# Patient Record
Sex: FEMALE | Race: WHITE | NOT HISPANIC OR LATINO | Employment: FULL TIME | URBAN - METROPOLITAN AREA
[De-identification: names, ages, dates, MRNs, and addresses within clinical notes are randomized per-mention and may not be internally consistent; named-entity substitution may affect disease eponyms.]

---

## 2018-02-21 ENCOUNTER — OFFICE VISIT (OUTPATIENT)
Dept: URGENT CARE | Facility: CLINIC | Age: 48
End: 2018-02-21
Payer: COMMERCIAL

## 2018-02-21 VITALS
HEIGHT: 65 IN | HEART RATE: 98 BPM | OXYGEN SATURATION: 99 % | TEMPERATURE: 99.9 F | BODY MASS INDEX: 36.66 KG/M2 | WEIGHT: 220.02 LBS | SYSTOLIC BLOOD PRESSURE: 128 MMHG | RESPIRATION RATE: 16 BRPM | DIASTOLIC BLOOD PRESSURE: 82 MMHG

## 2018-02-21 DIAGNOSIS — R05.9 COUGH: ICD-10-CM

## 2018-02-21 DIAGNOSIS — J01.90 ACUTE NON-RECURRENT SINUSITIS, UNSPECIFIED LOCATION: Primary | ICD-10-CM

## 2018-02-21 PROCEDURE — 99203 OFFICE O/P NEW LOW 30 MIN: CPT | Performed by: PHYSICIAN ASSISTANT

## 2018-02-21 RX ORDER — LEVONORGESTREL AND ETHINYL ESTRADIOL 0.1-0.02MG
KIT ORAL
COMMUNITY
Start: 2018-02-12 | End: 2020-09-24 | Stop reason: SDUPTHER

## 2018-02-21 RX ORDER — ALBUTEROL SULFATE 90 UG/1
2 AEROSOL, METERED RESPIRATORY (INHALATION) EVERY 4 HOURS PRN
Qty: 1 INHALER | Refills: 0 | Status: SHIPPED | OUTPATIENT
Start: 2018-02-21 | End: 2018-03-23

## 2018-02-21 RX ORDER — CEFPROZIL 250 MG/1
250 TABLET, FILM COATED ORAL 2 TIMES DAILY
Qty: 14 TABLET | Refills: 0 | Status: SHIPPED | OUTPATIENT
Start: 2018-02-21 | End: 2018-02-28

## 2018-02-21 RX ORDER — LEVOTHYROXINE SODIUM 0.05 MG/1
TABLET ORAL
COMMUNITY
Start: 2018-02-12 | End: 2020-05-27 | Stop reason: DRUGHIGH

## 2018-02-21 NOTE — PROGRESS NOTES
Assessment/Plan:      Diagnoses and all orders for this visit:    Acute non-recurrent sinusitis, unspecified location  -     cefprozil (CEFZIL) 250 mg tablet; Take 1 tablet (250 mg total) by mouth 2 (two) times a day for 7 days If Cefzil is not covered by insurance try Ceftin 250 mg BID x 7 days    Cough  -     albuterol (PROVENTIL HFA,VENTOLIN HFA) 90 mcg/act inhaler; Inhale 2 puffs every 4 (four) hours as needed for wheezing or shortness of breath for up to 30 days    Other orders  -     FALMINA 0 1-20 MG-MCG per tablet;   -     levothyroxine 50 mcg tablet;         Patient Instructions   Acute Cough   WHAT YOU NEED TO KNOW:   An acute cough can last up to 3 weeks  Common causes of an acute cough include a cold, allergies, or a lung infection  DISCHARGE INSTRUCTIONS:   Return to the emergency department if:   · You have trouble breathing or feel short of breath  · You cough up blood, or you see blood in your mucus  · You faint or feel weak or dizzy  · You have chest pain when you cough or take a deep breath  · You have new wheezing  Contact your healthcare provider if:   · You have a fever  · Your cough lasts longer than 4 weeks  · Your symptoms do not improve with treatment  · You have questions or concerns about your condition or care  Medicines:   · Medicines  may be needed to stop the cough, decrease swelling in your airways, or help open your airways  Medicine may also be given to help you cough up mucus  Ask your healthcare provider what over-the-counter medicines you can take  If you have an infection caused by bacteria, you may need antibiotics  · Take your medicine as directed  Contact your healthcare provider if you think your medicine is not helping or if you have side effects  Tell him or her if you are allergic to any medicine  Keep a list of the medicines, vitamins, and herbs you take  Include the amounts, and when and why you take them   Bring the list or the pill bottles to follow-up visits  Carry your medicine list with you in case of an emergency  Manage your symptoms:   · Do not smoke and stay away from others who smoke  Nicotine and other chemicals in cigarettes and cigars can cause lung damage and make your cough worse  Ask your healthcare provider for information if you currently smoke and need help to quit  E-cigarettes or smokeless tobacco still contain nicotine  Talk to your healthcare provider before you use these products  · Drink extra liquids as directed  Liquids will help thin and loosen mucus so you can cough it up  Liquids will also help prevent dehydration  Examples of good liquids to drink include water, fruit juice, and broth  Do not drink liquids that contain caffeine  Caffeine can increase your risk for dehydration  Ask your healthcare provider how much liquid to drink each day  · Rest as directed  Do not do activities that make your cough worse, such as exercise  · Use a humidifier or vaporizer  Use a cool mist humidifier or a vaporizer to increase air moisture in your home  This may make it easier for you to breathe and help decrease your cough  · Eat 2 to 5 mL of honey 2 times each day  Honey can help thin mucus and decrease your cough  · Use cough drops or lozenges  These can help decrease throat irritation and your cough  Follow up with your healthcare provider as directed:  Write down your questions so you remember to ask them during your visits  © 2017 [address] [facility] Information is for End User's use only and may not be sold, redistributed or otherwise used for commercial purposes  All illustrations and images included in CareNotes® are the copyrighted property of [employer]  or Devendra Chen  The above information is an  only  It is not intended as medical advice for individual conditions or treatments   Talk to your doctor, nurse or pharmacist before following any medical regimen to see if it is safe and effective for you  Rhinosinusitis   WHAT YOU NEED TO KNOW:   Rhinosinusitis (RS) is inflammation of your nose and sinuses  It commonly begins as a virus, often as a common cold  Viruses usually last 7 to 10 days and do not need treatment  When the virus does not get better on its own, you may have bacterial RS  This means that bacteria have begun to grow inside your sinuses  Acute RS lasts less than 4 weeks  Chronic RS lasts 12 weeks or more  Recurrent RS is when you have 4 or more episodes of RS in one year  DISCHARGE INSTRUCTIONS:   Return to the emergency department if:   · Your eye and eyelid are red, swollen, and painful  · You cannot open your eye  · You have double vision or you cannot see  · Your eyeball bulges out or you cannot move your eye  · You are more sleepy than normal or you notice changes in your ability to think, move, or talk  · You have a stiff neck, a fever, or a bad headache  · You have swelling of your forehead or scalp  Contact your healthcare provider if:   · Your symptoms are worse or do not improve after 3 to 5 days of treatment  · You have questions or concerns about your condition or care  Medicines: You may need any of the following:  · Acetaminophen  decreases pain and fever  It is available without a doctor's order  Ask how much to take and how often to take it  Follow directions  Acetaminophen can cause liver damage if not taken correctly  · NSAIDs , such as ibuprofen, help decrease swelling, pain, and fever  This medicine is available with or without a doctor's order  NSAIDs can cause stomach bleeding or kidney problems in certain people  If you take blood thinner medicine, always ask your healthcare provider if NSAIDs are safe for you  Always read the medicine label and follow directions  · Nasal steroid sprays  decrease inflammation in your nose and sinuses      · Decongestants  reduce swelling and drain mucus in the nose and sinuses  They may help you breathe easier  · Antihistamines  dry mucus in the nose and relieve sneezing  · Antibiotics  treat a bacterial infection and may be needed if your symptoms do not improve or they get worse  · Take your medicine as directed  Contact your healthcare provider if you think your medicine is not helping or if you have side effects  Tell him or her if you are allergic to any medicine  Keep a list of the medicines, vitamins, and herbs you take  Include the amounts, and when and why you take them  Bring the list or the pill bottles to follow-up visits  Carry your medicine list with you in case of an emergency  Self-care:   · Rinse your sinuses  Use a sinus rinse device to rinse your nasal passages with a saline (salt water) solution  This will help thin the mucus in your nose and rinse away pollen and dirt  It will also help reduce swelling so you can breathe normally  Ask your healthcare provider how often to do this  · Breathe in steam   Heat a bowl of water until you see steam  Lean over the bowl and make a tent over your head with a large towel  Breathe deeply for about 20 minutes  Be careful not to get too close to the steam or burn yourself  Do this 3 times a day  You can also breathe deeply when you take a hot shower  · Sleep with your head elevated  Place an extra pillow under your head before you go to sleep to help your sinuses drain  · Drink liquids as directed  Ask your healthcare provider how much liquid to drink each day and which liquids are best for you  Liquids will thin the mucus in your nose and help it drain  Avoid drinks that contain alcohol or caffeine  · Do not smoke, and avoid secondhand smoke  Nicotine and other chemicals in cigarettes and cigars can make your symptoms worse  Ask your healthcare provider for information if you currently smoke and need help to quit   E-cigarettes or smokeless tobacco still contain nicotine  Talk to your healthcare provider before you use these products  Follow up with your healthcare provider as directed: Follow up if your symptoms are worse or not better after 3 to 5 days of treatment  Write down your questions so you remember to ask them during your visits  © 2017 [address] [employer] Information is for End User's use only and may not be sold, redistributed or otherwise used for commercial purposes  All illustrations and images included in CareNotes® are the copyrighted property of [employer]  or Devendra Chen  The above information is an  only  It is not intended as medical advice for individual conditions or treatments  Talk to your doctor, nurse or pharmacist before following any medical regimen to see if it is safe and effective for you  Subjective:     Patient ID: Sascha Saenz is a 41 y o  female  Patient presents with a four-day history of sinus pressure purulent nasal drainage headaches sore throat and a cough  The cough can predict be productive of the same yellow mucus  She has been having a fevers as high as 100 8  Temperature today is only been 99  She denies any chest pain shortness of breath dyspnea on exertion she states the cough can be very cyclical and worse when she is active  She denies any body aches all  Review of Systems   Constitutional: Positive for fever  Negative for chills  HENT: Positive for congestion, postnasal drip, rhinorrhea, sinus pressure and sore throat  Negative for ear pain, trouble swallowing and voice change  Eyes: Negative for redness  Respiratory: Positive for cough  Negative for chest tightness, shortness of breath and wheezing  Cardiovascular: Negative for chest pain  Gastrointestinal: Negative for abdominal pain, diarrhea, nausea and vomiting  Musculoskeletal: Negative for myalgias  Skin: Negative for rash  Neurological: Positive for headaches   Negative for light-headedness  Hematological: Negative for adenopathy  Objective:     Physical Exam   Constitutional: She is oriented to person, place, and time  She appears well-developed and well-nourished  HENT:   Head: Normocephalic and atraumatic  Right Ear: External ear normal    Left Ear: External ear normal    Mouth/Throat: Oropharynx is clear and moist    Bogginess to nasal mucosa as well as the mucoid nasal drainage evident both nares  Eyes: Conjunctivae are normal    Neck: Neck supple  Cardiovascular: Normal rate, regular rhythm and normal heart sounds  Pulmonary/Chest: Effort normal and breath sounds normal    Lymphadenopathy:     She has no cervical adenopathy  Neurological: She is alert and oriented to person, place, and time  Skin: Skin is warm and dry  No rash noted  Psychiatric: She has a normal mood and affect  Nursing note and vitals reviewed

## 2018-02-21 NOTE — PATIENT INSTRUCTIONS
Acute Cough   WHAT YOU NEED TO KNOW:   An acute cough can last up to 3 weeks  Common causes of an acute cough include a cold, allergies, or a lung infection  DISCHARGE INSTRUCTIONS:   Return to the emergency department if:   · You have trouble breathing or feel short of breath  · You cough up blood, or you see blood in your mucus  · You faint or feel weak or dizzy  · You have chest pain when you cough or take a deep breath  · You have new wheezing  Contact your healthcare provider if:   · You have a fever  · Your cough lasts longer than 4 weeks  · Your symptoms do not improve with treatment  · You have questions or concerns about your condition or care  Medicines:   · Medicines  may be needed to stop the cough, decrease swelling in your airways, or help open your airways  Medicine may also be given to help you cough up mucus  Ask your healthcare provider what over-the-counter medicines you can take  If you have an infection caused by bacteria, you may need antibiotics  · Take your medicine as directed  Contact your healthcare provider if you think your medicine is not helping or if you have side effects  Tell him or her if you are allergic to any medicine  Keep a list of the medicines, vitamins, and herbs you take  Include the amounts, and when and why you take them  Bring the list or the pill bottles to follow-up visits  Carry your medicine list with you in case of an emergency  Manage your symptoms:   · Do not smoke and stay away from others who smoke  Nicotine and other chemicals in cigarettes and cigars can cause lung damage and make your cough worse  Ask your healthcare provider for information if you currently smoke and need help to quit  E-cigarettes or smokeless tobacco still contain nicotine  Talk to your healthcare provider before you use these products  · Drink extra liquids as directed  Liquids will help thin and loosen mucus so you can cough it up   Liquids will also help prevent dehydration  Examples of good liquids to drink include water, fruit juice, and broth  Do not drink liquids that contain caffeine  Caffeine can increase your risk for dehydration  Ask your healthcare provider how much liquid to drink each day  · Rest as directed  Do not do activities that make your cough worse, such as exercise  · Use a humidifier or vaporizer  Use a cool mist humidifier or a vaporizer to increase air moisture in your home  This may make it easier for you to breathe and help decrease your cough  · Eat 2 to 5 mL of honey 2 times each day  Honey can help thin mucus and decrease your cough  · Use cough drops or lozenges  These can help decrease throat irritation and your cough  Follow up with your healthcare provider as directed:  Write down your questions so you remember to ask them during your visits  © 2017 [address] [employer] Information is for End User's use only and may not be sold, redistributed or otherwise used for commercial purposes  All illustrations and images included in CareNotes® are the copyrighted property of [employer]  or [address]  The above information is an  only  It is not intended as medical advice for individual conditions or treatments  Talk to your doctor, nurse or pharmacist before following any medical regimen to see if it is safe and effective for you  Rhinosinusitis   WHAT YOU NEED TO KNOW:   Rhinosinusitis (RS) is inflammation of your nose and sinuses  It commonly begins as a virus, often as a common cold  Viruses usually last 7 to 10 days and do not need treatment  When the virus does not get better on its own, you may have bacterial RS  This means that bacteria have begun to grow inside your sinuses  Acute RS lasts less than 4 weeks  Chronic RS lasts 12 weeks or more  Recurrent RS is when you have 4 or more episodes of RS in one year     DISCHARGE INSTRUCTIONS:   Return to the emergency department if:   · Your eye and eyelid are red, swollen, and painful  · You cannot open your eye  · You have double vision or you cannot see  · Your eyeball bulges out or you cannot move your eye  · You are more sleepy than normal or you notice changes in your ability to think, move, or talk  · You have a stiff neck, a fever, or a bad headache  · You have swelling of your forehead or scalp  Contact your healthcare provider if:   · Your symptoms are worse or do not improve after 3 to 5 days of treatment  · You have questions or concerns about your condition or care  Medicines: You may need any of the following:  · Acetaminophen  decreases pain and fever  It is available without a doctor's order  Ask how much to take and how often to take it  Follow directions  Acetaminophen can cause liver damage if not taken correctly  · NSAIDs , such as ibuprofen, help decrease swelling, pain, and fever  This medicine is available with or without a doctor's order  NSAIDs can cause stomach bleeding or kidney problems in certain people  If you take blood thinner medicine, always ask your healthcare provider if NSAIDs are safe for you  Always read the medicine label and follow directions  · Nasal steroid sprays  decrease inflammation in your nose and sinuses  · Decongestants  reduce swelling and drain mucus in the nose and sinuses  They may help you breathe easier  · Antihistamines  dry mucus in the nose and relieve sneezing  · Antibiotics  treat a bacterial infection and may be needed if your symptoms do not improve or they get worse  · Take your medicine as directed  Contact your healthcare provider if you think your medicine is not helping or if you have side effects  Tell him or her if you are allergic to any medicine  Keep a list of the medicines, vitamins, and herbs you take  Include the amounts, and when and why you take them   Bring the list or the pill bottles to follow-up visits  Carry your medicine list with you in case of an emergency  Self-care:   · Rinse your sinuses  Use a sinus rinse device to rinse your nasal passages with a saline (salt water) solution  This will help thin the mucus in your nose and rinse away pollen and dirt  It will also help reduce swelling so you can breathe normally  Ask your healthcare provider how often to do this  · Breathe in steam   Heat a bowl of water until you see steam  Lean over the bowl and make a tent over your head with a large towel  Breathe deeply for about 20 minutes  Be careful not to get too close to the steam or burn yourself  Do this 3 times a day  You can also breathe deeply when you take a hot shower  · Sleep with your head elevated  Place an extra pillow under your head before you go to sleep to help your sinuses drain  · Drink liquids as directed  Ask your healthcare provider how much liquid to drink each day and which liquids are best for you  Liquids will thin the mucus in your nose and help it drain  Avoid drinks that contain alcohol or caffeine  · Do not smoke, and avoid secondhand smoke  Nicotine and other chemicals in cigarettes and cigars can make your symptoms worse  Ask your healthcare provider for information if you currently smoke and need help to quit  E-cigarettes or smokeless tobacco still contain nicotine  Talk to your healthcare provider before you use these products  Follow up with your healthcare provider as directed: Follow up if your symptoms are worse or not better after 3 to 5 days of treatment  Write down your questions so you remember to ask them during your visits  © 2017 [address] [employer] Information is for End User's use only and may not be sold, redistributed or otherwise used for commercial purposes  All illustrations and images included in CareNotes® are the copyrighted property of [employer]  or Devendra Chen    The above information is an  only  It is not intended as medical advice for individual conditions or treatments  Talk to your doctor, nurse or pharmacist before following any medical regimen to see if it is safe and effective for you

## 2018-07-27 ENCOUNTER — APPOINTMENT (OUTPATIENT)
Dept: LAB | Facility: CLINIC | Age: 48
End: 2018-07-27
Payer: COMMERCIAL

## 2018-07-27 ENCOUNTER — TRANSCRIBE ORDERS (OUTPATIENT)
Dept: LAB | Facility: CLINIC | Age: 48
End: 2018-07-27

## 2018-07-27 DIAGNOSIS — I51.9 MYXEDEMA HEART DISEASE: Primary | ICD-10-CM

## 2018-07-27 DIAGNOSIS — I51.9 MYXEDEMA HEART DISEASE: ICD-10-CM

## 2018-07-27 DIAGNOSIS — E03.9 MYXEDEMA HEART DISEASE: ICD-10-CM

## 2018-07-27 DIAGNOSIS — E03.9 MYXEDEMA HEART DISEASE: Primary | ICD-10-CM

## 2018-07-27 LAB
ALBUMIN SERPL BCP-MCNC: 3.6 G/DL (ref 3.5–5)
ALP SERPL-CCNC: 46 U/L (ref 46–116)
ALT SERPL W P-5'-P-CCNC: 18 U/L (ref 12–78)
ANION GAP SERPL CALCULATED.3IONS-SCNC: 6 MMOL/L (ref 4–13)
AST SERPL W P-5'-P-CCNC: 13 U/L (ref 5–45)
BASOPHILS # BLD AUTO: 0.06 THOUSANDS/ΜL (ref 0–0.1)
BASOPHILS NFR BLD AUTO: 1 % (ref 0–1)
BILIRUB SERPL-MCNC: 0.66 MG/DL (ref 0.2–1)
BUN SERPL-MCNC: 17 MG/DL (ref 5–25)
CALCIUM SERPL-MCNC: 8.6 MG/DL (ref 8.3–10.1)
CHLORIDE SERPL-SCNC: 109 MMOL/L (ref 100–108)
CHOLEST SERPL-MCNC: 158 MG/DL (ref 50–200)
CO2 SERPL-SCNC: 26 MMOL/L (ref 21–32)
CREAT SERPL-MCNC: 1.02 MG/DL (ref 0.6–1.3)
EOSINOPHIL # BLD AUTO: 0.26 THOUSAND/ΜL (ref 0–0.61)
EOSINOPHIL NFR BLD AUTO: 4 % (ref 0–6)
ERYTHROCYTE [DISTWIDTH] IN BLOOD BY AUTOMATED COUNT: 14 % (ref 11.6–15.1)
GFR SERPL CREATININE-BSD FRML MDRD: 67 ML/MIN/1.73SQ M
GLUCOSE P FAST SERPL-MCNC: 85 MG/DL (ref 65–99)
HCT VFR BLD AUTO: 41.6 % (ref 34.8–46.1)
HDLC SERPL-MCNC: 65 MG/DL (ref 40–60)
HGB BLD-MCNC: 13.3 G/DL (ref 11.5–15.4)
IMM GRANULOCYTES # BLD AUTO: 0.01 THOUSAND/UL (ref 0–0.2)
IMM GRANULOCYTES NFR BLD AUTO: 0 % (ref 0–2)
LDLC SERPL CALC-MCNC: 80 MG/DL (ref 0–100)
LYMPHOCYTES # BLD AUTO: 1.65 THOUSANDS/ΜL (ref 0.6–4.47)
LYMPHOCYTES NFR BLD AUTO: 24 % (ref 14–44)
MCH RBC QN AUTO: 28.7 PG (ref 26.8–34.3)
MCHC RBC AUTO-ENTMCNC: 32 G/DL (ref 31.4–37.4)
MCV RBC AUTO: 90 FL (ref 82–98)
MONOCYTES # BLD AUTO: 0.67 THOUSAND/ΜL (ref 0.17–1.22)
MONOCYTES NFR BLD AUTO: 10 % (ref 4–12)
NEUTROPHILS # BLD AUTO: 4.25 THOUSANDS/ΜL (ref 1.85–7.62)
NEUTS SEG NFR BLD AUTO: 61 % (ref 43–75)
NONHDLC SERPL-MCNC: 93 MG/DL
NRBC BLD AUTO-RTO: 0 /100 WBCS
PLATELET # BLD AUTO: 296 THOUSANDS/UL (ref 149–390)
PMV BLD AUTO: 11.3 FL (ref 8.9–12.7)
POTASSIUM SERPL-SCNC: 4.1 MMOL/L (ref 3.5–5.3)
PROT SERPL-MCNC: 7.4 G/DL (ref 6.4–8.2)
RBC # BLD AUTO: 4.63 MILLION/UL (ref 3.81–5.12)
SODIUM SERPL-SCNC: 141 MMOL/L (ref 136–145)
TRIGL SERPL-MCNC: 63 MG/DL
TSH SERPL DL<=0.05 MIU/L-ACNC: 7.19 UIU/ML (ref 0.36–3.74)
WBC # BLD AUTO: 6.9 THOUSAND/UL (ref 4.31–10.16)

## 2018-07-27 PROCEDURE — 80061 LIPID PANEL: CPT

## 2018-07-27 PROCEDURE — 85025 COMPLETE CBC W/AUTO DIFF WBC: CPT

## 2018-07-27 PROCEDURE — 36415 COLL VENOUS BLD VENIPUNCTURE: CPT

## 2018-07-27 PROCEDURE — 84443 ASSAY THYROID STIM HORMONE: CPT

## 2018-07-27 PROCEDURE — 80053 COMPREHEN METABOLIC PANEL: CPT

## 2018-10-10 ENCOUNTER — TRANSCRIBE ORDERS (OUTPATIENT)
Dept: LAB | Facility: CLINIC | Age: 48
End: 2018-10-10

## 2018-10-10 ENCOUNTER — APPOINTMENT (OUTPATIENT)
Dept: LAB | Facility: CLINIC | Age: 48
End: 2018-10-10
Payer: COMMERCIAL

## 2018-10-10 DIAGNOSIS — E03.9 MYXEDEMA HEART DISEASE: ICD-10-CM

## 2018-10-10 DIAGNOSIS — I51.9 MYXEDEMA HEART DISEASE: Primary | ICD-10-CM

## 2018-10-10 DIAGNOSIS — I51.9 MYXEDEMA HEART DISEASE: ICD-10-CM

## 2018-10-10 DIAGNOSIS — E03.9 MYXEDEMA HEART DISEASE: Primary | ICD-10-CM

## 2018-10-10 LAB
T4 FREE SERPL-MCNC: 1 NG/DL (ref 0.76–1.46)
TSH SERPL DL<=0.05 MIU/L-ACNC: 10.3 UIU/ML (ref 0.36–3.74)

## 2018-10-10 PROCEDURE — 84439 ASSAY OF FREE THYROXINE: CPT

## 2018-10-10 PROCEDURE — 84443 ASSAY THYROID STIM HORMONE: CPT

## 2018-10-10 PROCEDURE — 36415 COLL VENOUS BLD VENIPUNCTURE: CPT

## 2019-02-04 ENCOUNTER — APPOINTMENT (OUTPATIENT)
Dept: LAB | Facility: CLINIC | Age: 49
End: 2019-02-04
Payer: COMMERCIAL

## 2019-02-04 ENCOUNTER — TRANSCRIBE ORDERS (OUTPATIENT)
Dept: LAB | Facility: CLINIC | Age: 49
End: 2019-02-04

## 2019-02-04 DIAGNOSIS — E03.9 MYXEDEMA HEART DISEASE: ICD-10-CM

## 2019-02-04 DIAGNOSIS — E03.9 ACQUIRED HYPOTHYROIDISM: Primary | ICD-10-CM

## 2019-02-04 DIAGNOSIS — I51.9 MYXEDEMA HEART DISEASE: ICD-10-CM

## 2019-02-04 DIAGNOSIS — E03.9 ACQUIRED HYPOTHYROIDISM: ICD-10-CM

## 2019-02-04 LAB
T4 FREE SERPL-MCNC: 1.07 NG/DL (ref 0.76–1.46)
TSH SERPL DL<=0.05 MIU/L-ACNC: 2.24 UIU/ML (ref 0.36–3.74)

## 2019-02-04 PROCEDURE — 84439 ASSAY OF FREE THYROXINE: CPT

## 2019-02-04 PROCEDURE — 36415 COLL VENOUS BLD VENIPUNCTURE: CPT

## 2019-02-04 PROCEDURE — 84443 ASSAY THYROID STIM HORMONE: CPT

## 2019-08-12 ENCOUNTER — APPOINTMENT (OUTPATIENT)
Dept: LAB | Facility: CLINIC | Age: 49
End: 2019-08-12
Payer: COMMERCIAL

## 2019-08-12 ENCOUNTER — TRANSCRIBE ORDERS (OUTPATIENT)
Dept: LAB | Facility: CLINIC | Age: 49
End: 2019-08-12

## 2019-08-12 DIAGNOSIS — I51.9 MYXEDEMA HEART DISEASE: ICD-10-CM

## 2019-08-12 DIAGNOSIS — I51.9 MYXEDEMA HEART DISEASE: Primary | ICD-10-CM

## 2019-08-12 DIAGNOSIS — E03.9 MYXEDEMA HEART DISEASE: Primary | ICD-10-CM

## 2019-08-12 DIAGNOSIS — E03.9 MYXEDEMA HEART DISEASE: ICD-10-CM

## 2019-08-12 LAB
ALBUMIN SERPL BCP-MCNC: 3.8 G/DL (ref 3.5–5)
ALP SERPL-CCNC: 48 U/L (ref 46–116)
ALT SERPL W P-5'-P-CCNC: 20 U/L (ref 12–78)
ANION GAP SERPL CALCULATED.3IONS-SCNC: 10 MMOL/L (ref 4–13)
AST SERPL W P-5'-P-CCNC: 15 U/L (ref 5–45)
BASOPHILS # BLD AUTO: 0.06 THOUSANDS/ΜL (ref 0–0.1)
BASOPHILS NFR BLD AUTO: 1 % (ref 0–1)
BILIRUB SERPL-MCNC: 0.47 MG/DL (ref 0.2–1)
BUN SERPL-MCNC: 17 MG/DL (ref 5–25)
CALCIUM SERPL-MCNC: 9.3 MG/DL (ref 8.3–10.1)
CHLORIDE SERPL-SCNC: 108 MMOL/L (ref 100–108)
CHOLEST SERPL-MCNC: 174 MG/DL (ref 50–200)
CO2 SERPL-SCNC: 22 MMOL/L (ref 21–32)
CREAT SERPL-MCNC: 0.86 MG/DL (ref 0.6–1.3)
EOSINOPHIL # BLD AUTO: 0.12 THOUSAND/ΜL (ref 0–0.61)
EOSINOPHIL NFR BLD AUTO: 2 % (ref 0–6)
ERYTHROCYTE [DISTWIDTH] IN BLOOD BY AUTOMATED COUNT: 13.7 % (ref 11.6–15.1)
GFR SERPL CREATININE-BSD FRML MDRD: 81 ML/MIN/1.73SQ M
GLUCOSE P FAST SERPL-MCNC: 81 MG/DL (ref 65–99)
HCT VFR BLD AUTO: 41.9 % (ref 34.8–46.1)
HDLC SERPL-MCNC: 68 MG/DL (ref 40–60)
HGB BLD-MCNC: 13.2 G/DL (ref 11.5–15.4)
IMM GRANULOCYTES # BLD AUTO: 0.02 THOUSAND/UL (ref 0–0.2)
IMM GRANULOCYTES NFR BLD AUTO: 0 % (ref 0–2)
LDLC SERPL CALC-MCNC: 92 MG/DL (ref 0–100)
LYMPHOCYTES # BLD AUTO: 1.91 THOUSANDS/ΜL (ref 0.6–4.47)
LYMPHOCYTES NFR BLD AUTO: 26 % (ref 14–44)
MCH RBC QN AUTO: 28.6 PG (ref 26.8–34.3)
MCHC RBC AUTO-ENTMCNC: 31.5 G/DL (ref 31.4–37.4)
MCV RBC AUTO: 91 FL (ref 82–98)
MONOCYTES # BLD AUTO: 0.55 THOUSAND/ΜL (ref 0.17–1.22)
MONOCYTES NFR BLD AUTO: 7 % (ref 4–12)
NEUTROPHILS # BLD AUTO: 4.79 THOUSANDS/ΜL (ref 1.85–7.62)
NEUTS SEG NFR BLD AUTO: 64 % (ref 43–75)
NONHDLC SERPL-MCNC: 106 MG/DL
NRBC BLD AUTO-RTO: 0 /100 WBCS
PLATELET # BLD AUTO: 317 THOUSANDS/UL (ref 149–390)
PMV BLD AUTO: 11.2 FL (ref 8.9–12.7)
POTASSIUM SERPL-SCNC: 4.2 MMOL/L (ref 3.5–5.3)
PROT SERPL-MCNC: 7.5 G/DL (ref 6.4–8.2)
RBC # BLD AUTO: 4.62 MILLION/UL (ref 3.81–5.12)
SODIUM SERPL-SCNC: 140 MMOL/L (ref 136–145)
T4 FREE SERPL-MCNC: 0.95 NG/DL (ref 0.76–1.46)
TRIGL SERPL-MCNC: 70 MG/DL
TSH SERPL DL<=0.05 MIU/L-ACNC: 5.72 UIU/ML (ref 0.36–3.74)
WBC # BLD AUTO: 7.45 THOUSAND/UL (ref 4.31–10.16)

## 2019-08-12 PROCEDURE — 80061 LIPID PANEL: CPT

## 2019-08-12 PROCEDURE — 80053 COMPREHEN METABOLIC PANEL: CPT

## 2019-08-12 PROCEDURE — 85025 COMPLETE CBC W/AUTO DIFF WBC: CPT

## 2019-08-12 PROCEDURE — 84439 ASSAY OF FREE THYROXINE: CPT

## 2019-08-12 PROCEDURE — 36415 COLL VENOUS BLD VENIPUNCTURE: CPT

## 2019-08-12 PROCEDURE — 84443 ASSAY THYROID STIM HORMONE: CPT

## 2020-04-20 ENCOUNTER — APPOINTMENT (OUTPATIENT)
Dept: LAB | Facility: CLINIC | Age: 50
End: 2020-04-20
Payer: COMMERCIAL

## 2020-04-20 ENCOUNTER — TRANSCRIBE ORDERS (OUTPATIENT)
Dept: ADMINISTRATIVE | Facility: HOSPITAL | Age: 50
End: 2020-04-20

## 2020-04-20 DIAGNOSIS — E03.9 ACQUIRED HYPOTHYROIDISM: Primary | ICD-10-CM

## 2020-04-20 DIAGNOSIS — E03.9 ACQUIRED HYPOTHYROIDISM: ICD-10-CM

## 2020-04-20 LAB
ALBUMIN SERPL BCP-MCNC: 3.4 G/DL (ref 3.5–5)
ALP SERPL-CCNC: 40 U/L (ref 46–116)
ALT SERPL W P-5'-P-CCNC: 20 U/L (ref 12–78)
ANION GAP SERPL CALCULATED.3IONS-SCNC: 6 MMOL/L (ref 4–13)
AST SERPL W P-5'-P-CCNC: 11 U/L (ref 5–45)
BASOPHILS # BLD AUTO: 0.07 THOUSANDS/ΜL (ref 0–0.1)
BASOPHILS NFR BLD AUTO: 1 % (ref 0–1)
BILIRUB SERPL-MCNC: 0.4 MG/DL (ref 0.2–1)
BUN SERPL-MCNC: 16 MG/DL (ref 5–25)
CALCIUM SERPL-MCNC: 8.7 MG/DL (ref 8.3–10.1)
CHLORIDE SERPL-SCNC: 109 MMOL/L (ref 100–108)
CHOLEST SERPL-MCNC: 152 MG/DL (ref 50–200)
CO2 SERPL-SCNC: 24 MMOL/L (ref 21–32)
CREAT SERPL-MCNC: 0.9 MG/DL (ref 0.6–1.3)
EOSINOPHIL # BLD AUTO: 0.28 THOUSAND/ΜL (ref 0–0.61)
EOSINOPHIL NFR BLD AUTO: 5 % (ref 0–6)
ERYTHROCYTE [DISTWIDTH] IN BLOOD BY AUTOMATED COUNT: 13.7 % (ref 11.6–15.1)
GFR SERPL CREATININE-BSD FRML MDRD: 76 ML/MIN/1.73SQ M
GLUCOSE P FAST SERPL-MCNC: 85 MG/DL (ref 65–99)
HCT VFR BLD AUTO: 38.9 % (ref 34.8–46.1)
HDLC SERPL-MCNC: 54 MG/DL
HGB BLD-MCNC: 12.4 G/DL (ref 11.5–15.4)
IMM GRANULOCYTES # BLD AUTO: 0.01 THOUSAND/UL (ref 0–0.2)
IMM GRANULOCYTES NFR BLD AUTO: 0 % (ref 0–2)
LDLC SERPL CALC-MCNC: 86 MG/DL (ref 0–100)
LYMPHOCYTES # BLD AUTO: 1.46 THOUSANDS/ΜL (ref 0.6–4.47)
LYMPHOCYTES NFR BLD AUTO: 28 % (ref 14–44)
MCH RBC QN AUTO: 29.1 PG (ref 26.8–34.3)
MCHC RBC AUTO-ENTMCNC: 31.9 G/DL (ref 31.4–37.4)
MCV RBC AUTO: 91 FL (ref 82–98)
MONOCYTES # BLD AUTO: 0.55 THOUSAND/ΜL (ref 0.17–1.22)
MONOCYTES NFR BLD AUTO: 11 % (ref 4–12)
NEUTROPHILS # BLD AUTO: 2.83 THOUSANDS/ΜL (ref 1.85–7.62)
NEUTS SEG NFR BLD AUTO: 55 % (ref 43–75)
NONHDLC SERPL-MCNC: 98 MG/DL
NRBC BLD AUTO-RTO: 0 /100 WBCS
PLATELET # BLD AUTO: 271 THOUSANDS/UL (ref 149–390)
PMV BLD AUTO: 11.1 FL (ref 8.9–12.7)
POTASSIUM SERPL-SCNC: 4.4 MMOL/L (ref 3.5–5.3)
PROT SERPL-MCNC: 6.9 G/DL (ref 6.4–8.2)
RBC # BLD AUTO: 4.26 MILLION/UL (ref 3.81–5.12)
SODIUM SERPL-SCNC: 139 MMOL/L (ref 136–145)
T4 FREE SERPL-MCNC: 0.98 NG/DL (ref 0.76–1.46)
TRIGL SERPL-MCNC: 59 MG/DL
TSH SERPL DL<=0.05 MIU/L-ACNC: 6.3 UIU/ML (ref 0.36–3.74)
WBC # BLD AUTO: 5.2 THOUSAND/UL (ref 4.31–10.16)

## 2020-04-20 PROCEDURE — 36415 COLL VENOUS BLD VENIPUNCTURE: CPT

## 2020-04-20 PROCEDURE — 80053 COMPREHEN METABOLIC PANEL: CPT

## 2020-04-20 PROCEDURE — 84439 ASSAY OF FREE THYROXINE: CPT

## 2020-04-20 PROCEDURE — 85025 COMPLETE CBC W/AUTO DIFF WBC: CPT

## 2020-04-20 PROCEDURE — 84443 ASSAY THYROID STIM HORMONE: CPT

## 2020-04-20 PROCEDURE — 80061 LIPID PANEL: CPT

## 2020-05-27 ENCOUNTER — OFFICE VISIT (OUTPATIENT)
Dept: URGENT CARE | Facility: CLINIC | Age: 50
End: 2020-05-27
Payer: COMMERCIAL

## 2020-05-27 VITALS
BODY MASS INDEX: 37.94 KG/M2 | TEMPERATURE: 97 F | OXYGEN SATURATION: 97 % | WEIGHT: 228 LBS | RESPIRATION RATE: 18 BRPM | HEART RATE: 76 BPM | DIASTOLIC BLOOD PRESSURE: 85 MMHG | SYSTOLIC BLOOD PRESSURE: 132 MMHG

## 2020-05-27 DIAGNOSIS — J45.901 MILD ASTHMA WITH ACUTE EXACERBATION, UNSPECIFIED WHETHER PERSISTENT: Primary | ICD-10-CM

## 2020-05-27 DIAGNOSIS — R05.9 COUGH: ICD-10-CM

## 2020-05-27 PROCEDURE — 99213 OFFICE O/P EST LOW 20 MIN: CPT | Performed by: PHYSICIAN ASSISTANT

## 2020-05-27 PROCEDURE — 96372 THER/PROPH/DIAG INJ SC/IM: CPT | Performed by: PHYSICIAN ASSISTANT

## 2020-05-27 RX ORDER — AZITHROMYCIN 250 MG/1
TABLET, FILM COATED ORAL
Qty: 6 TABLET | Refills: 0 | Status: SHIPPED | OUTPATIENT
Start: 2020-05-27 | End: 2020-05-27 | Stop reason: CLARIF

## 2020-05-27 RX ORDER — BENZONATATE 200 MG/1
200 CAPSULE ORAL 3 TIMES DAILY PRN
Qty: 20 CAPSULE | Refills: 0 | Status: SHIPPED | OUTPATIENT
Start: 2020-05-27 | End: 2020-05-27 | Stop reason: CLARIF

## 2020-05-27 RX ORDER — BENZONATATE 200 MG/1
200 CAPSULE ORAL 3 TIMES DAILY PRN
Qty: 20 CAPSULE | Refills: 0 | Status: SHIPPED | OUTPATIENT
Start: 2020-05-27 | End: 2020-09-24

## 2020-05-27 RX ORDER — BENZONATATE 100 MG/1
100 CAPSULE ORAL 3 TIMES DAILY PRN
COMMUNITY
Start: 2020-03-17 | End: 2020-09-24

## 2020-05-27 RX ORDER — DEXAMETHASONE SODIUM PHOSPHATE 10 MG/ML
10 INJECTION, SOLUTION INTRAMUSCULAR; INTRAVENOUS ONCE
Status: COMPLETED | OUTPATIENT
Start: 2020-05-27 | End: 2020-05-27

## 2020-05-27 RX ORDER — LEVOFLOXACIN 500 MG/1
500 TABLET, FILM COATED ORAL EVERY 24 HOURS
Qty: 7 TABLET | Refills: 0 | Status: SHIPPED | OUTPATIENT
Start: 2020-05-27 | End: 2020-06-03

## 2020-05-27 RX ORDER — PREDNISONE 50 MG/1
50 TABLET ORAL DAILY
Qty: 5 TABLET | Refills: 0 | Status: SHIPPED | OUTPATIENT
Start: 2020-05-27 | End: 2020-06-01

## 2020-05-27 RX ORDER — LEVOTHYROXINE SODIUM 0.07 MG/1
TABLET ORAL
COMMUNITY
Start: 2020-02-28 | End: 2021-02-25 | Stop reason: SDUPTHER

## 2020-05-27 RX ORDER — PREDNISONE 50 MG/1
50 TABLET ORAL DAILY
Qty: 5 TABLET | Refills: 0 | Status: SHIPPED | OUTPATIENT
Start: 2020-05-27 | End: 2020-05-27 | Stop reason: CLARIF

## 2020-05-27 RX ORDER — LORATADINE 10 MG/1
10 TABLET ORAL DAILY
COMMUNITY

## 2020-05-27 RX ORDER — MELOXICAM 15 MG/1
TABLET ORAL
COMMUNITY
Start: 2020-04-23 | End: 2020-09-24 | Stop reason: SDUPTHER

## 2020-05-27 RX ADMIN — DEXAMETHASONE SODIUM PHOSPHATE 10 MG: 10 INJECTION, SOLUTION INTRAMUSCULAR; INTRAVENOUS at 17:18

## 2020-09-08 ENCOUNTER — TRANSCRIBE ORDERS (OUTPATIENT)
Dept: ADMINISTRATIVE | Facility: HOSPITAL | Age: 50
End: 2020-09-08

## 2020-09-08 DIAGNOSIS — R06.02 SHORTNESS OF BREATH: Primary | ICD-10-CM

## 2020-09-15 ENCOUNTER — TRANSCRIBE ORDERS (OUTPATIENT)
Dept: LAB | Facility: CLINIC | Age: 50
End: 2020-09-15

## 2020-09-15 ENCOUNTER — LAB (OUTPATIENT)
Dept: LAB | Facility: CLINIC | Age: 50
End: 2020-09-15
Payer: COMMERCIAL

## 2020-09-15 DIAGNOSIS — E03.9 HYPOTHYROIDISM, UNSPECIFIED TYPE: ICD-10-CM

## 2020-09-15 DIAGNOSIS — E03.9 HYPOTHYROIDISM, UNSPECIFIED TYPE: Primary | ICD-10-CM

## 2020-09-15 LAB
ALBUMIN SERPL BCP-MCNC: 3.6 G/DL (ref 3.5–5)
ALP SERPL-CCNC: 48 U/L (ref 46–116)
ALT SERPL W P-5'-P-CCNC: 21 U/L (ref 12–78)
ANION GAP SERPL CALCULATED.3IONS-SCNC: 7 MMOL/L (ref 4–13)
AST SERPL W P-5'-P-CCNC: 15 U/L (ref 5–45)
BASOPHILS # BLD AUTO: 0.1 THOUSANDS/ΜL (ref 0–0.1)
BASOPHILS NFR BLD AUTO: 2 % (ref 0–1)
BILIRUB SERPL-MCNC: 0.63 MG/DL (ref 0.2–1)
BUN SERPL-MCNC: 12 MG/DL (ref 5–25)
CALCIUM SERPL-MCNC: 8.9 MG/DL (ref 8.3–10.1)
CHLORIDE SERPL-SCNC: 107 MMOL/L (ref 100–108)
CHOLEST SERPL-MCNC: 204 MG/DL (ref 50–200)
CO2 SERPL-SCNC: 25 MMOL/L (ref 21–32)
CREAT SERPL-MCNC: 0.86 MG/DL (ref 0.6–1.3)
EOSINOPHIL # BLD AUTO: 0.35 THOUSAND/ΜL (ref 0–0.61)
EOSINOPHIL NFR BLD AUTO: 6 % (ref 0–6)
ERYTHROCYTE [DISTWIDTH] IN BLOOD BY AUTOMATED COUNT: 13.7 % (ref 11.6–15.1)
EST. AVERAGE GLUCOSE BLD GHB EST-MCNC: 100 MG/DL
GFR SERPL CREATININE-BSD FRML MDRD: 80 ML/MIN/1.73SQ M
GLUCOSE P FAST SERPL-MCNC: 90 MG/DL (ref 65–99)
HBA1C MFR BLD: 5.1 %
HCT VFR BLD AUTO: 43 % (ref 34.8–46.1)
HDLC SERPL-MCNC: 67 MG/DL
HGB BLD-MCNC: 13.4 G/DL (ref 11.5–15.4)
IMM GRANULOCYTES # BLD AUTO: 0.03 THOUSAND/UL (ref 0–0.2)
IMM GRANULOCYTES NFR BLD AUTO: 1 % (ref 0–2)
LDLC SERPL CALC-MCNC: 121 MG/DL (ref 0–100)
LYMPHOCYTES # BLD AUTO: 1.22 THOUSANDS/ΜL (ref 0.6–4.47)
LYMPHOCYTES NFR BLD AUTO: 20 % (ref 14–44)
MCH RBC QN AUTO: 29.1 PG (ref 26.8–34.3)
MCHC RBC AUTO-ENTMCNC: 31.2 G/DL (ref 31.4–37.4)
MCV RBC AUTO: 93 FL (ref 82–98)
MONOCYTES # BLD AUTO: 0.55 THOUSAND/ΜL (ref 0.17–1.22)
MONOCYTES NFR BLD AUTO: 9 % (ref 4–12)
NEUTROPHILS # BLD AUTO: 3.91 THOUSANDS/ΜL (ref 1.85–7.62)
NEUTS SEG NFR BLD AUTO: 62 % (ref 43–75)
NONHDLC SERPL-MCNC: 137 MG/DL
NRBC BLD AUTO-RTO: 0 /100 WBCS
PLATELET # BLD AUTO: 321 THOUSANDS/UL (ref 149–390)
PMV BLD AUTO: 10.8 FL (ref 8.9–12.7)
POTASSIUM SERPL-SCNC: 4.1 MMOL/L (ref 3.5–5.3)
PROT SERPL-MCNC: 7.3 G/DL (ref 6.4–8.2)
RBC # BLD AUTO: 4.61 MILLION/UL (ref 3.81–5.12)
SODIUM SERPL-SCNC: 139 MMOL/L (ref 136–145)
TRIGL SERPL-MCNC: 80 MG/DL
TSH SERPL DL<=0.05 MIU/L-ACNC: 5.49 UIU/ML (ref 0.36–3.74)
WBC # BLD AUTO: 6.16 THOUSAND/UL (ref 4.31–10.16)

## 2020-09-15 PROCEDURE — 85025 COMPLETE CBC W/AUTO DIFF WBC: CPT

## 2020-09-15 PROCEDURE — 36415 COLL VENOUS BLD VENIPUNCTURE: CPT

## 2020-09-15 PROCEDURE — 84443 ASSAY THYROID STIM HORMONE: CPT

## 2020-09-15 PROCEDURE — 83036 HEMOGLOBIN GLYCOSYLATED A1C: CPT

## 2020-09-15 PROCEDURE — 80053 COMPREHEN METABOLIC PANEL: CPT

## 2020-09-15 PROCEDURE — 80061 LIPID PANEL: CPT

## 2020-09-18 ENCOUNTER — HOSPITAL ENCOUNTER (OUTPATIENT)
Dept: PULMONOLOGY | Facility: HOSPITAL | Age: 50
Discharge: HOME | End: 2020-09-18
Payer: COMMERCIAL

## 2020-09-18 DIAGNOSIS — R06.02 SHORTNESS OF BREATH: ICD-10-CM

## 2020-09-18 PROCEDURE — 94010 BREATHING CAPACITY TEST: CPT | Performed by: INTERNAL MEDICINE

## 2020-09-18 PROCEDURE — 94760 N-INVAS EAR/PLS OXIMETRY 1: CPT

## 2020-09-18 PROCEDURE — 94729 DIFFUSING CAPACITY: CPT

## 2020-09-18 PROCEDURE — 94727 GAS DIL/WSHOT DETER LNG VOL: CPT | Performed by: INTERNAL MEDICINE

## 2020-09-18 PROCEDURE — 94727 GAS DIL/WSHOT DETER LNG VOL: CPT

## 2020-09-18 PROCEDURE — 94010 BREATHING CAPACITY TEST: CPT

## 2020-09-18 PROCEDURE — 94729 DIFFUSING CAPACITY: CPT | Performed by: INTERNAL MEDICINE

## 2020-09-24 ENCOUNTER — OFFICE VISIT (OUTPATIENT)
Dept: INTERNAL MEDICINE CLINIC | Facility: CLINIC | Age: 50
End: 2020-09-24
Payer: COMMERCIAL

## 2020-09-24 VITALS
HEIGHT: 64 IN | BODY MASS INDEX: 38.76 KG/M2 | HEART RATE: 61 BPM | DIASTOLIC BLOOD PRESSURE: 76 MMHG | WEIGHT: 227 LBS | SYSTOLIC BLOOD PRESSURE: 120 MMHG | TEMPERATURE: 97.3 F

## 2020-09-24 DIAGNOSIS — M75.102 ROTATOR CUFF SYNDROME OF LEFT SHOULDER: ICD-10-CM

## 2020-09-24 DIAGNOSIS — Z23 NEED FOR INFLUENZA VACCINATION: ICD-10-CM

## 2020-09-24 DIAGNOSIS — M54.40 BILATERAL LOW BACK PAIN WITH SCIATICA, SCIATICA LATERALITY UNSPECIFIED, UNSPECIFIED CHRONICITY: ICD-10-CM

## 2020-09-24 DIAGNOSIS — Z78.9 USES BIRTH CONTROL: ICD-10-CM

## 2020-09-24 DIAGNOSIS — E03.9 ACQUIRED HYPOTHYROIDISM: ICD-10-CM

## 2020-09-24 DIAGNOSIS — J45.30 MILD PERSISTENT ASTHMA WITHOUT COMPLICATION: Primary | ICD-10-CM
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
## 2020-09-24 PROCEDURE — 3725F SCREEN DEPRESSION PERFORMED: CPT | Performed by: INTERNAL MEDICINE

## 2020-09-24 PROCEDURE — 1036F TOBACCO NON-USER: CPT | Performed by: INTERNAL MEDICINE

## 2020-09-24 PROCEDURE — 90674 CCIIV4 VAC NO PRSV 0.5 ML IM: CPT | Performed by: INTERNAL MEDICINE

## 2020-09-24 PROCEDURE — 90471 IMMUNIZATION ADMIN: CPT | Performed by: INTERNAL MEDICINE

## 2020-09-24 PROCEDURE — 99214 OFFICE O/P EST MOD 30 MIN: CPT | Performed by: INTERNAL MEDICINE

## 2020-09-24 RX ORDER — MELOXICAM 15 MG/1
15 TABLET ORAL DAILY
Qty: 30 TABLET | Refills: 3 | Status: SHIPPED | OUTPATIENT
Start: 2020-09-24 | End: 2021-02-25 | Stop reason: SDUPTHER

## 2020-09-24 RX ORDER — LEVONORGESTREL AND ETHINYL ESTRADIOL 0.1-0.02MG
1 KIT ORAL DAILY
Qty: 28 TABLET | Refills: 3 | Status: SHIPPED | OUTPATIENT
Start: 2020-09-24 | End: 2021-07-15 | Stop reason: SDUPTHER
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
## 2021-01-15 ENCOUNTER — OFFICE VISIT (OUTPATIENT)
Dept: URGENT CARE | Facility: CLINIC | Age: 51
End: 2021-01-15
Payer: COMMERCIAL

## 2021-01-15 VITALS
RESPIRATION RATE: 20 BRPM | HEART RATE: 99 BPM | WEIGHT: 240 LBS | OXYGEN SATURATION: 99 % | HEIGHT: 64 IN | BODY MASS INDEX: 40.97 KG/M2 | TEMPERATURE: 98.2 F

## 2021-01-15 DIAGNOSIS — H65.92 LEFT NON-SUPPURATIVE OTITIS MEDIA: Primary | ICD-10-CM

## 2021-01-15 DIAGNOSIS — K52.9 NONINFECTIOUS GASTROENTERITIS, UNSPECIFIED TYPE: ICD-10-CM

## 2021-01-15 PROCEDURE — U0005 INFEC AGEN DETEC AMPLI PROBE: HCPCS | Performed by: PHYSICIAN ASSISTANT

## 2021-01-15 PROCEDURE — 99213 OFFICE O/P EST LOW 20 MIN: CPT | Performed by: PHYSICIAN ASSISTANT

## 2021-01-15 PROCEDURE — U0003 INFECTIOUS AGENT DETECTION BY NUCLEIC ACID (DNA OR RNA); SEVERE ACUTE RESPIRATORY SYNDROME CORONAVIRUS 2 (SARS-COV-2) (CORONAVIRUS DISEASE [COVID-19]), AMPLIFIED PROBE TECHNIQUE, MAKING USE OF HIGH THROUGHPUT TECHNOLOGIES AS DESCRIBED BY CMS-2020-01-R: HCPCS | Performed by: PHYSICIAN ASSISTANT

## 2021-01-15 RX ORDER — LOPERAMIDE HYDROCHLORIDE 2 MG/1
2 TABLET ORAL 4 TIMES DAILY PRN
Qty: 30 TABLET | Refills: 0 | Status: SHIPPED | OUTPATIENT
Start: 2021-01-15 | End: 2021-07-15

## 2021-01-15 RX ORDER — DOXYCYCLINE 100 MG/1
100 TABLET ORAL 2 TIMES DAILY
Qty: 20 TABLET | Refills: 0 | Status: SHIPPED | OUTPATIENT
Start: 2021-01-15 | End: 2021-01-22

## 2021-01-15 NOTE — PATIENT INSTRUCTIONS
Hydration and rest   Start doxycycline  Imodium for diarrhea  Monitor temperatures at home  Pedialyte  COVID-19 testing  Home isolation  Follow-up with PCP  Go to emergency room with worsening symptoms, difficulty breathing, increased abdominal pain or development of fever  Recommended supplements for potential COVID-19 is the following: Vitamin D3 2000 IU  daily ,  Vitamin C 1g  every 12 hours , Multivitamin Daily     COVID-19 Home Care Guidelines    Your healthcare provider and/or public health staff have evaluated you and have determined that you do not need to remain in the hospital at this time  At this time you can be isolated at home where you will be monitored by staff from your local or state health department  You should carefully follow the prevention and isolation steps below until a healthcare provider or local or state health department says that you can return to your normal activities  Stay home except to get medical care    People who are mildly ill with COVID-19 are able to isolate at home during their illness  You should restrict activities outside your home, except for getting medical care  Do not go to work, school, or public areas  Avoid using public transportation, ride-sharing, or taxis  Separate yourself from other people and animals in your home    People: As much as possible, you should stay in a specific room and away from other people in your home  Also, you should use a separate bathroom, if available  Animals: You should restrict contact with pets and other animals while you are sick with COVID-19, just like you would around other people  Although there have not been reports of pets or other animals becoming sick with COVID-19, it is still recommended that people sick with COVID-19 limit contact with animals until more information is known about the virus  When possible, have another member of your household care for your animals while you are sick   If you are sick with COVID-19, avoid contact with your pet, including petting, snuggling, being kissed or licked, and sharing food  If you must care for your pet or be around animals while you are sick, wash your hands before and after you interact with pets and wear a facemask  See COVID-19 and Animals for more information  Call ahead before visiting your doctor    If you have a medical appointment, call the healthcare provider and tell them that you have or may have COVID-19  This will help the healthcare providers office take steps to keep other people from getting infected or exposed  Wear a facemask    You should wear a facemask when you are around other people (e g , sharing a room or vehicle) or pets and before you enter a healthcare providers office  If you are not able to wear a facemask (for example, because it causes trouble breathing), then people who live with you should not stay in the same room with you, or they should wear a facemask if they enter your room  Cover your coughs and sneezes    Cover your mouth and nose with a tissue when you cough or sneeze  Throw used tissues in a lined trash can  Immediately wash your hands with soap and water for at least 20 seconds or, if soap and water are not available, clean your hands with an alcohol-based hand  that contains at least 60% alcohol  Clean your hands often    Wash your hands often with soap and water for at least 20 seconds, especially after blowing your nose, coughing, or sneezing; going to the bathroom; and before eating or preparing food  If soap and water are not readily available, use an alcohol-based hand  with at least 60% alcohol, covering all surfaces of your hands and rubbing them together until they feel dry  Soap and water are the best option if hands are visibly dirty  Avoid touching your eyes, nose, and mouth with unwashed hands      Avoid sharing personal household items    You should not share dishes, drinking glasses, cups, eating utensils, towels, or bedding with other people or pets in your home  After using these items, they should be washed thoroughly with soap and water  Clean all high-touch surfaces everyday    High touch surfaces include counters, tabletops, doorknobs, bathroom fixtures, toilets, phones, keyboards, tablets, and bedside tables  Also, clean any surfaces that may have blood, stool, or body fluids on them  Use a household cleaning spray or wipe, according to the label instructions  Labels contain instructions for safe and effective use of the cleaning product including precautions you should take when applying the product, such as wearing gloves and making sure you have good ventilation during use of the product  Monitor your symptoms    Seek prompt medical attention if your illness is worsening (e g , difficulty breathing)  Before seeking care, call your healthcare provider and tell them that you have, or are being evaluated for, COVID-19  Put on a facemask before you enter the facility  These steps will help the healthcare providers office to keep other people in the office or waiting room from getting infected or exposed  Ask your healthcare provider to call the local or [facility] health department  Persons who are placed under active monitoring or facilitated self-monitoring should follow instructions provided by their local health department or occupational health professionals, as appropriate  If you have a medical emergency and need to call 911, notify the dispatch personnel that you have, or are being evaluated for COVID-19  If possible, put on a facemask before emergency medical services arrive      Discontinuing home isolation    Patients with confirmed COVID-19 should remain under home isolation precautions until the following conditions are met:   - They have had no fever for at least 24 hours (that is one full day of no fever without the use medicine that reduces fevers)  AND  - other symptoms have improved (for example, when their cough or shortness of breath have improved)  AND  - If had mild or moderate illness, at least 10 days have passed since their symptoms first appeared or if severe illness (needed oxygen) or immunosuppressed, at least 20 days have passed since symptoms first appeared  Patients with confirmed COVID-19 should also notify close contacts (including their workplace) and ask that they self-quarantine  Currently, close contact is defined as being within 6 feet for 15 minutes or more from the period 24 hours starting 48 hours before symptom onset to the time at which the patient went into isolation  Close contacts of patients diagnosed with COVID-19 should be instructed by the patient to self-quarantine for 14 days from the last time of their last contact with the patient       Source: [employer]

## 2021-01-15 NOTE — LETTER
January 15, 2021     Patient: Cece Rodriges   YOB: 1970   Date of Visit: 1/15/2021       To Whom it May Concern:    Trenton Olivas was seen in my clinic on 1/15/2021  She may return to work pending COVID-19 test results  If you have any questions or concerns, please don't hesitate to call  Sincerely,          GOVIND Evangelista        CC: Sofia Tatum

## 2021-01-15 NOTE — PROGRESS NOTES
[email or web address]        NAME: Cristobal Hoang is a 52 y o  female  : 1970    MRN: [id number]  DATE: January 15, 2021  TIME: 12:36 PM    Assessment and Plan   Left non-suppurative otitis media [H65 92]  1  Left non-suppurative otitis media  doxycycline (ADOXA) 100 MG tablet   2  Noninfectious gastroenteritis, unspecified type  Novel Coronavirus (COVID-19), PCR [facility] - Collected at [facility]    loperamide (IMODIUM A-D) 2 MG tablet         Patient Instructions   Patient Instructions   Hydration and rest   Start doxycycline  Imodium for diarrhea  Monitor temperatures at home  Pedialyte  COVID-19 testing  Home isolation  Follow-up with PCP  Go to emergency room with worsening symptoms, difficulty breathing, increased abdominal pain or development of fever  Recommended supplements for potential COVID-19 is the following: Vitamin D3 2000 IU  daily ,  Vitamin C 1g  every 12 hours , Multivitamin Daily     COVID-19 Home Care Guidelines    Your healthcare provider and/or public health staff have evaluated you and have determined that you do not need to remain in the hospital at this time  At this time you can be isolated at home where you will be monitored by staff from your local or state health department  You should carefully follow the prevention and isolation steps below until a healthcare provider or local or state health department says that you can return to your normal activities  Stay home except to get medical care    People who are mildly ill with COVID-19 are able to isolate at home during their illness  You should restrict activities outside your home, except for getting medical care  Do not go to work, school, or public areas  Avoid using public transportation, ride-sharing, or taxis  Separate yourself from other people and animals in your home    People: As much as possible, you should stay in a specific room and away from other people in your home   Also, you should use a separate bathroom, if available  Animals: You should restrict contact with pets and other animals while you are sick with COVID-19, just like you would around other people  Although there have not been reports of pets or other animals becoming sick with COVID-19, it is still recommended that people sick with COVID-19 limit contact with animals until more information is known about the virus  When possible, have another member of your household care for your animals while you are sick  If you are sick with COVID-19, avoid contact with your pet, including petting, snuggling, being kissed or licked, and sharing food  If you must care for your pet or be around animals while you are sick, wash your hands before and after you interact with pets and wear a facemask  See COVID-19 and Animals for more information  Call ahead before visiting your doctor    If you have a medical appointment, call the healthcare provider and tell them that you have or may have COVID-19  This will help the healthcare providers office take steps to keep other people from getting infected or exposed  Wear a facemask    You should wear a facemask when you are around other people (e g , sharing a room or vehicle) or pets and before you enter a healthcare providers office  If you are not able to wear a facemask (for example, because it causes trouble breathing), then people who live with you should not stay in the same room with you, or they should wear a facemask if they enter your room  Cover your coughs and sneezes    Cover your mouth and nose with a tissue when you cough or sneeze  Throw used tissues in a lined trash can  Immediately wash your hands with soap and water for at least 20 seconds or, if soap and water are not available, clean your hands with an alcohol-based hand  that contains at least 60% alcohol      Clean your hands often    Wash your hands often with soap and water for at least 20 seconds, especially after blowing your nose, coughing, or sneezing; going to the bathroom; and before eating or preparing food  If soap and water are not readily available, use an alcohol-based hand  with at least 60% alcohol, covering all surfaces of your hands and rubbing them together until they feel dry  Soap and water are the best option if hands are visibly dirty  Avoid touching your eyes, nose, and mouth with unwashed hands  Avoid sharing personal household items    You should not share dishes, drinking glasses, cups, eating utensils, towels, or bedding with other people or pets in your home  After using these items, they should be washed thoroughly with soap and water  Clean all high-touch surfaces everyday    High touch surfaces include counters, tabletops, doorknobs, bathroom fixtures, toilets, phones, keyboards, tablets, and bedside tables  Also, clean any surfaces that may have blood, stool, or body fluids on them  Use a household cleaning spray or wipe, according to the label instructions  Labels contain instructions for safe and effective use of the cleaning product including precautions you should take when applying the product, such as wearing gloves and making sure you have good ventilation during use of the product  Monitor your symptoms    Seek prompt medical attention if your illness is worsening (e g , difficulty breathing)  Before seeking care, call your healthcare provider and tell them that you have, or are being evaluated for, COVID-19  Put on a facemask before you enter the facility  These steps will help the healthcare providers office to keep other people in the office or waiting room from getting infected or exposed  Ask your healthcare provider to call the local or [facility] health department  Persons who are placed under active monitoring or facilitated self-monitoring should follow instructions provided by their local health department or occupational health professionals, as appropriate    If you have a medical emergency and need to call 911, notify the dispatch personnel that you have, or are being evaluated for COVID-19  If possible, put on a facemask before emergency medical services arrive  Discontinuing home isolation    Patients with confirmed COVID-19 should remain under home isolation precautions until the following conditions are met:   - They have had no fever for at least 24 hours (that is one full day of no fever without the use medicine that reduces fevers)  AND  - other symptoms have improved (for example, when their cough or shortness of breath have improved)  AND  - If had mild or moderate illness, at least 10 days have passed since their symptoms first appeared or if severe illness (needed oxygen) or immunosuppressed, at least 20 days have passed since symptoms first appeared  Patients with confirmed COVID-19 should also notify close contacts (including their workplace) and ask that they self-quarantine  Currently, close contact is defined as being within 6 feet for 15 minutes or more from the period 24 hours starting 48 hours before symptom onset to the time at which the patient went into isolation  Close contacts of patients diagnosed with COVID-19 should be instructed by the patient to self-quarantine for 14 days from the last time of their last contact with the patient  Source: [employer]           Follow up with PCP in 3-5 days  Proceed to  ER if symptoms worsen  Chief Complaint     Chief Complaint   Patient presents with    Diarrhea     started new years     Generalized Body Aches    Cough    Earache         History of Present Illness       46-year-old female presents clinic with complaints of body aches and diarrhea  She reports diarrhea that began 2 weeks ago, she has 1-2 bouts of watery are on formed diarrhea daily  She denies any nausea or vomiting is able to keep down food and fluids    She reports some epigastric abdominal pain after eating  She denies history of GI disease, abdominal surgeries  She has no fevers  Body aches began 3-4 days ago  She denies any known sick contacts but works at [employer] and interacts with the public  She has been taking DayQuil over-the-counter with some relief  Review of Systems   Review of Systems   Constitutional: Positive for fatigue  Negative for fever  HENT: Positive for congestion, ear pain and rhinorrhea  Negative for sore throat  No loss of taste or smell   Respiratory: Positive for cough and shortness of breath  Gastrointestinal: Positive for abdominal pain and nausea  Musculoskeletal: Positive for myalgias  Neurological: Positive for light-headedness  Negative for dizziness and headaches           Current Medications       Current Outpatient Medications:     Albuterol Sulfate 108 (90 Base) MCG/ACT AEPB, Inhale 180 mcg 4 (four) times a day as needed, Disp: , Rfl:     fluticasone-salmeterol (ADVAIR, WIXELA) 100-50 mcg/dose inhaler, Inhale 1 puff 2 (two) times a day Rinse mouth after use , Disp: 1 Inhaler, Rfl: 1    levothyroxine 75 mcg tablet, Take 1 tablet by mouth once daily, Disp: , Rfl:     loratadine (CLARITIN) 10 mg tablet, 1 tab(s), Disp: , Rfl:     meloxicam (MOBIC) 15 mg tablet, Take 1 tablet (15 mg total) by mouth daily, Disp: 30 tablet, Rfl: 3    doxycycline (ADOXA) 100 MG tablet, Take 1 tablet (100 mg total) by mouth 2 (two) times a day for 10 days, Disp: 20 tablet, Rfl: 0    Falmina 0 1-20 MG-MCG per tablet, Take 1 tablet by mouth daily, Disp: 28 tablet, Rfl: 3    loperamide (IMODIUM A-D) 2 MG tablet, Take 1 tablet (2 mg total) by mouth 4 (four) times a day as needed for diarrhea, Disp: 30 tablet, Rfl: 0    Current Allergies     Allergies as of 01/15/2021 - Reviewed 01/15/2021   Allergen Reaction Noted    Penicillins Rash 10/21/2013            The following portions of the patient's history were reviewed and updated as appropriate: allergies, current medications, past family history, past medical history, past social history, past surgical history and problem list      Past Medical History:   Diagnosis Date    Asthma     [facility]     Essential hypertension, malignant     Hypertension     Hypothyroid     Mild persistent asthma     Osteoarthritis     Rotator cuff syndrome of left shoulder     Type II diabetes mellitus, uncontrolled ([facility] )        Past Surgical History:   Procedure Laterality Date    ANKLE SURGERY         Family History   Problem Relation Age of Onset    Diabetes Mother     Parkinsonism Father          Medications have been verified  Objective   Pulse 99   Temp 98 2 °F (36 8 °C)   Resp 20   Ht 5' 4" (1 626 m)   Wt 109 kg (240 lb)   LMP 01/01/2021 (Approximate)   SpO2 99%   BMI 41 20 kg/m²        Physical Exam     Physical Exam  Vitals signs and nursing note reviewed  Constitutional:       General: She is not in acute distress  Appearance: Normal appearance  She is obese  She is not ill-appearing or diaphoretic  HENT:      Head: Normocephalic and atraumatic  Right Ear: Tympanic membrane and ear canal normal       Left Ear: Tympanic membrane is erythematous and bulging  Nose: Congestion and rhinorrhea present  Cardiovascular:      Rate and Rhythm: Normal rate and regular rhythm  Pulmonary:      Effort: Pulmonary effort is normal  No respiratory distress  Breath sounds: Normal breath sounds  No wheezing, rhonchi or rales  Abdominal:      General: Abdomen is flat  Bowel sounds are normal       Palpations: Abdomen is soft  Tenderness: There is no abdominal tenderness  There is no guarding or rebound  Skin:     General: Skin is warm and dry  Findings: No rash  Comments: Good turgor   Neurological:      Mental Status: She is alert

## 2021-01-16 LAB — SARS-COV-2 RNA SPEC QL NAA+PROBE: NOT DETECTED

## 2021-01-18 ENCOUNTER — TELEPHONE (OUTPATIENT)
Dept: FAMILY MEDICINE CLINIC | Facility: CLINIC | Age: 51
End: 2021-01-18

## 2021-01-18 ENCOUNTER — TELEPHONE (OUTPATIENT)
Dept: URGENT CARE | Facility: CLINIC | Age: 51
End: 2021-01-18

## 2021-01-18 NOTE — TELEPHONE ENCOUNTER
Patient left a message on nurse line looking for covid results - return phone call unable to reach patient provided patient with urgent care number or to call her PCP

## 2021-01-19 ENCOUNTER — TELEMEDICINE (OUTPATIENT)
Dept: INTERNAL MEDICINE CLINIC | Facility: CLINIC | Age: 51
End: 2021-01-19
Payer: COMMERCIAL

## 2021-01-19 ENCOUNTER — TELEPHONE (OUTPATIENT)
Dept: ADMINISTRATIVE | Facility: OTHER | Age: 51
End: 2021-01-19

## 2021-01-19 VITALS — TEMPERATURE: 99.3 F

## 2021-01-19 DIAGNOSIS — E03.9 ACQUIRED HYPOTHYROIDISM: ICD-10-CM

## 2021-01-19 DIAGNOSIS — A08.4 VIRAL GASTROENTERITIS: Primary | ICD-10-CM

## 2021-01-19 DIAGNOSIS — Z12.31 ENCOUNTER FOR SCREENING MAMMOGRAM FOR MALIGNANT NEOPLASM OF BREAST: ICD-10-CM

## 2021-01-19 DIAGNOSIS — J45.30 MILD PERSISTENT ASTHMA WITHOUT COMPLICATION: ICD-10-CM

## 2021-01-19 PROCEDURE — 99213 OFFICE O/P EST LOW 20 MIN: CPT | Performed by: INTERNAL MEDICINE

## 2021-01-19 NOTE — PROGRESS NOTES
Virtual Regular Visit      Assessment/Plan:    Problem List Items Addressed This Visit        Digestive    Viral gastroenteritis - Primary       Endocrine    Hypothyroid       Respiratory    Mild persistent asthma       Other    Encounter for screening mammogram for malignant neoplasm of breast          BMI Counseling: There is no height or weight on file to calculate BMI  The BMI is above normal  Nutrition recommendations include decreasing portion sizes, encouraging healthy choices of fruits and vegetables and decreasing fast food intake  Exercise recommendations include moderate physical activity 150 minutes/week  Reason for visit is   Chief Complaint   Patient presents with    Cough     COUGH, DIARREA SINCE 1/1/21 WAS SEEN AT URGENT CARE 1/16/21 COVID19 NEGATIVE    Virtual Regular Visit        Encounter provider Jeevan Arita MD    Provider located at [address]  [facility] [address] [phone]      Recent Visits  No visits were found meeting these conditions  Showing recent visits within past 7 days and meeting all other requirements     Today's Visits  Date Type Provider Dept   01/19/21 Telemedicine Jeevan Arita MD [facility]  [facility]   Showing today's visits and meeting all other requirements     Future Appointments  No visits were found meeting these conditions  Showing future appointments within next 150 days and meeting all other requirements        The patient was identified by name and date of birth  Maryann Chen was informed that this is a telemedicine visit and that the visit is being conducted through [employer] and patient was informed that this is not a secure, HIPAA-compliant platform  She agrees to proceed     My office door was closed  No one else was in the room  She acknowledged consent and understanding of privacy and security of the video platform   The patient has agreed to participate and understands they can discontinue the visit at any time  Patient is aware this is a billable service  Subjective  Verónica Rodriguez is a 52 y o  female    Sick, diarrhea, no blood in the bowel movement, been to Urgent Care, cough dry       Past Medical History:   Diagnosis Date    Asthma     [facility] mellitus ([facility]     Essential hypertension, malignant     Hypertension     Hypothyroid     Mild persistent asthma     Osteoarthritis     Rotator cuff syndrome of left shoulder     [facility]        Past Surgical History:   Procedure Laterality Date    ANKLE SURGERY         Current Outpatient Medications   Medication Sig Dispense Refill    Albuterol Sulfate 108 (90 Base) MCG/ACT AEPB Inhale 180 mcg 4 (four) times a day as needed      doxycycline (ADOXA) 100 MG tablet Take 1 tablet (100 mg total) by mouth 2 (two) times a day for 10 days 20 tablet 0    Falmina 0 1-20 MG-MCG per tablet Take 1 tablet by mouth daily 28 tablet 3    fluticasone-salmeterol (ADVAIR, WIXELA) 100-50 mcg/dose inhaler Inhale 1 puff 2 (two) times a day Rinse mouth after use  1 Inhaler 1    levothyroxine 75 mcg tablet Take 1 tablet by mouth once daily      loperamide (IMODIUM A-D) 2 MG tablet Take 1 tablet (2 mg total) by mouth 4 (four) times a day as needed for diarrhea 30 tablet 0    loratadine (CLARITIN) 10 mg tablet Take 10 mg by mouth 2 (two) times a day       meloxicam (MOBIC) 15 mg tablet Take 1 tablet (15 mg total) by mouth daily 30 tablet 3     No current facility-administered medications for this visit  Allergies   Allergen Reactions    Penicillins Rash       Review of Systems   Constitutional: Negative for chills and fever  HENT: Negative for congestion, ear pain and sore throat  Eyes: Negative for pain  Respiratory: Positive for cough  Negative for shortness of breath  Cardiovascular: Negative for chest pain and leg swelling  Gastrointestinal: Positive for abdominal pain, diarrhea and nausea  Negative for blood in stool and vomiting  Endocrine: Negative for polyuria  Genitourinary: Negative for difficulty urinating, frequency and urgency  Musculoskeletal: Negative for arthralgias and back pain  Skin: Negative for rash  Neurological: Negative for weakness and headaches  Psychiatric/Behavioral: Negative for sleep disturbance  The patient is not nervous/anxious  Video Exam    Vitals:    01/19/21 1116   Temp: 99 3 °F (37 4 °C)   TempSrc: Tympanic       Physical Exam  Constitutional:       Appearance: Normal appearance  Eyes:      Extraocular Movements: Extraocular movements intact  Conjunctiva/sclera: Conjunctivae normal    Neurological:      General: No focal deficit present  Mental Status: She is alert  I spent 15 minutes directly with the patient during this visit      VIRTUAL VISIT [address] acknowledges that she has consented to an online visit or consultation  She understands that the online visit is based solely on information provided by her, and that, in the absence of a face-to-face physical evaluation by the physician, the diagnosis she receives is both limited and provisional in terms of accuracy and completeness  This is not intended to replace a full medical face-to-face evaluation by the physician  Gem Chatterjee understands and accepts these terms

## 2021-01-19 NOTE — LETTER
Procedure Request Form: Mammogram      Date Requested: 21  Patient: Anncandie Chasten  Patient : 1970   Referring Provider: Lori Patel, MD        Date of Procedure ______________________________       The above patient has informed us that they have completed their   most recent Mammogram at your facility  Please complete   this form and attach all corresponding procedure reports/results  Comments __________________________________________________________  ____________________________________________________________________  ____________________________________________________________________  ____________________________________________________________________    Facility Completing Procedure _________________________________________    Form Completed By (print name) _______________________________________      Signature __________________________________________________________      These reports are needed for  compliance    Please fax this completed form and a copy of the procedure report to our office located at [place] [address] as soon as possible to [phone] [facility]: Phone [phone]    We thank you for your assistance in treating our mutual patient

## 2021-01-19 NOTE — TELEPHONE ENCOUNTER
----- Message from Charmayne Albino, [place] sent at 1/19/2021 11:14 AM EST -----  Regarding: MAMMO & PAP  01/19/21 11:14 AM    Hello, our patient No patient name on file  has had Mammogram & PAP completed/performed  Please assist in updating the patient chart by making an External outreach to [address] [place] [address] located in [facility]  The date of service is APPROX 2018      Thank you,  Charmayne Albino, MA  [facility] INTERNAL MED

## 2021-01-19 NOTE — LETTER
Procedure Request Form: Cervical Cancer Screening and Mammogram      Date Requested: 21  Patient: Jamesetta Face  Patient : 1970   Referring Provider: July Matthews, MD        Date of Procedure ______________________________       The above patient has informed us that they have completed their   most recent Cervical Cancer Screening and Mammogram at your facility  Please complete   this form and attach all corresponding procedure reports/results  Comments __________________________________________________________  ____________________________________________________________________  ____________________________________________________________________  ____________________________________________________________________    Facility Completing Procedure _________________________________________    Form Completed By (print name) _______________________________________      Signature __________________________________________________________      These reports are needed for  compliance    Please fax this completed form and a copy of the procedure report to our office located at [place] [address] as soon as possible to [phone] [facility]: Phone [phone]    We thank you for your assistance in treating our mutual patient

## 2021-01-19 NOTE — LETTER
Procedure Request Form: Cervical Cancer Screening and Mammogram      Date Requested: 21  Patient: No Shepard  Patient : 1970   Referring Provider: Tammy Arias MD        Date of Procedure ______________________________       The above patient has informed us that they have completed their   most recent Cervical Cancer Screening and Mammogram at your facility  Please complete   this form and attach all corresponding procedure reports/results  Comments __________________________________________________________  ____________________________________________________________________  ____________________________________________________________________  ____________________________________________________________________    Facility Completing Procedure _________________________________________    Form Completed By (print name) _______________________________________      Signature __________________________________________________________      These reports are needed for  compliance    Please fax this completed form and a copy of the procedure report to our office located at [place] [address] as soon as possible to [phone] [facility] [place]: Phone [phone]    We thank you for your assistance in treating our mutual patient

## 2021-01-21 NOTE — TELEPHONE ENCOUNTER
Upon review of the In Basket request and the patient's chart, initial outreach has been made via fax, please see Contacts section for details       Thank you  Mary Goldberg

## 2021-01-22 ENCOUNTER — HOSPITAL ENCOUNTER (OUTPATIENT)
Dept: ULTRASOUND IMAGING | Facility: HOSPITAL | Age: 51
Discharge: HOME | End: 2021-01-22
Payer: COMMERCIAL

## 2021-01-22 ENCOUNTER — TELEMEDICINE (OUTPATIENT)
Dept: INTERNAL MEDICINE CLINIC | Facility: CLINIC | Age: 51
End: 2021-01-22
Payer: COMMERCIAL

## 2021-01-22 DIAGNOSIS — R10.9 ABDOMINAL PAIN, UNSPECIFIED ABDOMINAL LOCATION: Primary | ICD-10-CM

## 2021-01-22 DIAGNOSIS — R10.9 ABDOMINAL PAIN, UNSPECIFIED ABDOMINAL LOCATION: ICD-10-CM

## 2021-01-22 PROCEDURE — 76700 US EXAM ABDOM COMPLETE: CPT

## 2021-01-22 PROCEDURE — 3725F SCREEN DEPRESSION PERFORMED: CPT | Performed by: INTERNAL MEDICINE

## 2021-01-22 PROCEDURE — 76830 TRANSVAGINAL US NON-OB: CPT

## 2021-01-22 PROCEDURE — 76856 US EXAM PELVIC COMPLETE: CPT

## 2021-01-22 PROCEDURE — 99213 OFFICE O/P EST LOW 20 MIN: CPT | Performed by: INTERNAL MEDICINE
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
## 2021-01-25 ENCOUNTER — TELEMEDICINE (OUTPATIENT)
Dept: INTERNAL MEDICINE CLINIC | Facility: CLINIC | Age: 51
End: 2021-01-25
Payer: COMMERCIAL

## 2021-01-25 VITALS — BODY MASS INDEX: 40.97 KG/M2 | WEIGHT: 240 LBS | HEIGHT: 64 IN

## 2021-01-25 DIAGNOSIS — E03.9 ACQUIRED HYPOTHYROIDISM: ICD-10-CM

## 2021-01-25 DIAGNOSIS — R19.7 DIARRHEA, UNSPECIFIED TYPE: Primary | ICD-10-CM

## 2021-01-25 DIAGNOSIS — J45.30 MILD PERSISTENT ASTHMA WITHOUT COMPLICATION: ICD-10-CM

## 2021-01-25 DIAGNOSIS — M17.0 PRIMARY OSTEOARTHRITIS OF BOTH KNEES: ICD-10-CM

## 2021-01-25 PROCEDURE — 3008F BODY MASS INDEX DOCD: CPT | Performed by: INTERNAL MEDICINE

## 2021-01-25 PROCEDURE — 1036F TOBACCO NON-USER: CPT | Performed by: INTERNAL MEDICINE

## 2021-01-25 PROCEDURE — 99214 OFFICE O/P EST MOD 30 MIN: CPT | Performed by: INTERNAL MEDICINE
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
## 2021-01-26 ENCOUNTER — ANESTHESIA EVENT (EMERGENCY)
Dept: PERIOP | Facility: HOSPITAL | Age: 51
DRG: 854 | End: 2021-01-26
Payer: COMMERCIAL

## 2021-01-26 ENCOUNTER — APPOINTMENT (EMERGENCY)
Dept: CT IMAGING | Facility: HOSPITAL | Age: 51
DRG: 854 | End: 2021-01-26
Payer: COMMERCIAL

## 2021-01-26 ENCOUNTER — HOSPITAL ENCOUNTER (INPATIENT)
Facility: HOSPITAL | Age: 51
LOS: 15 days | Discharge: HOME HEALTH | DRG: 854 | End: 2021-02-10
Attending: EMERGENCY MEDICINE | Admitting: STUDENT IN AN ORGANIZED HEALTH CARE EDUCATION/TRAINING PROGRAM
Payer: COMMERCIAL

## 2021-01-26 ENCOUNTER — APPOINTMENT (EMERGENCY)
Dept: RADIOLOGY | Facility: HOSPITAL | Age: 51
DRG: 854 | End: 2021-01-26
Payer: COMMERCIAL

## 2021-01-26 ENCOUNTER — ANESTHESIA (EMERGENCY)
Dept: PERIOP | Facility: HOSPITAL | Age: 51
DRG: 854 | End: 2021-01-26
Payer: COMMERCIAL

## 2021-01-26 VITALS — HEART RATE: 87 BPM

## 2021-01-26 DIAGNOSIS — M17.0 PRIMARY OSTEOARTHRITIS OF BOTH KNEES: ICD-10-CM

## 2021-01-26 DIAGNOSIS — K66.8 PNEUMOPERITONEUM OF UNKNOWN ETIOLOGY: Primary | ICD-10-CM

## 2021-01-26 DIAGNOSIS — J45.909 ASTHMA, UNSPECIFIED ASTHMA SEVERITY, UNSPECIFIED WHETHER COMPLICATED, UNSPECIFIED WHETHER PERSISTENT: ICD-10-CM

## 2021-01-26 DIAGNOSIS — R19.7 DIARRHEA, UNSPECIFIED TYPE: ICD-10-CM

## 2021-01-26 DIAGNOSIS — E03.9 ACQUIRED HYPOTHYROIDISM: ICD-10-CM

## 2021-01-26 DIAGNOSIS — E86.0 DEHYDRATION: ICD-10-CM

## 2021-01-26 DIAGNOSIS — K57.20 PERFORATION OF SIGMOID COLON DUE TO DIVERTICULITIS: ICD-10-CM

## 2021-01-26 DIAGNOSIS — E11.9 TYPE 2 DIABETES MELLITUS WITHOUT COMPLICATION, UNSPECIFIED WHETHER LONG TERM INSULIN USE (HCC): ICD-10-CM

## 2021-01-26 DIAGNOSIS — E87.6 HYPOKALEMIA: ICD-10-CM

## 2021-01-26 DIAGNOSIS — N17.9 AKI (ACUTE KIDNEY INJURY) (HCC): ICD-10-CM

## 2021-01-26 DIAGNOSIS — A08.4 VIRAL GASTROENTERITIS: ICD-10-CM

## 2021-01-26 DIAGNOSIS — M54.40 BILATERAL LOW BACK PAIN WITH SCIATICA, SCIATICA LATERALITY UNSPECIFIED, UNSPECIFIED CHRONICITY: ICD-10-CM

## 2021-01-26 DIAGNOSIS — K52.9 COLITIS: ICD-10-CM

## 2021-01-26 DIAGNOSIS — R19.8 PERFORATED ABDOMINAL VISCUS: ICD-10-CM

## 2021-01-26 PROBLEM — Z87.09 HISTORY OF ASTHMA: Status: ACTIVE | Noted: 2021-01-26

## 2021-01-26 PROBLEM — R73.9 HYPERGLYCEMIA: Status: ACTIVE | Noted: 2021-01-26

## 2021-01-26 PROBLEM — R06.00 DOE (DYSPNEA ON EXERTION): Status: ACTIVE | Noted: 2021-01-26

## 2021-01-26 PROBLEM — R06.09 DOE (DYSPNEA ON EXERTION): Status: ACTIVE | Noted: 2021-01-26

## 2021-01-26 PROBLEM — D72.825 BANDEMIA: Status: ACTIVE | Noted: 2021-01-26

## 2021-01-26 PROBLEM — Z87.09 HISTORY OF ASTHMA: Status: RESOLVED | Noted: 2021-01-26 | Resolved: 2021-01-26

## 2021-01-26 LAB
ABO GROUP BLD: NORMAL
ABO GROUP BLD: NORMAL
ALBUMIN SERPL BCP-MCNC: 1.9 G/DL (ref 3.5–5)
ALP SERPL-CCNC: 74 U/L (ref 46–116)
ALT SERPL W P-5'-P-CCNC: 12 U/L (ref 12–78)
ANION GAP SERPL CALCULATED.3IONS-SCNC: 12 MMOL/L (ref 4–13)
ANION GAP SERPL CALCULATED.3IONS-SCNC: 12 MMOL/L (ref 4–13)
APTT PPP: 29 SECONDS (ref 23–37)
AST SERPL W P-5'-P-CCNC: 10 U/L (ref 5–45)
ATRIAL RATE: 99 BPM
BACTERIA UR QL AUTO: ABNORMAL /HPF
BACTERIA UR QL AUTO: ABNORMAL /HPF
BASE EXCESS BLDA CALC-SCNC: -4 MMOL/L (ref -2–3)
BASE EXCESS BLDA CALC-SCNC: -8.9 MMOL/L
BASOPHILS # BLD MANUAL: 0 THOUSAND/UL (ref 0–0.1)
BASOPHILS NFR MAR MANUAL: 0 % (ref 0–1)
BILIRUB SERPL-MCNC: 0.5 MG/DL (ref 0.2–1)
BILIRUB UR QL STRIP: ABNORMAL
BILIRUB UR QL STRIP: NEGATIVE
BLD GP AB SCN SERPL QL: NEGATIVE
BUN SERPL-MCNC: 14 MG/DL (ref 5–25)
BUN SERPL-MCNC: 15 MG/DL (ref 5–25)
CA-I BLD-SCNC: 0.99 MMOL/L (ref 1.12–1.32)
CALCIUM ALBUM COR SERPL-MCNC: 10.3 MG/DL (ref 8.3–10.1)
CALCIUM SERPL-MCNC: 7.2 MG/DL (ref 8.3–10.1)
CALCIUM SERPL-MCNC: 8.6 MG/DL (ref 8.3–10.1)
CHLORIDE SERPL-SCNC: 106 MMOL/L (ref 100–108)
CHLORIDE SERPL-SCNC: 98 MMOL/L (ref 100–108)
CLARITY UR: ABNORMAL
CLARITY UR: CLEAR
CO2 SERPL-SCNC: 21 MMOL/L (ref 21–32)
CO2 SERPL-SCNC: 24 MMOL/L (ref 21–32)
COLOR UR: YELLOW
COLOR UR: YELLOW
CREAT SERPL-MCNC: 0.77 MG/DL (ref 0.6–1.3)
CREAT SERPL-MCNC: 1.48 MG/DL (ref 0.6–1.3)
EOSINOPHIL # BLD MANUAL: 0.11 THOUSAND/UL (ref 0–0.4)
EOSINOPHIL NFR BLD MANUAL: 1 % (ref 0–6)
ERYTHROCYTE [DISTWIDTH] IN BLOOD BY AUTOMATED COUNT: 13.9 % (ref 11.6–15.1)
ERYTHROCYTE [DISTWIDTH] IN BLOOD BY AUTOMATED COUNT: 14.6 % (ref 11.6–15.1)
FINE GRAN CASTS URNS QL MICRO: ABNORMAL /LPF
GFR SERPL CREATININE-BSD FRML MDRD: 42 ML/MIN/1.73SQ M
GFR SERPL CREATININE-BSD FRML MDRD: 92 ML/MIN/1.73SQ M
GLUCOSE SERPL-MCNC: 136 MG/DL (ref 65–140)
GLUCOSE SERPL-MCNC: 172 MG/DL (ref 65–140)
GLUCOSE SERPL-MCNC: 180 MG/DL (ref 65–140)
GLUCOSE UR STRIP-MCNC: NEGATIVE MG/DL
GLUCOSE UR STRIP-MCNC: NEGATIVE MG/DL
HCG SERPL QL: NEGATIVE
HCO3 BLDA-SCNC: 17 MMOL/L (ref 22–28)
HCO3 BLDA-SCNC: 21.2 MMOL/L (ref 22–28)
HCT VFR BLD AUTO: 39.8 % (ref 34.8–46.1)
HCT VFR BLD AUTO: 42.6 % (ref 34.8–46.1)
HCT VFR BLD CALC: 31 % (ref 34.8–46.1)
HGB BLD-MCNC: 12.9 G/DL (ref 11.5–15.4)
HGB BLD-MCNC: 13.7 G/DL (ref 11.5–15.4)
HGB BLDA-MCNC: 10.5 G/DL (ref 11.5–15.4)
HGB UR QL STRIP.AUTO: ABNORMAL
HGB UR QL STRIP.AUTO: ABNORMAL
HOROWITZ INDEX BLDA+IHG-RTO: 60 MM[HG]
HYALINE CASTS #/AREA URNS LPF: ABNORMAL /LPF
HYALINE CASTS #/AREA URNS LPF: ABNORMAL /LPF
INR PPP: 1.34 (ref 0.84–1.19)
KETONES UR STRIP-MCNC: ABNORMAL MG/DL
KETONES UR STRIP-MCNC: ABNORMAL MG/DL
LACTATE SERPL-SCNC: 1 MMOL/L (ref 0.5–2)
LACTATE SERPL-SCNC: 1.6 MMOL/L (ref 0.5–2)
LEUKOCYTE ESTERASE UR QL STRIP: NEGATIVE
LEUKOCYTE ESTERASE UR QL STRIP: NEGATIVE
LIPASE SERPL-CCNC: 29 U/L (ref 73–393)
LYMPHOCYTES # BLD AUTO: 1.32 THOUSAND/UL (ref 0.6–4.47)
LYMPHOCYTES # BLD AUTO: 12 % (ref 14–44)
MAGNESIUM SERPL-MCNC: 1.7 MG/DL (ref 1.6–2.6)
MCH RBC QN AUTO: 26.8 PG (ref 26.8–34.3)
MCH RBC QN AUTO: 27.6 PG (ref 26.8–34.3)
MCHC RBC AUTO-ENTMCNC: 32.2 G/DL (ref 31.4–37.4)
MCHC RBC AUTO-ENTMCNC: 32.4 G/DL (ref 31.4–37.4)
MCV RBC AUTO: 83 FL (ref 82–98)
MCV RBC AUTO: 85 FL (ref 82–98)
METAMYELOCYTES NFR BLD MANUAL: 2 % (ref 0–1)
MONOCYTES # BLD AUTO: 0.66 THOUSAND/UL (ref 0–1.22)
MONOCYTES NFR BLD: 6 % (ref 4–12)
NEUTROPHILS # BLD MANUAL: 8.71 THOUSAND/UL (ref 1.85–7.62)
NEUTS BAND NFR BLD MANUAL: 27 % (ref 0–8)
NEUTS SEG NFR BLD AUTO: 52 % (ref 43–75)
NITRITE UR QL STRIP: NEGATIVE
NITRITE UR QL STRIP: POSITIVE
NON-SQ EPI CELLS URNS QL MICRO: ABNORMAL /HPF
NON-SQ EPI CELLS URNS QL MICRO: ABNORMAL /HPF
NRBC BLD AUTO-RTO: 0 /100 WBCS
O2 CT BLDA-SCNC: 19.6 ML/DL (ref 16–23)
OXYHGB MFR BLDA: 98.5 % (ref 94–97)
P AXIS: 59 DEGREES
PCO2 BLD: 22 MMOL/L (ref 21–32)
PCO2 BLD: 36.7 MM HG (ref 36–44)
PCO2 BLDA: 36.4 MM HG (ref 36–44)
PEEP RESPIRATORY: 6 CM[H2O]
PH BLD: 7.37 [PH] (ref 7.35–7.45)
PH BLDA: 7.29 [PH] (ref 7.35–7.45)
PH UR STRIP.AUTO: 6 [PH]
PH UR STRIP.AUTO: 6 [PH]
PHOSPHATE SERPL-MCNC: 3.5 MG/DL (ref 2.7–4.5)
PLATELET # BLD AUTO: 511 THOUSANDS/UL (ref 149–390)
PLATELET # BLD AUTO: 620 THOUSANDS/UL (ref 149–390)
PLATELET BLD QL SMEAR: ABNORMAL
PMV BLD AUTO: 9.9 FL (ref 8.9–12.7)
PMV BLD AUTO: 9.9 FL (ref 8.9–12.7)
PO2 BLD: 133 MM HG (ref 75–129)
PO2 BLDA: 229 MM HG (ref 75–129)
POTASSIUM BLD-SCNC: 4.3 MMOL/L (ref 3.5–5.3)
POTASSIUM SERPL-SCNC: 2.8 MMOL/L (ref 3.5–5.3)
POTASSIUM SERPL-SCNC: 3.2 MMOL/L (ref 3.5–5.3)
POTASSIUM SERPL-SCNC: 3.7 MMOL/L (ref 3.5–5.3)
PR INTERVAL: 138 MS
PROCALCITONIN SERPL-MCNC: 1.47 NG/ML
PROT SERPL-MCNC: 7.4 G/DL (ref 6.4–8.2)
PROT UR STRIP-MCNC: ABNORMAL MG/DL
PROT UR STRIP-MCNC: ABNORMAL MG/DL
PROTHROMBIN TIME: 15.9 SECONDS (ref 11.6–14.5)
QRS AXIS: 86 DEGREES
QRSD INTERVAL: 78 MS
QT INTERVAL: 380 MS
QTC INTERVAL: 487 MS
RBC # BLD AUTO: 4.68 MILLION/UL (ref 3.81–5.12)
RBC # BLD AUTO: 5.11 MILLION/UL (ref 3.81–5.12)
RBC #/AREA URNS AUTO: ABNORMAL /HPF
RBC #/AREA URNS AUTO: ABNORMAL /HPF
RH BLD: POSITIVE
RH BLD: POSITIVE
SAO2 % BLD FROM PO2: 99 % (ref 60–85)
SODIUM BLD-SCNC: 137 MMOL/L (ref 136–145)
SODIUM SERPL-SCNC: 134 MMOL/L (ref 136–145)
SODIUM SERPL-SCNC: 139 MMOL/L (ref 136–145)
SP GR UR STRIP.AUTO: 1.02 (ref 1–1.03)
SP GR UR STRIP.AUTO: 1.02 (ref 1–1.03)
SPECIMEN EXPIRATION DATE: NORMAL
SPECIMEN SOURCE: ABNORMAL
SPECIMEN SOURCE: ABNORMAL
T WAVE AXIS: 40 DEGREES
TOTAL CELLS COUNTED SPEC: 100
TOXIC GRANULES BLD QL SMEAR: PRESENT
TSH SERPL DL<=0.05 MIU/L-ACNC: 2.36 UIU/ML (ref 0.36–3.74)
UROBILINOGEN UR QL STRIP.AUTO: 0.2 E.U./DL
UROBILINOGEN UR QL STRIP.AUTO: 0.2 E.U./DL
VENT AC: 14
VENT- AC: AC
VENTRICULAR RATE: 99 BPM
VT SETTING VENT: 350 ML
WBC # BLD AUTO: 11.03 THOUSAND/UL (ref 4.31–10.16)
WBC # BLD AUTO: 4.45 THOUSAND/UL (ref 4.31–10.16)
WBC #/AREA URNS AUTO: ABNORMAL /HPF
WBC #/AREA URNS AUTO: ABNORMAL /HPF

## 2021-01-26 PROCEDURE — 82803 BLOOD GASES ANY COMBINATION: CPT

## 2021-01-26 PROCEDURE — 71045 X-RAY EXAM CHEST 1 VIEW: CPT

## 2021-01-26 PROCEDURE — 86920 COMPATIBILITY TEST SPIN: CPT

## 2021-01-26 PROCEDURE — 36415 COLL VENOUS BLD VENIPUNCTURE: CPT | Performed by: EMERGENCY MEDICINE

## 2021-01-26 PROCEDURE — G1004 CDSM NDSC: HCPCS

## 2021-01-26 PROCEDURE — 0DBW0ZX EXCISION OF PERITONEUM, OPEN APPROACH, DIAGNOSTIC: ICD-10-PCS | Performed by: STUDENT IN AN ORGANIZED HEALTH CARE EDUCATION/TRAINING PROGRAM

## 2021-01-26 PROCEDURE — 83690 ASSAY OF LIPASE: CPT | Performed by: EMERGENCY MEDICINE

## 2021-01-26 PROCEDURE — 93010 ELECTROCARDIOGRAM REPORT: CPT | Performed by: INTERNAL MEDICINE

## 2021-01-26 PROCEDURE — 93005 ELECTROCARDIOGRAM TRACING: CPT

## 2021-01-26 PROCEDURE — 84145 PROCALCITONIN (PCT): CPT | Performed by: EMERGENCY MEDICINE

## 2021-01-26 PROCEDURE — 3066F NEPHROPATHY DOC TX: CPT | Performed by: INTERNAL MEDICINE

## 2021-01-26 PROCEDURE — 86900 BLOOD TYPING SEROLOGIC ABO: CPT | Performed by: STUDENT IN AN ORGANIZED HEALTH CARE EDUCATION/TRAINING PROGRAM

## 2021-01-26 PROCEDURE — 74177 CT ABD & PELVIS W/CONTRAST: CPT

## 2021-01-26 PROCEDURE — 80048 BASIC METABOLIC PNL TOTAL CA: CPT | Performed by: PHYSICIAN ASSISTANT

## 2021-01-26 PROCEDURE — 84132 ASSAY OF SERUM POTASSIUM: CPT | Performed by: STUDENT IN AN ORGANIZED HEALTH CARE EDUCATION/TRAINING PROGRAM

## 2021-01-26 PROCEDURE — 97605 NEG PRS WND THER DME<=50SQCM: CPT | Performed by: STUDENT IN AN ORGANIZED HEALTH CARE EDUCATION/TRAINING PROGRAM

## 2021-01-26 PROCEDURE — 87070 CULTURE OTHR SPECIMN AEROBIC: CPT | Performed by: STUDENT IN AN ORGANIZED HEALTH CARE EDUCATION/TRAINING PROGRAM

## 2021-01-26 PROCEDURE — 0DTN0ZZ RESECTION OF SIGMOID COLON, OPEN APPROACH: ICD-10-PCS | Performed by: STUDENT IN AN ORGANIZED HEALTH CARE EDUCATION/TRAINING PROGRAM

## 2021-01-26 PROCEDURE — 82805 BLOOD GASES W/O2 SATURATION: CPT | Performed by: PHYSICIAN ASSISTANT

## 2021-01-26 PROCEDURE — 99285 EMERGENCY DEPT VISIT HI MDM: CPT | Performed by: EMERGENCY MEDICINE

## 2021-01-26 PROCEDURE — 88342 IMHCHEM/IMCYTCHM 1ST ANTB: CPT | Performed by: PATHOLOGY

## 2021-01-26 PROCEDURE — 82947 ASSAY GLUCOSE BLOOD QUANT: CPT

## 2021-01-26 PROCEDURE — 44140 PARTIAL REMOVAL OF COLON: CPT | Performed by: PHYSICIAN ASSISTANT

## 2021-01-26 PROCEDURE — 88307 TISSUE EXAM BY PATHOLOGIST: CPT | Performed by: PATHOLOGY

## 2021-01-26 PROCEDURE — 86850 RBC ANTIBODY SCREEN: CPT | Performed by: STUDENT IN AN ORGANIZED HEALTH CARE EDUCATION/TRAINING PROGRAM

## 2021-01-26 PROCEDURE — 85014 HEMATOCRIT: CPT

## 2021-01-26 PROCEDURE — 96368 THER/DIAG CONCURRENT INF: CPT

## 2021-01-26 PROCEDURE — 85730 THROMBOPLASTIN TIME PARTIAL: CPT | Performed by: EMERGENCY MEDICINE

## 2021-01-26 PROCEDURE — 87106 FUNGI IDENTIFICATION YEAST: CPT | Performed by: STUDENT IN AN ORGANIZED HEALTH CARE EDUCATION/TRAINING PROGRAM

## 2021-01-26 PROCEDURE — 99223 1ST HOSP IP/OBS HIGH 75: CPT | Performed by: STUDENT IN AN ORGANIZED HEALTH CARE EDUCATION/TRAINING PROGRAM

## 2021-01-26 PROCEDURE — 86901 BLOOD TYPING SEROLOGIC RH(D): CPT | Performed by: STUDENT IN AN ORGANIZED HEALTH CARE EDUCATION/TRAINING PROGRAM

## 2021-01-26 PROCEDURE — 85007 BL SMEAR W/DIFF WBC COUNT: CPT | Performed by: EMERGENCY MEDICINE

## 2021-01-26 PROCEDURE — 83605 ASSAY OF LACTIC ACID: CPT | Performed by: PHYSICIAN ASSISTANT

## 2021-01-26 PROCEDURE — 85027 COMPLETE CBC AUTOMATED: CPT | Performed by: PHYSICIAN ASSISTANT

## 2021-01-26 PROCEDURE — 85027 COMPLETE CBC AUTOMATED: CPT | Performed by: EMERGENCY MEDICINE

## 2021-01-26 PROCEDURE — 87205 SMEAR GRAM STAIN: CPT | Performed by: STUDENT IN AN ORGANIZED HEALTH CARE EDUCATION/TRAINING PROGRAM

## 2021-01-26 PROCEDURE — 80053 COMPREHEN METABOLIC PANEL: CPT | Performed by: EMERGENCY MEDICINE

## 2021-01-26 PROCEDURE — 84295 ASSAY OF SERUM SODIUM: CPT

## 2021-01-26 PROCEDURE — 83605 ASSAY OF LACTIC ACID: CPT | Performed by: EMERGENCY MEDICINE

## 2021-01-26 PROCEDURE — 87040 BLOOD CULTURE FOR BACTERIA: CPT | Performed by: EMERGENCY MEDICINE

## 2021-01-26 PROCEDURE — 96365 THER/PROPH/DIAG IV INF INIT: CPT

## 2021-01-26 PROCEDURE — 84443 ASSAY THYROID STIM HORMONE: CPT | Performed by: EMERGENCY MEDICINE

## 2021-01-26 PROCEDURE — 85610 PROTHROMBIN TIME: CPT | Performed by: EMERGENCY MEDICINE

## 2021-01-26 PROCEDURE — 87075 CULTR BACTERIA EXCEPT BLOOD: CPT | Performed by: STUDENT IN AN ORGANIZED HEALTH CARE EDUCATION/TRAINING PROGRAM

## 2021-01-26 PROCEDURE — 82330 ASSAY OF CALCIUM: CPT | Performed by: PHYSICIAN ASSISTANT

## 2021-01-26 PROCEDURE — 96366 THER/PROPH/DIAG IV INF ADDON: CPT

## 2021-01-26 PROCEDURE — 81001 URINALYSIS AUTO W/SCOPE: CPT | Performed by: EMERGENCY MEDICINE

## 2021-01-26 PROCEDURE — 81001 URINALYSIS AUTO W/SCOPE: CPT | Performed by: PHYSICIAN ASSISTANT

## 2021-01-26 PROCEDURE — 84100 ASSAY OF PHOSPHORUS: CPT | Performed by: PHYSICIAN ASSISTANT

## 2021-01-26 PROCEDURE — 44140 PARTIAL REMOVAL OF COLON: CPT | Performed by: STUDENT IN AN ORGANIZED HEALTH CARE EDUCATION/TRAINING PROGRAM

## 2021-01-26 PROCEDURE — 84132 ASSAY OF SERUM POTASSIUM: CPT

## 2021-01-26 PROCEDURE — 94760 N-INVAS EAR/PLS OXIMETRY 1: CPT

## 2021-01-26 PROCEDURE — 0DBM0ZZ EXCISION OF DESCENDING COLON, OPEN APPROACH: ICD-10-PCS | Performed by: STUDENT IN AN ORGANIZED HEALTH CARE EDUCATION/TRAINING PROGRAM

## 2021-01-26 PROCEDURE — 96361 HYDRATE IV INFUSION ADD-ON: CPT

## 2021-01-26 PROCEDURE — 87176 TISSUE HOMOGENIZATION CULTR: CPT | Performed by: STUDENT IN AN ORGANIZED HEALTH CARE EDUCATION/TRAINING PROGRAM

## 2021-01-26 PROCEDURE — 84703 CHORIONIC GONADOTROPIN ASSAY: CPT | Performed by: EMERGENCY MEDICINE

## 2021-01-26 PROCEDURE — 99285 EMERGENCY DEPT VISIT HI MDM: CPT

## 2021-01-26 PROCEDURE — 94002 VENT MGMT INPAT INIT DAY: CPT

## 2021-01-26 PROCEDURE — 83735 ASSAY OF MAGNESIUM: CPT | Performed by: PHYSICIAN ASSISTANT

## 2021-01-26 RX ORDER — SUCCINYLCHOLINE/SOD CL,ISO/PF 100 MG/5ML
SYRINGE (ML) INTRAVENOUS AS NEEDED
Status: DISCONTINUED | OUTPATIENT
Start: 2021-01-26 | End: 2021-01-26

## 2021-01-26 RX ORDER — ONDANSETRON 2 MG/ML
4 INJECTION INTRAMUSCULAR; INTRAVENOUS EVERY 6 HOURS PRN
Status: DISCONTINUED | OUTPATIENT
Start: 2021-01-26 | End: 2021-02-10 | Stop reason: HOSPADM

## 2021-01-26 RX ORDER — CHLORHEXIDINE GLUCONATE 0.12 MG/ML
15 RINSE ORAL EVERY 12 HOURS SCHEDULED
Status: DISCONTINUED | OUTPATIENT
Start: 2021-01-26 | End: 2021-01-29

## 2021-01-26 RX ORDER — PROPOFOL 10 MG/ML
INJECTION, EMULSION INTRAVENOUS AS NEEDED
Status: DISCONTINUED | OUTPATIENT
Start: 2021-01-26 | End: 2021-01-26

## 2021-01-26 RX ORDER — BUPIVACAINE HYDROCHLORIDE 2.5 MG/ML
INJECTION, SOLUTION EPIDURAL; INFILTRATION; INTRACAUDAL AS NEEDED
Status: DISCONTINUED | OUTPATIENT
Start: 2021-01-26 | End: 2021-01-26 | Stop reason: HOSPADM

## 2021-01-26 RX ORDER — MIDAZOLAM HYDROCHLORIDE 2 MG/2ML
INJECTION, SOLUTION INTRAMUSCULAR; INTRAVENOUS AS NEEDED
Status: DISCONTINUED | OUTPATIENT
Start: 2021-01-26 | End: 2021-01-26

## 2021-01-26 RX ORDER — HEPARIN SODIUM 5000 [USP'U]/ML
5000 INJECTION, SOLUTION INTRAVENOUS; SUBCUTANEOUS EVERY 8 HOURS SCHEDULED
Status: DISCONTINUED | OUTPATIENT
Start: 2021-01-26 | End: 2021-02-10 | Stop reason: HOSPADM

## 2021-01-26 RX ORDER — CHLORHEXIDINE GLUCONATE 0.12 MG/ML
15 RINSE ORAL EVERY 12 HOURS SCHEDULED
Status: DISCONTINUED | OUTPATIENT
Start: 2021-01-26 | End: 2021-01-26

## 2021-01-26 RX ORDER — POTASSIUM CHLORIDE 14.9 MG/ML
20 INJECTION INTRAVENOUS ONCE
Status: COMPLETED | OUTPATIENT
Start: 2021-01-27 | End: 2021-01-27

## 2021-01-26 RX ORDER — SODIUM CHLORIDE, SODIUM GLUCONATE, SODIUM ACETATE, POTASSIUM CHLORIDE, MAGNESIUM CHLORIDE, SODIUM PHOSPHATE, DIBASIC, AND POTASSIUM PHOSPHATE .53; .5; .37; .037; .03; .012; .00082 G/100ML; G/100ML; G/100ML; G/100ML; G/100ML; G/100ML; G/100ML
1000 INJECTION, SOLUTION INTRAVENOUS ONCE
Status: COMPLETED | OUTPATIENT
Start: 2021-01-26 | End: 2021-01-27

## 2021-01-26 RX ORDER — FENTANYL CITRATE-0.9 % NACL/PF 10 MCG/ML
75 PLASTIC BAG, INJECTION (ML) INTRAVENOUS CONTINUOUS
Status: DISCONTINUED | OUTPATIENT
Start: 2021-01-26 | End: 2021-01-29

## 2021-01-26 RX ORDER — PROPOFOL 10 MG/ML
5-50 INJECTION, EMULSION INTRAVENOUS
Status: DISCONTINUED | OUTPATIENT
Start: 2021-01-26 | End: 2021-01-28

## 2021-01-26 RX ORDER — POTASSIUM CHLORIDE 20 MEQ/1
40 TABLET, EXTENDED RELEASE ORAL ONCE
Status: COMPLETED | OUTPATIENT
Start: 2021-01-26 | End: 2021-01-26

## 2021-01-26 RX ORDER — DICYCLOMINE HCL 20 MG
20 TABLET ORAL ONCE
Status: COMPLETED | OUTPATIENT
Start: 2021-01-26 | End: 2021-01-26

## 2021-01-26 RX ORDER — KETAMINE HCL IN NACL, ISO-OSM 100MG/10ML
SYRINGE (ML) INJECTION AS NEEDED
Status: DISCONTINUED | OUTPATIENT
Start: 2021-01-26 | End: 2021-01-26

## 2021-01-26 RX ORDER — SODIUM CHLORIDE, SODIUM GLUCONATE, SODIUM ACETATE, POTASSIUM CHLORIDE, MAGNESIUM CHLORIDE, SODIUM PHOSPHATE, DIBASIC, AND POTASSIUM PHOSPHATE .53; .5; .37; .037; .03; .012; .00082 G/100ML; G/100ML; G/100ML; G/100ML; G/100ML; G/100ML; G/100ML
125 INJECTION, SOLUTION INTRAVENOUS CONTINUOUS
Status: DISCONTINUED | OUTPATIENT
Start: 2021-01-26 | End: 2021-01-26

## 2021-01-26 RX ORDER — ALBUMIN, HUMAN INJ 5% 5 %
SOLUTION INTRAVENOUS CONTINUOUS PRN
Status: DISCONTINUED | OUTPATIENT
Start: 2021-01-26 | End: 2021-01-26

## 2021-01-26 RX ORDER — MAGNESIUM SULFATE HEPTAHYDRATE 40 MG/ML
2 INJECTION, SOLUTION INTRAVENOUS ONCE
Status: COMPLETED | OUTPATIENT
Start: 2021-01-27 | End: 2021-01-27

## 2021-01-26 RX ORDER — POTASSIUM CHLORIDE 14.9 MG/ML
20 INJECTION INTRAVENOUS ONCE
Status: COMPLETED | OUTPATIENT
Start: 2021-01-26 | End: 2021-01-26

## 2021-01-26 RX ORDER — CALCIUM GLUCONATE 20 MG/ML
2 INJECTION, SOLUTION INTRAVENOUS ONCE
Status: COMPLETED | OUTPATIENT
Start: 2021-01-27 | End: 2021-01-27

## 2021-01-26 RX ORDER — FENTANYL CITRATE 50 UG/ML
INJECTION, SOLUTION INTRAMUSCULAR; INTRAVENOUS AS NEEDED
Status: DISCONTINUED | OUTPATIENT
Start: 2021-01-26 | End: 2021-01-26

## 2021-01-26 RX ORDER — ALBUTEROL SULFATE 2.5 MG/3ML
2.5 SOLUTION RESPIRATORY (INHALATION) EVERY 6 HOURS PRN
Status: DISCONTINUED | OUTPATIENT
Start: 2021-01-26 | End: 2021-02-10 | Stop reason: HOSPADM

## 2021-01-26 RX ORDER — DEXAMETHASONE SODIUM PHOSPHATE 10 MG/ML
INJECTION, SOLUTION INTRAMUSCULAR; INTRAVENOUS AS NEEDED
Status: DISCONTINUED | OUTPATIENT
Start: 2021-01-26 | End: 2021-01-26

## 2021-01-26 RX ORDER — ROCURONIUM BROMIDE 10 MG/ML
INJECTION, SOLUTION INTRAVENOUS AS NEEDED
Status: DISCONTINUED | OUTPATIENT
Start: 2021-01-26 | End: 2021-01-26

## 2021-01-26 RX ORDER — FENTANYL CITRATE 50 UG/ML
50 INJECTION, SOLUTION INTRAMUSCULAR; INTRAVENOUS
Status: DISCONTINUED | OUTPATIENT
Start: 2021-01-26 | End: 2021-01-29

## 2021-01-26 RX ORDER — SODIUM CHLORIDE 9 MG/ML
INJECTION, SOLUTION INTRAVENOUS CONTINUOUS PRN
Status: DISCONTINUED | OUTPATIENT
Start: 2021-01-26 | End: 2021-01-26

## 2021-01-26 RX ORDER — ONDANSETRON 2 MG/ML
INJECTION INTRAMUSCULAR; INTRAVENOUS AS NEEDED
Status: DISCONTINUED | OUTPATIENT
Start: 2021-01-26 | End: 2021-01-26

## 2021-01-26 RX ORDER — HEPARIN SODIUM 5000 [USP'U]/ML
5000 INJECTION, SOLUTION INTRAVENOUS; SUBCUTANEOUS EVERY 8 HOURS SCHEDULED
Status: DISCONTINUED | OUTPATIENT
Start: 2021-01-26 | End: 2021-01-26

## 2021-01-26 RX ORDER — DEXMEDETOMIDINE HYDROCHLORIDE 100 UG/ML
INJECTION, SOLUTION INTRAVENOUS AS NEEDED
Status: DISCONTINUED | OUTPATIENT
Start: 2021-01-26 | End: 2021-01-26

## 2021-01-26 RX ORDER — MAGNESIUM HYDROXIDE 1200 MG/15ML
LIQUID ORAL AS NEEDED
Status: DISCONTINUED | OUTPATIENT
Start: 2021-01-26 | End: 2021-01-26 | Stop reason: HOSPADM

## 2021-01-26 RX ORDER — SODIUM CHLORIDE, SODIUM LACTATE, POTASSIUM CHLORIDE, CALCIUM CHLORIDE 600; 310; 30; 20 MG/100ML; MG/100ML; MG/100ML; MG/100ML
125 INJECTION, SOLUTION INTRAVENOUS CONTINUOUS
Status: DISCONTINUED | OUTPATIENT
Start: 2021-01-26 | End: 2021-01-28

## 2021-01-26 RX ORDER — POTASSIUM CHLORIDE 14.9 MG/ML
INJECTION INTRAVENOUS CONTINUOUS PRN
Status: DISCONTINUED | OUTPATIENT
Start: 2021-01-26 | End: 2021-01-26

## 2021-01-26 RX ADMIN — SODIUM CHLORIDE, SODIUM LACTATE, POTASSIUM CHLORIDE, AND CALCIUM CHLORIDE: .6; .31; .03; .02 INJECTION, SOLUTION INTRAVENOUS at 20:58

## 2021-01-26 RX ADMIN — POTASSIUM CHLORIDE: 200 INJECTION, SOLUTION INTRAVENOUS at 19:46

## 2021-01-26 RX ADMIN — PHENYLEPHRINE HYDROCHLORIDE 200 MCG: 10 INJECTION INTRAVENOUS at 20:22

## 2021-01-26 RX ADMIN — PHENYLEPHRINE HYDROCHLORIDE 200 MCG: 10 INJECTION INTRAVENOUS at 21:55

## 2021-01-26 RX ADMIN — MIDAZOLAM HYDROCHLORIDE 2 MG: 1 INJECTION, SOLUTION INTRAMUSCULAR; INTRAVENOUS at 19:17

## 2021-01-26 RX ADMIN — ROCURONIUM BROMIDE 50 MG: 10 INJECTION, SOLUTION INTRAVENOUS at 21:53

## 2021-01-26 RX ADMIN — IOHEXOL 100 ML: 350 INJECTION, SOLUTION INTRAVENOUS at 16:07

## 2021-01-26 RX ADMIN — SODIUM CHLORIDE, SODIUM LACTATE, POTASSIUM CHLORIDE, AND CALCIUM CHLORIDE: .6; .31; .03; .02 INJECTION, SOLUTION INTRAVENOUS at 18:40

## 2021-01-26 RX ADMIN — PROPOFOL 170 MG: 10 INJECTION, EMULSION INTRAVENOUS at 19:25

## 2021-01-26 RX ADMIN — POTASSIUM CHLORIDE 20 MEQ: 14.9 INJECTION, SOLUTION INTRAVENOUS at 15:57

## 2021-01-26 RX ADMIN — ONDANSETRON 4 MG: 2 INJECTION INTRAMUSCULAR; INTRAVENOUS at 21:52

## 2021-01-26 RX ADMIN — POTASSIUM CHLORIDE 40 MEQ: 1500 TABLET, EXTENDED RELEASE ORAL at 15:56

## 2021-01-26 RX ADMIN — SODIUM CHLORIDE: 0.9 INJECTION, SOLUTION INTRAVENOUS at 18:51

## 2021-01-26 RX ADMIN — FENTANYL CITRATE 50 MCG: 50 INJECTION, SOLUTION INTRAMUSCULAR; INTRAVENOUS at 20:45

## 2021-01-26 RX ADMIN — ROCURONIUM BROMIDE 40 MG: 10 INJECTION, SOLUTION INTRAVENOUS at 19:42

## 2021-01-26 RX ADMIN — DICYCLOMINE HYDROCHLORIDE 20 MG: 20 TABLET ORAL at 15:10

## 2021-01-26 RX ADMIN — PROPOFOL 40 MCG/KG/MIN: 10 INJECTION, EMULSION INTRAVENOUS at 22:52

## 2021-01-26 RX ADMIN — FENTANYL CITRATE 100 MCG: 50 INJECTION, SOLUTION INTRAMUSCULAR; INTRAVENOUS at 19:25

## 2021-01-26 RX ADMIN — ROCURONIUM BROMIDE 10 MG: 10 INJECTION, SOLUTION INTRAVENOUS at 21:04

## 2021-01-26 RX ADMIN — METRONIDAZOLE 500 MG: 500 INJECTION, SOLUTION INTRAVENOUS at 16:44

## 2021-01-26 RX ADMIN — SODIUM CHLORIDE, SODIUM GLUCONATE, SODIUM ACETATE, POTASSIUM CHLORIDE AND MAGNESIUM CHLORIDE 1000 ML: 526; 502; 368; 37; 30 INJECTION, SOLUTION INTRAVENOUS at 23:22

## 2021-01-26 RX ADMIN — Medication 50 MCG/HR: at 22:50

## 2021-01-26 RX ADMIN — SODIUM CHLORIDE 1000 ML: 0.9 INJECTION, SOLUTION INTRAVENOUS at 15:10

## 2021-01-26 RX ADMIN — CHLORHEXIDINE GLUCONATE 0.12% ORAL RINSE 15 ML: 1.2 LIQUID ORAL at 22:50

## 2021-01-26 RX ADMIN — ALBUMIN (HUMAN): 12.5 INJECTION, SOLUTION INTRAVENOUS at 20:25

## 2021-01-26 RX ADMIN — DEXAMETHASONE SODIUM PHOSPHATE 4 MG: 10 INJECTION, SOLUTION INTRAMUSCULAR; INTRAVENOUS at 19:29

## 2021-01-26 RX ADMIN — DEXMEDETOMIDINE HCL 12 MCG: 100 INJECTION INTRAVENOUS at 20:09

## 2021-01-26 RX ADMIN — Medication 50 MG: at 19:29

## 2021-01-26 RX ADMIN — SODIUM CHLORIDE: 0.9 INJECTION, SOLUTION INTRAVENOUS at 21:39

## 2021-01-26 RX ADMIN — LIDOCAINE HYDROCHLORIDE 100 MG: 20 INJECTION, SOLUTION INTRAVENOUS at 19:25

## 2021-01-26 RX ADMIN — FENTANYL CITRATE 50 MCG: 50 INJECTION, SOLUTION INTRAMUSCULAR; INTRAVENOUS at 20:25

## 2021-01-26 RX ADMIN — Medication 100 MG: at 19:25

## 2021-01-26 RX ADMIN — CEFEPIME HYDROCHLORIDE 2000 MG: 2 INJECTION, POWDER, FOR SOLUTION INTRAVENOUS at 19:15

## 2021-01-26 NOTE — SEPSIS NOTE
Sepsis Note   Janis Beal 52 y o  female MRN: [id number]  Unit/Bed#: [facility] Encounter: [id number]      qSOFA     [address] Name 01/26/21 1355                Altered mental status GCS < 15  --        Respiratory Rate > / =95  0        Systolic BP < / =978  0        Q Sofa Score  0            Initial Sepsis Screening     Row Name 01/26/21 1651                Is the patient's history suggestive of a new or worsening infection? (!) Yes (Proceed)  -SW        Suspected source of infection  acute abdominal infection  -SW        Are two or more of the following signs & symptoms of infection both present and new to the patient?  --        Indicate SIRS criteria  Tachycardia > 90 bpm;WBC > 10% bands  -SW        If the answer is yes to both questions, suspicion of sepsis is present  --        If severe sepsis is present AND tissue hypoperfusion perists in the hour after fluid resuscitation or lactate > 4, the patient meets criteria for SEPTIC SHOCK  --        Are any of the following organ dysfunction criteria present within 6 hours of suspected infection and SIRS criteria that are NOT considered to be chronic conditions?   (!) Yes  -SW        Organ dysfunction  Creatinine > 0 5 mg/dl ABOVE BASELINE  -SW        Date of presentation of severe sepsis  01/26/21  -        Time of presentation of severe sepsis  1651  -SW        Tissue hypoperfusion persists in the hour after crystalloid fluid administration, evidenced, by either:  --        Was hypotension present within one hour of the conclusion of crystalloid fluid administration?  --        Date of presentation of septic shock  --        Time of presentation of septic shock  --          User Key  (r) = Recorded By, (t) = Taken By, (c) = Cosigned By    [address] Name Provider Type    DANIEL Cuevas DO Physician
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
## 2021-01-27 ENCOUNTER — ANESTHESIA (INPATIENT)
Dept: PERIOP | Facility: HOSPITAL | Age: 51
DRG: 854 | End: 2021-01-27
Payer: COMMERCIAL

## 2021-01-27 ENCOUNTER — ANESTHESIA EVENT (INPATIENT)
Dept: PERIOP | Facility: HOSPITAL | Age: 51
DRG: 854 | End: 2021-01-27
Payer: COMMERCIAL

## 2021-01-27 VITALS — HEART RATE: 95 BPM

## 2021-01-27 LAB
ALBUMIN SERPL BCP-MCNC: 1.6 G/DL (ref 3.5–5)
ALBUMIN SERPL BCP-MCNC: 1.7 G/DL (ref 3.5–5)
ALP SERPL-CCNC: 36 U/L (ref 46–116)
ALP SERPL-CCNC: 37 U/L (ref 46–116)
ALT SERPL W P-5'-P-CCNC: 10 U/L (ref 12–78)
ALT SERPL W P-5'-P-CCNC: 12 U/L (ref 12–78)
ANION GAP SERPL CALCULATED.3IONS-SCNC: 10 MMOL/L (ref 4–13)
ANION GAP SERPL CALCULATED.3IONS-SCNC: 10 MMOL/L (ref 4–13)
ANION GAP SERPL CALCULATED.3IONS-SCNC: 11 MMOL/L (ref 4–13)
ANISOCYTOSIS BLD QL SMEAR: PRESENT
AST SERPL W P-5'-P-CCNC: 10 U/L (ref 5–45)
AST SERPL W P-5'-P-CCNC: 8 U/L (ref 5–45)
ATRIAL RATE: 103 BPM
ATRIAL RATE: 79 BPM
BASE EXCESS BLDA CALC-SCNC: -5.8 MMOL/L
BASE EXCESS BLDA CALC-SCNC: -7 MMOL/L
BASE EXCESS BLDA CALC-SCNC: -7.6 MMOL/L
BASE EXCESS BLDA CALC-SCNC: -8.2 MMOL/L
BASOPHILS # BLD MANUAL: 0 THOUSAND/UL (ref 0–0.1)
BASOPHILS # BLD MANUAL: 0 THOUSAND/UL (ref 0–0.1)
BASOPHILS NFR MAR MANUAL: 0 % (ref 0–1)
BASOPHILS NFR MAR MANUAL: 0 % (ref 0–1)
BILIRUB SERPL-MCNC: 0.5 MG/DL (ref 0.2–1)
BILIRUB SERPL-MCNC: 0.6 MG/DL (ref 0.2–1)
BUN SERPL-MCNC: 10 MG/DL (ref 5–25)
BUN SERPL-MCNC: 12 MG/DL (ref 5–25)
BUN SERPL-MCNC: 8 MG/DL (ref 5–25)
CA-I BLD-SCNC: 1.03 MMOL/L (ref 1.12–1.32)
CALCIUM ALBUM COR SERPL-MCNC: 8.9 MG/DL (ref 8.3–10.1)
CALCIUM ALBUM COR SERPL-MCNC: 9.4 MG/DL (ref 8.3–10.1)
CALCIUM SERPL-MCNC: 6.3 MG/DL (ref 8.3–10.1)
CALCIUM SERPL-MCNC: 7.1 MG/DL (ref 8.3–10.1)
CALCIUM SERPL-MCNC: 7.5 MG/DL (ref 8.3–10.1)
CHLORIDE SERPL-SCNC: 108 MMOL/L (ref 100–108)
CHLORIDE SERPL-SCNC: 110 MMOL/L (ref 100–108)
CHLORIDE SERPL-SCNC: 115 MMOL/L (ref 100–108)
CO2 SERPL-SCNC: 19 MMOL/L (ref 21–32)
CO2 SERPL-SCNC: 21 MMOL/L (ref 21–32)
CO2 SERPL-SCNC: 21 MMOL/L (ref 21–32)
CREAT SERPL-MCNC: 0.58 MG/DL (ref 0.6–1.3)
CREAT SERPL-MCNC: 0.73 MG/DL (ref 0.6–1.3)
CREAT SERPL-MCNC: 0.83 MG/DL (ref 0.6–1.3)
EOSINOPHIL # BLD MANUAL: 0 THOUSAND/UL (ref 0–0.4)
EOSINOPHIL # BLD MANUAL: 0 THOUSAND/UL (ref 0–0.4)
EOSINOPHIL NFR BLD MANUAL: 0 % (ref 0–6)
EOSINOPHIL NFR BLD MANUAL: 0 % (ref 0–6)
ERYTHROCYTE [DISTWIDTH] IN BLOOD BY AUTOMATED COUNT: 13.9 % (ref 11.6–15.1)
ERYTHROCYTE [DISTWIDTH] IN BLOOD BY AUTOMATED COUNT: 14.4 % (ref 11.6–15.1)
EST. AVERAGE GLUCOSE BLD GHB EST-MCNC: 126 MG/DL
GFR SERPL CREATININE-BSD FRML MDRD: 109 ML/MIN/1.73SQ M
GFR SERPL CREATININE-BSD FRML MDRD: 84 ML/MIN/1.73SQ M
GFR SERPL CREATININE-BSD FRML MDRD: 98 ML/MIN/1.73SQ M
GLUCOSE SERPL-MCNC: 127 MG/DL (ref 65–140)
GLUCOSE SERPL-MCNC: 144 MG/DL (ref 65–140)
GLUCOSE SERPL-MCNC: 161 MG/DL (ref 65–140)
GLUCOSE SERPL-MCNC: 163 MG/DL (ref 65–140)
GLUCOSE SERPL-MCNC: 179 MG/DL (ref 65–140)
HBA1C MFR BLD: 6 %
HCO3 BLDA-SCNC: 16.1 MMOL/L (ref 22–28)
HCO3 BLDA-SCNC: 17 MMOL/L (ref 22–28)
HCO3 BLDA-SCNC: 17.3 MMOL/L (ref 22–28)
HCO3 BLDA-SCNC: 17.7 MMOL/L (ref 22–28)
HCT VFR BLD AUTO: 33.7 % (ref 34.8–46.1)
HCT VFR BLD AUTO: 37.8 % (ref 34.8–46.1)
HGB BLD-MCNC: 10.8 G/DL (ref 11.5–15.4)
HGB BLD-MCNC: 11.9 G/DL (ref 11.5–15.4)
HOROWITZ INDEX BLDA+IHG-RTO: 40 MM[HG]
HOROWITZ INDEX BLDA+IHG-RTO: 60 MM[HG]
HOROWITZ INDEX BLDA+IHG-RTO: 60 MM[HG]
LACTATE SERPL-SCNC: 0.9 MMOL/L (ref 0.5–2)
LACTATE SERPL-SCNC: 1.1 MMOL/L (ref 0.5–2)
LG PLATELETS BLD QL SMEAR: PRESENT
LG PLATELETS BLD QL SMEAR: PRESENT
LYMPHOCYTES # BLD AUTO: 0.85 THOUSAND/UL (ref 0.6–4.47)
LYMPHOCYTES # BLD AUTO: 0.89 THOUSAND/UL (ref 0.6–4.47)
LYMPHOCYTES # BLD AUTO: 5 % (ref 14–44)
LYMPHOCYTES # BLD AUTO: 6 % (ref 14–44)
MAGNESIUM SERPL-MCNC: 2.5 MG/DL (ref 1.6–2.6)
MAGNESIUM SERPL-MCNC: 2.7 MG/DL (ref 1.6–2.6)
MCH RBC QN AUTO: 27.4 PG (ref 26.8–34.3)
MCH RBC QN AUTO: 27.4 PG (ref 26.8–34.3)
MCHC RBC AUTO-ENTMCNC: 31.5 G/DL (ref 31.4–37.4)
MCHC RBC AUTO-ENTMCNC: 32 G/DL (ref 31.4–37.4)
MCV RBC AUTO: 86 FL (ref 82–98)
MCV RBC AUTO: 87 FL (ref 82–98)
METAMYELOCYTES NFR BLD MANUAL: 1 % (ref 0–1)
METAMYELOCYTES NFR BLD MANUAL: 3 % (ref 0–1)
MONOCYTES # BLD AUTO: 0.57 THOUSAND/UL (ref 0–1.22)
MONOCYTES # BLD AUTO: 0.71 THOUSAND/UL (ref 0–1.22)
MONOCYTES NFR BLD: 4 % (ref 4–12)
MONOCYTES NFR BLD: 4 % (ref 4–12)
MYELOCYTES NFR BLD MANUAL: 2 % (ref 0–1)
NEUTROPHILS # BLD MANUAL: 12.33 THOUSAND/UL (ref 1.85–7.62)
NEUTROPHILS # BLD MANUAL: 15.58 THOUSAND/UL (ref 1.85–7.62)
NEUTS BAND NFR BLD MANUAL: 23 % (ref 0–8)
NEUTS BAND NFR BLD MANUAL: 8 % (ref 0–8)
NEUTS SEG NFR BLD AUTO: 65 % (ref 43–75)
NEUTS SEG NFR BLD AUTO: 79 % (ref 43–75)
NRBC BLD AUTO-RTO: 0 /100 WBCS
NRBC BLD AUTO-RTO: 0 /100 WBCS
O2 CT BLDA-SCNC: 15.1 ML/DL (ref 16–23)
O2 CT BLDA-SCNC: 16.9 ML/DL (ref 16–23)
O2 CT BLDA-SCNC: 18 ML/DL (ref 16–23)
O2 CT BLDA-SCNC: 97.7 ML/DL (ref 16–23)
OXYHGB MFR BLDA: 96.7 % (ref 94–97)
OXYHGB MFR BLDA: 98.2 % (ref 94–97)
OXYHGB MFR BLDA: 98.5 % (ref 94–97)
OXYHGB MFR BLDA: 98.7 % (ref 94–97)
P AXIS: 45 DEGREES
P AXIS: 47 DEGREES
PCO2 BLDA: 27.4 MM HG (ref 36–44)
PCO2 BLDA: 28.3 MM HG (ref 36–44)
PCO2 BLDA: 31.4 MM HG (ref 36–44)
PCO2 BLDA: 34.1 MM HG (ref 36–44)
PEEP RESPIRATORY: 6 CM[H2O]
PH BLDA: 7.32 [PH] (ref 7.35–7.45)
PH BLDA: 7.36 [PH] (ref 7.35–7.45)
PH BLDA: 7.39 [PH] (ref 7.35–7.45)
PH BLDA: 7.41 [PH] (ref 7.35–7.45)
PHOSPHATE SERPL-MCNC: 3 MG/DL (ref 2.7–4.5)
PHOSPHATE SERPL-MCNC: 3.8 MG/DL (ref 2.7–4.5)
PLATELET # BLD AUTO: 392 THOUSANDS/UL (ref 149–390)
PLATELET # BLD AUTO: 417 THOUSANDS/UL (ref 149–390)
PLATELET BLD QL SMEAR: ADEQUATE
PLATELET BLD QL SMEAR: ADEQUATE
PMV BLD AUTO: 10 FL (ref 8.9–12.7)
PMV BLD AUTO: 10.3 FL (ref 8.9–12.7)
PO2 BLDA: 120.2 MM HG (ref 75–129)
PO2 BLDA: 154.7 MM HG (ref 75–129)
PO2 BLDA: 219.5 MM HG (ref 75–129)
PO2 BLDA: 231.7 MM HG (ref 75–129)
POTASSIUM SERPL-SCNC: 3 MMOL/L (ref 3.5–5.3)
POTASSIUM SERPL-SCNC: 3.6 MMOL/L (ref 3.5–5.3)
POTASSIUM SERPL-SCNC: 4.1 MMOL/L (ref 3.5–5.3)
PR INTERVAL: 132 MS
PR INTERVAL: 142 MS
PROCALCITONIN SERPL-MCNC: 2.5 NG/ML
PROT SERPL-MCNC: 4.5 G/DL (ref 6.4–8.2)
PROT SERPL-MCNC: 4.7 G/DL (ref 6.4–8.2)
QRS AXIS: 70 DEGREES
QRS AXIS: 87 DEGREES
QRSD INTERVAL: 76 MS
QRSD INTERVAL: 82 MS
QT INTERVAL: 356 MS
QT INTERVAL: 416 MS
QTC INTERVAL: 466 MS
QTC INTERVAL: 477 MS
RBC # BLD AUTO: 3.94 MILLION/UL (ref 3.81–5.12)
RBC # BLD AUTO: 4.35 MILLION/UL (ref 3.81–5.12)
SODIUM SERPL-SCNC: 139 MMOL/L (ref 136–145)
SODIUM SERPL-SCNC: 142 MMOL/L (ref 136–145)
SODIUM SERPL-SCNC: 144 MMOL/L (ref 136–145)
SPECIMEN SOURCE: ABNORMAL
T WAVE AXIS: -53 DEGREES
T WAVE AXIS: 15 DEGREES
TOTAL CELLS COUNTED SPEC: 100
TOXIC GRANULES BLD QL SMEAR: PRESENT
TOXIC GRANULES BLD QL SMEAR: PRESENT
VENT AC: 14
VENT- AC: AC
VENTRICULAR RATE: 103 BPM
VENTRICULAR RATE: 79 BPM
VT SETTING VENT: 350 ML
WBC # BLD AUTO: 14.17 THOUSAND/UL (ref 4.31–10.16)
WBC # BLD AUTO: 17.7 THOUSAND/UL (ref 4.31–10.16)

## 2021-01-27 PROCEDURE — 88305 TISSUE EXAM BY PATHOLOGIST: CPT | Performed by: PATHOLOGY

## 2021-01-27 PROCEDURE — 0D1L0Z4 BYPASS TRANSVERSE COLON TO CUTANEOUS, OPEN APPROACH: ICD-10-PCS | Performed by: STUDENT IN AN ORGANIZED HEALTH CARE EDUCATION/TRAINING PROGRAM

## 2021-01-27 PROCEDURE — 83605 ASSAY OF LACTIC ACID: CPT | Performed by: PHYSICIAN ASSISTANT

## 2021-01-27 PROCEDURE — 88307 TISSUE EXAM BY PATHOLOGIST: CPT | Performed by: PATHOLOGY

## 2021-01-27 PROCEDURE — 0DBU0ZZ EXCISION OF OMENTUM, OPEN APPROACH: ICD-10-PCS | Performed by: STUDENT IN AN ORGANIZED HEALTH CARE EDUCATION/TRAINING PROGRAM

## 2021-01-27 PROCEDURE — 85027 COMPLETE CBC AUTOMATED: CPT | Performed by: PHYSICIAN ASSISTANT

## 2021-01-27 PROCEDURE — 93005 ELECTROCARDIOGRAM TRACING: CPT

## 2021-01-27 PROCEDURE — 94760 N-INVAS EAR/PLS OXIMETRY 1: CPT

## 2021-01-27 PROCEDURE — 83735 ASSAY OF MAGNESIUM: CPT | Performed by: PHYSICIAN ASSISTANT

## 2021-01-27 PROCEDURE — 80048 BASIC METABOLIC PNL TOTAL CA: CPT | Performed by: PHYSICIAN ASSISTANT

## 2021-01-27 PROCEDURE — 0DTM0ZZ RESECTION OF DESCENDING COLON, OPEN APPROACH: ICD-10-PCS | Performed by: STUDENT IN AN ORGANIZED HEALTH CARE EDUCATION/TRAINING PROGRAM

## 2021-01-27 PROCEDURE — 94664 DEMO&/EVAL PT USE INHALER: CPT

## 2021-01-27 PROCEDURE — 80053 COMPREHEN METABOLIC PANEL: CPT | Performed by: PHYSICIAN ASSISTANT

## 2021-01-27 PROCEDURE — 93010 ELECTROCARDIOGRAM REPORT: CPT | Performed by: INTERNAL MEDICINE

## 2021-01-27 PROCEDURE — 44143 PARTIAL REMOVAL OF COLON: CPT | Performed by: STUDENT IN AN ORGANIZED HEALTH CARE EDUCATION/TRAINING PROGRAM

## 2021-01-27 PROCEDURE — 82330 ASSAY OF CALCIUM: CPT | Performed by: PHYSICIAN ASSISTANT

## 2021-01-27 PROCEDURE — 84100 ASSAY OF PHOSPHORUS: CPT | Performed by: PHYSICIAN ASSISTANT

## 2021-01-27 PROCEDURE — 97605 NEG PRS WND THER DME<=50SQCM: CPT | Performed by: STUDENT IN AN ORGANIZED HEALTH CARE EDUCATION/TRAINING PROGRAM

## 2021-01-27 PROCEDURE — 99233 SBSQ HOSP IP/OBS HIGH 50: CPT | Performed by: STUDENT IN AN ORGANIZED HEALTH CARE EDUCATION/TRAINING PROGRAM

## 2021-01-27 PROCEDURE — 85007 BL SMEAR W/DIFF WBC COUNT: CPT | Performed by: PHYSICIAN ASSISTANT

## 2021-01-27 PROCEDURE — 3044F HG A1C LEVEL LT 7.0%: CPT | Performed by: INTERNAL MEDICINE

## 2021-01-27 PROCEDURE — 82805 BLOOD GASES W/O2 SATURATION: CPT | Performed by: PHYSICIAN ASSISTANT

## 2021-01-27 PROCEDURE — 94003 VENT MGMT INPAT SUBQ DAY: CPT

## 2021-01-27 PROCEDURE — 83036 HEMOGLOBIN GLYCOSYLATED A1C: CPT | Performed by: PHYSICIAN ASSISTANT

## 2021-01-27 PROCEDURE — 82948 REAGENT STRIP/BLOOD GLUCOSE: CPT

## 2021-01-27 PROCEDURE — 84145 PROCALCITONIN (PCT): CPT | Performed by: PHYSICIAN ASSISTANT

## 2021-01-27 PROCEDURE — 44143 PARTIAL REMOVAL OF COLON: CPT | Performed by: CLINICAL NURSE SPECIALIST

## 2021-01-27 PROCEDURE — 97605 NEG PRS WND THER DME<=50SQCM: CPT | Performed by: CLINICAL NURSE SPECIALIST

## 2021-01-27 RX ORDER — SODIUM CHLORIDE 9 MG/ML
INJECTION, SOLUTION INTRAVENOUS CONTINUOUS PRN
Status: DISCONTINUED | OUTPATIENT
Start: 2021-01-27 | End: 2021-01-27

## 2021-01-27 RX ORDER — SODIUM CHLORIDE, SODIUM GLUCONATE, SODIUM ACETATE, POTASSIUM CHLORIDE, MAGNESIUM CHLORIDE, SODIUM PHOSPHATE, DIBASIC, AND POTASSIUM PHOSPHATE .53; .5; .37; .037; .03; .012; .00082 G/100ML; G/100ML; G/100ML; G/100ML; G/100ML; G/100ML; G/100ML
1000 INJECTION, SOLUTION INTRAVENOUS ONCE
Status: COMPLETED | OUTPATIENT
Start: 2021-01-27 | End: 2021-01-27

## 2021-01-27 RX ORDER — SODIUM CHLORIDE, SODIUM GLUCONATE, SODIUM ACETATE, POTASSIUM CHLORIDE, MAGNESIUM CHLORIDE, SODIUM PHOSPHATE, DIBASIC, AND POTASSIUM PHOSPHATE .53; .5; .37; .037; .03; .012; .00082 G/100ML; G/100ML; G/100ML; G/100ML; G/100ML; G/100ML; G/100ML
500 INJECTION, SOLUTION INTRAVENOUS ONCE
Status: DISCONTINUED | OUTPATIENT
Start: 2021-01-27 | End: 2021-01-27

## 2021-01-27 RX ORDER — MAGNESIUM SULFATE 1 G/100ML
1 INJECTION INTRAVENOUS ONCE
Status: COMPLETED | OUTPATIENT
Start: 2021-01-27 | End: 2021-01-27

## 2021-01-27 RX ORDER — MAGNESIUM HYDROXIDE 1200 MG/15ML
LIQUID ORAL AS NEEDED
Status: DISCONTINUED | OUTPATIENT
Start: 2021-01-27 | End: 2021-01-27 | Stop reason: HOSPADM

## 2021-01-27 RX ORDER — SODIUM CHLORIDE, SODIUM GLUCONATE, SODIUM ACETATE, POTASSIUM CHLORIDE, MAGNESIUM CHLORIDE, SODIUM PHOSPHATE, DIBASIC, AND POTASSIUM PHOSPHATE .53; .5; .37; .037; .03; .012; .00082 G/100ML; G/100ML; G/100ML; G/100ML; G/100ML; G/100ML; G/100ML
500 INJECTION, SOLUTION INTRAVENOUS ONCE
Status: COMPLETED | OUTPATIENT
Start: 2021-01-27 | End: 2021-01-27

## 2021-01-27 RX ORDER — ROCURONIUM BROMIDE 10 MG/ML
INJECTION, SOLUTION INTRAVENOUS AS NEEDED
Status: DISCONTINUED | OUTPATIENT
Start: 2021-01-27 | End: 2021-01-27

## 2021-01-27 RX ORDER — LEVALBUTEROL 1.25 MG/.5ML
1.25 SOLUTION, CONCENTRATE RESPIRATORY (INHALATION) 3 TIMES DAILY
Status: DISCONTINUED | OUTPATIENT
Start: 2021-01-27 | End: 2021-01-27

## 2021-01-27 RX ORDER — ALBUMIN, HUMAN INJ 5% 5 %
25 SOLUTION INTRAVENOUS ONCE
Status: COMPLETED | OUTPATIENT
Start: 2021-01-27 | End: 2021-01-27

## 2021-01-27 RX ORDER — SODIUM CHLORIDE, SODIUM LACTATE, POTASSIUM CHLORIDE, CALCIUM CHLORIDE 600; 310; 30; 20 MG/100ML; MG/100ML; MG/100ML; MG/100ML
INJECTION, SOLUTION INTRAVENOUS CONTINUOUS PRN
Status: DISCONTINUED | OUTPATIENT
Start: 2021-01-27 | End: 2021-01-27

## 2021-01-27 RX ORDER — ALBUMIN, HUMAN INJ 5% 5 %
12.5 SOLUTION INTRAVENOUS ONCE
Status: DISCONTINUED | OUTPATIENT
Start: 2021-01-27 | End: 2021-01-27

## 2021-01-27 RX ORDER — CALCIUM GLUCONATE 20 MG/ML
2 INJECTION, SOLUTION INTRAVENOUS ONCE
Status: COMPLETED | OUTPATIENT
Start: 2021-01-27 | End: 2021-01-28

## 2021-01-27 RX ORDER — POTASSIUM CHLORIDE 14.9 MG/ML
20 INJECTION INTRAVENOUS
Status: DISCONTINUED | OUTPATIENT
Start: 2021-01-27 | End: 2021-01-27

## 2021-01-27 RX ORDER — PROPOFOL 10 MG/ML
INJECTION, EMULSION INTRAVENOUS AS NEEDED
Status: DISCONTINUED | OUTPATIENT
Start: 2021-01-27 | End: 2021-01-27

## 2021-01-27 RX ORDER — ALBUMIN, HUMAN INJ 5% 5 %
SOLUTION INTRAVENOUS CONTINUOUS PRN
Status: DISCONTINUED | OUTPATIENT
Start: 2021-01-27 | End: 2021-01-27

## 2021-01-27 RX ORDER — HYDROMORPHONE HYDROCHLORIDE 2 MG/ML
INJECTION, SOLUTION INTRAMUSCULAR; INTRAVENOUS; SUBCUTANEOUS AS NEEDED
Status: DISCONTINUED | OUTPATIENT
Start: 2021-01-27 | End: 2021-01-27

## 2021-01-27 RX ORDER — POTASSIUM CHLORIDE 14.9 MG/ML
20 INJECTION INTRAVENOUS ONCE
Status: COMPLETED | OUTPATIENT
Start: 2021-01-27 | End: 2021-01-27

## 2021-01-27 RX ADMIN — PHENYLEPHRINE HYDROCHLORIDE 100 MCG: 10 INJECTION INTRAVENOUS at 18:07

## 2021-01-27 RX ADMIN — METRONIDAZOLE 500 MG: 500 INJECTION, SOLUTION INTRAVENOUS at 00:19

## 2021-01-27 RX ADMIN — Medication 500 MG: at 15:20

## 2021-01-27 RX ADMIN — ROCURONIUM BROMIDE 50 MG: 10 INJECTION, SOLUTION INTRAVENOUS at 15:12

## 2021-01-27 RX ADMIN — HEPARIN SODIUM 5000 UNITS: 5000 INJECTION INTRAVENOUS; SUBCUTANEOUS at 14:13

## 2021-01-27 RX ADMIN — METRONIDAZOLE 500 MG: 500 INJECTION, SOLUTION INTRAVENOUS at 19:06

## 2021-01-27 RX ADMIN — FENTANYL CITRATE 100 MCG: 50 INJECTION, SOLUTION INTRAMUSCULAR; INTRAVENOUS at 15:39

## 2021-01-27 RX ADMIN — FENTANYL CITRATE 100 MCG: 50 INJECTION, SOLUTION INTRAMUSCULAR; INTRAVENOUS at 15:12

## 2021-01-27 RX ADMIN — ALBUMIN (HUMAN): 12.5 INJECTION, SOLUTION INTRAVENOUS at 15:29

## 2021-01-27 RX ADMIN — METRONIDAZOLE 500 MG: 500 INJECTION, SOLUTION INTRAVENOUS at 08:42

## 2021-01-27 RX ADMIN — PHENYLEPHRINE HYDROCHLORIDE 100 MCG: 10 INJECTION INTRAVENOUS at 17:44

## 2021-01-27 RX ADMIN — HYDROMORPHONE HYDROCHLORIDE 0.6 MG: 2 INJECTION, SOLUTION INTRAMUSCULAR; INTRAVENOUS; SUBCUTANEOUS at 16:38

## 2021-01-27 RX ADMIN — PHENYLEPHRINE HYDROCHLORIDE 100 MCG: 10 INJECTION INTRAVENOUS at 18:16

## 2021-01-27 RX ADMIN — PROPOFOL 100 MG: 10 INJECTION, EMULSION INTRAVENOUS at 15:12

## 2021-01-27 RX ADMIN — MAGNESIUM SULFATE HEPTAHYDRATE 2 G: 40 INJECTION, SOLUTION INTRAVENOUS at 00:18

## 2021-01-27 RX ADMIN — SODIUM CHLORIDE, SODIUM GLUCONATE, SODIUM ACETATE, POTASSIUM CHLORIDE AND MAGNESIUM CHLORIDE 500 ML: 526; 502; 368; 37; 30 INJECTION, SOLUTION INTRAVENOUS at 22:45

## 2021-01-27 RX ADMIN — CEFEPIME HYDROCHLORIDE 2000 MG: 2 INJECTION, POWDER, FOR SOLUTION INTRAVENOUS at 19:58

## 2021-01-27 RX ADMIN — HEPARIN SODIUM 5000 UNITS: 5000 INJECTION INTRAVENOUS; SUBCUTANEOUS at 00:16

## 2021-01-27 RX ADMIN — SODIUM CHLORIDE, SODIUM GLUCONATE, SODIUM ACETATE, POTASSIUM CHLORIDE AND MAGNESIUM CHLORIDE 1000 ML: 526; 502; 368; 37; 30 INJECTION, SOLUTION INTRAVENOUS at 02:11

## 2021-01-27 RX ADMIN — FENTANYL CITRATE 50 MCG: 50 INJECTION, SOLUTION INTRAMUSCULAR; INTRAVENOUS at 08:39

## 2021-01-27 RX ADMIN — POTASSIUM CHLORIDE 20 MEQ: 14.9 INJECTION, SOLUTION INTRAVENOUS at 14:13

## 2021-01-27 RX ADMIN — SODIUM CHLORIDE: 0.9 INJECTION, SOLUTION INTRAVENOUS at 15:11

## 2021-01-27 RX ADMIN — PROPOFOL 25 MCG/KG/MIN: 10 INJECTION, EMULSION INTRAVENOUS at 01:16

## 2021-01-27 RX ADMIN — PHENYLEPHRINE HYDROCHLORIDE 100 MCG: 10 INJECTION INTRAVENOUS at 15:27

## 2021-01-27 RX ADMIN — SODIUM CHLORIDE, SODIUM LACTATE, POTASSIUM CHLORIDE, AND CALCIUM CHLORIDE 125 ML/HR: .6; .31; .03; .02 INJECTION, SOLUTION INTRAVENOUS at 06:30

## 2021-01-27 RX ADMIN — ALBUMIN (HUMAN) 25 G: 12.5 INJECTION, SOLUTION INTRAVENOUS at 02:11

## 2021-01-27 RX ADMIN — PHENYLEPHRINE HYDROCHLORIDE 100 MCG: 10 INJECTION INTRAVENOUS at 17:39

## 2021-01-27 RX ADMIN — HEPARIN SODIUM 5000 UNITS: 5000 INJECTION INTRAVENOUS; SUBCUTANEOUS at 05:04

## 2021-01-27 RX ADMIN — PROPOFOL 30 MCG/KG/MIN: 10 INJECTION, EMULSION INTRAVENOUS at 06:30

## 2021-01-27 RX ADMIN — HYDROMORPHONE HYDROCHLORIDE 0.4 MG: 2 INJECTION, SOLUTION INTRAMUSCULAR; INTRAVENOUS; SUBCUTANEOUS at 15:47

## 2021-01-27 RX ADMIN — CALCIUM GLUCONATE 2 G: 20 INJECTION, SOLUTION INTRAVENOUS at 08:42

## 2021-01-27 RX ADMIN — INSULIN LISPRO 1 UNITS: 100 INJECTION, SOLUTION INTRAVENOUS; SUBCUTANEOUS at 05:46

## 2021-01-27 RX ADMIN — PHENYLEPHRINE HYDROCHLORIDE 100 MCG: 10 INJECTION INTRAVENOUS at 17:04

## 2021-01-27 RX ADMIN — CEFEPIME HYDROCHLORIDE 2000 MG: 2 INJECTION, POWDER, FOR SOLUTION INTRAVENOUS at 09:56

## 2021-01-27 RX ADMIN — CALCIUM GLUCONATE 2 G: 20 INJECTION, SOLUTION INTRAVENOUS at 00:18

## 2021-01-27 RX ADMIN — SODIUM CHLORIDE, SODIUM LACTATE, POTASSIUM CHLORIDE, AND CALCIUM CHLORIDE 125 ML/HR: .6; .31; .03; .02 INJECTION, SOLUTION INTRAVENOUS at 14:22

## 2021-01-27 RX ADMIN — CHLORHEXIDINE GLUCONATE 0.12% ORAL RINSE 15 ML: 1.2 LIQUID ORAL at 21:02

## 2021-01-27 RX ADMIN — CALCIUM GLUCONATE 2 G: 20 INJECTION, SOLUTION INTRAVENOUS at 14:13

## 2021-01-27 RX ADMIN — ROCURONIUM BROMIDE 50 MG: 10 INJECTION, SOLUTION INTRAVENOUS at 16:06

## 2021-01-27 RX ADMIN — SODIUM CHLORIDE, SODIUM GLUCONATE, SODIUM ACETATE, POTASSIUM CHLORIDE AND MAGNESIUM CHLORIDE 1000 ML: 526; 502; 368; 37; 30 INJECTION, SOLUTION INTRAVENOUS at 05:35

## 2021-01-27 RX ADMIN — PHENYLEPHRINE HYDROCHLORIDE 100 MCG: 10 INJECTION INTRAVENOUS at 15:17

## 2021-01-27 RX ADMIN — CHLORHEXIDINE GLUCONATE 0.12% ORAL RINSE 15 ML: 1.2 LIQUID ORAL at 08:47

## 2021-01-27 RX ADMIN — POTASSIUM CHLORIDE 20 MEQ: 14.9 INJECTION, SOLUTION INTRAVENOUS at 09:44

## 2021-01-27 RX ADMIN — MAGNESIUM SULFATE HEPTAHYDRATE 1 G: 1 INJECTION, SOLUTION INTRAVENOUS at 18:56

## 2021-01-27 RX ADMIN — INSULIN LISPRO 1 UNITS: 100 INJECTION, SOLUTION INTRAVENOUS; SUBCUTANEOUS at 00:16

## 2021-01-27 RX ADMIN — HEPARIN SODIUM 5000 UNITS: 5000 INJECTION INTRAVENOUS; SUBCUTANEOUS at 21:02

## 2021-01-27 RX ADMIN — Medication 75 MCG/HR: at 12:24

## 2021-01-27 RX ADMIN — POTASSIUM CHLORIDE 20 MEQ: 14.9 INJECTION, SOLUTION INTRAVENOUS at 00:19

## 2021-01-27 NOTE — PHYSICAL THERAPY NOTE
Physical Therapy Cancellation Note    PT order received  Chart review performed and indicates plan for repeat OR today  Patient remains intubated and on full mechanical support  PT will follow and evaluate as appropriate       01/27/21 0927   PT Last Visit   PT Visit Date 01/27/21   Note Type   Note type Evaluation   Cancel Reasons Intubated/sedated     Jojo Glassing, PT, DPT

## 2021-01-27 NOTE — UTILIZATION REVIEW
Notification of Inpatient Admission/Inpatient Authorization Request   This is a Notification of Inpatient Admission for [address]  Be advised that this patient was admitted to our facility under Inpatient Status  Contact Mitesh Gasca at [phone] for additional admission information  [address] [facility] DEPT DEDICATED Chip Rachel [phone]  Patient Name:   Archer Landau   YOB: 1970       [address]   [address]:   [address]   Tax ID: [id number]  NPI: [id number] Attending Provider/NPI:  Phone:  Address: [place] [[id number]]  [phone]  Same as Facility   Place of Service Code: 24     [facility]   Start Date: 1/26/21 2240 Discharge Date & Time: No discharge date for patient encounter      Type of Admission: Inpatient Status Discharge Disposition   (if discharged): Final discharge disposition not confirmed   Patient Diagnoses: Diarrhea [R19 7]  Dehydration [E86 0]  Body mass index 38 0-38 9, adult [Z68 38]  Hypokalemia [E87 6]  Acquired hypothyroidism [E03 9]  Colitis [K52 9]  HARI (acute kidney injury) ([address] ) [N17 9]  Pneumoperitoneum of unknown etiology [K66 8]  Primary osteoarthritis of both knees [M17 0]  Perforated abdominal viscus [R19 8]  Bilateral low back pain with sciatica, sciatica laterality unspecified, unspecified chronicity [M54 40]  Diarrhea, unspecified type [R19 7]  Asthma, unspecified asthma severity, unspecified whether complicated, unspecified whether persistent [J45 909]  Type 2 diabetes mellitus without complication, unspecified whether long term insulin use ([address] ) [E11 9]     Orders: Admission Orders (From admission, onward)     Ordered        01/26/21 2240  Inpatient Admission  Once                    Assigned Utilization Review Contact: Mitesh Gasca  Utilization   Network Utilization Review Department  Phone: [phone]; Fax [phone]  Email: [email or web address]   ATTENTION PAYERS: Please call the assigned Utilization  directly with any questions or concerns ALL voicemails in the department are confidential  Send all requests for admission clinical reviews, approved or denied determinations and any other requests to dedicated fax number belonging to the campus where the patient is receiving treatment
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
## 2021-01-27 NOTE — RESPIRATORY THERAPY NOTE
01/27/21 1119   Vent Information   Vent ID Groot   Vent type Drager   Drager Vent Mode AC/VC+   $ Pulse Oximetry Spot Check Charge Completed   SpO2 99 %   AC/VC+ Settings   Resp Rate (BPM) 14 BPM   VT (mL) 350 mL   Insp Time (S) 0 95 S   FIO2 (%) 40 %   PEEP (cmH2O) 6 cmH2O   Rise Time (%) 0 2 %   Trigger Sensitivity Flow (LPM) 2 LPM   Humidification Heater   Heater Temp 98 6 °F (37 °C)   AC/VC+ Actuals   Resp Rate (BPM) 15 BPM   VT (mL) 319 mL   MV (Obs) 4 57   MAP (cmH2O) 9 9 cmH2O   Peak Pressure (cmH2O) 22 cmH2O   I:E Ratio (Obs) 1:3 5   Static Compliance (mL/cmH20) 29 7 mL/cmH2O   Plateau Pressure (cm H2O) 19 6 cm H2O   Heater Temperature (Obs) 98 2 °F (36 8 °C)   AC/VC+ ALARMS   High Peak Pressure (cmH2O) 40 cmH2O   High Resp Rate (BPM) 40 BPM   High MV (L/min) 14 L/min   Low MV (L/min) 3 L/min   High VT (mL) 800 mL   Maintenance   Alarm (pink) cable attached Yes   Resuscitation bag with peep valve at bedside Yes   Water bag changed No   Circuit changed No   ETT  Cuffed;Oral;Inflated; Hi-Lo 7 mm   Placement Date/Time: 01/26/21 1926   Mask Ventilation: Mask ventilation not attempted (0)  Preoxygenated: Yes  Technique: Direct laryngoscopy;Rapid sequence;Video laryngoscopy;bougie  Type: Cuffed;Oral;Inflated; Hi-Lo  Tube Size: 7 mm  Laryngoscope: Juan     Secured at (cm) 22   Measured from [address] by Commercial tube see   Site Condition Dry   HI-LO Suction  Continuous low suction   HI-LO Secretions Blood tinged   HI-LO Intervention Flushed

## 2021-01-27 NOTE — CASE MANAGEMENT
CM spoke to TISHBaltaKishor to obtain hx,  Pt remains intubated  Pt lives with Sada Rordíguez in a one story house with 1 ROSLYN  Pt is able to navigate steps and is independent with ADL's  She uses no DME's  She has been feeling sick with abd pain, vomiting and diarrhea since Jan 2021  She was seen at a  Urgent Care and was dx with an ear infection  Covid was negative at that time  She has never been in rehab or used [facility] services  Denies substance abuse, tobacco abuse, or mental health issues  Dr Jose Rafael Campo is her PCP  She [facility] [address] [facility] because that is where she works or the [employer] in [place]  She has no problem with co-pays  She does not have a POA or advanced directive, but has paperwork to complete  She drives and Sada Rodríguez is planning on transporting home when she is medically cleared  CM will continue to follow and will assess needs as hospitalization progresses  CM reviewed discharge planning process including the following: identifying help at home, patient preference for discharge planning needs, pharmacy preference, and availability of treatment team to discuss questions or concerns patient and/or family may have regarding understanding medications and recognizing signs and symptoms once discharged  CM also encouraged patient to follow up with all recommended appointments after discharge  Patient advised of importance for patient and family to participate in managing patients medical well being

## 2021-01-27 NOTE — RESPIRATORY THERAPY NOTE
RT Ventilator Management Note  Gary Enriquez 52 y o  female MRN: [id number]  Unit/Bed#:  Encounter: [id number]      Daily Screen       1/27/2021  0445 1/27/2021  0752          Patient safety screen outcome[de-identified]  Failed  Failed      Not Ready for Weaning due to[de-identified]  Underline problem not resolved;Going on Transport intubated  Going on Transport intubated; Underline problem not resolved              Physical Exam:   Assessment Type: Assess only  General Appearance: Sedated  Respiratory Pattern: Assisted  Chest Assessment: Chest expansion symmetrical  Bilateral Breath Sounds: Diminished, Coarse  Suction: ET Tube  O2 Device: vent      Resp Comments: pt remains intubated and on full mechanical support  fio2 titrated to 40%    skin integrity remains intact

## 2021-01-27 NOTE — ANESTHESIA POSTPROCEDURE EVALUATION
Post-Op Assessment Note    CV Status:  Stable  Pain Score: 0    Pain management: adequate     Mental Status:  Unresponsive   Hydration Status:  Euvolemic   PONV Controlled:  Controlled   Airway Patency:  Patent  Airway: intubated      Post Op Vitals Reviewed: Yes      Staff: CRNA, Anesthesiologist   Comments: patient transferreed direct to ICU, intubated, vented; reprot to ICu RN, PA; VSS        No complications documented      BP 98/71 (01/27/21 1838)    Temp 98 7 °F (37 1 °C) (01/27/21 1838)    Pulse (!) 106 (01/27/21 1838)   Resp 14 (01/27/21 1838)    SpO2 99 % (01/27/21 1838)

## 2021-01-27 NOTE — RESPIRATORY THERAPY NOTE
Pt received from OR intubated  7 0 hi-lo ETT secured at 22cm by tape  Pt placed on Drager in AC/VC+ mode 14bpm/350mL/+60%/+6cmH2O  ETT resecured w/ commercial tube see w/o incident  Pt resting comfortably at this time

## 2021-01-27 NOTE — OP NOTE
OPERATIVE REPORT  PATIENT NAME: Jean Wilson    :  1970  MRN: [id number]  Pt Location: [facility] OR ROOM 03    SURGERY DATE: 2021    Surgeon(s) and Role:     * Arlon Angelucci, DO - Primary     * Aretha Loja PA-C - Assisting    Preop Diagnosis:  Perforation of sigmoid colon due to diverticulitis [K57 20]  Open Abdomen    Post-Op Diagnosis Codes:     * Perforation of sigmoid colon due to diverticulitis [K57 20]  Open Abdomen    Procedure(s) (LRB):  LAPAROTOMY EXPLORATORY, RESECTION OF DESCENDING COLON, PARTIAL OMENTECTOMY, CREATION OF TRANSVERSE COLON COLOSTOMY, ABTHERA PLACEMENT, ABDOMINAL WASHOUT (N/A)    Specimen(s):  ID Type Source Tests Collected by Time Destination   1 : Portion of Descending Colon Tissue Large Intestine, Left/Descending Colon TISSUE EXAM Arlon Angelucci, DO 2021 1603    2 : Partial Omentectomy Tissue Omentum TISSUE EXAM Arlon Angelucci, DO 2021 1724        Estimated Blood Loss:   Minimal    Drains:  Negative Pressure Wound Therapy (V A C ) Abdomen (Active)   Number of days: 0       NG/OG/Enteral Tube Nasogastric 18 Fr Left nares (Active)   Placement Reverification Auscultation 21 08   Site Assessment Clean;Dry; Intact 21 08   Status Suction-low intermittent 21 08   Drainage Appearance Bile;Green 21   Output (mL) 0 mL 21 0800   Number of days: 1       Colostomy Transverse (Active)   Number of days: 0       Urethral Catheter Non-latex;Straight-tip 16 Fr   (Active)   Reasons to continue Urinary Catheter  Accurate I&O assessment in critically ill patients (48 hr  max) 21 0800   Goal for Removal No longer needed- Will place order to discontinue 21 08   Site Assessment Clean;Skin intact 21 08   Collection Container Standard drainage bag 21 08   Securement Method Securing device (Describe) 21 08   Output (mL) 125 mL 21 1600   Number of days: 1       Anesthesia Type:   General    Operative Indications:  Perforation of sigmoid colon due to diverticulitis [K57 20]  Open Abdomen, Patient in discontinuity    Operative Findings:  Rectal stump noted be intact  Descending colon noted with some distal ischemia and resected to viable colon  All bowel and colon very edematous  Attempted left upper quadrant colostomy with remaining descending colon but upon opening colon noted significant mucosal sloughing and purple discoloration  Created transverse end-colostomy through upper portion of midline incision with good viable colon    Complications:   None    Procedure and Technique:  The patient was seen again in Preoperative Holding  All the risks, benefits, complications, treatment options, and expected outcomes were discussed with the patient and family at length  All questions were answered to satisfaction  There was concurrence with the proposed plan and informed consent was obtained  The site of surgery was properly noted/marked  The patient was taken to Operating Room, identified, and the procedure verified as exploratory laparotomy, possible bowel resection, possible ostomy, possible abdominal closure    The patient was placed in supine position on the operating room table  The patient had received preoperative antibiotics and SCDs placed on the bilateral lower extremities  The ABThera VAC removed  The abdomen was then prepped and draped in the usual sterile fashion using Betadine  Time-out was then performed  The abdomen was then inspected and there was noted to have some cloudy serous fluid and some fibrinous tissue present but no gross purulence  The entirety of the intestine was noted to be very edematous and friable  The small bowel was then run from the ileocecal valve to the ligament of Treitz and was grossly normal   Attention was taken to the rectal stump which was noted to be intact    Upon evaluation of the end of the descending colon it was noted to have an ischemic portion which was leaking a small amount of liquid stool which was resected using a 75 blue load JESIS and mesentery was taken with the EnSeal device  After this was done the anti mesenteric border of an additional portion of descending colon noted turned purple  This was also resected using a 75 blue load JESSI and the mesentery was taken with the EnSeal device  A portion of the omentum was also noted to dusky and this was excised using the EnSeal device  At this time the remaining portion of the descending colon was noted to be viable  A colostomy site was chosen on the left side of the abdomen just superior to the umbilicus in the middle of the rectus muscle  The skin was incised in a circular fashion and skin and subcutaneous fat was excised  The fascia was exposed and a cruciate incision was made in the anterior fascia and the muscle was dissected  The posterior rectus fascia and peritoneum were opened and dilated  Two fingers were noted to be able to fit easily through the defect made in the abdominal wall  The descending colon was then brought up through the abdominal wall  Upon opening the colon her ostomy maturation it was noted that the mucosa was purple and had significant sloughing  Due to this the left upper quadrant ostomy was then aborted as more colon needed be resected  The posterior sheath was closed with a running 1  PDS  The anterior sheath closed with a running 1  PDS  The edges of the skin incisions were brought together with a 2-0 nylon in interrupted fashion  Space was made in the middle of the wound for packing  The remainder of the descending colon was then resected using a 75 blue load JESSI and mesentery was taken with the EnSeal device  The distal transverse colon was noted to be viable with intact mucosa    The decision was then made to bring and ostomy up through the superior portion of the midline incision to divert the fecal stream  At this point the colostomy was matured by dividing the staple line with cautery  The colostomy was sutured to the skin with 3-0 Vicryl in interrupted fashion circumferentially  For interrupted sutures of 1  PDS were then used to approximate the fascia just inferior to the ostomy  Using interrupted 2-0 nylon the skin was also approximated at the inferior portion of the ostomy to give a good seal   Abdominal cavity was then copiously irrigated with normal saline  The decision was made to then keep the patient's abdomen open due to her positive volume status along with continued feculent abdominal contamination  An ABThera VAC was then placed on the abdomen and also bridged to the left upper quadrant ostomy site  An ostomy appliance was then placed  All instrument, sponge, and needle counts were noted to be correct at the conclusion of the case  The patient was brought to the PACU in stable and satisfactory condition  I was present for the entire procedure, A qualified resident physician was not available and A physician assistant was required during the procedure for retraction tissue handling,dissection and suturing    Patient Disposition:  intubated and hemodynamically stable      SIGNATURE: Yannick Soriano DO  DATE: January 27, 2021  TIME: 6:18 PM

## 2021-01-27 NOTE — ANESTHESIA PROCEDURE NOTES
Arterial Line Insertion  Performed by: Walter Villegas MD  Authorized by: Watler Villegas MD   Consent: The procedure was performed in an emergent situation  Verbal consent obtained  Written consent obtained  Preparation: Patient was prepped and draped in the usual sterile fashion    Indications: hemodynamic monitoring  Orientation:  Left  Location: radial artery  Procedure Details:  Needle gauge: 20  Number of attempts: 2    Post-procedure:  Post-procedure: dressing applied  Waveform: good waveform and waveform confirmed  Patient tolerance: Patient tolerated the procedure well with no immediate complications

## 2021-01-27 NOTE — TELEPHONE ENCOUNTER
As a follow-up, a second attempt has been made for outreach via fax, please see Contacts section for details      Thank you  Felicia Varela
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
## 2021-01-27 NOTE — ASSESSMENT & PLAN NOTE
· Patient presents with approx 1 month of abdominal pain and course of ABx  · CT showing infectious/inflammatory/ less likely ischemic colitis of the whole colon from cecum to sigmoid colon and moderate volume pneumoperitoneum suggestive of perforated viscous  · Patient taken to OR  · Operations:  · 1/26: ExLap PONCHO, found to have sigmoid perf with feculent but contained abscess   Transverse, splenic flexure, and descending colon looked a bit dusky, therefore patient left open, in discontinuity, and with vac overtop with plan for washout, re-exploration 1/27  · Keep intubated overnight  · Obtain endpoints and follow closely  · Continue cefepime/flagyl for bowel perforation  · Follow up Cx  · LWC of incision

## 2021-01-27 NOTE — OP NOTE
OPERATIVE REPORT  PATIENT NAME: Xi Zarate    :  1970  MRN: [id number]  Pt Location: [facility] OR ROOM 03    SURGERY DATE: 2021    Surgeon(s) and Role:     * Aleisha Mullen DO - Primary     * Emeli Chan PA-C - Assisting    Preop Diagnosis:  Pneumoperitoneum of unknown etiology [K66 8]    Post-Op Diagnosis Codes:     * Pneumoperitoneum of unknown etiology [K66 8]  Perforated diverticulitis with abscess and pneumoperitoneum    Procedure(s) (LRB):  LAPAROSCOPY DIAGNOSTIC, convert to Exploratory Laparotomy, sigmoid colon resection, abthera placement (N/A)    Specimen(s):  ID Type Source Tests Collected by Time Destination   1 : Sigmoid colon with proximal staple line Tissue Large Intestine, Sigmoid Colon TISSUE EXAM Aleisha Mullen,  2021    A : Peritoneal cultures Tissue Peritoneum ANAEROBIC CULTURE AND GRAM STAIN, CULTURE, TISSUE AND GRAM STAIN Aleisha Mullen DO 2021        Estimated Blood Loss:   100 cc    Drains:  Negative Pressure Wound Therapy (V A C ) Abdomen (Active)   Number of days: 0       NG/OG/Enteral Tube Nasogastric 18 Fr Left nares (Active)   Number of days: 0       Urethral Catheter Non-latex;Straight-tip 16 Fr  (Active)   Collection Container Belly bag 21 193   Number of days: 0       Anesthesia Type:   General    Operative Indications:  Pneumoperitoneum of unknown etiology [K66 8]    Operative Findings:  Sigmoid colon noted to have multiple adhesions to the omentum, upon mobilizing sigmoid colon was noted there was a perforation with abscess, liquid stool noted to be coming from colonic perforation  Sigmoid colon resected  Transverse colon, splenic flexure, descending colon noted to be slightly dark/dusky, did pink up near end of case  Decision made to keep patient's abdomen open to re-evaluate the colon for viability    Complications:   None    Procedure and Technique:  The patient was seen again in Preoperative Holding    All the risks, benefits, complications, treatment options, and expected outcomes were discussed with the patient and family at length  All questions were answered to satisfaction  There was concurrence with the proposed plan and informed consent was obtained  The site of surgery was properly noted/marked  The patient was taken to Operating Room, identified, and the procedure verified as diagnostic laparoscopy, possible exploratory laparotomy, possible bowel resection, possible ostomy, and all other indicated procedures  The patient was placed in supine position on the operating room table  The patient had received preoperative antibiotics and SCDs placed on the bilateral lower extremities  A Garcia catheter was placed  Anesthesia was then induced and the patient was intubated  A Time-Out was then performed with all involved present confirming the correct patient, procedure, antibiotics, and any additional concerns  The abdomen was then prepped and draped in the usual sterile fashion using ChloraPrep  A supraumbilical incision was made in the midline and using a Kocher clamp the umbilical stalk was elevated  The fascia was then grasped with an additional Kocher clamp and a midline vertical incision was made in the fascia under direct visualization  A large Sandy clamp was used to open the peritoneum  A 5 mm trocar was then placed into this opening and the abdomen was insufflated  Opening pressures were noted to be low  Upon inspecting the abdomen it was noted that there was a lot of inflammation throughout the entirety and a small amount of purulence in the pelvis  The sigmoid colon was noted to have omentum adhered to it  An additional 5 mm port was placed in the right lower quadrant in the midclavicular line under direct visualization  As this was teased apart there was noted to be an abscess in the left pericolic gutter and a jeff perforation of the sigmoid colon was seen draining stool    At this time the decision was made to convert to exploratory laparotomy  A midline vertical incision was then made with a #10 blade  Electrocautery was then used to dissect down through the subcutaneous tissue to the fascia  Hemostasis was noted  The fascia was then incised with electrocautery and Kocher clamps were used to grasp the fascia and elevate it  Metzenbaum scissors was used to open the peritoneum and the abdominal cavity was entered  Peritoneal cultures were taken  With electrocautery and blunt dissection the sigmoid colon was brought into the operative field  The portion of the sigmoid colon with perforation was noted to be dark purple and did have multiple other small areas of perforation  Using a Contour stapler the sigmoid colon was transected at the rectosigmoid junction  Hemostasis was noted  At the staple line there was noted to be slightly dusky but this pinked up by the end of the case  The sigmoid colon and the descending colon were mobilized by dissecting the [employer] with electrocautery  Using the Harmonic the sigmoid colon mesentery was transected distally to proximally until descending colon was identified that was not frankly dusky  This area of the descending colon was then transected with a Contour stapler and the section of sigmoid colon was passed off the field  The end of the staple line was noted to be  most likely from the bowel edema  A 75 mm blue load JESSI stapler was then placed and another small portion of the descending colon was removed to get a good staple line  This also did not hold so interrupted 3-0 Vicryl sutures were then placed to approximate the ends of the colon  The rectal stump hemostasis was noted and a running locking stitch of 3-0 Prolene was done to ensure hemostasis and also identify the rectal stump for any possible reversal of the ostomy in the future  The abdomen was then copiously irrigated in all four quadrants  Irrigation was suctioned until clear    It was noted that the transverse colon, splenic flexure, descending colon was initially dusky  The mesenteric vessels were palpated and there was a good pulse  Toward the end of the case the color did start to get better  Due to the questionable bowel and that to get to overtly viable colon would remove the patient's transverse and descending colon, the decision was made to keep the patient's abdomen open and an ABThera VAC was placed  The patient's bowel will be re-evaluated for viability and eventual ostomy creation and closure  After the ABThera VAC was placed the patient then remained intubated and sedated and was brought to the ICU  All instrument, sponge, and needle counts were noted to be correct at the conclusion of the case  I was present for the entire procedure, A qualified resident physician was not available and A physician assistant was required during the procedure for retraction tissue handling,dissection and suturing    Patient Disposition:  intubated and hemodynamically stable      SIGNATURE: William Bustos DO  DATE: January 26, 2021  TIME: 10:25 PM

## 2021-01-27 NOTE — ASSESSMENT & PLAN NOTE
Baseline Cr: 0 8-0 9  Admission Cr: 1 48  Current Cr: Post-op: 0 77  Etiology: Patient with significant dehydration, diarrhea for several weeks  Follow urine output  · Avoid nephrotoxic medications/hypotension

## 2021-01-27 NOTE — QUICK NOTE
Spoke to patient's , Lilli Urias, over the phone  I provided him with a clinical update and an opportunity to ask questions, which were answered  Also informed Mr Oswaldo Singh of the dental injury that occurred during intubation  Apologized for the event and let him know that we recovered the fragment  He expressed understanding

## 2021-01-27 NOTE — ASSESSMENT & PLAN NOTE
· No history of diabetes  · A1C 9 1 on 9/2020  · Will start on SSI  · Check new A1C  · May be 2/2 acute stress reaction

## 2021-01-27 NOTE — ANESTHESIA PREPROCEDURE EVALUATION
Procedure:  LAPAROSCOPY DIAGNOSTIC, Possible Exploratory Laparotomy, All Other Indicated Procedures (N/A Abdomen)    Relevant Problems   CARDIO   (+) WHARTON (dyspnea on exertion) (when walks from front of [employer] to the back becomes short of breath)      ENDO   (+) Hypothyroid      GI/HEPATIC  Admitted with abdominal pain and diarrhea, found to have significant pneumoperitoneum on CT scan      /RENAL   (+) HARI (acute kidney injury) ([facility] )      GYN   (-) Currently pregnant      MUSCULOSKELETAL   (+) Bilateral low back pain with sciatica   (+) Primary osteoarthritis of both knees      PULMONARY   (+) WHARTON (dyspnea on exertion) (when walks from front of [employer] to the back becomes short of breath)   (+) Mild persistent asthma (Last rescue inhaler use several months ago, symptoms well controlled on long acting inhaler)   (-) Smoking        Physical Exam    Airway  Comment: Small mouth, receding chin  Mallampati score: II    Neck ROM: full     Dental   Comment: Poor dentition,     Cardiovascular  Rhythm: regular, Rate: normal,     Pulmonary  Breath sounds clear to auscultation, No wheezes,     Other Findings        Anesthesia Plan  ASA Score- 3 Emergent    Anesthesia Type- general with ASA Monitors  Additional Monitors: arterial line  Airway Plan: ETT  Comment: Discussed with patient (with spouse at bedside) plan for GETA with possible arterial line and possible central line  Did also discuss possibility of requiring mechanical ventilation post-operatively pending plan to return to OR as well as ICU admission  All questions answered          Plan Factors-Exercise tolerance (METS): >4 METS  Chart reviewed  EKG reviewed  Imaging results reviewed  Existing labs reviewed  Patient is not a current smoker  Induction- intravenous  Postoperative Plan- Plan for postoperative opioid use  Informed Consent- Anesthetic plan and risks discussed with patient and spouse    I personally reviewed this patient with the CRNA  Discussed and agreed on the Anesthesia Plan with the CRNA             Lab Results   Component Value Date    WBC 11 03 (H) 01/26/2021    HGB 13 7 01/26/2021    HCT 42 6 01/26/2021    MCV 83 01/26/2021     (H) 01/26/2021     Lab Results   Component Value Date    SODIUM 134 (L) 01/26/2021    K 3 2 (L) 01/26/2021    CL 98 (L) 01/26/2021    CO2 24 01/26/2021    BUN 15 01/26/2021    CREATININE 1 48 (H) 01/26/2021    GLUC 172 (H) 01/26/2021    CALCIUM 8 6 01/26/2021     Lab Results   Component Value Date    ALT 12 01/26/2021    AST 10 01/26/2021    ALKPHOS 74 01/26/2021     Lab Results   Component Value Date    INR 1 34 (H) 01/26/2021    PROTIME 15 9 (H) 01/26/2021     Lab Results   Component Value Date    HGBA1C 5 1 09/15/2020

## 2021-01-27 NOTE — PLAN OF CARE
Problem: Potential for Falls  Goal: Patient will remain free of falls  Description: INTERVENTIONS:  - Assess patient frequently for physical needs  -  Identify cognitive and physical deficits and behaviors that affect risk of falls    -  [place] fall precautions as indicated by assessment   - Educate patient/family on patient safety including physical limitations  - Instruct patient to call for assistance with activity based on assessment  - Modify environment to reduce risk of injury  - Consider OT/PT consult to assist with strengthening/mobility  Outcome: Progressing     Problem: Prexisting or High Potential for Compromised Skin Integrity  Goal: Skin integrity is maintained or improved  Description: INTERVENTIONS:  - Identify patients at risk for skin breakdown  - Assess and monitor skin integrity  - Assess and monitor nutrition and hydration status  - Monitor labs   - Assess for incontinence   - Turn and reposition patient  - Assist with mobility/ambulation  - Relieve pressure over bony prominences  - Avoid friction and shearing  - Provide appropriate hygiene as needed including keeping skin clean and dry  - Evaluate need for skin moisturizer/barrier cream  - Collaborate with interdisciplinary team   - Patient/family teaching  - Consider wound care consult   Outcome: Progressing     Problem: SAFETY,RESTRAINT: NV/NON-SELF DESTRUCTIVE BEHAVIOR  Goal: Remains free of harm/injury (restraint for non violent/non self-detsructive behavior)  Description: INTERVENTIONS:  - Instruct patient/family regarding restraint use   - Assess and monitor physiologic and psychological status   - Provide interventions and comfort measures to meet assessed patient needs   - Identify and implement measures to help patient regain control  - Assess readiness for release of restraint   Outcome: Progressing  Goal: Returns to optimal restraint-free functioning  Description: INTERVENTIONS:  - Assess the patient's behavior and symptoms that indicate continued need for restraint  - Identify and implement measures to help patient regain control  - Assess readiness for release of restraint   Outcome: Progressing     Problem: Nutrition/Hydration-ADULT  Goal: Nutrient/Hydration intake appropriate for improving, restoring or maintaining nutritional needs  Description: Monitor and assess patient's nutrition/hydration status for malnutrition  Collaborate with interdisciplinary team and initiate plan and interventions as ordered  Monitor patient's weight and dietary intake as ordered or per policy  Utilize nutrition screening tool and intervene as necessary  Determine patient's food preferences and provide high-protein, high-caloric foods as appropriate       INTERVENTIONS:  - Monitor oral intake, urinary output, labs, and treatment plans  - Assess nutrition and hydration status and recommend course of action  - Evaluate amount of meals eaten  - Assist patient with eating if necessary   - Allow adequate time for meals  - Recommend/ encourage appropriate diets, oral nutritional supplements, and vitamin/mineral supplements  - Order, calculate, and assess calorie counts as needed  - Recommend, monitor, and adjust tube feedings and TPN/PPN based on assessed needs  - Assess need for intravenous fluids  - Provide specific nutrition/hydration education as appropriate  - Include patient/family/caregiver in decisions related to nutrition  Outcome: Progressing     Problem: DISCHARGE PLANNING - CARE MANAGEMENT  Goal: Discharge to post-acute care or home with appropriate resources  Description: INTERVENTIONS:  - Conduct assessment to determine patient/family and health care team treatment goals, and need for post-acute services based on payer coverage, community resources, and patient preferences, and barriers to discharge  - Address psychosocial, clinical, and financial barriers to discharge as identified in assessment in conjunction with the patient/family and health care team  - Arrange appropriate level of post-acute services according to patients   needs and preference and payer coverage in collaboration with the physician and health care team  - Communicate with and update the patient/family, physician, and health care team regarding progress on the discharge plan  - Arrange appropriate transportation to post-acute venues  Outcome: Progressing     Problem: PAIN - ADULT  Goal: Verbalizes/displays adequate comfort level or baseline comfort level  Description: Interventions:  - Encourage patient to monitor pain and request assistance  - Assess pain using appropriate pain scale  - Administer analgesics based on type and severity of pain and evaluate response  - Implement non-pharmacological measures as appropriate and evaluate response  - Consider cultural and social influences on pain and pain management  - Notify physician/advanced practitioner if interventions unsuccessful or patient reports new pain  Outcome: Progressing     Problem: INFECTION - ADULT  Goal: Absence or prevention of progression during hospitalization  Description: INTERVENTIONS:  - Assess and monitor for signs and symptoms of infection  - Monitor lab/diagnostic results  - Monitor all insertion sites, i e  indwelling lines, tubes, and drains  - Monitor endotracheal if appropriate and nasal secretions for changes in amount and color  - [place] appropriate cooling/warming therapies per order  - Administer medications as ordered  - Instruct and encourage patient and family to use good hand hygiene technique  - Identify and instruct in appropriate isolation precautions for identified infection/condition  Outcome: Progressing     Problem: SAFETY ADULT  Goal: Patient will remain free of falls  Description: INTERVENTIONS:  - Assess patient frequently for physical needs  -  Identify cognitive and physical deficits and behaviors that affect risk of falls    -  [place] fall precautions as indicated by assessment   - Educate patient/family on patient safety including physical limitations  - Instruct patient to call for assistance with activity based on assessment  - Modify environment to reduce risk of injury  - Consider OT/PT consult to assist with strengthening/mobility  Outcome: Progressing  Goal: Maintain or return to baseline ADL function  Description: INTERVENTIONS:  -  Assess patient's ability to carry out ADLs; assess patient's baseline for ADL function and identify physical deficits which impact ability to perform ADLs (bathing, care of mouth/teeth, toileting, grooming, dressing, etc )  - Assess/evaluate cause of self-care deficits   - Assess range of motion  - Assess patient's mobility; develop plan if impaired  - Assess patient's need for assistive devices and provide as appropriate  - Encourage maximum independence but intervene and supervise when necessary  - Involve family in performance of ADLs  - Assess for home care needs following discharge   - Consider OT consult to assist with ADL evaluation and planning for discharge  - Provide patient education as appropriate  Outcome: Progressing  Goal: Maintain or return mobility status to optimal level  Description: INTERVENTIONS:  - Assess patient's baseline mobility status (ambulation, transfers, stairs, etc )    - Identify cognitive and physical deficits and behaviors that affect mobility  - Identify mobility aids required to assist with transfers and/or ambulation (gait belt, sit-to-stand, lift, walker, cane, etc )  - [place] fall precautions as indicated by assessment  - Record patient progress and toleration of activity level on Mobility SBAR; progress patient to next Phase/Stage  - Instruct patient to call for assistance with activity based on assessment  - Consider rehabilitation consult to assist with strengthening/weightbearing, etc   Outcome: Progressing     Problem: DISCHARGE PLANNING  Goal: Discharge to home or other facility with appropriate resources  Description: INTERVENTIONS:  - Identify barriers to discharge w/patient and caregiver  - Arrange for needed discharge resources and transportation as appropriate  - Identify discharge learning needs (meds, wound care, etc )  - Arrange for interpretive services to assist at discharge as needed  - Refer to Case Management Department for coordinating discharge planning if the patient needs post-hospital services based on physician/advanced practitioner order or complex needs related to functional status, cognitive ability, or social support system  Outcome: Progressing     Problem: Knowledge Deficit  Goal: Patient/family/caregiver demonstrates understanding of disease process, treatment plan, medications, and discharge instructions  Description: Complete learning assessment and assess knowledge base  Interventions:  - Provide teaching at level of understanding  - Provide teaching via preferred learning methods  Outcome: Progressing     Problem: NEUROSENSORY - ADULT  Goal: Achieves stable or improved neurological status  Description: INTERVENTIONS  - Monitor and report changes in neurological status  - Monitor vital signs such as temperature, blood pressure, glucose, and any other labs ordered   - Initiate measures to prevent increased intracranial pressure  - Monitor for seizure activity and implement precautions if appropriate      Outcome: Progressing  Goal: Achieves maximal functionality and self care  Description: INTERVENTIONS  - Monitor swallowing and airway patency with patient fatigue and changes in neurological status  - Encourage and assist patient to increase activity and self care     - Encourage visually impaired, hearing impaired and aphasic patients to use assistive/communication devices  Outcome: Progressing     Problem: CARDIOVASCULAR - ADULT  Goal: Maintains optimal cardiac output and hemodynamic stability  Description: INTERVENTIONS:  - Monitor I/O, vital signs and rhythm  - Monitor for S/S and trends of decreased cardiac output  - Administer and titrate ordered vasoactive medications to optimize hemodynamic stability  - Assess quality of pulses, skin color and temperature  - Assess for signs of decreased coronary artery perfusion  - Instruct patient to report change in severity of symptoms  Outcome: Progressing  Goal: Absence of cardiac dysrhythmias or at baseline rhythm  Description: INTERVENTIONS:  - Continuous cardiac monitoring, vital signs, obtain 12 lead EKG if ordered  - Administer antiarrhythmic and heart rate control medications as ordered  - Monitor electrolytes and administer replacement therapy as ordered  Outcome: Progressing     Problem: RESPIRATORY - ADULT  Goal: Achieves optimal ventilation and oxygenation  Description: INTERVENTIONS:  - Assess for changes in respiratory status  - Assess for changes in mentation and behavior  - Position to facilitate oxygenation and minimize respiratory effort  - Oxygen administered by appropriate delivery if ordered  - Initiate smoking cessation education as indicated  - Encourage broncho-pulmonary hygiene including cough, deep breathe, Incentive Spirometry  - Assess the need for suctioning and aspirate as needed  - Assess and instruct to report SOB or any respiratory difficulty  - Respiratory Therapy support as indicated  Outcome: Progressing     Problem: GASTROINTESTINAL - ADULT  Goal: Minimal or absence of nausea and/or vomiting  Description: INTERVENTIONS:  - Administer IV fluids if ordered to ensure adequate hydration  - Maintain NPO status until nausea and vomiting are resolved  - Nasogastric tube if ordered  - Administer ordered antiemetic medications as needed  - Provide nonpharmacologic comfort measures as appropriate  - Advance diet as tolerated, if ordered  - Consider nutrition services referral to assist patient with adequate nutrition and appropriate food choices  Outcome: Progressing  Goal: Maintains or returns to baseline bowel function  Description: INTERVENTIONS:  - Assess bowel function  - Encourage oral fluids to ensure adequate hydration  - Administer IV fluids if ordered to ensure adequate hydration  - Administer ordered medications as needed  - Encourage mobilization and activity  - Consider nutritional services referral to assist patient with adequate nutrition and appropriate food choices  Outcome: Progressing  Goal: Maintains adequate nutritional intake  Description: INTERVENTIONS:  - Monitor percentage of each meal consumed  - Identify factors contributing to decreased intake, treat as appropriate  - Assist with meals as needed  - Monitor I&O, weight, and lab values if indicated  - Obtain nutrition services referral as needed  Outcome: Progressing     Problem: GENITOURINARY - ADULT  Goal: Maintains or returns to baseline urinary function  Description: INTERVENTIONS:  - Assess urinary function  - Encourage oral fluids to ensure adequate hydration if ordered  - Administer IV fluids as ordered to ensure adequate hydration  - Administer ordered medications as needed  - Offer frequent toileting  - Follow urinary retention protocol if ordered  Outcome: Progressing  Goal: Absence of urinary retention  Description: INTERVENTIONS:  - Assess patients ability to void and empty bladder  - Monitor I/O  - Bladder scan as needed  - Discuss with physician/AP medications to alleviate retention as needed  - Discuss catheterization for long term situations as appropriate  Outcome: Progressing  Goal: Urinary catheter remains patent  Description: INTERVENTIONS:  - Assess patency of urinary catheter  - If patient has a chronic fernandez, consider changing catheter if non-functioning  - Follow guidelines for intermittent irrigation of non-functioning urinary catheter  Outcome: Progressing     Problem: METABOLIC, FLUID AND ELECTROLYTES - ADULT  Goal: Electrolytes maintained within normal limits  Description: INTERVENTIONS:  - Monitor labs and assess patient for signs and symptoms of electrolyte imbalances  - Administer electrolyte replacement as ordered  - Monitor response to electrolyte replacements, including repeat lab results as appropriate  - Instruct patient on fluid and nutrition as appropriate  Outcome: Progressing  Goal: Fluid balance maintained  Description: INTERVENTIONS:  - Monitor labs   - Monitor I/O and WT  - Instruct patient on fluid and nutrition as appropriate  - Assess for signs & symptoms of volume excess or deficit  Outcome: Progressing  Goal: Glucose maintained within target range  Description: INTERVENTIONS:  - Monitor Blood Glucose as ordered  - Assess for signs and symptoms of hyperglycemia and hypoglycemia  - Administer ordered medications to maintain glucose within target range  - Assess nutritional intake and initiate nutrition service referral as needed  Outcome: Progressing

## 2021-01-27 NOTE — ANESTHESIA POSTPROCEDURE EVALUATION
Post-Op Assessment Note    CV Status:  Stable  Pain Score: 0    Pain management: adequate     Mental Status:  Somnolent   Hydration Status:  Euvolemic   PONV Controlled:  Controlled   Airway Patency:  Patent and adequate  Airway: intubated      Post Op Vitals Reviewed: Yes      Staff: CRNA, Anesthesiologist   Comments: Pt  transferred to ICU intubated  No complications documented      BP   161/76   Temp   98   Pulse  97   Resp   15   SpO2   98%

## 2021-01-27 NOTE — ANESTHESIA PREPROCEDURE EVALUATION
Procedure:  LAPAROTOMY EXPLORATORY, Possible Bowel Resection, Possible Ostomy, Possible Abdomen Closure (N/A Abdomen)    Relevant Problems   CARDIO   (+) WHARTON (dyspnea on exertion)      ENDO   (+) Hypothyroid      /RENAL   (+) HARI (acute kidney injury) ([facility] [address] )      MUSCULOSKELETAL   (+) Bilateral low back pain with sciatica   (+) Primary osteoarthritis of both knees      PULMONARY   (+) WHARTON (dyspnea on exertion)   (+) Mild persistent asthma        Physical Exam    Airway       Dental       Cardiovascular  Rhythm: regular, Rate: normal,     Pulmonary  Rhonchi,     Other Findings        Anesthesia Plan  ASA Score- 3 Emergent    Anesthesia Type- general with ASA Monitors  Additional Monitors:   Airway Plan: ETT  Plan Factors-Exercise tolerance (METS): <4 METS  Chart reviewed  EKG reviewed  Existing labs reviewed  Induction- intravenous  Postoperative Plan-     Informed Consent- Anesthetic plan and risks discussed with spouse  I personally reviewed this patient with the CRNA  Discussed and agreed on the Anesthesia Plan with the CRNA  Tali Mcghee

## 2021-01-27 NOTE — ASSESSMENT & PLAN NOTE
· Patient on no controller medications at home  · No wheezing on exam or signs of exacerbation  · PRN albuterol

## 2021-01-27 NOTE — ASSESSMENT & PLAN NOTE
· Patient presents with approx 1 month of abdominal pain and course of ABx  · CT showing infectious/inflammatory/ less likely ischemic colitis of the whole colon from cecum to sigmoid colon and moderate volume pneumoperitoneum suggestive of perforated viscous  · Patient taken to OR  · Operations:  · 1/26: ExLap PONCHO, found to have sigmoid perf with feculent but contained abscess   Transverse, splenic flexure, and descending colon looked a bit dusky, therefore patient left open, in discontinuity, and with vac overtop with plan for washout, re-exploration 1/27  · Kept intubated overnight  · Obtain endpoints and follow closely  · Continue cefepime/flagyl for bowel perforation  · Follow up Cx  · LWC of incision  · Plan for repeat OR today
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
## 2021-01-27 NOTE — ASSESSMENT & PLAN NOTE
· On admission, K 2 8  · Likely losses due to diarrhea  · Recheck after OR, Replete for goal >4 ; < 5

## 2021-01-27 NOTE — ASSESSMENT & PLAN NOTE
Baseline Cr: 0 8-0 9  Admission Cr: 1 48  Current Cr: Post-op pending  Etiology: Patient with significant dehydration, diarrhea for several weeks  Follow urine output  · Avoid nephrotoxic medications/hypotension

## 2021-01-28 ENCOUNTER — OFFICE VISIT (OUTPATIENT)
Dept: INTERNAL MEDICINE CLINIC | Facility: CLINIC | Age: 51
End: 2021-01-28
Payer: COMMERCIAL

## 2021-01-28 DIAGNOSIS — Z12.39 ENCOUNTER FOR OTHER SCREENING FOR MALIGNANT NEOPLASM OF BREAST: Primary | ICD-10-CM

## 2021-01-28 LAB
ANION GAP SERPL CALCULATED.3IONS-SCNC: 10 MMOL/L (ref 4–13)
ANION GAP SERPL CALCULATED.3IONS-SCNC: 10 MMOL/L (ref 4–13)
ANION GAP SERPL CALCULATED.3IONS-SCNC: 11 MMOL/L (ref 4–13)
ARTERIAL PATENCY WRIST A: YES
BASE EX.OXY STD BLDV CALC-SCNC: 93.7 % (ref 60–80)
BASE EXCESS BLDA CALC-SCNC: -2.1 MMOL/L
BASE EXCESS BLDA CALC-SCNC: -4 MMOL/L (ref -2–3)
BASE EXCESS BLDA CALC-SCNC: -6.4 MMOL/L
BASE EXCESS BLDV CALC-SCNC: -4.4 MMOL/L
BODY TEMPERATURE: 100.2 DEGREES FEHRENHEIT
BUN SERPL-MCNC: 16 MG/DL (ref 5–25)
BUN SERPL-MCNC: 19 MG/DL (ref 5–25)
BUN SERPL-MCNC: 22 MG/DL (ref 5–25)
CA-I BLD-SCNC: 0.96 MMOL/L (ref 1.12–1.32)
CALCIUM SERPL-MCNC: 7.4 MG/DL (ref 8.3–10.1)
CALCIUM SERPL-MCNC: 7.6 MG/DL (ref 8.3–10.1)
CALCIUM SERPL-MCNC: 8 MG/DL (ref 8.3–10.1)
CHLORIDE SERPL-SCNC: 109 MMOL/L (ref 100–108)
CHLORIDE SERPL-SCNC: 110 MMOL/L (ref 100–108)
CHLORIDE SERPL-SCNC: 111 MMOL/L (ref 100–108)
CO2 SERPL-SCNC: 21 MMOL/L (ref 21–32)
CO2 SERPL-SCNC: 21 MMOL/L (ref 21–32)
CO2 SERPL-SCNC: 22 MMOL/L (ref 21–32)
CREAT SERPL-MCNC: 1.09 MG/DL (ref 0.6–1.3)
CREAT SERPL-MCNC: 1.19 MG/DL (ref 0.6–1.3)
CREAT SERPL-MCNC: 1.44 MG/DL (ref 0.6–1.3)
ERYTHROCYTE [DISTWIDTH] IN BLOOD BY AUTOMATED COUNT: 14.5 % (ref 11.6–15.1)
GFR SERPL CREATININE-BSD FRML MDRD: 43 ML/MIN/1.73SQ M
GFR SERPL CREATININE-BSD FRML MDRD: 54 ML/MIN/1.73SQ M
GFR SERPL CREATININE-BSD FRML MDRD: 60 ML/MIN/1.73SQ M
GLUCOSE SERPL-MCNC: 114 MG/DL (ref 65–140)
GLUCOSE SERPL-MCNC: 117 MG/DL (ref 65–140)
GLUCOSE SERPL-MCNC: 140 MG/DL (ref 65–140)
GLUCOSE SERPL-MCNC: 142 MG/DL (ref 65–140)
GLUCOSE SERPL-MCNC: 143 MG/DL (ref 65–140)
GLUCOSE SERPL-MCNC: 154 MG/DL (ref 65–140)
GLUCOSE SERPL-MCNC: 157 MG/DL (ref 65–140)
GLUCOSE SERPL-MCNC: 157 MG/DL (ref 65–140)
HCO3 BLDA-SCNC: 17 MMOL/L (ref 22–28)
HCO3 BLDA-SCNC: 20.6 MMOL/L (ref 22–28)
HCO3 BLDA-SCNC: 21.7 MMOL/L (ref 22–28)
HCO3 BLDV-SCNC: 16.9 MMOL/L (ref 24–30)
HCT VFR BLD AUTO: 34.3 % (ref 34.8–46.1)
HCT VFR BLD CALC: 41 % (ref 34.8–46.1)
HGB BLD-MCNC: 10.9 G/DL (ref 11.5–15.4)
HGB BLDA-MCNC: 13.9 G/DL (ref 11.5–15.4)
HOROWITZ INDEX BLDA+IHG-RTO: 40 MM[HG]
HOROWITZ INDEX BLDA+IHG-RTO: 40 MM[HG]
LACTATE SERPL-SCNC: 1 MMOL/L (ref 0.5–2)
LACTATE SERPL-SCNC: 1.1 MMOL/L (ref 0.5–2)
MAGNESIUM SERPL-MCNC: 2.5 MG/DL (ref 1.6–2.6)
MCH RBC QN AUTO: 27.3 PG (ref 26.8–34.3)
MCHC RBC AUTO-ENTMCNC: 31.8 G/DL (ref 31.4–37.4)
MCV RBC AUTO: 86 FL (ref 82–98)
NRBC BLD AUTO-RTO: 0 /100 WBCS
O2 CT BLDA-SCNC: 13.3 ML/DL (ref 16–23)
O2 CT BLDA-SCNC: 14.3 ML/DL (ref 16–23)
O2 CT BLDV-SCNC: 14.3 ML/DL
OXYHGB MFR BLDA: 97.4 % (ref 94–97)
OXYHGB MFR BLDA: 98.1 % (ref 94–97)
PCO2 BLD: 22 MMOL/L (ref 21–32)
PCO2 BLD: 36.4 MM HG (ref 36–44)
PCO2 BLDA: 27.3 MM HG (ref 36–44)
PCO2 BLDA: 33 MM HG (ref 36–44)
PCO2 BLDV: 21 MM HG (ref 42–50)
PEEP RESPIRATORY: 6 CM[H2O]
PEEP RESPIRATORY: 6 CM[H2O]
PH BLD: 7.36 [PH] (ref 7.35–7.45)
PH BLDA: 7.41 [PH] (ref 7.35–7.45)
PH BLDA: 7.43 [PH] (ref 7.35–7.45)
PH BLDV: 7.52 [PH] (ref 7.3–7.4)
PHOSPHATE SERPL-MCNC: 3.4 MG/DL (ref 2.7–4.5)
PLATELET # BLD AUTO: 362 THOUSANDS/UL (ref 149–390)
PMV BLD AUTO: 10.6 FL (ref 8.9–12.7)
PO2 BLD: 159 MM HG (ref 75–129)
PO2 BLDA: 127.2 MM HG (ref 75–129)
PO2 BLDA: 148.4 MM HG (ref 75–129)
PO2 BLDV: 197.8 MM HG (ref 35–45)
POTASSIUM BLD-SCNC: 4 MMOL/L (ref 3.5–5.3)
POTASSIUM SERPL-SCNC: 3.8 MMOL/L (ref 3.5–5.3)
POTASSIUM SERPL-SCNC: 4.1 MMOL/L (ref 3.5–5.3)
POTASSIUM SERPL-SCNC: 4.1 MMOL/L (ref 3.5–5.3)
RBC # BLD AUTO: 3.99 MILLION/UL (ref 3.81–5.12)
SAO2 % BLD FROM PO2: 99 % (ref 60–85)
SODIUM BLD-SCNC: 140 MMOL/L (ref 136–145)
SODIUM SERPL-SCNC: 141 MMOL/L (ref 136–145)
SODIUM SERPL-SCNC: 142 MMOL/L (ref 136–145)
SODIUM SERPL-SCNC: 142 MMOL/L (ref 136–145)
SPECIMEN SOURCE: ABNORMAL
VENT AC: 14
VENT AC: 14
VENT- AC: AC
VENT- AC: AC
VT SETTING VENT: 350 ML
VT SETTING VENT: 350 ML
WBC # BLD AUTO: 19.74 THOUSAND/UL (ref 4.31–10.16)

## 2021-01-28 PROCEDURE — 80048 BASIC METABOLIC PNL TOTAL CA: CPT | Performed by: CLINICAL NURSE SPECIALIST

## 2021-01-28 PROCEDURE — 80048 BASIC METABOLIC PNL TOTAL CA: CPT | Performed by: PHYSICIAN ASSISTANT

## 2021-01-28 PROCEDURE — 82947 ASSAY GLUCOSE BLOOD QUANT: CPT

## 2021-01-28 PROCEDURE — 82805 BLOOD GASES W/O2 SATURATION: CPT | Performed by: PHYSICIAN ASSISTANT

## 2021-01-28 PROCEDURE — 84100 ASSAY OF PHOSPHORUS: CPT | Performed by: CLINICAL NURSE SPECIALIST

## 2021-01-28 PROCEDURE — 94003 VENT MGMT INPAT SUBQ DAY: CPT

## 2021-01-28 PROCEDURE — 83605 ASSAY OF LACTIC ACID: CPT | Performed by: CLINICAL NURSE SPECIALIST

## 2021-01-28 PROCEDURE — 82803 BLOOD GASES ANY COMBINATION: CPT

## 2021-01-28 PROCEDURE — 83735 ASSAY OF MAGNESIUM: CPT | Performed by: CLINICAL NURSE SPECIALIST

## 2021-01-28 PROCEDURE — 99024 POSTOP FOLLOW-UP VISIT: CPT | Performed by: STUDENT IN AN ORGANIZED HEALTH CARE EDUCATION/TRAINING PROGRAM

## 2021-01-28 PROCEDURE — 82805 BLOOD GASES W/O2 SATURATION: CPT | Performed by: CLINICAL NURSE SPECIALIST

## 2021-01-28 PROCEDURE — 99291 CRITICAL CARE FIRST HOUR: CPT | Performed by: PHYSICIAN ASSISTANT

## 2021-01-28 PROCEDURE — 82330 ASSAY OF CALCIUM: CPT | Performed by: CLINICAL NURSE SPECIALIST

## 2021-01-28 PROCEDURE — 83605 ASSAY OF LACTIC ACID: CPT | Performed by: PHYSICIAN ASSISTANT

## 2021-01-28 PROCEDURE — 99292 CRITICAL CARE ADDL 30 MIN: CPT | Performed by: STUDENT IN AN ORGANIZED HEALTH CARE EDUCATION/TRAINING PROGRAM

## 2021-01-28 PROCEDURE — 94664 DEMO&/EVAL PT USE INHALER: CPT

## 2021-01-28 PROCEDURE — 84132 ASSAY OF SERUM POTASSIUM: CPT

## 2021-01-28 PROCEDURE — 85027 COMPLETE CBC AUTOMATED: CPT | Performed by: CLINICAL NURSE SPECIALIST

## 2021-01-28 PROCEDURE — 85014 HEMATOCRIT: CPT

## 2021-01-28 PROCEDURE — 82948 REAGENT STRIP/BLOOD GLUCOSE: CPT

## 2021-01-28 PROCEDURE — 84295 ASSAY OF SERUM SODIUM: CPT

## 2021-01-28 PROCEDURE — 99211 OFF/OP EST MAY X REQ PHY/QHP: CPT | Performed by: INTERNAL MEDICINE

## 2021-01-28 PROCEDURE — 94760 N-INVAS EAR/PLS OXIMETRY 1: CPT

## 2021-01-28 RX ORDER — FUROSEMIDE 10 MG/ML
40 INJECTION INTRAMUSCULAR; INTRAVENOUS ONCE
Status: COMPLETED | OUTPATIENT
Start: 2021-01-28 | End: 2021-01-28

## 2021-01-28 RX ORDER — ALBUMIN (HUMAN) 12.5 G/50ML
25 SOLUTION INTRAVENOUS EVERY 6 HOURS
Status: COMPLETED | OUTPATIENT
Start: 2021-01-28 | End: 2021-01-29

## 2021-01-28 RX ORDER — POTASSIUM CHLORIDE 14.9 MG/ML
20 INJECTION INTRAVENOUS ONCE
Status: COMPLETED | OUTPATIENT
Start: 2021-01-28 | End: 2021-01-29

## 2021-01-28 RX ORDER — ACETAMINOPHEN 650 MG/1
325 SUPPOSITORY RECTAL EVERY 6 HOURS PRN
Status: DISCONTINUED | OUTPATIENT
Start: 2021-01-28 | End: 2021-01-29

## 2021-01-28 RX ORDER — DEXMEDETOMIDINE 100 UG/ML
.1-.7 INJECTION, SOLUTION, CONCENTRATE INTRAVENOUS
Status: DISCONTINUED | OUTPATIENT
Start: 2021-01-28 | End: 2021-01-29

## 2021-01-28 RX ORDER — FUROSEMIDE 10 MG/ML
20 INJECTION INTRAMUSCULAR; INTRAVENOUS ONCE
Status: COMPLETED | OUTPATIENT
Start: 2021-01-28 | End: 2021-01-28

## 2021-01-28 RX ADMIN — METRONIDAZOLE 500 MG: 500 INJECTION, SOLUTION INTRAVENOUS at 17:43

## 2021-01-28 RX ADMIN — ALBUMIN (HUMAN) 25 G: 0.25 INJECTION, SOLUTION INTRAVENOUS at 11:42

## 2021-01-28 RX ADMIN — ALBUMIN (HUMAN) 25 G: 0.25 INJECTION, SOLUTION INTRAVENOUS at 23:34

## 2021-01-28 RX ADMIN — CHLORHEXIDINE GLUCONATE 0.12% ORAL RINSE 15 ML: 1.2 LIQUID ORAL at 20:09

## 2021-01-28 RX ADMIN — CALCIUM GLUCONATE 3 G: 98 INJECTION, SOLUTION INTRAVENOUS at 09:07

## 2021-01-28 RX ADMIN — FENTANYL CITRATE 50 MCG: 50 INJECTION, SOLUTION INTRAMUSCULAR; INTRAVENOUS at 00:51

## 2021-01-28 RX ADMIN — CHLORHEXIDINE GLUCONATE 0.12% ORAL RINSE 15 ML: 1.2 LIQUID ORAL at 08:55

## 2021-01-28 RX ADMIN — SODIUM CHLORIDE 0.5 MCG/KG/HR: 9 INJECTION, SOLUTION INTRAVENOUS at 15:12

## 2021-01-28 RX ADMIN — SODIUM CHLORIDE, SODIUM LACTATE, POTASSIUM CHLORIDE, AND CALCIUM CHLORIDE 125 ML/HR: .6; .31; .03; .02 INJECTION, SOLUTION INTRAVENOUS at 05:44

## 2021-01-28 RX ADMIN — CEFEPIME HYDROCHLORIDE 2000 MG: 2 INJECTION, POWDER, FOR SOLUTION INTRAVENOUS at 19:28

## 2021-01-28 RX ADMIN — INSULIN LISPRO 1 UNITS: 100 INJECTION, SOLUTION INTRAVENOUS; SUBCUTANEOUS at 00:28

## 2021-01-28 RX ADMIN — HEPARIN SODIUM 5000 UNITS: 5000 INJECTION INTRAVENOUS; SUBCUTANEOUS at 15:00

## 2021-01-28 RX ADMIN — Medication 75 MCG/HR: at 15:12

## 2021-01-28 RX ADMIN — FUROSEMIDE 20 MG: 10 INJECTION, SOLUTION INTRAMUSCULAR; INTRAVENOUS at 11:42

## 2021-01-28 RX ADMIN — ALBUMIN (HUMAN) 25 G: 0.25 INJECTION, SOLUTION INTRAVENOUS at 18:08

## 2021-01-28 RX ADMIN — Medication 75 MCG/HR: at 01:20

## 2021-01-28 RX ADMIN — HEPARIN SODIUM 5000 UNITS: 5000 INJECTION INTRAVENOUS; SUBCUTANEOUS at 21:56

## 2021-01-28 RX ADMIN — FUROSEMIDE 40 MG: 10 INJECTION, SOLUTION INTRAMUSCULAR; INTRAVENOUS at 23:32

## 2021-01-28 RX ADMIN — POTASSIUM CHLORIDE 20 MEQ: 14.9 INJECTION, SOLUTION INTRAVENOUS at 23:49

## 2021-01-28 RX ADMIN — METRONIDAZOLE 500 MG: 500 INJECTION, SOLUTION INTRAVENOUS at 00:38

## 2021-01-28 RX ADMIN — CEFEPIME HYDROCHLORIDE 2000 MG: 2 INJECTION, POWDER, FOR SOLUTION INTRAVENOUS at 08:00

## 2021-01-28 RX ADMIN — SODIUM CHLORIDE 0.5 MCG/KG/HR: 9 INJECTION, SOLUTION INTRAVENOUS at 23:45

## 2021-01-28 RX ADMIN — HEPARIN SODIUM 5000 UNITS: 5000 INJECTION INTRAVENOUS; SUBCUTANEOUS at 05:44

## 2021-01-28 RX ADMIN — SODIUM CHLORIDE 0.5 MCG/KG/HR: 9 INJECTION, SOLUTION INTRAVENOUS at 08:51

## 2021-01-28 RX ADMIN — FENTANYL CITRATE 50 MCG: 50 INJECTION, SOLUTION INTRAMUSCULAR; INTRAVENOUS at 06:33

## 2021-01-28 RX ADMIN — METRONIDAZOLE 500 MG: 500 INJECTION, SOLUTION INTRAVENOUS at 08:56

## 2021-01-28 RX ADMIN — FENTANYL CITRATE 50 MCG: 50 INJECTION, SOLUTION INTRAMUSCULAR; INTRAVENOUS at 18:21

## 2021-01-28 RX ADMIN — ACETAMINOPHEN 325 MG: 650 SUPPOSITORY RECTAL at 18:08

## 2021-01-28 RX ADMIN — FUROSEMIDE 40 MG: 10 INJECTION, SOLUTION INTRAMUSCULAR; INTRAVENOUS at 15:12

## 2021-01-28 NOTE — PROGRESS NOTES
Progress Note - [place] 1970, 52 y o  female MRN: [id number]    Unit/Bed#:  Encounter: [id number]    Primary Care Provider: Khai Gómez MD   Date and time admitted to hospital: 1/26/2021  2:12 PM        Perforation of sigmoid colon due to diverticulitis  Assessment & Plan  · See plan for pneumoperitoneum    Hypokalemia  Assessment & Plan  · On admission, K 2 8  · Likely losses due to diarrhea  · Follow this AM, Replete for goal >4 ; < 5    Bandemia  Assessment & Plan  · Presents with WBC 11 03 and bands of 27  · Likely 2/2 bowel perforation  · Follow with Daily CBC    Hyperglycemia  Assessment & Plan  · No history of diabetes  · A1C 9 1 on 9/2020  · Will start on SSI  · Check new A1C  · May be 2/2 acute stress reaction    [facility]) ([facility] )  Assessment & Plan  Baseline Cr: 0 8-0 9  Admission Cr: 1 48  Current Cr: Post-op:  Etiology: Patient with significant dehydration, diarrhea for several weeks  · Follow urine output, has been significantly decreased overnight and did not improve with IVF resuscitation  · Avoid nephrotoxic medications/hypotension  Hypothyroid  Assessment & Plan  · Continue synthroid once able to take PO    Mild persistent asthma  Assessment & Plan  · Patient on no controller medications at home  · No wheezing on exam or signs of exacerbation  · PRN albuterol    * Pneumoperitoneum  Assessment & Plan  · Patient presents with approx 1 month of abdominal pain and course of ABx  · CT showing infectious/inflammatory/ less likely ischemic colitis of the whole colon from cecum to sigmoid colon and moderate volume pneumoperitoneum suggestive of perforated viscous  · Patient taken to OR  · Operations:  · 1/26: ExLap PONCHO, found to have sigmoid perf with feculent but contained abscess   Transverse, splenic flexure, and descending colon looked a bit dusky, therefore patient left open, in discontinuity, and with vac overtop with plan for washout, re-exploration 1/27: descending and partial transverse colon resection with colostomy, remains open with abthera in place  · Kept intubated pending further surgical planning   · Obtain endpoints and follow closely  · Continue cefepime/flagyl for bowel perforation  · Follow up Cx, no growth to date  · [facility] of incision        ----------------------------------------------------------------------------------------  HPI/24hr events:  underwent second look ex-lap with descending colon and partial transverse colon resection with colostomy formation  Overnight with decreased urine output despite 500cc bolus and continual fluid resuscitation        Disposition: Continue Critical Care   Code Status: Level 1 - Full Code  ---------------------------------------------------------------------------------------  SUBJECTIVE  Intubated and sedated  Discussed with nursing at bedside    Review of Systems  Review of systems was unable to be performed secondary to intubation, sedation  ---------------------------------------------------------------------------------------  OBJECTIVE    Vitals   Vitals:    21 0306 21 0400 21 0500 21 0600   BP: 150/89 150/80 130/80 128/79   BP Location:  Right arm     Pulse: (!) 109 100 102 101   Resp: 14 15 15 16   Temp:  98 7 °F (37 1 °C)     TempSrc:  Oral     SpO2: 99% 98% 99% 99%   Weight:    108 kg (237 lb 7 oz)   Height:         Temp (24hrs), Av 4 °F (36 9 °C), Min:97 8 °F (36 6 °C), Max:99 2 °F (37 3 °C)  Current: Temperature: 98 7 °F (37 1 °C)          Respiratory:  SpO2: SpO2: 99 %       Invasive/non-invasive ventilation settings   Respiratory    Lab Data (Last 4 hours)    None         O2/Vent Data (Last 4 hours)       0448          Drager Vent Mode AC/VC+       Patient safety screen outcome: Failed       Resp Rate (BPM) (BPM) 14       VT (mL) (mL) 350       Insp Time (S) (S) 0 85       FIO2 (%) (%) 40       PEEP (cmH2O) (cmH2O) 6       Rise Time (%) (%) 0 2       MV (Obs) 6 58 Physical Exam  Constitutional:       General: She is not in acute distress  Appearance: She is not ill-appearing  HENT:      Head: Normocephalic and atraumatic  Mouth/Throat:      Mouth: Mucous membranes are dry  Eyes:      Extraocular Movements: Extraocular movements intact  Neck:      Musculoskeletal: Normal range of motion and neck supple  Cardiovascular:      Rate and Rhythm: Normal rate and regular rhythm  Pulmonary:      Effort: Pulmonary effort is normal       Breath sounds: Normal breath sounds  No wheezing, rhonchi or rales  Abdominal:      Comments: Abthera in place  Colostomy over epigastric area, pale with +stool output  Soft     Musculoskeletal: Normal range of motion  General: No swelling or tenderness  Skin:     General: Skin is warm and dry  Neurological:      Mental Status: She is alert        Comments: GCS 11T  Awake Follows commands in all extremities   Psychiatric:         Mood and Affect: Mood normal          Behavior: Behavior normal          Laboratory and Diagnostics:  Results from last 7 days   Lab Units 01/28/21  0112 01/27/21 1918 01/27/21  0503 01/26/21 2251 01/26/21 2047 01/26/21  1510   WBC Thousand/uL  --  17 70* 14 17* 4 45  --  11 03*   HEMOGLOBIN g/dL  --  11 9 10 8* 12 9  --  13 7   I STAT HEMOGLOBIN g/dl 13 9  --   --   --  10 5*  --    HEMATOCRIT %  --  37 8 33 7* 39 8  --  42 6   HEMATOCRIT, ISTAT % 41  --   --   --  31*  --    PLATELETS Thousands/uL  --  417* 392* 511*  --  620*   BANDS PCT %  --  23* 8  --   --  27*   MONO PCT %  --  4 4  --   --  6     Results from last 7 days   Lab Units 01/28/21  0112 01/27/21 1918 01/27/21  1221 01/27/21  0503 01/26/21 2251 01/26/21 2047 01/26/21  1902 01/26/21  1510   SODIUM mmol/L  --  142 144 139 139  --   --  134*   POTASSIUM mmol/L  --  4 1 3 0* 3 6 3 7  --  3 2* 2 8*   CHLORIDE mmol/L  --  110* 115* 108 106  --   --  98*   CO2 mmol/L  --  21 19* 21 21  --   --  24   CO2, I-STAT mmol/L 22  --   --   --   --  22  --   --    ANION GAP mmol/L  --  11 10 10 12  --   --  12   BUN mg/dL  --  10 8 12 14  --   --  15   CREATININE mg/dL  --  0 73 0 58* 0 83 0 77  --   --  1 48*   CALCIUM mg/dL  --  7 5* 6 3* 7 1* 7 2*  --   --  8 6   GLUCOSE RANDOM mg/dL  --  179* 127 161* 180*  --   --  172*   ALT U/L  --  10*  --  12  --   --   --  12   AST U/L  --  10  --  8  --   --   --  10   ALK PHOS U/L  --  36*  --  37*  --   --   --  74   ALBUMIN g/dL  --  1 6*  --  1 7*  --   --   --  1 9*   TOTAL BILIRUBIN mg/dL  --  0 50  --  0 60  --   --   --  0 50     Results from last 7 days   Lab Units 01/27/21 1918 01/27/21  0503 01/26/21  2251   MAGNESIUM mg/dL 2 5 2 7* 1 7   PHOSPHORUS mg/dL 3 8 3 0 3 5      Results from last 7 days   Lab Units 01/26/21  1622   INR  1 34*   PTT seconds 29          Results from last 7 days   Lab Units 01/28/21  0029 01/27/21 1918 01/27/21  0922 01/26/21  2251 01/26/21  1622   LACTIC ACID mmol/L 1 1 1 1 0 9 1 0 1 6     ABG:  Results from last 7 days   Lab Units 01/27/21 1918   PH ART  7 316*   PCO2 ART mm Hg 34 1*   PO2 ART mm Hg 120 2   HCO3 ART mmol/L 17 0*   BASE EXC ART mmol/L -8 2   ABG SOURCE  Line, Arterial     VBG:  Results from last 7 days   Lab Units 01/27/21 1918   ABG SOURCE  Line, Arterial     Results from last 7 days   Lab Units 01/27/21  0503 01/26/21  1622   PROCALCITONIN ng/ml 2 50* 1 47*       Micro  Results from last 7 days   Lab Units 01/26/21  2150 01/26/21  1621 01/26/21  1601   BLOOD CULTURE   --  No Growth at 24 hrs  No Growth at 24 hrs  GRAM STAIN RESULT  2+ Polys  No bacteria seen  --   --        EKG: per telemetry  Imaging: I have personally reviewed pertinent reports     and I have personally reviewed pertinent films in PACS    Intake and Output  I/O       01/26 0701 - 01/27 0700 01/27 0701 - 01/28 0700    I V  (mL/kg) 7180 5 (69 7) 4291 7 (39 7)    IV Piggyback 2200 947 5    Total Intake(mL/kg) 9380 5 (91 1) 5239 2 (48 5)    Urine (mL/kg/hr) 490 1055 (0 4)    Emesis/NG output 100 175    Drains 450 550    Total Output 1040 1780    Net +8340 5 +3459 2                Height and Weights   Height: 5' 4" (162 6 cm)  IBW: 54 7 kg  Body mass index is 40 76 kg/m²  Weight (last 2 days)     Date/Time   Weight    01/28/21 0600   108 (237 44)    01/26/21 2315   104 (228 18)    01/26/21 2234   104 (228 18)    01/26/21 1355   109 (240)                Nutrition       Diet Orders   (From admission, onward)             Start     Ordered    01/26/21 2234  Diet NPO  Diet effective now     Question Answer Comment   Diet Type NPO    RD to adjust diet per protocol?  No        01/26/21 2233                  Active Medications  Scheduled Meds:  Current Facility-Administered Medications   Medication Dose Route Frequency Provider Last Rate    albuterol  2 5 mg Nebulization Q6H PRN Surendra Cromwell, PA-C      cefepime  2,000 mg Intravenous Q12H [facility], PA-C 2,000 mg (01/27/21 1958)    chlorhexidine  15 mL [place], [place]      dexmedetomidine  0 1-0 7 mcg/kg/hr Intravenous Titrated Yousif Flax, PA-DEMETRIO      fentaNYL  75 mcg/hr Intravenous Continuous Finlayson Cromwell, [place] 75 mcg/hr (01/28/21 0120)    fentanyl citrate (PF)  50 mcg Intravenous Q1H PRN Surendra Cromwell, PA-C      heparin (porcine)  5,000 Units Subcutaneous [place], [place]      insulin lispro  1-6 Units Subcutaneous Q6H [address] [place], [place]      lactated ringers  125 mL/hr Intravenous Continuous Surendra Cromwell, [place] 125 mL/hr (01/28/21 0544)    metroNIDAZOLE  500 mg Intravenous Q8H [facility], PA-C 500 mg (01/28/21 0038)    morphine injection  2 mg Intravenous Q3H PRN Surendra Cromwell, PA-C      ondansetron  4 mg Intravenous Q6H PRN Surendra Cromwell, PA-C       Continuous Infusions:  dexmedetomidine, 0 1-0 7 mcg/kg/hr  fentaNYL, 75 mcg/hr, Last Rate: 75 mcg/hr (01/28/21 0120)  lactated ringers, 125 mL/hr, Last Rate: 125 mL/hr (01/28/21 0544)      PRN Meds: albuterol, 2 5 mg, Q6H PRN  fentanyl citrate (PF), 50 mcg, Q1H PRN  morphine injection, 2 mg, Q3H PRN  ondansetron, 4 mg, Q6H PRN        Invasive Devices Review  Invasive Devices     Peripheral Intravenous Line            Peripheral IV 01/26/21 Right Wrist 1 day    Peripheral IV 01/26/21 Right;Ventral (anterior) Antecubital 1 day    Peripheral IV 01/27/21 Distal;Left;Upper;Ventral (anterior) Arm 1 day          Arterial Line            Arterial Line 01/26/21 Left Radial 1 day          Drain            NG/OG/Enteral Tube Nasogastric 18 Fr Left nares 1 day    Urethral Catheter Non-latex;Straight-tip 16 Fr  1 day    Colostomy Transverse less than 1 day    Negative Pressure Wound Therapy (V A C ) Abdomen less than 1 day          Airway            ETT  Cuffed;Oral;Inflated; Hi-Lo 7 mm 1 day                Rationale for remaining devices: necessary for access  ---------------------------------------------------------------------------------------  Advance Directive and Living Will:      Power of :    POLST:    ---------------------------------------------------------------------------------------  Care Time Delivered:   Upon my evaluation, this patient had a high probability of imminent or life-threatening deterioration due to bowel perforation, which required my direct attention, intervention, and personal management  I have personally provided 45 minutes (0600 to 0645) of critical care time, exclusive of procedures, teaching, family meetings, and any prior time recorded by providers other than myself  Cordell Mckinnon PA-C      Portions of the record may have been created with voice recognition software  Occasional wrong word or "sound a like" substitutions may have occurred due to the inherent limitations of voice recognition software    Read the chart carefully and recognize, using context, where substitutions have occurred

## 2021-01-28 NOTE — PLAN OF CARE
Problem: Potential for Falls  Goal: Patient will remain free of falls  Description: INTERVENTIONS:  - Assess patient frequently for physical needs  -  Identify cognitive and physical deficits and behaviors that affect risk of falls    -  [place] fall precautions as indicated by assessment   - Educate patient/family on patient safety including physical limitations  - Instruct patient to call for assistance with activity based on assessment  - Modify environment to reduce risk of injury  - Consider OT/PT consult to assist with strengthening/mobility  Outcome: Progressing     Problem: Prexisting or High Potential for Compromised Skin Integrity  Goal: Skin integrity is maintained or improved  Description: INTERVENTIONS:  - Identify patients at risk for skin breakdown  - Assess and monitor skin integrity  - Assess and monitor nutrition and hydration status  - Monitor labs   - Assess for incontinence   - Turn and reposition patient  - Assist with mobility/ambulation  - Relieve pressure over bony prominences  - Avoid friction and shearing  - Provide appropriate hygiene as needed including keeping skin clean and dry  - Evaluate need for skin moisturizer/barrier cream  - Collaborate with interdisciplinary team   - Patient/family teaching  - Consider wound care consult   Outcome: Progressing     Problem: SAFETY,RESTRAINT: NV/NON-SELF DESTRUCTIVE BEHAVIOR  Goal: Remains free of harm/injury (restraint for non violent/non self-detsructive behavior)  Description: INTERVENTIONS:  - Instruct patient/family regarding restraint use   - Assess and monitor physiologic and psychological status   - Provide interventions and comfort measures to meet assessed patient needs   - Identify and implement measures to help patient regain control  - Assess readiness for release of restraint   Outcome: Progressing  Goal: Returns to optimal restraint-free functioning  Description: INTERVENTIONS:  - Assess the patient's behavior and symptoms that indicate continued need for restraint  - Identify and implement measures to help patient regain control  - Assess readiness for release of restraint   Outcome: Progressing     Problem: Nutrition/Hydration-ADULT  Goal: Nutrient/Hydration intake appropriate for improving, restoring or maintaining nutritional needs  Description: Monitor and assess patient's nutrition/hydration status for malnutrition  Collaborate with interdisciplinary team and initiate plan and interventions as ordered  Monitor patient's weight and dietary intake as ordered or per policy  Utilize nutrition screening tool and intervene as necessary  Determine patient's food preferences and provide high-protein, high-caloric foods as appropriate       INTERVENTIONS:  - Monitor oral intake, urinary output, labs, and treatment plans  - Assess nutrition and hydration status and recommend course of action  - Evaluate amount of meals eaten  - Assist patient with eating if necessary   - Allow adequate time for meals  - Recommend/ encourage appropriate diets, oral nutritional supplements, and vitamin/mineral supplements  - Order, calculate, and assess calorie counts as needed  - Recommend, monitor, and adjust tube feedings or PN based on assessed needs  - Assess need for intravenous fluids  - Provide nutrition/hydration education as appropriate  - Include patient/family/caregiver in decisions related to nutrition  Outcome: Progressing     Problem: DISCHARGE PLANNING - CARE MANAGEMENT  Goal: Discharge to post-acute care or home with appropriate resources  Description: INTERVENTIONS:  - Conduct assessment to determine patient/family and health care team treatment goals, and need for post-acute services based on payer coverage, community resources, and patient preferences, and barriers to discharge  - Address psychosocial, clinical, and financial barriers to discharge as identified in assessment in conjunction with the patient/family and health care team  - Arrange appropriate level of post-acute services according to patients   needs and preference and payer coverage in collaboration with the physician and health care team  - Communicate with and update the patient/family, physician, and health care team regarding progress on the discharge plan  - Arrange appropriate transportation to post-acute venues  Outcome: Progressing     Problem: PAIN - ADULT  Goal: Verbalizes/displays adequate comfort level or baseline comfort level  Description: Interventions:  - Encourage patient to monitor pain and request assistance  - Assess pain using appropriate pain scale  - Administer analgesics based on type and severity of pain and evaluate response  - Implement non-pharmacological measures as appropriate and evaluate response  - Consider cultural and social influences on pain and pain management  - Notify physician/advanced practitioner if interventions unsuccessful or patient reports new pain  Outcome: Progressing     Problem: INFECTION - ADULT  Goal: Absence or prevention of progression during hospitalization  Description: INTERVENTIONS:  - Assess and monitor for signs and symptoms of infection  - Monitor lab/diagnostic results  - Monitor all insertion sites, i e  indwelling lines, tubes, and drains  - Monitor endotracheal if appropriate and nasal secretions for changes in amount and color  - [place] appropriate cooling/warming therapies per order  - Administer medications as ordered  - Instruct and encourage patient and family to use good hand hygiene technique  - Identify and instruct in appropriate isolation precautions for identified infection/condition  Outcome: Progressing     Problem: SAFETY ADULT  Goal: Patient will remain free of falls  Description: INTERVENTIONS:  - Assess patient frequently for physical needs  -  Identify cognitive and physical deficits and behaviors that affect risk of falls    -  [place] fall precautions as indicated by assessment   - Educate patient/family on patient safety including physical limitations  - Instruct patient to call for assistance with activity based on assessment  - Modify environment to reduce risk of injury  - Consider OT/PT consult to assist with strengthening/mobility  Outcome: Progressing  Goal: Maintain or return to baseline ADL function  Description: INTERVENTIONS:  -  Assess patient's ability to carry out ADLs; assess patient's baseline for ADL function and identify physical deficits which impact ability to perform ADLs (bathing, care of mouth/teeth, toileting, grooming, dressing, etc )  - Assess/evaluate cause of self-care deficits   - Assess range of motion  - Assess patient's mobility; develop plan if impaired  - Assess patient's need for assistive devices and provide as appropriate  - Encourage maximum independence but intervene and supervise when necessary  - Involve family in performance of ADLs  - Assess for home care needs following discharge   - Consider OT consult to assist with ADL evaluation and planning for discharge  - Provide patient education as appropriate  Outcome: Progressing  Goal: Maintain or return mobility status to optimal level  Description: INTERVENTIONS:  - Assess patient's baseline mobility status (ambulation, transfers, stairs, etc )    - Identify cognitive and physical deficits and behaviors that affect mobility  - Identify mobility aids required to assist with transfers and/or ambulation (gait belt, sit-to-stand, lift, walker, cane, etc )  - [place] fall precautions as indicated by assessment  - Record patient progress and toleration of activity level on Mobility SBAR; progress patient to next Phase/Stage  - Instruct patient to call for assistance with activity based on assessment  - Consider rehabilitation consult to assist with strengthening/weightbearing, etc   Outcome: Progressing     Problem: DISCHARGE PLANNING  Goal: Discharge to home or other facility with appropriate resources  Description: INTERVENTIONS:  - Identify barriers to discharge w/patient and caregiver  - Arrange for needed discharge resources and transportation as appropriate  - Identify discharge learning needs (meds, wound care, etc )  - Arrange for interpretive services to assist at discharge as needed  - Refer to Case Management Department for coordinating discharge planning if the patient needs post-hospital services based on physician/advanced practitioner order or complex needs related to functional status, cognitive ability, or social support system  Outcome: Progressing     Problem: Knowledge Deficit  Goal: Patient/family/caregiver demonstrates understanding of disease process, treatment plan, medications, and discharge instructions  Description: Complete learning assessment and assess knowledge base  Interventions:  - Provide teaching at level of understanding  - Provide teaching via preferred learning methods  Outcome: Progressing     Problem: NEUROSENSORY - ADULT  Goal: Achieves stable or improved neurological status  Description: INTERVENTIONS  - Monitor and report changes in neurological status  - Monitor vital signs such as temperature, blood pressure, glucose, and any other labs ordered   - Initiate measures to prevent increased intracranial pressure  - Monitor for seizure activity and implement precautions if appropriate      Outcome: Progressing  Goal: Achieves maximal functionality and self care  Description: INTERVENTIONS  - Monitor swallowing and airway patency with patient fatigue and changes in neurological status  - Encourage and assist patient to increase activity and self care     - Encourage visually impaired, hearing impaired and aphasic patients to use assistive/communication devices  Outcome: Progressing     Problem: CARDIOVASCULAR - ADULT  Goal: Maintains optimal cardiac output and hemodynamic stability  Description: INTERVENTIONS:  - Monitor I/O, vital signs and rhythm  - Monitor for S/S and trends of decreased cardiac output  - Administer and titrate ordered vasoactive medications to optimize hemodynamic stability  - Assess quality of pulses, skin color and temperature  - Assess for signs of decreased coronary artery perfusion  - Instruct patient to report change in severity of symptoms  Outcome: Progressing  Goal: Absence of cardiac dysrhythmias or at baseline rhythm  Description: INTERVENTIONS:  - Continuous cardiac monitoring, vital signs, obtain 12 lead EKG if ordered  - Administer antiarrhythmic and heart rate control medications as ordered  - Monitor electrolytes and administer replacement therapy as ordered  Outcome: Progressing     Problem: RESPIRATORY - ADULT  Goal: Achieves optimal ventilation and oxygenation  Description: INTERVENTIONS:  - Assess for changes in respiratory status  - Assess for changes in mentation and behavior  - Position to facilitate oxygenation and minimize respiratory effort  - Oxygen administered by appropriate delivery if ordered  - Initiate smoking cessation education as indicated  - Encourage broncho-pulmonary hygiene including cough, deep breathe, Incentive Spirometry  - Assess the need for suctioning and aspirate as needed  - Assess and instruct to report SOB or any respiratory difficulty  - Respiratory Therapy support as indicated  Outcome: Progressing     Problem: GASTROINTESTINAL - ADULT  Goal: Minimal or absence of nausea and/or vomiting  Description: INTERVENTIONS:  - Administer IV fluids if ordered to ensure adequate hydration  - Maintain NPO status until nausea and vomiting are resolved  - Nasogastric tube if ordered  - Administer ordered antiemetic medications as needed  - Provide nonpharmacologic comfort measures as appropriate  - Advance diet as tolerated, if ordered  - Consider nutrition services referral to assist patient with adequate nutrition and appropriate food choices  Outcome: Progressing  Goal: Maintains or returns to baseline bowel function  Description: INTERVENTIONS:  - Assess bowel function  - Encourage oral fluids to ensure adequate hydration  - Administer IV fluids if ordered to ensure adequate hydration  - Administer ordered medications as needed  - Encourage mobilization and activity  - Consider nutritional services referral to assist patient with adequate nutrition and appropriate food choices  Outcome: Progressing  Goal: Maintains adequate nutritional intake  Description: INTERVENTIONS:  - Monitor percentage of each meal consumed  - Identify factors contributing to decreased intake, treat as appropriate  - Assist with meals as needed  - Monitor I&O, weight, and lab values if indicated  - Obtain nutrition services referral as needed  Outcome: Progressing     Problem: GENITOURINARY - ADULT  Goal: Maintains or returns to baseline urinary function  Description: INTERVENTIONS:  - Assess urinary function  - Encourage oral fluids to ensure adequate hydration if ordered  - Administer IV fluids as ordered to ensure adequate hydration  - Administer ordered medications as needed  - Offer frequent toileting  - Follow urinary retention protocol if ordered  Outcome: Progressing  Goal: Absence of urinary retention  Description: INTERVENTIONS:  - Assess patients ability to void and empty bladder  - Monitor I/O  - Bladder scan as needed  - Discuss with physician/AP medications to alleviate retention as needed  - Discuss catheterization for long term situations as appropriate  Outcome: Progressing  Goal: Urinary catheter remains patent  Description: INTERVENTIONS:  - Assess patency of urinary catheter  - If patient has a chronic fernandez, consider changing catheter if non-functioning  - Follow guidelines for intermittent irrigation of non-functioning urinary catheter  Outcome: Progressing     Problem: METABOLIC, FLUID AND ELECTROLYTES - ADULT  Goal: Electrolytes maintained within normal limits  Description: INTERVENTIONS:  - Monitor labs and assess patient for signs and symptoms of electrolyte imbalances  - Administer electrolyte replacement as ordered  - Monitor response to electrolyte replacements, including repeat lab results as appropriate  - Instruct patient on fluid and nutrition as appropriate  Outcome: Progressing  Goal: Fluid balance maintained  Description: INTERVENTIONS:  - Monitor labs   - Monitor I/O and WT  - Instruct patient on fluid and nutrition as appropriate  - Assess for signs & symptoms of volume excess or deficit  Outcome: Progressing  Goal: Glucose maintained within target range  Description: INTERVENTIONS:  - Monitor Blood Glucose as ordered  - Assess for signs and symptoms of hyperglycemia and hypoglycemia  - Administer ordered medications to maintain glucose within target range  - Assess nutritional intake and initiate nutrition service referral as needed  Outcome: Progressing

## 2021-01-28 NOTE — RESPIRATORY THERAPY NOTE
01/28/21 1608   Vent Information   Vent ID Groot   Vent type Drager   Drager Vent Mode AC/VC+   $ Pulse Oximetry Spot Check Charge Completed   SpO2 100 %   AC/VC+ Settings   Resp Rate (BPM) 14 BPM   VT (mL) 350 mL   Insp Time (S) 0 85 S   FIO2 (%) 40 %   PEEP (cmH2O) 6 cmH2O   Rise Time (%) 0 2 %   Trigger Sensitivity Flow (LPM) 2 LPM   Humidification Heater   Heater Temp 98 6 °F (37 °C)   AC/VC+ Actuals   Resp Rate (BPM) 14 BPM   VT (mL) 358 mL   MV (Obs) 4 471   MAP (cmH2O) 7 6 cmH2O   Peak Pressure (cmH2O) 7 6 cmH2O   I:E Ratio (Obs) 1:4 1   Static Compliance (mL/cmH20) 78 mL/cmH2O   Heater Temperature (Obs) 98 4 °F (36 9 °C)   AC/VC+ ALARMS   High Peak Pressure (cmH2O) 40 cmH2O   High Resp Rate (BPM) 40 BPM   High MV (L/min) 14 L/min   Low MV (L/min) 3 L/min   High VT (mL) 800 mL   Maintenance   Alarm (pink) cable attached Yes   Resuscitation bag with peep valve at bedside Yes   Water bag changed Yes   Circuit changed No   ETT  Cuffed;Oral;Inflated; Hi-Lo 7 mm   Placement Date/Time: 01/26/21 1926   Mask Ventilation: Mask ventilation not attempted (0)  Preoxygenated: Yes  Technique: Direct laryngoscopy;Rapid sequence;Video laryngoscopy;bougie  Type: Cuffed;Oral;Inflated; Hi-Lo  Tube Size: 7 mm  Laryngoscope: Mc     Secured at (cm) 22   Measured from Lips   Secured Location Left   Secured by Commercial tube see   Site Condition Dry   HI-LO Suction  Continuous low suction   HI-LO Secretions Scant   HI-LO Intervention Patent

## 2021-01-28 NOTE — RESPIRATORY THERAPY NOTE
RT Ventilator Management Note  Bijal Renteria 52 y o  female MRN: [id number]  Unit/Bed#:  Encounter: [id number]      Daily Screen       1/27/2021  0752 1/27/2021 2035          Patient safety screen outcome[de-identified]  Failed  Failed      Not Ready for Weaning due to[de-identified]  Going on Transport intubated; Underline problem not resolved  Underline problem not resolved;Going on Transport intubated              Physical Exam:   Assessment Type: Assess only  General Appearance: Sedated  Respiratory Pattern: Assisted  Chest Assessment: Chest expansion symmetrical  Bilateral Breath Sounds: Diminished, Coarse      Resp Comments: patient awake and alert at this time, intubated on full mechanical support, no changes were made to the vent settigns, pts tube remains in place with a commercail tube see, no sking breakdown or discoloration was noted, 2 fingers space remains between the patinet and the commercial tube see, no extubation planned patient to go back to the OR        01/27/21 2035   Vent Information   Vent ID Cesar Landa   Vent type Drager   Drager Vent Mode AC/VC+   $ Pulse Oximetry Spot Check Charge Completed   AC/VC+ Settings   Resp Rate (BPM) 14 BPM   VT (mL) 350 mL   Insp Time (S) 0 95 S   FIO2 (%) 40 %   PEEP (cmH2O) 6 cmH2O   Rise Time (%) 0 2 %   Trigger Sensitivity Flow (LPM) 2 LPM   Humidification Heater   Heater Temp 98 6 °F (37 °C)   AC/VC+ Actuals   Resp Rate (BPM) 18 BPM   VT (mL) 380 mL   MV (Obs) 5 87   MAP (cmH2O) 6 4 cmH2O   Peak Pressure (cmH2O) 7 6 cmH2O   I:E Ratio (Obs) 1:2 6   Static Compliance (mL/cmH20)   (no reading)   Plateau Pressure (cm H2O) 17 cm H2O   Heater Temperature (Obs) 98 4 °F (36 9 °C)   AC/VC+ ALARMS   High Peak Pressure (cmH2O) 40 cmH2O   High Resp Rate (BPM) 40 BPM   High MV (L/min) 14 L/min   Low MV (L/min) 3 L/min   High VT (mL) 800 mL   Maintenance   Alarm (pink) cable attached Yes   Resuscitation bag with peep valve at bedside Yes   Water bag changed No   Circuit changed No Daily Screen   Patient safety screen outcome: Failed   Not Ready for Weaning due to: Underline problem not resolved;Going on Transport intubated   IHI Ventilator Associated Pneumonia Bundle   Daily Assessment of Readiness to Extubate Yes   Head of Bed Elevated HOB 30   ETT  Cuffed;Oral;Inflated; Hi-Lo 7 mm   Placement Date/Time: 01/26/21 1926   Mask Ventilation: Mask ventilation not attempted (0)  Preoxygenated: Yes  Technique: Direct laryngoscopy;Rapid sequence;Video laryngoscopy;bougie  Type: Cuffed;Oral;Inflated; Hi-Lo  Tube Size: 7 mm  Laryngoscope: Juan     Secured at (cm) 22   Measured from [facility] by Commercial tube see   Site Condition Dry   Cuff Pressure (cm H2O) 28 cm H2O   HI-LO Suction  Continuous low suction   HI-LO Secretions Scant   HI-LO Intervention Patent

## 2021-01-28 NOTE — PROGRESS NOTES
Progress Note -Surgery PA  Xi Elliot 52 y o  female MRN: [id number]  Unit/Bed#:  Encounter: [id number]      Assessment   56y F with perforated diverticulitis  - POD #1, s/p Take back for washout and 2nd look with resection of descending colon, partial omentectomy, transverse colon colostomy creation with ABThera placement  - POD #2, s/p exploratory laparotomy for pneumoperitoneum with sigmoid colon resection and ABThera placement  - intraoperative findings with perforated diverticulitis with abscess pneumoperitoneum  - few colonies of year on peritoneal tissue cultured from 1/26  - Blood cultures negative> 48h  - patient awake and oriented but intubated, ABThera intact with serosanguineous output, colostomy with stool output, no contamination of wound VAC with stool from ostomy  Ostomy functioning with liquid stool in bag  - decreased UOP  · concern for ATN  · Creatinine 1 19 from 1 09  · Total I/O positive  Plan   -- Plan for take back to OR tomorrow for another look, washout and possible closure  -- Continue with care per ICU  ______________________________________________________________________  Subjective:   Patient awake and alert but intubated    Objective:    Vitals:  /82   Pulse 102   Temp 98 7 °F (37 1 °C) (Oral)   Resp 16   Ht 5' 4" (1 626 m)   Wt 108 kg (237 lb 7 oz)   LMP 01/01/2021 (Approximate)   SpO2 100%   BMI 40 76 kg/m²     I/Os:  I/O last 3 completed shifts: In: 50257 7 [I V :05869 2; IV Piggyback:2197 5]  Out: 2439 [CQNNL:2739; Emesis/NG output:275; Drains:1000]    I/O this shift:   In: 728 [I V :578; IV Piggyback:150]  Out: 90 [Urine:90]    Invasive Devices     Peripheral Intravenous Line            Peripheral IV 01/26/21 Right;Ventral (anterior) Antecubital 1 day    Peripheral IV 01/27/21 Distal;Left;Upper;Ventral (anterior) Arm 1 day          Arterial Line            Arterial Line 01/26/21 Left Radial 1 day          Drain            NG/OG/Enteral Tube Nasogastric 18 Fr Left nares 1 day    Urethral Catheter Non-latex;Straight-tip 16 Fr  1 day    Colostomy Transverse less than 1 day    Negative Pressure Wound Therapy (V A C ) Abdomen less than 1 day          Airway            ETT  Cuffed;Oral;Inflated; Hi-Lo 7 mm 1 day                Medications:  Current Facility-Administered Medications   Medication Dose Route Frequency    albumin human (FLEXBUMIN) 25 % injection 25 g  25 g Intravenous Q6H    albuterol inhalation solution 2 5 mg  2 5 mg Nebulization Q6H PRN    cefepime (MAXIPIME) 2,000 mg in dextrose 5 % 50 mL IVPB  2,000 mg Intravenous Q12H    chlorhexidine (PERIDEX) 0 12 % oral rinse 15 mL  15 mL Swish & Spit Q12H [address]    dexmedetomidine (PRECEDEX) 400 mcg in sodium chloride 0 9% 100 ml IVPB  0 1-0 7 mcg/kg/hr Intravenous Titrated    fentaNYL 1000 mcg in sodium chloride 0 9% 100mL infusion  75 mcg/hr Intravenous Continuous    fentanyl citrate (PF) 100 MCG/2ML 50 mcg  50 mcg Intravenous Q1H PRN    heparin (porcine) subcutaneous injection 5,000 Units  5,000 Units Subcutaneous Q8H [address]    insulin lispro (HumaLOG) 100 units/mL subcutaneous injection 1-6 Units  1-6 Units Subcutaneous Q6H [address]    metroNIDAZOLE (FLAGYL) IVPB (premix) 500 mg 100 mL  500 mg Intravenous Q8H    morphine injection 2 mg  2 mg Intravenous Q3H PRN    ondansetron (ZOFRAN) injection 4 mg  4 mg Intravenous Q6H PRN                 Lab Results and Cultures:   CBC with diff:   Lab Results   Component Value Date    WBC 19 74 (H) 01/28/2021    HGB 10 9 (L) 01/28/2021    HCT 34 3 (L) 01/28/2021    MCV 86 01/28/2021     01/28/2021    MCH 27 3 01/28/2021    MCHC 31 8 01/28/2021    RDW 14 5 01/28/2021    MPV 10 6 01/28/2021    NRBC 0 01/28/2021       BMP/CMP:  Lab Results   Component Value Date    K 4 1 01/28/2021     (H) 01/28/2021    CO2 21 01/28/2021    CO2 22 01/28/2021    BUN 19 01/28/2021    CREATININE 1 19 01/28/2021    GLUCOSE 157 (H) 01/28/2021    CALCIUM 8 0 (L) 01/28/2021    AST 10 01/27/2021    ALT 10 (L) 01/27/2021    ALKPHOS 36 (L) 01/27/2021    EGFR 54 01/28/2021       Lipid Panel:   No results found for: CHOL    Coags:   Lab Results   Component Value Date    PTT 29 01/26/2021    INR 1 34 (H) 01/26/2021        Urinalysis:   Lab Results   Component Value Date    COLORU Yellow 01/26/2021    CLARITYU Clear 01/26/2021    SPECGRAV 1 020 01/26/2021    PHUR 6 0 01/26/2021    LEUKOCYTESUR Negative 01/26/2021    NITRITE Negative 01/26/2021    GLUCOSEU Negative 01/26/2021    KETONESU Trace (A) 01/26/2021    BILIRUBINUR Negative 01/26/2021    BLOODU Small (A) 01/26/2021        Urine Culture: No results found for: URINECX   Wound Culure: No results found for: WOUNDCULT  Blood Culture:   Lab Results   Component Value Date    BLOODCX No Growth at 24 hrs  01/26/2021         Physical Exam:  General Appearance:    Intubated, but Awake and alert, cooperative, no distress, appears stated age   Lungs:    Vent sounds on auscultation    Heart:    Regular rate and rhythm, S1 and S2 normal, no murmur   Abdomen:    Normoactive BS, soft, tenderness with palpation non rigid, no masses  Wound/Dressing:  Colostomy budded and patent and mid abdomen with loose stool in bag  Marcene Chough A C intact at midline incision, no contamination, no surrounding erythema, s/s output to VAC canister  Extremities:  Extremities normal, no calf tenderness, no cyanosis or edema   Pulses:   2+ and symmetric all extremities   Skin:   Skin color, texture, turgor normal, no rashes   Neurologic:   CNII-XII intact, normal strength, affect appropriate       Imaging:  Xr Chest Portable Icu    Result Date: 1/27/2021  Impression: No acute cardiopulmonary disease  ET tube 4 cm above the shreyas  Workstation performed: [id number]     Ct Abdomen Pelvis With Contrast    Result Date: 1/26/2021  Impression: 1  Mild pancolonic wall thickening with minimal pericolonic fat stranding extending from the cecum to sigmoid colon    Findings are suggestive of infectious/inflammatory or less likely ischemic colitis  2   Moderate volume pneumoperitoneum, suggestive of perforated viscus  However, the site of perforation is not definitively identified  Urgent surgical consultation is recommended    I personally discussed this study with [facility] on 1/26/2021 at 4:30 PM  Workstation performed: [id number]       VTE Pharmacologic Prophylaxis: Heparin  VTE Mechanical Prophylaxis: sequential compression device    Kehinde Kitchen PA-C   1/28/2021

## 2021-01-28 NOTE — OCCUPATIONAL THERAPY NOTE
Occupational Therapy Cancellation Note      Patient Name: Monica HERNANDEZ Date: 1/28/2021 01/28/21 0726   OT Last Visit   OT Visit Date 01/28/21   Note Type   Note type Evaluation   Cancel Reasons Intubated/sedated   Assessment   Assessment OT order received, chart review performed  At this time, OT evaluation cancelled secondary to patient intubated and on full mechanical support  OT will continue to follow patient and evaluate as medically appropriate       Zeina Dose, OTR/L

## 2021-01-28 NOTE — ASSESSMENT & PLAN NOTE
· Patient presents with approx 1 month of abdominal pain and course of ABx  · CT showing infectious/inflammatory/ less likely ischemic colitis of the whole colon from cecum to sigmoid colon and moderate volume pneumoperitoneum suggestive of perforated viscous  · Patient taken to OR  · Operations:  · 1/26: ExLap PONCHO, found to have sigmoid perf with feculent but contained abscess   Transverse, splenic flexure, and descending colon looked a bit dusky, therefore patient left open, in discontinuity, and with vac overtop with plan for washout, re-exploration 1/27: descending and partial transverse colon resection with colostomy, remains open with abthera in place  · Kept intubated pending further surgical planning   · Obtain endpoints and follow closely  · Continue cefepime/flagyl for bowel perforation  · Follow up Cx, no growth to date  · LWC of incision

## 2021-01-28 NOTE — RESPIRATORY THERAPY NOTE
01/28/21 0725   Vent Information   Vent ID Ju Suarez   Vent type Drager   Drager Vent Mode AC/VC+   Is the patient reintubated? No   $ Vent Daily Charge-Subsequent Yes   $ Pulse Oximetry Spot Check Charge Completed   SpO2 99 %   AC/VC+ Settings   Resp Rate (BPM) 14 BPM   VT (mL) 350 mL   Insp Time (S) 0 85 S   FIO2 (%) 40 %   PEEP (cmH2O) 6 cmH2O   Rise Time (%) 0 2 %   Trigger Sensitivity Flow (LPM) 2 LPM   Humidification Heater   Heater Temp 98 6 °F (37 °C)   AC/VC+ Actuals   Resp Rate (BPM) 17 BPM   VT (mL) 475 mL   MV (Obs) 4 96   MAP (cmH2O) 7 3 cmH2O   Peak Pressure (cmH2O) 8 7 cmH2O   I:E Ratio (Obs) 1:3   Static Compliance (mL/cmH20)   (no value)   Plateau Pressure (cm H2O)   (no vlaue)   Heater Temperature (Obs) 98 2 °F (36 8 °C)   AC/VC+ ALARMS   High Peak Pressure (cmH2O) 40 cmH2O   High Resp Rate (BPM) 40 BPM   High MV (L/min) 14 L/min   Low MV (L/min) 3 L/min   High VT (mL) 800 mL   Maintenance   Alarm (pink) cable attached Yes   Resuscitation bag with peep valve at bedside Yes   Water bag changed No   Circuit changed No   Daily Screen   Patient safety screen outcome: Failed   Not Ready for Weaning due to: Underline problem not resolved   IHI Ventilator Associated Pneumonia Bundle   Daily Assessment of Readiness to Extubate Yes   Head of Bed Elevated HOB 30   Oral Care Mouth suctioned   ETT  Cuffed;Oral;Inflated; Hi-Lo 7 mm   Placement Date/Time: 01/26/21 1926   Mask Ventilation: Mask ventilation not attempted (0)  Preoxygenated: Yes  Technique: Direct laryngoscopy;Rapid sequence;Video laryngoscopy;bougie  Type: Cuffed;Oral;Inflated; Hi-Lo  Tube Size: 7 mm  Laryngoscope: Mc     Secured at (cm) 22   Measured from [facility] 399   Repositioned Center to Left   Secured by Commercial tube see   Site Condition Dry   Cuff Pressure (cm H2O) 28 cm H2O   HI-LO Suction  Continuous low suction   HI-LO Secretions Scant   HI-LO Intervention Patent

## 2021-01-28 NOTE — RESPIRATORY THERAPY NOTE
RT Ventilator Management Note  Ash Mead 52 y o  female MRN: [id number]  Unit/Bed#:  Encounter: [id number]      Daily Screen       1/27/2021 2035 1/28/2021 0448          Patient safety screen outcome[de-identified]  Failed  Failed      Not Ready for Weaning due to[de-identified]  Underline problem not resolved;Going on Transport intubated  --              Physical Exam:   Assessment Type: Assess only  General Appearance: Awake  Respiratory Pattern: Assisted  Chest Assessment: Chest expansion symmetrical  Bilateral Breath Sounds: Diminished, Coarse      Resp Comments: pt remains intubated and on full mechanical support, pt is not an SBT this morning due to her need to return to the OR  Will continue with the current settings   Pt appears comfortable and to tolerated the vent well        01/28/21 0448   Vent Information   Vent ID Yanet Furl   Vent type Drager   Drager Vent Mode AC/VC+   $ Pulse Oximetry Spot Check Charge Completed   AC/VC+ Settings   Resp Rate (BPM) 14 BPM   VT (mL) 350 mL   Insp Time (S) 0 85 S   FIO2 (%) 40 %   PEEP (cmH2O) 6 cmH2O   Rise Time (%) 0 2 %   Trigger Sensitivity Flow (LPM) 2 LPM   Humidification Heater   Heater Temp 98 6 °F (37 °C)   AC/VC+ Actuals   Resp Rate (BPM) 19 BPM   VT (mL) 458 mL   MV (Obs) 6 58   MAP (cmH2O) 7 4 cmH2O   Peak Pressure (cmH2O) 11 cmH2O   I:E Ratio (Obs) 1:4 1   Static Compliance (mL/cmH20) 37 4 mL/cmH2O   Plateau Pressure (cm H2O) 9 6 cm H2O   Heater Temperature (Obs) 98 8 °F (37 1 °C)   AC/VC+ ALARMS   High Peak Pressure (cmH2O) 40 cmH2O   High Resp Rate (BPM) 40 BPM   High MV (L/min) 14 L/min   Low MV (L/min) 3 L/min   High VT (mL) 800 mL   Maintenance   Alarm (pink) cable attached Yes   Resuscitation bag with peep valve at bedside Yes   Water bag changed No   Circuit changed No   Daily Screen   Patient safety screen outcome: Failed   IHI Ventilator Associated Pneumonia Bundle   Daily Assessment of Readiness to Extubate Yes   Head of Bed Elevated HOB 30   ETT Cuffed;Oral;Inflated; Hi-Lo 7 mm   Placement Date/Time: 01/26/21 1926   Mask Ventilation: Mask ventilation not attempted (0)  Preoxygenated: Yes  Technique: Direct laryngoscopy;Rapid sequence;Video laryngoscopy;bougie  Type: Cuffed;Oral;Inflated; Hi-Lo  Tube Size: 7 mm  Laryngoscope: Juan     Secured at (cm) 22   Measured from [address] by Commercial tube see   Site Condition Dry   HI-LO Suction  Continuous low suction   HI-LO Secretions Scant   HI-LO Intervention Patent

## 2021-01-28 NOTE — RESPIRATORY THERAPY NOTE
01/28/21 1111   Vent Information   Vent ID Groot   Vent type Drager   Drager Vent Mode AC/VC+   $ Pulse Oximetry Spot Check Charge Completed   SpO2 100 %   AC/VC+ Settings   Resp Rate (BPM) 14 BPM   VT (mL) 350 mL   Insp Time (S) 0 85 S   FIO2 (%) 40 %   PEEP (cmH2O) 6 cmH2O   Rise Time (%) 0 2 %   Trigger Sensitivity Flow (LPM) 2 LPM   Humidification Heater   Heater Temp 98 6 °F (37 °C)   AC/VC+ Actuals   Resp Rate (BPM) 16 BPM   VT (mL) 289 mL   MV (Obs) 6 39   MAP (cmH2O) 7 9 cmH2O   Peak Pressure (cmH2O) 15 cmH2O   I:E Ratio (Obs) 1:4 1   Heater Temperature (Obs) 98 6 °F (37 °C)   AC/VC+ ALARMS   High Peak Pressure (cmH2O) 40 cmH2O   High Resp Rate (BPM) 40 BPM   High MV (L/min) 14 L/min   Low MV (L/min) 3 L/min   High VT (mL) 800 mL   Maintenance   Alarm (pink) cable attached Yes   Resuscitation bag with peep valve at bedside Yes   Water bag changed No   Circuit changed No   ETT  Cuffed;Oral;Inflated; Hi-Lo 7 mm   Placement Date/Time: 01/26/21 1926   Mask Ventilation: Mask ventilation not attempted (0)  Preoxygenated: Yes  Technique: Direct laryngoscopy;Rapid sequence;Video laryngoscopy;bougie  Type: Cuffed;Oral;Inflated; Hi-Lo  Tube Size: 7 mm  Laryngoscope: Mc     Secured at (cm) 22   Measured from Lips   Secured Location Left   Secured by Commercial tube see   Site Condition Dry   HI-LO Suction  Continuous low suction   HI-LO Secretions Scant   HI-LO Intervention Patent

## 2021-01-28 NOTE — ASSESSMENT & PLAN NOTE
Sepsis, POA, due suspected ischemic colitis with perforation of sigmoid colon  · Patient presents with approx 1 month of abdominal pain and course of ABx  · CT showing infectious/inflammatory/ less likely ischemic colitis of the whole colon from cecum to sigmoid colon and moderate volume pneumoperitoneum suggestive of perforated viscous  · Patient taken to OR  · Operations:  · 1/26: ExLap PONCHO, found to have sigmoid perf with feculent but contained abscess   Transverse, splenic flexure, and descending colon looked a bit dusky, therefore patient left open, in discontinuity, and with vac overtop with plan for washout, re-exploration  · 1/27: descending and partial transverse colon resection with colostomy, remains open with abthera in place  · Kept intubated pending further surgical planning   · Obtain endpoints and follow closely  · Continue cefepime/flagyl for bowel perforation  · Follow up Cx, no growth to date  · LWC of incision

## 2021-01-28 NOTE — CASE MANAGEMENT
Pt scheduled for the OR tomorrow  Pt remains intubated and has an open abd  No tentative date for discharge  Surgery feels she will need VNA services for colostomy care

## 2021-01-28 NOTE — RESPIRATORY THERAPY NOTE
RT Ventilator Management Note  Abiolajamie Lerma 52 y o  female MRN: [id number]  Unit/Bed#:  Encounter: [id number]      Daily Screen       1/28/2021  0448 1/28/2021  0725          Patient safety screen outcome[de-identified]  Failed  Failed      Not Ready for Weaning due to[de-identified]  --  Underline problem not resolved              Physical Exam:   Assessment Type: Assess only  General Appearance: Awake  Respiratory Pattern: Assisted  Chest Assessment: Chest expansion symmetrical  Bilateral Breath Sounds: Diminished, Coarse  Suction: ET Tube      Resp Comments: pt remains intubated and on full mechanical support  No changes at this time  Skin integrity remains intact

## 2021-01-28 NOTE — RESPIRATORY THERAPY NOTE
RT Ventilator Management Note  Ashish Cabrera 52 y o  female MRN: [id number]  Unit/Bed#:  Encounter: [id number]      Daily Screen       1/27/2021  0752 1/27/2021 2035          Patient safety screen outcome[de-identified]  Failed  Failed      Not Ready for Weaning due to[de-identified]  Going on Transport intubated; Underline problem not resolved  Underline problem not resolved;Going on Transport intubated              Physical Exam:   Assessment Type: Assess only  General Appearance: Sedated  Respiratory Pattern: Assisted  Chest Assessment: Chest expansion symmetrical  Bilateral Breath Sounds: Diminished, Coarse      Resp Comments: no cahgnes made to the vent settings       01/28/21 0031   Vent Information   Vent ID Groot   Vent type Drager   Drager Vent Mode AC/VC+   $ Pulse Oximetry Spot Check Charge Completed   AC/VC+ Settings   Resp Rate (BPM) 14 BPM   VT (mL) 350 mL   Insp Time (S) 0 95 S   FIO2 (%) 40 %   PEEP (cmH2O) 6 cmH2O   Rise Time (%) 0 2 %   Trigger Sensitivity Flow (LPM) 2 LPM   Humidification Heater   Heater Temp 98 6 °F (37 °C)   AC/VC+ Actuals   Resp Rate (BPM) 19 BPM   VT (mL) 541 mL   MV (Obs) 6 86   MAP (cmH2O) 7 3 cmH2O   Peak Pressure (cmH2O) 12 cmH2O   I:E Ratio (Obs) 1:3 5   Static Compliance (mL/cmH20)   (no reading)   Plateau Pressure (cm H2O) 11 cm H2O   AC/VC+ ALARMS   High Peak Pressure (cmH2O) 40 cmH2O   High Resp Rate (BPM) 40 BPM   High MV (L/min) 14 L/min   Low MV (L/min) 3 L/min   High VT (mL) 800 mL   Maintenance   Alarm (pink) cable attached Yes   Resuscitation bag with peep valve at bedside Yes   Water bag changed No   Circuit changed No   IHI Ventilator Associated Pneumonia Bundle   Daily Assessment of Readiness to Extubate Yes   Head of Bed Elevated HOB 30   ETT  Cuffed;Oral;Inflated; Hi-Lo 7 mm   Placement Date/Time: 01/26/21 1926   Mask Ventilation: Mask ventilation not attempted (0)  Preoxygenated: Yes  Technique: Direct laryngoscopy;Rapid sequence;Video laryngoscopy;bougie  Type: Cuffed;Oral;Inflated; Hi-Lo  Tube Size: 7 mm  Laryngoscope: Juan     Secured at (cm) 22   Measured from [address] by Commercial tube see   Site Condition Dry   HI-LO Suction  Continuous low suction   HI-LO Secretions Scant   HI-LO Intervention Patent

## 2021-01-28 NOTE — ASSESSMENT & PLAN NOTE
Baseline Cr: 0 8-0 9  Admission Cr: 1 48  Current Cr: Post-op:  Etiology: Patient with significant dehydration, diarrhea for several weeks  · Follow urine output, has been significantly decreased overnight and did not improve with IVF resuscitation  · Avoid nephrotoxic medications/hypotension

## 2021-01-29 ENCOUNTER — ANESTHESIA (INPATIENT)
Dept: PERIOP | Facility: HOSPITAL | Age: 51
DRG: 854 | End: 2021-01-29
Payer: COMMERCIAL

## 2021-01-29 ENCOUNTER — ANESTHESIA EVENT (INPATIENT)
Dept: PERIOP | Facility: HOSPITAL | Age: 51
DRG: 854 | End: 2021-01-29
Payer: COMMERCIAL

## 2021-01-29 VITALS — HEART RATE: 79 BPM

## 2021-01-29 PROBLEM — R06.00 DOE (DYSPNEA ON EXERTION): Status: RESOLVED | Noted: 2021-01-26 | Resolved: 2021-01-29

## 2021-01-29 PROBLEM — R73.9 HYPERGLYCEMIA: Status: RESOLVED | Noted: 2021-01-26 | Resolved: 2021-01-29

## 2021-01-29 PROBLEM — R06.09 DOE (DYSPNEA ON EXERTION): Status: RESOLVED | Noted: 2021-01-26 | Resolved: 2021-01-29

## 2021-01-29 LAB
ALBUMIN SERPL BCP-MCNC: 2.1 G/DL (ref 3.5–5)
ALP SERPL-CCNC: 37 U/L (ref 46–116)
ALT SERPL W P-5'-P-CCNC: 10 U/L (ref 12–78)
ANION GAP SERPL CALCULATED.3IONS-SCNC: 10 MMOL/L (ref 4–13)
ANION GAP SERPL CALCULATED.3IONS-SCNC: 15 MMOL/L (ref 4–13)
ANISOCYTOSIS BLD QL SMEAR: PRESENT
AST SERPL W P-5'-P-CCNC: 10 U/L (ref 5–45)
BACTERIA SPEC ANAEROBE CULT: NORMAL
BASE EXCESS BLDA CALC-SCNC: -15.3 MMOL/L
BASOPHILS # BLD MANUAL: 0 THOUSAND/UL (ref 0–0.1)
BASOPHILS NFR MAR MANUAL: 0 % (ref 0–1)
BILIRUB SERPL-MCNC: 0.4 MG/DL (ref 0.2–1)
BUN SERPL-MCNC: 25 MG/DL (ref 5–25)
BUN SERPL-MCNC: 26 MG/DL (ref 5–25)
CALCIUM ALBUM COR SERPL-MCNC: 9.1 MG/DL (ref 8.3–10.1)
CALCIUM SERPL-MCNC: 7.6 MG/DL (ref 8.3–10.1)
CALCIUM SERPL-MCNC: 7.7 MG/DL (ref 8.3–10.1)
CHLORIDE SERPL-SCNC: 110 MMOL/L (ref 100–108)
CHLORIDE SERPL-SCNC: 112 MMOL/L (ref 100–108)
CO2 SERPL-SCNC: 18 MMOL/L (ref 21–32)
CO2 SERPL-SCNC: 22 MMOL/L (ref 21–32)
CREAT SERPL-MCNC: 1.25 MG/DL (ref 0.6–1.3)
CREAT SERPL-MCNC: 1.53 MG/DL (ref 0.6–1.3)
EOSINOPHIL # BLD MANUAL: 0 THOUSAND/UL (ref 0–0.4)
EOSINOPHIL NFR BLD MANUAL: 0 % (ref 0–6)
ERYTHROCYTE [DISTWIDTH] IN BLOOD BY AUTOMATED COUNT: 14.6 % (ref 11.6–15.1)
ERYTHROCYTE [DISTWIDTH] IN BLOOD BY AUTOMATED COUNT: 14.9 % (ref 11.6–15.1)
GFR SERPL CREATININE-BSD FRML MDRD: 40 ML/MIN/1.73SQ M
GFR SERPL CREATININE-BSD FRML MDRD: 51 ML/MIN/1.73SQ M
GLUCOSE SERPL-MCNC: 102 MG/DL (ref 65–140)
GLUCOSE SERPL-MCNC: 103 MG/DL (ref 65–140)
GLUCOSE SERPL-MCNC: 104 MG/DL (ref 65–140)
GLUCOSE SERPL-MCNC: 119 MG/DL (ref 65–140)
GLUCOSE SERPL-MCNC: 121 MG/DL (ref 65–140)
GLUCOSE SERPL-MCNC: 97 MG/DL (ref 65–140)
HCO3 BLDA-SCNC: 9.8 MMOL/L (ref 22–28)
HCT VFR BLD AUTO: 24.1 % (ref 34.8–46.1)
HCT VFR BLD AUTO: 26.7 % (ref 34.8–46.1)
HCT VFR BLD AUTO: 27.3 % (ref 34.8–46.1)
HGB BLD-MCNC: 7.6 G/DL (ref 11.5–15.4)
HGB BLD-MCNC: 8.4 G/DL (ref 11.5–15.4)
HGB BLD-MCNC: 8.4 G/DL (ref 11.5–15.4)
LACTATE SERPL-SCNC: 0.9 MMOL/L (ref 0.5–2)
LYMPHOCYTES # BLD AUTO: 1.91 THOUSAND/UL (ref 0.6–4.47)
LYMPHOCYTES # BLD AUTO: 13 % (ref 14–44)
MAGNESIUM SERPL-MCNC: 2.6 MG/DL (ref 1.6–2.6)
MCH RBC QN AUTO: 27.4 PG (ref 26.8–34.3)
MCH RBC QN AUTO: 27.5 PG (ref 26.8–34.3)
MCHC RBC AUTO-ENTMCNC: 30.8 G/DL (ref 31.4–37.4)
MCHC RBC AUTO-ENTMCNC: 31.1 G/DL (ref 31.4–37.4)
MCV RBC AUTO: 88 FL (ref 82–98)
MCV RBC AUTO: 89 FL (ref 82–98)
MONOCYTES # BLD AUTO: 0.73 THOUSAND/UL (ref 0–1.22)
MONOCYTES NFR BLD: 5 % (ref 4–12)
MYELOCYTES NFR BLD MANUAL: 3 % (ref 0–1)
NASAL CANNULA: 2
NEUTROPHILS # BLD MANUAL: 11.43 THOUSAND/UL (ref 1.85–7.62)
NEUTS BAND NFR BLD MANUAL: 11 % (ref 0–8)
NEUTS SEG NFR BLD AUTO: 67 % (ref 43–75)
NRBC BLD AUTO-RTO: 0 /100 WBCS
O2 CT BLDA-SCNC: 8.2 ML/DL (ref 16–23)
OXYHGB MFR BLDA: 97.4 % (ref 94–97)
PCO2 BLDA: 20.4 MM HG (ref 36–44)
PH BLDA: 7.3 [PH] (ref 7.35–7.45)
PHOSPHATE SERPL-MCNC: 3.2 MG/DL (ref 2.7–4.5)
PLATELET # BLD AUTO: 290 THOUSANDS/UL (ref 149–390)
PLATELET # BLD AUTO: 326 THOUSANDS/UL (ref 149–390)
PLATELET BLD QL SMEAR: ADEQUATE
PMV BLD AUTO: 10.2 FL (ref 8.9–12.7)
PMV BLD AUTO: 10.3 FL (ref 8.9–12.7)
PO2 BLDA: 142.6 MM HG (ref 75–129)
POTASSIUM SERPL-SCNC: 3.6 MMOL/L (ref 3.5–5.3)
POTASSIUM SERPL-SCNC: 3.6 MMOL/L (ref 3.5–5.3)
PROT SERPL-MCNC: 4.8 G/DL (ref 6.4–8.2)
RBC # BLD AUTO: 2.74 MILLION/UL (ref 3.81–5.12)
RBC # BLD AUTO: 3.06 MILLION/UL (ref 3.81–5.12)
SODIUM SERPL-SCNC: 142 MMOL/L (ref 136–145)
SODIUM SERPL-SCNC: 145 MMOL/L (ref 136–145)
SPECIMEN SOURCE: ABNORMAL
TOTAL CELLS COUNTED SPEC: 100
VARIANT LYMPHS # BLD AUTO: 1 %
WBC # BLD AUTO: 14.66 THOUSAND/UL (ref 4.31–10.16)
WBC # BLD AUTO: 21.08 THOUSAND/UL (ref 4.31–10.16)

## 2021-01-29 PROCEDURE — 99255 IP/OBS CONSLTJ NEW/EST HI 80: CPT | Performed by: INTERNAL MEDICINE

## 2021-01-29 PROCEDURE — 2W13X6Z COMPRESSION OF ABDOMINAL WALL USING PRESSURE DRESSING: ICD-10-PCS | Performed by: STUDENT IN AN ORGANIZED HEALTH CARE EDUCATION/TRAINING PROGRAM

## 2021-01-29 PROCEDURE — 0WQF0ZZ REPAIR ABDOMINAL WALL, OPEN APPROACH: ICD-10-PCS | Performed by: STUDENT IN AN ORGANIZED HEALTH CARE EDUCATION/TRAINING PROGRAM

## 2021-01-29 PROCEDURE — 99233 SBSQ HOSP IP/OBS HIGH 50: CPT | Performed by: STUDENT IN AN ORGANIZED HEALTH CARE EDUCATION/TRAINING PROGRAM

## 2021-01-29 PROCEDURE — 82805 BLOOD GASES W/O2 SATURATION: CPT | Performed by: NURSE PRACTITIONER

## 2021-01-29 PROCEDURE — 83605 ASSAY OF LACTIC ACID: CPT | Performed by: NURSE PRACTITIONER

## 2021-01-29 PROCEDURE — 80048 BASIC METABOLIC PNL TOTAL CA: CPT | Performed by: PHYSICIAN ASSISTANT

## 2021-01-29 PROCEDURE — 85018 HEMOGLOBIN: CPT | Performed by: PHYSICIAN ASSISTANT

## 2021-01-29 PROCEDURE — 94003 VENT MGMT INPAT SUBQ DAY: CPT

## 2021-01-29 PROCEDURE — 84100 ASSAY OF PHOSPHORUS: CPT | Performed by: PHYSICIAN ASSISTANT

## 2021-01-29 PROCEDURE — 49000 EXPLORATION OF ABDOMEN: CPT | Performed by: STUDENT IN AN ORGANIZED HEALTH CARE EDUCATION/TRAINING PROGRAM

## 2021-01-29 PROCEDURE — 85027 COMPLETE CBC AUTOMATED: CPT | Performed by: PHYSICIAN ASSISTANT

## 2021-01-29 PROCEDURE — 83735 ASSAY OF MAGNESIUM: CPT | Performed by: PHYSICIAN ASSISTANT

## 2021-01-29 PROCEDURE — 94664 DEMO&/EVAL PT USE INHALER: CPT

## 2021-01-29 PROCEDURE — 80053 COMPREHEN METABOLIC PANEL: CPT | Performed by: NURSE PRACTITIONER

## 2021-01-29 PROCEDURE — 85007 BL SMEAR W/DIFF WBC COUNT: CPT | Performed by: PHYSICIAN ASSISTANT

## 2021-01-29 PROCEDURE — 94760 N-INVAS EAR/PLS OXIMETRY 1: CPT

## 2021-01-29 PROCEDURE — 49000 EXPLORATION OF ABDOMEN: CPT | Performed by: PHYSICIAN ASSISTANT

## 2021-01-29 PROCEDURE — 82948 REAGENT STRIP/BLOOD GLUCOSE: CPT

## 2021-01-29 PROCEDURE — 85014 HEMATOCRIT: CPT | Performed by: PHYSICIAN ASSISTANT

## 2021-01-29 PROCEDURE — C1765 ADHESION BARRIER: HCPCS | Performed by: STUDENT IN AN ORGANIZED HEALTH CARE EDUCATION/TRAINING PROGRAM

## 2021-01-29 PROCEDURE — 85027 COMPLETE CBC AUTOMATED: CPT | Performed by: NURSE PRACTITIONER

## 2021-01-29 RX ORDER — KETAMINE HCL IN NACL, ISO-OSM 100MG/10ML
SYRINGE (ML) INJECTION AS NEEDED
Status: DISCONTINUED | OUTPATIENT
Start: 2021-01-29 | End: 2021-01-29

## 2021-01-29 RX ORDER — ROCURONIUM BROMIDE 10 MG/ML
INJECTION, SOLUTION INTRAVENOUS AS NEEDED
Status: DISCONTINUED | OUTPATIENT
Start: 2021-01-29 | End: 2021-01-29

## 2021-01-29 RX ORDER — SODIUM CHLORIDE 9 MG/ML
INJECTION, SOLUTION INTRAVENOUS CONTINUOUS PRN
Status: DISCONTINUED | OUTPATIENT
Start: 2021-01-29 | End: 2021-01-29

## 2021-01-29 RX ORDER — NEOSTIGMINE METHYLSULFATE 1 MG/ML
INJECTION INTRAVENOUS AS NEEDED
Status: DISCONTINUED | OUTPATIENT
Start: 2021-01-29 | End: 2021-01-29

## 2021-01-29 RX ORDER — CALCIUM GLUCONATE 20 MG/ML
1 INJECTION, SOLUTION INTRAVENOUS ONCE
Status: COMPLETED | OUTPATIENT
Start: 2021-01-29 | End: 2021-01-29

## 2021-01-29 RX ORDER — FENTANYL CITRATE 50 UG/ML
50 INJECTION, SOLUTION INTRAMUSCULAR; INTRAVENOUS EVERY 2 HOUR PRN
Status: DISCONTINUED | OUTPATIENT
Start: 2021-01-29 | End: 2021-01-30

## 2021-01-29 RX ORDER — EPHEDRINE SULFATE 50 MG/ML
INJECTION INTRAVENOUS AS NEEDED
Status: DISCONTINUED | OUTPATIENT
Start: 2021-01-29 | End: 2021-01-29

## 2021-01-29 RX ORDER — DEXMEDETOMIDINE HYDROCHLORIDE 100 UG/ML
INJECTION, SOLUTION INTRAVENOUS AS NEEDED
Status: DISCONTINUED | OUTPATIENT
Start: 2021-01-29 | End: 2021-01-29

## 2021-01-29 RX ORDER — POTASSIUM CHLORIDE 29.8 MG/ML
40 INJECTION INTRAVENOUS ONCE
Status: COMPLETED | OUTPATIENT
Start: 2021-01-29 | End: 2021-01-30

## 2021-01-29 RX ORDER — HYDROMORPHONE HCL/PF 1 MG/ML
0.5 SYRINGE (ML) INJECTION
Status: DISCONTINUED | OUTPATIENT
Start: 2021-01-29 | End: 2021-01-29

## 2021-01-29 RX ORDER — GLYCOPYRROLATE 0.2 MG/ML
INJECTION INTRAMUSCULAR; INTRAVENOUS AS NEEDED
Status: DISCONTINUED | OUTPATIENT
Start: 2021-01-29 | End: 2021-01-29

## 2021-01-29 RX ORDER — HYDROMORPHONE HCL/PF 1 MG/ML
0.4 SYRINGE (ML) INJECTION
Status: DISCONTINUED | OUTPATIENT
Start: 2021-01-29 | End: 2021-01-29 | Stop reason: HOSPADM

## 2021-01-29 RX ORDER — MAGNESIUM HYDROXIDE 1200 MG/15ML
LIQUID ORAL AS NEEDED
Status: DISCONTINUED | OUTPATIENT
Start: 2021-01-29 | End: 2021-01-29 | Stop reason: HOSPADM

## 2021-01-29 RX ORDER — ALBUMIN, HUMAN INJ 5% 5 %
25 SOLUTION INTRAVENOUS ONCE
Status: COMPLETED | OUTPATIENT
Start: 2021-01-29 | End: 2021-01-29

## 2021-01-29 RX ORDER — ONDANSETRON 2 MG/ML
4 INJECTION INTRAMUSCULAR; INTRAVENOUS ONCE AS NEEDED
Status: DISCONTINUED | OUTPATIENT
Start: 2021-01-29 | End: 2021-01-29 | Stop reason: HOSPADM

## 2021-01-29 RX ADMIN — HYDROMORPHONE HYDROCHLORIDE 0.4 MG: 1 INJECTION, SOLUTION INTRAMUSCULAR; INTRAVENOUS; SUBCUTANEOUS at 17:16

## 2021-01-29 RX ADMIN — SODIUM CHLORIDE 0.7 MCG/KG/HR: 9 INJECTION, SOLUTION INTRAVENOUS at 07:55

## 2021-01-29 RX ADMIN — EPHEDRINE SULFATE 5 MG: 50 INJECTION INTRAVENOUS at 16:36

## 2021-01-29 RX ADMIN — ACETAMINOPHEN 325 MG: 650 SUPPOSITORY RECTAL at 07:49

## 2021-01-29 RX ADMIN — CHLORHEXIDINE GLUCONATE 0.12% ORAL RINSE 15 ML: 1.2 LIQUID ORAL at 09:26

## 2021-01-29 RX ADMIN — ROCURONIUM BROMIDE 50 MG: 10 SOLUTION INTRAVENOUS at 15:07

## 2021-01-29 RX ADMIN — SODIUM BICARBONATE 125 ML/HR: 84 INJECTION, SOLUTION INTRAVENOUS at 22:16

## 2021-01-29 RX ADMIN — CEFEPIME HYDROCHLORIDE 2000 MG: 2 INJECTION, POWDER, FOR SOLUTION INTRAVENOUS at 07:05

## 2021-01-29 RX ADMIN — HEPARIN SODIUM 5000 UNITS: 5000 INJECTION INTRAVENOUS; SUBCUTANEOUS at 22:14

## 2021-01-29 RX ADMIN — FENTANYL CITRATE 50 MCG: 50 INJECTION, SOLUTION INTRAMUSCULAR; INTRAVENOUS at 23:59

## 2021-01-29 RX ADMIN — METRONIDAZOLE 500 MG: 500 INJECTION, SOLUTION INTRAVENOUS at 01:56

## 2021-01-29 RX ADMIN — CALCIUM GLUCONATE 1 G: 20 INJECTION, SOLUTION INTRAVENOUS at 09:40

## 2021-01-29 RX ADMIN — POTASSIUM CHLORIDE 40 MEQ: 400 INJECTION, SOLUTION INTRAVENOUS at 22:15

## 2021-01-29 RX ADMIN — PHENYLEPHRINE HYDROCHLORIDE 100 MCG: 10 INJECTION INTRAVENOUS at 16:36

## 2021-01-29 RX ADMIN — NYSTATIN 500000 UNITS: 100000 SUSPENSION ORAL at 12:38

## 2021-01-29 RX ADMIN — CEFEPIME HYDROCHLORIDE 2000 MG: 2 INJECTION, POWDER, FOR SOLUTION INTRAVENOUS at 20:29

## 2021-01-29 RX ADMIN — HYDROMORPHONE HYDROCHLORIDE 0.4 MG: 1 INJECTION, SOLUTION INTRAMUSCULAR; INTRAVENOUS; SUBCUTANEOUS at 17:10

## 2021-01-29 RX ADMIN — NEOSTIGMINE METHYLSULFATE 4 MG: 1 INJECTION INTRAVENOUS at 16:28

## 2021-01-29 RX ADMIN — GLYCOPYRROLATE 0.6 MG: 0.2 INJECTION, SOLUTION INTRAMUSCULAR; INTRAVENOUS at 16:28

## 2021-01-29 RX ADMIN — DEXMEDETOMIDINE HCL 16 MCG: 100 INJECTION INTRAVENOUS at 15:05

## 2021-01-29 RX ADMIN — FENTANYL CITRATE 50 MCG: 50 INJECTION, SOLUTION INTRAMUSCULAR; INTRAVENOUS at 22:15

## 2021-01-29 RX ADMIN — MORPHINE SULFATE 2 MG: 2 INJECTION, SOLUTION INTRAMUSCULAR; INTRAVENOUS at 12:27

## 2021-01-29 RX ADMIN — SODIUM CHLORIDE 0.7 MCG/KG/HR: 9 INJECTION, SOLUTION INTRAVENOUS at 13:54

## 2021-01-29 RX ADMIN — POLYMYXIN B SULFATE: 500000 INJECTION, POWDER, LYOPHILIZED, FOR SOLUTION INTRAMUSCULAR; INTRATHECAL; INTRAVENOUS; OPHTHALMIC at 18:07

## 2021-01-29 RX ADMIN — Medication 75 MCG/HR: at 05:46

## 2021-01-29 RX ADMIN — NYSTATIN 500000 UNITS: 100000 SUSPENSION ORAL at 22:29

## 2021-01-29 RX ADMIN — METRONIDAZOLE 500 MG: 500 INJECTION, SOLUTION INTRAVENOUS at 15:47

## 2021-01-29 RX ADMIN — SODIUM CHLORIDE: 0.9 INJECTION, SOLUTION INTRAVENOUS at 15:04

## 2021-01-29 RX ADMIN — HEPARIN SODIUM 5000 UNITS: 5000 INJECTION INTRAVENOUS; SUBCUTANEOUS at 05:08

## 2021-01-29 RX ADMIN — METRONIDAZOLE 500 MG: 500 INJECTION, SOLUTION INTRAVENOUS at 09:26

## 2021-01-29 RX ADMIN — MORPHINE SULFATE 2 MG: 2 INJECTION, SOLUTION INTRAMUSCULAR; INTRAVENOUS at 07:37

## 2021-01-29 RX ADMIN — ALBUMIN (HUMAN) 25 G: 12.5 INJECTION, SOLUTION INTRAVENOUS at 22:14

## 2021-01-29 RX ADMIN — HYDROMORPHONE HYDROCHLORIDE 0.4 MG: 1 INJECTION, SOLUTION INTRAMUSCULAR; INTRAVENOUS; SUBCUTANEOUS at 17:00

## 2021-01-29 RX ADMIN — HYDROMORPHONE HYDROCHLORIDE 0.4 MG: 1 INJECTION, SOLUTION INTRAMUSCULAR; INTRAVENOUS; SUBCUTANEOUS at 17:36

## 2021-01-29 RX ADMIN — ALBUMIN (HUMAN) 25 G: 0.25 INJECTION, SOLUTION INTRAVENOUS at 04:59

## 2021-01-29 RX ADMIN — ACETAMINOPHEN 325 MG: 650 SUPPOSITORY RECTAL at 14:00

## 2021-01-29 RX ADMIN — Medication 25 MG: at 15:25

## 2021-01-29 NOTE — RESPIRATORY THERAPY NOTE
RT Ventilator Management Note  Praneeth Harrell 52 y o  female MRN: [id number]  Unit/Bed#:  Encounter: [id number]      Daily Screen       1/28/2021 1929 1/29/2021  0020          Patient safety screen outcome[de-identified]  Failed  Failed      Not Ready for Weaning due to[de-identified]  Going on Transport intubated; Underline problem not resolved  Going on Transport intubated              Physical Exam:   Assessment Type: Assess only  General Appearance: Alert, Awake  Respiratory Pattern: Assisted  Chest Assessment: Chest expansion symmetrical  Bilateral Breath Sounds: Diminished, Coarse  O2 Device: vent      Resp Comments: no changes made to the patients vent settings, pt to be taken back to the OR today  01/29/21 0020   Vent Information   Vent ID groot   Vent type Drager   Drager Vent Mode AC/VC+   $ Pulse Oximetry Spot Check Charge Completed   AC/VC+ Settings   Resp Rate (BPM) 14 BPM   VT (mL) 350 mL   Insp Time (S) 0 85 S   FIO2 (%) 40 %   PEEP (cmH2O) 6 cmH2O   Rise Time (%) 0 2 %   Trigger Sensitivity Flow (LPM) 2 LPM   Humidification Heater   Heater Temp 98 6 °F (37 °C)   AC/VC+ Actuals   Resp Rate (BPM) 14 BPM   VT (mL) 358 mL   MV (Obs) 4 68   MAP (cmH2O) 8 4 cmH2O   Peak Pressure (cmH2O) 16 cmH2O   I:E Ratio (Obs) 1:4 1   Static Compliance (mL/cmH20) 15 8 mL/cmH2O   Plateau Pressure (cm H2O) 15 9 cm H2O   Heater Temperature (Obs) 98 2 °F (36 8 °C)   AC/VC+ ALARMS   High Peak Pressure (cmH2O) 40 cmH2O   High Resp Rate (BPM) 40 BPM   High MV (L/min) 14 L/min   Low MV (L/min) 3 L/min   High VT (mL) 800 mL   Maintenance   Alarm (pink) cable attached Yes   Resuscitation bag with peep valve at bedside Yes   Water bag changed No   Circuit changed No   Daily Screen   Patient safety screen outcome: Failed   Not Ready for Weaning due to: Going on Transport intubated   IHI Ventilator Associated Pneumonia Bundle   Daily Assessment of Readiness to Extubate Yes   Head of Bed Elevated HOB 30   ETT  Cuffed;Oral;Inflated; Hi-Lo 7 mm Placement Date/Time: 01/26/21 1926   Mask Ventilation: Mask ventilation not attempted (0)  Preoxygenated: Yes  Technique: Direct laryngoscopy;Rapid sequence;Video laryngoscopy;bougie  Type: Cuffed;Oral;Inflated; Hi-Lo  Tube Size: 7 mm  Laryngoscope: Juan     Secured at (cm) 22   Measured from Lips   Secured Location Left   Secured by Commercial tube see   Site Condition Dry   HI-LO Suction  Continuous low suction   HI-LO Secretions Scant   HI-LO Intervention Patent

## 2021-01-29 NOTE — OP NOTE
OPERATIVE REPORT  PATIENT NAME: Tim Byers    :  1970  MRN: [id number]  Pt Location: [facility] OR ROOM 02    SURGERY DATE: 2021    Surgeon(s) and Role:     * Kike Montes, DO - Primary     * Lacey Monzon PA-C - Assisting    Preop Diagnosis:  Perforation of sigmoid colon due to diverticulitis [K57 20]  Open abdomen    Post-Op Diagnosis Codes:  Perforation of sigmoid colon due to diverticulitis [K57 20]  Open Abdomen    Procedure(s) (LRB):  LAPAROTOMY EXPLORATORY, Abdominal Washout, Possible Abdominal Closure (N/A)    Specimen(s):  None    Estimated Blood Loss:   Minimal    Drains:  Closed/Suction Drain RLQ Bulb (Active)   Number of days: 0       NG/OG/Enteral Tube Nasogastric 18 Fr Left nares (Active)   Placement Reverification Auscultation 21 1200   Site Assessment Clean; Intact;Dry 21 1200   Status Suction-low intermittent 21 1200   Drainage Appearance Bile;Green 21 1200   Output (mL) 0 mL 21 1200   Number of days: 3       Colostomy Transverse (Active)   Stomal Appliance Clean;Dry; Intact 21 0800   Stoma Assessment [place] 21 08   Stoma Shape Budded;Round 21 0800   Peristomal Assessment Other (Comment) 21 08   Treatment Bag change 21 1627   Output (mL) 100 mL 21 2200   Number of days: 2       Urethral Catheter Non-latex;Straight-tip 16 Fr   (Active)   Reasons to continue Urinary Catheter  Accurate I&O assessment in critically ill patients (48 hr  max) 21 0800   Goal for Removal Remove after 48 hrs of I/O monitoring 21 2000   Site Assessment Clean;Skin intact 21 0800   Collection Container Standard drainage bag 21 0800   Securement Method Securing device (Describe) 21 0800   Output (mL) 60 mL 21 1200   Number of days: 3       Anesthesia Type:   General    Operative Indications:  Perforation of sigmoid colon due to diverticulitis [K57 20]  Open Abdomen    Operative Findings:  Rectal stump with tiny opening at lateral portion staple line draining serous fluid, entirety of staple line reinforced with interrupted 3-0 Vicryl  Abdomen with some fibrinous exudate and small amount of cloudy fluid but no purulence  Small bowel with significantly decreased edema  Abdomen washed out with 6 L fluid and closed    Complications:   None    Procedure and Technique:  The patient was seen again in Preoperative Holding  All the risks, benefits, complications, treatment options, and expected outcomes were discussed with the patient and family at length  All questions were answered to satisfaction  There was concurrence with the proposed plan and informed consent was obtained  The site of surgery was properly noted/marked  The patient was taken to Operating Room, identified, and the procedure verified as exploratory laparotomy, abdominal washout, possible abdominal closure, and all other indicated procedures  The patient was placed in the supine position on the operating room table  The patient had received preoperative antibiotics and SCDs placed on the bilateral lower extremities  Anesthesia was induced  He had therapy VAC was removed from the abdomen  The ostomy appliance was removed and a 4 by 4 with Tegaderm was placed over the ostomy  The abdomen was then prepped and draped the usual sterile fashion using Betadine  A Time-Out was then performed with all involved present confirming the correct patient, procedure, antibiotics, and any additional concerns  The abdomen was inspected and was noted to have some fibrinous exudate as expected with some slight cloudy serous fluid  No gross purulence was present  The rectal stump was inspected and there was noted to be a pinpoint hole in the staple line at the left lateral edge  Due to this the entirety of the staple line was reinforced interrupted 3-0 Vicryl  No other leakage was noted    The entirety of the small bowel was manipulated and no intraloop abscess were found   The abdomen was then copiously irrigated with 6 L of fluid  A 10 Polish flat APURVA was placed in the pelvis around the rectal stump and was brought out through the right lower quadrant  The abdominal cavity was then inspected  Hemostasis was noted  Attention was then turned to wound closure  The fascia was re-approximated with two running 0 Loop PDS, one from superior and one from inferiorly and the wound copiously irrigated  Four stitches of interrupted 1  Vicryl were used as internal retention sutures  Staples were used to close the skin intermittently leaving space for packing  The abdomen was then cleansed and dried  Packing was placed in the midline incision and left upper quadrant ostomy incision  A new ostomy appliance was placed over the colostomy  All instrument, sponge, and needle counts were noted to be correct at the conclusion of the case  The patient was brought to the PACU in stable and satisfactory condition       I was present for the entire procedure, A qualified resident physician was not available and A physician assistant was required during the procedure for retraction tissue handling,dissection and suturing    Patient Disposition:  hemodynamically stable    SIGNATURE: Dalton Garcia DO  DATE: January 29, 2021  TIME: 4:33 PM

## 2021-01-29 NOTE — QUICK NOTE
Called and spoke with patient's  Chiquita Fontanez via phone to provide daily clinical update  Discussed plan for upcoming surgery this afternoon  All questions and concerns answered to his satisfaction       Amanda Quezada PA-C

## 2021-01-29 NOTE — ASSESSMENT & PLAN NOTE
Baseline Cr: 0 8-0 9  Admission Cr: 1 48  Current Cr: Post-op:  Etiology: Patient with significant dehydration, diarrhea for several weeks  · Follow urine output, has been significantly decreased overnight and did not improve with IVF resuscitation-has improved with lasix  · Avoid nephrotoxic medications/hypotension

## 2021-01-29 NOTE — RESPIRATORY THERAPY NOTE
RT Ventilator Management Note  Orlando Brown 52 y o  female MRN: [id number]  Unit/Bed#:  Encounter: [id number]      Daily Screen       1/28/2021  0725 1/28/2021 1929          Patient safety screen outcome[de-identified]  Failed  Failed      Not Ready for Weaning due to[de-identified]  Underline problem not resolved  Going on Transport intubated; Underline problem not resolved              Physical Exam:   Assessment Type: Assess only  General Appearance: Alert, Awake  Respiratory Pattern: Assisted  Chest Assessment: Chest expansion symmetrical  Bilateral Breath Sounds: Diminished, Coarse      Resp Comments: pt remains intubated on full mechanical support at this time, no changes were made to the vent settings  Pts EET remains held in place with a commercial tube see, no skin breakdown or discoloration noted  2 fingers space remains between the patinet and the strap of the commercial tube see  Pt to go back to the OR tomorrow         01/28/21 1929   Vent Information   Vent ID Soniyaia Prim   Vent type Drager   Drager Vent Mode AC/VC+   $ Pulse Oximetry Spot Check Charge Completed   AC/VC+ Settings   Resp Rate (BPM) 14 BPM   VT (mL) 350 mL   Insp Time (S) 0 85 S   FIO2 (%) 40 %   PEEP (cmH2O) 6 cmH2O   Rise Time (%) 0 2 %   Trigger Sensitivity Flow (LPM) 2 LPM   Humidification Heater   Heater Temp 98 6 °F (37 °C)   AC/VC+ Actuals   Resp Rate (BPM) 15 BPM   VT (mL) 410 mL   MV (Obs) 4 88   MAP (cmH2O) 7 7 cmH2O   Peak Pressure (cmH2O) 8 4 cmH2O   I:E Ratio (Obs) 1:4 1   Static Compliance (mL/cmH20) 84 mL/cmH2O   Plateau Pressure (cm H2O)   (no value)   Heater Temperature (Obs) 98 4 °F (36 9 °C)   AC/VC+ ALARMS   High Peak Pressure (cmH2O) 40 cmH2O   High Resp Rate (BPM) 40 BPM   High MV (L/min) 14 L/min   Low MV (L/min) 3 L/min   High VT (mL) 800 mL   Maintenance   Alarm (pink) cable attached Yes   Resuscitation bag with peep valve at bedside Yes   Water bag changed No   Circuit changed No   Daily Screen   Patient safety screen outcome: Failed   Not Ready for Weaning due to: Going on Transport intubated; Underline problem not resolved   IHI Ventilator Associated Pneumonia Bundle   Daily Assessment of Readiness to Extubate Yes   Head of Bed Elevated HOB 30   ETT  Cuffed;Oral;Inflated; Hi-Lo 7 mm   Placement Date/Time: 01/26/21 1926   Mask Ventilation: Mask ventilation not attempted (0)  Preoxygenated: Yes  Technique: Direct laryngoscopy;Rapid sequence;Video laryngoscopy;bougie  Type: Cuffed;Oral;Inflated; Hi-Lo  Tube Size: 7 mm  Laryngoscope: Mc     Secured at (cm) 22   Measured from Lips   Secured Location Left   Secured by Commercial tube see   Site Condition Dry   Cuff Pressure (cm H2O) 24 cm H2O   HI-LO Suction  Continuous low suction   HI-LO Secretions Scant   HI-LO Intervention Patent

## 2021-01-29 NOTE — PLAN OF CARE
Problem: Potential for Falls  Goal: Patient will remain free of falls  Description: INTERVENTIONS:  - Assess patient frequently for physical needs  -  Identify cognitive and physical deficits and behaviors that affect risk of falls    -  [place] fall precautions as indicated by assessment   - Educate patient/family on patient safety including physical limitations  - Instruct patient to call for assistance with activity based on assessment  - Modify environment to reduce risk of injury  - Consider OT/PT consult to assist with strengthening/mobility  Outcome: Progressing     Problem: Prexisting or High Potential for Compromised Skin Integrity  Goal: Skin integrity is maintained or improved  Description: INTERVENTIONS:  - Identify patients at risk for skin breakdown  - Assess and monitor skin integrity  - Assess and monitor nutrition and hydration status  - Monitor labs   - Assess for incontinence   - Turn and reposition patient  - Assist with mobility/ambulation  - Relieve pressure over bony prominences  - Avoid friction and shearing  - Provide appropriate hygiene as needed including keeping skin clean and dry  - Evaluate need for skin moisturizer/barrier cream  - Collaborate with interdisciplinary team   - Patient/family teaching  - Consider wound care consult   Outcome: Progressing     Problem: SAFETY,RESTRAINT: NV/NON-SELF DESTRUCTIVE BEHAVIOR  Goal: Remains free of harm/injury (restraint for non violent/non self-detsructive behavior)  Description: INTERVENTIONS:  - Instruct patient/family regarding restraint use   - Assess and monitor physiologic and psychological status   - Provide interventions and comfort measures to meet assessed patient needs   - Identify and implement measures to help patient regain control  - Assess readiness for release of restraint   Outcome: Progressing  Goal: Returns to optimal restraint-free functioning  Description: INTERVENTIONS:  - Assess the patient's behavior and symptoms that indicate continued need for restraint  - Identify and implement measures to help patient regain control  - Assess readiness for release of restraint   Outcome: Progressing     Problem: Nutrition/Hydration-ADULT  Goal: Nutrient/Hydration intake appropriate for improving, restoring or maintaining nutritional needs  Description: Monitor and assess patient's nutrition/hydration status for malnutrition  Collaborate with interdisciplinary team and initiate plan and interventions as ordered  Monitor patient's weight and dietary intake as ordered or per policy  Utilize nutrition screening tool and intervene as necessary  Determine patient's food preferences and provide high-protein, high-caloric foods as appropriate       INTERVENTIONS:  - Monitor oral intake, urinary output, labs, and treatment plans  - Assess nutrition and hydration status and recommend course of action  - Evaluate amount of meals eaten  - Assist patient with eating if necessary   - Allow adequate time for meals  - Recommend/ encourage appropriate diets, oral nutritional supplements, and vitamin/mineral supplements  - Order, calculate, and assess calorie counts as needed  - Recommend, monitor, and adjust tube feedings or PN based on assessed needs  - Assess need for intravenous fluids  - Provide nutrition/hydration education as appropriate  - Include patient/family/caregiver in decisions related to nutrition  Outcome: Progressing     Problem: DISCHARGE PLANNING - CARE MANAGEMENT  Goal: Discharge to post-acute care or home with appropriate resources  Description: INTERVENTIONS:  - Conduct assessment to determine patient/family and health care team treatment goals, and need for post-acute services based on payer coverage, community resources, and patient preferences, and barriers to discharge  - Address psychosocial, clinical, and financial barriers to discharge as identified in assessment in conjunction with the patient/family and health care team  - Arrange appropriate level of post-acute services according to patients   needs and preference and payer coverage in collaboration with the physician and health care team  - Communicate with and update the patient/family, physician, and health care team regarding progress on the discharge plan  - Arrange appropriate transportation to post-acute venues  Outcome: Progressing     Problem: PAIN - ADULT  Goal: Verbalizes/displays adequate comfort level or baseline comfort level  Description: Interventions:  - Encourage patient to monitor pain and request assistance  - Assess pain using appropriate pain scale  - Administer analgesics based on type and severity of pain and evaluate response  - Implement non-pharmacological measures as appropriate and evaluate response  - Consider cultural and social influences on pain and pain management  - Notify physician/advanced practitioner if interventions unsuccessful or patient reports new pain  Outcome: Progressing     Problem: INFECTION - ADULT  Goal: Absence or prevention of progression during hospitalization  Description: INTERVENTIONS:  - Assess and monitor for signs and symptoms of infection  - Monitor lab/diagnostic results  - Monitor all insertion sites, i e  indwelling lines, tubes, and drains  - Monitor endotracheal if appropriate and nasal secretions for changes in amount and color  - [place] appropriate cooling/warming therapies per order  - Administer medications as ordered  - Instruct and encourage patient and family to use good hand hygiene technique  - Identify and instruct in appropriate isolation precautions for identified infection/condition  Outcome: Progressing     Problem: SAFETY ADULT  Goal: Patient will remain free of falls  Description: INTERVENTIONS:  - Assess patient frequently for physical needs  -  Identify cognitive and physical deficits and behaviors that affect risk of falls    -  [place] fall precautions as indicated by assessment   - Educate patient/family on patient safety including physical limitations  - Instruct patient to call for assistance with activity based on assessment  - Modify environment to reduce risk of injury  - Consider OT/PT consult to assist with strengthening/mobility  Outcome: Progressing  Goal: Maintain or return to baseline ADL function  Description: INTERVENTIONS:  -  Assess patient's ability to carry out ADLs; assess patient's baseline for ADL function and identify physical deficits which impact ability to perform ADLs (bathing, care of mouth/teeth, toileting, grooming, dressing, etc )  - Assess/evaluate cause of self-care deficits   - Assess range of motion  - Assess patient's mobility; develop plan if impaired  - Assess patient's need for assistive devices and provide as appropriate  - Encourage maximum independence but intervene and supervise when necessary  - Involve family in performance of ADLs  - Assess for home care needs following discharge   - Consider OT consult to assist with ADL evaluation and planning for discharge  - Provide patient education as appropriate  Outcome: Progressing  Goal: Maintain or return mobility status to optimal level  Description: INTERVENTIONS:  - Assess patient's baseline mobility status (ambulation, transfers, stairs, etc )    - Identify cognitive and physical deficits and behaviors that affect mobility  - Identify mobility aids required to assist with transfers and/or ambulation (gait belt, sit-to-stand, lift, walker, cane, etc )  - [place] fall precautions as indicated by assessment  - Record patient progress and toleration of activity level on Mobility SBAR; progress patient to next Phase/Stage  - Instruct patient to call for assistance with activity based on assessment  - Consider rehabilitation consult to assist with strengthening/weightbearing, etc   Outcome: Progressing     Problem: DISCHARGE PLANNING  Goal: Discharge to home or other facility with appropriate resources  Description: INTERVENTIONS:  - Identify barriers to discharge w/patient and caregiver  - Arrange for needed discharge resources and transportation as appropriate  - Identify discharge learning needs (meds, wound care, etc )  - Arrange for interpretive services to assist at discharge as needed  - Refer to Case Management Department for coordinating discharge planning if the patient needs post-hospital services based on physician/advanced practitioner order or complex needs related to functional status, cognitive ability, or social support system  Outcome: Progressing     Problem: Knowledge Deficit  Goal: Patient/family/caregiver demonstrates understanding of disease process, treatment plan, medications, and discharge instructions  Description: Complete learning assessment and assess knowledge base  Interventions:  - Provide teaching at level of understanding  - Provide teaching via preferred learning methods  Outcome: Progressing     Problem: NEUROSENSORY - ADULT  Goal: Achieves stable or improved neurological status  Description: INTERVENTIONS  - Monitor and report changes in neurological status  - Monitor vital signs such as temperature, blood pressure, glucose, and any other labs ordered   - Initiate measures to prevent increased intracranial pressure  - Monitor for seizure activity and implement precautions if appropriate      Outcome: Progressing  Goal: Achieves maximal functionality and self care  Description: INTERVENTIONS  - Monitor swallowing and airway patency with patient fatigue and changes in neurological status  - Encourage and assist patient to increase activity and self care     - Encourage visually impaired, hearing impaired and aphasic patients to use assistive/communication devices  Outcome: Progressing     Problem: CARDIOVASCULAR - ADULT  Goal: Maintains optimal cardiac output and hemodynamic stability  Description: INTERVENTIONS:  - Monitor I/O, vital signs and rhythm  - Monitor for S/S and trends of decreased cardiac output  - Administer and titrate ordered vasoactive medications to optimize hemodynamic stability  - Assess quality of pulses, skin color and temperature  - Assess for signs of decreased coronary artery perfusion  - Instruct patient to report change in severity of symptoms  Outcome: Progressing  Goal: Absence of cardiac dysrhythmias or at baseline rhythm  Description: INTERVENTIONS:  - Continuous cardiac monitoring, vital signs, obtain 12 lead EKG if ordered  - Administer antiarrhythmic and heart rate control medications as ordered  - Monitor electrolytes and administer replacement therapy as ordered  Outcome: Progressing     Problem: RESPIRATORY - ADULT  Goal: Achieves optimal ventilation and oxygenation  Description: INTERVENTIONS:  - Assess for changes in respiratory status  - Assess for changes in mentation and behavior  - Position to facilitate oxygenation and minimize respiratory effort  - Oxygen administered by appropriate delivery if ordered  - Initiate smoking cessation education as indicated  - Encourage broncho-pulmonary hygiene including cough, deep breathe, Incentive Spirometry  - Assess the need for suctioning and aspirate as needed  - Assess and instruct to report SOB or any respiratory difficulty  - Respiratory Therapy support as indicated  Outcome: Progressing     Problem: GASTROINTESTINAL - ADULT  Goal: Minimal or absence of nausea and/or vomiting  Description: INTERVENTIONS:  - Administer IV fluids if ordered to ensure adequate hydration  - Maintain NPO status until nausea and vomiting are resolved  - Nasogastric tube if ordered  - Administer ordered antiemetic medications as needed  - Provide nonpharmacologic comfort measures as appropriate  - Advance diet as tolerated, if ordered  - Consider nutrition services referral to assist patient with adequate nutrition and appropriate food choices  Outcome: Progressing  Goal: Maintains or returns to baseline bowel function  Description: INTERVENTIONS:  - Assess bowel function  - Encourage oral fluids to ensure adequate hydration  - Administer IV fluids if ordered to ensure adequate hydration  - Administer ordered medications as needed  - Encourage mobilization and activity  - Consider nutritional services referral to assist patient with adequate nutrition and appropriate food choices  Outcome: Progressing  Goal: Maintains adequate nutritional intake  Description: INTERVENTIONS:  - Monitor percentage of each meal consumed  - Identify factors contributing to decreased intake, treat as appropriate  - Assist with meals as needed  - Monitor I&O, weight, and lab values if indicated  - Obtain nutrition services referral as needed  Outcome: Progressing     Problem: GENITOURINARY - ADULT  Goal: Maintains or returns to baseline urinary function  Description: INTERVENTIONS:  - Assess urinary function  - Encourage oral fluids to ensure adequate hydration if ordered  - Administer IV fluids as ordered to ensure adequate hydration  - Administer ordered medications as needed  - Offer frequent toileting  - Follow urinary retention protocol if ordered  Outcome: Progressing  Goal: Absence of urinary retention  Description: INTERVENTIONS:  - Assess patients ability to void and empty bladder  - Monitor I/O  - Bladder scan as needed  - Discuss with physician/AP medications to alleviate retention as needed  - Discuss catheterization for long term situations as appropriate  Outcome: Progressing  Goal: Urinary catheter remains patent  Description: INTERVENTIONS:  - Assess patency of urinary catheter  - If patient has a chronic fernandez, consider changing catheter if non-functioning  - Follow guidelines for intermittent irrigation of non-functioning urinary catheter  Outcome: Progressing     Problem: METABOLIC, FLUID AND ELECTROLYTES - ADULT  Goal: Electrolytes maintained within normal limits  Description: INTERVENTIONS:  - Monitor labs and assess patient for signs and symptoms of electrolyte imbalances  - Administer electrolyte replacement as ordered  - Monitor response to electrolyte replacements, including repeat lab results as appropriate  - Instruct patient on fluid and nutrition as appropriate  Outcome: Progressing  Goal: Fluid balance maintained  Description: INTERVENTIONS:  - Monitor labs   - Monitor I/O and WT  - Instruct patient on fluid and nutrition as appropriate  - Assess for signs & symptoms of volume excess or deficit  Outcome: Progressing  Goal: Glucose maintained within target range  Description: INTERVENTIONS:  - Monitor Blood Glucose as ordered  - Assess for signs and symptoms of hyperglycemia and hypoglycemia  - Administer ordered medications to maintain glucose within target range  - Assess nutritional intake and initiate nutrition service referral as needed  Outcome: Progressing

## 2021-01-29 NOTE — ANESTHESIA POSTPROCEDURE EVALUATION
Post-Op Assessment Note    CV Status:  Stable  Pain Score: 4    Pain management: adequate     Mental Status:  Alert and awake   Hydration Status:  Euvolemic   PONV Controlled:  Controlled   Airway Patency:  Patent and adequate      Post Op Vitals Reviewed: Yes      Staff: CRNA         No complications documented      BP   113/61   Temp (P) 98 1 °F (36 7 °C) (01/29/21 1652)    Pulse 73 (01/29/21 1652)   Resp   15   SpO2   99%

## 2021-01-29 NOTE — RESPIRATORY THERAPY NOTE
RT Ventilator Management Note  Caicedo Dany 52 y o  female MRN: [id number]  Unit/Bed#:  Encounter: [id number]      Daily Screen       1/29/2021  0020 1/29/2021  0419          Patient safety screen outcome[de-identified]  Failed  Failed      Not Ready for Weaning due to[de-identified]  Going on Transport intubated  Going on Transport intubated; Underline problem not resolved              Physical Exam:   Assessment Type: Assess only  General Appearance: Sleeping  Respiratory Pattern: Assisted  Chest Assessment: Chest expansion symmetrical  Bilateral Breath Sounds: Diminished, Coarse  O2 Device: vent      Resp Comments: Pt remains intubated on full mechanical support, pt to remains intuabted for trip back to the OR today  01/29/21 0419   Vent Information   Vent ID Dot English   Vent type Drager   Drager Vent Mode AC/VC+   $ Pulse Oximetry Spot Check Charge Completed   SpO2 100 %   AC/VC+ Settings   Resp Rate (BPM) 14 BPM   VT (mL) 350 mL   Insp Time (S) 0 85 S   FIO2 (%) 40 %   PEEP (cmH2O) 6 cmH2O   Rise Time (%) 0 2 %   Trigger Sensitivity Flow (LPM) 2 LPM   Humidification Heater   Heater Temp 98 6 °F (37 °C)   AC/VC+ Actuals   Resp Rate (BPM) 14 BPM   VT (mL) 350 mL   MV (Obs) 4 24   MAP (cmH2O) 8 3 cmH2O   Peak Pressure (cmH2O) 19 cmH2O   I:E Ratio (Obs) 1:4 1   Static Compliance (mL/cmH20) 78 mL/cmH2O   Plateau Pressure (cm H2O) 18 7 cm H2O   Heater Temperature (Obs) 99 °F (37 2 °C)   AC/VC+ ALARMS   High Peak Pressure (cmH2O) 40 cmH2O   High Resp Rate (BPM) 40 BPM   High MV (L/min) 14 L/min   Low MV (L/min) 3 L/min   High VT (mL) 800 mL   Maintenance   Alarm (pink) cable attached Yes   Resuscitation bag with peep valve at bedside Yes   Water bag changed No   Circuit changed No   Daily Screen   Patient safety screen outcome: Failed   Not Ready for Weaning due to: Going on Transport intubated; Underline problem not resolved   IHI Ventilator Associated Pneumonia Bundle   Daily Assessment of Readiness to Extubate Yes   Head of Bed Elevated HOB 30   ETT  Cuffed;Oral;Inflated; Hi-Lo 7 mm   Placement Date/Time: 01/26/21 1926   Mask Ventilation: Mask ventilation not attempted (0)  Preoxygenated: Yes  Technique: Direct laryngoscopy;Rapid sequence;Video laryngoscopy;bougie  Type: Cuffed;Oral;Inflated; Hi-Lo  Tube Size: 7 mm  Laryngoscope: Mc     Secured at (cm) 22   Measured from Lips   Secured Location Left   Secured by Commercial tube see   Site Condition Dry   HI-LO Suction  Continuous low suction   HI-LO Secretions Scant   HI-LO Intervention Patent

## 2021-01-29 NOTE — PROGRESS NOTES
Progress Note - Kavitha Cassidy 1970, 52 y o  female MRN: [id number]    Unit/Bed#:  Encounter: [id number]    Primary Care Provider: Tracie Umana MD   Date and time admitted to hospital: 1/26/2021  2:12 PM        Perforation of sigmoid colon due to diverticulitis  Assessment & Plan  · See plan for pneumoperitoneum    Hypokalemia  Assessment & Plan  · On admission, K 2 8  · Likely losses due to diarrhea  · Follow this AM, Replete for goal >4 ; < 5    Bandemia  Assessment & Plan  · Presents with WBC 11 03 and bands of 27  · Likely 2/2 bowel perforation  · Follow with Daily CBC    Hyperglycemia  Assessment & Plan  · No history of diabetes  · A1C 9 1 on 9/2020  · Will start on SSI  · Check new A1C  · May be 2/2 acute stress reaction    [facility]  Assessment & Plan  Baseline Cr: 0 8-0 9  Admission Cr: 1 48  Current Cr: Post-op:  Etiology: Patient with significant dehydration, diarrhea for several weeks  · Follow urine output, has been significantly decreased overnight and did not improve with IVF resuscitation-has improved with lasix  · Avoid nephrotoxic medications/hypotension  Hypothyroid  Assessment & Plan  · Continue synthroid once able to take PO    Mild persistent asthma  Assessment & Plan  · Patient on no controller medications at home  · No wheezing on exam or signs of exacerbation  · PRN albuterol    * Pneumoperitoneum  Assessment & Plan  Sepsis, POA, due suspected ischemic colitis with perforation of sigmoid colon  · Patient presents with approx 1 month of abdominal pain and course of ABx  · CT showing infectious/inflammatory/ less likely ischemic colitis of the whole colon from cecum to sigmoid colon and moderate volume pneumoperitoneum suggestive of perforated viscous  · Patient taken to OR  · Operations:  · 1/26: ExLap PONCHO, found to have sigmoid perf with feculent but contained abscess   Transverse, splenic flexure, and descending colon looked a bit dusky, therefore patient left open, in discontinuity, and with vac overtop with plan for washout, re-exploration  · : descending and partial transverse colon resection with colostomy, remains open with abthera in place  · Kept intubated pending further surgical planning   · Obtain endpoints and follow closely  · Continue cefepime/flagyl for bowel perforation  · Follow up Cx, no growth to date  · [facility] of incision      ----------------------------------------------------------------------------------------  HPI/24hr events: Remains intubated, with abthera in place  Plan for OR today for possible closure      Disposition: Continue Critical Care   Code Status: Level 1 - Full Code  ---------------------------------------------------------------------------------------  SUBJECTIVE  Pt intubated    Review of Systems   Unable to perform ROS: Intubated     Review of systems was unable to be performed secondary to pt intubated  ---------------------------------------------------------------------------------------  OBJECTIVE    Vitals   Vitals:    21 2200 21 2300 21 0000 21 0100   BP: 120/53 91/52 90/53 92/55   BP Location:       Pulse: 55 (!) 51 (!) 53 (!) 54   Resp:       Temp: 99 °F (37 2 °C) 98 8 °F (37 1 °C) 98 8 °F (37 1 °C) 99 °F (37 2 °C)   TempSrc:   Esophageal    SpO2: 100% 100% 100% 100%   Weight:       Height:         Temp (24hrs), Av 8 °F (37 7 °C), Min:98 7 °F (37 1 °C), Max:100 9 °F (38 3 °C)  Current: Temperature: 99 °F (37 2 °C)          Respiratory:  SpO2: SpO2: 100 %       Invasive/non-invasive ventilation settings   Respiratory    Lab Data (Last 4 hours)    None         O2/Vent Data (Last 4 hours)       0020          Drager Vent Mode AC/VC+       Patient safety screen outcome: Failed       Resp Rate (BPM) (BPM) 14       VT (mL) (mL) 350       Insp Time (S) (S) 0 85       FIO2 (%) (%) 40       PEEP (cmH2O) (cmH2O) 6       Rise Time (%) (%) 0 2       MV (Obs) 4 68 Physical Exam  Constitutional:       Appearance: She is obese  Interventions: She is sedated and intubated  HENT:      Head: Normocephalic and atraumatic  Eyes:      Pupils: Pupils are equal, round, and reactive to light  Cardiovascular:      Rate and Rhythm: Regular rhythm  Bradycardia present  Heart sounds: Normal heart sounds  Pulmonary:      Effort: She is intubated  Breath sounds: Decreased breath sounds present  Abdominal:      General: Bowel sounds are decreased  Comments: [place] Noriegan in place  colostomy with +stool   Neurological:      GCS: GCS eye subscore is 4  GCS verbal subscore is 1  GCS motor subscore is 6           Laboratory and Diagnostics:  Results from last 7 days   Lab Units 01/28/21  0618 01/28/21  0112 01/27/21 1918 01/27/21  0503 01/26/21  2251 01/26/21 2047 01/26/21  1510   WBC Thousand/uL 19 74*  --  17 70* 14 17* 4 45  --  11 03*   HEMOGLOBIN g/dL 10 9*  --  11 9 10 8* 12 9  --  13 7   I STAT HEMOGLOBIN g/dl  --  13 9  --   --   --  10 5*  --    HEMATOCRIT % 34 3*  --  37 8 33 7* 39 8  --  42 6   HEMATOCRIT, ISTAT %  --  41  --   --   --  31*  --    PLATELETS Thousands/uL 362  --  417* 392* 511*  --  620*   BANDS PCT %  --   --  23* 8  --   --  27*   MONO PCT %  --   --  4 4  --   --  6     Results from last 7 days   Lab Units 01/28/21  2009 01/28/21  1428 01/28/21  0803 01/28/21  0112 01/27/21 1918 01/27/21  1221 01/27/21  0503 01/26/21 2251 01/26/21  1510   SODIUM mmol/L 142 142 141  --  142 144 139 139  --  134*   POTASSIUM mmol/L 3 8 4 1 4 1  --  4 1 3 0* 3 6 3 7   < > 2 8*   CHLORIDE mmol/L 110* 111* 109*  --  110* 115* 108 106  --  98*   CO2 mmol/L 22 21 21  --  21 19* 21 21  --  24   CO2, I-STAT mmol/L  --   --   --  22  --   --   --   --    < >  --    ANION GAP mmol/L 10 10 11  --  11 10 10 12  --  12   BUN mg/dL 22 19 16  --  10 8 12 14  --  15   CREATININE mg/dL 1 44* 1 19 1 09  --  0 73 0 58* 0 83 0 77  --  1 48*   CALCIUM mg/dL 7 6* 8 0* 7 4* --  7 5* 6 3* 7 1* 7 2*  --  8 6   GLUCOSE RANDOM mg/dL 140 142* 157*  --  179* 127 161* 180*  --  172*   ALT U/L  --   --   --   --  10*  --  12  --   --  12   AST U/L  --   --   --   --  10  --  8  --   --  10   ALK PHOS U/L  --   --   --   --  36*  --  37*  --   --  74   ALBUMIN g/dL  --   --   --   --  1 6*  --  1 7*  --   --  1 9*   TOTAL BILIRUBIN mg/dL  --   --   --   --  0 50  --  0 60  --   --  0 50    < > = values in this interval not displayed  Results from last 7 days   Lab Units 01/28/21  0803 01/27/21  1918 01/27/21  0503 01/26/21  2251   MAGNESIUM mg/dL 2 5 2 5 2 7* 1 7   PHOSPHORUS mg/dL 3 4 3 8 3 0 3 5      Results from last 7 days   Lab Units 01/26/21  1622   INR  1 34*   PTT seconds 29          Results from last 7 days   Lab Units 01/28/21  1328 01/28/21  0029 01/27/21  1918 01/27/21  0922 01/26/21  2251 01/26/21  1622   LACTIC ACID mmol/L 1 0 1 1 1 1 0 9 1 0 1 6     ABG:  Results from last 7 days   Lab Units 01/28/21  1612   PH ART  7 435   PCO2 ART mm Hg 33 0*   PO2 ART mm Hg 148 4*   HCO3 ART mmol/L 21 7*   BASE EXC ART mmol/L -2 1   ABG SOURCE  Line, Arterial     VBG:  Results from last 7 days   Lab Units 01/28/21  1612 01/28/21  1424   PH SHIRLEY   --  7 523*   PCO2 SHIRLEY mm Hg  --  21 0*   PO2 SHIRLEY mm Hg  --  197 8*   HCO3 SHIRLEY mmol/L  --  16 9*   BASE EXC SHIRLEY mmol/L  --  -4 4   ABG SOURCE  Line, Arterial  --      Results from last 7 days   Lab Units 01/27/21  0503 01/26/21  1622   PROCALCITONIN ng/ml 2 50* 1 47*       Micro  Results from last 7 days   Lab Units 01/26/21  2150 01/26/21  1621 01/26/21  1601   BLOOD CULTURE   --  No Growth at 48 hrs  No Growth at 48 hrs  GRAM STAIN RESULT  2+ Polys  No bacteria seen  --   --        EKG: NSR  Imaging: I have personally reviewed pertinent reports        Intake and Output  I/O       01/27 0701 - 01/28 0700 01/28 0701 - 01/29 0700    I V  (mL/kg) 5011 7 (46 4) 1116 2 (10 3)    IV Piggyback 1097 5 700    Total Intake(mL/kg) 6109 2 (56 6) 1816 2 (16 8)    Urine (mL/kg/hr) 1055 (0 4) 545 (0 2)    Emesis/NG output 175     Drains 550 400    Stool  100    Total Output 1422 9989    Net +4329 2 +771 2                Height and Weights   Height: 5' 4" (162 6 cm)  IBW: 54 7 kg  Body mass index is 40 76 kg/m²  Weight (last 2 days)     Date/Time   Weight    01/28/21 0600   108 (237 44)                Nutrition       Diet Orders   (From admission, onward)             Start     Ordered    01/26/21 2234  Diet NPO  Diet effective now     Question Answer Comment   Diet Type NPO    RD to adjust diet per protocol?  No        01/26/21 2233                  Active Medications  Scheduled Meds:  Current Facility-Administered Medications   Medication Dose Route Frequency Provider Last Rate    acetaminophen  325 mg Rectal Q6H PRN VINH Sneed-DEMETRIO      albumin human  25 g Intravenous Q6H VINH Perez-C Stopped (01/29/21 0000)    albuterol  2 5 mg Nebulization Q6H PRN VINH Ricci-C      cefepime  2,000 mg Intravenous Q12H Satnam Hodge PA-C Stopped (01/28/21 2000)    chlorhexidine  15 mL [place], [place]      dexmedetomidine  0 1-0 7 mcg/kg/hr Intravenous Titrated Paolyne VINH Palacio-C 0 5 mcg/kg/hr (01/28/21 2345)    fentaNYL  75 mcg/hr Intravenous Continuous Satnam Hodge PA-C 75 mcg/hr (01/28/21 1512)    fentanyl citrate (PF)  50 mcg Intravenous Q1H PRN Satnam Hodge PA-DEMETRIO      heparin (porcine)  5,000 Units Subcutaneous [place], [place]      insulin lispro  1-6 Units Subcutaneous Q6H [address] [place], [place]      metroNIDAZOLE  500 mg Intravenous Q8H [facility], PA-C 500 mg (01/29/21 0156)    morphine injection  2 mg Intravenous Q3H PRN Satnam Hodge PA-C      ondansetron  4 mg Intravenous Q6H PRN Satnam Hodge PA-C       Continuous Infusions:  dexmedetomidine, 0 1-0 7 mcg/kg/hr, Last Rate: 0 5 mcg/kg/hr (01/28/21 4066)  fentaNYL, 75 mcg/hr, Last Rate: 75 mcg/hr (01/28/21 1512)      PRN Meds: acetaminophen, 325 mg, Q6H PRN  albuterol, 2 5 mg, Q6H PRN  fentanyl citrate (PF), 50 mcg, Q1H PRN  morphine injection, 2 mg, Q3H PRN  ondansetron, 4 mg, Q6H PRN        Invasive Devices Review  Invasive Devices     Peripheral Intravenous Line            Peripheral IV 01/26/21 Right;Ventral (anterior) Antecubital 2 days    Peripheral IV 01/27/21 Distal;Left;Upper;Ventral (anterior) Arm 1 day          Arterial Line            Arterial Line 01/26/21 Left Radial 2 days          Drain            NG/OG/Enteral Tube Nasogastric 18 Fr Left nares 2 days    Urethral Catheter Non-latex;Straight-tip 16 Fr  2 days    Colostomy Transverse 1 day    Negative Pressure Wound Therapy (V A C ) Abdomen 1 day          Airway            ETT  Cuffed;Oral;Inflated; Hi-Lo 7 mm 2 days                Rationale for remaining devices: ETT-resp failure  ---------------------------------------------------------------------------------------  Advance Directive and Living Will:      Power of :    POLST:    ---------------------------------------------------------------------------------------  Care Time Delivered:   Upon my evaluation, this patient had a high probability of imminent or life-threatening deterioration due to pneumoperitoneum, which required my direct attention, intervention, and personal management  I have personally provided 20 minutes (0100 to 0130) of critical care time, exclusive of procedures, teaching, family meetings, and any prior time recorded by providers other than myself  BRAYAN Davis      Portions of the record may have been created with voice recognition software  Occasional wrong word or "sound a like" substitutions may have occurred due to the inherent limitations of voice recognition software    Read the chart carefully and recognize, using context, where substitutions have occurred
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
## 2021-01-29 NOTE — RESPIRATORY THERAPY NOTE
RT Ventilator Management Note  Alyssa Merino 52 y o  female MRN: [id number]  Unit/Bed#:  Encounter: [id number]      Daily Screen       1/29/2021  0419 1/29/2021  0806          Patient safety screen outcome[de-identified]  Failed  Failed      Not Ready for Weaning due to[de-identified]  Going on Transport intubated; Underline problem not resolved  Underline problem not resolved;Going on Transport intubated              Physical Exam:   Assessment Type: Assess only  General Appearance: Awake  Respiratory Pattern: Assisted  Chest Assessment: Chest expansion symmetrical  Bilateral Breath Sounds: Clear, Diminished  O2 Device: vent      Resp Comments: pt remains intubated on full support  tolerating settings well  pt is breathing over the vent  breath sounds are clear, decreased bilaterally  no suction at this time  ett remains 22 at lips with two finger width between head and see  will continue to monitor  no changes at this time

## 2021-01-29 NOTE — RESPIRATORY THERAPY NOTE
RT Ventilator Management Note  Bijal Renteria 52 y o  female MRN: [id number]  Unit/Bed#:  Encounter: [id number]      Daily Screen       1/29/2021  0419 1/29/2021  0806          Patient safety screen outcome[de-identified]  Failed  Failed      Not Ready for Weaning due to[de-identified]  Going on Transport intubated; Underline problem not resolved  Underline problem not resolved;Going on Transport intubated              Physical Exam:   Assessment Type: Assess only  General Appearance: Awake  Respiratory Pattern: Assisted  Chest Assessment: Chest expansion symmetrical  Bilateral Breath Sounds: Clear, Diminished  O2 Device: vent      Resp Comments: pt remains intubated on full settings  getting ready for transport to or   breath sounds are clear decreased bilaterally  no suction at this time  no changes at this tiem

## 2021-01-29 NOTE — ANESTHESIA PREPROCEDURE EVALUATION
Procedure:  LAPAROTOMY EXPLORATORY, Abdominal Washout, Possible Abdominal Closure (N/A Abdomen)    Relevant Problems   CARDIO   (+) WHARTON (dyspnea on exertion)      ENDO   (+) Hypothyroid      /RENAL   (+) HARI (acute kidney injury) ([facility] [address] )      MUSCULOSKELETAL   (+) Bilateral low back pain with sciatica   (+) Primary osteoarthritis of both knees      PULMONARY   (+) WHARTON (dyspnea on exertion)   (+) Mild persistent asthma (stable on maintenance therapy, last rescue months ago)      Other   (+) Perforation of sigmoid colon due to diverticulitis (Status post exploratory laparotomy, sigmoid colon resection and abthera placement 1/26, then washout on 1/27)   Intubated due to open abdomen, on AC/VC+ 14/350/40%/6    Physical Exam    Airway  Comment: ETT in situ           Dental       Cardiovascular      Pulmonary      Other Findings        Anesthesia Plan  ASA Score- 4     Anesthesia Type- general with ASA Monitors  Additional Monitors: arterial line  Airway Plan:     Comment: Left radial arterial line in situ  Plan Factors-    Chart reviewed  EKG reviewed  Existing labs reviewed  Patient summary reviewed  Patient is not a current smoker  Induction- inhalational     Postoperative Plan- Plan for postoperative opioid use  Informed Consent- Plan/risks discussed with: significant other, Gurinder Sosa

## 2021-01-30 PROBLEM — N17.9 AKI (ACUTE KIDNEY INJURY) (HCC): Status: RESOLVED | Noted: 2021-01-26 | Resolved: 2021-01-30

## 2021-01-30 LAB
ABO GROUP BLD BPU: NORMAL
ABO GROUP BLD BPU: NORMAL
ALBUMIN SERPL BCP-MCNC: 2.3 G/DL (ref 3.5–5)
ALP SERPL-CCNC: 34 U/L (ref 46–116)
ALT SERPL W P-5'-P-CCNC: 11 U/L (ref 12–78)
ANION GAP SERPL CALCULATED.3IONS-SCNC: 12 MMOL/L (ref 4–13)
ANION GAP SERPL CALCULATED.3IONS-SCNC: 8 MMOL/L (ref 4–13)
ANISOCYTOSIS BLD QL SMEAR: PRESENT
ARTERIAL PATENCY WRIST A: YES
AST SERPL W P-5'-P-CCNC: 12 U/L (ref 5–45)
BACTERIA TISS AEROBE CULT: ABNORMAL
BACTERIA TISS AEROBE CULT: ABNORMAL
BASE EXCESS BLDA CALC-SCNC: -6.5 MMOL/L
BASOPHILS # BLD MANUAL: 0 THOUSAND/UL (ref 0–0.1)
BASOPHILS NFR MAR MANUAL: 0 % (ref 0–1)
BILIRUB SERPL-MCNC: 0.5 MG/DL (ref 0.2–1)
BODY TEMPERATURE: 98.4 DEGREES FEHRENHEIT
BPU ID: NORMAL
BPU ID: NORMAL
BUN SERPL-MCNC: 21 MG/DL (ref 5–25)
BUN SERPL-MCNC: 21 MG/DL (ref 5–25)
CALCIUM ALBUM COR SERPL-MCNC: 8.8 MG/DL (ref 8.3–10.1)
CALCIUM SERPL-MCNC: 7.4 MG/DL (ref 8.3–10.1)
CALCIUM SERPL-MCNC: 7.9 MG/DL (ref 8.3–10.1)
CHLORIDE SERPL-SCNC: 109 MMOL/L (ref 100–108)
CHLORIDE SERPL-SCNC: 110 MMOL/L (ref 100–108)
CO2 SERPL-SCNC: 23 MMOL/L (ref 21–32)
CO2 SERPL-SCNC: 24 MMOL/L (ref 21–32)
CREAT SERPL-MCNC: 1.16 MG/DL (ref 0.6–1.3)
CREAT SERPL-MCNC: 1.22 MG/DL (ref 0.6–1.3)
CROSSMATCH: NORMAL
CROSSMATCH: NORMAL
EOSINOPHIL # BLD MANUAL: 0 THOUSAND/UL (ref 0–0.4)
EOSINOPHIL NFR BLD MANUAL: 0 % (ref 0–6)
ERYTHROCYTE [DISTWIDTH] IN BLOOD BY AUTOMATED COUNT: 14.6 % (ref 11.6–15.1)
GFR SERPL CREATININE-BSD FRML MDRD: 53 ML/MIN/1.73SQ M
GFR SERPL CREATININE-BSD FRML MDRD: 56 ML/MIN/1.73SQ M
GLUCOSE SERPL-MCNC: 119 MG/DL (ref 65–140)
GLUCOSE SERPL-MCNC: 126 MG/DL (ref 65–140)
GLUCOSE SERPL-MCNC: 129 MG/DL (ref 65–140)
GLUCOSE SERPL-MCNC: 131 MG/DL (ref 65–140)
GLUCOSE SERPL-MCNC: 133 MG/DL (ref 65–140)
GRAM STN SPEC: ABNORMAL
GRAM STN SPEC: ABNORMAL
HCO3 BLDA-SCNC: 17.2 MMOL/L (ref 22–28)
HCT VFR BLD AUTO: 25.1 % (ref 34.8–46.1)
HGB BLD-MCNC: 7.9 G/DL (ref 11.5–15.4)
LYMPHOCYTES # BLD AUTO: 0.7 THOUSAND/UL (ref 0.6–4.47)
LYMPHOCYTES # BLD AUTO: 4 % (ref 14–44)
MAGNESIUM SERPL-MCNC: 2.1 MG/DL (ref 1.6–2.6)
MAGNESIUM SERPL-MCNC: 2.2 MG/DL (ref 1.6–2.6)
MCH RBC QN AUTO: 27.4 PG (ref 26.8–34.3)
MCHC RBC AUTO-ENTMCNC: 31.5 G/DL (ref 31.4–37.4)
MCV RBC AUTO: 87 FL (ref 82–98)
METAMYELOCYTES NFR BLD MANUAL: 2 % (ref 0–1)
MONOCYTES # BLD AUTO: 0.35 THOUSAND/UL (ref 0–1.22)
MONOCYTES NFR BLD: 2 % (ref 4–12)
MYELOCYTES NFR BLD MANUAL: 4 % (ref 0–1)
NASAL CANNULA: 2
NEUTROPHILS # BLD MANUAL: 15.36 THOUSAND/UL (ref 1.85–7.62)
NEUTS BAND NFR BLD MANUAL: 15 % (ref 0–8)
NEUTS SEG NFR BLD AUTO: 73 % (ref 43–75)
NRBC BLD AUTO-RTO: 0 /100 WBCS
O2 CT BLDA-SCNC: 11.4 ML/DL (ref 16–23)
OXYHGB MFR BLDA: 97 % (ref 94–97)
PCO2 BLDA: 27.6 MM HG (ref 36–44)
PH BLDA: 7.41 [PH] (ref 7.35–7.45)
PHOSPHATE SERPL-MCNC: 2.1 MG/DL (ref 2.7–4.5)
PLATELET # BLD AUTO: 314 THOUSANDS/UL (ref 149–390)
PLATELET BLD QL SMEAR: ADEQUATE
PMV BLD AUTO: 9.7 FL (ref 8.9–12.7)
PO2 BLDA: 106.4 MM HG (ref 75–129)
POTASSIUM SERPL-SCNC: 3.4 MMOL/L (ref 3.5–5.3)
POTASSIUM SERPL-SCNC: 3.8 MMOL/L (ref 3.5–5.3)
PROCALCITONIN SERPL-MCNC: 2.4 NG/ML
PROT SERPL-MCNC: 5 G/DL (ref 6.4–8.2)
RBC # BLD AUTO: 2.88 MILLION/UL (ref 3.81–5.12)
SODIUM SERPL-SCNC: 141 MMOL/L (ref 136–145)
SODIUM SERPL-SCNC: 145 MMOL/L (ref 136–145)
SPECIMEN SOURCE: ABNORMAL
TOTAL CELLS COUNTED SPEC: 100
UNIT DISPENSE STATUS: NORMAL
UNIT DISPENSE STATUS: NORMAL
UNIT PRODUCT CODE: NORMAL
UNIT PRODUCT CODE: NORMAL
UNIT RH: NORMAL
UNIT RH: NORMAL
WBC # BLD AUTO: 17.45 THOUSAND/UL (ref 4.31–10.16)

## 2021-01-30 PROCEDURE — 80053 COMPREHEN METABOLIC PANEL: CPT | Performed by: NURSE PRACTITIONER

## 2021-01-30 PROCEDURE — 84145 PROCALCITONIN (PCT): CPT | Performed by: NURSE PRACTITIONER

## 2021-01-30 PROCEDURE — 97167 OT EVAL HIGH COMPLEX 60 MIN: CPT

## 2021-01-30 PROCEDURE — 94664 DEMO&/EVAL PT USE INHALER: CPT

## 2021-01-30 PROCEDURE — 99233 SBSQ HOSP IP/OBS HIGH 50: CPT | Performed by: STUDENT IN AN ORGANIZED HEALTH CARE EDUCATION/TRAINING PROGRAM

## 2021-01-30 PROCEDURE — 84100 ASSAY OF PHOSPHORUS: CPT | Performed by: NURSE PRACTITIONER

## 2021-01-30 PROCEDURE — 82805 BLOOD GASES W/O2 SATURATION: CPT | Performed by: NURSE PRACTITIONER

## 2021-01-30 PROCEDURE — 99233 SBSQ HOSP IP/OBS HIGH 50: CPT | Performed by: INTERNAL MEDICINE

## 2021-01-30 PROCEDURE — 80048 BASIC METABOLIC PNL TOTAL CA: CPT | Performed by: PHYSICIAN ASSISTANT

## 2021-01-30 PROCEDURE — 82948 REAGENT STRIP/BLOOD GLUCOSE: CPT

## 2021-01-30 PROCEDURE — 99024 POSTOP FOLLOW-UP VISIT: CPT | Performed by: STUDENT IN AN ORGANIZED HEALTH CARE EDUCATION/TRAINING PROGRAM

## 2021-01-30 PROCEDURE — 85027 COMPLETE CBC AUTOMATED: CPT | Performed by: NURSE PRACTITIONER

## 2021-01-30 PROCEDURE — 83735 ASSAY OF MAGNESIUM: CPT | Performed by: PHYSICIAN ASSISTANT

## 2021-01-30 PROCEDURE — 85007 BL SMEAR W/DIFF WBC COUNT: CPT | Performed by: NURSE PRACTITIONER

## 2021-01-30 PROCEDURE — 97163 PT EVAL HIGH COMPLEX 45 MIN: CPT

## 2021-01-30 PROCEDURE — 83735 ASSAY OF MAGNESIUM: CPT | Performed by: NURSE PRACTITIONER

## 2021-01-30 RX ORDER — POTASSIUM CHLORIDE 14.9 MG/ML
20 INJECTION INTRAVENOUS
Status: COMPLETED | OUTPATIENT
Start: 2021-01-30 | End: 2021-01-31

## 2021-01-30 RX ORDER — POTASSIUM CHLORIDE 14.9 MG/ML
20 INJECTION INTRAVENOUS
Status: COMPLETED | OUTPATIENT
Start: 2021-01-30 | End: 2021-01-30

## 2021-01-30 RX ORDER — CALCIUM GLUCONATE 20 MG/ML
1 INJECTION, SOLUTION INTRAVENOUS ONCE
Status: COMPLETED | OUTPATIENT
Start: 2021-01-30 | End: 2021-01-30

## 2021-01-30 RX ORDER — FUROSEMIDE 10 MG/ML
40 INJECTION INTRAMUSCULAR; INTRAVENOUS
Status: DISCONTINUED | OUTPATIENT
Start: 2021-01-30 | End: 2021-01-30

## 2021-01-30 RX ORDER — HYDROMORPHONE HCL/PF 1 MG/ML
0.5 SYRINGE (ML) INJECTION EVERY 4 HOURS PRN
Status: DISCONTINUED | OUTPATIENT
Start: 2021-01-30 | End: 2021-02-10

## 2021-01-30 RX ORDER — FUROSEMIDE 10 MG/ML
40 INJECTION INTRAMUSCULAR; INTRAVENOUS EVERY 8 HOURS
Status: DISCONTINUED | OUTPATIENT
Start: 2021-01-30 | End: 2021-01-30

## 2021-01-30 RX ORDER — HYDROMORPHONE HCL/PF 1 MG/ML
1 SYRINGE (ML) INJECTION EVERY 4 HOURS PRN
Status: DISCONTINUED | OUTPATIENT
Start: 2021-01-30 | End: 2021-02-10

## 2021-01-30 RX ORDER — FUROSEMIDE 10 MG/ML
40 INJECTION INTRAMUSCULAR; INTRAVENOUS ONCE
Status: COMPLETED | OUTPATIENT
Start: 2021-01-30 | End: 2021-01-30

## 2021-01-30 RX ORDER — LIDOCAINE 50 MG/G
2 PATCH TOPICAL DAILY
Status: DISCONTINUED | OUTPATIENT
Start: 2021-01-30 | End: 2021-02-10 | Stop reason: HOSPADM

## 2021-01-30 RX ADMIN — NYSTATIN 500000 UNITS: 100000 SUSPENSION ORAL at 18:22

## 2021-01-30 RX ADMIN — LIDOCAINE 5% 2 PATCH: 700 PATCH TOPICAL at 15:51

## 2021-01-30 RX ADMIN — HYDROMORPHONE HYDROCHLORIDE 1 MG: 1 INJECTION, SOLUTION INTRAMUSCULAR; INTRAVENOUS; SUBCUTANEOUS at 18:21

## 2021-01-30 RX ADMIN — FUROSEMIDE 40 MG: 10 INJECTION, SOLUTION INTRAMUSCULAR; INTRAVENOUS at 03:03

## 2021-01-30 RX ADMIN — HYDROMORPHONE HYDROCHLORIDE 1 MG: 1 INJECTION, SOLUTION INTRAMUSCULAR; INTRAVENOUS; SUBCUTANEOUS at 13:32

## 2021-01-30 RX ADMIN — POTASSIUM CHLORIDE 20 MEQ: 14.9 INJECTION, SOLUTION INTRAVENOUS at 20:20

## 2021-01-30 RX ADMIN — POTASSIUM CHLORIDE 20 MEQ: 14.9 INJECTION, SOLUTION INTRAVENOUS at 10:19

## 2021-01-30 RX ADMIN — POTASSIUM CHLORIDE 20 MEQ: 14.9 INJECTION, SOLUTION INTRAVENOUS at 18:21

## 2021-01-30 RX ADMIN — POTASSIUM PHOSPHATE, MONOBASIC AND POTASSIUM PHOSPHATE, DIBASIC 12 MMOL: 224; 236 INJECTION, SOLUTION, CONCENTRATE INTRAVENOUS at 11:22

## 2021-01-30 RX ADMIN — FENTANYL CITRATE 50 MCG: 50 INJECTION, SOLUTION INTRAMUSCULAR; INTRAVENOUS at 06:06

## 2021-01-30 RX ADMIN — METRONIDAZOLE 500 MG: 500 INJECTION, SOLUTION INTRAVENOUS at 02:48

## 2021-01-30 RX ADMIN — HEPARIN SODIUM 5000 UNITS: 5000 INJECTION INTRAVENOUS; SUBCUTANEOUS at 21:23

## 2021-01-30 RX ADMIN — METRONIDAZOLE 500 MG: 500 INJECTION, SOLUTION INTRAVENOUS at 16:10

## 2021-01-30 RX ADMIN — HYDROMORPHONE HYDROCHLORIDE 1 MG: 1 INJECTION, SOLUTION INTRAMUSCULAR; INTRAVENOUS; SUBCUTANEOUS at 23:23

## 2021-01-30 RX ADMIN — METRONIDAZOLE 500 MG: 500 INJECTION, SOLUTION INTRAVENOUS at 10:19

## 2021-01-30 RX ADMIN — CEFEPIME HYDROCHLORIDE 2000 MG: 2 INJECTION, POWDER, FOR SOLUTION INTRAVENOUS at 18:26

## 2021-01-30 RX ADMIN — FUROSEMIDE 40 MG: 10 INJECTION, SOLUTION INTRAMUSCULAR; INTRAVENOUS at 08:22

## 2021-01-30 RX ADMIN — NYSTATIN 500000 UNITS: 100000 SUSPENSION ORAL at 13:33

## 2021-01-30 RX ADMIN — CALCIUM GLUCONATE 1 G: 20 INJECTION, SOLUTION INTRAVENOUS at 08:22

## 2021-01-30 RX ADMIN — NYSTATIN 500000 UNITS: 100000 SUSPENSION ORAL at 21:23

## 2021-01-30 RX ADMIN — HYDROMORPHONE HYDROCHLORIDE 1 MG: 1 INJECTION, SOLUTION INTRAMUSCULAR; INTRAVENOUS; SUBCUTANEOUS at 08:22

## 2021-01-30 RX ADMIN — NYSTATIN 500000 UNITS: 100000 SUSPENSION ORAL at 09:02

## 2021-01-30 RX ADMIN — HEPARIN SODIUM 5000 UNITS: 5000 INJECTION INTRAVENOUS; SUBCUTANEOUS at 06:16

## 2021-01-30 RX ADMIN — CEFEPIME HYDROCHLORIDE 2000 MG: 2 INJECTION, POWDER, FOR SOLUTION INTRAVENOUS at 09:03

## 2021-01-30 RX ADMIN — HEPARIN SODIUM 5000 UNITS: 5000 INJECTION INTRAVENOUS; SUBCUTANEOUS at 13:32

## 2021-01-30 RX ADMIN — FENTANYL CITRATE 50 MCG: 50 INJECTION, SOLUTION INTRAMUSCULAR; INTRAVENOUS at 02:49

## 2021-01-30 RX ADMIN — POTASSIUM CHLORIDE 20 MEQ: 14.9 INJECTION, SOLUTION INTRAVENOUS at 08:22

## 2021-01-30 NOTE — PROGRESS NOTES
Progress Note - General Surgery   Lima Wadsworth 52 y o  female MRN: [id number]  Unit/Bed#:  Encounter: [id number]    Assessment/Plan  Lima Wadsworth is a 52 y o  female   POD1 bring back to OR, washout, rectal stump reinforcement, fascial closure  POD3 bring back to OR, washout, attempted formation of descending colon colostomy, descending colon resection, partial omentectomy, formation of transverse colostomy, ABThera placement   POD4 exploratory laparotomy, sigmoid colon resection, ABThera placement  Tm [email or web address] 1/29, afebrile at this time, VSS, WBC 17 45 from 21 08,   NG tube 150cc/24h bilious output recorded, 500cc in cannister this AM  Cr 1 22 from 1 25 , UOP 520cc/24hr, 350cc clear light yellow urine in fernandez bag this AM  APURVA 190cc/24hr serosang output  +colostomy output, 150cc stool    Ok to remove NG tube given evidence of bowel function  Ok for sips of clear liquids  If able to tolerate with continued bowel function, ok to advance as tolerated  Local wound care, first packing change tomorrow over incision and former colostomy site  Continue fernandez until improvement of Cr and urine output   Wound care consult for colostomy education  Pain control PRN  Continue cefepime and flagyl  IS  Critical care team primary     Subjective/Objective     Subjective: Complaints of abdominal soreness    Objective:     Blood pressure 114/59, pulse 86, temperature 99 3 °F (37 4 °C), temperature source Oral, resp  rate 20, height 5' 4" (1 626 m), weight 108 kg (237 lb 7 oz), last menstrual period 01/01/2021, SpO2 98 %  ,Body mass index is 40 76 kg/m²        Intake/Output Summary (Last 24 hours) at 1/30/2021 1307  Last data filed at 1/30/2021 1019  Gross per 24 hour   Intake 2141 55 ml   Output 4780 ml   Net -2638 45 ml       Invasive Devices     Peripheral Intravenous Line            Peripheral IV 01/26/21 Right;Ventral (anterior) Antecubital 3 days    Peripheral IV 01/27/21 Distal;Left;Upper;Ventral (anterior) Arm 3 days          Arterial Line            Arterial Line 01/26/21 Left Radial 3 days          Drain            NG/OG/Enteral Tube Nasogastric 18 Fr Left nares 3 days    Urethral Catheter Non-latex;Straight-tip 16 Fr  3 days    Colostomy Transverse 2 days    Closed/Suction Drain RLQ Bulb less than 1 day                Physical Exam: /59   Pulse 86   Temp 99 3 °F (37 4 °C) (Oral)   Resp 20   Ht 5' 4" (1 626 m)   Wt 108 kg (237 lb 7 oz)   LMP 01/01/2021 (Approximate)   SpO2 98%   BMI 40 76 kg/m²   General appearance: alert and oriented, in no acute distress  Lungs: clear to auscultation bilaterally  Heart: regular rate and rhythm, S1, S2 normal, no murmur, click, rub or gallop  Abdomen: soft, mildly distended, appropriate incisional tenderness to palpation, APURVA serosang output, midline incision and former colostomy site packed with serosang drainage, transverse colostomy pink and patent, bilious stool in bag  Extremities: extremities normal, warm and well-perfused; no cyanosis, clubbing, or edema    Lab, Imaging and other studies:I have personally reviewed pertinent lab results       VTE Pharmacologic Prophylaxis: Heparin  VTE Mechanical Prophylaxis: sequential compression device    Recent Results (from the past 36 hour(s))   Fingerstick Glucose (POCT)    Collection Time: 01/29/21  5:18 AM   Result Value Ref Range    POC Glucose 102 65 - 140 mg/dl   CBC and differential    Collection Time: 01/29/21  5:20 AM   Result Value Ref Range    WBC 14 66 (H) 4 31 - 10 16 Thousand/uL    RBC 2 74 (L) 3 81 - 5 12 Million/uL    Hemoglobin 7 6 (L) 11 5 - 15 4 g/dL    Hematocrit 24 1 (L) 34 8 - 46 1 %    MCV 88 82 - 98 fL    MCH 27 4 26 8 - 34 3 pg    MCHC 31 1 (L) 31 4 - 37 4 g/dL    RDW 14 6 11 6 - 15 1 %    MPV 10 3 8 9 - 12 7 fL    Platelets 484 834 - 039 Thousands/uL    nRBC 0 /100 WBCs   Basic metabolic panel    Collection Time: 01/29/21  5:20 AM   Result Value Ref Range    Sodium 142 136 - 145 mmol/L    Potassium 3 6 3 5 - 5 3 mmol/L    Chloride 110 (H) 100 - 108 mmol/L    CO2 22 21 - 32 mmol/L    ANION GAP 10 4 - 13 mmol/L    BUN 25 5 - 25 mg/dL    Creatinine 1 53 (H) 0 60 - 1 30 mg/dL    Glucose 119 65 - 140 mg/dL    Calcium 7 7 (L) 8 3 - 10 1 mg/dL    eGFR 40 ml/min/1 73sq m   Magnesium    Collection Time: 01/29/21  5:20 AM   Result Value Ref Range    Magnesium 2 6 1 6 - 2 6 mg/dL   Phosphorus    Collection Time: 01/29/21  5:20 AM   Result Value Ref Range    Phosphorus 3 2 2 7 - 4 5 mg/dL   Manual Differential(PHLEBS Do Not Order)    Collection Time: 01/29/21  5:20 AM   Result Value Ref Range    Segmented % 67 43 - 75 %    Bands % 11 (H) 0 - 8 %    Lymphocytes % 13 (L) 14 - 44 %    Monocytes % 5 4 - 12 %    Eosinophils, % 0 0 - 6 %    Basophils % 0 0 - 1 %    Myelocytes % 3 (H) 0 - 1 %    Atypical Lymphocytes % 1 (H) <=0 %    Absolute Neutrophils 11 43 (H) 1 85 - 7 62 Thousand/uL    Lymphocytes Absolute 1 91 0 60 - 4 47 Thousand/uL    Monocytes Absolute 0 73 0 00 - 1 22 Thousand/uL    Eosinophils Absolute 0 00 0 00 - 0 40 Thousand/uL    Basophils Absolute 0 00 0 00 - 0 10 Thousand/uL    Total Counted 100     Anisocytosis Present     Platelet Estimate Adequate Adequate   Fingerstick Glucose (POCT)    Collection Time: 01/29/21 12:30 PM   Result Value Ref Range    POC Glucose 103 65 - 140 mg/dl   Hemoglobin and hematocrit, blood    Collection Time: 01/29/21  2:01 PM   Result Value Ref Range    Hemoglobin 8 4 (L) 11 5 - 15 4 g/dL    Hematocrit 26 7 (L) 34 8 - 46 1 %   Fingerstick Glucose (POCT)    Collection Time: 01/29/21  5:02 PM   Result Value Ref Range    POC Glucose 97 65 - 140 mg/dl   Blood gas, arterial    Collection Time: 01/29/21  8:46 PM   Result Value Ref Range    pH, Arterial 7 300 (L) 7 350 - 7 450    pCO2, Arterial 20 4 (LL) 36 0 - 44 0 mm Hg    pO2, Arterial 142 6 (H) 75 0 - 129 0 mm Hg    HCO3, Arterial 9 8 (L) 22 0 - 28 0 mmol/L    Base Excess, Arterial -15 3 mmol/L    O2 Content, Arterial 8 2 (L) 16 0 - 23 0 mL/dL    O2 HGB,Arterial  97 4 (H) 94 0 - 97 0 %    SOURCE Line, Arterial     Nasal Cannula 2    CBC and Platelet    Collection Time: 01/29/21  8:47 PM   Result Value Ref Range    WBC 21 08 (H) 4 31 - 10 16 Thousand/uL    RBC 3 06 (L) 3 81 - 5 12 Million/uL    Hemoglobin 8 4 (L) 11 5 - 15 4 g/dL    Hematocrit 27 3 (L) 34 8 - 46 1 %    MCV 89 82 - 98 fL    MCH 27 5 26 8 - 34 3 pg    MCHC 30 8 (L) 31 4 - 37 4 g/dL    RDW 14 9 11 6 - 15 1 %    Platelets 838 818 - 732 Thousands/uL    MPV 10 2 8 9 - 12 7 fL   Comprehensive metabolic panel    Collection Time: 01/29/21  8:47 PM   Result Value Ref Range    Sodium 145 136 - 145 mmol/L    Potassium 3 6 3 5 - 5 3 mmol/L    Chloride 112 (H) 100 - 108 mmol/L    CO2 18 (L) 21 - 32 mmol/L    ANION GAP 15 (H) 4 - 13 mmol/L    BUN 26 (H) 5 - 25 mg/dL    Creatinine 1 25 0 60 - 1 30 mg/dL    Glucose 104 65 - 140 mg/dL    Calcium 7 6 (L) 8 3 - 10 1 mg/dL    Corrected Calcium 9 1 8 3 - 10 1 mg/dL    AST 10 5 - 45 U/L    ALT 10 (L) 12 - 78 U/L    Alkaline Phosphatase 37 (L) 46 - 116 U/L    Total Protein 4 8 (L) 6 4 - 8 2 g/dL    Albumin 2 1 (L) 3 5 - 5 0 g/dL    Total Bilirubin 0 40 0 20 - 1 00 mg/dL    eGFR 51 ml/min/1 73sq m   Lactic acid, plasma    Collection Time: 01/29/21 10:18 PM   Result Value Ref Range    LACTIC ACID 0 9 0 5 - 2 0 mmol/L   Fingerstick Glucose (POCT)    Collection Time: 01/30/21 12:05 AM   Result Value Ref Range    POC Glucose 129 65 - 140 mg/dl   Blood gas, arterial    Collection Time: 01/30/21  2:34 AM   Result Value Ref Range    pH, Arterial 7 413 7 350 - 7 450    pCO2, Arterial 27 6 (LL) 36 0 - 44 0 mm Hg    pO2, Arterial 106 4 75 0 - 129 0 mm Hg    HCO3, Arterial 17 2 (L) 22 0 - 28 0 mmol/L    Base Excess, Arterial -6 5 mmol/L    O2 Content, Arterial 11 4 (L) 16 0 - 23 0 mL/dL    O2 HGB,Arterial  97 0 94 0 - 97 0 %    SOURCE Radial, Left     HUNTER TEST Yes     Temperature 98 4 Degrees Fehrenheit    Nasal Cannula 2    CBC and differential    Collection Time: 01/30/21  6:19 AM   Result Value Ref Range    WBC 17 45 (H) 4 31 - 10 16 Thousand/uL    RBC 2 88 (L) 3 81 - 5 12 Million/uL    Hemoglobin 7 9 (L) 11 5 - 15 4 g/dL    Hematocrit 25 1 (L) 34 8 - 46 1 %    MCV 87 82 - 98 fL    MCH 27 4 26 8 - 34 3 pg    MCHC 31 5 31 4 - 37 4 g/dL    RDW 14 6 11 6 - 15 1 %    MPV 9 7 8 9 - 12 7 fL    Platelets 545 531 - 530 Thousands/uL    nRBC 0 /100 WBCs   Comprehensive metabolic panel    Collection Time: 01/30/21  6:19 AM   Result Value Ref Range    Sodium 145 136 - 145 mmol/L    Potassium 3 4 (L) 3 5 - 5 3 mmol/L    Chloride 110 (H) 100 - 108 mmol/L    CO2 23 21 - 32 mmol/L    ANION GAP 12 4 - 13 mmol/L    BUN 21 5 - 25 mg/dL    Creatinine 1 22 0 60 - 1 30 mg/dL    Glucose 119 65 - 140 mg/dL    Calcium 7 4 (L) 8 3 - 10 1 mg/dL    Corrected Calcium 8 8 8 3 - 10 1 mg/dL    AST 12 5 - 45 U/L    ALT 11 (L) 12 - 78 U/L    Alkaline Phosphatase 34 (L) 46 - 116 U/L    Total Protein 5 0 (L) 6 4 - 8 2 g/dL    Albumin 2 3 (L) 3 5 - 5 0 g/dL    Total Bilirubin 0 50 0 20 - 1 00 mg/dL    eGFR 53 ml/min/1 73sq m   Magnesium    Collection Time: 01/30/21  6:19 AM   Result Value Ref Range    Magnesium 2 2 1 6 - 2 6 mg/dL   Phosphorus    Collection Time: 01/30/21  6:19 AM   Result Value Ref Range    Phosphorus 2 1 (L) 2 7 - 4 5 mg/dL   Manual Differential(PHLEBS Do Not Order)    Collection Time: 01/30/21  6:19 AM   Result Value Ref Range    Segmented % 73 43 - 75 %    Bands % 15 (H) 0 - 8 %    Lymphocytes % 4 (L) 14 - 44 %    Monocytes % 2 (L) 4 - 12 %    Eosinophils, % 0 0 - 6 %    Basophils % 0 0 - 1 %    Metamyelocytes% 2 (H) 0 - 1 %    Myelocytes % 4 (H) 0 - 1 %    Absolute Neutrophils 15 36 (H) 1 85 - 7 62 Thousand/uL    Lymphocytes Absolute 0 70 0 60 - 4 47 Thousand/uL    Monocytes Absolute 0 35 0 00 - 1 22 Thousand/uL    Eosinophils Absolute 0 00 0 00 - 0 40 Thousand/uL    Basophils Absolute 0 00 0 00 - 0 10 Thousand/uL    Total Counted 100     Anisocytosis Present     Platelet Estimate Adequate Adequate

## 2021-01-30 NOTE — ASSESSMENT & PLAN NOTE
Baseline Cr: 0 8-0 9  Admission Cr: 1 48  Improving  Etiology: Patient with significant dehydration, diarrhea for several weeks  · Avoid nephrotoxic medications/hypotension    · Continue fluid resuscitation, bicarbonate repletion

## 2021-01-30 NOTE — ASSESSMENT & PLAN NOTE
Sepsis, POA, due suspected ischemic colitis with perforation of sigmoid colon  · Patient presents with approx 1 month of abdominal pain and course of ABx  · CT showing infectious/inflammatory/ less likely ischemic colitis of the whole colon from cecum to sigmoid colon and moderate volume pneumoperitoneum suggestive of perforated viscous  · Patient taken to OR  · Operations:  · 1/26: ExLap PONCHO, found to have sigmoid perf with feculent but contained abscess   Transverse, splenic flexure, and descending colon looked a bit dusky, therefore patient left open, in discontinuity, and with vac overtop with plan for washout, re-exploration  · 1/27: descending and partial transverse colon resection with colostomy, remains open with abthera in place  · 1/29:  Exploratory laparotomy with washout and transverse colostomy, closure  · Obtain endpoints and follow closely  · Continue cefepime/flagyl for bowel perforation  · Follow up Cx, no growth to date

## 2021-01-30 NOTE — ASSESSMENT & PLAN NOTE
· Presents with WBC 11 03 and bands of 27  · Likely 2/2 bowel perforation  · Follow with Daily CBC with differential

## 2021-01-30 NOTE — PROGRESS NOTES
NEPHROLOGY PROGRESS NOTE    Patient: Orlando Brown               Sex: female          DOA: 1/26/2021  2:12 PM   YOB: 1970        Age:  52 y o         LOS:  LOS: 4 days   1/30/2021    REASON FOR THE CONSULTATION:      Acute kidney injury    SUBJECTIVE     Patient is seen and examined next to the bedside in the intensive care unit  Noted to be extubated with nasogastric tube still in place      CURRENT MEDICATIONS       Current Facility-Administered Medications:     albuterol inhalation solution 2 5 mg, 2 5 mg, Nebulization, Q6H PRN, Marina Chilel PA-C    cefepime (MAXIPIME) 2,000 mg in dextrose 5 % 50 mL IVPB, 2,000 mg, Intravenous, Q12H, Marina Chilel PA-C, Last Rate: 100 mL/hr at 01/30/21 0903, 2,000 mg at 01/30/21 0903    furosemide (LASIX) injection 40 mg, 40 mg, Intravenous, Q8H, Daquan Nair PA-C, 40 mg at 01/30/21 9128    heparin (porcine) subcutaneous injection 5,000 Units, 5,000 Units, Subcutaneous, Q8H [address], Marina Chilel PA-C, 5,000 Units at 01/30/21 0616    HYDROmorphone (DILAUDID) injection 0 5 mg, 0 5 mg, Intravenous, Q4H PRN **OR** HYDROmorphone (DILAUDID) injection 1 mg, 1 mg, Intravenous, Q4H PRN, Daquan Nair PA-C, 1 mg at 01/30/21 0673    insulin lispro (HumaLOG) 100 units/mL subcutaneous injection 1-6 Units, 1-6 Units, Subcutaneous, Q6H [address], 1 Units at 01/28/21 0028 **AND** Fingerstick Glucose (POCT), , , Q6H, Marina Chilel PA-C    metroNIDAZOLE (FLAGYL) IVPB (premix) 500 mg 100 mL, 500 mg, Intravenous, Q8H, Marina Chilel PA-C, Stopped at 01/30/21 0315    nystatin (MYCOSTATIN) oral suspension 500,000 Units, 500,000 Units, Swish & Swallow, 4x Daily, Marina Chilel PA-C, 500,000 Units at 01/30/21 0902    ondansetron (ZOFRAN) injection 4 mg, 4 mg, Intravenous, Q6H PRN, Angela Mora PA-C    potassium chloride 20 mEq IVPB (premix), 20 mEq, Intravenous, Q2H, Daquna Nair PA-C, Last Rate: 50 mL/hr at 01/30/21 0822, 20 mEq at 01/30/21 0822    potassium phosphates 12 mmol in sodium chloride 0 9 % 250 mL infusion, 12 mmol, Intravenous, Once, Nuria Brambila PA-C    REVIEW OF SYSTEMS     Review of Systems   Unable to perform ROS: Acuity of condition       OBJECTIVE     Current Weight: Weight - Scale: 108 kg (237 lb 7 oz)  Vitals:    01/30/21 0845   BP:    Pulse:    Resp:    Temp: 99 9 °F (37 7 °C)   SpO2:      Body mass index is 40 76 kg/m²  Intake/Output Summary (Last 24 hours) at 1/30/2021 0958  Last data filed at 1/30/2021 0830  Gross per 24 hour   Intake 1867 15 ml   Output 3675 ml   Net -1807 85 ml       PHYSICAL EXAMINATION     Physical Exam  Constitutional:       Appearance: She is well-developed  HENT:      Head: Normocephalic and atraumatic  Eyes:      Pupils: Pupils are equal, round, and reactive to light  Neck:      Musculoskeletal: Neck supple  Cardiovascular:      Rate and Rhythm: Normal rate and regular rhythm  Heart sounds: Normal heart sounds  Pulmonary:      Effort: Pulmonary effort is normal    Abdominal:      General: Bowel sounds are normal       Palpations: Abdomen is soft  Musculoskeletal: Normal range of motion  Right lower leg: Edema present  Left lower leg: Edema present  Skin:     General: Skin is warm  Neurological:      Mental Status: She is alert and oriented to person, place, and time             LAB RESULTS     Results from last 7 days   Lab Units 01/30/21  0619 01/29/21  2047 01/29/21  1401 01/29/21  0520 01/28/21 2009 01/28/21  1428 01/28/21  0803 01/28/21  0618 01/28/21  0112 01/27/21  1918  01/27/21  0503 01/26/21  2251 01/26/21  2047   WBC Thousand/uL 17 45* 21 08*  --  14 66*  --   --   --  19 74*  --  17 70*  --  14 17* 4 45  --    HEMOGLOBIN g/dL 7 9* 8 4* 8 4* 7 6*  --   --   --  10 9*  --  11 9  --  10 8* 12 9  --    I STAT HEMOGLOBIN g/dl  --   --   --   --   --   --   --   --  13 9  --   --   --   --  10 5*   HEMATOCRIT % 25 1* 27 3* 26 7* 24 1*  --   --   --  34 3*  --  37 8  --  33 7* 39 8  -- HEMATOCRIT, ISTAT %  --   --   --   --   --   --   --   --  41  --   --   --   --  31*   PLATELETS Thousands/uL 314 326  --  290  --   --   --  362  --  417*  --  392* 511*  --    POTASSIUM mmol/L 3 4* 3 6  --  3 6 3 8 4 1 4 1  --   --  4 1   < > 3 6 3 7  --    CHLORIDE mmol/L 110* 112*  --  110* 110* 111* 109*  --   --  110*   < > 108 106  --    CO2 mmol/L 23 18*  --  22 22 21 21  --   --  21   < > 21 21  --    CO2, I-STAT mmol/L  --   --   --   --   --   --   --   --  22  --   --   --   --  22   BUN mg/dL 21 26*  --  25 22 19 16  --   --  10   < > 12 14  --    CREATININE mg/dL 1 22 1 25  --  1 53* 1 44* 1 19 1 09  --   --  0 73   < > 0 83 0 77  --    EGFR ml/min/1 73sq m 53 51  --  40 43 54 60  --   --  98   < > 84 92  --    CALCIUM mg/dL 7 4* 7 6*  --  7 7* 7 6* 8 0* 7 4*  --   --  7 5*   < > 7 1* 7 2*  --    MAGNESIUM mg/dL 2 2  --   --  2 6  --   --  2 5  --   --  2 5  --  2 7* 1 7  --    PHOSPHORUS mg/dL 2 1*  --   --  3 2  --   --  3 4  --   --  3 8  --  3 0 3 5  --    GLUCOSE, ISTAT mg/dl  --   --   --   --   --   --   --   --  157*  --   --   --   --  136    < > = values in this interval not displayed  RADIOLOGY RESULTS      Reviewed    ASSESSMENT/PLAN     52years old female with past medical history of asthma who presented to our facility in January 26 with complaints of abdominal pain and diarrhea and noted to have pneumoperitoneum secondary to a perforated sigmoid colon  1  Acute kidney injury:  Etiology was thought to be secondary to ischemic ATN setting of perforated viscus resulting in pneumoperitoneum plus multiple hypotensive episodes plus contrast induced nephropathy   -serum creatinine peaked up to 1 5   -noted to be hypervolemic with potential for intra-abdominal hypertension occurring secondary to being in volume overload  --initiated on Lasix 40 mg IV p r n   Induce diuresis resulting in improvement in urine output as well as improvement in serum creatinine down to 1 2 mg/dL as well     2  Hypervolemia:  Noted on exam yesterday with approximately 13 L in positive fluid balance  -recommended restriction of further fluids   -administer albumin q 12 hours p r n  In the setting of hypotension   -Lasix administered as above              Janie Auguste MD  Nephrology  1/30/2021
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
## 2021-01-30 NOTE — PROGRESS NOTES
Progress Note - Tim Byers 1970, 52 y o  female MRN: [id number]    Unit/Bed#:  Encounter: [id number]    Primary Care Provider: Mago Medina MD   Date and time admitted to hospital: 1/26/2021  2:12 PM        Perforation of sigmoid colon due to diverticulitis  Assessment & Plan  · See plan for pneumoperitoneum    Hypokalemia  Assessment & Plan  · On admission, K 2 8  · Likely losses due to diarrhea  · Continue repletion as needed    Bandemia  Assessment & Plan  · Presents with WBC 11 03 and bands of 27  · Likely 2/2 bowel perforation  · Follow with Daily CBC with differential    HARI (acute kidney injury) ([facility] [address] )  Assessment & Plan  Baseline Cr: 0 8-0 9  Admission Cr: 1 48  Improving  Etiology: Patient with significant dehydration, diarrhea for several weeks  · Avoid nephrotoxic medications/hypotension  · Continue fluid resuscitation, bicarbonate repletion    Hypothyroid  Assessment & Plan  · Continue synthroid once able to take PO    Mild persistent asthma  Assessment & Plan  · Patient on no controller medications at home  · No wheezing on exam or signs of exacerbation  · PRN albuterol    * Pneumoperitoneum  Assessment & Plan  Sepsis, POA, due suspected ischemic colitis with perforation of sigmoid colon  · Patient presents with approx 1 month of abdominal pain and course of ABx  · CT showing infectious/inflammatory/ less likely ischemic colitis of the whole colon from cecum to sigmoid colon and moderate volume pneumoperitoneum suggestive of perforated viscous  · Patient taken to OR  · Operations:  · 1/26: ExLap PONCHO, found to have sigmoid perf with feculent but contained abscess   Transverse, splenic flexure, and descending colon looked a bit dusky, therefore patient left open, in discontinuity, and with vac overtop with plan for washout, re-exploration  · 1/27: descending and partial transverse colon resection with colostomy, remains open with abthera in place  · 1/29:  Exploratory laparotomy with washout and transverse colostomy, closure  · Obtain endpoints and follow closely  · Continue cefepime/flagyl for bowel perforation  · Follow up Cx, no growth to date    ----------------------------------------------------------------------------------------  HPI/24hr events:  Remains metabolically deranged overnight, requiring bicarbonate infusion and fluid resuscitation  Creatinine improving  Disposition: Continue Critical Care   Code Status: Level 1 - Full Code  ---------------------------------------------------------------------------------------  SUBJECTIVE  No complaints offered  Review of Systems  Review of systems was reviewed and negative unless stated above in HPI/24-hour events   ---------------------------------------------------------------------------------------  OBJECTIVE    Vitals   Vitals:    21 2100 21 2200 21 2300   BP:       BP Location:       Pulse: 75 83 92 86   Resp:       Temp:       TempSrc:       SpO2: 99% 96% 96% 96%   Weight:       Height:         Temp (24hrs), Av 2 °F (37 9 °C), Min:98 1 °F (36 7 °C), Max:101 3 °F (38 5 °C)  Current: Temperature: 98 4 °F (36 9 °C)  Arterial Line BP: 94/43  Arterial Line MAP (mmHg): 58 mmHg    Respiratory:  SpO2: SpO2: 96 %       Invasive/non-invasive ventilation settings   Respiratory    Lab Data (Last 4 hours)       0234            pH, Arterial       7 413           pCO2, Arterial       27 6           pO2, Arterial       106 4           HCO3, Arterial       17 2           Base Excess, Arterial       -6 5                O2/Vent Data     None                Physical Exam  GEN:  Ill appearing, appears stated age, no acute distress  HEENT:  Sclera anicteric, mucous membranes pink and moist, conjunctiva pink, no juan/rhinorrhea  Neck:  No lymphadenopathy, normal ROM, supple, no bruits  CV :  S1S2, regular no murmurs, rubs or gallops  Intact distal pulses  No JVD  Resp:  Lungs CTA =B/L  No subq air or crepitus  Symmetrical expansion  No cough noted  GI :  Abd soft, incisional tenderness, no guarding/rebound, mildly distended, absent bowel sounds X4 quads, midline dressing dry and intact, colostomy with bag intact, Eduin-Morel with serosanguineous drainage  Neuro:  CN II-XII grossly intact, nonfocal exam, speech clear, GCS 15  Psych:  Appropriate affect      Laboratory and Diagnostics:  Results from last 7 days   Lab Units 01/29/21 2047 01/29/21  1401 01/29/21  0520 01/28/21  0618 01/28/21  0112 01/27/21 1918 01/27/21  0503 01/26/21  2251  01/26/21  1510   WBC Thousand/uL 21 08*  --  14 66* 19 74*  --  17 70* 14 17* 4 45  --  11 03*   HEMOGLOBIN g/dL 8 4* 8 4* 7 6* 10 9*  --  11 9 10 8* 12 9  --  13 7   I STAT HEMOGLOBIN g/dl  --   --   --   --  13 9  --   --   --    < >  --    HEMATOCRIT % 27 3* 26 7* 24 1* 34 3*  --  37 8 33 7* 39 8  --  42 6   HEMATOCRIT, ISTAT %  --   --   --   --  41  --   --   --    < >  --    PLATELETS Thousands/uL 326  --  290 362  --  417* 392* 511*  --  620*   BANDS PCT %  --   --  11*  --   --  23* 8  --   --  27*   MONO PCT %  --   --  5  --   --  4 4  --   --  6    < > = values in this interval not displayed       Results from last 7 days   Lab Units 01/29/21 2047 01/29/21  0520 01/28/21 2009 01/28/21  1428 01/28/21  0803 01/28/21  0112 01/27/21  1918 01/27/21  1221 01/27/21  0503  01/26/21  1510   SODIUM mmol/L 145 142 142 142 141  --  142 144 139   < > 134*   POTASSIUM mmol/L 3 6 3 6 3 8 4 1 4 1  --  4 1 3 0* 3 6   < > 2 8*   CHLORIDE mmol/L 112* 110* 110* 111* 109*  --  110* 115* 108   < > 98*   CO2 mmol/L 18* 22 22 21 21  --  21 19* 21   < > 24   CO2, I-STAT mmol/L  --   --   --   --   --  22  --   --   --    < >  --    ANION GAP mmol/L 15* 10 10 10 11  --  11 10 10   < > 12   BUN mg/dL 26* 25 22 19 16  --  10 8 12   < > 15   CREATININE mg/dL 1 25 1 53* 1 44* 1 19 1 09  --  0 73 0 58* 0 83   < > 1 48*   CALCIUM mg/dL 7 6* 7 7* 7 6* 8 0* 7 4*  --  7 5* 6 3* 7 1*   < > 8 6   GLUCOSE RANDOM mg/dL 104 119 140 142* 157*  --  179* 127 161*   < > 172*   ALT U/L 10*  --   --   --   --   --  10*  --  12  --  12   AST U/L 10  --   --   --   --   --  10  --  8  --  10   ALK PHOS U/L 37*  --   --   --   --   --  36*  --  37*  --  74   ALBUMIN g/dL 2 1*  --   --   --   --   --  1 6*  --  1 7*  --  1 9*   TOTAL BILIRUBIN mg/dL 0 40  --   --   --   --   --  0 50  --  0 60  --  0 50    < > = values in this interval not displayed  Results from last 7 days   Lab Units 01/29/21  0520 01/28/21  0803 01/27/21 1918 01/27/21  0503 01/26/21  2251   MAGNESIUM mg/dL 2 6 2 5 2 5 2 7* 1 7   PHOSPHORUS mg/dL 3 2 3 4 3 8 3 0 3 5      Results from last 7 days   Lab Units 01/26/21  1622   INR  1 34*   PTT seconds 29          Results from last 7 days   Lab Units 01/29/21  2218 01/28/21  1328 01/28/21  0029 01/27/21 1918 01/27/21  0922 01/26/21  2251 01/26/21  1622   LACTIC ACID mmol/L 0 9 1 0 1 1 1 1 0 9 1 0 1 6     ABG:  Results from last 7 days   Lab Units 01/30/21  0234   PH ART  7 413   PCO2 ART mm Hg 27 6*   PO2 ART mm Hg 106 4   HCO3 ART mmol/L 17 2*   BASE EXC ART mmol/L -6 5   ABG SOURCE  Radial, Left     VBG:  Results from last 7 days   Lab Units 01/30/21  0234  01/28/21  1424   PH SHIRLEY   --   --  7 523*   PCO2 SHIRLEY mm Hg  --   --  21 0*   PO2 SHIRLEY mm Hg  --   --  197 8*   HCO3 SHIRLEY mmol/L  --   --  16 9*   BASE EXC SHIRLEY mmol/L  --   --  -4 4   ABG SOURCE  Radial, Left   < >  --     < > = values in this interval not displayed  Results from last 7 days   Lab Units 01/27/21  0503 01/26/21  1622   PROCALCITONIN ng/ml 2 50* 1 47*       Micro  Results from last 7 days   Lab Units 01/26/21  2150 01/26/21  1621 01/26/21  1601   BLOOD CULTURE   --  No Growth at 72 hrs  No Growth at 72 hrs  GRAM STAIN RESULT  2+ Polys  No bacteria seen  --   --        EKG:  Sinus rhythm  Imaging: I have personally reviewed pertinent reports  Intake and Output  I/O       01/28 0701 - 01/29 0700 01/29 0701 - 01/30 0700    I  V  (mL/kg) 1242 2 (11 5) 1042 4 (9 7)    IV Piggyback 1000 200    Total Intake(mL/kg) 2242 2 (20 8) 1242 4 (11 5)    Urine (mL/kg/hr) 1070 (0 4) 395 (0 2)    Emesis/NG output 25 150    Drains 400 150    Stool 100 150    Total Output 1595 845    Net +647 2 +397 4                Height and Weights   Height: 5' 4" (162 6 cm)  IBW: 54 7 kg  Body mass index is 40 76 kg/m²  Weight (last 2 days)     Date/Time   Weight    01/28/21 0600   108 (237 44)                Nutrition       Diet Orders   (From admission, onward)             Start     Ordered    01/26/21 2234  Diet NPO  Diet effective now     Question Answer Comment   Diet Type NPO    RD to adjust diet per protocol?  No        01/26/21 2233                  Active Medications  Scheduled Meds:  Current Facility-Administered Medications   Medication Dose Route Frequency Provider Last Rate    albuterol  2 5 mg Nebulization Q6H PRN Dallas Callahan PA-C      cefepime  2,000 mg Intravenous Q12H Marina Chilel PA-C Stopped (01/29/21 2110)    fentanyl citrate (PF)  50 mcg Intravenous Q2H PRN BRAYAN Hilario      heparin (porcine)  5,000 Units Subcutaneous Q8H [facility] [place], [place]      insulin lispro  1-6 Units Subcutaneous Q6H [facility] [place], [place]      metroNIDAZOLE  500 mg Intravenous Q8H [place] JUAQUIN Newman Stopped (01/30/21 0315)    nystatin  500,000 Units Swish & Swallow 4x Daily Marina Chilel PA-C      ondansetron  4 mg Intravenous Q6H PRN Marina Chilel PA-C       Continuous Infusions:     PRN Meds:   albuterol, 2 5 mg, Q6H PRN  fentanyl citrate (PF), 50 mcg, Q2H PRN  ondansetron, 4 mg, Q6H PRN        Invasive Devices Review  Invasive Devices     Peripheral Intravenous Line            Peripheral IV 01/26/21 Right;Ventral (anterior) Antecubital 3 days    Peripheral IV 01/27/21 Distal;Left;Upper;Ventral (anterior) Arm 3 days          Arterial Line            Arterial Line 01/26/21 Left Radial 3 days          Drain            NG/OG/Enteral Tube Nasogastric 18 Fr Left nares 3 days    Urethral Catheter Non-latex;Straight-tip 16 Fr  3 days    Colostomy Transverse 2 days    Closed/Suction Drain RLQ Bulb less than 1 day                Rationale for remaining devices:  Postoperative  ---------------------------------------------------------------------------------------  Advance Directive and Living Will:      Power of :    POLST:    ---------------------------------------------------------------------------------------  Care Time Delivered:   No Critical Care time spent       [facility]      Portions of the record may have been created with voice recognition software  Occasional wrong word or "sound a like" substitutions may have occurred due to the inherent limitations of voice recognition software    Read the chart carefully and recognize, using context, where substitutions have occurred
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
## 2021-01-30 NOTE — OCCUPATIONAL THERAPY NOTE
Occupational Therapy Cancellation Note        Patient Name: Cirilo WILLIAMSON Date: 1/29/21    OT order received, chart review performed  At this time, OT evaluation cancelled as patient remains intubated  OT will continue to follow pt and evaluate as appropriate      Toby Staley OTR/L

## 2021-01-30 NOTE — QUICK NOTE
Called and spoke with patient's  Clearance via phone to provide daily clinical update  All questions and concerns were answered at that time         Jasbir Cain PA-C
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
## 2021-01-31 LAB
ALBUMIN SERPL BCP-MCNC: 2 G/DL (ref 3.5–5)
ALP SERPL-CCNC: 38 U/L (ref 46–116)
ALT SERPL W P-5'-P-CCNC: 11 U/L (ref 12–78)
ANION GAP SERPL CALCULATED.3IONS-SCNC: 11 MMOL/L (ref 4–13)
ANION GAP SERPL CALCULATED.3IONS-SCNC: 14 MMOL/L (ref 4–13)
ANISOCYTOSIS BLD QL SMEAR: PRESENT
AST SERPL W P-5'-P-CCNC: 12 U/L (ref 5–45)
ATRIAL RATE: 89 BPM
BACTERIA BLD CULT: NORMAL
BACTERIA BLD CULT: NORMAL
BASOPHILS # BLD MANUAL: 0 THOUSAND/UL (ref 0–0.1)
BASOPHILS NFR MAR MANUAL: 0 % (ref 0–1)
BILIRUB SERPL-MCNC: 0.4 MG/DL (ref 0.2–1)
BUN SERPL-MCNC: 20 MG/DL (ref 5–25)
BUN SERPL-MCNC: 20 MG/DL (ref 5–25)
CALCIUM ALBUM COR SERPL-MCNC: 9.4 MG/DL (ref 8.3–10.1)
CALCIUM SERPL-MCNC: 7.8 MG/DL (ref 8.3–10.1)
CALCIUM SERPL-MCNC: 8 MG/DL (ref 8.3–10.1)
CHLORIDE SERPL-SCNC: 109 MMOL/L (ref 100–108)
CHLORIDE SERPL-SCNC: 111 MMOL/L (ref 100–108)
CO2 SERPL-SCNC: 24 MMOL/L (ref 21–32)
CO2 SERPL-SCNC: 25 MMOL/L (ref 21–32)
CREAT SERPL-MCNC: 0.86 MG/DL (ref 0.6–1.3)
CREAT SERPL-MCNC: 0.93 MG/DL (ref 0.6–1.3)
EOSINOPHIL # BLD MANUAL: 0.16 THOUSAND/UL (ref 0–0.4)
EOSINOPHIL NFR BLD MANUAL: 1 % (ref 0–6)
ERYTHROCYTE [DISTWIDTH] IN BLOOD BY AUTOMATED COUNT: 14.8 % (ref 11.6–15.1)
GFR SERPL CREATININE-BSD FRML MDRD: 73 ML/MIN/1.73SQ M
GFR SERPL CREATININE-BSD FRML MDRD: 80 ML/MIN/1.73SQ M
GLUCOSE SERPL-MCNC: 110 MG/DL (ref 65–140)
GLUCOSE SERPL-MCNC: 114 MG/DL (ref 65–140)
GLUCOSE SERPL-MCNC: 117 MG/DL (ref 65–140)
GLUCOSE SERPL-MCNC: 120 MG/DL (ref 65–140)
GLUCOSE SERPL-MCNC: 131 MG/DL (ref 65–140)
HCT VFR BLD AUTO: 25.8 % (ref 34.8–46.1)
HGB BLD-MCNC: 8.2 G/DL (ref 11.5–15.4)
LYMPHOCYTES # BLD AUTO: 0.94 THOUSAND/UL (ref 0.6–4.47)
LYMPHOCYTES # BLD AUTO: 6 % (ref 14–44)
MAGNESIUM SERPL-MCNC: 2 MG/DL (ref 1.6–2.6)
MAGNESIUM SERPL-MCNC: 2.2 MG/DL (ref 1.6–2.6)
MCH RBC QN AUTO: 27.6 PG (ref 26.8–34.3)
MCHC RBC AUTO-ENTMCNC: 31.8 G/DL (ref 31.4–37.4)
MCV RBC AUTO: 87 FL (ref 82–98)
METAMYELOCYTES NFR BLD MANUAL: 1 % (ref 0–1)
MONOCYTES # BLD AUTO: 0.31 THOUSAND/UL (ref 0–1.22)
MONOCYTES NFR BLD: 2 % (ref 4–12)
NEUTROPHILS # BLD MANUAL: 14.1 THOUSAND/UL (ref 1.85–7.62)
NEUTS BAND NFR BLD MANUAL: 12 % (ref 0–8)
NEUTS SEG NFR BLD AUTO: 78 % (ref 43–75)
NRBC BLD AUTO-RTO: 0 /100 WBCS
P AXIS: 51 DEGREES
PHOSPHATE SERPL-MCNC: 1.7 MG/DL (ref 2.7–4.5)
PLATELET # BLD AUTO: 389 THOUSANDS/UL (ref 149–390)
PLATELET BLD QL SMEAR: ADEQUATE
PMV BLD AUTO: 10 FL (ref 8.9–12.7)
POTASSIUM SERPL-SCNC: 3.8 MMOL/L (ref 3.5–5.3)
POTASSIUM SERPL-SCNC: 3.9 MMOL/L (ref 3.5–5.3)
PR INTERVAL: 138 MS
PROCALCITONIN SERPL-MCNC: 1.22 NG/ML
PROT SERPL-MCNC: 5.2 G/DL (ref 6.4–8.2)
QRS AXIS: 64 DEGREES
QRSD INTERVAL: 84 MS
QT INTERVAL: 364 MS
QTC INTERVAL: 442 MS
RBC # BLD AUTO: 2.97 MILLION/UL (ref 3.81–5.12)
SODIUM SERPL-SCNC: 147 MMOL/L (ref 136–145)
SODIUM SERPL-SCNC: 147 MMOL/L (ref 136–145)
T WAVE AXIS: 43 DEGREES
TOTAL CELLS COUNTED SPEC: 100
TOXIC GRANULES BLD QL SMEAR: PRESENT
VENTRICULAR RATE: 89 BPM
WBC # BLD AUTO: 15.67 THOUSAND/UL (ref 4.31–10.16)

## 2021-01-31 PROCEDURE — 93005 ELECTROCARDIOGRAM TRACING: CPT

## 2021-01-31 PROCEDURE — 99024 POSTOP FOLLOW-UP VISIT: CPT | Performed by: STUDENT IN AN ORGANIZED HEALTH CARE EDUCATION/TRAINING PROGRAM

## 2021-01-31 PROCEDURE — 84100 ASSAY OF PHOSPHORUS: CPT | Performed by: NURSE PRACTITIONER

## 2021-01-31 PROCEDURE — 80053 COMPREHEN METABOLIC PANEL: CPT | Performed by: NURSE PRACTITIONER

## 2021-01-31 PROCEDURE — 94664 DEMO&/EVAL PT USE INHALER: CPT

## 2021-01-31 PROCEDURE — 84145 PROCALCITONIN (PCT): CPT | Performed by: NURSE PRACTITIONER

## 2021-01-31 PROCEDURE — 85027 COMPLETE CBC AUTOMATED: CPT | Performed by: NURSE PRACTITIONER

## 2021-01-31 PROCEDURE — 83735 ASSAY OF MAGNESIUM: CPT | Performed by: PHYSICIAN ASSISTANT

## 2021-01-31 PROCEDURE — 99233 SBSQ HOSP IP/OBS HIGH 50: CPT | Performed by: STUDENT IN AN ORGANIZED HEALTH CARE EDUCATION/TRAINING PROGRAM

## 2021-01-31 PROCEDURE — 82948 REAGENT STRIP/BLOOD GLUCOSE: CPT

## 2021-01-31 PROCEDURE — 99233 SBSQ HOSP IP/OBS HIGH 50: CPT | Performed by: INTERNAL MEDICINE

## 2021-01-31 PROCEDURE — 80048 BASIC METABOLIC PNL TOTAL CA: CPT | Performed by: PHYSICIAN ASSISTANT

## 2021-01-31 PROCEDURE — 85007 BL SMEAR W/DIFF WBC COUNT: CPT | Performed by: NURSE PRACTITIONER

## 2021-01-31 PROCEDURE — 83735 ASSAY OF MAGNESIUM: CPT | Performed by: NURSE PRACTITIONER

## 2021-01-31 PROCEDURE — 93010 ELECTROCARDIOGRAM REPORT: CPT | Performed by: INTERNAL MEDICINE

## 2021-01-31 RX ORDER — ACETAMINOPHEN 325 MG/1
975 TABLET ORAL EVERY 8 HOURS PRN
Status: DISCONTINUED | OUTPATIENT
Start: 2021-01-31 | End: 2021-01-31

## 2021-01-31 RX ORDER — FUROSEMIDE 10 MG/ML
40 INJECTION INTRAMUSCULAR; INTRAVENOUS ONCE
Status: COMPLETED | OUTPATIENT
Start: 2021-01-31 | End: 2021-01-31

## 2021-01-31 RX ORDER — POTASSIUM CHLORIDE 14.9 MG/ML
20 INJECTION INTRAVENOUS ONCE
Status: COMPLETED | OUTPATIENT
Start: 2021-01-31 | End: 2021-01-31

## 2021-01-31 RX ORDER — CALCIUM GLUCONATE 20 MG/ML
1 INJECTION, SOLUTION INTRAVENOUS ONCE
Status: COMPLETED | OUTPATIENT
Start: 2021-01-31 | End: 2021-01-31

## 2021-01-31 RX ORDER — ACETAMINOPHEN 325 MG/1
975 TABLET ORAL EVERY 8 HOURS SCHEDULED
Status: DISCONTINUED | OUTPATIENT
Start: 2021-01-31 | End: 2021-02-10 | Stop reason: HOSPADM

## 2021-01-31 RX ADMIN — METRONIDAZOLE 500 MG: 500 INJECTION, SOLUTION INTRAVENOUS at 17:27

## 2021-01-31 RX ADMIN — CEFEPIME HYDROCHLORIDE 2000 MG: 2 INJECTION, POWDER, FOR SOLUTION INTRAVENOUS at 18:35

## 2021-01-31 RX ADMIN — HEPARIN SODIUM 5000 UNITS: 5000 INJECTION INTRAVENOUS; SUBCUTANEOUS at 13:05

## 2021-01-31 RX ADMIN — HEPARIN SODIUM 5000 UNITS: 5000 INJECTION INTRAVENOUS; SUBCUTANEOUS at 21:53

## 2021-01-31 RX ADMIN — HEPARIN SODIUM 5000 UNITS: 5000 INJECTION INTRAVENOUS; SUBCUTANEOUS at 06:36

## 2021-01-31 RX ADMIN — METRONIDAZOLE 500 MG: 500 INJECTION, SOLUTION INTRAVENOUS at 09:36

## 2021-01-31 RX ADMIN — METRONIDAZOLE 500 MG: 500 INJECTION, SOLUTION INTRAVENOUS at 01:13

## 2021-01-31 RX ADMIN — CEFEPIME HYDROCHLORIDE 2000 MG: 2 INJECTION, POWDER, FOR SOLUTION INTRAVENOUS at 06:38

## 2021-01-31 RX ADMIN — NYSTATIN 500000 UNITS: 100000 SUSPENSION ORAL at 09:35

## 2021-01-31 RX ADMIN — POTASSIUM CHLORIDE 20 MEQ: 14.9 INJECTION, SOLUTION INTRAVENOUS at 10:05

## 2021-01-31 RX ADMIN — NYSTATIN 500000 UNITS: 100000 SUSPENSION ORAL at 17:27

## 2021-01-31 RX ADMIN — FUROSEMIDE 40 MG: 10 INJECTION, SOLUTION INTRAMUSCULAR; INTRAVENOUS at 06:38

## 2021-01-31 RX ADMIN — ACETAMINOPHEN 975 MG: 325 TABLET, FILM COATED ORAL at 12:26

## 2021-01-31 RX ADMIN — LIDOCAINE 5% 2 PATCH: 700 PATCH TOPICAL at 09:35

## 2021-01-31 RX ADMIN — POTASSIUM PHOSPHATE, MONOBASIC AND POTASSIUM PHOSPHATE, DIBASIC 12 MMOL: 224; 236 INJECTION, SOLUTION, CONCENTRATE INTRAVENOUS at 10:03

## 2021-01-31 RX ADMIN — HYDROMORPHONE HYDROCHLORIDE 1 MG: 1 INJECTION, SOLUTION INTRAMUSCULAR; INTRAVENOUS; SUBCUTANEOUS at 13:04

## 2021-01-31 RX ADMIN — NYSTATIN 500000 UNITS: 100000 SUSPENSION ORAL at 12:23

## 2021-01-31 RX ADMIN — CALCIUM GLUCONATE 1 G: 20 INJECTION, SOLUTION INTRAVENOUS at 09:28

## 2021-01-31 RX ADMIN — HYDROMORPHONE HYDROCHLORIDE 1 MG: 1 INJECTION, SOLUTION INTRAMUSCULAR; INTRAVENOUS; SUBCUTANEOUS at 17:27

## 2021-01-31 RX ADMIN — HYDROMORPHONE HYDROCHLORIDE 1 MG: 1 INJECTION, SOLUTION INTRAMUSCULAR; INTRAVENOUS; SUBCUTANEOUS at 09:28

## 2021-01-31 RX ADMIN — NYSTATIN 500000 UNITS: 100000 SUSPENSION ORAL at 21:53

## 2021-01-31 RX ADMIN — ACETAMINOPHEN 975 MG: 325 TABLET, FILM COATED ORAL at 21:52

## 2021-01-31 NOTE — ASSESSMENT & PLAN NOTE
· Presented with WBC 11 03 and bands of 27  · Likely 2/2 bowel perforation  · Follow with Daily CBC with differential

## 2021-01-31 NOTE — ASSESSMENT & PLAN NOTE
Baseline Cr: 0 8-0 9  Admission Cr: 1 48  Improving  Etiology: Patient with significant dehydration, diarrhea for several weeks  · Avoid nephrotoxic medications/hypotension

## 2021-01-31 NOTE — PROGRESS NOTES
NEPHROLOGY PROGRESS NOTE    Patient: Guillermina Andrea               Sex: female          DOA: 1/26/2021  2:12 PM   YOB: 1970        Age:  52 y o         LOS:  LOS: 5 days   1/31/2021    REASON FOR THE CONSULTATION:      Acute kidney injury    SUBJECTIVE     Patient is seen and examined next to the bedside in the intensive care unit  -nasogastric tube now appears to be out   -excellent diuresis over the past 24 hours      CURRENT MEDICATIONS       Current Facility-Administered Medications:     albuterol inhalation solution 2 5 mg, 2 5 mg, Nebulization, Q6H PRN, Marina Chilel PA-C    cefepime (MAXIPIME) 2,000 mg in dextrose 5 % 50 mL IVPB, 2,000 mg, Intravenous, Q12H, Marina Chilel PA-C, Last Rate: 100 mL/hr at 01/31/21 0638, 2,000 mg at 01/31/21 2831    heparin (porcine) subcutaneous injection 5,000 Units, 5,000 Units, Subcutaneous, Q8H [facility], Marina Chilel PA-C, 5,000 Units at 01/31/21 0636    HYDROmorphone (DILAUDID) injection 0 5 mg, 0 5 mg, Intravenous, Q4H PRN **OR** HYDROmorphone (DILAUDID) injection 1 mg, 1 mg, Intravenous, Q4H PRN, Farhan Wakefield PA-C, 1 mg at 01/31/21 8936    insulin lispro (HumaLOG) 100 units/mL subcutaneous injection 1-6 Units, 1-6 Units, Subcutaneous, Q6H [facility], 1 Units at 01/28/21 0028 **AND** Fingerstick Glucose (POCT), , , Q6H, Marina Chilel PA-C    lidocaine (LIDODERM) 5 % patch 2 patch, 2 patch, Topical, Daily, Farhan Wakefield PA-C, 2 patch at 01/31/21 0935    metroNIDAZOLE (FLAGYL) IVPB (premix) 500 mg 100 mL, 500 mg, Intravenous, Q8H, Marina Chilel PA-C, Last Rate: 200 mL/hr at 01/31/21 0936, 500 mg at 01/31/21 0936    nystatin (MYCOSTATIN) oral suspension 500,000 Units, 500,000 Units, Swish & Swallow, 4x Daily, Marina Chilel PA-C, 500,000 Units at 01/31/21 0935    ondansetron (ZOFRAN) injection 4 mg, 4 mg, Intravenous, Q6H PRN, Alex Kelley PA-C    potassium chloride 20 mEq IVPB (premix), 20 mEq, Intravenous, Once, Farhan Wakefield PA-C, Last Rate: 50 mL/hr at 01/31/21 1005, 20 mEq at 01/31/21 1005    potassium phosphates 12 mmol in sodium chloride 0 9 % 250 mL infusion, 12 mmol, Intravenous, Once, Osmar Murray PA-C, Last Rate: 62 5 mL/hr at 01/31/21 1003, 12 mmol at 01/31/21 1003    REVIEW OF SYSTEMS     Review of Systems   Constitutional: Positive for fatigue  HENT: Negative  Eyes: Negative  Respiratory: Negative  Cardiovascular: Positive for leg swelling  Gastrointestinal: Negative  Endocrine: Negative  Genitourinary: Negative  Musculoskeletal: Negative  Skin: Negative  Allergic/Immunologic: Negative  Neurological: Negative  Hematological: Negative  All other systems reviewed and are negative  OBJECTIVE     Current Weight: Weight - Scale: 108 kg (237 lb 7 oz)  Vitals:    01/31/21 0728   BP: 126/66   Pulse: 81   Resp: (!) 9   Temp: 98 9 °F (37 2 °C)   SpO2: 97%     Body mass index is 40 76 kg/m²  Intake/Output Summary (Last 24 hours) at 1/31/2021 1041  Last data filed at 1/31/2021 0601  Gross per 24 hour   Intake 610 ml   Output 1375 ml   Net -765 ml       PHYSICAL EXAMINATION     Physical Exam  Constitutional:       Appearance: She is well-developed  HENT:      Head: Normocephalic and atraumatic  Eyes:      Pupils: Pupils are equal, round, and reactive to light  Neck:      Musculoskeletal: Neck supple  Cardiovascular:      Rate and Rhythm: Normal rate and regular rhythm  Heart sounds: Normal heart sounds  Pulmonary:      Comments: Poor inspiratory effort  Abdominal:      General: Bowel sounds are normal       Palpations: Abdomen is soft  Musculoskeletal: Normal range of motion  General: Swelling present  Skin:     General: Skin is warm  Neurological:      Mental Status: She is alert and oriented to person, place, and time             LAB RESULTS     Results from last 7 days   Lab Units 01/31/21  0625 01/30/21  1547 01/30/21  2184 01/29/21  2047 01/29/21  1401 01/29/21  0520 01/28/21  2009 01/28/21  1428 01/28/21  0803 01/28/21  0618 01/28/21  0112 01/27/21  1918  01/27/21  0503 01/26/21  2251  01/26/21  2047   WBC Thousand/uL 15 67*  --  17 45* 21 08*  --  14 66*  --   --   --  19 74*  --  17 70*  --  14 17* 4 45  --   --    HEMOGLOBIN g/dL 8 2*  --  7 9* 8 4* 8 4* 7 6*  --   --   --  10 9*  --  11 9  --  10 8* 12 9  --   --    I STAT HEMOGLOBIN g/dl  --   --   --   --   --   --   --   --   --   --  13 9  --   --   --   --   --  10 5*   HEMATOCRIT % 25 8*  --  25 1* 27 3* 26 7* 24 1*  --   --   --  34 3*  --  37 8  --  33 7* 39 8  --   --    HEMATOCRIT, ISTAT %  --   --   --   --   --   --   --   --   --   --  41  --   --   --   --   --  31*   PLATELETS Thousands/uL 389  --  314 326  --  290  --   --   --  362  --  417*  --  392* 511*  --   --    POTASSIUM mmol/L 3 8 3 8 3 4* 3 6  --  3 6 3 8 4 1 4 1  --   --  4 1   < > 3 6 3 7  --   --    CHLORIDE mmol/L 111* 109* 110* 112*  --  110* 110* 111* 109*  --   --  110*   < > 108 106  --   --    CO2 mmol/L 25 24 23 18*  --  22 22 21 21  --   --  21   < > 21 21  --   --    CO2, I-STAT mmol/L  --   --   --   --   --   --   --   --   --   --  22  --   --   --   --   --  22   BUN mg/dL 20 21 21 26*  --  25 22 19 16  --   --  10   < > 12 14  --   --    CREATININE mg/dL 0 86 1 16 1 22 1 25  --  1 53* 1 44* 1 19 1 09  --   --  0 73   < > 0 83 0 77  --   --    EGFR ml/min/1 73sq m 80 56 53 51  --  40 43 54 60  --   --  98   < > 84 92  --   --    CALCIUM mg/dL 7 8* 7 9* 7 4* 7 6*  --  7 7* 7 6* 8 0* 7 4*  --   --  7 5*   < > 7 1* 7 2*  --   --    MAGNESIUM mg/dL 2 2 2 1 2 2  --   --  2 6  --   --  2 5  --   --  2 5  --  2 7* 1 7   < >  --    PHOSPHORUS mg/dL 1 7*  --  2 1*  --   --  3 2  --   --  3 4  --   --  3 8  --  3 0 3 5  --   --    GLUCOSE, ISTAT mg/dl  --   --   --   --   --   --   --   --   --   --  157*  --   --   --   --   --  136    < > = values in this interval not displayed             RADIOLOGY RESULTS      Reviewed    ASSESSMENT/PLAN 52years old female with past medical history of asthma who presented to our facility in January 26 with complaints of abdominal pain and diarrhea and noted to have pneumoperitoneum secondary to a perforated sigmoid colon  1  Acute kidney injury:  Etiology was thought to be secondary to ischemic ATN setting of perforated viscus resulting in pneumoperitoneum plus multiple hypotensive episodes plus contrast induced nephropathy   -serum creatinine peaked up to 1 5   -noted to be hypervolemic with potential for intra-abdominal hypertension occurring secondary to being in volume overload  --initiated on Lasix 40 mg IV p r n  Induce diuresis resulting in improvement in urine output as well as improvement in serum creatinine down to 0 86mg/dL today  2  Hypervolemia:  Patient was noted to be in 13 L positive fluid balance  -excellent diuresis overnight amounting to 4 5 L   -recommend holding Lasix  3  Hypernatremia:  Serum sodium 147 and elevated suggestive of water losses  Hold further lasix  Encourage free water intake  Will sign off at this time    Call as needed      Roney Johnson MD  Nephrology  1/31/2021

## 2021-01-31 NOTE — QUICK NOTE
Call and spoke with patient's  Faraz Living to provide daily clinical update  All questions and concerns were answered at that point  Notified him of patients downgraded level of care  Provided RM number and direct phone number         Janelle Schmid PA-C

## 2021-01-31 NOTE — UTILIZATION REVIEW
Continued Stay Review    Date:       1/31                  Current Patient Class: IP   Current Level of Care: ICU     HPI:50 y o  female initially admitted on 1/26 for diarrhea and L sided abd pain   To OR for exp laparotomy , sigmoid resection on 1/26    Assessment/Plan: POD1 bring back to OR, washout, rectal stump reinforcement, fascial closure  POD3 bring back to OR, washout, attempted formation of descending colon colostomy, descending colon resection, partial omentectomy, formation of transverse colostomy, ABThera placement   POD4 exploratory laparotomy, sigmoid colon resection, ABThera placement   NG tube 150cc/24h bilious output recorded, 500cc in cannister this AM  Cr 1 22 from 1 25 , UOP 520cc/24hr, 350cc clear light yellow urine in fernandez bag this AM  APURVA 190cc/24hr serosang output  +colostomy output, 150cc stool   Ok to remove NG tube + bowel function , sips of clear liquids   Wound care   Cont fernandez , pain control ,cont abx     1/30 Nephrology Note  HARI Etiology was thought to be secondary to ischemic ATN setting of perforated viscus resulting in pneumoperitoneum plus multiple hypotensive episodes plus contrast induced nephropathy  creat peaked to 1 5  started on iv lasix prn , w/ inc UO   Hypovolemia 13 l + fluid balance   rec restrictions of further fluids  Albumin q12h prn and lasix      Pertinent Labs/Diagnostic Results:     Results from last 7 days   Lab Units 01/30/21  0619 01/29/21 2047 01/29/21  1401 01/29/21  0520   WBC Thousand/uL 17 45* 21 08*  --  14 66*   HEMOGLOBIN g/dL 7 9* 8 4* 8 4* 7 6*   HEMATOCRIT % 25 1* 27 3* 26 7* 24 1*   PLATELETS Thousands/uL 314 326  --  290   BANDS PCT % 15*  --   --  11*     Results from last 7 days   Lab Units 01/30/21  1547 01/30/21  0619 01/29/21  2047 01/29/21  0520  01/28/21  0803  01/27/21  1918  01/27/21  0503 01/26/21  2251   SODIUM mmol/L 141 145 145 142   < > 141  --  142   < > 139 139   POTASSIUM mmol/L 3 8 3 4* 3 6 3 6   < > 4 1  --  4 1   < > 3 6 3 7 CHLORIDE mmol/L 109* 110* 112* 110*   < > 109*  --  110*   < > 108 106   CO2 mmol/L 24 23 18* 22   < > 21  --  21   < > 21 21   CO2, I-STAT   --   --   --   --   --   --    < >  --   --   --   --    ANION GAP mmol/L 8 12 15* 10   < > 11  --  11   < > 10 12   BUN mg/dL 21 21 26* 25   < > 16  --  10   < > 12 14   CREATININE mg/dL 1 16 1 22 1 25 1 53*   < > 1 09  --  0 73   < > 0 83 0 77   EGFR ml/min/1 73sq m 56 53 51 40   < > 60  --  98   < > 84 92   CALCIUM mg/dL 7 9* 7 4* 7 6* 7 7*   < > 7 4*  --  7 5*   < > 7 1* 7 2*   CALCIUM, IONIZED mmol/L  --   --   --   --   --  0 96*  --   --   --  1 03* 0 99*   MAGNESIUM mg/dL 2 1 2 2  --  2 6  --  2 5  --  2 5  --  2 7* 1 7   PHOSPHORUS mg/dL  --  2 1*  --  3 2  --  3 4  --  3 8  --  3 0 3 5    < > = values in this interval not displayed       Results from last 7 days   Lab Units 01/30/21  0619 01/29/21 2047 01/27/21  1918 01/27/21  0503   AST U/L 12 10 10 8   ALT U/L 11* 10* 10* 12   ALK PHOS U/L 34* 37* 36* 37*   TOTAL PROTEIN g/dL 5 0* 4 8* 4 5* 4 7*   ALBUMIN g/dL 2 3* 2 1* 1 6* 1 7*   TOTAL BILIRUBIN mg/dL 0 50 0 40 0 50 0 60     Results from last 7 days   Lab Units 01/30/21  2023 01/30/21  1332 01/30/21  0005 01/29/21  1702 01/29/21  1230 01/29/21  0518 01/29/21  0022 01/28/21  1828 01/28/21  1333 01/28/21  0626 01/28/21  0027   POC GLUCOSE mg/dl 126 131 129 97 103 102 121 117 114 143* 154*     Results from last 7 days   Lab Units 01/30/21  1547 01/30/21  0619 01/29/21  2047 01/29/21  0520 01/28/21  2009 01/28/21  1428 01/28/21  0803 01/27/21  1918 01/27/21  1221 01/27/21  0503 01/26/21  2251   GLUCOSE RANDOM mg/dL 133 119 104 119 140 142* 157* 179* 127 161* 180*     Results from last 7 days   Lab Units 01/27/21  0503   HEMOGLOBIN A1C % 6 0*   EAG mg/dl 126     No results found for: BETA-HYDROXYBUTYRATE   Results from last 7 days   Lab Units 01/30/21  0234 01/29/21  2046 01/28/21  1612   PH ART  7 413 7 300* 7 435   PCO2 ART mm Hg 27 6* 20 4* 33 0*   PO2 ART mm Hg 106 4 142 6* 148 4*   HCO3 ART mmol/L 17 2* 9 8* 21 7*   BASE EXC ART mmol/L -6 5 -15 3 -2 1   O2 CONTENT ART mL/dL 11 4* 8 2* 13 3*   O2 HGB, ARTERIAL % 97 0 97 4* 98 1*   ABG SOURCE  Radial, Left Line, Arterial Line, Arterial     Results from last 7 days   Lab Units 01/28/21  1424   PH SHIRLEY  7 523*   PCO2 SHIRLEY mm Hg 21 0*   PO2 SHIRLEY mm Hg 197 8*   HCO3 SHIRLEY mmol/L 16 9*   BASE EXC SHIRLEY mmol/L -4 4   O2 CONTENT SHIRLEY ml/dL 14 3   O2 HGB, VENOUS % 93 7*     Results from last 7 days   Lab Units 01/28/21  0112 01/26/21  2047   I STAT BASE EXC mmol/L -4* -4*   I STAT O2 SAT % 99* 99*   ISTAT PH ART  7 361 7 370   I STAT ART PCO2 mm HG 36 4 36 7   I STAT ART PO2 mm  0* 133 0*   I STAT ART HCO3 mmol/L 20 6* 21 2*     Results from last 7 days   Lab Units 01/26/21  1622   PROTIME seconds 15 9*   INR  1 34*   PTT seconds 29     Results from last 7 days   Lab Units 01/26/21  1510   TSH 3RD GENERATON uIU/mL 2 358     Results from last 7 days   Lab Units 01/30/21  0619 01/27/21  0503 01/26/21  1622   PROCALCITONIN ng/ml 2 40* 2 50* 1 47*     Results from last 7 days   Lab Units 01/29/21  2218 01/28/21  1328 01/28/21  0029 01/27/21  1918 01/27/21  0922   LACTIC ACID mmol/L 0 9 1 0 1 1 1 1 0 9     Results from last 7 days   Lab Units 01/26/21  1510   LIPASE u/L 29*     Results from last 7 days   Lab Units 01/26/21  2252 01/26/21  1510   CLARITY UA  Clear Cloudy   COLOR UA  Yellow Yellow   SPEC GRAV UA  1 020 1 025   PH UA  6 0 6 0   GLUCOSE UA mg/dl Negative Negative   KETONES UA mg/dl Trace* Trace*   BLOOD UA  Small* Moderate*   PROTEIN UA mg/dl Trace* 100 (2+)*   NITRITE UA  Negative Positive*   BILIRUBIN UA  Negative Small*   UROBILINOGEN UA E U /dl 0 2 0 2   LEUKOCYTES UA  Negative Negative   WBC UA /hpf 0-1 None Seen   RBC UA /hpf 2-4 1-2   BACTERIA UA /hpf Occasional Innumerable*   EPITHELIAL CELLS WET PREP /hpf Occasional Innumerable*     Results from last 7 days   Lab Units 01/26/21  2150 01/26/21  1621 01/26/21  1601 BLOOD CULTURE   --  No Growth After 4 Days  No Growth After 4 Days     GRAM STAIN RESULT  2+ Polys  No bacteria seen  --   --      Results from last 7 days   Lab Units 01/30/21  0619 01/29/21  0520 01/27/21  0503 01/26/21  1510   TOTAL COUNTED  100 100 100 100        Vital Signs:   01/30/21 2300  99 °F (37 2 °C)  86  20  --  --  --  --  95 %  --  --  None (Room air)  --  Lying   01/30/21 1900  --  81  --  --  --  106/58  78 mmHg  95 %  --  --  --  --  --   01/30/21 1845  --  83  --  --  --  103/57  77 mmHg  95 %  --  --  --  --  --   01/30/21 1830  --  89  --  --  --  118/64  86 mmHg  96 %  --  --  --  --  --   01/30/21 1815  --  87  --  --  --  111/64  83 mmHg  97 %  --  --  --  --  --   01/30/21 1800  --  86  --  --  --  109/62  81 mmHg  97 %  --  --  --  --  --   01/30/21 1745  --  82  --  --  --  104/60  78 mmHg  96 %  --  --  --  --  --   01/30/21 1730  --  86  --  --  --  108/62  81 mmHg  96 %  --  --  --  --  --   01/30/21 1715  --  84  --  --  --  104/60  79 mmHg  96 %  --  --  --  --  --   01/30/21 1700  --  87  --  --  --  109/61  81 mmHg  97 %  --  --  --  --  --   01/30/21 1645  --  85  --  --  --  111/61  81 mmHg  96 %  --  --  --  --  --   01/30/21 1630  --  85  --  --  --  108/61  81 mmHg  96 %  --  --  --  --  --   01/30/21 1615  --  86  --  --  --  109/62  82 mmHg  96 %  --  --  --  --  --   01/30/21 1600  --  86  --  --  --  108/62  81 mmHg  97 %  --  --  None (Room air)  --  Lying   01/30/21 1545  --  101  --  --  --  127/70  94 mmHg  95 %  --  --  --  --  --   01/30/21 1530  --  88  --  --  --  109/62  81 mmHg  96 %  --  --  --  --  --   01/30/21 1515  --  86  --  --  --  108/60  79 mmHg  96 %  --  --  --  --  --   01/30/21 1500  --  84  --  --  --  105/59  78 mmHg  96 %  --  --  --  --  --   01/30/21 1445  --  84  --  --  --  106/61  79 mmHg  96 %  --  --  --  --  --   01/30/21 1430  --  82  --  --  --  106/61  79 mmHg  96 %  --  --  --  --  --   01/30/21 1415  --  83  --  --  --  106/60  79 mmHg  96 %  --  --  --  --  --   01/30/21 1400  --  90  --  --  --  119/67  88 mmHg  96 %  --  --  --  --  --   01/30/21 1345  --  89  --  --  --  117/64  85 mmHg  96 %  --  --  --  --  --   01/30/21 1330  --  86  --  --  --  108/63  82 mmHg  98 %  --  --  None (Room air)  --  --   01/30/21 1315  --  84  --  --  --  108/61  80 mmHg  98 %  --  --  --  --  --   01/30/21 1300  --  82  --  --  --  113/61  82 mmHg  99 %  --  --  --  --  --   01/30/21 1245  --  81  --  --  --  110/60  80 mmHg  99 %  --  --  --  --  --   01/30/21 1230  --  85  --  --  --  116/63  85 mmHg  99 %  --  --  --  --  --   01/30/21 1215  --  80  --  --  --  106/57  77 mmHg  99 %  --  --  --  --  --   01/30/21 1200  --  83  --  --  --  109/59  79 mmHg  99 %  --  --  --  --  --   01/30/21 1145  --  85  --  --  --  113/61  81 mmHg  99 %  --  --  --  --  --   01/30/21 1130  99 3 °F (37 4 °C)  85  --  --  --  109/59  78 mmHg  98 %  --  --  --  --  --   01/30/21 1115  --  84  --  --  --  112/60  79 mmHg  99 %  --  --  --  --  --   01/30/21 1100  --  86  --  --  --  112/59  80 mmHg  98 %  --  --  --  --  --   01/30/21 1045  --  84  --  --  --  112/59  78 mmHg  98 %  --  --  --  --  --   01/30/21 1030  --  84  --  --  --  122/69  89 mmHg  97 %  --  --  --  --  --   01/30/21 1015  --  90  --  --  --  107/60  79 mmHg  97 %  --  --  --  --  --   01/30/21 1000  --  94  --  --  --  100/59  77 mmHg  95 %  --  --  --  --  --   01/30/21 0915  --  82  --  --  --  105/53  72 mmHg  98 %  --  --  --  --  --   01/30/21 0900  --  83  --  --  --  107/53  73 mmHg  98 %  --  --  --  --  --   01/30/21 0845  99 9 °F (37 7 °C)  85  --  --  --  103/52  70 mmHg  98 %  --  --  --  --  --   01/30/21 0830  --  86  20  --  --  106/53  72 mmHg  98 %  --  2 L/min  Nasal cannula  --  Lying   01/30/21 0815  --  87  --  --  --  102/50  69 mmHg  98 %  --  --  --  --  --   01/30/21 0800  --  83  --  114/59  81  100/50  68 mmHg  98 %  --  --  --  --  --   01/30/21 0745  --  81  --  --  -- 96/48  66 mmHg  99 %  --  --  --  --  --   01/30/21 0730  --  89  --  --  --  106/52  72 mmHg  99 %  --  --  --  --  --   01/30/21 0715  --  88  --  --  --  98/49  67 mmHg  99 %  --  --  --  --  --   01/30/21 0700  --  93  --  127/59  85  102/51  69 mmHg  98 %  --  --  --  --  --   01/30/21 0600  --  88  --  110/55  75  92/46  63 mmHg  99 %  --  --  --  --  --   01/30/21 0500  --  94  --  123/65  84  96/48  66 mmHg  99 %  --  --  --  --  --   01/30/21 0400  --  99  --  101/61  72  89/48  64 mmHg  99 %  --  --  --  --  --   01/30/21 0300  --  96  --  122/58  84  89/47  64 mmHg  98 %  --  --  --  --  --   01/30/21 0200  --  99  --  109/54  77  88/44  59 mmHg  97 %  --  --  --  --  --   01/30/21 0100  --  97  --  --  --  86/43  58 mmHg  95 %  --  --  --  --  --   01/30/21 0000  --  92  --  --  --  96/45  61 mmHg                     Medications:   Scheduled Medications:  calcium gluconate, 1 g, Intravenous, Once  cefepime, 2,000 mg, Intravenous, Q12H  heparin (porcine), 5,000 Units, Subcutaneous, Q8H [address]  insulin lispro, 1-6 Units, Subcutaneous, Q6H LUI  lidocaine, 2 patch, Topical, Daily  metroNIDAZOLE, 500 mg, Intravenous, Q8H  nystatin, 500,000 Units, Swish & Swallow, 4x Daily      Continuous IV Infusions:     PRN Meds:  albuterol, 2 5 mg, Nebulization, Q6H PRN  HYDROmorphone, 0 5 mg, Intravenous, Q4H PRN    Or  HYDROmorphone, 1 mg, Intravenous, Q4H PRN 1/30 x4  ondansetron, 4 mg, Intravenous, Q6H PRN        Discharge Plan: TBD     Network Utilization Review Department  ATTENTION: Please call with any questions or concerns to [phone] and carefully listen to the prompts so that you are directed to the right person  All voicemails are confidential   Carlos Ron all requests for admission clinical reviews, approved or denied determinations and any other requests to dedicated fax number below belonging to the campus where the patient is receiving treatment   List of dedicated fax numbers for the Facilities:  FACILITY NAME UR FAX NUMBER   ADMISSION DENIALS (Administrative/Medical Necessity) [phone]   PARENT [facility]ILD [facility]ALTH (Maternity/NICU/Pediatrics) [address] [facility],[address]  [phone]   Jasmin Marvin 0739 (  Kayla Pizano "Suzanne" 103) [address] [address] [facility] [phone]   [place] [address] [phone]

## 2021-01-31 NOTE — PROGRESS NOTES
Progress Note - General Surgery   Wily Rasmussen 52 y o  female MRN: [id number]  Unit/Bed#:  Encounter: [id number]    Assessment/Plan  Wily Rasmussen is a 52 y o  female     POD2 bring back to OR, washout, rectal stump reinforcement, fascial closure  POD4 bring back to OR, washout, attempted formation of descending colon colostomy, descending colon resection, partial omentectomy, formation of transverse colostomy, ABThera placement   POD5 exploratory laparotomy, sigmoid colon resection, ABThera placement  AVSS, WBC 15 67 from 17  Cr 0 86 from 1 22 , 4 975L/24hr following diuresis  APURVA 80cc/24hr serosang output  Small amount of stool in colostomy    Continue NPO with sips of liquids until more significant bowel function given reports of belching following PO intake yesterday   If patient develops nausea/vomiting, will need to reinsert NG tube  HARI resolved, Cr at baseline, ok to discontinue fernandez   Serial abdominal exams  Ambulation/OOB  Pain control PRN  Packing changes performed today, continue with daily packing changes over midline incision and former colostomy site   Wound care consult for ostomy education  Follow up with final pathology results  Preliminary findings include UC in differential  May require gastroenterology consult if confirmed on final pathology  IS  Remainder of care per critical care team    Subjective/Objective     Subjective: No acute events overnight     Objective:     Blood pressure 126/66, pulse 81, temperature 98 9 °F (37 2 °C), temperature source Oral, resp  rate (!) 9, height 5' 4" (1 626 m), weight 108 kg (237 lb 7 oz), last menstrual period 01/01/2021, SpO2 97 %  ,Body mass index is 40 76 kg/m²        Intake/Output Summary (Last 24 hours) at 1/31/2021 1039  Last data filed at 1/31/2021 0601  Gross per 24 hour   Intake 610 ml   Output 1375 ml   Net -765 ml       Invasive Devices     Peripheral Intravenous Line            Peripheral IV 01/30/21 Right Wrist less than 1 day Arterial Line            Arterial Line 01/26/21 Left Radial 4 days          Drain            Urethral Catheter Non-latex;Straight-tip 16 Fr  4 days    Colostomy Transverse 3 days    Closed/Suction Drain RLQ Bulb 1 day                Physical Exam: /66 (BP Location: Right arm)   Pulse 81   Temp 98 9 °F (37 2 °C) (Oral)   Resp (!) 9   Ht 5' 4" (1 626 m)   Wt 108 kg (237 lb 7 oz)   LMP 01/01/2021 (Approximate)   SpO2 97%   BMI 40 76 kg/m²   General appearance: alert and oriented, in no acute distress  Lungs: clear to auscultation bilaterally  Heart: regular rate and rhythm, S1, S2 normal, no murmur, click, rub or gallop  Abdomen: soft, mildly distended, colostomy pink and patent, small amount of liquid stool in bag and around os of ostomy, incision intact with serous drainage, no surrounding erythema or edema, APURVA serosang  Extremities: extremities normal, warm and well-perfused; no cyanosis, clubbing, or edema    Lab, Imaging and other studies:I have personally reviewed pertinent lab results       VTE Pharmacologic Prophylaxis: Heparin  VTE Mechanical Prophylaxis: sequential compression device    Recent Results (from the past 36 hour(s))   Fingerstick Glucose (POCT)    Collection Time: 01/30/21 12:05 AM   Result Value Ref Range    POC Glucose 129 65 - 140 mg/dl   Blood gas, arterial    Collection Time: 01/30/21  2:34 AM   Result Value Ref Range    pH, Arterial 7 413 7 350 - 7 450    pCO2, Arterial 27 6 (LL) 36 0 - 44 0 mm Hg    pO2, Arterial 106 4 75 0 - 129 0 mm Hg    HCO3, Arterial 17 2 (L) 22 0 - 28 0 mmol/L    Base Excess, Arterial -6 5 mmol/L    O2 Content, Arterial 11 4 (L) 16 0 - 23 0 mL/dL    O2 HGB,Arterial  97 0 94 0 - 97 0 %    SOURCE Radial, Left     HUNTER TEST Yes     Temperature 98 4 Degrees Fehrenheit    Nasal Cannula 2    CBC and differential    Collection Time: 01/30/21  6:19 AM   Result Value Ref Range    WBC 17 45 (H) 4 31 - 10 16 Thousand/uL    RBC 2 88 (L) 3 81 - 5 12 Million/uL Hemoglobin 7 9 (L) 11 5 - 15 4 g/dL    Hematocrit 25 1 (L) 34 8 - 46 1 %    MCV 87 82 - 98 fL    MCH 27 4 26 8 - 34 3 pg    MCHC 31 5 31 4 - 37 4 g/dL    RDW 14 6 11 6 - 15 1 %    MPV 9 7 8 9 - 12 7 fL    Platelets 495 331 - 645 Thousands/uL    nRBC 0 /100 WBCs   Comprehensive metabolic panel    Collection Time: 01/30/21  6:19 AM   Result Value Ref Range    Sodium 145 136 - 145 mmol/L    Potassium 3 4 (L) 3 5 - 5 3 mmol/L    Chloride 110 (H) 100 - 108 mmol/L    CO2 23 21 - 32 mmol/L    ANION GAP 12 4 - 13 mmol/L    BUN 21 5 - 25 mg/dL    Creatinine 1 22 0 60 - 1 30 mg/dL    Glucose 119 65 - 140 mg/dL    Calcium 7 4 (L) 8 3 - 10 1 mg/dL    Corrected Calcium 8 8 8 3 - 10 1 mg/dL    AST 12 5 - 45 U/L    ALT 11 (L) 12 - 78 U/L    Alkaline Phosphatase 34 (L) 46 - 116 U/L    Total Protein 5 0 (L) 6 4 - 8 2 g/dL    Albumin 2 3 (L) 3 5 - 5 0 g/dL    Total Bilirubin 0 50 0 20 - 1 00 mg/dL    eGFR 53 ml/min/1 73sq m   Magnesium    Collection Time: 01/30/21  6:19 AM   Result Value Ref Range    Magnesium 2 2 1 6 - 2 6 mg/dL   Phosphorus    Collection Time: 01/30/21  6:19 AM   Result Value Ref Range    Phosphorus 2 1 (L) 2 7 - 4 5 mg/dL   Procalcitonin with AM Reflex    Collection Time: 01/30/21  6:19 AM   Result Value Ref Range    Procalcitonin 2 40 (H) <=0 25 ng/ml   Manual Differential(PHLEBS Do Not Order)    Collection Time: 01/30/21  6:19 AM   Result Value Ref Range    Segmented % 73 43 - 75 %    Bands % 15 (H) 0 - 8 %    Lymphocytes % 4 (L) 14 - 44 %    Monocytes % 2 (L) 4 - 12 %    Eosinophils, % 0 0 - 6 %    Basophils % 0 0 - 1 %    Metamyelocytes% 2 (H) 0 - 1 %    Myelocytes % 4 (H) 0 - 1 %    Absolute Neutrophils 15 36 (H) 1 85 - 7 62 Thousand/uL    Lymphocytes Absolute 0 70 0 60 - 4 47 Thousand/uL    Monocytes Absolute 0 35 0 00 - 1 22 Thousand/uL    Eosinophils Absolute 0 00 0 00 - 0 40 Thousand/uL    Basophils Absolute 0 00 0 00 - 0 10 Thousand/uL    Total Counted 100     Anisocytosis Present     Platelet Estimate Adequate Adequate   Fingerstick Glucose (POCT)    Collection Time: 01/30/21  1:32 PM   Result Value Ref Range    POC Glucose 131 65 - 140 mg/dl   Basic metabolic panel    Collection Time: 01/30/21  3:47 PM   Result Value Ref Range    Sodium 141 136 - 145 mmol/L    Potassium 3 8 3 5 - 5 3 mmol/L    Chloride 109 (H) 100 - 108 mmol/L    CO2 24 21 - 32 mmol/L    ANION GAP 8 4 - 13 mmol/L    BUN 21 5 - 25 mg/dL    Creatinine 1 16 0 60 - 1 30 mg/dL    Glucose 133 65 - 140 mg/dL    Calcium 7 9 (L) 8 3 - 10 1 mg/dL    eGFR 56 ml/min/1 73sq m   Magnesium    Collection Time: 01/30/21  3:47 PM   Result Value Ref Range    Magnesium 2 1 1 6 - 2 6 mg/dL   Prepare Leukoreduced RBC: 2 Units    Collection Time: 01/30/21  7:57 PM   Result Value Ref Range    Unit Product Code K3333Z53     Unit Number [id number]     Unit ABO A     Unit RH POS     Crossmatch Compatible     Unit Dispense Status Return to [facility]     Unit Product Code T2379P90     Unit Number [id number]     Unit ABO A     Unit RH POS     Crossmatch Compatible     Unit Dispense Status Return to [facility]    Fingerstick Glucose (POCT)    Collection Time: 01/30/21  8:23 PM   Result Value Ref Range    POC Glucose 126 65 - 140 mg/dl   Fingerstick Glucose (POCT)    Collection Time: 01/31/21 12:19 AM   Result Value Ref Range    POC Glucose 110 65 - 140 mg/dl   CBC and differential    Collection Time: 01/31/21  6:25 AM   Result Value Ref Range    WBC 15 67 (H) 4 31 - 10 16 Thousand/uL    RBC 2 97 (L) 3 81 - 5 12 Million/uL    Hemoglobin 8 2 (L) 11 5 - 15 4 g/dL    Hematocrit 25 8 (L) 34 8 - 46 1 %    MCV 87 82 - 98 fL    MCH 27 6 26 8 - 34 3 pg    MCHC 31 8 31 4 - 37 4 g/dL    RDW 14 8 11 6 - 15 1 %    MPV 10 0 8 9 - 12 7 fL    Platelets 866 011 - 876 Thousands/uL    nRBC 0 /100 WBCs   Comprehensive metabolic panel    Collection Time: 01/31/21  6:25 AM   Result Value Ref Range    Sodium 147 (H) 136 - 145 mmol/L    Potassium 3 8 3 5 - 5 3 mmol/L    Chloride 111 (H) 100 - 108 mmol/L    CO2 25 21 - 32 mmol/L    ANION GAP 11 4 - 13 mmol/L    BUN 20 5 - 25 mg/dL    Creatinine 0 86 0 60 - 1 30 mg/dL    Glucose 117 65 - 140 mg/dL    Calcium 7 8 (L) 8 3 - 10 1 mg/dL    Corrected Calcium 9 4 8 3 - 10 1 mg/dL    AST 12 5 - 45 U/L    ALT 11 (L) 12 - 78 U/L    Alkaline Phosphatase 38 (L) 46 - 116 U/L    Total Protein 5 2 (L) 6 4 - 8 2 g/dL    Albumin 2 0 (L) 3 5 - 5 0 g/dL    Total Bilirubin 0 40 0 20 - 1 00 mg/dL    eGFR 80 ml/min/1 73sq m   Magnesium    Collection Time: 01/31/21  6:25 AM   Result Value Ref Range    Magnesium 2 2 1 6 - 2 6 mg/dL   Phosphorus    Collection Time: 01/31/21  6:25 AM   Result Value Ref Range    Phosphorus 1 7 (L) 2 7 - 4 5 mg/dL   Manual Differential(PHLEBS Do Not Order)    Collection Time: 01/31/21  6:25 AM   Result Value Ref Range    Segmented % 78 (H) 43 - 75 %    Bands % 12 (H) 0 - 8 %    Lymphocytes % 6 (L) 14 - 44 %    Monocytes % 2 (L) 4 - 12 %    Eosinophils, % 1 0 - 6 %    Basophils % 0 0 - 1 %    Metamyelocytes% 1 0 - 1 %    Absolute Neutrophils 14 10 (H) 1 85 - 7 62 Thousand/uL    Lymphocytes Absolute 0 94 0 60 - 4 47 Thousand/uL    Monocytes Absolute 0 31 0 00 - 1 22 Thousand/uL    Eosinophils Absolute 0 16 0 00 - 0 40 Thousand/uL    Basophils Absolute 0 00 0 00 - 0 10 Thousand/uL    Total Counted 100     Toxic Granulation Present     Anisocytosis Present     Platelet Estimate Adequate Adequate

## 2021-02-01 PROBLEM — N17.9 ACUTE RENAL FAILURE (ARF) (HCC): Status: ACTIVE | Noted: 2021-02-01

## 2021-02-01 LAB
ALBUMIN SERPL BCP-MCNC: 2 G/DL (ref 3.5–5)
ALP SERPL-CCNC: 41 U/L (ref 46–116)
ALT SERPL W P-5'-P-CCNC: 12 U/L (ref 12–78)
ANION GAP SERPL CALCULATED.3IONS-SCNC: 12 MMOL/L (ref 4–13)
AST SERPL W P-5'-P-CCNC: 10 U/L (ref 5–45)
BASOPHILS # BLD AUTO: 0.04 THOUSANDS/ΜL (ref 0–0.1)
BASOPHILS NFR BLD AUTO: 0 % (ref 0–1)
BILIRUB SERPL-MCNC: 0.4 MG/DL (ref 0.2–1)
BUN SERPL-MCNC: 18 MG/DL (ref 5–25)
CALCIUM ALBUM COR SERPL-MCNC: 9.5 MG/DL (ref 8.3–10.1)
CALCIUM SERPL-MCNC: 7.9 MG/DL (ref 8.3–10.1)
CHLORIDE SERPL-SCNC: 111 MMOL/L (ref 100–108)
CO2 SERPL-SCNC: 25 MMOL/L (ref 21–32)
CREAT SERPL-MCNC: 0.75 MG/DL (ref 0.6–1.3)
EOSINOPHIL # BLD AUTO: 0.04 THOUSAND/ΜL (ref 0–0.61)
EOSINOPHIL NFR BLD AUTO: 0 % (ref 0–6)
ERYTHROCYTE [DISTWIDTH] IN BLOOD BY AUTOMATED COUNT: 14.8 % (ref 11.6–15.1)
GFR SERPL CREATININE-BSD FRML MDRD: 95 ML/MIN/1.73SQ M
GLUCOSE SERPL-MCNC: 123 MG/DL (ref 65–140)
GLUCOSE SERPL-MCNC: 131 MG/DL (ref 65–140)
GLUCOSE SERPL-MCNC: 134 MG/DL (ref 65–140)
GLUCOSE SERPL-MCNC: 134 MG/DL (ref 65–140)
GLUCOSE SERPL-MCNC: 143 MG/DL (ref 65–140)
HCT VFR BLD AUTO: 27.1 % (ref 34.8–46.1)
HGB BLD-MCNC: 8.4 G/DL (ref 11.5–15.4)
IMM GRANULOCYTES # BLD AUTO: 0.26 THOUSAND/UL (ref 0–0.2)
IMM GRANULOCYTES NFR BLD AUTO: 2 % (ref 0–2)
LYMPHOCYTES # BLD AUTO: 0.68 THOUSANDS/ΜL (ref 0.6–4.47)
LYMPHOCYTES NFR BLD AUTO: 6 % (ref 14–44)
MAGNESIUM SERPL-MCNC: 2.1 MG/DL (ref 1.6–2.6)
MCH RBC QN AUTO: 27.5 PG (ref 26.8–34.3)
MCHC RBC AUTO-ENTMCNC: 31 G/DL (ref 31.4–37.4)
MCV RBC AUTO: 89 FL (ref 82–98)
MONOCYTES # BLD AUTO: 0.49 THOUSAND/ΜL (ref 0.17–1.22)
MONOCYTES NFR BLD AUTO: 4 % (ref 4–12)
NEUTROPHILS # BLD AUTO: 10.4 THOUSANDS/ΜL (ref 1.85–7.62)
NEUTS SEG NFR BLD AUTO: 88 % (ref 43–75)
NRBC BLD AUTO-RTO: 0 /100 WBCS
PHOSPHATE SERPL-MCNC: 1.6 MG/DL (ref 2.7–4.5)
PHOSPHATE SERPL-MCNC: 2.1 MG/DL (ref 2.7–4.5)
PLATELET # BLD AUTO: 492 THOUSANDS/UL (ref 149–390)
PMV BLD AUTO: 9.9 FL (ref 8.9–12.7)
POTASSIUM SERPL-SCNC: 3.5 MMOL/L (ref 3.5–5.3)
POTASSIUM SERPL-SCNC: 3.8 MMOL/L (ref 3.5–5.3)
PROT SERPL-MCNC: 5.6 G/DL (ref 6.4–8.2)
RBC # BLD AUTO: 3.06 MILLION/UL (ref 3.81–5.12)
SODIUM SERPL-SCNC: 148 MMOL/L (ref 136–145)
WBC # BLD AUTO: 11.91 THOUSAND/UL (ref 4.31–10.16)

## 2021-02-01 PROCEDURE — 83735 ASSAY OF MAGNESIUM: CPT | Performed by: NURSE PRACTITIONER

## 2021-02-01 PROCEDURE — 80053 COMPREHEN METABOLIC PANEL: CPT | Performed by: NURSE PRACTITIONER

## 2021-02-01 PROCEDURE — 84100 ASSAY OF PHOSPHORUS: CPT | Performed by: NURSE PRACTITIONER

## 2021-02-01 PROCEDURE — 99232 SBSQ HOSP IP/OBS MODERATE 35: CPT | Performed by: FAMILY MEDICINE

## 2021-02-01 PROCEDURE — 85025 COMPLETE CBC W/AUTO DIFF WBC: CPT | Performed by: NURSE PRACTITIONER

## 2021-02-01 PROCEDURE — 82948 REAGENT STRIP/BLOOD GLUCOSE: CPT

## 2021-02-01 PROCEDURE — 84132 ASSAY OF SERUM POTASSIUM: CPT | Performed by: STUDENT IN AN ORGANIZED HEALTH CARE EDUCATION/TRAINING PROGRAM

## 2021-02-01 PROCEDURE — 93005 ELECTROCARDIOGRAM TRACING: CPT

## 2021-02-01 PROCEDURE — 99024 POSTOP FOLLOW-UP VISIT: CPT | Performed by: SURGERY

## 2021-02-01 PROCEDURE — 84100 ASSAY OF PHOSPHORUS: CPT | Performed by: STUDENT IN AN ORGANIZED HEALTH CARE EDUCATION/TRAINING PROGRAM

## 2021-02-01 RX ADMIN — NYSTATIN 500000 UNITS: 100000 SUSPENSION ORAL at 09:52

## 2021-02-01 RX ADMIN — HEPARIN SODIUM 5000 UNITS: 5000 INJECTION INTRAVENOUS; SUBCUTANEOUS at 06:22

## 2021-02-01 RX ADMIN — NYSTATIN 500000 UNITS: 100000 SUSPENSION ORAL at 21:29

## 2021-02-01 RX ADMIN — ACETAMINOPHEN 975 MG: 325 TABLET, FILM COATED ORAL at 06:22

## 2021-02-01 RX ADMIN — HYDROMORPHONE HYDROCHLORIDE 1 MG: 1 INJECTION, SOLUTION INTRAMUSCULAR; INTRAVENOUS; SUBCUTANEOUS at 13:21

## 2021-02-01 RX ADMIN — METRONIDAZOLE 500 MG: 500 INJECTION, SOLUTION INTRAVENOUS at 00:25

## 2021-02-01 RX ADMIN — CEFEPIME HYDROCHLORIDE 2000 MG: 2 INJECTION, POWDER, FOR SOLUTION INTRAVENOUS at 06:20

## 2021-02-01 RX ADMIN — METRONIDAZOLE 500 MG: 500 INJECTION, SOLUTION INTRAVENOUS at 09:52

## 2021-02-01 RX ADMIN — HYDROMORPHONE HYDROCHLORIDE 1 MG: 1 INJECTION, SOLUTION INTRAMUSCULAR; INTRAVENOUS; SUBCUTANEOUS at 07:37

## 2021-02-01 RX ADMIN — METRONIDAZOLE 500 MG: 500 INJECTION, SOLUTION INTRAVENOUS at 16:20

## 2021-02-01 RX ADMIN — POTASSIUM PHOSPHATE, MONOBASIC AND POTASSIUM PHOSPHATE, DIBASIC 21 MMOL: 224; 236 INJECTION, SOLUTION, CONCENTRATE INTRAVENOUS at 14:56

## 2021-02-01 RX ADMIN — HEPARIN SODIUM 5000 UNITS: 5000 INJECTION INTRAVENOUS; SUBCUTANEOUS at 21:30

## 2021-02-01 RX ADMIN — HYDROMORPHONE HYDROCHLORIDE 1 MG: 1 INJECTION, SOLUTION INTRAMUSCULAR; INTRAVENOUS; SUBCUTANEOUS at 19:58

## 2021-02-01 RX ADMIN — NYSTATIN 500000 UNITS: 100000 SUSPENSION ORAL at 13:18

## 2021-02-01 RX ADMIN — LIDOCAINE 5% 2 PATCH: 700 PATCH TOPICAL at 09:52

## 2021-02-01 RX ADMIN — ACETAMINOPHEN 975 MG: 325 TABLET, FILM COATED ORAL at 13:18

## 2021-02-01 RX ADMIN — CEFEPIME HYDROCHLORIDE 2000 MG: 2 INJECTION, POWDER, FOR SOLUTION INTRAVENOUS at 18:01

## 2021-02-01 RX ADMIN — HEPARIN SODIUM 5000 UNITS: 5000 INJECTION INTRAVENOUS; SUBCUTANEOUS at 13:18

## 2021-02-01 RX ADMIN — NYSTATIN 500000 UNITS: 100000 SUSPENSION ORAL at 17:06

## 2021-02-01 RX ADMIN — ACETAMINOPHEN 975 MG: 325 TABLET, FILM COATED ORAL at 21:29

## 2021-02-01 NOTE — PROGRESS NOTES
Progress Note - Pena Steve 1970, 52 y o  female MRN: [id number]    Unit/Bed#: -01 Encounter: [id number]    Primary Care Provider: Orlando Yuo MD   Date and time admitted to hospital: 2021  2:12 PM        Perforation of sigmoid colon due to diverticulitis  Assessment & Plan  · Status post Ex lap/sigmoid, descending colon & transverse colon resection status post colostomy; postop #6  · Currently NPO  · On p r n  Dilaudid/Zofran  · On IV cefepime/Flagyl  Follow up on intraoperative cultures  Acute renal failure (ARF) (Formerly [facility])  Assessment & Plan  · Creatinine 0 75  · I/O 135/7949  · The patient was receiving p r n  Lasix to assist diuresis  Now Lasix on hold  · Likely acute renal failure secondary to ischemic ATN due to perforated viscus  · Monitor I&Os/creatinine closely  Hypothyroid  Assessment & Plan  Continue with Synthroid  Mild persistent asthma  Assessment & Plan  The patient not hypoxic  Continue with p r n  Albuterol/albuterol inhaler    VTE Pharmacologic Prophylaxis:     Pharmacologic: Heparin    Time Spent for Care: 30 minutes  More than 50% of total time spent on counseling and coordination of care as described above  Current Length of Stay: 6 day(s)    Current Patient Status: Inpatient   Certification Statement: The patient will continue to require additional inpatient hospital stay due to still NPO, on prn iv antiemetics/narcotics      Code Status: Level 1 - Full Code      Subjective:   Patient reports soreness around surgical site  Some nausea but no episodes of vomiting  She does have brown watery output in her colostomy bag  No fevers chills    Objective:     Vitals:   Temp (24hrs), Av 5 °F (36 9 °C), Min:98 2 °F (36 8 °C), Max:99 2 °F (37 3 °C)    Temp:  [98 2 °F (36 8 °C)-99 2 °F (37 3 °C)] 98 2 °F (36 8 °C)  HR:  [72-85] 72  Resp:  [18-19] 19  BP: (120-143)/(73-83) 143/73  SpO2:  [94 %-96 %] 94 %  Body mass index is 40 76 kg/m²       Input and Output Summary (last 24 hours): Intake/Output Summary (Last 24 hours) at 2/1/2021 1218  Last data filed at 2/1/2021 1120  Gross per 24 hour   Intake 60 ml   Output 645 ml   Net -585 ml       Physical Exam:     Physical Exam  Vitals signs reviewed  Constitutional:       Appearance: Normal appearance  Comments: Appears uncomfortable due to abdominal tenderness   HENT:      Head: Normocephalic and atraumatic  Nose: Nose normal       Mouth/Throat:      Mouth: Mucous membranes are moist       Pharynx: Oropharynx is clear  Neck:      Musculoskeletal: Normal range of motion and neck supple  Cardiovascular:      Rate and Rhythm: Normal rate and regular rhythm  Pulses: Normal pulses  Pulmonary:      Effort: Pulmonary effort is normal       Breath sounds: Normal breath sounds  Abdominal:      Comments: Soft,  Mildly distended,  Tenderness + but no guarding/rigidity  Hypoactive BS +  Colostomy bag noting watery brown material   Musculoskeletal: Normal range of motion  Skin:     General: Skin is warm and dry  Neurological:      General: No focal deficit present  Mental Status: She is alert and oriented to person, place, and time             Additional Data:     Labs:    Results from last 7 days   Lab Units 02/01/21  0602 01/31/21  0625   WBC Thousand/uL 11 91* 15 67*   HEMOGLOBIN g/dL 8 4* 8 2*   HEMATOCRIT % 27 1* 25 8*   PLATELETS Thousands/uL 492* 389   BANDS PCT %  --  12*   NEUTROS PCT % 88*  --    LYMPHS PCT % 6*  --    LYMPHO PCT %  --  6*   MONOS PCT % 4  --    MONO PCT %  --  2*   EOS PCT % 0 1     Results from last 7 days   Lab Units 02/01/21  0602   SODIUM mmol/L 148*   POTASSIUM mmol/L 3 5   CHLORIDE mmol/L 111*   CO2 mmol/L 25   BUN mg/dL 18   CREATININE mg/dL 0 75   ANION GAP mmol/L 12   CALCIUM mg/dL 7 9*   ALBUMIN g/dL 2 0*   TOTAL BILIRUBIN mg/dL 0 40   ALK PHOS U/L 41*   ALT U/L 12   AST U/L 10   GLUCOSE RANDOM mg/dL 134     Results from last 7 days   Lab Units 01/26/21  1622 INR  1 34*     Results from last 7 days   Lab Units 02/01/21  0451 02/01/21  0024 01/31/21  1704 01/31/21  1310 01/31/21  0019 01/30/21  2023 01/30/21  1332 01/30/21  0005 01/29/21  1702 01/29/21  1230 01/29/21  0518 01/29/21  0022   POC GLUCOSE mg/dl 131 143* 120 114 110 126 131 129 97 103 102 121     Results from last 7 days   Lab Units 01/27/21  0503   HEMOGLOBIN A1C % 6 0*     Results from last 7 days   Lab Units 01/31/21  0625 01/30/21  0619 01/29/21  2218 01/28/21  1328 01/28/21  0029 01/27/21  1918  01/27/21  0503  01/26/21  1622   LACTIC ACID mmol/L  --   --  0 9 1 0 1 1 1 1   < >  --    < > 1 6   PROCALCITONIN ng/ml 1 22* 2 40*  --   --   --   --   --  2 50*  --  1 47*    < > = values in this interval not displayed  * I Have Reviewed All Lab Data Listed Above  * Additional Pertinent Lab Tests Reviewed: All Labs Within Last 24 Hours Reviewed    Recent Cultures (last 7 days):     Results from last 7 days   Lab Units 01/26/21  2150 01/26/21  1621 01/26/21  1601   BLOOD CULTURE   --  No Growth After 5 Days  No Growth After 5 Days     GRAM STAIN RESULT  2+ Polys  No bacteria seen  --   --        Last 24 Hours Medication List:   Current Facility-Administered Medications   Medication Dose Route Frequency Provider Last Rate    acetaminophen  975 mg Oral Q8H [address] Paula Rubio MD      albuterol  2 5 mg Nebulization Q6H PRN Alex Kelley PA-C      cefepime  2,000 mg Intravenous Q12H Marina Chilel PA-C 2,000 mg (02/01/21 0620)    heparin (porcine)  5,000 Units Subcutaneous [facility] Marina [place], [place]      HYDROmorphone  0 5 mg Intravenous Q4H PRN Paula Rubio MD      Or   [facility] HYDROmorphone  1 mg Intravenous Q4H PRN Paula Rubio MD      insulin lispro  1-6 Units Subcutaneous Q6H [address] [place], [place]      lidocaine  2 patch Topical Daily Paula Rubio MD      metroNIDAZOLE  500 mg Intravenous Q8H Marina Chilel PA-C 500 mg (02/01/21 0492)    nystatin  500,000 Units Swish & Swallow 4x Daily Marina Newman PA-C      ondansetron  4 mg Intravenous Q6H PRN Mali Mccurdy PA-C          Today, Patient Was Seen By: JOANN Story      ** Please Note: Dictation voice to text software may have been used in the creation of this document   **

## 2021-02-01 NOTE — PLAN OF CARE
Problem: Potential for Falls  Goal: Patient will remain free of falls  Description: INTERVENTIONS:  - Assess patient frequently for physical needs  -  Identify cognitive and physical deficits and behaviors that affect risk of falls    -  [place] fall precautions as indicated by assessment   - Educate patient/family on patient safety including physical limitations  - Instruct patient to call for assistance with activity based on assessment  - Modify environment to reduce risk of injury  - Consider OT/PT consult to assist with strengthening/mobility  Outcome: Progressing     Problem: Prexisting or High Potential for Compromised Skin Integrity  Goal: Skin integrity is maintained or improved  Description: INTERVENTIONS:  - Identify patients at risk for skin breakdown  - Assess and monitor skin integrity  - Assess and monitor nutrition and hydration status  - Monitor labs   - Assess for incontinence   - Turn and reposition patient  - Assist with mobility/ambulation  - Relieve pressure over bony prominences  - Avoid friction and shearing  - Provide appropriate hygiene as needed including keeping skin clean and dry  - Evaluate need for skin moisturizer/barrier cream  - Collaborate with interdisciplinary team   - Patient/family teaching  - Consider wound care consult   Outcome: Progressing     Problem: DISCHARGE PLANNING - CARE MANAGEMENT  Goal: Discharge to post-acute care or home with appropriate resources  Description: INTERVENTIONS:  - Conduct assessment to determine patient/family and health care team treatment goals, and need for post-acute services based on payer coverage, community resources, and patient preferences, and barriers to discharge  - Address psychosocial, clinical, and financial barriers to discharge as identified in assessment in conjunction with the patient/family and health care team  - Arrange appropriate level of post-acute services according to patients   needs and preference and payer coverage in collaboration with the physician and health care team  - Communicate with and update the patient/family, physician, and health care team regarding progress on the discharge plan  - Arrange appropriate transportation to post-acute venues  Outcome: Progressing     Problem: PAIN - ADULT  Goal: Verbalizes/displays adequate comfort level or baseline comfort level  Description: Interventions:  - Encourage patient to monitor pain and request assistance  - Assess pain using appropriate pain scale  - Administer analgesics based on type and severity of pain and evaluate response  - Implement non-pharmacological measures as appropriate and evaluate response  - Consider cultural and social influences on pain and pain management  - Notify physician/advanced practitioner if interventions unsuccessful or patient reports new pain  Outcome: Progressing     Problem: INFECTION - ADULT  Goal: Absence or prevention of progression during hospitalization  Description: INTERVENTIONS:  - Assess and monitor for signs and symptoms of infection  - Monitor lab/diagnostic results  - Monitor all insertion sites, i e  indwelling lines, tubes, and drains  - Monitor endotracheal if appropriate and nasal secretions for changes in amount and color  - [place] appropriate cooling/warming therapies per order  - Administer medications as ordered  - Instruct and encourage patient and family to use good hand hygiene technique  - Identify and instruct in appropriate isolation precautions for identified infection/condition  Outcome: Progressing     Problem: SAFETY ADULT  Goal: Patient will remain free of falls  Description: INTERVENTIONS:  - Assess patient frequently for physical needs  -  Identify cognitive and physical deficits and behaviors that affect risk of falls    -  [place] fall precautions as indicated by assessment   - Educate patient/family on patient safety including physical limitations  - Instruct patient to call for assistance with activity based on assessment  - Modify environment to reduce risk of injury  - Consider OT/PT consult to assist with strengthening/mobility  Outcome: Progressing     Problem: DISCHARGE PLANNING  Goal: Discharge to home or other facility with appropriate resources  Description: INTERVENTIONS:  - Identify barriers to discharge w/patient and caregiver  - Arrange for needed discharge resources and transportation as appropriate  - Identify discharge learning needs (meds, wound care, etc )  - Arrange for interpretive services to assist at discharge as needed  - Refer to Case Management Department for coordinating discharge planning if the patient needs post-hospital services based on physician/advanced practitioner order or complex needs related to functional status, cognitive ability, or social support system  Outcome: Progressing     Problem: Knowledge Deficit  Goal: Patient/family/caregiver demonstrates understanding of disease process, treatment plan, medications, and discharge instructions  Description: Complete learning assessment and assess knowledge base    Interventions:  - Provide teaching at level of understanding  - Provide teaching via preferred learning methods  Outcome: Progressing     Problem: RESPIRATORY - ADULT  Goal: Achieves optimal ventilation and oxygenation  Description: INTERVENTIONS:  - Assess for changes in respiratory status  - Assess for changes in mentation and behavior  - Position to facilitate oxygenation and minimize respiratory effort  - Oxygen administered by appropriate delivery if ordered  - Initiate smoking cessation education as indicated  - Encourage broncho-pulmonary hygiene including cough, deep breathe, Incentive Spirometry  - Assess the need for suctioning and aspirate as needed  - Assess and instruct to report SOB or any respiratory difficulty  - Respiratory Therapy support as indicated  Outcome: Progressing     Problem: GASTROINTESTINAL - ADULT  Goal: Minimal or absence of nausea and/or vomiting  Description: INTERVENTIONS:  - Administer IV fluids if ordered to ensure adequate hydration  - Maintain NPO status until nausea and vomiting are resolved  - Nasogastric tube if ordered  - Administer ordered antiemetic medications as needed  - Provide nonpharmacologic comfort measures as appropriate  - Advance diet as tolerated, if ordered  - Consider nutrition services referral to assist patient with adequate nutrition and appropriate food choices  Outcome: Progressing  Goal: Maintains or returns to baseline bowel function  Description: INTERVENTIONS:  - Assess bowel function  - Encourage oral fluids to ensure adequate hydration  - Administer IV fluids if ordered to ensure adequate hydration  - Administer ordered medications as needed  - Encourage mobilization and activity  - Consider nutritional services referral to assist patient with adequate nutrition and appropriate food choices  Outcome: Progressing  Goal: Maintains adequate nutritional intake  Description: INTERVENTIONS:  - Monitor percentage of each meal consumed  - Identify factors contributing to decreased intake, treat as appropriate  - Assist with meals as needed  - Monitor I&O, weight, and lab values if indicated  - Obtain nutrition services referral as needed  Outcome: Progressing     Problem: GENITOURINARY - ADULT  Goal: Maintains or returns to baseline urinary function  Description: INTERVENTIONS:  - Assess urinary function  - Encourage oral fluids to ensure adequate hydration if ordered  - Administer IV fluids as ordered to ensure adequate hydration  - Administer ordered medications as needed  - Offer frequent toileting  - Follow urinary retention protocol if ordered  Outcome: Progressing  Goal: Absence of urinary retention  Description: INTERVENTIONS:  - Assess patients ability to void and empty bladder  - Monitor I/O  - Bladder scan as needed  - Discuss with physician/AP medications to alleviate retention as needed  - Discuss catheterization for long term situations as appropriate  Outcome: Progressing  Goal: Urinary catheter remains patent  Description: INTERVENTIONS:  - Assess patency of urinary catheter  - If patient has a chronic fernandez, consider changing catheter if non-functioning  - Follow guidelines for intermittent irrigation of non-functioning urinary catheter  Outcome: Progressing     Problem: METABOLIC, FLUID AND ELECTROLYTES - ADULT  Goal: Electrolytes maintained within normal limits  Description: INTERVENTIONS:  - Monitor labs and assess patient for signs and symptoms of electrolyte imbalances  - Administer electrolyte replacement as ordered  - Monitor response to electrolyte replacements, including repeat lab results as appropriate  - Instruct patient on fluid and nutrition as appropriate  Outcome: Progressing  Goal: Fluid balance maintained  Description: INTERVENTIONS:  - Monitor labs   - Monitor I/O and WT  - Instruct patient on fluid and nutrition as appropriate  - Assess for signs & symptoms of volume excess or deficit  Outcome: Progressing  Goal: Glucose maintained within target range  Description: INTERVENTIONS:  - Monitor Blood Glucose as ordered  - Assess for signs and symptoms of hyperglycemia and hypoglycemia  - Administer ordered medications to maintain glucose within target range  - Assess nutritional intake and initiate nutrition service referral as needed  Outcome: Progressing     Problem: Nutrition/Hydration-ADULT  Goal: Nutrient/Hydration intake appropriate for improving, restoring or maintaining nutritional needs  Description: Monitor and assess patient's nutrition/hydration status for malnutrition  Collaborate with interdisciplinary team and initiate plan and interventions as ordered  Monitor patient's weight and dietary intake as ordered or per policy  Utilize nutrition screening tool and intervene as necessary   Determine patient's food preferences and provide high-protein, high-caloric foods as appropriate       INTERVENTIONS:  - Monitor oral intake, urinary output, labs, and treatment plans  - Assess nutrition and hydration status and recommend course of action  - Evaluate amount of meals eaten  - Assist patient with eating if necessary   - Allow adequate time for meals  - Recommend/ encourage appropriate diets, oral nutritional supplements, and vitamin/mineral supplements  - Order, calculate, and assess calorie counts as needed  - Recommend, monitor, and adjust tube feedings or PN based on assessed needs  - Assess need for intravenous fluids  - Provide nutrition/hydration education as appropriate  - Include patient/family/caregiver in decisions related to nutrition  Outcome: Not Progressing     Problem: SAFETY ADULT  Goal: Maintain or return to baseline ADL function  Description: INTERVENTIONS:  -  Assess patient's ability to carry out ADLs; assess patient's baseline for ADL function and identify physical deficits which impact ability to perform ADLs (bathing, care of mouth/teeth, toileting, grooming, dressing, etc )  - Assess/evaluate cause of self-care deficits   - Assess range of motion  - Assess patient's mobility; develop plan if impaired  - Assess patient's need for assistive devices and provide as appropriate  - Encourage maximum independence but intervene and supervise when necessary  - Involve family in performance of ADLs  - Assess for home care needs following discharge   - Consider OT consult to assist with ADL evaluation and planning for discharge  - Provide patient education as appropriate  Outcome: Not Progressing  Goal: Maintain or return mobility status to optimal level  Description: INTERVENTIONS:  - Assess patient's baseline mobility status (ambulation, transfers, stairs, etc )    - Identify cognitive and physical deficits and behaviors that affect mobility  - Identify mobility aids required to assist with transfers and/or ambulation (gait belt, sit-to-stand, lift, walker, cane, etc )  - [place] fall precautions as indicated by assessment  - Record patient progress and toleration of activity level on Mobility SBAR; progress patient to next Phase/Stage  - Instruct patient to call for assistance with activity based on assessment  - Consider rehabilitation consult to assist with strengthening/weightbearing, etc   Outcome: Not Progressing     Problem: SAFETY,RESTRAINT: NV/NON-SELF DESTRUCTIVE BEHAVIOR  Goal: Remains free of harm/injury (restraint for non violent/non self-detsructive behavior)  Description: INTERVENTIONS:  - Instruct patient/family regarding restraint use   - Assess and monitor physiologic and psychological status   - Provide interventions and comfort measures to meet assessed patient needs   - Identify and implement measures to help patient regain control  - Assess readiness for release of restraint   Outcome: Completed  Goal: Returns to optimal restraint-free functioning  Description: INTERVENTIONS:  - Assess the patient's behavior and symptoms that indicate continued need for restraint  - Identify and implement measures to help patient regain control  - Assess readiness for release of restraint   Outcome: Completed     Problem: NEUROSENSORY - ADULT  Goal: Achieves stable or improved neurological status  Description: INTERVENTIONS  - Monitor and report changes in neurological status  - Monitor vital signs such as temperature, blood pressure, glucose, and any other labs ordered   - Initiate measures to prevent increased intracranial pressure  - Monitor for seizure activity and implement precautions if appropriate      Outcome: Completed  Goal: Achieves maximal functionality and self care  Description: INTERVENTIONS  - Monitor swallowing and airway patency with patient fatigue and changes in neurological status  - Encourage and assist patient to increase activity and self care     - Encourage visually impaired, hearing impaired and aphasic patients to use assistive/communication devices  Outcome: Completed     Problem: CARDIOVASCULAR - ADULT  Goal: Maintains optimal cardiac output and hemodynamic stability  Description: INTERVENTIONS:  - Monitor I/O, vital signs and rhythm  - Monitor for S/S and trends of decreased cardiac output  - Administer and titrate ordered vasoactive medications to optimize hemodynamic stability  - Assess quality of pulses, skin color and temperature  - Assess for signs of decreased coronary artery perfusion  - Instruct patient to report change in severity of symptoms  Outcome: Completed  Goal: Absence of cardiac dysrhythmias or at baseline rhythm  Description: INTERVENTIONS:  - Continuous cardiac monitoring, vital signs, obtain 12 lead EKG if ordered  - Administer antiarrhythmic and heart rate control medications as ordered  - Monitor electrolytes and administer replacement therapy as ordered  Outcome: Completed

## 2021-02-01 NOTE — PROGRESS NOTES
Progress Note - General Surgery   Janis Beal 52 y o  female MRN: [id number]  Unit/Bed#: -01 Encounter: [id number]    Assessment/Plan  Janis Beal is a 52 y o  female     POD3 bring back to OR, washout, rectal stump reinforcement, fascial closure  POD5 bring back to OR, washout, attempted formation of descending colon colostomy, descending colon resection, partial omentectomy, formation of transverse colostomy, ABThera placement   POD6 exploratory laparotomy, sigmoid colon resection, ABThera placement  Sigmoid colon perforation with abscess  Pneumoperitoneum     AVSS  WBC down trending (11 91 today, from 15 67) Continue to monitor- AM CBC  Phos 1 6, K+ 3 5    Repleted with 21 mmol IV KPhos  Check labs in 2 hours  2,575 mL total UO yesterday, Cr stable (0 75), no longer has Garcia  APURVA output 25 cc serous fluid, C/D/I   +BS, still slightly distended, burping and passing some flatus  Colostomy producing mucoid, thin liquid drainage    NG tube removed yesterday   NPO with sips of liquids, ADAT  Will continue to monitor change in bowel function  Continue daily packing over midline incision, former colostomy site  Changed today with Williemae Reza, PA-C  Analgesia prn  DVT prophylaxsis-SQH  Serial abdominal exams  OOB, ambulate  Encourage IS  PT/OT   FU with final pathology results on 1/27  Preliminary results show possible UC  Subjective/Objective     Subjective: No acute events overnight  Patient denies fevers/chills, SOB, cough, CP, palpitations, N/V  Mild abdominal discomfort around incisions and ostomy bag  No troubles voiding, no dysuria  PT/OT did walk her to commode yesterday  Objective:     Blood pressure 143/73, pulse 72, temperature 98 2 °F (36 8 °C), resp  rate 19, height 5' 4" (1 626 m), weight 108 kg (237 lb 7 oz), SpO2 94 %  ,Body mass index is 40 76 kg/m²        Intake/Output Summary (Last 24 hours) at 2/1/2021 0815  Last data filed at 2/1/2021 0451  Gross per 24 hour   Intake 640 ml   Output 1595 ml   Net -955 ml       Invasive Devices     Peripheral Intravenous Line            Peripheral IV 01/30/21 Right Wrist 1 day    Peripheral IV 01/31/21 Left Hand less than 1 day          Drain            Colostomy Transverse 4 days    Closed/Suction Drain RLQ Bulb 2 days                Physical Exam:     General Appearance: Alert and oriented, in no acute distress, cooperative  Head: Normocephalic, atraumatic  Neck: Supple, midline  Eyes: Lids normal,  conjunctiva clear   Mouth: Dry mucous membranes  Lungs: CTA bilaterally, normal lung effort, without wheezes, rhonchi, or crackles  Cardiovascular: RRR, normal S1 and S2, no murmurs, no abnormal heaves or thrills  Abdomen: Mild distension, Lidocaine patch placed bilaterally, active BS, mild tenderness right abdomen around incision and ostomy bag, without guarding or rebound  Midline abdominal incision with serous drainage on ABDs  Mild erythema around incision edges  APURVA drain C/D/I with 25 cc serous output  Extremities:  Full ROM against gravity  No calf tenderness  Skin: Warm, dry BLE  No pallor, erythema, or cyanosis  No LE edema  Neuro: AAOx3, normal affect      Lab, Imaging and other studies:  I have personally reviewed pertinent lab results    , CBC:   Lab Results   Component Value Date    WBC 11 91 (H) 02/01/2021    HGB 8 4 (L) 02/01/2021    HCT 27 1 (L) 02/01/2021    MCV 89 02/01/2021     (H) 02/01/2021    MCH 27 5 02/01/2021    MCHC 31 0 (L) 02/01/2021    RDW 14 8 02/01/2021    MPV 9 9 02/01/2021    NRBC 0 02/01/2021   , CMP:   Lab Results   Component Value Date    SODIUM 148 (H) 02/01/2021    K 3 5 02/01/2021     (H) 02/01/2021    CO2 25 02/01/2021    BUN 18 02/01/2021    CREATININE 0 75 02/01/2021    CALCIUM 7 9 (L) 02/01/2021    AST 10 02/01/2021    ALT 12 02/01/2021    ALKPHOS 41 (L) 02/01/2021    EGFR 95 02/01/2021   , Coagulation: No results found for: PT, INR, APTT, Urinalysis: No results found for: COLORU, CLARITYU, SPECGRAV, PHUR, LEUKOCYTESUR, NITRITE, PROTEINUA, GLUCOSEU, KETONESU, BILIRUBINUR, BLOODU, Amylase: No results found for: AMYLASE, Lipase: No results found for: LIPASE   Xr Chest Portable Icu    Result Date: 1/27/2021  Impression: No acute cardiopulmonary disease  ET tube 4 cm above the shreyas  Workstation performed: [id number]     Ct Abdomen Pelvis With Contrast    Result Date: 1/26/2021  Impression: 1  Mild pancolonic wall thickening with minimal pericolonic fat stranding extending from the cecum to sigmoid colon  Findings are suggestive of infectious/inflammatory or less likely ischemic colitis  2   Moderate volume pneumoperitoneum, suggestive of perforated viscus  However, the site of perforation is not definitively identified  Urgent surgical consultation is recommended    I personally discussed this study with [facility] on 1/26/2021 at 4:30 PM  Workstation performed: [id number]     VTE Pharmacologic Prophylaxis: Heparin  VTE Mechanical Prophylaxis: sequential compression device    Recent Results (from the past 36 hour(s))   Fingerstick Glucose (POCT)    Collection Time: 01/30/21  8:23 PM   Result Value Ref Range    POC Glucose 126 65 - 140 mg/dl   Fingerstick Glucose (POCT)    Collection Time: 01/31/21 12:19 AM   Result Value Ref Range    POC Glucose 110 65 - 140 mg/dl   Procalcitonin Reflex    Collection Time: 01/31/21  6:25 AM   Result Value Ref Range    Procalcitonin 1 22 (H) <=0 25 ng/ml   CBC and differential    Collection Time: 01/31/21  6:25 AM   Result Value Ref Range    WBC 15 67 (H) 4 31 - 10 16 Thousand/uL    RBC 2 97 (L) 3 81 - 5 12 Million/uL    Hemoglobin 8 2 (L) 11 5 - 15 4 g/dL    Hematocrit 25 8 (L) 34 8 - 46 1 %    MCV 87 82 - 98 fL    MCH 27 6 26 8 - 34 3 pg    MCHC 31 8 31 4 - 37 4 g/dL    RDW 14 8 11 6 - 15 1 %    MPV 10 0 8 9 - 12 7 fL    Platelets 993 230 - 988 Thousands/uL    nRBC 0 /100 WBCs   Comprehensive metabolic panel    Collection Time: 01/31/21  6:25 AM Result Value Ref Range    Sodium 147 (H) 136 - 145 mmol/L    Potassium 3 8 3 5 - 5 3 mmol/L    Chloride 111 (H) 100 - 108 mmol/L    CO2 25 21 - 32 mmol/L    ANION GAP 11 4 - 13 mmol/L    BUN 20 5 - 25 mg/dL    Creatinine 0 86 0 60 - 1 30 mg/dL    Glucose 117 65 - 140 mg/dL    Calcium 7 8 (L) 8 3 - 10 1 mg/dL    Corrected Calcium 9 4 8 3 - 10 1 mg/dL    AST 12 5 - 45 U/L    ALT 11 (L) 12 - 78 U/L    Alkaline Phosphatase 38 (L) 46 - 116 U/L    Total Protein 5 2 (L) 6 4 - 8 2 g/dL    Albumin 2 0 (L) 3 5 - 5 0 g/dL    Total Bilirubin 0 40 0 20 - 1 00 mg/dL    eGFR 80 ml/min/1 73sq m   Magnesium    Collection Time: 01/31/21  6:25 AM   Result Value Ref Range    Magnesium 2 2 1 6 - 2 6 mg/dL   Phosphorus    Collection Time: 01/31/21  6:25 AM   Result Value Ref Range    Phosphorus 1 7 (L) 2 7 - 4 5 mg/dL   Manual Differential(PHLEBS Do Not Order)    Collection Time: 01/31/21  6:25 AM   Result Value Ref Range    Segmented % 78 (H) 43 - 75 %    Bands % 12 (H) 0 - 8 %    Lymphocytes % 6 (L) 14 - 44 %    Monocytes % 2 (L) 4 - 12 %    Eosinophils, % 1 0 - 6 %    Basophils % 0 0 - 1 %    Metamyelocytes% 1 0 - 1 %    Absolute Neutrophils 14 10 (H) 1 85 - 7 62 Thousand/uL    Lymphocytes Absolute 0 94 0 60 - 4 47 Thousand/uL    Monocytes Absolute 0 31 0 00 - 1 22 Thousand/uL    Eosinophils Absolute 0 16 0 00 - 0 40 Thousand/uL    Basophils Absolute 0 00 0 00 - 0 10 Thousand/uL    Total Counted 100     Toxic Granulation Present     Anisocytosis Present     Platelet Estimate Adequate Adequate   ECG 12 lead    Collection Time: 01/31/21  6:35 AM   Result Value Ref Range    Ventricular Rate 89 BPM    Atrial Rate 89 BPM    UT Interval 138 ms    QRSD Interval 84 ms    QT Interval 364 ms    QTC Interval 442 ms    P Axis 51 degrees    QRS Axis 64 degrees    T Wave Axis 43 degrees   Fingerstick Glucose (POCT)    Collection Time: 01/31/21  1:10 PM   Result Value Ref Range    POC Glucose 114 65 - 140 mg/dl   Fingerstick Glucose (POCT) Collection Time: 01/31/21  5:04 PM   Result Value Ref Range    POC Glucose 120 65 - 140 mg/dl   Basic metabolic panel    Collection Time: 01/31/21  6:23 PM   Result Value Ref Range    Sodium 147 (H) 136 - 145 mmol/L    Potassium 3 9 3 5 - 5 3 mmol/L    Chloride 109 (H) 100 - 108 mmol/L    CO2 24 21 - 32 mmol/L    ANION GAP 14 (H) 4 - 13 mmol/L    BUN 20 5 - 25 mg/dL    Creatinine 0 93 0 60 - 1 30 mg/dL    Glucose 131 65 - 140 mg/dL    Calcium 8 0 (L) 8 3 - 10 1 mg/dL    eGFR 73 ml/min/1 73sq m   Magnesium    Collection Time: 01/31/21  6:23 PM   Result Value Ref Range    Magnesium 2 0 1 6 - 2 6 mg/dL   Fingerstick Glucose (POCT)    Collection Time: 02/01/21 12:24 AM   Result Value Ref Range    POC Glucose 143 (H) 65 - 140 mg/dl   Fingerstick Glucose (POCT)    Collection Time: 02/01/21  4:51 AM   Result Value Ref Range    POC Glucose 131 65 - 140 mg/dl   CBC and differential    Collection Time: 02/01/21  6:02 AM   Result Value Ref Range    WBC 11 91 (H) 4 31 - 10 16 Thousand/uL    RBC 3 06 (L) 3 81 - 5 12 Million/uL    Hemoglobin 8 4 (L) 11 5 - 15 4 g/dL    Hematocrit 27 1 (L) 34 8 - 46 1 %    MCV 89 82 - 98 fL    MCH 27 5 26 8 - 34 3 pg    MCHC 31 0 (L) 31 4 - 37 4 g/dL    RDW 14 8 11 6 - 15 1 %    MPV 9 9 8 9 - 12 7 fL    Platelets 901 (H) 897 - 390 Thousands/uL    nRBC 0 /100 WBCs    Neutrophils Relative 88 (H) 43 - 75 %    Immat GRANS % 2 0 - 2 %    Lymphocytes Relative 6 (L) 14 - 44 %    Monocytes Relative 4 4 - 12 %    Eosinophils Relative 0 0 - 6 %    Basophils Relative 0 0 - 1 %    Neutrophils Absolute 10 40 (H) 1 85 - 7 62 Thousands/µL    Immature Grans Absolute 0 26 (H) 0 00 - 0 20 Thousand/uL    Lymphocytes Absolute 0 68 0 60 - 4 47 Thousands/µL    Monocytes Absolute 0 49 0 17 - 1 22 Thousand/µL    Eosinophils Absolute 0 04 0 00 - 0 61 Thousand/µL    Basophils Absolute 0 04 0 00 - 0 10 Thousands/µL   Comprehensive metabolic panel    Collection Time: 02/01/21  6:02 AM   Result Value Ref Range    Sodium 148 (H) 136 - 145 mmol/L    Potassium 3 5 3 5 - 5 3 mmol/L    Chloride 111 (H) 100 - 108 mmol/L    CO2 25 21 - 32 mmol/L    ANION GAP 12 4 - 13 mmol/L    BUN 18 5 - 25 mg/dL    Creatinine 0 75 0 60 - 1 30 mg/dL    Glucose 134 65 - 140 mg/dL    Calcium 7 9 (L) 8 3 - 10 1 mg/dL    Corrected Calcium 9 5 8 3 - 10 1 mg/dL    AST 10 5 - 45 U/L    ALT 12 12 - 78 U/L    Alkaline Phosphatase 41 (L) 46 - 116 U/L    Total Protein 5 6 (L) 6 4 - 8 2 g/dL    Albumin 2 0 (L) 3 5 - 5 0 g/dL    Total Bilirubin 0 40 0 20 - 1 00 mg/dL    eGFR 95 ml/min/1 73sq m   Magnesium    Collection Time: 02/01/21  6:02 AM   Result Value Ref Range    Magnesium 2 1 1 6 - 2 6 mg/dL   Phosphorus    Collection Time: 02/01/21  6:02 AM   Result Value Ref Range    Phosphorus 1 6 (L) 2 7 - 4 5 mg/dL     JUAQUIN Jackson  2/1/2021

## 2021-02-01 NOTE — ASSESSMENT & PLAN NOTE
· Status post Ex lap/sigmoid, descending colon & transverse colon resection status post colostomy; postop #6  · Currently NPO  · On p r n  Dilaudid/Zofran  · On IV cefepime/Flagyl  Follow up on intraoperative cultures

## 2021-02-01 NOTE — ASSESSMENT & PLAN NOTE
· Creatinine 0 75  · I/O 660/2595  · The patient was receiving p r n  Lasix to assist diuresis  Now Lasix on hold  · Likely acute renal failure secondary to ischemic ATN due to perforated viscus  · Monitor I&Os/creatinine closely

## 2021-02-02 LAB
ANION GAP SERPL CALCULATED.3IONS-SCNC: 11 MMOL/L (ref 4–13)
ATRIAL RATE: 73 BPM
BASOPHILS # BLD MANUAL: 0 THOUSAND/UL (ref 0–0.1)
BASOPHILS NFR MAR MANUAL: 0 % (ref 0–1)
BUN SERPL-MCNC: 19 MG/DL (ref 5–25)
CALCIUM SERPL-MCNC: 8 MG/DL (ref 8.3–10.1)
CHLORIDE SERPL-SCNC: 112 MMOL/L (ref 100–108)
CO2 SERPL-SCNC: 26 MMOL/L (ref 21–32)
CREAT SERPL-MCNC: 0.73 MG/DL (ref 0.6–1.3)
EOSINOPHIL # BLD MANUAL: 0 THOUSAND/UL (ref 0–0.4)
EOSINOPHIL NFR BLD MANUAL: 0 % (ref 0–6)
ERYTHROCYTE [DISTWIDTH] IN BLOOD BY AUTOMATED COUNT: 15.1 % (ref 11.6–15.1)
GFR SERPL CREATININE-BSD FRML MDRD: 98 ML/MIN/1.73SQ M
GLUCOSE SERPL-MCNC: 122 MG/DL (ref 65–140)
GLUCOSE SERPL-MCNC: 130 MG/DL (ref 65–140)
GLUCOSE SERPL-MCNC: 138 MG/DL (ref 65–140)
GLUCOSE SERPL-MCNC: 155 MG/DL (ref 65–140)
GLUCOSE SERPL-MCNC: 168 MG/DL (ref 65–140)
GLUCOSE SERPL-MCNC: 175 MG/DL (ref 65–140)
GLUCOSE SERPL-MCNC: 182 MG/DL (ref 65–140)
HCT VFR BLD AUTO: 27.5 % (ref 34.8–46.1)
HGB BLD-MCNC: 8.6 G/DL (ref 11.5–15.4)
LYMPHOCYTES # BLD AUTO: 0.69 THOUSAND/UL (ref 0.6–4.47)
LYMPHOCYTES # BLD AUTO: 7 % (ref 14–44)
MCH RBC QN AUTO: 27.4 PG (ref 26.8–34.3)
MCHC RBC AUTO-ENTMCNC: 31.3 G/DL (ref 31.4–37.4)
MCV RBC AUTO: 88 FL (ref 82–98)
MONOCYTES # BLD AUTO: 0.39 THOUSAND/UL (ref 0–1.22)
MONOCYTES NFR BLD: 4 % (ref 4–12)
NEUTROPHILS # BLD MANUAL: 8.75 THOUSAND/UL (ref 1.85–7.62)
NEUTS BAND NFR BLD MANUAL: 4 % (ref 0–8)
NEUTS SEG NFR BLD AUTO: 85 % (ref 43–75)
NRBC BLD AUTO-RTO: 0 /100 WBCS
P AXIS: 44 DEGREES
PLATELET # BLD AUTO: 608 THOUSANDS/UL (ref 149–390)
PLATELET BLD QL SMEAR: ABNORMAL
PMV BLD AUTO: 9.9 FL (ref 8.9–12.7)
POTASSIUM SERPL-SCNC: 3.6 MMOL/L (ref 3.5–5.3)
PR INTERVAL: 138 MS
QRS AXIS: 37 DEGREES
QRSD INTERVAL: 78 MS
QT INTERVAL: 418 MS
QTC INTERVAL: 460 MS
RBC # BLD AUTO: 3.14 MILLION/UL (ref 3.81–5.12)
SODIUM SERPL-SCNC: 149 MMOL/L (ref 136–145)
T WAVE AXIS: 52 DEGREES
TOTAL CELLS COUNTED SPEC: 100
VENTRICULAR RATE: 73 BPM
WBC # BLD AUTO: 9.83 THOUSAND/UL (ref 4.31–10.16)

## 2021-02-02 PROCEDURE — 94760 N-INVAS EAR/PLS OXIMETRY 1: CPT

## 2021-02-02 PROCEDURE — 97535 SELF CARE MNGMENT TRAINING: CPT

## 2021-02-02 PROCEDURE — 97530 THERAPEUTIC ACTIVITIES: CPT

## 2021-02-02 PROCEDURE — 94640 AIRWAY INHALATION TREATMENT: CPT

## 2021-02-02 PROCEDURE — 85027 COMPLETE CBC AUTOMATED: CPT | Performed by: PHYSICIAN ASSISTANT

## 2021-02-02 PROCEDURE — 93010 ELECTROCARDIOGRAM REPORT: CPT | Performed by: INTERNAL MEDICINE

## 2021-02-02 PROCEDURE — 97116 GAIT TRAINING THERAPY: CPT

## 2021-02-02 PROCEDURE — 93005 ELECTROCARDIOGRAM TRACING: CPT

## 2021-02-02 PROCEDURE — 97110 THERAPEUTIC EXERCISES: CPT

## 2021-02-02 PROCEDURE — 82948 REAGENT STRIP/BLOOD GLUCOSE: CPT

## 2021-02-02 PROCEDURE — 99024 POSTOP FOLLOW-UP VISIT: CPT | Performed by: SURGERY

## 2021-02-02 PROCEDURE — 85007 BL SMEAR W/DIFF WBC COUNT: CPT | Performed by: PHYSICIAN ASSISTANT

## 2021-02-02 PROCEDURE — 80048 BASIC METABOLIC PNL TOTAL CA: CPT | Performed by: PHYSICIAN ASSISTANT

## 2021-02-02 PROCEDURE — 99232 SBSQ HOSP IP/OBS MODERATE 35: CPT | Performed by: INTERNAL MEDICINE

## 2021-02-02 RX ORDER — DEXTROSE AND SODIUM CHLORIDE 5; .33 G/100ML; G/100ML
75 INJECTION, SOLUTION INTRAVENOUS CONTINUOUS
Status: DISCONTINUED | OUTPATIENT
Start: 2021-02-02 | End: 2021-02-02

## 2021-02-02 RX ORDER — HYDROMORPHONE HCL/PF 1 MG/ML
0.2 SYRINGE (ML) INJECTION ONCE
Status: DISCONTINUED | OUTPATIENT
Start: 2021-02-02 | End: 2021-02-07

## 2021-02-02 RX ORDER — DEXTROSE AND SODIUM CHLORIDE 5; .2 G/100ML; G/100ML
75 INJECTION, SOLUTION INTRAVENOUS CONTINUOUS
Status: DISCONTINUED | OUTPATIENT
Start: 2021-02-02 | End: 2021-02-04

## 2021-02-02 RX ADMIN — CEFEPIME HYDROCHLORIDE 2000 MG: 2 INJECTION, POWDER, FOR SOLUTION INTRAVENOUS at 06:03

## 2021-02-02 RX ADMIN — CEFEPIME HYDROCHLORIDE 2000 MG: 2 INJECTION, POWDER, FOR SOLUTION INTRAVENOUS at 18:06

## 2021-02-02 RX ADMIN — DEXTROSE AND SODIUM CHLORIDE 75 ML/HR: 5; .2 INJECTION, SOLUTION INTRAVENOUS at 11:40

## 2021-02-02 RX ADMIN — HEPARIN SODIUM 5000 UNITS: 5000 INJECTION INTRAVENOUS; SUBCUTANEOUS at 22:19

## 2021-02-02 RX ADMIN — INSULIN LISPRO 1 UNITS: 100 INJECTION, SOLUTION INTRAVENOUS; SUBCUTANEOUS at 13:20

## 2021-02-02 RX ADMIN — HYDROMORPHONE HYDROCHLORIDE 1 MG: 1 INJECTION, SOLUTION INTRAMUSCULAR; INTRAVENOUS; SUBCUTANEOUS at 20:04

## 2021-02-02 RX ADMIN — LIDOCAINE 5% 2 PATCH: 700 PATCH TOPICAL at 08:38

## 2021-02-02 RX ADMIN — INSULIN LISPRO 1 UNITS: 100 INJECTION, SOLUTION INTRAVENOUS; SUBCUTANEOUS at 17:12

## 2021-02-02 RX ADMIN — HYDROMORPHONE HYDROCHLORIDE 1 MG: 1 INJECTION, SOLUTION INTRAMUSCULAR; INTRAVENOUS; SUBCUTANEOUS at 11:40

## 2021-02-02 RX ADMIN — HEPARIN SODIUM 5000 UNITS: 5000 INJECTION INTRAVENOUS; SUBCUTANEOUS at 13:21

## 2021-02-02 RX ADMIN — ACETAMINOPHEN 975 MG: 325 TABLET, FILM COATED ORAL at 05:39

## 2021-02-02 RX ADMIN — METRONIDAZOLE 500 MG: 500 INJECTION, SOLUTION INTRAVENOUS at 08:38

## 2021-02-02 RX ADMIN — HEPARIN SODIUM 5000 UNITS: 5000 INJECTION INTRAVENOUS; SUBCUTANEOUS at 05:39

## 2021-02-02 RX ADMIN — METRONIDAZOLE 500 MG: 500 INJECTION, SOLUTION INTRAVENOUS at 00:30

## 2021-02-02 RX ADMIN — HYDROMORPHONE HYDROCHLORIDE 1 MG: 1 INJECTION, SOLUTION INTRAMUSCULAR; INTRAVENOUS; SUBCUTANEOUS at 07:41

## 2021-02-02 RX ADMIN — ALBUTEROL SULFATE 2.5 MG: 2.5 SOLUTION RESPIRATORY (INHALATION) at 20:15

## 2021-02-02 RX ADMIN — ONDANSETRON 4 MG: 2 INJECTION INTRAMUSCULAR; INTRAVENOUS at 00:24

## 2021-02-02 RX ADMIN — ACETAMINOPHEN 975 MG: 325 TABLET, FILM COATED ORAL at 13:20

## 2021-02-02 RX ADMIN — ACETAMINOPHEN 975 MG: 325 TABLET, FILM COATED ORAL at 22:19

## 2021-02-02 RX ADMIN — ONDANSETRON 4 MG: 2 INJECTION INTRAMUSCULAR; INTRAVENOUS at 20:04

## 2021-02-02 RX ADMIN — METRONIDAZOLE 500 MG: 500 INJECTION, SOLUTION INTRAVENOUS at 17:12

## 2021-02-02 RX ADMIN — NYSTATIN 500000 UNITS: 100000 SUSPENSION ORAL at 08:38

## 2021-02-02 NOTE — CASE MANAGEMENT
Per rounding with SLIM, patient is anticipated to be discharged in 48-72 hrs  Patient will need HHC for wound and colostomy care  Per [employer], [facility] has accepted  Cm department will continue to follow patient through discharge

## 2021-02-02 NOTE — PLAN OF CARE
Problem: OCCUPATIONAL THERAPY ADULT  Goal: Performs self-care activities at highest level of function for planned discharge setting  See evaluation for individualized goals  Description: Treatment Interventions: ADL retraining, Functional transfer training, UE strengthening/ROM, Endurance training, Patient/family training, Equipment evaluation/education, Compensatory technique education, Energy conservation, Activityengagement, Continued evaluation          See flowsheet documentation for full assessment, interventions and recommendations  Outcome: Progressing  Note: Limitation: Decreased ADL status, Decreased UE strength, Decreased endurance, Decreased self-care trans, Decreased high-level ADLs  Prognosis: Good  Assessment: Patient participated in Skilled OT session this date with interventions consisting of ADL re training with the use of correct body mechnaics, safety awareness and fall prevention techniques,  therapeutic activities to: increase activity tolerance, increase dynamic sit/ stand balance during functional activity  and increase postural control, and increasing functional standing tolerance  Patient agreeable to OT treatment session, upon arrival patient was found seated OOB to Recliner, alert, responsive  and in no apparent distress  In comparison to previous session, patient with improvements in task engagement, functional transfers/mobility, and ADL participation/performance  Patient participated in toileting hygiene and LB dressing/clothing management at bedside commode; please see above flowsheet for details  Patient demonstrated fair static and poor+ dynamic sitting balance, and poor+ dynamic standing balance while participating in functional tasks  Co treatment session with Physical Therapy secondary to complex medical condition, requiring two skilled therapists to provide therapeutic techniques to position, facilitate and elicit targeted outcome   Patient requiring verbal cues for correct technique, frequent rest periods and ocassional safety reminders  Patient continues to be functioning below baseline level, occupational performance remains limited secondary to factors listed above and increased risk for falls and injury  From OT standpoint, recommendation at time of d/c would be post-acute rehabilitation services  Patient to benefit from continued Occupational Therapy treatment while in the hospital to address deficits as defined above and maximize level of functional independence with ADLs and functional mobility        OT Discharge Recommendation: Post-Acute Rehabilitation Services

## 2021-02-02 NOTE — PROGRESS NOTES
Patient has been NPO x 7 days since admit, currently receiving IVF of D5 1/4NS at 75 ml/hour as ordered per MD provides 306 calories, 1350 ml free water in 1800 ml volume  Consider starting 1L Standard TPN until able to feed via gut, advance PO diet  RD continuing to monitor, and reconsult if nutrition support planned

## 2021-02-02 NOTE — PLAN OF CARE
Problem: Potential for Falls  Goal: Patient will remain free of falls  Description: INTERVENTIONS:  - Assess patient frequently for physical needs  -  Identify cognitive and physical deficits and behaviors that affect risk of falls  -  [place] fall precautions as indicated by assessment   - Educate patient/family on patient safety including physical limitations  - Instruct patient to call for assistance with activity based on assessment  - Modify environment to reduce risk of injury  - Consider OT/PT consult to assist with strengthening/mobility  Outcome: Progressing     Problem: Prexisting or High Potential for Compromised Skin Integrity  Goal: Skin integrity is maintained or improved  Description: INTERVENTIONS:  - Identify patients at risk for skin breakdown  - Assess and monitor skin integrity  - Assess and monitor nutrition and hydration status  - Monitor labs   - Assess for incontinence   - Turn and reposition patient  - Assist with mobility/ambulation  - Relieve pressure over bony prominences  - Avoid friction and shearing  - Provide appropriate hygiene as needed including keeping skin clean and dry  - Evaluate need for skin moisturizer/barrier cream  - Collaborate with interdisciplinary team   - Patient/family teaching  - Consider wound care consult   Outcome: Progressing     Problem: Nutrition/Hydration-ADULT  Goal: Nutrient/Hydration intake appropriate for improving, restoring or maintaining nutritional needs  Description: Monitor and assess patient's nutrition/hydration status for malnutrition  Collaborate with interdisciplinary team and initiate plan and interventions as ordered  Monitor patient's weight and dietary intake as ordered or per policy  Utilize nutrition screening tool and intervene as necessary  Determine patient's food preferences and provide high-protein, high-caloric foods as appropriate       INTERVENTIONS:  - Monitor oral intake, urinary output, labs, and treatment plans  - Assess nutrition and hydration status and recommend course of action  - Evaluate amount of meals eaten  - Assist patient with eating if necessary   - Allow adequate time for meals  - Recommend/ encourage appropriate diets, oral nutritional supplements, and vitamin/mineral supplements  - Order, calculate, and assess calorie counts as needed  - Recommend, monitor, and adjust tube feedings or PN based on assessed needs  - Assess need for intravenous fluids  - Provide nutrition/hydration education as appropriate  - Include patient/family/caregiver in decisions related to nutrition  Outcome: Progressing     Problem: DISCHARGE PLANNING - CARE MANAGEMENT  Goal: Discharge to post-acute care or home with appropriate resources  Description: INTERVENTIONS:  - Conduct assessment to determine patient/family and health care team treatment goals, and need for post-acute services based on payer coverage, community resources, and patient preferences, and barriers to discharge  - Address psychosocial, clinical, and financial barriers to discharge as identified in assessment in conjunction with the patient/family and health care team  - Arrange appropriate level of post-acute services according to patients   needs and preference and payer coverage in collaboration with the physician and health care team  - Communicate with and update the patient/family, physician, and health care team regarding progress on the discharge plan  - Arrange appropriate transportation to post-acute venues  Outcome: Progressing     Problem: PAIN - ADULT  Goal: Verbalizes/displays adequate comfort level or baseline comfort level  Description: Interventions:  - Encourage patient to monitor pain and request assistance  - Assess pain using appropriate pain scale  - Administer analgesics based on type and severity of pain and evaluate response  - Implement non-pharmacological measures as appropriate and evaluate response  - Consider cultural and social influences on pain and pain management  - Notify physician/advanced practitioner if interventions unsuccessful or patient reports new pain  Outcome: Progressing     Problem: INFECTION - ADULT  Goal: Absence or prevention of progression during hospitalization  Description: INTERVENTIONS:  - Assess and monitor for signs and symptoms of infection  - Monitor lab/diagnostic results  - Monitor all insertion sites, i e  indwelling lines, tubes, and drains  - Monitor endotracheal if appropriate and nasal secretions for changes in amount and color  - [place] appropriate cooling/warming therapies per order  - Administer medications as ordered  - Instruct and encourage patient and family to use good hand hygiene technique  - Identify and instruct in appropriate isolation precautions for identified infection/condition  Outcome: Progressing     Problem: SAFETY ADULT  Goal: Patient will remain free of falls  Description: INTERVENTIONS:  - Assess patient frequently for physical needs  -  Identify cognitive and physical deficits and behaviors that affect risk of falls    -  [place] fall precautions as indicated by assessment   - Educate patient/family on patient safety including physical limitations  - Instruct patient to call for assistance with activity based on assessment  - Modify environment to reduce risk of injury  - Consider OT/PT consult to assist with strengthening/mobility  Outcome: Progressing  Goal: Maintain or return to baseline ADL function  Description: INTERVENTIONS:  -  Assess patient's ability to carry out ADLs; assess patient's baseline for ADL function and identify physical deficits which impact ability to perform ADLs (bathing, care of mouth/teeth, toileting, grooming, dressing, etc )  - Assess/evaluate cause of self-care deficits   - Assess range of motion  - Assess patient's mobility; develop plan if impaired  - Assess patient's need for assistive devices and provide as appropriate  - Encourage maximum independence but intervene and supervise when necessary  - Involve family in performance of ADLs  - Assess for home care needs following discharge   - Consider OT consult to assist with ADL evaluation and planning for discharge  - Provide patient education as appropriate  Outcome: Progressing  Goal: Maintain or return mobility status to optimal level  Description: INTERVENTIONS:  - Assess patient's baseline mobility status (ambulation, transfers, stairs, etc )    - Identify cognitive and physical deficits and behaviors that affect mobility  - Identify mobility aids required to assist with transfers and/or ambulation (gait belt, sit-to-stand, lift, walker, cane, etc )  - [place] fall precautions as indicated by assessment  - Record patient progress and toleration of activity level on Mobility SBAR; progress patient to next Phase/Stage  - Instruct patient to call for assistance with activity based on assessment  - Consider rehabilitation consult to assist with strengthening/weightbearing, etc   Outcome: Progressing     Problem: DISCHARGE PLANNING  Goal: Discharge to home or other facility with appropriate resources  Description: INTERVENTIONS:  - Identify barriers to discharge w/patient and caregiver  - Arrange for needed discharge resources and transportation as appropriate  - Identify discharge learning needs (meds, wound care, etc )  - Arrange for interpretive services to assist at discharge as needed  - Refer to Case Management Department for coordinating discharge planning if the patient needs post-hospital services based on physician/advanced practitioner order or complex needs related to functional status, cognitive ability, or social support system  Outcome: Progressing     Problem: Knowledge Deficit  Goal: Patient/family/caregiver demonstrates understanding of disease process, treatment plan, medications, and discharge instructions  Description: Complete learning assessment and assess knowledge base    Interventions:  - Provide teaching at level of understanding  - Provide teaching via preferred learning methods  Outcome: Progressing     Problem: RESPIRATORY - ADULT  Goal: Achieves optimal ventilation and oxygenation  Description: INTERVENTIONS:  - Assess for changes in respiratory status  - Assess for changes in mentation and behavior  - Position to facilitate oxygenation and minimize respiratory effort  - Oxygen administered by appropriate delivery if ordered  - Initiate smoking cessation education as indicated  - Encourage broncho-pulmonary hygiene including cough, deep breathe, Incentive Spirometry  - Assess the need for suctioning and aspirate as needed  - Assess and instruct to report SOB or any respiratory difficulty  - Respiratory Therapy support as indicated  Outcome: Progressing     Problem: GASTROINTESTINAL - ADULT  Goal: Minimal or absence of nausea and/or vomiting  Description: INTERVENTIONS:  - Administer IV fluids if ordered to ensure adequate hydration  - Maintain NPO status until nausea and vomiting are resolved  - Nasogastric tube if ordered  - Administer ordered antiemetic medications as needed  - Provide nonpharmacologic comfort measures as appropriate  - Advance diet as tolerated, if ordered  - Consider nutrition services referral to assist patient with adequate nutrition and appropriate food choices  Outcome: Progressing  Goal: Maintains or returns to baseline bowel function  Description: INTERVENTIONS:  - Assess bowel function  - Encourage oral fluids to ensure adequate hydration  - Administer IV fluids if ordered to ensure adequate hydration  - Administer ordered medications as needed  - Encourage mobilization and activity  - Consider nutritional services referral to assist patient with adequate nutrition and appropriate food choices  Outcome: Progressing  Goal: Maintains adequate nutritional intake  Description: INTERVENTIONS:  - Monitor percentage of each meal consumed  - Identify factors contributing to decreased intake, treat as appropriate  - Assist with meals as needed  - Monitor I&O, weight, and lab values if indicated  - Obtain nutrition services referral as needed  Outcome: Progressing     Problem: GENITOURINARY - ADULT  Goal: Maintains or returns to baseline urinary function  Description: INTERVENTIONS:  - Assess urinary function  - Encourage oral fluids to ensure adequate hydration if ordered  - Administer IV fluids as ordered to ensure adequate hydration  - Administer ordered medications as needed  - Offer frequent toileting  - Follow urinary retention protocol if ordered  Outcome: Progressing  Goal: Absence of urinary retention  Description: INTERVENTIONS:  - Assess patients ability to void and empty bladder  - Monitor I/O  - Bladder scan as needed  - Discuss with physician/AP medications to alleviate retention as needed  - Discuss catheterization for long term situations as appropriate  Outcome: Progressing  Goal: Urinary catheter remains patent  Description: INTERVENTIONS:  - Assess patency of urinary catheter  - If patient has a chronic fernanedz, consider changing catheter if non-functioning  - Follow guidelines for intermittent irrigation of non-functioning urinary catheter  Outcome: Progressing     Problem: METABOLIC, FLUID AND ELECTROLYTES - ADULT  Goal: Electrolytes maintained within normal limits  Description: INTERVENTIONS:  - Monitor labs and assess patient for signs and symptoms of electrolyte imbalances  - Administer electrolyte replacement as ordered  - Monitor response to electrolyte replacements, including repeat lab results as appropriate  - Instruct patient on fluid and nutrition as appropriate  Outcome: Progressing  Goal: Fluid balance maintained  Description: INTERVENTIONS:  - Monitor labs   - Monitor I/O and WT  - Instruct patient on fluid and nutrition as appropriate  - Assess for signs & symptoms of volume excess or deficit  Outcome: Progressing  Goal: Glucose maintained within target range  Description: INTERVENTIONS:  - Monitor Blood Glucose as ordered  - Assess for signs and symptoms of hyperglycemia and hypoglycemia  - Administer ordered medications to maintain glucose within target range  - Assess nutritional intake and initiate nutrition service referral as needed  Outcome: Progressing

## 2021-02-02 NOTE — OCCUPATIONAL THERAPY NOTE
Occupational Therapy Treatment Note        Patient Name: Xi Zarate  WBUDU'U Date: 2/2/2021 02/02/21 1502   OT Last Visit   OT Visit Date 02/02/21   Note Type   Note Type Treatment   Restrictions/Precautions   Weight Bearing Precautions Per Order No   Other Precautions Chair Alarm; Bed Alarm; Fall Risk;Pain;Multiple lines   Pain Assessment   Pain Assessment Tool 0-10   Pain Score 9   Pain Location/Orientation Location: Abdomen   Pain Onset/Description Descriptor: [facility] Pain Intervention(s) Repositioned; Ambulation/increased activity   Multiple Pain Sites No   ADL   Eating Assistance 5  Supervision/Setup   Eating Deficit NPO   Grooming Assistance 5  Supervision/Setup   Grooming Deficit Increased time to complete;Verbal cueing;Supervision/safety   Grooming Comments Based on pt's functional performance during treatment session   UB Bathing Assistance 5  Supervision/Setup   UB Bathing Deficit Steadying;Verbal cueing;Supervision/safety; Increased time to complete   UB Bathing Comments Based on simulated ADL   LB Bathing Assistance 2  Maximal Assistance   LB Bathing Deficit Setup;Verbal cueing;Supervision/safety; Increased time to complete;Right lower leg including foot; Left lower leg including foot   UB Dressing Assistance 4  Minimal Assistance   UB Dressing Deficit Setup;Supervision/safety; Increased time to complete   UB Dressing Comments Based on pt's functional performance during treatment session   LB Dressing Assistance 2  Maximal [address]  4  Minimal Assistance   Toileting Deficit Setup; Increased time to complete; Bedside commode   Functional Standing Tolerance   Time ~2-3 minute time intervals x2   Activity Functional transfers and mobility   Comments Performed with RW   Bed Mobility   Sit to Supine 4  Minimal assistance   Additional items Assist x 2;HOB elevated; Increased time required;Verbal cues;LE management   Additional Comments Patient received seated OOB to recliner chair upon OT arrival; at end of session: pt returned lying supine in bed w/ all needs within reach and bed alarm activated   Transfers   Sit to Stand 3  Moderate assistance   Additional items Assist x 2; Increased time required;Verbal cues;Armrests   Stand to Sit 4  Minimal assistance   Additional items Assist x 2; Increased time required;Verbal cues;Armrests   Toilet transfer 3  Moderate assistance   Additional items Assist x 2; Increased time required;Armrests; Verbal cues; Commode   Additional Comments Performed functional transfers with RW  Patient required verbal cueing for safe hand placement during transfers  Functional Mobility   Functional Mobility 4  Minimal assistance   Additional Comments x1   Additional items Rolling walker   Toilet Transfers   Toilet Transfer From Other (Comment)  ([address] chair)   Toilet Transfer Type To   Toilet Transfer to Standard bedside commode   Toilet Transfer Technique Ambulating   Toilet Transfers Minimal assistance   Cognition   Overall Cognitive Status WFL   Arousal/Participation Alert; Responsive; Cooperative   Attention Within functional limits   Orientation Level Oriented X4   Memory Within functional limits   Following Commands Follows all commands and directions without difficulty   Comments Patient agreeable to OT evaluation   Activity Tolerance   Activity Tolerance Patient limited by fatigue;Patient limited by pain   Medical Staff Made Aware MICHELLE Hernandez confirmed pt appropriate for therapy   Assessment   Assessment Patient participated in Skilled OT session this date with interventions consisting of ADL re training with the use of correct body mechnaics, safety awareness and fall prevention techniques,  therapeutic activities to: increase activity tolerance, increase dynamic sit/ stand balance during functional activity  and increase postural control, and increasing functional standing tolerance   Patient agreeable to OT treatment session, upon arrival patient was found seated OOB to Recliner, alert, responsive  and in no apparent distress  In comparison to previous session, patient with improvements in task engagement, functional transfers/mobility, and ADL participation/performance  Patient participated in toileting hygiene and LB dressing/clothing management at bedside commode; please see above flowsheet for details  Patient demonstrated fair static and poor+ dynamic sitting balance, and poor+ dynamic standing balance while participating in functional tasks  Co treatment session with Physical Therapy secondary to complex medical condition, requiring two skilled therapists to provide therapeutic techniques to position, facilitate and elicit targeted outcome  Patient requiring verbal cues for correct technique, frequent rest periods and ocassional safety reminders  Patient continues to be functioning below baseline level, occupational performance remains limited secondary to factors listed above and increased risk for falls and injury  From OT standpoint, recommendation at time of d/c would be post-acute rehabilitation services  Patient to benefit from continued Occupational Therapy treatment while in the hospital to address deficits as defined above and maximize level of functional independence with ADLs and functional mobility  Plan   Treatment Interventions ADL retraining;Functional transfer training;UE strengthening/ROM; Endurance training;Patient/family training;Equipment evaluation/education; Compensatory technique education; Energy conservation; Activityengagement;Continued evaluation   Goal Expiration Date 02/13/21   OT Treatment Day 1   OT Frequency 3-5x/wk   Recommendation   OT Discharge Recommendation Post-Acute Rehabilitation Services   AM-PAC Daily Activity Inpatient   Lower Body Dressing 2   Bathing 2   Toileting 3   Upper Body Dressing 3   Grooming 3   Eating 3   Daily Activity Raw Score 16   Daily Activity Standardized Score (Calc for Raw Score >=11) 35 96 AM-PAC Applied Cognition Inpatient   Following a Speech/Presentation 4   Understanding Ordinary Conversation 4   Taking Medications 4   Remembering Where Things Are Placed or Put Away 4   Remembering List of 4-5 Errands 4   Taking Care of Complicated Tasks 4   Applied Cognition Raw Score 24   Applied Cognition Standardized Score 62 21   Modified [place] Scale   Modified [place] Scale 4     Humza Trejo, OTR/L

## 2021-02-02 NOTE — PHYSICAL THERAPY NOTE
Physical Therapy Treatment Note       02/02/21 1430   PT Last Visit   PT Visit Date 02/02/21   Note Type   Note Type Treatment   Pain Assessment   Pain Assessment Tool 0-10   Pain Score 9   Pain Location/Orientation Location: Abdomen   Restrictions/Precautions   Weight Bearing Precautions Per Order No   Other Precautions Chair Alarm; Bed Alarm;Multiple lines; Fall Risk;Pain   General   Chart Reviewed Yes   Response to Previous Treatment Patient with no complaints from previous session  Family/Caregiver Present No   Cognition   Overall Cognitive Status WFL   Arousal/Participation Alert; Responsive; Cooperative   Attention Within functional limits   Orientation Level Oriented X4   Memory Within functional limits   Following Commands Follows all commands and directions without difficulty   Comments pt agreeable to PT session   Subjective   Subjective "I have been up for several hours"   Bed Mobility   Additional Comments pt received OOB to recliner upon arrival   Transfers   Sit to Stand 3  Moderate assistance   Additional items Assist x 2;Armrests; Increased time required;Verbal cues   Stand to Sit 4  Minimal assistance   Additional items Assist x 2;Armrests; Increased time required;Verbal cues   Additional Comments vc for appropriate hand/foot placment during sit to stand transfers for safety   Ambulation/Elevation   Gait pattern Decreased foot clearance; Foward flexed; Short stride; Step to;Excessively slow  (pt able to clear components of egress test)   Gait Assistance 4  Minimal assist   Additional items Assist x 2;Verbal cues; Tactile cues   Assistive Device Bariatric Rolling walker   Distance 5'   Balance   Static Sitting Fair   Dynamic Sitting Fair -   Static Standing Poor +   Dynamic Standing Poor +   Ambulatory Poor +   Endurance Deficit   Endurance Deficit Yes   Activity Tolerance   Activity Tolerance Patient limited by fatigue;Patient limited by pain   Medical Staff Made Aware OT Sarah Brice   Nurse Made Aware RN Beauford Felty verbalized pt appropriate to see, made aware of session outcome/recs   Exercises   Ankle Pumps Sitting;10 reps;AROM; Bilateral   Assessment   Prognosis Good   Problem List Decreased strength;Decreased endurance; Impaired balance;Decreased mobility; Decreased skin integrity;Pain   Assessment Co treatment with OT secondary to complex medical condition of pt, A of 2 required to achieve and maintain transitional movements, requiring the need of skilled therapeutic intervention of 2 therapists to achieve delivery of services  Pt seen for PT treatment session this date with interventions consisting of gait training w/ emphasis on improving pt's ability to ambulate level surfaces x 5' + 15' with min A of 2 provided by therapist with RW, Therapeutic exercise consisting of: AROM 10 reps B LE in sitting position and therapeutic activity consisting of training: supine<>sit transfers and sit<>stand transfers  Pt agreeable to PT treatment session upon arrival, pt found seated OOB in recliner, in no apparent distress, A&O x 4 and responsive  In comparison to previous session, pt with improvements in decreased assistance for bed mobility, transfers; trialed ambulation; initiation of B LE exercises to tolerance  Post session: pt returned BTB, bed alarm engaged, all needs in reach and RN notified of session findings/recommendations  Continue to recommend STR at time of d/c in order to maximize pt's functional independence and safety w/ mobility  Pt continues to be functioning below baseline level, and remains limited 2* factors listed above  PT will continue to see pt while here in order to address the deficits listed above and provide interventions consistent w/ POC in effort to achieve STGs     Barriers to Discharge Inaccessible home environment;Decreased caregiver support   Goals   Patient Goals to return home   STG Expiration Date 02/09/21   Short Term Goal #1 Additional: Ambulate > 50' x3 ft  with least restrictive assistive device with SBA w/o LOB and w/ normalized gait pattern 100% of the time   Plan   Treatment/Interventions Functional transfer training;LE strengthening/ROM; Therapeutic exercise; Endurance training;Patient/family training;Equipment eval/education; Bed mobility;Gait training;Spoke to nursing;OT   Progress Slow progress, decreased activity tolerance   PT Frequency   (3-5x/wk)   Recommendation   PT Discharge Recommendation Post-Acute Rehabilitation Services   Equipment Recommended Walker  (RW)   PT - OK to Discharge Yes  (when medically cleared if to STR)   [address] Mobility Inpatient   Turning in Bed Without Bedrails 2   Lying on Back to Sitting on Edge of Flat Bed 2   Moving Bed to Chair 2   Standing Up From Chair 1   Walk in Room 2   Climb 3-5 Stairs 1   Basic Mobility Inpatient Raw Score 10   Turning Head Towards Sound 4   Follow Simple Instructions 4   Low Function Basic Mobility Raw Score 18   Low Function Basic Mobility Standardized Score 29 25    The patient's AM-PAC Basic Mobility Inpatient Short Form Low Function Raw Score 18 , Standardized Score is 29 25  A standardized score less 42 9 suggests the patient may benefit from discharge to post-acute rehab services  Please also refer to the recommendation of the Physical Therapist for safe discharge planning             Dana Phillips, PT, DPT

## 2021-02-02 NOTE — ASSESSMENT & PLAN NOTE
Likely acute renal failure secondary to ischemic ATN due to perforated viscus    · Creatinine now stable  ·

## 2021-02-02 NOTE — PROGRESS NOTES
Progress Note - Janis Beal 1970, 52 y o  female MRN: [id number]    Unit/Bed#: -01 Encounter: [id number]    Primary Care Provider: Cristopher Thornton MD   Date and time admitted to hospital: 1/26/2021  2:12 PM        Perforation of sigmoid colon due to diverticulitis  Assessment & Plan  · Status post Ex lap/sigmoid, descending colon & transverse colon resection status post colostomy 01/26/2021  · Postsurgical management as per surgery  · Currently NPO for several days, sodium trending up  · Will start her on D5 1/4 for hypernatremia  · Consider TPN if no significant bowel function  · On p r n  Dilaudid/Zofran  · On IV cefepime/Flagyl for now  Can likely de-escalate cefepime to ceftriaxone as there is no evidence of Pseudomonas  Can consider a course of at least 7-10 days of that + flagyl given perforated viscus  * Pneumoperitoneum  Assessment & Plan  Secondary to perforation of sigmoid colon  See plan under perforation of sigmoid colon  Mild persistent asthma  Assessment & Plan  Currently stable  Continue with p r n  Albuterol/albuterol inhaler    Acute renal failure (ARF) (HCC)  Assessment & Plan  Likely acute renal failure secondary to ischemic ATN due to perforated viscus  · Creatinine now stable  ·     Hypokalemia  Assessment & Plan  Monitor potassium levels and replace as needed    Hypothyroid  Assessment & Plan  Continue with Synthroid once able to tolerate p o  Intake    VTE Pharmacologic Prophylaxis:   Pharmacologic: Heparin  Mechanical VTE Prophylaxis in Place: Yes    Patient Centered Rounds: I have performed bedside rounds with nursing staff today  Discussions with Specialists or Other Care Team Provider:   Noted notes from specialists    Education and Discussions with Family / Patient:  Patient herself she is competent alert and oriented    Time Spent for Care: 30 minutes    More than 50% of total time spent on counseling and coordination of care as described above     Current Length of Stay: 7 day(s)    Current Patient Status: Inpatient   Certification Statement: The patient will continue to require additional inpatient hospital stay due to Lack of p o  Intake, lack of bowel function, need for IV fluids    Discharge Plan: Once stable    Code Status: Level 1 - Full Code      Subjective:     Patient evaluated this morning  She does mention some abdominal pain otherwise denies nausea, vomiting, diarrhea  She does mention some mild flatus  No other events reported  Objective:     Vitals:   Temp (24hrs), Av 1 °F (36 7 °C), Min:97 9 °F (36 6 °C), Max:98 3 °F (36 8 °C)    Temp:  [97 9 °F (36 6 °C)-98 3 °F (36 8 °C)] 97 9 °F (36 6 °C)  HR:  [58-69] 63  Resp:  [18-19] 19  BP: (110-146)/(78-84) 120/78  SpO2:  [96 %] 96 %  Body mass index is 38 94 kg/m²  Input and Output Summary (last 24 hours): Intake/Output Summary (Last 24 hours) at 2021 1304  Last data filed at 2021 0544  Gross per 24 hour   Intake --   Output 955 ml   Net -955 ml       Physical Exam:     Physical Exam  Vitals signs and nursing note reviewed  Constitutional:       Appearance: Normal appearance  She is normal weight  Comments: Middle-age female in bed, awake   HENT:      Head: Normocephalic and atraumatic  Right Ear: External ear normal       Left Ear: External ear normal       Nose: Nose normal  No congestion  Mouth/Throat:      Mouth: Mucous membranes are moist       Pharynx: Oropharynx is clear  No oropharyngeal exudate or posterior oropharyngeal erythema  Eyes:      General: No scleral icterus  Right eye: No discharge  Left eye: No discharge  Extraocular Movements: Extraocular movements intact  Conjunctiva/sclera: Conjunctivae normal       Pupils: Pupils are equal, round, and reactive to light  Neck:      Musculoskeletal: Normal range of motion and neck supple  No neck rigidity or muscular tenderness     Cardiovascular:      Rate and Rhythm: Normal rate and regular rhythm  Pulses: Normal pulses  Heart sounds: Normal heart sounds  No murmur  No friction rub  No gallop  Pulmonary:      Effort: Pulmonary effort is normal  No respiratory distress  Breath sounds: Normal breath sounds  No stridor  No wheezing, rhonchi or rales  Chest:      Chest wall: No tenderness  Abdominal:      General: Abdomen is flat  Bowel sounds are normal  There is distension  Palpations: Abdomen is soft  There is no mass  Tenderness: There is abdominal tenderness  There is no guarding or rebound  Comments: Mild tenderness in the incision area but no rebound or guarding, some distention as well  Ostomy in place   Musculoskeletal: Normal range of motion  General: No swelling, tenderness, deformity or signs of injury  Skin:     General: Skin is warm and dry  Capillary Refill: Capillary refill takes less than 2 seconds  Coloration: Skin is not jaundiced or pale  Findings: No bruising, erythema, lesion or rash  Neurological:      General: No focal deficit present  Mental Status: She is alert and oriented to person, place, and time  Mental status is at baseline  Cranial Nerves: No cranial nerve deficit  Sensory: No sensory deficit  Motor: No weakness  Coordination: Coordination normal    Psychiatric:         Mood and Affect: Mood normal          Behavior: Behavior normal          Thought Content:  Thought content normal          Judgment: Judgment normal            Additional Data:     Labs:    Results from last 7 days   Lab Units 02/02/21  0548 02/01/21  0602   WBC Thousand/uL 9 83 11 91*   HEMOGLOBIN g/dL 8 6* 8 4*   HEMATOCRIT % 27 5* 27 1*   PLATELETS Thousands/uL 608* 492*   BANDS PCT % 4  --    NEUTROS PCT %  --  88*   LYMPHS PCT %  --  6*   LYMPHO PCT % 7*  --    MONOS PCT %  --  4   MONO PCT % 4  --    EOS PCT % 0 0     Results from last 7 days   Lab Units 02/02/21  0548 02/01/21  0602   SODIUM mmol/L 149*  --  148*   POTASSIUM mmol/L 3 6   < > 3 5   CHLORIDE mmol/L 112*  --  111*   CO2 mmol/L 26  --  25   BUN mg/dL 19  --  18   CREATININE mg/dL 0 73  --  0 75   ANION GAP mmol/L 11  --  12   CALCIUM mg/dL 8 0*  --  7 9*   ALBUMIN g/dL  --   --  2 0*   TOTAL BILIRUBIN mg/dL  --   --  0 40   ALK PHOS U/L  --   --  41*   ALT U/L  --   --  12   AST U/L  --   --  10   GLUCOSE RANDOM mg/dL 138  --  134    < > = values in this interval not displayed  Results from last 7 days   Lab Units 01/26/21  1622   INR  1 34*     Results from last 7 days   Lab Units 02/02/21  1253 02/02/21  0543 02/01/21  2358 02/01/21  1602 02/01/21  1208 02/01/21  0451 02/01/21  0024 01/31/21  1704 01/31/21  1310 01/31/21  0019 01/30/21  2023 01/30/21  1332   POC GLUCOSE mg/dl 182* 130 122 123 134 131 143* 120 114 110 126 131     Results from last 7 days   Lab Units 01/27/21  0503   HEMOGLOBIN A1C % 6 0*     Results from last 7 days   Lab Units 01/31/21  0625 01/30/21  0619 01/29/21  2218 01/28/21  1328 01/28/21  0029 01/27/21  1918  01/27/21  0503  01/26/21  1622   LACTIC ACID mmol/L  --   --  0 9 1 0 1 1 1 1   < >  --    < > 1 6   PROCALCITONIN ng/ml 1 22* 2 40*  --   --   --   --   --  2 50*  --  1 47*    < > = values in this interval not displayed  * I Have Reviewed All Lab Data Listed Above  * Additional Pertinent Lab Tests Reviewed: Zonia Boothe Admission Reviewed      Recent Cultures (last 7 days):     Results from last 7 days   Lab Units 01/26/21  2150 01/26/21  1621 01/26/21  1601   BLOOD CULTURE   --  No Growth After 5 Days  No Growth After 5 Days     GRAM STAIN RESULT  2+ Polys  No bacteria seen  --   --        Last 24 Hours Medication List:   Current Facility-Administered Medications   Medication Dose Route Frequency Provider Last Rate    acetaminophen  975 mg Oral Q8H [facility] Geovanna Friedman MD      albuterol  2 5 mg Nebulization Q6H PRN Arina Bah PA-C      cefepime 2,000 mg Intravenous Q12H Marina Chilel PA-C 2,000 mg (02/02/21 0603)    dextrose 5 % and sodium chloride 0 2 %  75 mL/hr Intravenous Continuous Daksha Wesley MD 75 mL/hr (02/02/21 1140)    heparin (porcine)  5,000 Units Subcutaneous [facility] Marina Newman PA-C      HYDROmorphone  0 2 mg Intravenous Once Nicole JUAQUIN Randall      HYDROmorphone  0 5 mg Intravenous Q4H PRN Jessenia Mccarthy MD      Or   Emily Mckeon HYDROmorphone  1 mg Intravenous Q4H PRN Jessenia Mccarthy MD      insulin lispro  1-6 Units Subcutaneous Q6H [address] [place], [place]      lidocaine  2 patch Topical Daily Jessenia Mccarthy MD      metroNIDAZOLE  500 mg Intravenous Q8H Marina Chilel PA-C 500 mg (02/02/21 5978)    ondansetron  4 mg Intravenous Q6H PRN Hay benitez PA-C          Today, Patient Was Seen By: Daksha Wesley MD    ** Please Note: Dictation voice to text software may have been used in the creation of this document   **

## 2021-02-02 NOTE — PROGRESS NOTES
Progress Note - General Surgery   Melissa Keys 52 y o  female MRN: [id number]  Unit/Bed#: -01 Encounter: [id number]    Assessment/Plan  Melissa Keys is a 52 y o  female     POD4 bring back to OR, washout, rectal stump reinforcement, fascial closure  POD6 bring back to OR, washout, attempted formation of descending colon colostomy, descending colon resection, partial omentectomy, formation of transverse colostomy, ABThera placement   POD7 exploratory laparotomy, sigmoid colon resection, ABThera placement  Sigmoid colon perforation with abscess  Pneumoperitoneum      AVSS  WBC (9 83 from 11 91)  UO: 0 4 ml/kg/hr   Daily APURVA output: 30cc  10 cc serous fluid today  Small amount of brown liquid stool in ostomy bag  +BS, +flatus  1 episode of vomiting last night, no n/v today  NPO- will transition to sips of clears later this afternoon/tomorrow if no N/V and ostomy has continued output  IVF: D5NS0 2% @75  Analgesia prn  DVT prophylaxis SQH  Encourage OOB, ambulation  PT/OT  Monitor I/O  Int Med following- repletion of electrolytes  Continue daily packing of abdominal incision        Subjective/Objective     Subjective: No acute event overnight  Patient experiencing 10/10 abdominal pain, but is controlled with pain medication  No n/v this morning, but did have 1 episode of vomiting last night  Described as minimal green vomitus  Denies fevers/chills, SOB, CP, palpitations, cough, or LE tenderness  Objective:     Blood pressure 120/78, pulse 63, temperature 97 9 °F (36 6 °C), resp  rate 19, height 5' 4" (1 626 m), weight 103 kg (226 lb 13 7 oz), SpO2 96 %  ,Body mass index is 38 94 kg/m²        Intake/Output Summary (Last 24 hours) at 2/2/2021 1228  Last data filed at 2/2/2021 0544  Gross per 24 hour   Intake --   Output 955 ml   Net -955 ml       Invasive Devices     Peripheral Intravenous Line            Peripheral IV 01/30/21 Right Wrist 2 days    Peripheral IV 01/31/21 Left Hand 2 days Drain            Colostomy Transverse 5 days    Closed/Suction Drain RLQ Bulb 3 days                Physical Exam:     General Appearance: Alert and oriented, in no acute distress, laying in bed   Head: Normocephalic, atraumatic  Neck: Supple, midline  Eyes: Lids normal, conjunctiva clear   Ears: External ears without abnormalities, no discharge from auditory canal  Nose: Midline, nares patent without discharge  Lungs: CTA bilaterally, normal lung effort, without wheezes, rhonchi, or crackles  Cardiovascular: RRR, normal S1 and S2, no murmurs, no abnormal heaves or thrills  Abdomen: Non-distended, active BS, abdomen soft, tender to deep palpation around incisions no rigidity, no guarding or rebound  Abdominal dressing and packing removed  Midline abdominal incision staples intact, copious serous drainage  Mild tenderness with palpation around wound edges  Wound edges are pink, macerated without induration, fluctuance, or purulent drainage  Colostomy bag intact without irritation  Liquid brown stool and gas bubble visible in bag    APURVA drain C/D/I with small amt <20cc of serous fluid  Extremities: Full ROM against gravity  No calf tenderness  Skin: Warm, dry BLE  No pallor, erythema, or cyanosis  No LE edema  Neuro: AAOx3, flat affect       Lab, Imaging and other studies:  I have personally reviewed pertinent lab results    , CBC:   Lab Results   Component Value Date    WBC 9 83 02/02/2021    HGB 8 6 (L) 02/02/2021    HCT 27 5 (L) 02/02/2021    MCV 88 02/02/2021     (H) 02/02/2021    MCH 27 4 02/02/2021    MCHC 31 3 (L) 02/02/2021    RDW 15 1 02/02/2021    MPV 9 9 02/02/2021    NRBC 0 02/02/2021   , CMP:   Lab Results   Component Value Date    SODIUM 149 (H) 02/02/2021    K 3 6 02/02/2021     (H) 02/02/2021    CO2 26 02/02/2021    BUN 19 02/02/2021    CREATININE 0 73 02/02/2021    CALCIUM 8 0 (L) 02/02/2021    EGFR 98 02/02/2021   , Coagulation: No results found for: PT, INR, APTT, Urinalysis: No results found for: Brianna Boyers, SPECGRAV, PHUR, LEUKOCYTESUR, NITRITE, PROTEINUA, GLUCOSEU, KETONESU, BILIRUBINUR, BLOODU, Amylase: No results found for: AMYLASE, Lipase: No results found for: LIPASE   Xr Chest Portable Icu    Result Date: 1/27/2021  Impression: No acute cardiopulmonary disease  ET tube 4 cm above the shreyas  Workstation performed: [id number]     Ct Abdomen Pelvis With Contrast    Result Date: 1/26/2021  Impression: 1  Mild pancolonic wall thickening with minimal pericolonic fat stranding extending from the cecum to sigmoid colon  Findings are suggestive of infectious/inflammatory or less likely ischemic colitis  2   Moderate volume pneumoperitoneum, suggestive of perforated viscus  However, the site of perforation is not definitively identified  Urgent surgical consultation is recommended    I personally discussed this study with [facility] on 1/26/2021 at 4:30 PM  Workstation performed: [id number]     VTE Pharmacologic Prophylaxis: Heparin  VTE Mechanical Prophylaxis: sequential compression device    Recent Results (from the past 36 hour(s))   Fingerstick Glucose (POCT)    Collection Time: 02/01/21  4:51 AM   Result Value Ref Range    POC Glucose 131 65 - 140 mg/dl   CBC and differential    Collection Time: 02/01/21  6:02 AM   Result Value Ref Range    WBC 11 91 (H) 4 31 - 10 16 Thousand/uL    RBC 3 06 (L) 3 81 - 5 12 Million/uL    Hemoglobin 8 4 (L) 11 5 - 15 4 g/dL    Hematocrit 27 1 (L) 34 8 - 46 1 %    MCV 89 82 - 98 fL    MCH 27 5 26 8 - 34 3 pg    MCHC 31 0 (L) 31 4 - 37 4 g/dL    RDW 14 8 11 6 - 15 1 %    MPV 9 9 8 9 - 12 7 fL    Platelets 403 (H) 479 - 390 Thousands/uL    nRBC 0 /100 WBCs    Neutrophils Relative 88 (H) 43 - 75 %    Immat GRANS % 2 0 - 2 %    Lymphocytes Relative 6 (L) 14 - 44 %    Monocytes Relative 4 4 - 12 %    Eosinophils Relative 0 0 - 6 %    Basophils Relative 0 0 - 1 %    Neutrophils Absolute 10 40 (H) 1 85 - 7 62 Thousands/µL    Immature Grans Absolute 0 26 (H) 0 00 - 0 20 Thousand/uL    Lymphocytes Absolute 0 68 0 60 - 4 47 Thousands/µL    Monocytes Absolute 0 49 0 17 - 1 22 Thousand/µL    Eosinophils Absolute 0 04 0 00 - 0 61 Thousand/µL    Basophils Absolute 0 04 0 00 - 0 10 Thousands/µL   Comprehensive metabolic panel    Collection Time: 02/01/21  6:02 AM   Result Value Ref Range    Sodium 148 (H) 136 - 145 mmol/L    Potassium 3 5 3 5 - 5 3 mmol/L    Chloride 111 (H) 100 - 108 mmol/L    CO2 25 21 - 32 mmol/L    ANION GAP 12 4 - 13 mmol/L    BUN 18 5 - 25 mg/dL    Creatinine 0 75 0 60 - 1 30 mg/dL    Glucose 134 65 - 140 mg/dL    Calcium 7 9 (L) 8 3 - 10 1 mg/dL    Corrected Calcium 9 5 8 3 - 10 1 mg/dL    AST 10 5 - 45 U/L    ALT 12 12 - 78 U/L    Alkaline Phosphatase 41 (L) 46 - 116 U/L    Total Protein 5 6 (L) 6 4 - 8 2 g/dL    Albumin 2 0 (L) 3 5 - 5 0 g/dL    Total Bilirubin 0 40 0 20 - 1 00 mg/dL    eGFR 95 ml/min/1 73sq m   Magnesium    Collection Time: 02/01/21  6:02 AM   Result Value Ref Range    Magnesium 2 1 1 6 - 2 6 mg/dL   Phosphorus    Collection Time: 02/01/21  6:02 AM   Result Value Ref Range    Phosphorus 1 6 (L) 2 7 - 4 5 mg/dL   Fingerstick Glucose (POCT)    Collection Time: 02/01/21 12:08 PM   Result Value Ref Range    POC Glucose 134 65 - 140 mg/dl   Fingerstick Glucose (POCT)    Collection Time: 02/01/21  4:02 PM   Result Value Ref Range    POC Glucose 123 65 - 140 mg/dl   Phosphorus    Collection Time: 02/01/21  9:44 PM   Result Value Ref Range    Phosphorus 2 1 (L) 2 7 - 4 5 mg/dL   Potassium    Collection Time: 02/01/21  9:44 PM   Result Value Ref Range    Potassium 3 8 3 5 - 5 3 mmol/L   Fingerstick Glucose (POCT)    Collection Time: 02/01/21 11:58 PM   Result Value Ref Range    POC Glucose 122 65 - 140 mg/dl   Fingerstick Glucose (POCT)    Collection Time: 02/02/21  5:43 AM   Result Value Ref Range    POC Glucose 130 65 - 140 mg/dl   Basic metabolic panel    Collection Time: 02/02/21  5:48 AM   Result Value Ref Range    Sodium 149 (H) 136 - 145 mmol/L    Potassium 3 6 3 5 - 5 3 mmol/L    Chloride 112 (H) 100 - 108 mmol/L    CO2 26 21 - 32 mmol/L    ANION GAP 11 4 - 13 mmol/L    BUN 19 5 - 25 mg/dL    Creatinine 0 73 0 60 - 1 30 mg/dL    Glucose 138 65 - 140 mg/dL    Calcium 8 0 (L) 8 3 - 10 1 mg/dL    eGFR 98 ml/min/1 73sq m   CBC and differential    Collection Time: 02/02/21  5:48 AM   Result Value Ref Range    WBC 9 83 4 31 - 10 16 Thousand/uL    RBC 3 14 (L) 3 81 - 5 12 Million/uL    Hemoglobin 8 6 (L) 11 5 - 15 4 g/dL    Hematocrit 27 5 (L) 34 8 - 46 1 %    MCV 88 82 - 98 fL    MCH 27 4 26 8 - 34 3 pg    MCHC 31 3 (L) 31 4 - 37 4 g/dL    RDW 15 1 11 6 - 15 1 %    MPV 9 9 8 9 - 12 7 fL    Platelets 340 (H) 384 - 390 Thousands/uL    nRBC 0 /100 WBCs   Manual Differential(PHLEBS Do Not Order)    Collection Time: 02/02/21  5:48 AM   Result Value Ref Range    Segmented % 85 (H) 43 - 75 %    Bands % 4 0 - 8 %    Lymphocytes % 7 (L) 14 - 44 %    Monocytes % 4 4 - 12 %    Eosinophils, % 0 0 - 6 %    Basophils % 0 0 - 1 %    Absolute Neutrophils 8 75 (H) 1 85 - 7 62 Thousand/uL    Lymphocytes Absolute 0 69 0 60 - 4 47 Thousand/uL    Monocytes Absolute 0 39 0 00 - 1 22 Thousand/uL    Eosinophils Absolute 0 00 0 00 - 0 40 Thousand/uL    Basophils Absolute 0 00 0 00 - 0 10 Thousand/uL    Total Counted 100     Platelet Estimate Increased (A) Adequate     Manuel PAVAISHALI  2/2/2021

## 2021-02-02 NOTE — ASSESSMENT & PLAN NOTE
· Status post Ex lap/sigmoid, descending colon & transverse colon resection status post colostomy 01/26/2021  · Postsurgical management as per surgery  · Currently NPO for several days, sodium trending up  · Will start her on D5 1/4 for hypernatremia  · Consider TPN if no significant bowel function  · On p r n  Dilaudid/Zofran  · On IV cefepime/Flagyl for now  Can likely de-escalate cefepime to ceftriaxone as there is no evidence of Pseudomonas  Can consider a course of at least 7-10 days of that + flagyl given perforated viscus

## 2021-02-02 NOTE — PLAN OF CARE
Problem: PHYSICAL THERAPY ADULT  Goal: Performs mobility at highest level of function for planned discharge setting  See evaluation for individualized goals  Description: Treatment/Interventions: Functional transfer training, LE strengthening/ROM, Therapeutic exercise, Endurance training, Patient/family training, Equipment eval/education, Bed mobility, Continued evaluation, Spoke to nursing, OT          See flowsheet documentation for full assessment, interventions and recommendations  Outcome: Progressing  Note: Prognosis: Good  Problem List: Decreased strength, Decreased endurance, Impaired balance, Decreased mobility, Decreased skin integrity, Pain  Assessment: Co treatment with OT secondary to complex medical condition of pt, A of 2 required to achieve and maintain transitional movements, requiring the need of skilled therapeutic intervention of 2 therapists to achieve delivery of services  Pt seen for PT treatment session this date with interventions consisting of gait training w/ emphasis on improving pt's ability to ambulate level surfaces x 5' + 15' with min A of 2 provided by therapist with RW, Therapeutic exercise consisting of: AROM 10 reps B LE in sitting position and therapeutic activity consisting of training: supine<>sit transfers and sit<>stand transfers  Pt agreeable to PT treatment session upon arrival, pt found seated OOB in recliner, in no apparent distress, A&O x 4 and responsive  In comparison to previous session, pt with improvements in decreased assistance for bed mobility, transfers; trialed ambulation; initiation of B LE exercises to tolerance  Post session: pt returned BTB, bed alarm engaged, all needs in reach and RN notified of session findings/recommendations  Continue to recommend STR at time of d/c in order to maximize pt's functional independence and safety w/ mobility  Pt continues to be functioning below baseline level, and remains limited 2* factors listed above   PT will continue to see pt while here in order to address the deficits listed above and provide interventions consistent w/ POC in effort to achieve STGs  Barriers to Discharge: Inaccessible home environment, Decreased caregiver support     PT Discharge Recommendation: [phone]     PT - OK to Discharge: Yes(when medically cleared if to STR)    See flowsheet documentation for full assessment

## 2021-02-03 ENCOUNTER — TELEPHONE (OUTPATIENT)
Dept: INTERNAL MEDICINE CLINIC | Facility: CLINIC | Age: 51
End: 2021-02-03

## 2021-02-03 LAB
ANION GAP SERPL CALCULATED.3IONS-SCNC: 8 MMOL/L (ref 4–13)
BASOPHILS # BLD AUTO: 0.02 THOUSANDS/ΜL (ref 0–0.1)
BASOPHILS NFR BLD AUTO: 0 % (ref 0–1)
BUN SERPL-MCNC: 16 MG/DL (ref 5–25)
CALCIUM SERPL-MCNC: 7.6 MG/DL (ref 8.3–10.1)
CHLORIDE SERPL-SCNC: 109 MMOL/L (ref 100–108)
CO2 SERPL-SCNC: 28 MMOL/L (ref 21–32)
CREAT SERPL-MCNC: 0.7 MG/DL (ref 0.6–1.3)
EOSINOPHIL # BLD AUTO: 0.02 THOUSAND/ΜL (ref 0–0.61)
EOSINOPHIL NFR BLD AUTO: 0 % (ref 0–6)
ERYTHROCYTE [DISTWIDTH] IN BLOOD BY AUTOMATED COUNT: 15.3 % (ref 11.6–15.1)
GFR SERPL CREATININE-BSD FRML MDRD: 103 ML/MIN/1.73SQ M
GLUCOSE SERPL-MCNC: 117 MG/DL (ref 65–140)
GLUCOSE SERPL-MCNC: 134 MG/DL (ref 65–140)
GLUCOSE SERPL-MCNC: 138 MG/DL (ref 65–140)
GLUCOSE SERPL-MCNC: 140 MG/DL (ref 65–140)
HCT VFR BLD AUTO: 27.1 % (ref 34.8–46.1)
HGB BLD-MCNC: 8.5 G/DL (ref 11.5–15.4)
IMM GRANULOCYTES # BLD AUTO: 0.21 THOUSAND/UL (ref 0–0.2)
IMM GRANULOCYTES NFR BLD AUTO: 1 % (ref 0–2)
LYMPHOCYTES # BLD AUTO: 0.87 THOUSANDS/ΜL (ref 0.6–4.47)
LYMPHOCYTES NFR BLD AUTO: 6 % (ref 14–44)
MAGNESIUM SERPL-MCNC: 1.7 MG/DL (ref 1.6–2.6)
MCH RBC QN AUTO: 27.7 PG (ref 26.8–34.3)
MCHC RBC AUTO-ENTMCNC: 31.4 G/DL (ref 31.4–37.4)
MCV RBC AUTO: 88 FL (ref 82–98)
MONOCYTES # BLD AUTO: 0.63 THOUSAND/ΜL (ref 0.17–1.22)
MONOCYTES NFR BLD AUTO: 4 % (ref 4–12)
NEUTROPHILS # BLD AUTO: 13.26 THOUSANDS/ΜL (ref 1.85–7.62)
NEUTS SEG NFR BLD AUTO: 89 % (ref 43–75)
NRBC BLD AUTO-RTO: 0 /100 WBCS
PHOSPHATE SERPL-MCNC: 1.7 MG/DL (ref 2.7–4.5)
PLATELET # BLD AUTO: 628 THOUSANDS/UL (ref 149–390)
PMV BLD AUTO: 9.8 FL (ref 8.9–12.7)
POTASSIUM SERPL-SCNC: 2.9 MMOL/L (ref 3.5–5.3)
RBC # BLD AUTO: 3.07 MILLION/UL (ref 3.81–5.12)
SODIUM SERPL-SCNC: 145 MMOL/L (ref 136–145)
WBC # BLD AUTO: 15.01 THOUSAND/UL (ref 4.31–10.16)

## 2021-02-03 PROCEDURE — 99232 SBSQ HOSP IP/OBS MODERATE 35: CPT | Performed by: INTERNAL MEDICINE

## 2021-02-03 PROCEDURE — C1751 CATH, INF, PER/CENT/MIDLINE: HCPCS

## 2021-02-03 PROCEDURE — 83516 IMMUNOASSAY NONANTIBODY: CPT | Performed by: PHYSICIAN ASSISTANT

## 2021-02-03 PROCEDURE — 93005 ELECTROCARDIOGRAM TRACING: CPT

## 2021-02-03 PROCEDURE — 80048 BASIC METABOLIC PNL TOTAL CA: CPT | Performed by: INTERNAL MEDICINE

## 2021-02-03 PROCEDURE — 86671 FUNGUS NES ANTIBODY: CPT | Performed by: PHYSICIAN ASSISTANT

## 2021-02-03 PROCEDURE — 99024 POSTOP FOLLOW-UP VISIT: CPT | Performed by: PHYSICIAN ASSISTANT

## 2021-02-03 PROCEDURE — 99254 IP/OBS CNSLTJ NEW/EST MOD 60: CPT | Performed by: PHYSICIAN ASSISTANT

## 2021-02-03 PROCEDURE — 84100 ASSAY OF PHOSPHORUS: CPT | Performed by: INTERNAL MEDICINE

## 2021-02-03 PROCEDURE — 82948 REAGENT STRIP/BLOOD GLUCOSE: CPT

## 2021-02-03 PROCEDURE — NC001 PR NO CHARGE: Performed by: PHYSICIAN ASSISTANT

## 2021-02-03 PROCEDURE — 86255 FLUORESCENT ANTIBODY SCREEN: CPT | Performed by: PHYSICIAN ASSISTANT

## 2021-02-03 PROCEDURE — 02HV33Z INSERTION OF INFUSION DEVICE INTO SUPERIOR VENA CAVA, PERCUTANEOUS APPROACH: ICD-10-PCS | Performed by: STUDENT IN AN ORGANIZED HEALTH CARE EDUCATION/TRAINING PROGRAM

## 2021-02-03 PROCEDURE — 36569 INSJ PICC 5 YR+ W/O IMAGING: CPT

## 2021-02-03 PROCEDURE — 85025 COMPLETE CBC W/AUTO DIFF WBC: CPT | Performed by: INTERNAL MEDICINE

## 2021-02-03 PROCEDURE — 83735 ASSAY OF MAGNESIUM: CPT | Performed by: INTERNAL MEDICINE

## 2021-02-03 RX ADMIN — DEXTROSE AND SODIUM CHLORIDE 75 ML/HR: 5; .2 INJECTION, SOLUTION INTRAVENOUS at 20:19

## 2021-02-03 RX ADMIN — HEPARIN SODIUM 5000 UNITS: 5000 INJECTION INTRAVENOUS; SUBCUTANEOUS at 22:17

## 2021-02-03 RX ADMIN — METRONIDAZOLE 500 MG: 500 INJECTION, SOLUTION INTRAVENOUS at 09:27

## 2021-02-03 RX ADMIN — ACETAMINOPHEN 975 MG: 325 TABLET, FILM COATED ORAL at 06:01

## 2021-02-03 RX ADMIN — INSULIN LISPRO 1 UNITS: 100 INJECTION, SOLUTION INTRAVENOUS; SUBCUTANEOUS at 00:01

## 2021-02-03 RX ADMIN — FLUTICASONE FUROATE AND VILANTEROL TRIFENATATE 1 PUFF: 100; 25 POWDER RESPIRATORY (INHALATION) at 10:24

## 2021-02-03 RX ADMIN — CEFEPIME HYDROCHLORIDE 2000 MG: 2 INJECTION, POWDER, FOR SOLUTION INTRAVENOUS at 20:37

## 2021-02-03 RX ADMIN — HYDROMORPHONE HYDROCHLORIDE 0.5 MG: 1 INJECTION, SOLUTION INTRAMUSCULAR; INTRAVENOUS; SUBCUTANEOUS at 22:17

## 2021-02-03 RX ADMIN — DEXTROSE AND SODIUM CHLORIDE 75 ML/HR: 5; .2 INJECTION, SOLUTION INTRAVENOUS at 04:02

## 2021-02-03 RX ADMIN — HEPARIN SODIUM 5000 UNITS: 5000 INJECTION INTRAVENOUS; SUBCUTANEOUS at 13:28

## 2021-02-03 RX ADMIN — CEFEPIME HYDROCHLORIDE 2000 MG: 2 INJECTION, POWDER, FOR SOLUTION INTRAVENOUS at 06:02

## 2021-02-03 RX ADMIN — METRONIDAZOLE 500 MG: 500 INJECTION, SOLUTION INTRAVENOUS at 00:03

## 2021-02-03 RX ADMIN — HEPARIN SODIUM 5000 UNITS: 5000 INJECTION INTRAVENOUS; SUBCUTANEOUS at 06:01

## 2021-02-03 RX ADMIN — METRONIDAZOLE 500 MG: 500 INJECTION, SOLUTION INTRAVENOUS at 17:41

## 2021-02-03 RX ADMIN — ACETAMINOPHEN 975 MG: 325 TABLET, FILM COATED ORAL at 13:28

## 2021-02-03 RX ADMIN — LIDOCAINE 5% 2 PATCH: 700 PATCH TOPICAL at 10:24

## 2021-02-03 RX ADMIN — ACETAMINOPHEN 975 MG: 325 TABLET, FILM COATED ORAL at 22:18

## 2021-02-03 RX ADMIN — HYDROMORPHONE HYDROCHLORIDE 1 MG: 1 INJECTION, SOLUTION INTRAMUSCULAR; INTRAVENOUS; SUBCUTANEOUS at 09:26

## 2021-02-03 NOTE — PROGRESS NOTES
Progress Note - General Surgery   Bijal Renteria 52 y o  female MRN: [id number]  Unit/Bed#: -01 Encounter: [id number]    Assessment/Plan  Bijal Renteria is a 52 y o  female     POD5 bring back to OR, washout, rectal stump reinforcement, fascial closure  POD7 bring back to OR, washout, attempted formation of descending colon colostomy, descending colon resection, partial omentectomy, formation of transverse colostomy, ABThera placement   POD8 exploratory laparotomy, sigmoid colon resection, ABThera placement  Sigmoid colon perforation with abscess  Pneumoperitoneum     AVSS  UO: 500mL daily  APURVA: 8 cc serous   Ostomy: 100cc greenish liquid    Transitioned to sips of clears, tolerating well  Will continue to ADAT  PICC line consent obtained- tough stick/possibility of starting TPN (hold off for now, as some bowel function is returning)  IVF: D5NS0 2% @75  Analgesia prn  DVT prophylaxis SQH  Encourage OOB, ambulation  PT/OT; both recommend she will benefit from post-acute rehab services at d/c  Monitor I/O  Continue daily packing of midline abdominal incision and old colostomy incision  Int Med following-Cefepime Flagyl, likely will d/c cefepime and likely start ceftriaxone since no growth of pseudomonas  GI consulted for pathology results of possible UC vs colitis; appreciate input      Subjective/Objective     Subjective: No acute events overnight  Pain is at 8/10, but more pain is in her lower back  Controlled with pain medication and lidocaine patches  No longer nauseous and no episodes of vomiting since the night before  Passing flatus in ostomy bag, gurgling sound noted and bubbles in bag  Dark green liquid present in bag, +stool output  Denies fevers/chills, CP, palpitations, SOB, cough, LE tenderness  Has not been up and ambulating on own  PT came yesterday and had her walking around the room, they will evaluate and get her up and OOB today       Objective:     Blood pressure 151/79, pulse 59, temperature 98 1 °F (36 7 °C), resp  rate 19, height 5' 4" (1 626 m), weight 103 kg (226 lb 13 7 oz), SpO2 96 %  ,Body mass index is 38 94 kg/m²  Intake/Output Summary (Last 24 hours) at 2/3/2021 1057  Last data filed at 2/3/2021 0402  Gross per 24 hour   Intake 1227 5 ml   Output 558 ml   Net 669 5 ml       Invasive Devices     Peripheral Intravenous Line            Peripheral IV 01/30/21 Right Wrist 3 days    Peripheral IV 01/31/21 Left Hand 3 days          Drain            Colostomy Transverse 6 days    Closed/Suction Drain RLQ Bulb 4 days                Physical Exam:   General Appearance: Alert and oriented, in no acute distress, flat affect  Head: Normocephalic, atraumatic  Neck: Supple, midline  Eyes: Lids normal, conjunctiva clear   Ears: External ears without abnormalities, no discharge from auditory canal  Nose: Midline, nares patent without discharge  Mouth: Poor dentition   Lungs: CTA bilaterally, normal lung effort, without wheezes, rhonchi, or crackles  Cardiovascular: RRR, normal S1 and S2, no murmurs, no abnormal heaves or thrills  Abdomen: Non-distended, active BS, no rigidity, soft abdomen, tender to palpation around incision sites, no guarding/rebound  APURVA drain C/D/I with 10 cc serous output  Midline incision site with pink, macerated wound edges, tender to palpation  Copious serosanguinous drainage  Small amt of slough on right middle wound edge  Extremities: No calf tenderness  Skin: Warm, dry BLE  No pallor, erythema, or cyanosis  No LE edema  Neuro: AAOx3    Lab, Imaging and other studies:I have personally reviewed pertinent lab results    , CBC: No results found for: WBC, HGB, HCT, MCV, PLT, ADJUSTEDWBC, MCH, MCHC, RDW, MPV, NRBC, CMP: No results found for: SODIUM, K, CL, CO2, ANIONGAP, BUN, CREATININE, GLUCOSE, CALCIUM, AST, ALT, ALKPHOS, PROT, BILITOT, EGFR, Coagulation: No results found for: PT, INR, APTT, Urinalysis: No results found for: [place], SPECGRAV, PHUR, LEUKOCYTESUR, NITRITE, PROTEINUA, GLUCOSEU, KETONESU, BILIRUBINUR, BLOODU, Amylase: No results found for: AMYLASE, Lipase: No results found for: LIPASE   VTE Pharmacologic Prophylaxis: Heparin  VTE Mechanical Prophylaxis: sequential compression device    Recent Results (from the past 36 hour(s))   Fingerstick Glucose (POCT)    Collection Time: 02/01/21 11:58 PM   Result Value Ref Range    POC Glucose 122 65 - 140 mg/dl   Fingerstick Glucose (POCT)    Collection Time: 02/02/21  5:43 AM   Result Value Ref Range    POC Glucose 130 65 - 140 mg/dl   Basic metabolic panel    Collection Time: 02/02/21  5:48 AM   Result Value Ref Range    Sodium 149 (H) 136 - 145 mmol/L    Potassium 3 6 3 5 - 5 3 mmol/L    Chloride 112 (H) 100 - 108 mmol/L    CO2 26 21 - 32 mmol/L    ANION GAP 11 4 - 13 mmol/L    BUN 19 5 - 25 mg/dL    Creatinine 0 73 0 60 - 1 30 mg/dL    Glucose 138 65 - 140 mg/dL    Calcium 8 0 (L) 8 3 - 10 1 mg/dL    eGFR 98 ml/min/1 73sq m   CBC and differential    Collection Time: 02/02/21  5:48 AM   Result Value Ref Range    WBC 9 83 4 31 - 10 16 Thousand/uL    RBC 3 14 (L) 3 81 - 5 12 Million/uL    Hemoglobin 8 6 (L) 11 5 - 15 4 g/dL    Hematocrit 27 5 (L) 34 8 - 46 1 %    MCV 88 82 - 98 fL    MCH 27 4 26 8 - 34 3 pg    MCHC 31 3 (L) 31 4 - 37 4 g/dL    RDW 15 1 11 6 - 15 1 %    MPV 9 9 8 9 - 12 7 fL    Platelets 623 (H) 141 - 390 Thousands/uL    nRBC 0 /100 WBCs   Manual Differential(PHLEBS Do Not Order)    Collection Time: 02/02/21  5:48 AM   Result Value Ref Range    Segmented % 85 (H) 43 - 75 %    Bands % 4 0 - 8 %    Lymphocytes % 7 (L) 14 - 44 %    Monocytes % 4 4 - 12 %    Eosinophils, % 0 0 - 6 %    Basophils % 0 0 - 1 %    Absolute Neutrophils 8 75 (H) 1 85 - 7 62 Thousand/uL    Lymphocytes Absolute 0 69 0 60 - 4 47 Thousand/uL    Monocytes Absolute 0 39 0 00 - 1 22 Thousand/uL    Eosinophils Absolute 0 00 0 00 - 0 40 Thousand/uL    Basophils Absolute 0 00 0 00 - 0 10 Thousand/uL    Total Counted 100 Platelet Estimate Increased (A) Adequate   Fingerstick Glucose (POCT)    Collection Time: 02/02/21 12:53 PM   Result Value Ref Range    POC Glucose 182 (H) 65 - 140 mg/dl   Fingerstick Glucose (POCT)    Collection Time: 02/02/21  4:41 PM   Result Value Ref Range    POC Glucose 175 (H) 65 - 140 mg/dl   Fingerstick Glucose (POCT)    Collection Time: 02/02/21  9:22 PM   Result Value Ref Range    POC Glucose 168 (H) 65 - 140 mg/dl   Fingerstick Glucose (POCT)    Collection Time: 02/02/21 11:28 PM   Result Value Ref Range    POC Glucose 155 (H) 65 - 140 mg/dl   Fingerstick Glucose (POCT)    Collection Time: 02/03/21  6:06 AM   Result Value Ref Range    POC Glucose 117 65 - 140 mg/dl     JUAQUIN Jackson  2/3/2021

## 2021-02-03 NOTE — PROGRESS NOTES
Progress Note - Aidan Magana 1970, 52 y o  female MRN: [id number]    Unit/Bed#: -01 Encounter: [id number]    Primary Care Provider: Checo Armas MD   Date and time admitted to hospital: 1/26/2021  2:12 PM        Perforation of sigmoid colon due to diverticulitis  Assessment & Plan  · Status post Ex lap/sigmoid, descending colon & transverse colon resection status post colostomy 01/26/2021  · Postsurgical management as per surgery  · Patient currently NPO, no significant persistent return of bowel function yet  Consideration being given to TPN by surgical team   · On p r n  Dilaudid/Zofran  · On IV cefepime/Flagyl for now  Can likely de-escalate cefepime to ceftriaxone as there is no evidence of Pseudomonas  Can consider a course of at least 7-10 days of that + flagyl given perforated viscus  * Pneumoperitoneum  Assessment & Plan  Secondary to perforation of sigmoid colon  See plan under perforation of sigmoid colon  Mild persistent asthma  Assessment & Plan  Currently stable  Continue with p r n  Albuterol/albuterol inhaler    Acute renal failure (ARF) (HCC)  Assessment & Plan  Likely acute renal failure secondary to ischemic ATN due to perforated viscus  · Creatinine now stable around 0 7  Monitor kidney function    Hypothyroid  Assessment & Plan  Continue with Synthroid once able to tolerate p o  Intake      Sepsis which was presented due to bowel perforation is treated and resolved, currently patient is nontoxic    VTE Pharmacologic Prophylaxis:   Pharmacologic: Heparin  Mechanical VTE Prophylaxis in Place: Yes    Patient Centered Rounds: I have performed bedside rounds with nursing staff today  Education and Discussions with Family / Patient:  Patient herself    Time Spent for Care: 30 minutes  More than 50% of total time spent on counseling and coordination of care as described above      Current Length of Stay: 8 day(s)    Current Patient Status: Inpatient   Certification Statement: The patient will continue to require additional inpatient hospital stay due to Need for establishing    Discharge Plan:  Once stable    Code Status: Level 1 - Full Code      Subjective:     Patient evaluated this morning  No significant bowel function at although she does admit some flatus  Does have some abdominal pain but that is not worsened than usual   Denies diarrhea, denies vomiting  No other events reported    Objective:     Vitals:   Temp (24hrs), Av °F (36 7 °C), Min:97 7 °F (36 5 °C), Max:98 1 °F (36 7 °C)    Temp:  [97 7 °F (36 5 °C)-98 1 °F (36 7 °C)] 98 1 °F (36 7 °C)  HR:  [59-72] 59  Resp:  [18-19] 19  BP: (134-151)/(73-79) 151/79  SpO2:  [95 %-96 %] 96 %  Body mass index is 38 94 kg/m²  Input and Output Summary (last 24 hours): Intake/Output Summary (Last 24 hours) at 2/3/2021 1147  Last data filed at 2/3/2021 1101  Gross per 24 hour   Intake 1227 5 ml   Output 658 ml   Net 569 5 ml       Physical Exam:     Physical Exam  Vitals signs and nursing note reviewed  Constitutional:       Appearance: Normal appearance  She is normal weight  Comments: Female in bed, awake, alert   HENT:      Head: Normocephalic and atraumatic  Right Ear: External ear normal       Left Ear: External ear normal       Nose: Nose normal  No congestion  Mouth/Throat:      Mouth: Mucous membranes are moist       Pharynx: Oropharynx is clear  No oropharyngeal exudate or posterior oropharyngeal erythema  Eyes:      General: No scleral icterus  Right eye: No discharge  Left eye: No discharge  Extraocular Movements: Extraocular movements intact  Conjunctiva/sclera: Conjunctivae normal       Pupils: Pupils are equal, round, and reactive to light  Neck:      Musculoskeletal: Normal range of motion and neck supple  No neck rigidity or muscular tenderness  Cardiovascular:      Rate and Rhythm: Normal rate and regular rhythm  Pulses: Normal pulses  Heart sounds: Normal heart sounds  No murmur  No friction rub  No gallop  Pulmonary:      Effort: Pulmonary effort is normal  No respiratory distress  Breath sounds: Normal breath sounds  No stridor  No wheezing, rhonchi or rales  Chest:      Chest wall: No tenderness  Abdominal:      General: Abdomen is flat  Bowel sounds are normal  There is distension  Palpations: Abdomen is soft  There is no mass  Tenderness: There is abdominal tenderness  There is no guarding or rebound  Comments: Mild tenderness in the incision area but no rebound or guarding, some distention as well  Ostomy in place   Musculoskeletal: Normal range of motion  General: No swelling, tenderness, deformity or signs of injury  Skin:     General: Skin is warm and dry  Capillary Refill: Capillary refill takes less than 2 seconds  Coloration: Skin is not jaundiced or pale  Findings: No bruising, erythema, lesion or rash  Neurological:      General: No focal deficit present  Mental Status: She is alert and oriented to person, place, and time  Mental status is at baseline  Cranial Nerves: No cranial nerve deficit  Sensory: No sensory deficit  Motor: No weakness  Coordination: Coordination normal    Psychiatric:         Mood and Affect: Mood normal          Behavior: Behavior normal          Thought Content:  Thought content normal          Judgment: Judgment normal            Additional Data:     Labs:    Results from last 7 days   Lab Units 02/02/21  0548 02/01/21  0602   WBC Thousand/uL 9 83 11 91*   HEMOGLOBIN g/dL 8 6* 8 4*   HEMATOCRIT % 27 5* 27 1*   PLATELETS Thousands/uL 608* 492*   BANDS PCT % 4  --    NEUTROS PCT %  --  88*   LYMPHS PCT %  --  6*   LYMPHO PCT % 7*  --    MONOS PCT %  --  4   MONO PCT % 4  --    EOS PCT % 0 0     Results from last 7 days   Lab Units 02/02/21  0548  02/01/21  0602   SODIUM mmol/L 149*  --  148*   POTASSIUM mmol/L 3 6   < > 3 5 CHLORIDE mmol/L 112*  --  111*   CO2 mmol/L 26  --  25   BUN mg/dL 19  --  18   CREATININE mg/dL 0 73  --  0 75   ANION GAP mmol/L 11  --  12   CALCIUM mg/dL 8 0*  --  7 9*   ALBUMIN g/dL  --   --  2 0*   TOTAL BILIRUBIN mg/dL  --   --  0 40   ALK PHOS U/L  --   --  41*   ALT U/L  --   --  12   AST U/L  --   --  10   GLUCOSE RANDOM mg/dL 138  --  134    < > = values in this interval not displayed  Results from last 7 days   Lab Units 02/03/21  1132 02/03/21  0606 02/02/21  2328 02/02/21  2122 02/02/21  1641 02/02/21  1253 02/02/21  0543 02/01/21  2358 02/01/21  1602 02/01/21  1208 02/01/21  0451 02/01/21  0024   POC GLUCOSE mg/dl 134 117 155* 168* 175* 182* 130 122 123 134 131 143*         Results from last 7 days   Lab Units 01/31/21  0625 01/30/21  0619 01/29/21  2218 01/28/21  1328 01/28/21  0029 01/27/21  1918   LACTIC ACID mmol/L  --   --  0 9 1 0 1 1 1 1   PROCALCITONIN ng/ml 1 22* 2 40*  --   --   --   --            * I Have Reviewed All Lab Data Listed Above  * Additional Pertinent Lab Tests Reviewed:  [facility]    Recent Cultures (last 7 days):           Last 24 Hours Medication List:   Current Facility-Administered Medications   Medication Dose Route Frequency Provider Last Rate    acetaminophen  975 mg Oral Q8H [address] Marine Irving MD      albuterol  2 5 mg Nebulization Q6H PRN Kassie Gorman PA-C      cefepime  2,000 mg Intravenous Q12H Marina Chilel PA-C 2,000 mg (02/03/21 0602)    dextrose 5 % and sodium chloride 0 2 %  75 mL/hr Intravenous Continuous Irving Mari MD 75 mL/hr (02/03/21 0402)    fluticasone-vilanterol  1 puff Inhalation Daily Grace Driscoll PA-C      heparin (porcine)  5,000 Units Subcutaneous [facility] Marina Newman PA-C      HYDROmorphone  0 2 mg Intravenous Once Randall Camilo PA-C      HYDROmorphone  0 5 mg Intravenous Q4H PRN Marine Irving MD      Or   Angie Benites HYDROmorphone  1 mg Intravenous Q4H PRN Star Rubi MD Margot      insulin lispro  1-6 Units Subcutaneous Q6H [address] [place], [place]      lidocaine  2 patch Topical Daily Guerline Morales MD      metroNIDAZOLE  500 mg Intravenous Q8H Marina Chilel PA-C 500 mg (02/03/21 0927)    ondansetron  4 mg Intravenous Q6H PRN Ernestine Paul PA-C          Today, Patient Was Seen By: Eliseo Corado MD    ** Please Note: Dictation voice to text software may have been used in the creation of this document   **

## 2021-02-03 NOTE — WOUND OSTOMY CARE
Progress Note - Ostomy   Aidan Magana 52 y o  female MRN: [id number]  Unit/Bed#: -01 Encounter: [id number]      Assessment:  Patient admitted with pneumoperitoneum with creation of transverse colostomy  Patient is POD #7 for creation of the colostomy  Seen for ostomy education and teaching  Introduced self and role to patient  Patient is agreeable to visit  Patient is primary learner  Patient is alert and oriented x 4, flat affect  Patient reports some abdominal discomfort  Patient is currently NPO  Topics reviewed: normal stoma appearance, how and when to empty (1/3 full) to prevent pulling/lifting of the barrier, importance & how to clean the end of the pouch to prevent odor, gas/odor producing foods, frequency of changing of the pouch (every 3-4 days on a schedule), one piece/two piece pouching systems differences, and how to obtain supplies  2 piece ostomy pouch is clean dry and intact, no leaking or lifting of the barrier noted  Unable to visualize the stoma due to stool obscuring the stoma  Emptied pouch with patient into cylinder to demonstrated emptying and closing of the pouch  Patient watched procedure closely and states she has looked at her stoma  Pouch emptied for 100ml of liquid dark green effluent  No flatus in pouch  Plan is for patient to have VNA at home per CM notes  Patient listened and had good attention during the teaching session  Patient makes appropriate eye contact, but seems withdrawn  Patient verbalized understanding of education and teaching, however due to flat affect it is hard to know her level of true understanding  Patient denies having any specific questions regarding ostomy care  Encouraged patient to attempt to empty her pouch with nursing  Encouraged patient to continue to read the educational materials provided - "Life after colostomy Surgery" [facility]  Plan:   1  Will plan for pouch change later this week  2   Encourage patient to read through her ostomy educational materials  3  Encourage patient to engage in self care with her ostomy - emptying ,etc with nursing assistance    Objective:    Vitals: Blood pressure 151/79, pulse 59, temperature 98 1 °F (36 7 °C), resp  rate 19, height 5' 4" (1 626 m), weight 103 kg (226 lb 13 7 oz), SpO2 96 %  ,Body mass index is 38 94 kg/m²      Will continue to follow,  James Henao RN BSN [facility]

## 2021-02-03 NOTE — TELEPHONE ENCOUNTER
Patient is looking for paperwork for Kole Dang that should be filled out for her temporary disability paperwork  It is done through her work but she is in the hospital  I don't see anything in her chart and I am unsure if anyone has spoken to them or saw paperwork?

## 2021-02-03 NOTE — PROCEDURES
Insert PICC line    Date/Time: 2/3/2021 1:21 PM  Performed by: Shoshana Lan RN  Authorized by: JUAQUIN Jackson     Patient location:  Bedside  Other Assisting Provider: No    Consent:     Consent obtained:  Written    Consent given by:  Patient  Universal protocol:     Procedure explained and questions answered to patient or proxy's satisfaction: yes      Relevant documents present and verified: yes      Test results available and properly labeled: yes      Required blood products, implants, devices, and special equipment available: yes      Site/side marked: yes      Patient identity confirmed:  Verbally with patient, arm band and hospital-assigned identification number  Pre-procedure details:     Hand hygiene: Hand hygiene performed prior to insertion      Sterile barrier technique: All elements of maximal sterile technique followed      Skin preparation:  ChloraPrep    Skin preparation agent: Skin preparation agent completely dried prior to procedure    Indications:     PICC line indications: total parenteral nutrition    Anesthesia (see MAR for exact dosages):      Anesthesia method:  Local infiltration    Local anesthetic:  Lidocaine 1% w/o epi  Procedure details:     Location:  [place]    Vessel type: vein      Laterality:  Right    Approach: percutaneous technique used      Patient position:  Flat    Procedural supplies:  Double lumen    Catheter size:  5 Fr    Landmarks identified: yes      Ultrasound guidance: yes      Sterile ultrasound techniques: Sterile gel and sterile probe covers were used      Number of attempts:  1    Successful placement: yes      Vessel of catheter tip end:  Sherlock 3CG confirmed    Total catheter length (cm):  43    Catheter out on skin (cm):  0    Max flow rate:  5 ml/sec    Arm circumference:  31  Post-procedure details:     Post-procedure:  Dressing applied and securement device placed    Assessment:  Blood return through all ports    Post-procedure complications: none Patient tolerance of procedure:   Tolerated well, no immediate complications
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
## 2021-02-03 NOTE — UTILIZATION REVIEW
Continued Stay Review    Date: 2-3-21                  Current Patient Class: inpatient  Current Level of Care: med surg    HPI:50 y o  female initially admitted on 1-26-21  Pneumoperitoneum, perforation of sigmoid colon due to diverticulitis       1-29 exploratory laparotomy abdominal washout / colostomy  Assessment/Plan:     Currently npo  Bowel function has not returned yet  Considering TPN  On iv cefepime, flagyl  , iv d 5% and  2% ns 75/hr  Consult gastroenterology ordered  Continue to finger stick glucose  Wound care consulted for colostomy education           Pertinent Labs/Diagnostic Results:       Results from last 7 days   Lab Units 02/02/21  0548 02/01/21  0602 01/31/21  0625 01/30/21  0619 01/29/21  2047   WBC Thousand/uL 9 83 11 91* 15 67* 17 45* 21 08*   HEMOGLOBIN g/dL 8 6* 8 4* 8 2* 7 9* 8 4*   HEMATOCRIT % 27 5* 27 1* 25 8* 25 1* 27 3*   PLATELETS Thousands/uL 608* 492* 389 314 326   NEUTROS ABS Thousands/µL  --  10 40*  --   --   --    BANDS PCT % 4  --  12* 15*  --          Results from last 7 days   Lab Units 02/02/21  0548 02/01/21  2144 02/01/21  0602 01/31/21  1823 01/31/21  0625 01/30/21  1547 01/30/21  0619  01/29/21  0520  01/28/21  0803   SODIUM mmol/L 149*  --  148* 147* 147* 141 145   < > 142   < > 141   POTASSIUM mmol/L 3 6 3 8 3 5 3 9 3 8 3 8 3 4*   < > 3 6   < > 4 1   CHLORIDE mmol/L 112*  --  111* 109* 111* 109* 110*   < > 110*   < > 109*   CO2 mmol/L 26  --  25 24 25 24 23   < > 22   < > 21   ANION GAP mmol/L 11  --  12 14* 11 8 12   < > 10   < > 11   BUN mg/dL 19  --  18 20 20 21 21   < > 25   < > 16   CREATININE mg/dL 0 73  --  0 75 0 93 0 86 1 16 1 22   < > 1 53*   < > 1 09   EGFR ml/min/1 73sq m 98  --  95 73 80 56 53   < > 40   < > 60   CALCIUM mg/dL 8 0*  --  7 9* 8 0* 7 8* 7 9* 7 4*   < > 7 7*   < > 7 4*   CALCIUM, IONIZED mmol/L  --   --   --   --   --   --   --   --   --   --  0 96*   MAGNESIUM mg/dL  --   --  2 1 2 0 2 2 2 1 2 2  --  2 6  --  2 5 PHOSPHORUS mg/dL  --  2 1* 1 6*  --  1 7*  --  2 1*  --  3 2  --  3 4    < > = values in this interval not displayed       Results from last 7 days   Lab Units 02/01/21  0602 01/31/21  0625 01/30/21  0619 01/29/21  2047 01/27/21  1918   AST U/L 10 12 12 10 10   ALT U/L 12 11* 11* 10* 10*   ALK PHOS U/L 41* 38* 34* 37* 36*   TOTAL PROTEIN g/dL 5 6* 5 2* 5 0* 4 8* 4 5*   ALBUMIN g/dL 2 0* 2 0* 2 3* 2 1* 1 6*   TOTAL BILIRUBIN mg/dL 0 40 0 40 0 50 0 40 0 50     Results from last 7 days   Lab Units 02/03/21  1132 02/03/21  0606 02/02/21  2328 02/02/21  2122 02/02/21  1641 02/02/21  1253 02/02/21  0543 02/01/21  2358 02/01/21  1602 02/01/21  1208 02/01/21  0451 02/01/21  0024   POC GLUCOSE mg/dl 134 117 155* 168* 175* 182* 130 122 123 134 131 143*     Results from last 7 days   Lab Units 02/02/21  0548 02/01/21  0602 01/31/21  1823 01/31/21  0625 01/30/21  1547 01/30/21  0619 01/29/21  2047 01/29/21  0520 01/28/21 2009 01/28/21  1428 01/28/21  0803 01/27/21  1918   GLUCOSE RANDOM mg/dL 138 134 131 117 133 119 104 119 140 142* 157* 179*     Results from last 7 days   Lab Units 01/30/21  0234 01/29/21  2046 01/28/21  1612   PH ART  7 413 7 300* 7 435   PCO2 ART mm Hg 27 6* 20 4* 33 0*   PO2 ART mm Hg 106 4 142 6* 148 4*   HCO3 ART mmol/L 17 2* 9 8* 21 7*   BASE EXC ART mmol/L -6 5 -15 3 -2 1   O2 CONTENT ART mL/dL 11 4* 8 2* 13 3*   O2 HGB, ARTERIAL % 97 0 97 4* 98 1*   ABG SOURCE  Radial, Left Line, Arterial Line, Arterial     Results from last 7 days   Lab Units 01/28/21  1424   PH SHIRLEY  7 523*   PCO2 SHIRLEY mm Hg 21 0*   PO2 SHIRLEY mm Hg 197 8*   HCO3 SHIRLEY mmol/L 16 9*   BASE EXC SHIRLEY mmol/L -4 4   O2 CONTENT SHIRLEY ml/dL 14 3   O2 HGB, VENOUS % 93 7*     Results from last 7 days   Lab Units 01/28/21  0112   I STAT BASE EXC mmol/L -4*   I STAT O2 SAT % 99*   ISTAT PH ART  7 361   I STAT ART PCO2 mm HG 36 4   I STAT ART PO2 mm  0*   I STAT ART HCO3 mmol/L 20 6*     Results from last 7 days   Lab Units 01/31/21  4170 01/30/21  0619   PROCALCITONIN ng/ml 1 22* 2 40*     Results from last 7 days   Lab Units 01/29/21  2218 01/28/21  1328 01/28/21  0029 01/27/21  1918   LACTIC ACID mmol/L 0 9 1 0 1 1 1 1       Vital Signs:       Date/Time  Temp  Pulse  Resp  BP  MAP (mmHg)  SpO2  O2 Device   02/03/21 07:47:35  98 1 °F (36 7 °C)  59  19  151/79  103  96 %  --   02/02/21 23:00:05  97 7 °F (36 5 °C)  72  18  135/78  97  96 %  --   02/02/21 2018  --  --  --  --  --  95 %  None (Room air)   02/02/21 2017  --  --  --  --  --  95 %  --   02/02/21 1501  98 1 °F (36 7 °C)  60  18  134/73  93  96 %  --   02/02/21 0843  --  --  --  --  --  --  None (Room air)   02/02/21 0752  --  --  --  --  --  --  None (Room air)   02/02/21 07:39:03  97 9 °F (36 6 °C)  63  19  120/78  92  96 %  --           Medications:     acetaminophen, 975 mg, Oral, Q8H LUI  cefepime, 2,000 mg, Intravenous, Q12H  fluticasone-vilanterol, 1 puff, Inhalation, Daily  heparin (porcine), 5,000 Units, Subcutaneous, Q8H LUI  HYDROmorphone, 0 2 mg, Intravenous, Once  insulin lispro, 1-6 Units, Subcutaneous, Q6H LUI  lidocaine, 2 patch, Topical, Daily  metroNIDAZOLE, 500 mg, Intravenous, Q8H      Continuous IV Infusions:  dextrose 5 % and sodium chloride 0 2 %, 75 mL/hr, Intravenous, Continuous      PRN Meds:  albuterol, 2 5 mg, Nebulization, Q6H PRN  HYDROmorphone, 0 5 mg, Intravenous, Q4H PRN    Or  HYDROmorphone, 1 mg, Intravenous, Q4H PRN  ondansetron, 4 mg, Intravenous, Q6H PRN        Discharge Plan: to be determined     Network Utilization Review Department  ATTENTION: Please call with any questions or concerns to [phone] and carefully listen to the prompts so that you are directed to the right person  All voicemails are confidential   Nanda Willams all requests for admission clinical reviews, approved or denied determinations and any other requests to dedicated fax number below belonging to the campus where the patient is receiving treatment   List of dedicated fax numbers for the Facilities:  FACILITY NAME UR FAX NUMBER   ADMISSION DENIALS (Administrative/Medical Necessity) [phone]   [address] (Maternity/NICU/Pediatrics) [address] [facility],[address] Floor [facility] [address] [place] [address]  [address] Jenna Marvin 4947 (  Kayla Pizano "Suzanne" 103) [address] [address] Julian Zamora 1481 [address] [place], [place] [address] [phone]

## 2021-02-03 NOTE — ASSESSMENT & PLAN NOTE
· Status post Ex lap/sigmoid, descending colon & transverse colon resection status post colostomy 01/26/2021  · Postsurgical management as per surgery  · Patient currently NPO, no significant persistent return of bowel function yet  Consideration being given to TPN by surgical team   · On p r n  Dilaudid/Zofran  · On IV cefepime/Flagyl for now  Can likely de-escalate cefepime to ceftriaxone as there is no evidence of Pseudomonas  Can consider a course of at least 7-10 days of that + flagyl given perforated viscus

## 2021-02-03 NOTE — ASSESSMENT & PLAN NOTE
Likely acute renal failure secondary to ischemic ATN due to perforated viscus  · Creatinine now stable around 0 7      Monitor kidney function

## 2021-02-03 NOTE — TELEPHONE ENCOUNTER
Dr Mendoza Orozco   Do you remember filling this paperwork out? I cant find it at [facility]

## 2021-02-04 PROBLEM — E87.8 ELECTROLYTE ABNORMALITY: Status: ACTIVE | Noted: 2021-02-04

## 2021-02-04 LAB
ALBUMIN SERPL BCP-MCNC: 1.9 G/DL (ref 3.5–5)
ALP SERPL-CCNC: 63 U/L (ref 46–116)
ALT SERPL W P-5'-P-CCNC: 13 U/L (ref 12–78)
ANION GAP SERPL CALCULATED.3IONS-SCNC: 9 MMOL/L (ref 4–13)
AST SERPL W P-5'-P-CCNC: 14 U/L (ref 5–45)
BASOPHILS # BLD AUTO: 0.02 THOUSANDS/ΜL (ref 0–0.1)
BASOPHILS NFR BLD AUTO: 0 % (ref 0–1)
BILIRUB SERPL-MCNC: 0.2 MG/DL (ref 0.2–1)
BUN SERPL-MCNC: 15 MG/DL (ref 5–25)
CALCIUM ALBUM COR SERPL-MCNC: 9.1 MG/DL (ref 8.3–10.1)
CALCIUM SERPL-MCNC: 7.4 MG/DL (ref 8.3–10.1)
CHLORIDE SERPL-SCNC: 109 MMOL/L (ref 100–108)
CHOLEST SERPL-MCNC: 93 MG/DL (ref 50–200)
CO2 SERPL-SCNC: 27 MMOL/L (ref 21–32)
CREAT SERPL-MCNC: 0.63 MG/DL (ref 0.6–1.3)
EOSINOPHIL # BLD AUTO: 0.05 THOUSAND/ΜL (ref 0–0.61)
EOSINOPHIL NFR BLD AUTO: 0 % (ref 0–6)
ERYTHROCYTE [DISTWIDTH] IN BLOOD BY AUTOMATED COUNT: 15.7 % (ref 11.6–15.1)
GFR SERPL CREATININE-BSD FRML MDRD: 106 ML/MIN/1.73SQ M
GLUCOSE SERPL-MCNC: 108 MG/DL (ref 65–140)
GLUCOSE SERPL-MCNC: 116 MG/DL (ref 65–140)
GLUCOSE SERPL-MCNC: 117 MG/DL (ref 65–140)
GLUCOSE SERPL-MCNC: 119 MG/DL (ref 65–140)
GLUCOSE SERPL-MCNC: 121 MG/DL (ref 65–140)
GLUCOSE SERPL-MCNC: 123 MG/DL (ref 65–140)
HCT VFR BLD AUTO: 30.6 % (ref 34.8–46.1)
HDLC SERPL-MCNC: 8 MG/DL
HGB BLD-MCNC: 9.6 G/DL (ref 11.5–15.4)
IMM GRANULOCYTES # BLD AUTO: 0.29 THOUSAND/UL (ref 0–0.2)
IMM GRANULOCYTES NFR BLD AUTO: 2 % (ref 0–2)
LDLC SERPL CALC-MCNC: 48 MG/DL (ref 0–100)
LYMPHOCYTES # BLD AUTO: 1.31 THOUSANDS/ΜL (ref 0.6–4.47)
LYMPHOCYTES NFR BLD AUTO: 8 % (ref 14–44)
MAGNESIUM SERPL-MCNC: 1.7 MG/DL (ref 1.6–2.6)
MCH RBC QN AUTO: 27.3 PG (ref 26.8–34.3)
MCHC RBC AUTO-ENTMCNC: 31.4 G/DL (ref 31.4–37.4)
MCV RBC AUTO: 87 FL (ref 82–98)
MONOCYTES # BLD AUTO: 0.59 THOUSAND/ΜL (ref 0.17–1.22)
MONOCYTES NFR BLD AUTO: 4 % (ref 4–12)
NEUTROPHILS # BLD AUTO: 13.85 THOUSANDS/ΜL (ref 1.85–7.62)
NEUTS SEG NFR BLD AUTO: 86 % (ref 43–75)
NONHDLC SERPL-MCNC: 85 MG/DL
NRBC BLD AUTO-RTO: 0 /100 WBCS
PHOSPHATE SERPL-MCNC: 1.5 MG/DL (ref 2.7–4.5)
PHOSPHATE SERPL-MCNC: 1.6 MG/DL (ref 2.7–4.5)
PLATELET # BLD AUTO: 730 THOUSANDS/UL (ref 149–390)
PMV BLD AUTO: 9.9 FL (ref 8.9–12.7)
POTASSIUM SERPL-SCNC: 2.8 MMOL/L (ref 3.5–5.3)
POTASSIUM SERPL-SCNC: 3 MMOL/L (ref 3.5–5.3)
PROT SERPL-MCNC: 5.2 G/DL (ref 6.4–8.2)
RBC # BLD AUTO: 3.52 MILLION/UL (ref 3.81–5.12)
SODIUM SERPL-SCNC: 145 MMOL/L (ref 136–145)
TRIGL SERPL-MCNC: 185 MG/DL
WBC # BLD AUTO: 16.11 THOUSAND/UL (ref 4.31–10.16)

## 2021-02-04 PROCEDURE — 80061 LIPID PANEL: CPT | Performed by: PHYSICIAN ASSISTANT

## 2021-02-04 PROCEDURE — 84100 ASSAY OF PHOSPHORUS: CPT | Performed by: PHYSICIAN ASSISTANT

## 2021-02-04 PROCEDURE — NC001 PR NO CHARGE: Performed by: PHYSICIAN ASSISTANT

## 2021-02-04 PROCEDURE — 84132 ASSAY OF SERUM POTASSIUM: CPT | Performed by: PHYSICIAN ASSISTANT

## 2021-02-04 PROCEDURE — 93005 ELECTROCARDIOGRAM TRACING: CPT

## 2021-02-04 PROCEDURE — 99232 SBSQ HOSP IP/OBS MODERATE 35: CPT | Performed by: INTERNAL MEDICINE

## 2021-02-04 PROCEDURE — 97530 THERAPEUTIC ACTIVITIES: CPT

## 2021-02-04 PROCEDURE — 80053 COMPREHEN METABOLIC PANEL: CPT | Performed by: PHYSICIAN ASSISTANT

## 2021-02-04 PROCEDURE — 85025 COMPLETE CBC W/AUTO DIFF WBC: CPT | Performed by: PHYSICIAN ASSISTANT

## 2021-02-04 PROCEDURE — 97116 GAIT TRAINING THERAPY: CPT

## 2021-02-04 PROCEDURE — 99024 POSTOP FOLLOW-UP VISIT: CPT | Performed by: PHYSICIAN ASSISTANT

## 2021-02-04 PROCEDURE — 82948 REAGENT STRIP/BLOOD GLUCOSE: CPT

## 2021-02-04 PROCEDURE — 83735 ASSAY OF MAGNESIUM: CPT | Performed by: PHYSICIAN ASSISTANT

## 2021-02-04 RX ORDER — LEVOTHYROXINE SODIUM 0.07 MG/1
75 TABLET ORAL
Status: DISCONTINUED | OUTPATIENT
Start: 2021-02-04 | End: 2021-02-10 | Stop reason: HOSPADM

## 2021-02-04 RX ORDER — SODIUM CHLORIDE, SODIUM GLUCONATE, SODIUM ACETATE, POTASSIUM CHLORIDE, MAGNESIUM CHLORIDE, SODIUM PHOSPHATE, DIBASIC, AND POTASSIUM PHOSPHATE .53; .5; .37; .037; .03; .012; .00082 G/100ML; G/100ML; G/100ML; G/100ML; G/100ML; G/100ML; G/100ML
75 INJECTION, SOLUTION INTRAVENOUS CONTINUOUS
Status: DISCONTINUED | OUTPATIENT
Start: 2021-02-04 | End: 2021-02-08

## 2021-02-04 RX ORDER — POTASSIUM CHLORIDE 14.9 MG/ML
20 INJECTION INTRAVENOUS ONCE
Status: COMPLETED | OUTPATIENT
Start: 2021-02-04 | End: 2021-02-04

## 2021-02-04 RX ADMIN — SODIUM CHLORIDE, SODIUM GLUCONATE, SODIUM ACETATE, POTASSIUM CHLORIDE AND MAGNESIUM CHLORIDE 125 ML/HR: 526; 502; 368; 37; 30 INJECTION, SOLUTION INTRAVENOUS at 17:22

## 2021-02-04 RX ADMIN — FLUTICASONE FUROATE AND VILANTEROL TRIFENATATE 1 PUFF: 100; 25 POWDER RESPIRATORY (INHALATION) at 09:01

## 2021-02-04 RX ADMIN — ONDANSETRON 4 MG: 2 INJECTION INTRAMUSCULAR; INTRAVENOUS at 22:00

## 2021-02-04 RX ADMIN — ACETAMINOPHEN 975 MG: 325 TABLET, FILM COATED ORAL at 21:52

## 2021-02-04 RX ADMIN — LIDOCAINE 5% 2 PATCH: 700 PATCH TOPICAL at 09:01

## 2021-02-04 RX ADMIN — POTASSIUM CHLORIDE 20 MEQ: 14.9 INJECTION, SOLUTION INTRAVENOUS at 10:42

## 2021-02-04 RX ADMIN — CEFEPIME HYDROCHLORIDE 2000 MG: 2 INJECTION, POWDER, FOR SOLUTION INTRAVENOUS at 06:28

## 2021-02-04 RX ADMIN — HEPARIN SODIUM 5000 UNITS: 5000 INJECTION INTRAVENOUS; SUBCUTANEOUS at 21:51

## 2021-02-04 RX ADMIN — METRONIDAZOLE 500 MG: 500 INJECTION, SOLUTION INTRAVENOUS at 17:18

## 2021-02-04 RX ADMIN — HEPARIN SODIUM 5000 UNITS: 5000 INJECTION INTRAVENOUS; SUBCUTANEOUS at 14:13

## 2021-02-04 RX ADMIN — POTASSIUM PHOSPHATE, MONOBASIC AND POTASSIUM PHOSPHATE, DIBASIC 21 MMOL: 224; 236 INJECTION, SOLUTION, CONCENTRATE INTRAVENOUS at 12:45

## 2021-02-04 RX ADMIN — METRONIDAZOLE 500 MG: 500 INJECTION, SOLUTION INTRAVENOUS at 08:56

## 2021-02-04 RX ADMIN — ACETAMINOPHEN 975 MG: 325 TABLET, FILM COATED ORAL at 05:48

## 2021-02-04 RX ADMIN — CEFEPIME HYDROCHLORIDE 2000 MG: 2 INJECTION, POWDER, FOR SOLUTION INTRAVENOUS at 05:48

## 2021-02-04 RX ADMIN — CEFEPIME HYDROCHLORIDE 2000 MG: 2 INJECTION, POWDER, FOR SOLUTION INTRAVENOUS at 19:37

## 2021-02-04 RX ADMIN — HYDROMORPHONE HYDROCHLORIDE 1 MG: 1 INJECTION, SOLUTION INTRAMUSCULAR; INTRAVENOUS; SUBCUTANEOUS at 10:49

## 2021-02-04 RX ADMIN — HEPARIN SODIUM 5000 UNITS: 5000 INJECTION INTRAVENOUS; SUBCUTANEOUS at 05:48

## 2021-02-04 RX ADMIN — LEVOTHYROXINE SODIUM 75 MCG: 75 TABLET ORAL at 14:13

## 2021-02-04 RX ADMIN — ACETAMINOPHEN 975 MG: 325 TABLET, FILM COATED ORAL at 14:13

## 2021-02-04 RX ADMIN — METRONIDAZOLE 500 MG: 500 INJECTION, SOLUTION INTRAVENOUS at 01:18

## 2021-02-04 NOTE — ASSESSMENT & PLAN NOTE
· Status post Ex lap/sigmoid, descending colon & transverse colon resection status post colostomy 01/26/2021  · Postsurgical management as per surgery  · Diet being advanced to clear liquids as per surgical team   Monitor response  · On p r n  Dilaudid/Zofran  · On IV cefepime/Flagyl for now  Can likely de-escalate cefepime to ceftriaxone as there is no evidence of Pseudomonas  Can consider a course of at least 7-10 days of that + flagyl given perforated viscus  There is some concern of inflammatory bowel disease given biopsy findings, GI following

## 2021-02-04 NOTE — ASSESSMENT & PLAN NOTE
Likely acute renal failure secondary to ischemic ATN due to perforated viscus  Creatinine now stable around 0 7  Urine output running low  Will start isolyte 125cc/h  Encourage p o   Intake if able to tolerate    Monitor kidney function

## 2021-02-04 NOTE — PROGRESS NOTES
Progress Note - General Surgery   Bijal Renteria 52 y o  female MRN: [id number]  Unit/Bed#: -01 Encounter: [id number]    Assessment/Plan  Bijal Renteria is a 52 y o  female     POD6 bring back to OR, washout, rectal stump reinforcement, fascial closure  POD8 bring back to OR, washout, attempted formation of descending colon colostomy, descending colon resection, partial omentectomy, formation of transverse colostomy, ABThera placement   PO9 exploratory laparotomy, sigmoid colon resection, ABThera placement  Sigmoid colon perforation with abscess  Pneumoperitoneum     -AVSS  -WBC francia 15 01 yesterday, trend   -Still having moderate amount of serous drainage from midline incision, saturating dressings  -Consider CT of abdomen if WBC still increasing   -Continue daily dressing changes, monitor drainage output   -R basilic PICC placed yesterday   -Transferred to clear liquid diet this am, had mild nausea  She will take sips of clears and try again at lunch  -Ostomy more output (150 yesterday), +BS, no gas, no distention  -GI consulted for possible UC vs colitis pathology; IBD workup and future OP colonoscopy  -IVF  -IV Cefepime + Flagyl  -DVT prophylaxis-SQH  -IS  -OOB, needs to ambulate    -PT/OT    Hypokalemia  Hypophosphatemia   -SLIM following  -Replete   -Trend labs    HARI  -SLIM following   -UO: 0 1/mL/hr yesterday   -Cr 0 63  -Monitor I/O   -Changed to [place] @125      Subjective/Objective     Subjective: Abd pain is still 7/10, mostly around colostomy bag and at incision sites  Better with pain medication  Back pain bothers her more  She got up to the chair for a bit yesterday, but has not been OOB ambulating  Voiding with no difficulty, denies dysuria  No fevers/chills, CP, LE tenderness  Feels likely more SOB today, but not off her baseline  Objective:     Blood pressure 132/84, pulse 70, temperature 97 5 °F (36 4 °C), resp   rate 18, height 5' 4" (1 626 m), weight 103 kg (226 lb 13 7 oz), SpO2 96 %  ,Body mass index is 38 94 kg/m²  Intake/Output Summary (Last 24 hours) at 2/4/2021 0848  Last data filed at 2/4/2021 0201  Gross per 24 hour   Intake 980 ml   Output 380 ml   Net 600 ml       Invasive Devices     Peripherally Inserted Central Catheter Line            PICC Line 58/97/26 Right Basilic less than 1 day          Peripheral Intravenous Line            Peripheral IV 01/31/21 Left Hand 3 days          Drain            Colostomy Transverse 7 days    Closed/Suction Drain RLQ Bulb 5 days                Physical Exam:  General Appearance: Patient is in no acute distress  Head: Normocephalic, atraumatic  Neck: Supple, midline  Eyes: Lids normal, conjunctiva clear, no scleral icterus  Nose: No nasal discharge  Mouth: Moist mucous membranes, tongue protrudes midline, poor dentition  Lungs: CTA bilaterally, normal lung effort  Cardiovascular: RRR, normal S1 and S2  No murmurs  No abnormal heaves or thrills  Abdomen: Non-distended, tympanic upper quadrants, +BS, soft abdomen, no rigidity, tender in lower quadrants and around incisions, no guarding/rebound  Ostomy with adequate amt of dark green liquid, no gas in bag  Extremities: No cyanosis, clubbing, or edema  No calf tenderness  Skin: Warm, dry BLE  No pallor, erythema, or cyanosis  No rashes or lesions  Neuro: AAOx3, no focal deficits, normal affect, normal speech  Incisions/Drains: APURVA drain C/D/I with 15 cc serous output  Midline abdominal incision with pink, macerated edges with some slough inside  Tender to palpation, no fluctuance or induration  Moderate amount of serous drainage, no malodor  Was probed and found fascia to be intact  Lab, Imaging and other studies:  I have personally reviewed pertinent lab results    , CBC:   Lab Results   Component Value Date    WBC 15 01 (H) 02/03/2021    HGB 8 5 (L) 02/03/2021    HCT 27 1 (L) 02/03/2021    MCV 88 02/03/2021     (H) 02/03/2021    MCH 27 7 02/03/2021    MCHC 31 4 02/03/2021 RDW 15 3 (H) 02/03/2021    MPV 9 8 02/03/2021    NRBC 0 02/03/2021   , CMP:   Lab Results   Component Value Date    SODIUM 145 02/04/2021    K 2 8 (L) 02/04/2021     (H) 02/04/2021    CO2 27 02/04/2021    BUN 15 02/04/2021    CREATININE 0 63 02/04/2021    CALCIUM 7 4 (L) 02/04/2021    AST 14 02/04/2021    ALT 13 02/04/2021    ALKPHOS 63 02/04/2021    EGFR 106 02/04/2021   , Coagulation: No results found for: PT, INR, APTT, Urinalysis: No results found for: Nickola Khan, SPECGRAV, PHUR, LEUKOCYTESUR, NITRITE, PROTEINUA, GLUCOSEU, KETONESU, BILIRUBINUR, BLOODU, Amylase: No results found for: AMYLASE, Lipase: No results found for: LIPASE   Xr Chest Portable Icu    Result Date: 1/27/2021  Impression: No acute cardiopulmonary disease  ET tube 4 cm above the shreyas  Workstation performed: [id number]     Ct Abdomen Pelvis With Contrast    Result Date: 1/26/2021  Impression: 1  Mild pancolonic wall thickening with minimal pericolonic fat stranding extending from the cecum to sigmoid colon  Findings are suggestive of infectious/inflammatory or less likely ischemic colitis  2   Moderate volume pneumoperitoneum, suggestive of perforated viscus  However, the site of perforation is not definitively identified  Urgent surgical consultation is recommended    I personally discussed this study with [facility] on 1/26/2021 at 4:30 PM  Workstation performed: [id number]     VTE Pharmacologic Prophylaxis: Heparin  VTE Mechanical Prophylaxis: sequential compression device    Recent Results (from the past 36 hour(s))   Fingerstick Glucose (POCT)    Collection Time: 02/02/21  9:22 PM   Result Value Ref Range    POC Glucose 168 (H) 65 - 140 mg/dl   Fingerstick Glucose (POCT)    Collection Time: 02/02/21 11:28 PM   Result Value Ref Range    POC Glucose 155 (H) 65 - 140 mg/dl   Fingerstick Glucose (POCT)    Collection Time: 02/03/21  6:06 AM   Result Value Ref Range    POC Glucose 117 65 - 140 mg/dl   Fingerstick Glucose (POCT)    Collection Time: 02/03/21 11:32 AM   Result Value Ref Range    POC Glucose 134 65 - 140 mg/dl   Fingerstick Glucose (POCT)    Collection Time: 02/03/21  4:06 PM   Result Value Ref Range    POC Glucose 138 65 - 140 mg/dl   CBC and differential    Collection Time: 02/03/21  4:25 PM   Result Value Ref Range    WBC 15 01 (H) 4 31 - 10 16 Thousand/uL    RBC 3 07 (L) 3 81 - 5 12 Million/uL    Hemoglobin 8 5 (L) 11 5 - 15 4 g/dL    Hematocrit 27 1 (L) 34 8 - 46 1 %    MCV 88 82 - 98 fL    MCH 27 7 26 8 - 34 3 pg    MCHC 31 4 31 4 - 37 4 g/dL    RDW 15 3 (H) 11 6 - 15 1 %    MPV 9 8 8 9 - 12 7 fL    Platelets 666 (H) 412 - 390 Thousands/uL    nRBC 0 /100 WBCs    Neutrophils Relative 89 (H) 43 - 75 %    Immat GRANS % 1 0 - 2 %    Lymphocytes Relative 6 (L) 14 - 44 %    Monocytes Relative 4 4 - 12 %    Eosinophils Relative 0 0 - 6 %    Basophils Relative 0 0 - 1 %    Neutrophils Absolute 13 26 (H) 1 85 - 7 62 Thousands/µL    Immature Grans Absolute 0 21 (H) 0 00 - 0 20 Thousand/uL    Lymphocytes Absolute 0 87 0 60 - 4 47 Thousands/µL    Monocytes Absolute 0 63 0 17 - 1 22 Thousand/µL    Eosinophils Absolute 0 02 0 00 - 0 61 Thousand/µL    Basophils Absolute 0 02 0 00 - 0 10 Thousands/µL   Basic metabolic panel    Collection Time: 02/03/21  4:25 PM   Result Value Ref Range    Sodium 145 136 - 145 mmol/L    Potassium 2 9 (L) 3 5 - 5 3 mmol/L    Chloride 109 (H) 100 - 108 mmol/L    CO2 28 21 - 32 mmol/L    ANION GAP 8 4 - 13 mmol/L    BUN 16 5 - 25 mg/dL    Creatinine 0 70 0 60 - 1 30 mg/dL    Glucose 140 65 - 140 mg/dL    Calcium 7 6 (L) 8 3 - 10 1 mg/dL    eGFR 103 ml/min/1 73sq m   Magnesium    Collection Time: 02/03/21  4:25 PM   Result Value Ref Range    Magnesium 1 7 1 6 - 2 6 mg/dL   Phosphorus    Collection Time: 02/03/21  4:25 PM   Result Value Ref Range    Phosphorus 1 7 (L) 2 7 - 4 5 mg/dL   Fingerstick Glucose (POCT)    Collection Time: 02/04/21 12:04 AM   Result Value Ref Range    POC Glucose 117 65 - 140 mg/dl   Comprehensive metabolic panel    Collection Time: 02/04/21  5:56 AM   Result Value Ref Range    Sodium 145 136 - 145 mmol/L    Potassium 2 8 (L) 3 5 - 5 3 mmol/L    Chloride 109 (H) 100 - 108 mmol/L    CO2 27 21 - 32 mmol/L    ANION GAP 9 4 - 13 mmol/L    BUN 15 5 - 25 mg/dL    Creatinine 0 63 0 60 - 1 30 mg/dL    Glucose 116 65 - 140 mg/dL    Calcium 7 4 (L) 8 3 - 10 1 mg/dL    Corrected Calcium 9 1 8 3 - 10 1 mg/dL    AST 14 5 - 45 U/L    ALT 13 12 - 78 U/L    Alkaline Phosphatase 63 46 - 116 U/L    Total Protein 5 2 (L) 6 4 - 8 2 g/dL    Albumin 1 9 (L) 3 5 - 5 0 g/dL    Total Bilirubin 0 20 0 20 - 1 00 mg/dL    eGFR 106 ml/min/1 73sq m   Lipid panel    Collection Time: 02/04/21  5:56 AM   Result Value Ref Range    Cholesterol 93 50 - 200 mg/dL    Triglycerides 185 (H) <=150 mg/dL    HDL, Direct 8 (L) >=40 mg/dL    LDL Calculated 48 0 - 100 mg/dL    Non-HDL-Chol (CHOL-HDL) 85 mg/dl   Magnesium    Collection Time: 02/04/21  5:56 AM   Result Value Ref Range    Magnesium 1 7 1 6 - 2 6 mg/dL   Phosphorus    Collection Time: 02/04/21  5:56 AM   Result Value Ref Range    Phosphorus 1 5 (L) 2 7 - 4 5 mg/dL   Fingerstick Glucose (POCT)    Collection Time: 02/04/21  6:12 AM   Result Value Ref Range    POC Glucose 108 65 - 140 mg/dl       JUAQUIN Jackson  2/4/2021

## 2021-02-04 NOTE — PLAN OF CARE
Problem: PHYSICAL THERAPY ADULT  Goal: Performs mobility at highest level of function for planned discharge setting  See evaluation for individualized goals  Description: Treatment/Interventions: Functional transfer training, LE strengthening/ROM, Therapeutic exercise, Endurance training, Patient/family training, Equipment eval/education, Bed mobility, Continued evaluation, Spoke to nursing, OT          See flowsheet documentation for full assessment, interventions and recommendations  Outcome: Progressing  Note: Prognosis: Good  Problem List: Decreased strength, Decreased endurance, Impaired balance, Decreased mobility, Decreased skin integrity, Pain  Assessment: Pt seen for PT treatment session this date with interventions consisting of gait training w/ emphasis on improving pt's ability to ambulate level surfaces x 30' with min A provided by therapist with bariatric RW and therapeutic activity consisting of training: supine<>sit transfers and sit<>stand transfers  Pt agreeable to PT treatment session upon arrival, pt found supine in bed w/ HOB elevated, in no apparent distress, A&O x 4 and responsive  In comparison to previous session, pt with improvements in household distance gait tolerance with use of RW, decreased assistance for transfers, gait trial  Post session: pt returned back to recliner, all needs in reach and RN notified of session findings/recommendations  Continue to recommend STR at time of d/c in order to maximize pt's functional independence and safety w/ mobility  Pt continues to be functioning below baseline level, and remains limited 2* factors listed above  PT will continue to see pt while here in order to address the deficits listed above and provide interventions consistent w/ POC in effort to achieve STGs    Barriers to Discharge: Inaccessible home environment, Decreased caregiver support     PT Discharge Recommendation: [id number]     PT - OK to Discharge: Yes(when medically cleared if to STR)    See flowsheet documentation for full assessment

## 2021-02-04 NOTE — PHYSICAL THERAPY NOTE
Physical Therapy Treatment Note       02/04/21 1355   PT Last Visit   PT Visit Date 02/04/21   Note Type   Note Type Treatment   Pain Assessment   Pain Assessment Tool 0-10   Pain Score 7   Pain Location/Orientation Location: Abdomen   Hospital Pain Intervention(s)   (RN aware of pt's pain report)   Restrictions/Precautions   Weight Bearing Precautions Per Order No   Other Precautions Chair Alarm; Bed Alarm;Multiple lines; Fall Risk;Pain  (abdominal precautions)   General   Chart Reviewed Yes   Response to Previous Treatment Patient with no complaints from previous session  Family/Caregiver Present No   Cognition   Overall Cognitive Status WFL   Arousal/Participation Alert; Responsive; Cooperative   Attention Within functional limits   Orientation Level Oriented X4   Memory Within functional limits   Following Commands Follows all commands and directions without difficulty   Comments pt agreeable to PT session   Subjective   Subjective "I need to use the commode"   Bed Mobility   Supine to Sit 4  Minimal assistance   Additional items Assist x 1;HOB elevated; Increased time required;Verbal cues;LE management; Bedrails  (log roll technique)   Transfers   Sit to Stand 4  Minimal assistance   Additional items Assist x 1; Increased time required;Verbal cues;Armrests   Stand to Sit 4  Minimal assistance   Additional items Assist x 1; Increased time required;Verbal cues;Armrests   Toilet transfer 4  Minimal assistance   Additional items Assist x 1; Increased time required;Verbal cues; Commode   Ambulation/Elevation   Gait pattern Decreased foot clearance; Short stride; Step to   Gait Assistance 4  Minimal assist   Additional items Assist x 1;Verbal cues   Assistive Device Bariatric Rolling walker   Distance 30'   Balance   Static Sitting Fair +   Dynamic Sitting Fair   Static Standing Fair -   Dynamic Standing Poor +   Ambulatory Poor +   Endurance Deficit   Endurance Deficit Yes   Activity Tolerance   Activity Tolerance Patient limited by fatigue;Patient limited by pain   Nurse Made Aware RN Cony Corea verbalized pt appropriate to see, made aware of session outcome/recs   Assessment   Prognosis Good   Problem List Decreased strength;Decreased endurance; Impaired balance;Decreased mobility; Decreased skin integrity;Pain   Assessment Pt seen for PT treatment session this date with interventions consisting of gait training w/ emphasis on improving pt's ability to ambulate level surfaces x 30' with min A provided by therapist with bariatric RW and therapeutic activity consisting of training: supine<>sit transfers and sit<>stand transfers  Pt agreeable to PT treatment session upon arrival, pt found supine in bed w/ HOB elevated, in no apparent distress, A&O x 4 and responsive  In comparison to previous session, pt with improvements in household distance gait tolerance with use of RW, decreased assistance for transfers, gait trial  Post session: pt returned back to recliner, all needs in reach and RN notified of session findings/recommendations  Continue to recommend STR at time of d/c in order to maximize pt's functional independence and safety w/ mobility  Pt continues to be functioning below baseline level, and remains limited 2* factors listed above  PT will continue to see pt while here in order to address the deficits listed above and provide interventions consistent w/ POC in effort to achieve STGs  Barriers to Discharge Inaccessible home environment;Decreased caregiver support   Goals   Patient Goals to return home   STG Expiration Date 02/09/21   Short Term Goal #1 Additional: pt to clear 1 stair with Supervision to safely navigate previous living environment   PT Treatment Day 2   Plan   Treatment/Interventions Functional transfer training;LE strengthening/ROM; Therapeutic exercise; Endurance training;Patient/family training;Equipment eval/education; Bed mobility;Continued evaluation;Spoke to nursing;Gait training;Elevations   Progress Progressing toward goals   PT Frequency   (3-5x/wk)   Recommendation   PT Discharge Recommendation Post-Acute Rehabilitation Services   Equipment Recommended Walker  (RW)   PT - OK to Discharge Yes  (when medically cleared if to STR)   [address] Mobility Inpatient   Turning in Bed Without Bedrails 3   Lying on Back to Sitting on Edge of Flat Bed 3   Moving Bed to Chair 3   Standing Up From Chair 3   Walk in Room 2   Climb 3-5 Stairs 1   Basic Mobility Inpatient Raw Score 15   Basic Mobility Standardized Score 36 97       Ana Wick, PT, DPT

## 2021-02-04 NOTE — PROGRESS NOTES
Progress Note - Karissa Artis 1970, 52 y o  female MRN: [id number]    Unit/Bed#: -01 Encounter: [id number]    Primary Care Provider: Roya Lantigua MD   Date and time admitted to hospital: 1/26/2021  2:12 PM        Perforation of sigmoid colon due to diverticulitis  Assessment & Plan  · Status post Ex lap/sigmoid, descending colon & transverse colon resection status post colostomy 01/26/2021  · Postsurgical management as per surgery  · Diet being advanced to clear liquids as per surgical team   Monitor response  · On p r n  Dilaudid/Zofran  · On IV cefepime/Flagyl for now  Can likely de-escalate cefepime to ceftriaxone as there is no evidence of Pseudomonas  Can consider a course of at least 7-10 days of that + flagyl given perforated viscus  There is some concern of inflammatory bowel disease given biopsy findings, GI following  * Pneumoperitoneum  Assessment & Plan  Secondary to perforation of sigmoid colon  See plan under perforation of sigmoid colon  Mild persistent asthma  Assessment & Plan  Currently stable  Continue with p r n  Albuterol/albuterol inhaler    Electrolyte abnormality  Assessment & Plan  Patient has hypokalemia hypophosphatemia, this is likely due to poor p o  Intake post surgery  Electrolytes being replaced today  Continue to monitor  Acute renal failure (ARF) (HCC)  Assessment & Plan  Likely acute renal failure secondary to ischemic ATN due to perforated viscus  Creatinine now stable around 0 7  Urine output running low  Will start isolyte 125cc/h  Encourage p o  Intake if able to tolerate    Monitor kidney function    Hypothyroid  Assessment & Plan  Since her p o  Intake is being restarted will resume the levothyroxine      VTE Pharmacologic Prophylaxis:   Pharmacologic: Heparin  Mechanical VTE Prophylaxis in Place: Yes    Patient Centered Rounds: I have performed bedside rounds with nursing staff today      Discussions with Specialists or Other Care Team Provider:  Discussed with surgery    Education and Discussions with Family / Patient:  Discussed with patient herself, she is competent alert and oriented    Time Spent for Care: 30 minutes  More than 50% of total time spent on counseling and coordination of care as described above  Current Length of Stay: 9 day(s)    Current Patient Status: Inpatient   Certification Statement: The patient will continue to require additional inpatient hospital stay due to Need for IV antibiotics, establishing p o  Intake    Discharge Plan:  Once stable    Code Status: Level 1 - Full Code      Subjective:     Patient evaluated this morning  She is passing gas as and abdominal pain has decreased significantly  Electrolytes noted to be lo low particularly potassium and phosphorus  Otherwise denies nausea or vomiting  No other events reported  Objective:     Vitals:   Temp (24hrs), Av 3 °F (36 3 °C), Min:97 1 °F (36 2 °C), Max:97 5 °F (36 4 °C)    Temp:  [97 1 °F (36 2 °C)-97 5 °F (36 4 °C)] 97 5 °F (36 4 °C)  HR:  [69-70] 70  Resp:  [18] 18  BP: (132-136)/(84-86) 132/84  SpO2:  [95 %-96 %] 96 %  Body mass index is 38 94 kg/m²  Input and Output Summary (last 24 hours): Intake/Output Summary (Last 24 hours) at 2021 1238  Last data filed at 2021 0600  Gross per 24 hour   Intake 1300 ml   Output 280 ml   Net 1020 ml       Physical Exam:     Physical Exam  Vitals signs and nursing note reviewed  Constitutional:       Appearance: Normal appearance  She is normal weight  Comments: Female in bed, awake, alert   HENT:      Head: Normocephalic and atraumatic  Right Ear: External ear normal       Left Ear: External ear normal       Nose: Nose normal  No congestion  Mouth/Throat:      Mouth: Mucous membranes are moist       Pharynx: Oropharynx is clear  No oropharyngeal exudate or posterior oropharyngeal erythema  Eyes:      General: No scleral icterus  Right eye: No discharge  Left eye: No discharge  Extraocular Movements: Extraocular movements intact  Conjunctiva/sclera: Conjunctivae normal       Pupils: Pupils are equal, round, and reactive to light  Neck:      Musculoskeletal: Normal range of motion and neck supple  No neck rigidity or muscular tenderness  Cardiovascular:      Rate and Rhythm: Normal rate and regular rhythm  Pulses: Normal pulses  Heart sounds: Normal heart sounds  No murmur  No friction rub  No gallop  Pulmonary:      Effort: Pulmonary effort is normal  No respiratory distress  Breath sounds: Normal breath sounds  No stridor  No wheezing, rhonchi or rales  Chest:      Chest wall: No tenderness  Abdominal:      General: Abdomen is flat  Bowel sounds are normal  There is distension  Palpations: Abdomen is soft  There is no mass  Tenderness: There is abdominal tenderness  There is no guarding or rebound  Comments:   Ostomy in place   Musculoskeletal: Normal range of motion  General: No swelling, tenderness, deformity or signs of injury  Skin:     General: Skin is warm and dry  Capillary Refill: Capillary refill takes less than 2 seconds  Coloration: Skin is not jaundiced or pale  Findings: No bruising, erythema, lesion or rash  Neurological:      General: No focal deficit present  Mental Status: She is alert and oriented to person, place, and time  Mental status is at baseline  Cranial Nerves: No cranial nerve deficit  Sensory: No sensory deficit  Motor: No weakness  Coordination: Coordination normal    Psychiatric:         Mood and Affect: Mood normal          Behavior: Behavior normal          Thought Content:  Thought content normal          Judgment: Judgment normal            Additional Data:     Labs:    Results from last 7 days   Lab Units 02/04/21  1218  02/02/21  0548   WBC Thousand/uL 16 11*   < > 9 83   HEMOGLOBIN g/dL 9 6*   < > 8 6*   HEMATOCRIT % 30 6* < > 27 5*   PLATELETS Thousands/uL 730*   < > 608*   BANDS PCT %  --   --  4   NEUTROS PCT % 86*   < >  --    LYMPHS PCT % 8*   < >  --    LYMPHO PCT %  --   --  7*   MONOS PCT % 4   < >  --    MONO PCT %  --   --  4   EOS PCT % 0   < > 0    < > = values in this interval not displayed  Results from last 7 days   Lab Units 02/04/21  1218 02/04/21  0556   SODIUM mmol/L  --  145   POTASSIUM mmol/L 3 0* 2 8*   CHLORIDE mmol/L  --  109*   CO2 mmol/L  --  27   BUN mg/dL  --  15   CREATININE mg/dL  --  0 63   ANION GAP mmol/L  --  9   CALCIUM mg/dL  --  7 4*   ALBUMIN g/dL  --  1 9*   TOTAL BILIRUBIN mg/dL  --  0 20   ALK PHOS U/L  --  63   ALT U/L  --  13   AST U/L  --  14   GLUCOSE RANDOM mg/dL  --  116         Results from last 7 days   Lab Units 02/04/21  1155 02/04/21  0612 02/04/21  0004 02/03/21  1606 02/03/21  1132 02/03/21  0606 02/02/21  2328 02/02/21  2122 02/02/21  1641 02/02/21  1253 02/02/21  0543 02/01/21  2358   POC GLUCOSE mg/dl 121 108 117 138 134 117 155* 168* 175* 182* 130 122         Results from last 7 days   Lab Units 01/31/21  0625 01/30/21  0619 01/29/21  2218 01/28/21  1328   LACTIC ACID mmol/L  --   --  0 9 1 0   PROCALCITONIN ng/ml 1 22* 2 40*  --   --            * I Have Reviewed All Lab Data Listed Above  * Additional Pertinent Lab Tests Reviewed:  [address] Admission Reviewed      Recent Cultures (last 7 days):           Last 24 Hours Medication List:   Current Facility-Administered Medications   Medication Dose Route Frequency Provider Last Rate    acetaminophen  975 mg Oral Q8H [facility] Shirlean Riedel, MD      albuterol  2 5 mg Nebulization Q6H PRN Calli Maria PA-C      cefepime  2,000 mg Intravenous Q12H aMrina Chilel PA-C 2,000 mg (02/04/21 0628)    fluticasone-vilanterol  1 puff Inhalation Daily Grace Driscoll PA-C      heparin (porcine)  5,000 Units Subcutaneous [facility] Marina Newman PA-C      HYDROmorphone  0 2 mg Intravenous Once Dhaval Kim, JUAQUIN      HYDROmorphone  0 5 mg Intravenous Q4H PRN Amy Osorio MD      Or   Juan Sandra HYDROmorphone  1 mg Intravenous Q4H PRN Amy Osorio MD      insulin lispro  1-6 Units Subcutaneous Q6H [facility] [place], [place]      levothyroxine  75 mcg Oral Early Morning Roxane Smith MD      lidocaine  2 patch Topical Daily Amy Osorio MD      metroNIDAZOLE  500 mg Intravenous Q8H Marina Chilel PA-C 500 mg (02/04/21 0856)    multi-electrolyte  125 mL/hr Intravenous Continuous Roxane Smith MD      ondansetron  4 mg Intravenous Q6H PRN Dorris Halsted, PA-C      potassium chloride  20 mEq Intravenous Once Roxane Smith MD 20 mEq (02/04/21 1042)    potassium phosphate  21 mmol Intravenous Once Roxane Smith MD          Today, Patient Was Seen By: Roxane Smith MD    ** Please Note: Dictation voice to text software may have been used in the creation of this document   **

## 2021-02-04 NOTE — ASSESSMENT & PLAN NOTE
Patient has hypokalemia hypophosphatemia, this is likely due to poor p o  Intake post surgery  Electrolytes being replaced today  Continue to monitor

## 2021-02-04 NOTE — PROGRESS NOTES
Progress Note  Tim Byers 52 y o  female MRN: [id number]  Unit/Bed#: -01 Encounter: [id number]    Subjective:  Patient reports of some abdominal discomfort and nausea  Objective:     Vitals:   Vitals:    02/04/21 0006   BP: 132/84   Pulse: 70   Resp: 18   Temp: 97 5 °F (36 4 °C)   SpO2: 96%   ,Body mass index is 38 94 kg/m²  Intake/Output Summary (Last 24 hours) at 2/4/2021 0943  Last data filed at 2/4/2021 0201  Gross per 24 hour   Intake 980 ml   Output 380 ml   Net 600 ml       Physical Exam:    General Appearance: Alert, appears stated age and cooperative  Lungs: Clear to auscultation bilaterally, no rales or rhonchi, no labored breathing/accessory muscle use  Heart: Regular rate and rhythm, S1, S2 normal, no murmur, click, rub or gallop  Abdomen: Soft, non-tender, non-distended; bowel sounds noted, surgical bandages noted, +colostomy and +APURVA drain  Extremities: No cyanosis, edema    Invasive Devices     Peripherally Inserted Central Catheter Line            PICC Line 48/87/08 Right Basilic less than 1 day          Peripheral Intravenous Line            Peripheral IV 01/31/21 Left Hand 3 days          Drain            Colostomy Transverse 7 days    Closed/Suction Drain RLQ Bulb 5 days                Lab Results:  No results displayed because visit has over 200 results  Imaging Studies:   I have personally reviewed pertinent imaging studies  Us Abdomen Complete    Result Date: 1/22/2021  Narrative: ABDOMEN ULTRASOUND, COMPLETE INDICATION:   R10 9: Unspecified abdominal pain  COMPARISON: None TECHNIQUE:   Real-time ultrasound of the abdomen was performed with a curvilinear transducer with both volumetric sweeps and still imaging techniques  FINDINGS: PANCREAS:  Visualized portions of the pancreas are within normal limits  AORTA AND IVC:  Visualized portions are normal for patient age  LIVER: Size:  Within normal range  The liver measures 17 cm in the midclavicular line   Contour: Surface contour is smooth  Parenchyma:  Echogenicity and echotexture are within normal limits  No evidence of suspicious mass  Limited imaging of the main portal vein shows it to be patent and hepatopetal  BILIARY: The gallbladder is normal in caliber  No wall thickening or pericholecystic fluid  There is layering sludge without evidence for shadowing calculi  No sonographic Wolfe sign  No intrahepatic biliary dilatation  CBD measures 4 mm  No choledocholithiasis  KIDNEY: Right kidney measures 9 8 cm  7 x 7 mm hyperechoic structure identified along the peripheral cortex of the right renal midpole could represent a focal fat area or tiny angiomyolipoma  Left kidney measures 11 8 cm  Within normal limits  SPLEEN: Measures 9 2 cm  Within normal limits  ASCITES:  None  Impression: Gallbladder sludge  Workstation performed: [id number]     Xr Chest Portable Icu    Result Date: 1/27/2021  Narrative: CHEST INDICATION:   Endotracheal tube positioning  COMPARISON:  Chest radiograph from 1/16/2015  Abdomen CT from 1/26/2021  EXAM PERFORMED/VIEWS:  XR CHEST PORTABLE ICU  FINDINGS:  ET tube 4 cm above the shreyas  NG tube in stomach  Cardiomediastinal silhouette normal  Lungs clear  No effusion or pneumothorax  Osseous structures normal for age  Impression: No acute cardiopulmonary disease  ET tube 4 cm above the shreyas  Workstation performed: [id number]     Us Pelvis Complete W Transvaginal    Result Date: 1/22/2021  Narrative: PELVIC ULTRASOUND, COMPLETE INDICATION:  52years old   R10 9: Unspecified abdominal pain  COMPARISON: None TECHNIQUE:   Transabdominal pelvic ultrasound was performed in sagittal and transverse planes with a curvilinear transducer  Additional transvaginal imaging was performed to better evaluate the endometrium and ovaries  Imaging included volumetric sweeps as well as traditional still imaging technique  FINDINGS: UTERUS: The uterus is anteverted in position, measuring 8 6 x 5 4 x 6 4 cm  Heterogeneous uterine echotexture containing multiple masses/fibroids including anterior to 0 4 x 2 cm and posterior 2 x 1 6 cm intramural masses possibly containing a some mucosal component  The cervix shows no suspicious abnormality  ENDOMETRIUM:  Normal caliber of 3 mm  Homogeneous and normal in appearance  OVARIES/ADNEXA: Right ovary was not visualized due to bowel gas artifact  Left ovary is normal size/top normal size  Left ovarian 3 4 x 3 1 x 3 cm simple cyst  Doppler flow within normal limits  No suspicious adnexal mass or loculated collections  There is no free fluid  Impression:  Heterogeneous myomatous uterus  Left ovarian 3 4 x 3 1 x 3 cm cyst  Right ovary was not visualized due to bowel gas artifact  Workstation performed: [id number]     Ct Abdomen Pelvis With Contrast    Result Date: 1/26/2021  Narrative: CT ABDOMEN AND PELVIS WITH IV CONTRAST INDICATION:   LLQ abdominal pain, diverticulitis suspected LLQ abd pain  53 y/o female presents to the ED for diarrhea and abd pain x weeks  Patient states that she has had 2-3 episodes of watery diarrhea/ day for the last month  She states that she also has intermittent nausea/ vomiting  She also reports constant LLQ abd pain x 3 weeks  COMPARISON:  None  TECHNIQUE:  CT examination of the abdomen and pelvis was performed  Axial, sagittal, and coronal 2D reformatted images were created from the source data and submitted for interpretation  Radiation dose length product (DLP) for this visit:  1106 53 mGy-cm   This examination, like all CT scans performed in the [facility], was performed utilizing techniques to minimize radiation dose exposure, including the use of iterative reconstruction and automated exposure control  IV Contrast:  100 mL of iohexol (OMNIPAQUE) Enteric Contrast:  Enteric contrast was not administered   FINDINGS: ABDOMEN LOWER CHEST:  No clinically significant abnormality identified in the visualized lower chest  LIVER/BILIARY TREE:  Unremarkable  GALLBLADDER:  No calcified gallstones  No pericholecystic inflammatory change  SPLEEN:  Unremarkable  PANCREAS:  Unremarkable  ADRENAL GLANDS:  Unremarkable  KIDNEYS/URETERS:  Subcentimeter hypoattenuating cortical lesion in the right kidney, too small to characterize, statistically most likely benign  STOMACH AND BOWEL:  Mild pancolonic wall thickening with minimal pericolonic fat stranding extending from the cecum to sigmoid colon  Findings are suggestive of infectious/inflammatory or less likely ischemic colitis  APPENDIX:  There is a mildly prominent appendix demonstrating wall enhancement, likely related to secondary inflammatory involvement  ABDOMINOPELVIC CAVITY:  There is moderate volume pneumoperitoneum, suggestive of perforated viscus  However, the site of perforation is not definitively identified  No ascites  No enlarged lymph nodes  VESSELS:  Patent  No aneurysm  Specifically, the mesenteric vessels are patent  PELVIS REPRODUCTIVE ORGANS:  There is a 3 8 cm left ovarian cyst (series 2 image 74)  URINARY BLADDER:  Unremarkable  ABDOMINAL WALL/INGUINAL REGIONS:  Unremarkable  OSSEOUS STRUCTURES:  No acute fracture or destructive osseous lesion  Impression: 1  Mild pancolonic wall thickening with minimal pericolonic fat stranding extending from the cecum to sigmoid colon  Findings are suggestive of infectious/inflammatory or less likely ischemic colitis  2   Moderate volume pneumoperitoneum, suggestive of perforated viscus  However, the site of perforation is not definitively identified  Urgent surgical consultation is recommended    I personally discussed this study with [facility] on 1/26/2021 at 4:30 PM  Workstation performed: [id number]         Assessment & Plan    Sigmoid Colon Perforation with abscess s/p ex lap with sigmoid colon resection and colostomy     - Pathology from the surgery showed evidence of chronic active colitis with ulceration and mural abscess suggestive of the etiology to be from ulcerative colitis verses a segmental colitis of diverticular disease  Staining for CMV was negative  - Recommend that patient undergo a colonoscopy after she has healed from her surgery as an outpatient to further investigate  - Will check an IBD panel   - Post-operative care per general surgery team   - Will have patient follow up in our office as an outpatient  Patient was provided with Dr Claire Proalice card/information to schedule an appointment as an outpatient  GI will sign off, please contact with any concerns  The patient will be seen by Dr Wadie Gilford Cedric Mola, PA-C  2/4/2021,9:43 AM

## 2021-02-05 LAB
ALBUMIN SERPL BCP-MCNC: 1.7 G/DL (ref 3.5–5)
ALP SERPL-CCNC: 63 U/L (ref 46–116)
ALT SERPL W P-5'-P-CCNC: 10 U/L (ref 12–78)
ANION GAP SERPL CALCULATED.3IONS-SCNC: 8 MMOL/L (ref 4–13)
AST SERPL W P-5'-P-CCNC: 14 U/L (ref 5–45)
BASOPHILS # BLD AUTO: 0.02 THOUSANDS/ΜL (ref 0–0.1)
BASOPHILS NFR BLD AUTO: 0 % (ref 0–1)
BILIRUB SERPL-MCNC: 0.2 MG/DL (ref 0.2–1)
BUN SERPL-MCNC: 7 MG/DL (ref 5–25)
CA-I BLD-SCNC: 0.98 MMOL/L (ref 1.12–1.32)
CALCIUM ALBUM COR SERPL-MCNC: 8.8 MG/DL (ref 8.3–10.1)
CALCIUM SERPL-MCNC: 7 MG/DL (ref 8.3–10.1)
CHLORIDE SERPL-SCNC: 107 MMOL/L (ref 100–108)
CO2 SERPL-SCNC: 27 MMOL/L (ref 21–32)
CREAT SERPL-MCNC: 0.6 MG/DL (ref 0.6–1.3)
EOSINOPHIL # BLD AUTO: 0.08 THOUSAND/ΜL (ref 0–0.61)
EOSINOPHIL NFR BLD AUTO: 1 % (ref 0–6)
ERYTHROCYTE [DISTWIDTH] IN BLOOD BY AUTOMATED COUNT: 15.9 % (ref 11.6–15.1)
GFR SERPL CREATININE-BSD FRML MDRD: 108 ML/MIN/1.73SQ M
GLUCOSE SERPL-MCNC: 107 MG/DL (ref 65–140)
GLUCOSE SERPL-MCNC: 108 MG/DL (ref 65–140)
GLUCOSE SERPL-MCNC: 108 MG/DL (ref 65–140)
GLUCOSE SERPL-MCNC: 114 MG/DL (ref 65–140)
GLUCOSE SERPL-MCNC: 119 MG/DL (ref 65–140)
GLUCOSE SERPL-MCNC: 124 MG/DL (ref 65–140)
HCT VFR BLD AUTO: 25.9 % (ref 34.8–46.1)
HGB BLD-MCNC: 8.2 G/DL (ref 11.5–15.4)
IMM GRANULOCYTES # BLD AUTO: 0.25 THOUSAND/UL (ref 0–0.2)
IMM GRANULOCYTES NFR BLD AUTO: 2 % (ref 0–2)
LYMPHOCYTES # BLD AUTO: 1.24 THOUSANDS/ΜL (ref 0.6–4.47)
LYMPHOCYTES NFR BLD AUTO: 10 % (ref 14–44)
MAGNESIUM SERPL-MCNC: 1.6 MG/DL (ref 1.6–2.6)
MCH RBC QN AUTO: 27.6 PG (ref 26.8–34.3)
MCHC RBC AUTO-ENTMCNC: 31.7 G/DL (ref 31.4–37.4)
MCV RBC AUTO: 87 FL (ref 82–98)
MONOCYTES # BLD AUTO: 0.63 THOUSAND/ΜL (ref 0.17–1.22)
MONOCYTES NFR BLD AUTO: 5 % (ref 4–12)
NEUTROPHILS # BLD AUTO: 10.26 THOUSANDS/ΜL (ref 1.85–7.62)
NEUTS SEG NFR BLD AUTO: 82 % (ref 43–75)
NRBC BLD AUTO-RTO: 0 /100 WBCS
PHOSPHATE SERPL-MCNC: 1.8 MG/DL (ref 2.7–4.5)
PLATELET # BLD AUTO: 611 THOUSANDS/UL (ref 149–390)
PMV BLD AUTO: 9.8 FL (ref 8.9–12.7)
POTASSIUM SERPL-SCNC: 2.6 MMOL/L (ref 3.5–5.3)
PROT SERPL-MCNC: 4.8 G/DL (ref 6.4–8.2)
RBC # BLD AUTO: 2.97 MILLION/UL (ref 3.81–5.12)
SODIUM SERPL-SCNC: 142 MMOL/L (ref 136–145)
WBC # BLD AUTO: 12.48 THOUSAND/UL (ref 4.31–10.16)

## 2021-02-05 PROCEDURE — 82948 REAGENT STRIP/BLOOD GLUCOSE: CPT

## 2021-02-05 PROCEDURE — 82330 ASSAY OF CALCIUM: CPT | Performed by: PHYSICIAN ASSISTANT

## 2021-02-05 PROCEDURE — 85025 COMPLETE CBC W/AUTO DIFF WBC: CPT | Performed by: INTERNAL MEDICINE

## 2021-02-05 PROCEDURE — 83735 ASSAY OF MAGNESIUM: CPT | Performed by: INTERNAL MEDICINE

## 2021-02-05 PROCEDURE — 80053 COMPREHEN METABOLIC PANEL: CPT | Performed by: INTERNAL MEDICINE

## 2021-02-05 PROCEDURE — 99024 POSTOP FOLLOW-UP VISIT: CPT | Performed by: PHYSICIAN ASSISTANT

## 2021-02-05 PROCEDURE — 93005 ELECTROCARDIOGRAM TRACING: CPT

## 2021-02-05 PROCEDURE — 99232 SBSQ HOSP IP/OBS MODERATE 35: CPT | Performed by: INTERNAL MEDICINE

## 2021-02-05 PROCEDURE — 84100 ASSAY OF PHOSPHORUS: CPT | Performed by: INTERNAL MEDICINE

## 2021-02-05 RX ORDER — FLUCONAZOLE 100 MG/1
200 TABLET ORAL DAILY
Status: DISCONTINUED | OUTPATIENT
Start: 2021-02-06 | End: 2021-02-10 | Stop reason: HOSPADM

## 2021-02-05 RX ORDER — PROMETHAZINE HYDROCHLORIDE 25 MG/ML
25 INJECTION, SOLUTION INTRAMUSCULAR; INTRAVENOUS ONCE
Status: COMPLETED | OUTPATIENT
Start: 2021-02-05 | End: 2021-02-05

## 2021-02-05 RX ORDER — CALCIUM CARBONATE 500(1250)
1 TABLET ORAL
Status: DISCONTINUED | OUTPATIENT
Start: 2021-02-05 | End: 2021-02-09

## 2021-02-05 RX ORDER — FLUCONAZOLE 100 MG/1
400 TABLET ORAL ONCE
Status: COMPLETED | OUTPATIENT
Start: 2021-02-05 | End: 2021-02-05

## 2021-02-05 RX ORDER — POTASSIUM CHLORIDE 14.9 MG/ML
20 INJECTION INTRAVENOUS ONCE
Status: COMPLETED | OUTPATIENT
Start: 2021-02-05 | End: 2021-02-05

## 2021-02-05 RX ORDER — POTASSIUM CHLORIDE 20MEQ/15ML
40 LIQUID (ML) ORAL ONCE
Status: COMPLETED | OUTPATIENT
Start: 2021-02-05 | End: 2021-02-05

## 2021-02-05 RX ORDER — MAGNESIUM SULFATE HEPTAHYDRATE 40 MG/ML
2 INJECTION, SOLUTION INTRAVENOUS ONCE
Status: COMPLETED | OUTPATIENT
Start: 2021-02-05 | End: 2021-02-05

## 2021-02-05 RX ORDER — POTASSIUM CHLORIDE 14.9 MG/ML
20 INJECTION INTRAVENOUS ONCE
Status: DISCONTINUED | OUTPATIENT
Start: 2021-02-05 | End: 2021-02-05

## 2021-02-05 RX ADMIN — CALCIUM 1 TABLET: 500 TABLET ORAL at 17:06

## 2021-02-05 RX ADMIN — LIDOCAINE 5% 2 PATCH: 700 PATCH TOPICAL at 09:32

## 2021-02-05 RX ADMIN — FLUCONAZOLE 400 MG: 100 TABLET ORAL at 17:05

## 2021-02-05 RX ADMIN — METRONIDAZOLE 500 MG: 500 INJECTION, SOLUTION INTRAVENOUS at 01:08

## 2021-02-05 RX ADMIN — ACETAMINOPHEN 975 MG: 325 TABLET, FILM COATED ORAL at 06:05

## 2021-02-05 RX ADMIN — POTASSIUM CHLORIDE 40 MEQ: 20 SOLUTION ORAL at 11:55

## 2021-02-05 RX ADMIN — POTASSIUM PHOSPHATE, MONOBASIC AND POTASSIUM PHOSPHATE, DIBASIC 30 MMOL: 224; 236 INJECTION, SOLUTION, CONCENTRATE INTRAVENOUS at 11:47

## 2021-02-05 RX ADMIN — CEFEPIME HYDROCHLORIDE 2000 MG: 2 INJECTION, POWDER, FOR SOLUTION INTRAVENOUS at 06:05

## 2021-02-05 RX ADMIN — METRONIDAZOLE 500 MG: 500 INJECTION, SOLUTION INTRAVENOUS at 09:40

## 2021-02-05 RX ADMIN — HEPARIN SODIUM 5000 UNITS: 5000 INJECTION INTRAVENOUS; SUBCUTANEOUS at 14:49

## 2021-02-05 RX ADMIN — PROMETHAZINE HYDROCHLORIDE 25 MG: 25 INJECTION INTRAMUSCULAR; INTRAVENOUS at 17:05

## 2021-02-05 RX ADMIN — HEPARIN SODIUM 5000 UNITS: 5000 INJECTION INTRAVENOUS; SUBCUTANEOUS at 21:39

## 2021-02-05 RX ADMIN — ONDANSETRON 4 MG: 2 INJECTION INTRAMUSCULAR; INTRAVENOUS at 21:40

## 2021-02-05 RX ADMIN — ONDANSETRON 4 MG: 2 INJECTION INTRAMUSCULAR; INTRAVENOUS at 15:36

## 2021-02-05 RX ADMIN — HYDROMORPHONE HYDROCHLORIDE 1 MG: 1 INJECTION, SOLUTION INTRAMUSCULAR; INTRAVENOUS; SUBCUTANEOUS at 21:39

## 2021-02-05 RX ADMIN — HYDROMORPHONE HYDROCHLORIDE 0.5 MG: 1 INJECTION, SOLUTION INTRAMUSCULAR; INTRAVENOUS; SUBCUTANEOUS at 03:22

## 2021-02-05 RX ADMIN — FLUTICASONE FUROATE AND VILANTEROL TRIFENATATE 1 PUFF: 100; 25 POWDER RESPIRATORY (INHALATION) at 09:32

## 2021-02-05 RX ADMIN — HEPARIN SODIUM 5000 UNITS: 5000 INJECTION INTRAVENOUS; SUBCUTANEOUS at 06:04

## 2021-02-05 RX ADMIN — HYDROMORPHONE HYDROCHLORIDE 1 MG: 1 INJECTION, SOLUTION INTRAMUSCULAR; INTRAVENOUS; SUBCUTANEOUS at 11:03

## 2021-02-05 RX ADMIN — MAGNESIUM SULFATE HEPTAHYDRATE 2 G: 40 INJECTION, SOLUTION INTRAVENOUS at 10:26

## 2021-02-05 RX ADMIN — POTASSIUM CHLORIDE 20 MEQ: 14.9 INJECTION, SOLUTION INTRAVENOUS at 17:09

## 2021-02-05 RX ADMIN — SODIUM CHLORIDE, SODIUM GLUCONATE, SODIUM ACETATE, POTASSIUM CHLORIDE AND MAGNESIUM CHLORIDE 125 ML/HR: 526; 502; 368; 37; 30 INJECTION, SOLUTION INTRAVENOUS at 01:09

## 2021-02-05 RX ADMIN — ACETAMINOPHEN 975 MG: 325 TABLET, FILM COATED ORAL at 14:49

## 2021-02-05 RX ADMIN — LEVOTHYROXINE SODIUM 75 MCG: 75 TABLET ORAL at 06:05

## 2021-02-05 NOTE — DISCHARGE INSTR - OTHER ORDERS
Cleanse daniel-stomal skin with warm water, pat dry, and apply 3m no sting skin prep to the daniel-stomal skin  Apply negra ring circumferentially around the stoma  Apply maxorb to the distal incision and cover with tegaderm  Pouch with using one piece cut to fit ostomy pouch  Empty when 1/3 full and change pouch every 3-4 days and as needed for leaking  May need to change with wound vac dressing changes due to proximity and overlap of the dressings/ostomy barrier

## 2021-02-05 NOTE — QUICK NOTE
Patient's significant other, keisha Sy called and given update about patient     All questions answered      Hugo Roa PA-C

## 2021-02-05 NOTE — ASSESSMENT & PLAN NOTE
Likely acute renal failure secondary to ischemic ATN due to perforated viscus  Creatinine now stable around 0 7  Now that urine output is increasing can consider reducing the rate of IV fluids, we will drop the rate from 125-75  If appropriate p o  Intake can consider discontinuation altogether      Monitor kidney function

## 2021-02-05 NOTE — PROGRESS NOTES
Progress Note - General Surgery   Jean Wilson 52 y o  female MRN: [id number]  Unit/Bed#: -01 Encounter: [id number]    Assessment/Plan  Jean Wilson is a 52 y o  female     Sigmoid Colon Perforation with abscess s/p ex lap with sigmoid colon resection and colostomy on 1/26/2021  POD7 bring back to OR, washout, rectal stump reinforcement, fascial closure  POD9 bring back to OR, washout, attempted formation of descending colon colostomy, descending colon resection, partial omentectomy, formation of transverse colostomy, ABThera placement   PO10 exploratory laparotomy, sigmoid colon resection, ABThera placement  Pneumoperitoneum     WBC improved 12 48 from 16 11, continue to trend   Ostomy having more output (400 cc yesterday)  Clear liquid diet as tolerated; had one episode of vomiting at 10:00 last night (described as dark green, approx 20cc)  Will plan to d/c IVF once tolerating more po, UO has improved   Wound still having significant amt of serous drainage, continue daily dressing changes   p r n  Dilaudid/Zofran  IV cefepime/Flagyl   Recommend PT/OT, as she is not OOB ambulating by self   PT/OT recommending OP STR   GI following for IBD workup, plan for OP FU in office and eventual OP colonoscopy   Monitor for now  Will plan for d/c once diet is advanced, labs stable, pain better controlled         Anemia  Hbg 8 2 will recheck later today  transfuse if <7    Hypocalcemia  Corrected calcium normal  Likely related to low albumin levels  Ionized Ca 0 93  Will give 1 500 mg tablet calcium carbonate with meal    Hypokalemia  Hypophosphatemia  Likely s/s to low PO intake  Will replete Mg to increase K+ absorption   SLIM on board managing     Protein-deficient  Low total protein and albumin  S/s to poor po intake  Likely contribution to slow wound healing   Continue to monitor, will hopefully improve as diet advances  Continue daily clear Ensure for nutrition supplement     Patient met sepsis criteria in ICU on 1/28-1/31  No longer meeting sepsis criteria  Treated and resolved  Subjective/Objective     Subjective: Ostomy/wound care nurse was present during beginning of encounter, ostomy bag was changed  Patient's pain is still 7/10 around incision sites  1 episode of vomiting dark green at 10:00pm last night  No longer nauseous  Denies fevers/chills, SOB, CP, LE tenderness  Objective:     Blood pressure 140/88, pulse 67, temperature 98 2 °F (36 8 °C), resp  rate 18, height 5' 4" (1 626 m), weight 103 kg (227 lb 1 2 oz), SpO2 95 %  ,Body mass index is 38 98 kg/m²  Intake/Output Summary (Last 24 hours) at 2/5/2021 0919  Last data filed at 2/5/2021 0701  Gross per 24 hour   Intake 970 83 ml   Output 1690 ml   Net -719 17 ml       Invasive Devices     Peripherally Inserted Central Catheter Line            PICC Line 15/00/39 Right Basilic 1 day          Drain            Colostomy Transverse 8 days    Closed/Suction Drain RLQ Bulb 6 days                Physical Exam:   Physical Exam  Vitals signs and nursing note reviewed  Constitutional:       General: She is not in acute distress  Appearance: Normal appearance  HENT:      Head: Normocephalic and atraumatic  Right Ear: External ear normal       Left Ear: External ear normal       Nose: No congestion or rhinorrhea  Mouth/Throat:      Mouth: Mucous membranes are moist       Pharynx: Oropharynx is clear  Comments: Tongue protrudes midline  Poor dentition  Eyes:      General: No scleral icterus  Extraocular Movements: Extraocular movements intact  Conjunctiva/sclera: Conjunctivae normal    Neck:      Musculoskeletal: Normal range of motion and neck supple  Cardiovascular:      Rate and Rhythm: Normal rate and regular rhythm  Heart sounds: Normal heart sounds  No murmur  No friction rub  No gallop  Pulmonary:      Effort: Pulmonary effort is normal       Breath sounds: Normal breath sounds  No wheezing, rhonchi or rales  Abdominal:      General: Bowel sounds are normal  There is no distension  Palpations: Abdomen is soft  There is no mass  Tenderness: There is abdominal tenderness (Around midline incision and ostomy bag)  There is no guarding or rebound  Comments: Nondistended, soft abdomen    Midline incision with copious serous drainage  Ostomy bag with no output   Musculoskeletal: Normal range of motion  General: No swelling or tenderness  Right lower leg: No edema  Left lower leg: No edema  Skin:     General: Skin is warm and dry  Coloration: Skin is not jaundiced  Neurological:      General: No focal deficit present  Mental Status: She is alert and oriented to person, place, and time  Psychiatric:         Mood and Affect: Mood normal          Behavior: Behavior normal          Lab, Imaging and other studies:  I have personally reviewed pertinent lab results  , CBC:   Lab Results   Component Value Date    WBC 12 48 (H) 02/05/2021    HGB 8 2 (L) 02/05/2021    HCT 25 9 (L) 02/05/2021    MCV 87 02/05/2021     (H) 02/05/2021    MCH 27 6 02/05/2021    MCHC 31 7 02/05/2021    RDW 15 9 (H) 02/05/2021    MPV 9 8 02/05/2021    NRBC 0 02/05/2021   , CMP:   Lab Results   Component Value Date    SODIUM 142 02/05/2021    K 2 6 (LL) 02/05/2021     02/05/2021    CO2 27 02/05/2021    BUN 7 02/05/2021    CREATININE 0 60 02/05/2021    CALCIUM 7 0 (L) 02/05/2021    AST 14 02/05/2021    ALT 10 (L) 02/05/2021    ALKPHOS 63 02/05/2021    EGFR 108 02/05/2021   , Coagulation: No results found for: PT, INR, APTT, Urinalysis: No results found for: Tharon Havers, SPECGRAV, PHUR, LEUKOCYTESUR, NITRITE, PROTEINUA, GLUCOSEU, KETONESU, BILIRUBINUR, BLOODU, Amylase: No results found for: AMYLASE, Lipase: No results found for: LIPASE   Xr Chest Portable Icu    Result Date: 1/27/2021  Impression: No acute cardiopulmonary disease  ET tube 4 cm above the shreyas    Workstation performed: [id number] Ct Abdomen Pelvis With Contrast    Result Date: 1/26/2021  Impression: 1  Mild pancolonic wall thickening with minimal pericolonic fat stranding extending from the cecum to sigmoid colon  Findings are suggestive of infectious/inflammatory or less likely ischemic colitis  2   Moderate volume pneumoperitoneum, suggestive of perforated viscus  However, the site of perforation is not definitively identified  Urgent surgical consultation is recommended    I personally discussed this study with [facility] on 1/26/2021 at 4:30 PM  Workstation performed: [id number]     VTE Pharmacologic Prophylaxis: Heparin  VTE Mechanical Prophylaxis: sequential compression device    Recent Results (from the past 36 hour(s))   Fingerstick Glucose (POCT)    Collection Time: 02/04/21 12:04 AM   Result Value Ref Range    POC Glucose 117 65 - 140 mg/dl   Comprehensive metabolic panel    Collection Time: 02/04/21  5:56 AM   Result Value Ref Range    Sodium 145 136 - 145 mmol/L    Potassium 2 8 (L) 3 5 - 5 3 mmol/L    Chloride 109 (H) 100 - 108 mmol/L    CO2 27 21 - 32 mmol/L    ANION GAP 9 4 - 13 mmol/L    BUN 15 5 - 25 mg/dL    Creatinine 0 63 0 60 - 1 30 mg/dL    Glucose 116 65 - 140 mg/dL    Calcium 7 4 (L) 8 3 - 10 1 mg/dL    Corrected Calcium 9 1 8 3 - 10 1 mg/dL    AST 14 5 - 45 U/L    ALT 13 12 - 78 U/L    Alkaline Phosphatase 63 46 - 116 U/L    Total Protein 5 2 (L) 6 4 - 8 2 g/dL    Albumin 1 9 (L) 3 5 - 5 0 g/dL    Total Bilirubin 0 20 0 20 - 1 00 mg/dL    eGFR 106 ml/min/1 73sq m   Lipid panel    Collection Time: 02/04/21  5:56 AM   Result Value Ref Range    Cholesterol 93 50 - 200 mg/dL    Triglycerides 185 (H) <=150 mg/dL    HDL, Direct 8 (L) >=40 mg/dL    LDL Calculated 48 0 - 100 mg/dL    Non-HDL-Chol (CHOL-HDL) 85 mg/dl   Magnesium    Collection Time: 02/04/21  5:56 AM   Result Value Ref Range    Magnesium 1 7 1 6 - 2 6 mg/dL   Phosphorus    Collection Time: 02/04/21  5:56 AM   Result Value Ref Range    Phosphorus 1 5 (L) 2 7 - 4 5 mg/dL   Fingerstick Glucose (POCT)    Collection Time: 02/04/21  6:12 AM   Result Value Ref Range    POC Glucose 108 65 - 140 mg/dl   Fingerstick Glucose (POCT)    Collection Time: 02/04/21 11:55 AM   Result Value Ref Range    POC Glucose 121 65 - 140 mg/dl   Potassium    Collection Time: 02/04/21 12:18 PM   Result Value Ref Range    Potassium 3 0 (L) 3 5 - 5 3 mmol/L   Phosphorus    Collection Time: 02/04/21 12:18 PM   Result Value Ref Range    Phosphorus 1 6 (L) 2 7 - 4 5 mg/dL   CBC and differential    Collection Time: 02/04/21 12:18 PM   Result Value Ref Range    WBC 16 11 (H) 4 31 - 10 16 Thousand/uL    RBC 3 52 (L) 3 81 - 5 12 Million/uL    Hemoglobin 9 6 (L) 11 5 - 15 4 g/dL    Hematocrit 30 6 (L) 34 8 - 46 1 %    MCV 87 82 - 98 fL    MCH 27 3 26 8 - 34 3 pg    MCHC 31 4 31 4 - 37 4 g/dL    RDW 15 7 (H) 11 6 - 15 1 %    MPV 9 9 8 9 - 12 7 fL    Platelets 207 (H) 063 - 390 Thousands/uL    nRBC 0 /100 WBCs    Neutrophils Relative 86 (H) 43 - 75 %    Immat GRANS % 2 0 - 2 %    Lymphocytes Relative 8 (L) 14 - 44 %    Monocytes Relative 4 4 - 12 %    Eosinophils Relative 0 0 - 6 %    Basophils Relative 0 0 - 1 %    Neutrophils Absolute 13 85 (H) 1 85 - 7 62 Thousands/µL    Immature Grans Absolute 0 29 (H) 0 00 - 0 20 Thousand/uL    Lymphocytes Absolute 1 31 0 60 - 4 47 Thousands/µL    Monocytes Absolute 0 59 0 17 - 1 22 Thousand/µL    Eosinophils Absolute 0 05 0 00 - 0 61 Thousand/µL    Basophils Absolute 0 02 0 00 - 0 10 Thousands/µL   Fingerstick Glucose (POCT)    Collection Time: 02/04/21  3:31 PM   Result Value Ref Range    POC Glucose 123 65 - 140 mg/dl   Fingerstick Glucose (POCT)    Collection Time: 02/04/21  9:20 PM   Result Value Ref Range    POC Glucose 119 65 - 140 mg/dl   Fingerstick Glucose (POCT)    Collection Time: 02/05/21 12:00 AM   Result Value Ref Range    POC Glucose 108 65 - 140 mg/dl   CBC and differential    Collection Time: 02/05/21  6:10 AM   Result Value Ref Range    WBC 12 48 (H) 4 31 - 10 16 Thousand/uL    RBC 2 97 (L) 3 81 - 5 12 Million/uL    Hemoglobin 8 2 (L) 11 5 - 15 4 g/dL    Hematocrit 25 9 (L) 34 8 - 46 1 %    MCV 87 82 - 98 fL    MCH 27 6 26 8 - 34 3 pg    MCHC 31 7 31 4 - 37 4 g/dL    RDW 15 9 (H) 11 6 - 15 1 %    MPV 9 8 8 9 - 12 7 fL    Platelets 695 (H) 727 - 390 Thousands/uL    nRBC 0 /100 WBCs    Neutrophils Relative 82 (H) 43 - 75 %    Immat GRANS % 2 0 - 2 %    Lymphocytes Relative 10 (L) 14 - 44 %    Monocytes Relative 5 4 - 12 %    Eosinophils Relative 1 0 - 6 %    Basophils Relative 0 0 - 1 %    Neutrophils Absolute 10 26 (H) 1 85 - 7 62 Thousands/µL    Immature Grans Absolute 0 25 (H) 0 00 - 0 20 Thousand/uL    Lymphocytes Absolute 1 24 0 60 - 4 47 Thousands/µL    Monocytes Absolute 0 63 0 17 - 1 22 Thousand/µL    Eosinophils Absolute 0 08 0 00 - 0 61 Thousand/µL    Basophils Absolute 0 02 0 00 - 0 10 Thousands/µL   Comprehensive metabolic panel    Collection Time: 02/05/21  6:10 AM   Result Value Ref Range    Sodium 142 136 - 145 mmol/L    Potassium 2 6 (LL) 3 5 - 5 3 mmol/L    Chloride 107 100 - 108 mmol/L    CO2 27 21 - 32 mmol/L    ANION GAP 8 4 - 13 mmol/L    BUN 7 5 - 25 mg/dL    Creatinine 0 60 0 60 - 1 30 mg/dL    Glucose 108 65 - 140 mg/dL    Calcium 7 0 (L) 8 3 - 10 1 mg/dL    Corrected Calcium 8 8 8 3 - 10 1 mg/dL    AST 14 5 - 45 U/L    ALT 10 (L) 12 - 78 U/L    Alkaline Phosphatase 63 46 - 116 U/L    Total Protein 4 8 (L) 6 4 - 8 2 g/dL    Albumin 1 7 (L) 3 5 - 5 0 g/dL    Total Bilirubin 0 20 0 20 - 1 00 mg/dL    eGFR 108 ml/min/1 73sq m   Phosphorus    Collection Time: 02/05/21  6:10 AM   Result Value Ref Range    Phosphorus 1 8 (L) 2 7 - 4 5 mg/dL   Magnesium    Collection Time: 02/05/21  6:10 AM   Result Value Ref Range    Magnesium 1 6 1 6 - 2 6 mg/dL   Fingerstick Glucose (POCT)    Collection Time: 02/05/21  6:21 AM   Result Value Ref Range    POC Glucose 107 65 - 140 mg/dl       JUAQUIN Jackson  2/5/2021

## 2021-02-05 NOTE — CASE MANAGEMENT
Per XIAO anticipate dc 48 hours  CM notified [facility] via [facility]  CM awaiting soc confirmation

## 2021-02-05 NOTE — PROGRESS NOTES
Progress Note - China Cardoza 1970, 52 y o  female MRN: [id number]    Unit/Bed#: -01 Encounter: [id number]    Primary Care Provider: Sharon Stone MD   Date and time admitted to hospital: 1/26/2021  2:12 PM        Perforation of sigmoid colon due to diverticulitis  Assessment & Plan  · Status post Ex lap/sigmoid, descending colon & transverse colon resection status post colostomy 01/26/2021  · Postsurgical management as per surgery  · Currently on clear liquid diet, advancement been done by surgical team   · On p r n  Dilaudid/Zofran  · On IV cefepime/Flagyl for now  Can likely de-escalate cefepime to ceftriaxone as there is no evidence of Pseudomonas  Can consider a course of at least 7-10 days of that + flagyl given perforated viscus  There is some concern of inflammatory bowel disease given biopsy findings, GI following  * Pneumoperitoneum  Assessment & Plan  Secondary to perforation of sigmoid colon  See plan under perforation of sigmoid colon  Mild persistent asthma  Assessment & Plan  Currently stable  Continue with p r n  Albuterol/albuterol inhaler    Electrolyte abnormality  Assessment & Plan  Patient has hypokalemia hypophosphatemia, this is likely due to poor p o  Intake post surgery and significant electrolyte depletion  Will replace again Potassium as well as phosphorus  Also replace magnesium  Acute renal failure (ARF) (HCC)  Assessment & Plan  Likely acute renal failure secondary to ischemic ATN due to perforated viscus  Creatinine now stable around 0 7  Now that urine output is increasing can consider reducing the rate of IV fluids, we will drop the rate from 125-75  If appropriate p o  Intake can consider discontinuation altogether  Monitor kidney function    Hypothyroid  Assessment & Plan  Levothyroxine has been resumed now that she has p o  Intake        VTE Pharmacologic Prophylaxis:   Pharmacologic: Heparin  Mechanical VTE Prophylaxis in Place: Yes    Patient Centered Rounds: I have performed bedside rounds with nursing staff today  Discussions with Specialists or Other Care Team Provider:  Surgery    Education and Discussions with Family / Patient:  Discussed with patient herself    Time Spent for Care: 30 minutes  More than 50% of total time spent on counseling and coordination of care as described above  Current Length of Stay: 10 day(s)    Current Patient Status: Inpatient   Certification Statement: The patient will continue to require additional inpatient hospital stay due to Need for IV therapy, electrolyte replacement    Discharge Plan:  Once stable    Code Status: Level 1 - Full Code      Subjective:     Patient evaluated this morning on consultation  Primary surgical service  She still mentions some abdominal discomfort however denies any significant nausea or vomiting  She does have flatulence and is tolerating liquid diet so far  Electrolyte is still significantly abnormal including hypokalemia and hypophosphatemia as well as borderline low magnesium  Other than that she is alert and oriented, no other events reported  Objective:     Vitals:   Temp (24hrs), Av 8 °F (36 6 °C), Min:97 4 °F (36 3 °C), Max:98 2 °F (36 8 °C)    Temp:  [97 4 °F (36 3 °C)-98 2 °F (36 8 °C)] 98 2 °F (36 8 °C)  HR:  [67-84] 67  Resp:  [16-18] 18  BP: (135-140)/(88-94) 140/88  SpO2:  [95 %-97 %] 95 %  Body mass index is 38 98 kg/m²  Input and Output Summary (last 24 hours): Intake/Output Summary (Last 24 hours) at 2021 1057  Last data filed at 2021 0900  Gross per 24 hour   Intake 1386 83 ml   Output 1690 ml   Net -303 17 ml       Physical Exam:     Physical Exam  Vitals signs and nursing note reviewed  Constitutional:       Appearance: Normal appearance  She is normal weight  Comments: Middle-age female in bed, awake   HENT:      Head: Normocephalic and atraumatic        Right Ear: External ear normal       Left Ear: External ear normal       Nose: Nose normal  No congestion  Mouth/Throat:      Mouth: Mucous membranes are moist       Pharynx: Oropharynx is clear  No oropharyngeal exudate or posterior oropharyngeal erythema  Eyes:      General: No scleral icterus  Right eye: No discharge  Left eye: No discharge  Extraocular Movements: Extraocular movements intact  Conjunctiva/sclera: Conjunctivae normal       Pupils: Pupils are equal, round, and reactive to light  Neck:      Musculoskeletal: Normal range of motion and neck supple  No neck rigidity or muscular tenderness  Cardiovascular:      Rate and Rhythm: Normal rate and regular rhythm  Pulses: Normal pulses  Heart sounds: Normal heart sounds  No murmur  No friction rub  No gallop  Pulmonary:      Effort: Pulmonary effort is normal  No respiratory distress  Breath sounds: Normal breath sounds  No stridor  No wheezing, rhonchi or rales  Chest:      Chest wall: No tenderness  Abdominal:      General: Abdomen is flat  Bowel sounds are normal  There is no distension  Palpations: Abdomen is soft  There is no mass  Tenderness: There is no abdominal tenderness  There is no guarding or rebound  Comments: Status post laparotomy, there is a colostomy bag in place  Musculoskeletal: Normal range of motion  General: No swelling, tenderness, deformity or signs of injury  Skin:     General: Skin is warm and dry  Capillary Refill: Capillary refill takes less than 2 seconds  Coloration: Skin is not jaundiced or pale  Findings: No bruising, erythema, lesion or rash  Neurological:      General: No focal deficit present  Mental Status: She is alert and oriented to person, place, and time  Mental status is at baseline  Cranial Nerves: No cranial nerve deficit  Sensory: No sensory deficit  Motor: No weakness        Coordination: Coordination normal    Psychiatric:         Mood and Affect: Mood normal          Behavior: Behavior normal          Thought Content: Thought content normal          Judgment: Judgment normal            Additional Data:     Labs:    Results from last 7 days   Lab Units 02/05/21  0610  02/02/21  0548   WBC Thousand/uL 12 48*   < > 9 83   HEMOGLOBIN g/dL 8 2*   < > 8 6*   HEMATOCRIT % 25 9*   < > 27 5*   PLATELETS Thousands/uL 611*   < > 608*   BANDS PCT %  --   --  4   NEUTROS PCT % 82*   < >  --    LYMPHS PCT % 10*   < >  --    LYMPHO PCT %  --   --  7*   MONOS PCT % 5   < >  --    MONO PCT %  --   --  4   EOS PCT % 1   < > 0    < > = values in this interval not displayed  Results from last 7 days   Lab Units 02/05/21  0610   SODIUM mmol/L 142   POTASSIUM mmol/L 2 6*   CHLORIDE mmol/L 107   CO2 mmol/L 27   BUN mg/dL 7   CREATININE mg/dL 0 60   ANION GAP mmol/L 8   CALCIUM mg/dL 7 0*   ALBUMIN g/dL 1 7*   TOTAL BILIRUBIN mg/dL 0 20   ALK PHOS U/L 63   ALT U/L 10*   AST U/L 14   GLUCOSE RANDOM mg/dL 108         Results from last 7 days   Lab Units 02/05/21  0621 02/05/21  0000 02/04/21  2120 02/04/21  1531 02/04/21  1155 02/04/21  0612 02/04/21  0004 02/03/21  1606 02/03/21  1132 02/03/21  0606 02/02/21  2328 02/02/21  2122   POC GLUCOSE mg/dl 107 108 119 123 121 108 117 138 134 117 155* 168*         Results from last 7 days   Lab Units 01/31/21  0625 01/30/21  0619 01/29/21  2218   LACTIC ACID mmol/L  --   --  0 9   PROCALCITONIN ng/ml 1 22* 2 40*  --            * I Have Reviewed All Lab Data Listed Above  * Additional Pertinent Lab Tests Reviewed:  [address] Admission Reviewed    Recent Cultures (last 7 days):           Last 24 Hours Medication List:   Current Facility-Administered Medications   Medication Dose Route Frequency Provider Last Rate    acetaminophen  975 mg Oral Q8H [facility] Jessenia Mccarthy MD      albuterol  2 5 mg Nebulization Q6H PRN Anika Brown PA-C      cefepime  2,000 mg Intravenous Q12H Marina Chilel PA-C 2,000 mg (02/05/21 1233)    fluticasone-vilanterol  1 puff Inhalation Daily Grace Driscoll PA-C      heparin (porcine)  5,000 Units Subcutaneous [place], [place]      HYDROmorphone  0 2 mg Intravenous Once May JUAQUIN Steward      HYDROmorphone  0 5 mg Intravenous Q4H PRN Jonathon Mary MD      Or   Soco Ramirez HYDROmorphone  1 mg Intravenous Q4H PRN Jonathon Mary MD      insulin lispro  1-6 Units Subcutaneous Q6H [facility] [place], [place]      levothyroxine  75 mcg Oral Early Morning Justin Sotelo MD      lidocaine  2 patch Topical Daily Jonathon Mary MD      magnesium sulfate  2 g Intravenous Once Justin Sotelo MD      metroNIDAZOLE  500 mg Intravenous Q8H Marina Chilel PA-C 500 mg (02/05/21 0940)    multi-electrolyte  75 mL/hr Intravenous Continuous Justin Sotelo  mL/hr (02/05/21 0109)    ondansetron  4 mg Intravenous Q6H PRN [place], PA-C      potassium chloride  20 mEq Intravenous Once Justin Sotelo MD      potassium chloride  40 mEq Oral Once Justin Sotelo MD      potassium phosphate  30 mmol Intravenous Once JUAQUIN Jackson          Today, Patient Was Seen By: Justin Sotelo MD    ** Please Note: Dictation voice to text software may have been used in the creation of this document   **

## 2021-02-05 NOTE — ASSESSMENT & PLAN NOTE
· Status post Ex lap/sigmoid, descending colon & transverse colon resection status post colostomy 01/26/2021  · Postsurgical management as per surgery  · Currently on clear liquid diet, advancement been done by surgical team   · On p r n  Dilaudid/Zofran  · On IV cefepime/Flagyl for now  Can likely de-escalate cefepime to ceftriaxone as there is no evidence of Pseudomonas  Can consider a course of at least 7-10 days of that + flagyl given perforated viscus  There is some concern of inflammatory bowel disease given biopsy findings, GI following

## 2021-02-05 NOTE — ASSESSMENT & PLAN NOTE
Patient has hypokalemia hypophosphatemia, this is likely due to poor p o  Intake post surgery and significant electrolyte depletion  Will replace again Potassium as well as phosphorus  Also replace magnesium

## 2021-02-05 NOTE — WOUND OSTOMY CARE
Progress Note - Ostomy   Megan Varghese 52 y o  female MRN: [id number]  Unit/Bed#: -01 Encounter: [id number]      Assessment:  Patient is POD # 9 s/p transverse colostomy creation due to pneumoperitoneum  Seen for continued ostomy education and teaching  Patient is agreeable to visit today reports her pouch was just changed last night  Patient is primary learner  Patient is alert and oriented x 4, complains of incision pain  Reports she has not read her ostomy educational materials, but helped the nurse empty her pouch  Topics reviewed: normal stoma appearance, how and when to empty (1/3 full) to prevent pulling/lifting of the barrier, importance & how to clean the end of the pouch to prevent odor, gas/odor producing foods, frequency of changing of the pouch (every 3-4 days on a schedule), one piece/two piece pouching systems differences, daniel-stomal skin care (using warm water only, no soaps/lotions, gas valve functionality of one piece, Clothing- including avoiding placing belt-line/seat belts over the stoma, bathing, and how to obtain supplies  One piece pouch noted with significant wash-out despite being changed overnight  Due to this it was decided to change the pouch to assess the stoma  Step-by-step pouch change done using 1 piece ostomy pouch  Demonstrated proper removal of the pouch  Reviewed with patient how and when to measure the stoma (weekly)  Demonstrated how to trace & cut one piece barrier, and how to apply it to the skin using gentle pressure / warmth of the hands  Skin prep applied to daniel-stomal skin and an negra ring applied circumferentially to decrease washout, rationale of accessories explained to patient  Good seal obtained  Verbally discussed how to mold a two piece pouch  Midline abdominal transverse colostomy - beefy red and moist  Slightly budded   Stoma measures 1 3/4 inches side to side and 2 inches top to bottom, os is noted centrally but superior, slight oval shape, mucocutaneous junction is intact  Glenda-stomal skin is intact with no redness or wound - noted incision/sutures to distal to the stoma (applied maxorb and tegaderm over this area to create a flat pouching surface)  Effluent = 50ml dark brown liquid  No flatus in pouch  Plan is for patient to have VNA at home  Patient listened and had good attention/eye contact during the teaching session, but toward the end of the session became very withdrawn and quiet- patient verbalized she was feeling very overwhelmed  Provided encouragement and support  Patient denies having any specific questions regarding ostomy care  Encouraged patient to continue to practice emptying her pouch with nursing, and to read the educational materials provided - "Life after colostomy Surgery" by [facility]  Patient gave verbal permission to order sample kits from [employer]     Plan:   1  Will continue to follow along  2  Encourage patient to read through her ostomy educational materials  3  Encourage patient to engage in self care with her ostomy - emptying ,etc with nursing assistance  4  Care to midline abdominal incision per acute care surgery  5  Will order sample kites  6  Supplies left at bedside for patient to take home  7  Cleanse glenda-stomal skin with warm water, pat dry, and apply 3m no sting skin prep to the glenda-stomal skin  Apply negra ring circumferentially around the stoma  Apply maxorb to the distal incision and cover with tegaderm  Pouch with using one piece cut to fit ostomy pouch  Empty when 1/3 full and change pouch every 3-4 days and as needed for leaking    Objective:    Vitals: Blood pressure 140/88, pulse 67, temperature 98 2 °F (36 8 °C), resp  rate 18, height 5' 4" (1 626 m), weight 103 kg (227 lb 1 2 oz), SpO2 95 %  ,Body mass index is 38 98 kg/m²          Will follow along,  Dann Vidal RN BSN [facility]

## 2021-02-06 LAB
ANION GAP SERPL CALCULATED.3IONS-SCNC: 7 MMOL/L (ref 4–13)
ATRIAL RATE: 56 BPM
ATRIAL RATE: 61 BPM
ATRIAL RATE: 63 BPM
ATRIAL RATE: 69 BPM
BASOPHILS # BLD MANUAL: 0 THOUSAND/UL (ref 0–0.1)
BASOPHILS NFR MAR MANUAL: 0 % (ref 0–1)
BUN SERPL-MCNC: 7 MG/DL (ref 5–25)
CALCIUM SERPL-MCNC: 7.6 MG/DL (ref 8.3–10.1)
CHLORIDE SERPL-SCNC: 108 MMOL/L (ref 100–108)
CO2 SERPL-SCNC: 27 MMOL/L (ref 21–32)
CREAT SERPL-MCNC: 0.62 MG/DL (ref 0.6–1.3)
EOSINOPHIL # BLD MANUAL: 0.25 THOUSAND/UL (ref 0–0.4)
EOSINOPHIL NFR BLD MANUAL: 2 % (ref 0–6)
ERYTHROCYTE [DISTWIDTH] IN BLOOD BY AUTOMATED COUNT: 16.8 % (ref 11.6–15.1)
GFR SERPL CREATININE-BSD FRML MDRD: 107 ML/MIN/1.73SQ M
GLUCOSE SERPL-MCNC: 100 MG/DL (ref 65–140)
GLUCOSE SERPL-MCNC: 101 MG/DL (ref 65–140)
GLUCOSE SERPL-MCNC: 102 MG/DL (ref 65–140)
GLUCOSE SERPL-MCNC: 106 MG/DL (ref 65–140)
GLUCOSE SERPL-MCNC: 106 MG/DL (ref 65–140)
GLUCOSE SERPL-MCNC: 112 MG/DL (ref 65–140)
HCT VFR BLD AUTO: 26.7 % (ref 34.8–46.1)
HGB BLD-MCNC: 8.5 G/DL (ref 11.5–15.4)
LYMPHOCYTES # BLD AUTO: 1.11 THOUSAND/UL (ref 0.6–4.47)
LYMPHOCYTES # BLD AUTO: 9 % (ref 14–44)
MAGNESIUM SERPL-MCNC: 1.9 MG/DL (ref 1.6–2.6)
MCH RBC QN AUTO: 27.9 PG (ref 26.8–34.3)
MCHC RBC AUTO-ENTMCNC: 31.8 G/DL (ref 31.4–37.4)
MCV RBC AUTO: 88 FL (ref 82–98)
METAMYELOCYTES NFR BLD MANUAL: 2 % (ref 0–1)
MONOCYTES # BLD AUTO: 0.49 THOUSAND/UL (ref 0–1.22)
MONOCYTES NFR BLD: 4 % (ref 4–12)
NEUTROPHILS # BLD MANUAL: 10.26 THOUSAND/UL (ref 1.85–7.62)
NEUTS BAND NFR BLD MANUAL: 3 % (ref 0–8)
NEUTS SEG NFR BLD AUTO: 80 % (ref 43–75)
NRBC BLD AUTO-RTO: 0 /100 WBCS
P AXIS: 18 DEGREES
P AXIS: 32 DEGREES
P AXIS: 35 DEGREES
P AXIS: 36 DEGREES
PHOSPHATE SERPL-MCNC: 2.1 MG/DL (ref 2.7–4.5)
PLATELET # BLD AUTO: 629 THOUSANDS/UL (ref 149–390)
PLATELET BLD QL SMEAR: ABNORMAL
PMV BLD AUTO: 10.2 FL (ref 8.9–12.7)
POTASSIUM SERPL-SCNC: 3 MMOL/L (ref 3.5–5.3)
PR INTERVAL: 108 MS
PR INTERVAL: 128 MS
PR INTERVAL: 132 MS
PR INTERVAL: 134 MS
QRS AXIS: 34 DEGREES
QRS AXIS: 36 DEGREES
QRS AXIS: 46 DEGREES
QRS AXIS: 48 DEGREES
QRSD INTERVAL: 82 MS
QRSD INTERVAL: 82 MS
QRSD INTERVAL: 84 MS
QRSD INTERVAL: 86 MS
QT INTERVAL: 372 MS
QT INTERVAL: 458 MS
QT INTERVAL: 468 MS
QT INTERVAL: 478 MS
QTC INTERVAL: 380 MS
QTC INTERVAL: 451 MS
QTC INTERVAL: 461 MS
QTC INTERVAL: 512 MS
RBC # BLD AUTO: 3.05 MILLION/UL (ref 3.81–5.12)
SODIUM SERPL-SCNC: 142 MMOL/L (ref 136–145)
T WAVE AXIS: 48 DEGREES
T WAVE AXIS: 58 DEGREES
T WAVE AXIS: 64 DEGREES
T WAVE AXIS: 72 DEGREES
TOTAL CELLS COUNTED SPEC: 100
VENTRICULAR RATE: 56 BPM
VENTRICULAR RATE: 61 BPM
VENTRICULAR RATE: 63 BPM
VENTRICULAR RATE: 69 BPM
WBC # BLD AUTO: 12.36 THOUSAND/UL (ref 4.31–10.16)

## 2021-02-06 PROCEDURE — 97606 NEG PRS WND THER DME>50 SQCM: CPT | Performed by: PHYSICIAN ASSISTANT

## 2021-02-06 PROCEDURE — 82948 REAGENT STRIP/BLOOD GLUCOSE: CPT

## 2021-02-06 PROCEDURE — 99232 SBSQ HOSP IP/OBS MODERATE 35: CPT | Performed by: INTERNAL MEDICINE

## 2021-02-06 PROCEDURE — 83735 ASSAY OF MAGNESIUM: CPT | Performed by: PHYSICIAN ASSISTANT

## 2021-02-06 PROCEDURE — 99024 POSTOP FOLLOW-UP VISIT: CPT | Performed by: PHYSICIAN ASSISTANT

## 2021-02-06 PROCEDURE — 85007 BL SMEAR W/DIFF WBC COUNT: CPT | Performed by: PHYSICIAN ASSISTANT

## 2021-02-06 PROCEDURE — 85027 COMPLETE CBC AUTOMATED: CPT | Performed by: PHYSICIAN ASSISTANT

## 2021-02-06 PROCEDURE — 93010 ELECTROCARDIOGRAM REPORT: CPT | Performed by: INTERNAL MEDICINE

## 2021-02-06 PROCEDURE — 84100 ASSAY OF PHOSPHORUS: CPT | Performed by: PHYSICIAN ASSISTANT

## 2021-02-06 PROCEDURE — 80048 BASIC METABOLIC PNL TOTAL CA: CPT | Performed by: PHYSICIAN ASSISTANT

## 2021-02-06 RX ORDER — HYDROMORPHONE HCL/PF 1 MG/ML
0.5 SYRINGE (ML) INJECTION ONCE
Status: COMPLETED | OUTPATIENT
Start: 2021-02-06 | End: 2021-02-06

## 2021-02-06 RX ORDER — POTASSIUM CHLORIDE 20MEQ/15ML
40 LIQUID (ML) ORAL ONCE
Status: DISCONTINUED | OUTPATIENT
Start: 2021-02-06 | End: 2021-02-07

## 2021-02-06 RX ADMIN — HYDROMORPHONE HYDROCHLORIDE 1 MG: 1 INJECTION, SOLUTION INTRAMUSCULAR; INTRAVENOUS; SUBCUTANEOUS at 17:04

## 2021-02-06 RX ADMIN — SODIUM CHLORIDE, SODIUM GLUCONATE, SODIUM ACETATE, POTASSIUM CHLORIDE AND MAGNESIUM CHLORIDE 75 ML/HR: 526; 502; 368; 37; 30 INJECTION, SOLUTION INTRAVENOUS at 09:41

## 2021-02-06 RX ADMIN — POTASSIUM PHOSPHATE, MONOBASIC AND POTASSIUM PHOSPHATE, DIBASIC 21 MMOL: 224; 236 INJECTION, SOLUTION, CONCENTRATE INTRAVENOUS at 10:30

## 2021-02-06 RX ADMIN — LIDOCAINE 5% 2 PATCH: 700 PATCH TOPICAL at 09:40

## 2021-02-06 RX ADMIN — HEPARIN SODIUM 5000 UNITS: 5000 INJECTION INTRAVENOUS; SUBCUTANEOUS at 14:46

## 2021-02-06 RX ADMIN — ACETAMINOPHEN 975 MG: 325 TABLET, FILM COATED ORAL at 14:46

## 2021-02-06 RX ADMIN — ACETAMINOPHEN 975 MG: 325 TABLET, FILM COATED ORAL at 05:36

## 2021-02-06 RX ADMIN — HEPARIN SODIUM 5000 UNITS: 5000 INJECTION INTRAVENOUS; SUBCUTANEOUS at 05:36

## 2021-02-06 RX ADMIN — FLUTICASONE FUROATE AND VILANTEROL TRIFENATATE 1 PUFF: 100; 25 POWDER RESPIRATORY (INHALATION) at 09:40

## 2021-02-06 RX ADMIN — HYDROMORPHONE HYDROCHLORIDE 1 MG: 1 INJECTION, SOLUTION INTRAMUSCULAR; INTRAVENOUS; SUBCUTANEOUS at 05:36

## 2021-02-06 RX ADMIN — FLUCONAZOLE 200 MG: 100 TABLET ORAL at 17:04

## 2021-02-06 RX ADMIN — CALCIUM 1 TABLET: 500 TABLET ORAL at 17:04

## 2021-02-06 RX ADMIN — LEVOTHYROXINE SODIUM 75 MCG: 75 TABLET ORAL at 05:36

## 2021-02-06 RX ADMIN — HYDROMORPHONE HYDROCHLORIDE 0.5 MG: 1 INJECTION, SOLUTION INTRAMUSCULAR; INTRAVENOUS; SUBCUTANEOUS at 20:16

## 2021-02-06 NOTE — PLAN OF CARE
Problem: Potential for Falls  Goal: Patient will remain free of falls  Description: INTERVENTIONS:  - Assess patient frequently for physical needs  -  Identify cognitive and physical deficits and behaviors that affect risk of falls  -  [place] fall precautions as indicated by assessment   - Educate patient/family on patient safety including physical limitations  - Instruct patient to call for assistance with activity based on assessment  - Modify environment to reduce risk of injury  - Consider OT/PT consult to assist with strengthening/mobility  Outcome: Progressing     Problem: Prexisting or High Potential for Compromised Skin Integrity  Goal: Skin integrity is maintained or improved  Description: INTERVENTIONS:  - Identify patients at risk for skin breakdown  - Assess and monitor skin integrity  - Assess and monitor nutrition and hydration status  - Monitor labs   - Assess for incontinence   - Turn and reposition patient  - Assist with mobility/ambulation  - Relieve pressure over bony prominences  - Avoid friction and shearing  - Provide appropriate hygiene as needed including keeping skin clean and dry  - Evaluate need for skin moisturizer/barrier cream  - Collaborate with interdisciplinary team   - Patient/family teaching  - Consider wound care consult   Outcome: Progressing     Problem: Nutrition/Hydration-ADULT  Goal: Nutrient/Hydration intake appropriate for improving, restoring or maintaining nutritional needs  Description: Monitor and assess patient's nutrition/hydration status for malnutrition  Collaborate with interdisciplinary team and initiate plan and interventions as ordered  Monitor patient's weight and dietary intake as ordered or per policy  Utilize nutrition screening tool and intervene as necessary  Determine patient's food preferences and provide high-protein, high-caloric foods as appropriate       INTERVENTIONS:  - Monitor oral intake, urinary output, labs, and treatment plans  - Assess nutrition and hydration status and recommend course of action  - Evaluate amount of meals eaten  - Assist patient with eating if necessary   - Allow adequate time for meals  - Recommend/ encourage appropriate diets, oral nutritional supplements, and vitamin/mineral supplements  - Order, calculate, and assess calorie counts as needed  - Recommend, monitor, and adjust tube feedings or PN based on assessed needs  - Assess need for intravenous fluids  - Provide nutrition/hydration education as appropriate  - Include patient/family/caregiver in decisions related to nutrition  Outcome: Progressing     Problem: DISCHARGE PLANNING - CARE MANAGEMENT  Goal: Discharge to post-acute care or home with appropriate resources  Description: INTERVENTIONS:  - Conduct assessment to determine patient/family and health care team treatment goals, and need for post-acute services based on payer coverage, community resources, and patient preferences, and barriers to discharge  - Address psychosocial, clinical, and financial barriers to discharge as identified in assessment in conjunction with the patient/family and health care team  - Arrange appropriate level of post-acute services according to patients   needs and preference and payer coverage in collaboration with the physician and health care team  - Communicate with and update the patient/family, physician, and health care team regarding progress on the discharge plan  - Arrange appropriate transportation to post-acute venues  Outcome: Progressing     Problem: PAIN - ADULT  Goal: Verbalizes/displays adequate comfort level or baseline comfort level  Description: Interventions:  - Encourage patient to monitor pain and request assistance  - Assess pain using appropriate pain scale  - Administer analgesics based on type and severity of pain and evaluate response  - Implement non-pharmacological measures as appropriate and evaluate response  - Consider cultural and social influences on pain and pain management  - Notify physician/advanced practitioner if interventions unsuccessful or patient reports new pain  Outcome: Progressing     Problem: INFECTION - ADULT  Goal: Absence or prevention of progression during hospitalization  Description: INTERVENTIONS:  - Assess and monitor for signs and symptoms of infection  - Monitor lab/diagnostic results  - Monitor all insertion sites, i e  indwelling lines, tubes, and drains  - Monitor endotracheal if appropriate and nasal secretions for changes in amount and color  - [place] appropriate cooling/warming therapies per order  - Administer medications as ordered  - Instruct and encourage patient and family to use good hand hygiene technique  - Identify and instruct in appropriate isolation precautions for identified infection/condition  Outcome: Progressing     Problem: SAFETY ADULT  Goal: Patient will remain free of falls  Description: INTERVENTIONS:  - Assess patient frequently for physical needs  -  Identify cognitive and physical deficits and behaviors that affect risk of falls    -  [place] fall precautions as indicated by assessment   - Educate patient/family on patient safety including physical limitations  - Instruct patient to call for assistance with activity based on assessment  - Modify environment to reduce risk of injury  - Consider OT/PT consult to assist with strengthening/mobility  Outcome: Progressing  Goal: Maintain or return to baseline ADL function  Description: INTERVENTIONS:  -  Assess patient's ability to carry out ADLs; assess patient's baseline for ADL function and identify physical deficits which impact ability to perform ADLs (bathing, care of mouth/teeth, toileting, grooming, dressing, etc )  - Assess/evaluate cause of self-care deficits   - Assess range of motion  - Assess patient's mobility; develop plan if impaired  - Assess patient's need for assistive devices and provide as appropriate  - Encourage maximum independence but intervene and supervise when necessary  - Involve family in performance of ADLs  - Assess for home care needs following discharge   - Consider OT consult to assist with ADL evaluation and planning for discharge  - Provide patient education as appropriate  Outcome: Progressing  Goal: Maintain or return mobility status to optimal level  Description: INTERVENTIONS:  - Assess patient's baseline mobility status (ambulation, transfers, stairs, etc )    - Identify cognitive and physical deficits and behaviors that affect mobility  - Identify mobility aids required to assist with transfers and/or ambulation (gait belt, sit-to-stand, lift, walker, cane, etc )  - [place] fall precautions as indicated by assessment  - Record patient progress and toleration of activity level on Mobility SBAR; progress patient to next Phase/Stage  - Instruct patient to call for assistance with activity based on assessment  - Consider rehabilitation consult to assist with strengthening/weightbearing, etc   Outcome: Progressing     Problem: DISCHARGE PLANNING  Goal: Discharge to home or other facility with appropriate resources  Description: INTERVENTIONS:  - Identify barriers to discharge w/patient and caregiver  - Arrange for needed discharge resources and transportation as appropriate  - Identify discharge learning needs (meds, wound care, etc )  - Arrange for interpretive services to assist at discharge as needed  - Refer to Case Management Department for coordinating discharge planning if the patient needs post-hospital services based on physician/advanced practitioner order or complex needs related to functional status, cognitive ability, or social support system  Outcome: Progressing     Problem: Knowledge Deficit  Goal: Patient/family/caregiver demonstrates understanding of disease process, treatment plan, medications, and discharge instructions  Description: Complete learning assessment and assess knowledge base    Interventions:  - Provide teaching at level of understanding  - Provide teaching via preferred learning methods  Outcome: Progressing     Problem: RESPIRATORY - ADULT  Goal: Achieves optimal ventilation and oxygenation  Description: INTERVENTIONS:  - Assess for changes in respiratory status  - Assess for changes in mentation and behavior  - Position to facilitate oxygenation and minimize respiratory effort  - Oxygen administered by appropriate delivery if ordered  - Initiate smoking cessation education as indicated  - Encourage broncho-pulmonary hygiene including cough, deep breathe, Incentive Spirometry  - Assess the need for suctioning and aspirate as needed  - Assess and instruct to report SOB or any respiratory difficulty  - Respiratory Therapy support as indicated  Outcome: Progressing     Problem: GASTROINTESTINAL - ADULT  Goal: Minimal or absence of nausea and/or vomiting  Description: INTERVENTIONS:  - Administer IV fluids if ordered to ensure adequate hydration  - Maintain NPO status until nausea and vomiting are resolved  - Nasogastric tube if ordered  - Administer ordered antiemetic medications as needed  - Provide nonpharmacologic comfort measures as appropriate  - Advance diet as tolerated, if ordered  - Consider nutrition services referral to assist patient with adequate nutrition and appropriate food choices  Outcome: Progressing  Goal: Maintains or returns to baseline bowel function  Description: INTERVENTIONS:  - Assess bowel function  - Encourage oral fluids to ensure adequate hydration  - Administer IV fluids if ordered to ensure adequate hydration  - Administer ordered medications as needed  - Encourage mobilization and activity  - Consider nutritional services referral to assist patient with adequate nutrition and appropriate food choices  Outcome: Progressing  Goal: Maintains adequate nutritional intake  Description: INTERVENTIONS:  - Monitor percentage of each meal consumed  - Identify factors contributing to decreased intake, treat as appropriate  - Assist with meals as needed  - Monitor I&O, weight, and lab values if indicated  - Obtain nutrition services referral as needed  Outcome: Progressing     Problem: GENITOURINARY - ADULT  Goal: Maintains or returns to baseline urinary function  Description: INTERVENTIONS:  - Assess urinary function  - Encourage oral fluids to ensure adequate hydration if ordered  - Administer IV fluids as ordered to ensure adequate hydration  - Administer ordered medications as needed  - Offer frequent toileting  - Follow urinary retention protocol if ordered  Outcome: Progressing  Goal: Absence of urinary retention  Description: INTERVENTIONS:  - Assess patients ability to void and empty bladder  - Monitor I/O  - Bladder scan as needed  - Discuss with physician/AP medications to alleviate retention as needed  - Discuss catheterization for long term situations as appropriate  Outcome: Progressing  Goal: Urinary catheter remains patent  Description: INTERVENTIONS:  - Assess patency of urinary catheter  - If patient has a chronic fernandez, consider changing catheter if non-functioning  - Follow guidelines for intermittent irrigation of non-functioning urinary catheter  Outcome: Progressing     Problem: METABOLIC, FLUID AND ELECTROLYTES - ADULT  Goal: Electrolytes maintained within normal limits  Description: INTERVENTIONS:  - Monitor labs and assess patient for signs and symptoms of electrolyte imbalances  - Administer electrolyte replacement as ordered  - Monitor response to electrolyte replacements, including repeat lab results as appropriate  - Instruct patient on fluid and nutrition as appropriate  Outcome: Progressing  Goal: Fluid balance maintained  Description: INTERVENTIONS:  - Monitor labs   - Monitor I/O and WT  - Instruct patient on fluid and nutrition as appropriate  - Assess for signs & symptoms of volume excess or deficit  Outcome: Progressing  Goal: Glucose maintained within target range  Description: INTERVENTIONS:  - Monitor Blood Glucose as ordered  - Assess for signs and symptoms of hyperglycemia and hypoglycemia  - Administer ordered medications to maintain glucose within target range  - Assess nutritional intake and initiate nutrition service referral as needed  Outcome: Progressing

## 2021-02-06 NOTE — PROGRESS NOTES
Progress Note - Monica Curtis 1970, 52 y o  female MRN: [id number]    Unit/Bed#: -01 Encounter: [id number]    Primary Care Provider: Candace Yun MD   Date and time admitted to hospital: 1/26/2021  2:12 PM        Perforation of sigmoid colon due to diverticulitis  Assessment & Plan  · Status post Ex lap/sigmoid, descending colon & transverse colon resection status post colostomy 01/26/2021  · Postsurgical management as per surgery  · Currently on surgical diet  · Diet management by surgical team   · On p r n  Dilaudid/Zofran  · On IV cefepime/Flagyl for now  Can likely de-escalate cefepime to ceftriaxone as there is no evidence of Pseudomonas  Can consider a course of at least 7-10 days of that + flagyl given perforated viscus  There is some concern of inflammatory bowel disease given biopsy findings, GI following  * Pneumoperitoneum  Assessment & Plan  Secondary to perforation of sigmoid colon  See plan under perforation of sigmoid colon  Mild persistent asthma  Assessment & Plan  Currently stable  Continue with p r n  Albuterol/albuterol inhaler    Electrolyte abnormality  Assessment & Plan  Patient has hypokalemia hypophosphatemia, this is likely due to poor p o  Intake post surgery and significant electrolyte depletion  Will continue to replace again Potassium as well as phosphorus  Acute renal failure (ARF) (HCC)  Assessment & Plan  Likely acute renal failure secondary to ischemic ATN due to perforated viscus  Creatinine now stable around 0 7  Continue IVF while not able to tolerate significant PO intake  Currently on 75cc/h    Monitor kidney function    Hypothyroid  Assessment & Plan  Levothyroxine has been resumed, continue      VTE Pharmacologic Prophylaxis:   Pharmacologic: Heparin  Mechanical VTE Prophylaxis in Place: Yes    Patient Centered Rounds: I have performed bedside rounds with nursing staff today      Discussions with Specialists or Other Care Team Provider: Discussed with surgery      Time Spent for Care: 30 minutes  More than 50% of total time spent on counseling and coordination of care as described above  Current Length of Stay: 11 day(s)    Current Patient Status: Inpatient       Discharge Plan:  Once stable    Code Status: Level 1 - Full Code      Subjective:     Patient evaluated this morning  She is still had some nausea vomiting  Also mentions abdominal pain but that somewhat better  Denies chest pain, cough, shortness of breath  No other events reported  Objective:     Vitals:   Temp (24hrs), Av 8 °F (36 6 °C), Min:97 2 °F (36 2 °C), Max:98 1 °F (36 7 °C)    Temp:  [97 2 °F (36 2 °C)-98 1 °F (36 7 °C)] 98 °F (36 7 °C)  HR:  [84-92] 89  Resp:  [17-18] 18  BP: (146-160)/() 146/91  SpO2:  [94 %-99 %] 99 %  Body mass index is 40 87 kg/m²  Input and Output Summary (last 24 hours): Intake/Output Summary (Last 24 hours) at 2021 1137  Last data filed at 2021 0941  Gross per 24 hour   Intake 960 ml   Output 1245 ml   Net -285 ml       Physical Exam:     Physical Exam  Vitals signs and nursing note reviewed  Constitutional:       Appearance: Normal appearance  She is normal weight  Comments: Middle-age female in bed, awake, alert   HENT:      Head: Normocephalic and atraumatic  Right Ear: External ear normal       Left Ear: External ear normal       Nose: Nose normal  No congestion  Mouth/Throat:      Mouth: Mucous membranes are moist       Pharynx: Oropharynx is clear  No oropharyngeal exudate or posterior oropharyngeal erythema  Eyes:      General: No scleral icterus  Right eye: No discharge  Left eye: No discharge  Extraocular Movements: Extraocular movements intact  Conjunctiva/sclera: Conjunctivae normal       Pupils: Pupils are equal, round, and reactive to light  Neck:      Musculoskeletal: Normal range of motion and neck supple  No neck rigidity or muscular tenderness  Cardiovascular:      Rate and Rhythm: Normal rate and regular rhythm  Pulses: Normal pulses  Heart sounds: Normal heart sounds  No murmur  No friction rub  No gallop  Pulmonary:      Effort: Pulmonary effort is normal  No respiratory distress  Breath sounds: Normal breath sounds  No stridor  No wheezing, rhonchi or rales  Chest:      Chest wall: No tenderness  Abdominal:      General: Abdomen is flat  Bowel sounds are normal  There is no distension  Palpations: Abdomen is soft  There is no mass  Tenderness: There is no abdominal tenderness  There is no guarding or rebound  Comments: Status post laparotomy, there is a colostomy bag in place  Musculoskeletal: Normal range of motion  General: No swelling, tenderness, deformity or signs of injury  Skin:     General: Skin is warm and dry  Capillary Refill: Capillary refill takes less than 2 seconds  Coloration: Skin is not jaundiced or pale  Findings: No bruising, erythema, lesion or rash  Neurological:      General: No focal deficit present  Mental Status: She is alert and oriented to person, place, and time  Mental status is at baseline  Cranial Nerves: No cranial nerve deficit  Sensory: No sensory deficit  Motor: No weakness  Coordination: Coordination normal    Psychiatric:         Mood and Affect: Mood normal          Behavior: Behavior normal          Thought Content:  Thought content normal          Judgment: Judgment normal            Additional Data:     Labs:    Results from last 7 days   Lab Units 02/06/21  0512 02/05/21  0610   WBC Thousand/uL 12 36* 12 48*   HEMOGLOBIN g/dL 8 5* 8 2*   HEMATOCRIT % 26 7* 25 9*   PLATELETS Thousands/uL 629* 611*   BANDS PCT % 3  --    NEUTROS PCT %  --  82*   LYMPHS PCT %  --  10*   LYMPHO PCT % 9*  --    MONOS PCT %  --  5   MONO PCT % 4  --    EOS PCT % 2 1     Results from last 7 days   Lab Units 02/06/21  0512 02/05/21  8997 SODIUM mmol/L 142 142   POTASSIUM mmol/L 3 0* 2 6*   CHLORIDE mmol/L 108 107   CO2 mmol/L 27 27   BUN mg/dL 7 7   CREATININE mg/dL 0 62 0 60   ANION GAP mmol/L 7 8   CALCIUM mg/dL 7 6* 7 0*   ALBUMIN g/dL  --  1 7*   TOTAL BILIRUBIN mg/dL  --  0 20   ALK PHOS U/L  --  63   ALT U/L  --  10*   AST U/L  --  14   GLUCOSE RANDOM mg/dL 101 108         Results from last 7 days   Lab Units 02/06/21  1109 02/06/21  0720 02/06/21  0643 02/05/21  2347 02/05/21  2120 02/05/21  1843 02/05/21  1259 02/05/21  0621 02/05/21  0000 02/04/21 2120 02/04/21  1531 02/04/21  1155   POC GLUCOSE mg/dl 100 102 106 112 114 124 119 107 108 119 123 121         Results from last 7 days   Lab Units 01/31/21  0625   PROCALCITONIN ng/ml 1 22*           * I Have Reviewed All Lab Data Listed Above  * Additional Pertinent Lab Tests Reviewed:  [address] Admission Reviewed      Recent Cultures (last 7 days):           Last 24 Hours Medication List:   Current Facility-Administered Medications   Medication Dose Route Frequency Provider Last Rate    acetaminophen  975 mg Oral Q8H [facility] Jonathon Mary MD      albuterol  2 5 mg Nebulization Q6H PRN Candice Rodriguez PA-C      calcium carbonate  1 tablet Oral Daily With [employer] JUAQUIN Genao      fluconazole  200 mg Oral Daily JUAQUIN Jackson      fluticasone-vilanterol  1 puff Inhalation Daily Grace Driscoll PA-C      heparin (porcine)  5,000 Units Subcutaneous Q8H [facility] [place] [place]      HYDROmorphone  0 2 mg Intravenous Once May JUAQUIN Steward      HYDROmorphone  0 5 mg Intravenous Q4H PRN Jonathon Mary MD      Or   Soco Ramirez HYDROmorphone  1 mg Intravenous Q4H PRN Jonathon Mary MD      insulin lispro  1-6 Units Subcutaneous Q6H [facility] [place], [place]      levothyroxine  75 mcg Oral Early Morning Heber Erickson MD      lidocaine  2 patch Topical Daily Jonathon Mary MD      multi-electrolyte  75 mL/hr Intravenous Continuous Hebertrevon Erickson MD 75 mL/hr (02/06/21 0941)    ondansetron  4 mg Intravenous Q6H PRN Mile No PA-C      potassium chloride  40 mEq Oral Once Hoda Ugarte MD      potassium phosphate  21 mmol Intravenous Once Olive Camilo PA-C 21 mmol (02/06/21 1030)        Today, Patient Was Seen By: Hoda Ugarte MD    ** Please Note: Dictation voice to text software may have been used in the creation of this document   **

## 2021-02-06 NOTE — ASSESSMENT & PLAN NOTE
Likely acute renal failure secondary to ischemic ATN due to perforated viscus  Creatinine now stable around 0 7  Continue IVF while not able to tolerate significant PO intake   Currently on 75cc/h    Monitor kidney function

## 2021-02-06 NOTE — ASSESSMENT & PLAN NOTE
Patient has hypokalemia hypophosphatemia, this is likely due to poor p o  Intake post surgery and significant electrolyte depletion  Will continue to replace again Potassium as well as phosphorus

## 2021-02-06 NOTE — ASSESSMENT & PLAN NOTE
· Status post Ex lap/sigmoid, descending colon & transverse colon resection status post colostomy 01/26/2021  · Postsurgical management as per surgery  · Currently on surgical diet  · Diet management by surgical team   · On p r n  Dilaudid/Zofran  · On IV cefepime/Flagyl for now  Can likely de-escalate cefepime to ceftriaxone as there is no evidence of Pseudomonas  Can consider a course of at least 7-10 days of that + flagyl given perforated viscus  There is some concern of inflammatory bowel disease given biopsy findings, GI following

## 2021-02-06 NOTE — PROGRESS NOTES
Progress Note -Surgery VINH Caicedo Nine 52 y o  female MRN: [id number]  Unit/Bed#: -01 Encounter: [id number]      Assessment   56y F w Sigmoid Colon Perforation with abscess s/p ex lap with sigmoid colon resection and colostomy on 1/26/2021  POD8 bring back to OR, washout, rectal stump reinforcement, fascial closure  POD10 bring back to OR, washout, attempted formation of descending colon colostomy, descending colon resection, partial omentectomy, formation of transverse colostomy, ABThera placement   POD11 exploratory laparotomy, sigmoid colon resection, ABThera placement  Pneumoperitoneum   -- Leukocytosis stable at 12 36 (12 48)  -- Hgb stable 8 5  -- Ostomy output liquid stool with some coffee ground like material, 275cc last 24h  -- APURVA drain 20cc, s/s  -- surgical incision wound with significant drainage  -- patient reports another episode of emesis  -- GI following for IBD workup, plan for OP FU in office and eventual OP colonoscopy   Post op anemia  - stable  Hypocalcemia  - calcium 7 6, corrected calcium normal  - ionized calcium 0 93, will continue to monitor  Hypokalemia  - improved today, 3 0  - will continue to replete  Protein nutrition deficiency  -albumin and total protein  - poor PO intake  -  Continuing with nutritional supplements  lPlan   -- remove stable and start wound VAC incisional wound  -- antibiotics yesterday  Continue to monitor lab  -- p r n  Antiemetics and analgesia  -- continue with diet as tolerated, continue with nutritional supplements  -- continue to monitor off me output and record  -- continue monitor drain and record  -- continue to replete electrolytes  -- Internal Medicine on consult for medical management, appreciate assistant with this patient  ______________________________________________________________________  Subjective:   Patient patient had episode of vomiting again last night  She denies any current nausea    Tolerating diet but reports poor appetite  Objective:    Vitals:  /85   Pulse 84   Temp 98 1 °F (36 7 °C)   Resp 16   Ht 5' 4" (1 626 m)   Wt 108 kg (238 lb 1 6 oz)   SpO2 98%   BMI 40 87 kg/m²     I/Os:  I/O last 3 completed shifts: In: 1446 8 [P O :476; I V :970 8]  Out: 2745 [Urine:1700; Emesis/NG output:500; Drains:20; Stool:525]    I/O this shift:   In: 900 [I V :900]  Out: -     Invasive Devices     Peripherally Inserted Central Catheter Line            PICC Line 48/92/84 Right Basilic 3 days          Drain            Colostomy Transverse 10 days    Closed/Suction Drain RLQ Bulb 8 days                Medications:  Current Facility-Administered Medications   Medication Dose Route Frequency    acetaminophen (TYLENOL) tablet 975 mg  975 mg Oral Q8H [facility]    albuterol inhalation solution 2 5 mg  2 5 mg Nebulization Q6H PRN    calcium carbonate (OYSTER SHELL,OSCAL) 500 mg tablet 1 tablet  1 tablet Oral Daily With Dinner    fluconazole (DIFLUCAN) tablet 200 mg  200 mg Oral Daily    fluticasone-vilanterol (BREO ELLIPTA) 100-25 mcg/inh inhaler 1 puff  1 puff Inhalation Daily    heparin (porcine) subcutaneous injection 5,000 Units  5,000 Units Subcutaneous Q8H [facility]    HYDROmorphone (DILAUDID) injection 0 2 mg  0 2 mg Intravenous Once    HYDROmorphone (DILAUDID) injection 0 5 mg  0 5 mg Intravenous Q4H PRN    Or    HYDROmorphone (DILAUDID) injection 1 mg  1 mg Intravenous Q4H PRN    insulin lispro (HumaLOG) 100 units/mL subcutaneous injection 1-6 Units  1-6 Units Subcutaneous Q6H [facility]    levothyroxine tablet 75 mcg  75 mcg Oral Early Morning    lidocaine (LIDODERM) 5 % patch 2 patch  2 patch Topical Daily    multi-electrolyte (PLASMALYTE-A/ISOLYTE-S PH 7 4) IV solution  75 mL/hr Intravenous Continuous    ondansetron (ZOFRAN) injection 4 mg  4 mg Intravenous Q6H PRN    potassium chloride oral solution 40 mEq  40 mEq Oral Once                 Lab Results and Cultures:   CBC with diff:   Lab Results   Component Value Date    WBC 12 36 (H) 02/06/2021    HGB 8 5 (L) 02/06/2021    HCT 26 7 (L) 02/06/2021    MCV 88 02/06/2021     (H) 02/06/2021    MCH 27 9 02/06/2021    MCHC 31 8 02/06/2021    RDW 16 8 (H) 02/06/2021    MPV 10 2 02/06/2021    NRBC 0 02/06/2021       BMP/CMP:  Lab Results   Component Value Date    K 3 0 (L) 02/06/2021     02/06/2021    CO2 27 02/06/2021    CO2 22 01/28/2021    BUN 7 02/06/2021    CREATININE 0 62 02/06/2021    GLUCOSE 157 (H) 01/28/2021    CALCIUM 7 6 (L) 02/06/2021    AST 14 02/05/2021    ALT 10 (L) 02/05/2021    ALKPHOS 63 02/05/2021    EGFR 107 02/06/2021       Lipid Panel:   No results found for: CHOL    Coags:   Lab Results   Component Value Date    PTT 29 01/26/2021    INR 1 34 (H) 01/26/2021        Urinalysis:   Lab Results   Component Value Date    COLORU Yellow 01/26/2021    CLARITYU Clear 01/26/2021    SPECGRAV 1 020 01/26/2021    PHUR 6 0 01/26/2021    LEUKOCYTESUR Negative 01/26/2021    NITRITE Negative 01/26/2021    GLUCOSEU Negative 01/26/2021    KETONESU Trace (A) 01/26/2021    BILIRUBINUR Negative 01/26/2021    BLOODU Small (A) 01/26/2021        Urine Culture: No results found for: URINECX   Wound Culure: No results found for: WOUNDCULT  Blood Culture:   Lab Results   Component Value Date    BLOODCX No Growth After 5 Days  01/26/2021         Physical Exam:  General Appearance:    Alert and orientated x 3, cooperative, no distress, appears stated age   Lungs:     Clear to auscultation bilaterally, respirations unlabored, no wheezes    Heart:    Regular rate and rhythm, S1 and S2 normal, no murmur   Abdomen:    Normoactive BS, soft, non tender, non rigid, no masses, no palpated organomegaly  Wound/Dressing:  Colostomy with liquid stool with some coffee-ground like material, midline incision with granulation tissue at wound bed, 10% slough at base, surrounding erythema likely from continued moisture  Will remove staples today    Left incision from previous colostomy site healing well, minimal drainage, will incorporate this in wound VAC  Extremities:  Extremities normal, no calf tenderness, no cyanosis or edema   Pulses:   2+ and symmetric all extremities   Skin:   Skin color, texture, turgor normal, no rashes   Neurologic:   CNII-XII intact, normal strength, affect appropriate       Imaging:  Xr Chest Portable Icu    Result Date: 1/27/2021  Impression: No acute cardiopulmonary disease  ET tube 4 cm above the shreyas  Workstation performed: [id number]     Ct Abdomen Pelvis With Contrast    Result Date: 1/26/2021  Impression: 1  Mild pancolonic wall thickening with minimal pericolonic fat stranding extending from the cecum to sigmoid colon  Findings are suggestive of infectious/inflammatory or less likely ischemic colitis  2   Moderate volume pneumoperitoneum, suggestive of perforated viscus  However, the site of perforation is not definitively identified  Urgent surgical consultation is recommended    I personally discussed this study with [facility] on 1/26/2021 at 4:30 PM  Workstation performed: [id number]       VTE Pharmacologic Prophylaxis: Heparin  VTE Mechanical Prophylaxis: sequential compression device    Michael Figueroa PA-C   2/6/2021

## 2021-02-06 NOTE — PLAN OF CARE
Problem: Potential for Falls  Goal: Patient will remain free of falls  Description: INTERVENTIONS:  - Assess patient frequently for physical needs  -  Identify cognitive and physical deficits and behaviors that affect risk of falls  -  [place] fall precautions as indicated by assessment   - Educate patient/family on patient safety including physical limitations  - Instruct patient to call for assistance with activity based on assessment  - Modify environment to reduce risk of injury  - Consider OT/PT consult to assist with strengthening/mobility  Outcome: Progressing     Problem: Prexisting or High Potential for Compromised Skin Integrity  Goal: Skin integrity is maintained or improved  Description: INTERVENTIONS:  - Identify patients at risk for skin breakdown  - Assess and monitor skin integrity  - Assess and monitor nutrition and hydration status  - Monitor labs   - Assess for incontinence   - Turn and reposition patient  - Assist with mobility/ambulation  - Relieve pressure over bony prominences  - Avoid friction and shearing  - Provide appropriate hygiene as needed including keeping skin clean and dry  - Evaluate need for skin moisturizer/barrier cream  - Collaborate with interdisciplinary team   - Patient/family teaching  - Consider wound care consult   Outcome: Progressing     Problem: Nutrition/Hydration-ADULT  Goal: Nutrient/Hydration intake appropriate for improving, restoring or maintaining nutritional needs  Description: Monitor and assess patient's nutrition/hydration status for malnutrition  Collaborate with interdisciplinary team and initiate plan and interventions as ordered  Monitor patient's weight and dietary intake as ordered or per policy  Utilize nutrition screening tool and intervene as necessary  Determine patient's food preferences and provide high-protein, high-caloric foods as appropriate       INTERVENTIONS:  - Monitor oral intake, urinary output, labs, and treatment plans  - Assess nutrition and hydration status and recommend course of action  - Evaluate amount of meals eaten  - Assist patient with eating if necessary   - Allow adequate time for meals  - Recommend/ encourage appropriate diets, oral nutritional supplements, and vitamin/mineral supplements  - Order, calculate, and assess calorie counts as needed  - Recommend, monitor, and adjust tube feedings or PN based on assessed needs  - Assess need for intravenous fluids  - Provide nutrition/hydration education as appropriate  - Include patient/family/caregiver in decisions related to nutrition  Outcome: Progressing     Problem: DISCHARGE PLANNING - CARE MANAGEMENT  Goal: Discharge to post-acute care or home with appropriate resources  Description: INTERVENTIONS:  - Conduct assessment to determine patient/family and health care team treatment goals, and need for post-acute services based on payer coverage, community resources, and patient preferences, and barriers to discharge  - Address psychosocial, clinical, and financial barriers to discharge as identified in assessment in conjunction with the patient/family and health care team  - Arrange appropriate level of post-acute services according to patients   needs and preference and payer coverage in collaboration with the physician and health care team  - Communicate with and update the patient/family, physician, and health care team regarding progress on the discharge plan  - Arrange appropriate transportation to post-acute venues  Outcome: Progressing     Problem: PAIN - ADULT  Goal: Verbalizes/displays adequate comfort level or baseline comfort level  Description: Interventions:  - Encourage patient to monitor pain and request assistance  - Assess pain using appropriate pain scale  - Administer analgesics based on type and severity of pain and evaluate response  - Implement non-pharmacological measures as appropriate and evaluate response  - Consider cultural and social influences on pain and pain management  - Notify physician/advanced practitioner if interventions unsuccessful or patient reports new pain  Outcome: Progressing     Problem: INFECTION - ADULT  Goal: Absence or prevention of progression during hospitalization  Description: INTERVENTIONS:  - Assess and monitor for signs and symptoms of infection  - Monitor lab/diagnostic results  - Monitor all insertion sites, i e  indwelling lines, tubes, and drains  - Monitor endotracheal if appropriate and nasal secretions for changes in amount and color  - Kelly appropriate cooling/warming therapies per order  - Administer medications as ordered  - Instruct and encourage patient and family to use good hand hygiene technique  - Identify and instruct in appropriate isolation precautions for identified infection/condition  Outcome: Progressing     Problem: SAFETY ADULT  Goal: Patient will remain free of falls  Description: INTERVENTIONS:  - Assess patient frequently for physical needs  -  Identify cognitive and physical deficits and behaviors that affect risk of falls    -  [place] fall precautions as indicated by assessment   - Educate patient/family on patient safety including physical limitations  - Instruct patient to call for assistance with activity based on assessment  - Modify environment to reduce risk of injury  - Consider OT/PT consult to assist with strengthening/mobility  Outcome: Progressing  Goal: Maintain or return to baseline ADL function  Description: INTERVENTIONS:  -  Assess patient's ability to carry out ADLs; assess patient's baseline for ADL function and identify physical deficits which impact ability to perform ADLs (bathing, care of mouth/teeth, toileting, grooming, dressing, etc )  - Assess/evaluate cause of self-care deficits   - Assess range of motion  - Assess patient's mobility; develop plan if impaired  - Assess patient's need for assistive devices and provide as appropriate  - Encourage maximum independence but intervene and supervise when necessary  - Involve family in performance of ADLs  - Assess for home care needs following discharge   - Consider OT consult to assist with ADL evaluation and planning for discharge  - Provide patient education as appropriate  Outcome: Progressing  Goal: Maintain or return mobility status to optimal level  Description: INTERVENTIONS:  - Assess patient's baseline mobility status (ambulation, transfers, stairs, etc )    - Identify cognitive and physical deficits and behaviors that affect mobility  - Identify mobility aids required to assist with transfers and/or ambulation (gait belt, sit-to-stand, lift, walker, cane, etc )  - [place] fall precautions as indicated by assessment  - Record patient progress and toleration of activity level on Mobility SBAR; progress patient to next Phase/Stage  - Instruct patient to call for assistance with activity based on assessment  - Consider rehabilitation consult to assist with strengthening/weightbearing, etc   Outcome: Progressing     Problem: DISCHARGE PLANNING  Goal: Discharge to home or other facility with appropriate resources  Description: INTERVENTIONS:  - Identify barriers to discharge w/patient and caregiver  - Arrange for needed discharge resources and transportation as appropriate  - Identify discharge learning needs (meds, wound care, etc )  - Arrange for interpretive services to assist at discharge as needed  - Refer to Case Management Department for coordinating discharge planning if the patient needs post-hospital services based on physician/advanced practitioner order or complex needs related to functional status, cognitive ability, or social support system  Outcome: Progressing     Problem: Knowledge Deficit  Goal: Patient/family/caregiver demonstrates understanding of disease process, treatment plan, medications, and discharge instructions  Description: Complete learning assessment and assess knowledge base    Interventions:  - Provide teaching at level of understanding  - Provide teaching via preferred learning methods  Outcome: Progressing     Problem: RESPIRATORY - ADULT  Goal: Achieves optimal ventilation and oxygenation  Description: INTERVENTIONS:  - Assess for changes in respiratory status  - Assess for changes in mentation and behavior  - Position to facilitate oxygenation and minimize respiratory effort  - Oxygen administered by appropriate delivery if ordered  - Initiate smoking cessation education as indicated  - Encourage broncho-pulmonary hygiene including cough, deep breathe, Incentive Spirometry  - Assess the need for suctioning and aspirate as needed  - Assess and instruct to report SOB or any respiratory difficulty  - Respiratory Therapy support as indicated  Outcome: Progressing     Problem: GASTROINTESTINAL - ADULT  Goal: Minimal or absence of nausea and/or vomiting  Description: INTERVENTIONS:  - Administer IV fluids if ordered to ensure adequate hydration  - Maintain NPO status until nausea and vomiting are resolved  - Nasogastric tube if ordered  - Administer ordered antiemetic medications as needed  - Provide nonpharmacologic comfort measures as appropriate  - Advance diet as tolerated, if ordered  - Consider nutrition services referral to assist patient with adequate nutrition and appropriate food choices  Outcome: Progressing  Goal: Maintains or returns to baseline bowel function  Description: INTERVENTIONS:  - Assess bowel function  - Encourage oral fluids to ensure adequate hydration  - Administer IV fluids if ordered to ensure adequate hydration  - Administer ordered medications as needed  - Encourage mobilization and activity  - Consider nutritional services referral to assist patient with adequate nutrition and appropriate food choices  Outcome: Progressing  Goal: Maintains adequate nutritional intake  Description: INTERVENTIONS:  - Monitor percentage of each meal consumed  - Identify factors contributing to decreased intake, treat as appropriate  - Assist with meals as needed  - Monitor I&O, weight, and lab values if indicated  - Obtain nutrition services referral as needed  Outcome: Progressing     Problem: GENITOURINARY - ADULT  Goal: Maintains or returns to baseline urinary function  Description: INTERVENTIONS:  - Assess urinary function  - Encourage oral fluids to ensure adequate hydration if ordered  - Administer IV fluids as ordered to ensure adequate hydration  - Administer ordered medications as needed  - Offer frequent toileting  - Follow urinary retention protocol if ordered  Outcome: Progressing  Goal: Absence of urinary retention  Description: INTERVENTIONS:  - Assess patients ability to void and empty bladder  - Monitor I/O  - Bladder scan as needed  - Discuss with physician/AP medications to alleviate retention as needed  - Discuss catheterization for long term situations as appropriate  Outcome: Progressing  Goal: Urinary catheter remains patent  Description: INTERVENTIONS:  - Assess patency of urinary catheter  - If patient has a chronic fernandez, consider changing catheter if non-functioning  - Follow guidelines for intermittent irrigation of non-functioning urinary catheter  Outcome: Progressing     Problem: METABOLIC, FLUID AND ELECTROLYTES - ADULT  Goal: Electrolytes maintained within normal limits  Description: INTERVENTIONS:  - Monitor labs and assess patient for signs and symptoms of electrolyte imbalances  - Administer electrolyte replacement as ordered  - Monitor response to electrolyte replacements, including repeat lab results as appropriate  - Instruct patient on fluid and nutrition as appropriate  Outcome: Progressing  Goal: Fluid balance maintained  Description: INTERVENTIONS:  - Monitor labs   - Monitor I/O and WT  - Instruct patient on fluid and nutrition as appropriate  - Assess for signs & symptoms of volume excess or deficit  Outcome: Progressing  Goal: Glucose maintained within target range  Description: INTERVENTIONS:  - Monitor Blood Glucose as ordered  - Assess for signs and symptoms of hyperglycemia and hypoglycemia  - Administer ordered medications to maintain glucose within target range  - Assess nutritional intake and initiate nutrition service referral as needed  Outcome: Progressing

## 2021-02-07 LAB
ANION GAP SERPL CALCULATED.3IONS-SCNC: 9 MMOL/L (ref 4–13)
BUN SERPL-MCNC: 5 MG/DL (ref 5–25)
CALCIUM SERPL-MCNC: 8 MG/DL (ref 8.3–10.1)
CHLORIDE SERPL-SCNC: 104 MMOL/L (ref 100–108)
CO2 SERPL-SCNC: 27 MMOL/L (ref 21–32)
CREAT SERPL-MCNC: 0.66 MG/DL (ref 0.6–1.3)
ERYTHROCYTE [DISTWIDTH] IN BLOOD BY AUTOMATED COUNT: 17.4 % (ref 11.6–15.1)
GFR SERPL CREATININE-BSD FRML MDRD: 105 ML/MIN/1.73SQ M
GLUCOSE SERPL-MCNC: 114 MG/DL (ref 65–140)
GLUCOSE SERPL-MCNC: 91 MG/DL (ref 65–140)
GLUCOSE SERPL-MCNC: 98 MG/DL (ref 65–140)
HCT VFR BLD AUTO: 29.6 % (ref 34.8–46.1)
HGB BLD-MCNC: 9.2 G/DL (ref 11.5–15.4)
MAGNESIUM SERPL-MCNC: 1.7 MG/DL (ref 1.6–2.6)
MCH RBC QN AUTO: 27.1 PG (ref 26.8–34.3)
MCHC RBC AUTO-ENTMCNC: 31.1 G/DL (ref 31.4–37.4)
MCV RBC AUTO: 87 FL (ref 82–98)
NRBC BLD AUTO-RTO: 0 /100 WBCS
PHOSPHATE SERPL-MCNC: 1.8 MG/DL (ref 2.7–4.5)
PLATELET # BLD AUTO: 642 THOUSANDS/UL (ref 149–390)
PMV BLD AUTO: 9.9 FL (ref 8.9–12.7)
POTASSIUM SERPL-SCNC: 3.2 MMOL/L (ref 3.5–5.3)
RBC # BLD AUTO: 3.39 MILLION/UL (ref 3.81–5.12)
SODIUM SERPL-SCNC: 140 MMOL/L (ref 136–145)
WBC # BLD AUTO: 12.19 THOUSAND/UL (ref 4.31–10.16)

## 2021-02-07 PROCEDURE — 84100 ASSAY OF PHOSPHORUS: CPT | Performed by: INTERNAL MEDICINE

## 2021-02-07 PROCEDURE — 99233 SBSQ HOSP IP/OBS HIGH 50: CPT | Performed by: INTERNAL MEDICINE

## 2021-02-07 PROCEDURE — 80048 BASIC METABOLIC PNL TOTAL CA: CPT | Performed by: INTERNAL MEDICINE

## 2021-02-07 PROCEDURE — 82948 REAGENT STRIP/BLOOD GLUCOSE: CPT

## 2021-02-07 PROCEDURE — 99024 POSTOP FOLLOW-UP VISIT: CPT | Performed by: PHYSICIAN ASSISTANT

## 2021-02-07 PROCEDURE — 85027 COMPLETE CBC AUTOMATED: CPT | Performed by: INTERNAL MEDICINE

## 2021-02-07 PROCEDURE — 83735 ASSAY OF MAGNESIUM: CPT | Performed by: INTERNAL MEDICINE

## 2021-02-07 RX ORDER — POTASSIUM CHLORIDE 20MEQ/15ML
40 LIQUID (ML) ORAL ONCE
Status: DISCONTINUED | OUTPATIENT
Start: 2021-02-07 | End: 2021-02-07

## 2021-02-07 RX ORDER — POTASSIUM CHLORIDE 29.8 MG/ML
40 INJECTION INTRAVENOUS ONCE
Status: COMPLETED | OUTPATIENT
Start: 2021-02-07 | End: 2021-02-07

## 2021-02-07 RX ORDER — MAGNESIUM SULFATE HEPTAHYDRATE 40 MG/ML
2 INJECTION, SOLUTION INTRAVENOUS ONCE
Status: COMPLETED | OUTPATIENT
Start: 2021-02-07 | End: 2021-02-07

## 2021-02-07 RX ADMIN — HEPARIN SODIUM 5000 UNITS: 5000 INJECTION INTRAVENOUS; SUBCUTANEOUS at 05:24

## 2021-02-07 RX ADMIN — HEPARIN SODIUM 5000 UNITS: 5000 INJECTION INTRAVENOUS; SUBCUTANEOUS at 15:19

## 2021-02-07 RX ADMIN — ACETAMINOPHEN 975 MG: 325 TABLET, FILM COATED ORAL at 15:18

## 2021-02-07 RX ADMIN — MAGNESIUM GLUCONATE 500 MG ORAL TABLET 400 MG: 500 TABLET ORAL at 17:09

## 2021-02-07 RX ADMIN — POTASSIUM CHLORIDE 40 MEQ: 400 INJECTION, SOLUTION INTRAVENOUS at 12:51

## 2021-02-07 RX ADMIN — LIDOCAINE 5% 2 PATCH: 700 PATCH TOPICAL at 09:01

## 2021-02-07 RX ADMIN — Medication 1 TABLET: at 17:09

## 2021-02-07 RX ADMIN — SODIUM CHLORIDE, SODIUM GLUCONATE, SODIUM ACETATE, POTASSIUM CHLORIDE AND MAGNESIUM CHLORIDE 75 ML/HR: 526; 502; 368; 37; 30 INJECTION, SOLUTION INTRAVENOUS at 01:19

## 2021-02-07 RX ADMIN — SODIUM CHLORIDE, SODIUM GLUCONATE, SODIUM ACETATE, POTASSIUM CHLORIDE AND MAGNESIUM CHLORIDE 75 ML/HR: 526; 502; 368; 37; 30 INJECTION, SOLUTION INTRAVENOUS at 15:18

## 2021-02-07 RX ADMIN — HEPARIN SODIUM 5000 UNITS: 5000 INJECTION INTRAVENOUS; SUBCUTANEOUS at 21:34

## 2021-02-07 RX ADMIN — MAGNESIUM GLUCONATE 500 MG ORAL TABLET 400 MG: 500 TABLET ORAL at 12:14

## 2021-02-07 RX ADMIN — ACETAMINOPHEN 975 MG: 325 TABLET, FILM COATED ORAL at 05:24

## 2021-02-07 RX ADMIN — LEVOTHYROXINE SODIUM 75 MCG: 75 TABLET ORAL at 05:24

## 2021-02-07 RX ADMIN — FLUCONAZOLE 200 MG: 100 TABLET ORAL at 09:01

## 2021-02-07 RX ADMIN — Medication 1 TABLET: at 12:14

## 2021-02-07 RX ADMIN — FLUTICASONE FUROATE AND VILANTEROL TRIFENATATE 1 PUFF: 100; 25 POWDER RESPIRATORY (INHALATION) at 09:01

## 2021-02-07 RX ADMIN — CALCIUM 1 TABLET: 500 TABLET ORAL at 17:09

## 2021-02-07 RX ADMIN — MAGNESIUM SULFATE HEPTAHYDRATE 2 G: 40 INJECTION, SOLUTION INTRAVENOUS at 11:03

## 2021-02-07 RX ADMIN — ACETAMINOPHEN 975 MG: 325 TABLET, FILM COATED ORAL at 21:34

## 2021-02-07 NOTE — PLAN OF CARE
Problem: Potential for Falls  Goal: Patient will remain free of falls  Description: INTERVENTIONS:  - Assess patient frequently for physical needs  -  Identify cognitive and physical deficits and behaviors that affect risk of falls  -  [place] fall precautions as indicated by assessment   - Educate patient/family on patient safety including physical limitations  - Instruct patient to call for assistance with activity based on assessment  - Modify environment to reduce risk of injury  - Consider OT/PT consult to assist with strengthening/mobility  Outcome: Progressing     Problem: Prexisting or High Potential for Compromised Skin Integrity  Goal: Skin integrity is maintained or improved  Description: INTERVENTIONS:  - Identify patients at risk for skin breakdown  - Assess and monitor skin integrity  - Assess and monitor nutrition and hydration status  - Monitor labs   - Assess for incontinence   - Turn and reposition patient  - Assist with mobility/ambulation  - Relieve pressure over bony prominences  - Avoid friction and shearing  - Provide appropriate hygiene as needed including keeping skin clean and dry  - Evaluate need for skin moisturizer/barrier cream  - Collaborate with interdisciplinary team   - Patient/family teaching  - Consider wound care consult   Outcome: Progressing     Problem: Nutrition/Hydration-ADULT  Goal: Nutrient/Hydration intake appropriate for improving, restoring or maintaining nutritional needs  Description: Monitor and assess patient's nutrition/hydration status for malnutrition  Collaborate with interdisciplinary team and initiate plan and interventions as ordered  Monitor patient's weight and dietary intake as ordered or per policy  Utilize nutrition screening tool and intervene as necessary  Determine patient's food preferences and provide high-protein, high-caloric foods as appropriate       INTERVENTIONS:  - Monitor oral intake, urinary output, labs, and treatment plans  - Assess nutrition and hydration status and recommend course of action  - Evaluate amount of meals eaten  - Assist patient with eating if necessary   - Allow adequate time for meals  - Recommend/ encourage appropriate diets, oral nutritional supplements, and vitamin/mineral supplements  - Order, calculate, and assess calorie counts as needed  - Recommend, monitor, and adjust tube feedings or PN based on assessed needs  - Assess need for intravenous fluids  - Provide nutrition/hydration education as appropriate  - Include patient/family/caregiver in decisions related to nutrition  Outcome: Progressing     Problem: DISCHARGE PLANNING - CARE MANAGEMENT  Goal: Discharge to post-acute care or home with appropriate resources  Description: INTERVENTIONS:  - Conduct assessment to determine patient/family and health care team treatment goals, and need for post-acute services based on payer coverage, community resources, and patient preferences, and barriers to discharge  - Address psychosocial, clinical, and financial barriers to discharge as identified in assessment in conjunction with the patient/family and health care team  - Arrange appropriate level of post-acute services according to patients   needs and preference and payer coverage in collaboration with the physician and health care team  - Communicate with and update the patient/family, physician, and health care team regarding progress on the discharge plan  - Arrange appropriate transportation to post-acute venues  Outcome: Progressing     Problem: PAIN - ADULT  Goal: Verbalizes/displays adequate comfort level or baseline comfort level  Description: Interventions:  - Encourage patient to monitor pain and request assistance  - Assess pain using appropriate pain scale  - Administer analgesics based on type and severity of pain and evaluate response  - Implement non-pharmacological measures as appropriate and evaluate response  - Consider cultural and social influences on pain and pain management  - Notify physician/advanced practitioner if interventions unsuccessful or patient reports new pain  Outcome: Progressing     Problem: INFECTION - ADULT  Goal: Absence or prevention of progression during hospitalization  Description: INTERVENTIONS:  - Assess and monitor for signs and symptoms of infection  - Monitor lab/diagnostic results  - Monitor all insertion sites, i e  indwelling lines, tubes, and drains  - Monitor endotracheal if appropriate and nasal secretions for changes in amount and color  - [place] appropriate cooling/warming therapies per order  - Administer medications as ordered  - Instruct and encourage patient and family to use good hand hygiene technique  - Identify and instruct in appropriate isolation precautions for identified infection/condition  Outcome: Progressing     Problem: SAFETY ADULT  Goal: Patient will remain free of falls  Description: INTERVENTIONS:  - Assess patient frequently for physical needs  -  Identify cognitive and physical deficits and behaviors that affect risk of falls    -  [place] fall precautions as indicated by assessment   - Educate patient/family on patient safety including physical limitations  - Instruct patient to call for assistance with activity based on assessment  - Modify environment to reduce risk of injury  - Consider OT/PT consult to assist with strengthening/mobility  Outcome: Progressing  Goal: Maintain or return to baseline ADL function  Description: INTERVENTIONS:  -  Assess patient's ability to carry out ADLs; assess patient's baseline for ADL function and identify physical deficits which impact ability to perform ADLs (bathing, care of mouth/teeth, toileting, grooming, dressing, etc )  - Assess/evaluate cause of self-care deficits   - Assess range of motion  - Assess patient's mobility; develop plan if impaired  - Assess patient's need for assistive devices and provide as appropriate  - Encourage maximum independence but intervene and supervise when necessary  - Involve family in performance of ADLs  - Assess for home care needs following discharge   - Consider OT consult to assist with ADL evaluation and planning for discharge  - Provide patient education as appropriate  Outcome: Progressing  Goal: Maintain or return mobility status to optimal level  Description: INTERVENTIONS:  - Assess patient's baseline mobility status (ambulation, transfers, stairs, etc )    - Identify cognitive and physical deficits and behaviors that affect mobility  - Identify mobility aids required to assist with transfers and/or ambulation (gait belt, sit-to-stand, lift, walker, cane, etc )  - [place] fall precautions as indicated by assessment  - Record patient progress and toleration of activity level on Mobility SBAR; progress patient to next Phase/Stage  - Instruct patient to call for assistance with activity based on assessment  - Consider rehabilitation consult to assist with strengthening/weightbearing, etc   Outcome: Progressing     Problem: DISCHARGE PLANNING  Goal: Discharge to home or other facility with appropriate resources  Description: INTERVENTIONS:  - Identify barriers to discharge w/patient and caregiver  - Arrange for needed discharge resources and transportation as appropriate  - Identify discharge learning needs (meds, wound care, etc )  - Arrange for interpretive services to assist at discharge as needed  - Refer to Case Management Department for coordinating discharge planning if the patient needs post-hospital services based on physician/advanced practitioner order or complex needs related to functional status, cognitive ability, or social support system  Outcome: Progressing     Problem: Knowledge Deficit  Goal: Patient/family/caregiver demonstrates understanding of disease process, treatment plan, medications, and discharge instructions  Description: Complete learning assessment and assess knowledge base    Interventions:  - Provide teaching at level of understanding  - Provide teaching via preferred learning methods  Outcome: Progressing     Problem: RESPIRATORY - ADULT  Goal: Achieves optimal ventilation and oxygenation  Description: INTERVENTIONS:  - Assess for changes in respiratory status  - Assess for changes in mentation and behavior  - Position to facilitate oxygenation and minimize respiratory effort  - Oxygen administered by appropriate delivery if ordered  - Initiate smoking cessation education as indicated  - Encourage broncho-pulmonary hygiene including cough, deep breathe, Incentive Spirometry  - Assess the need for suctioning and aspirate as needed  - Assess and instruct to report SOB or any respiratory difficulty  - Respiratory Therapy support as indicated  Outcome: Progressing     Problem: GASTROINTESTINAL - ADULT  Goal: Minimal or absence of nausea and/or vomiting  Description: INTERVENTIONS:  - Administer IV fluids if ordered to ensure adequate hydration  - Maintain NPO status until nausea and vomiting are resolved  - Nasogastric tube if ordered  - Administer ordered antiemetic medications as needed  - Provide nonpharmacologic comfort measures as appropriate  - Advance diet as tolerated, if ordered  - Consider nutrition services referral to assist patient with adequate nutrition and appropriate food choices  Outcome: Progressing  Goal: Maintains or returns to baseline bowel function  Description: INTERVENTIONS:  - Assess bowel function  - Encourage oral fluids to ensure adequate hydration  - Administer IV fluids if ordered to ensure adequate hydration  - Administer ordered medications as needed  - Encourage mobilization and activity  - Consider nutritional services referral to assist patient with adequate nutrition and appropriate food choices  Outcome: Progressing  Goal: Maintains adequate nutritional intake  Description: INTERVENTIONS:  - Monitor percentage of each meal consumed  - Identify factors contributing to decreased intake, treat as appropriate  - Assist with meals as needed  - Monitor I&O, weight, and lab values if indicated  - Obtain nutrition services referral as needed  Outcome: Progressing     Problem: GENITOURINARY - ADULT  Goal: Maintains or returns to baseline urinary function  Description: INTERVENTIONS:  - Assess urinary function  - Encourage oral fluids to ensure adequate hydration if ordered  - Administer IV fluids as ordered to ensure adequate hydration  - Administer ordered medications as needed  - Offer frequent toileting  - Follow urinary retention protocol if ordered  Outcome: Progressing  Goal: Absence of urinary retention  Description: INTERVENTIONS:  - Assess patients ability to void and empty bladder  - Monitor I/O  - Bladder scan as needed  - Discuss with physician/AP medications to alleviate retention as needed  - Discuss catheterization for long term situations as appropriate  Outcome: Progressing  Goal: Urinary catheter remains patent  Description: INTERVENTIONS:  - Assess patency of urinary catheter  - If patient has a chronic fernandez, consider changing catheter if non-functioning  - Follow guidelines for intermittent irrigation of non-functioning urinary catheter  Outcome: Progressing     Problem: METABOLIC, FLUID AND ELECTROLYTES - ADULT  Goal: Electrolytes maintained within normal limits  Description: INTERVENTIONS:  - Monitor labs and assess patient for signs and symptoms of electrolyte imbalances  - Administer electrolyte replacement as ordered  - Monitor response to electrolyte replacements, including repeat lab results as appropriate  - Instruct patient on fluid and nutrition as appropriate  Outcome: Progressing  Goal: Fluid balance maintained  Description: INTERVENTIONS:  - Monitor labs   - Monitor I/O and WT  - Instruct patient on fluid and nutrition as appropriate  - Assess for signs & symptoms of volume excess or deficit  Outcome: Progressing  Goal: Glucose maintained within target range  Description: INTERVENTIONS:  - Monitor Blood Glucose as ordered  - Assess for signs and symptoms of hyperglycemia and hypoglycemia  - Administer ordered medications to maintain glucose within target range  - Assess nutritional intake and initiate nutrition service referral as needed  Outcome: Progressing

## 2021-02-07 NOTE — ASSESSMENT & PLAN NOTE
Patient has hypokalemia hypophosphatemia hypomagnesemia, this is likely due to poor p o  Intake post surgery and significant electrolyte depletion    Continue to replace electrolytes as needed, will replace potassium, phosphorus, magnesium

## 2021-02-07 NOTE — ASSESSMENT & PLAN NOTE
Likely acute renal failure secondary to ischemic ATN due to perforated viscus  Creatinine now stable around 0 7  Okay to Continue IVF while not able to tolerate significant PO intake   Currently on 75cc/h    Monitor kidney function

## 2021-02-07 NOTE — ASSESSMENT & PLAN NOTE
· Status post Ex lap/sigmoid, descending colon & transverse colon resection status post colostomy 01/26/2021  · Postsurgical management as per surgery  · Currently on surgical diet full liquids  · Diet management by surgical team   · On p r n  Dilaudid/Zofran  · Was on IV cefepime and Flagyl, now off antibiotics  Okay to monitor off antibiotics as there are no signs of sepsis at this point    There is some concern of inflammatory bowel disease given biopsy findings, GI following

## 2021-02-07 NOTE — PROCEDURES
ARIANA ATKINSON  Change Note    Date: 2/6/2021    Location of wound:  Midline abdominal wound  Site of previous exploratory laparotomy surgery  Also incorporated is incision left open for secondary intention at left abdomen, attempted colostomy site    Dimensions of wound:  18 cm x 4 cm x 4 cm    Description of wound:  Surgical wound  Cruz removed  Base of wound with slough  Granulation tissue at subcutaneous fat  Serous drainage  Minimal slough at wound base distal portion  Colostomy at proximal portion of wound  4 cm open abdominal wound a previous colostomy site and left mid abdomen also incorporated into wound VAC dressing  This is the initial application  Average was necessary for this wound VAC application  Adhesive was placed underneath the bridge to protect skin  Sponges placed:  4 Black Sponges at midline wound  One black sponge into left mid abdominal wound  One black sponge for bridge between wounds    0 White Sponges    VAC settings:  125 mmHg  Continuous    Demetri Tena PA-C   2/6/2021

## 2021-02-07 NOTE — PROGRESS NOTES
Progress Note - Bryn Guaman 1970, 52 y o  female MRN: [id number]    Unit/Bed#: -01 Encounter: [id number]    Primary Care Provider: Bridget Boo MD   Date and time admitted to hospital: 1/26/2021  2:12 PM        Perforation of sigmoid colon due to diverticulitis  Assessment & Plan  · Status post Ex lap/sigmoid, descending colon & transverse colon resection status post colostomy 01/26/2021  · Postsurgical management as per surgery  · Currently on surgical diet full liquids  · Diet management by surgical team   · On p r n  Dilaudid/Zofran  · Was on IV cefepime and Flagyl, now off antibiotics  Okay to monitor off antibiotics as there are no signs of sepsis at this point    There is some concern of inflammatory bowel disease given biopsy findings, GI following  * Pneumoperitoneum  Assessment & Plan  Secondary to perforation of sigmoid colon  See plan under perforation of sigmoid colon  Mild persistent asthma  Assessment & Plan  Currently stable  Continue with p r n  Albuterol/albuterol inhaler    Electrolyte abnormality  Assessment & Plan  Patient has hypokalemia hypophosphatemia hypomagnesemia, this is likely due to poor p o  Intake post surgery and significant electrolyte depletion  Continue to replace electrolytes as needed, will replace potassium, phosphorus, magnesium    Acute renal failure (ARF) (HCC)  Assessment & Plan  Likely acute renal failure secondary to ischemic ATN due to perforated viscus  Creatinine now stable around 0 7  Okay to Continue IVF while not able to tolerate significant PO intake  Currently on 75cc/h    Monitor kidney function    Hypothyroid  Assessment & Plan  Levothyroxine has been resumed, continue      VTE Pharmacologic Prophylaxis:   Pharmacologic: Heparin  Mechanical VTE Prophylaxis in Place: Yes    Patient Centered Rounds: I have performed bedside rounds with nursing staff today      Education and Discussions with Family / Patient:  Discussed with patient herself she is competent alert and oriented    Time Spent for Care: 30 minutes  More than 50% of total time spent on counseling and coordination of care as described above  Current Length of Stay: 12 day(s)    Current Patient Status: Inpatient   Certification Statement: The patient will continue to require additional inpatient hospital stay due to Need to establish appropriate p o  Intake    Discharge Plan:  Once stable    Code Status: Level 1 - Full Code      Subjective:     Patient evaluated this morning  She feels overall well, no significant abdominal pain  Blood work reviewed, some electrolyte abnormalities persistently hypophosphatemia, hypokalemia  No other events reported    Objective:     Vitals:   Temp (24hrs), Av 8 °F (36 6 °C), Min:97 5 °F (36 4 °C), Max:98 1 °F (36 7 °C)    Temp:  [97 5 °F (36 4 °C)-98 1 °F (36 7 °C)] 97 9 °F (36 6 °C)  HR:  [84-88] 88  Resp:  [16-18] 18  BP: (143-145)/(85-98) 145/88  SpO2:  [98 %-99 %] 98 %  Body mass index is 40 79 kg/m²  Input and Output Summary (last 24 hours): Intake/Output Summary (Last 24 hours) at 2021 1122  Last data filed at 2021 1100  Gross per 24 hour   Intake 480 ml   Output 1345 ml   Net -865 ml       Physical Exam:     Physical Exam  Vitals signs and nursing note reviewed  Constitutional:       Appearance: Normal appearance  She is normal weight  Comments: Middle-age female in bed, awake, alert   HENT:      Head: Normocephalic and atraumatic  Right Ear: External ear normal       Left Ear: External ear normal       Nose: Nose normal  No congestion  Mouth/Throat:      Mouth: Mucous membranes are moist       Pharynx: Oropharynx is clear  No oropharyngeal exudate or posterior oropharyngeal erythema  Eyes:      General: No scleral icterus  Right eye: No discharge  Left eye: No discharge  Extraocular Movements: Extraocular movements intact        Conjunctiva/sclera: Conjunctivae normal  Pupils: Pupils are equal, round, and reactive to light  Neck:      Musculoskeletal: Normal range of motion and neck supple  No neck rigidity or muscular tenderness  Cardiovascular:      Rate and Rhythm: Normal rate and regular rhythm  Pulses: Normal pulses  Heart sounds: Normal heart sounds  No murmur  No friction rub  No gallop  Pulmonary:      Effort: Pulmonary effort is normal  No respiratory distress  Breath sounds: Normal breath sounds  No stridor  No wheezing, rhonchi or rales  Chest:      Chest wall: No tenderness  Abdominal:      General: Abdomen is flat  Bowel sounds are normal  There is no distension  Palpations: Abdomen is soft  There is no mass  Tenderness: There is no abdominal tenderness  There is no guarding or rebound  Comments: Status post laparotomy, there is a colostomy bag in place  There is also a drain in place as well as wound VAC   Musculoskeletal: Normal range of motion  General: No swelling, tenderness, deformity or signs of injury  Skin:     General: Skin is warm and dry  Capillary Refill: Capillary refill takes less than 2 seconds  Coloration: Skin is not jaundiced or pale  Findings: No bruising, erythema, lesion or rash  Neurological:      General: No focal deficit present  Mental Status: She is alert and oriented to person, place, and time  Mental status is at baseline  Cranial Nerves: No cranial nerve deficit  Sensory: No sensory deficit  Motor: No weakness  Coordination: Coordination normal    Psychiatric:         Mood and Affect: Mood normal          Behavior: Behavior normal          Thought Content:  Thought content normal          Judgment: Judgment normal            Additional Data:     Labs:    Results from last 7 days   Lab Units 02/07/21  0901 02/06/21  0512 02/05/21  0610   WBC Thousand/uL 12 19* 12 36* 12 48*   HEMOGLOBIN g/dL 9 2* 8 5* 8 2*   HEMATOCRIT % 29 6* 26 7* 25 9* PLATELETS Thousands/uL 642* 629* 611*   BANDS PCT %  --  3  --    NEUTROS PCT %  --   --  82*   LYMPHS PCT %  --   --  10*   LYMPHO PCT %  --  9*  --    MONOS PCT %  --   --  5   MONO PCT %  --  4  --    EOS PCT %  --  2 1     Results from last 7 days   Lab Units 02/07/21  0901  02/05/21  0610   SODIUM mmol/L 140   < > 142   POTASSIUM mmol/L 3 2*   < > 2 6*   CHLORIDE mmol/L 104   < > 107   CO2 mmol/L 27   < > 27   BUN mg/dL 5   < > 7   CREATININE mg/dL 0 66   < > 0 60   ANION GAP mmol/L 9   < > 8   CALCIUM mg/dL 8 0*   < > 7 0*   ALBUMIN g/dL  --   --  1 7*   TOTAL BILIRUBIN mg/dL  --   --  0 20   ALK PHOS U/L  --   --  63   ALT U/L  --   --  10*   AST U/L  --   --  14   GLUCOSE RANDOM mg/dL 114   < > 108    < > = values in this interval not displayed  Results from last 7 days   Lab Units 02/07/21  0628 02/07/21  0018 02/06/21  1540 02/06/21  1109 02/06/21  0720 02/06/21  5338 02/05/21  2347 02/05/21  2120 02/05/21  1843 02/05/21  1259 02/05/21  0621 02/05/21  0000   POC GLUCOSE mg/dl 91 98 106 100 102 106 112 114 124 119 107 108                   * I Have Reviewed All Lab Data Listed Above  * Additional Pertinent Lab Tests Reviewed:  All [facility] Admission Reviewed      Recent Cultures (last 7 days):           Last 24 Hours Medication List:   Current Facility-Administered Medications   Medication Dose Route Frequency Provider Last Rate    acetaminophen  975 mg Oral Q8H [address] Alfred Thornton MD      albuterol  2 5 mg Nebulization Q6H PRN Vashti Lowery PA-C      calcium carbonate  1 tablet Oral Daily With [employer] JUAQUIN Genao      fluconazole  200 mg Oral Daily JUAQUIN Jackson      fluticasone-vilanterol  1 puff Inhalation Daily Grace Driscoll PA-C      heparin (porcine)  5,000 Units Subcutaneous Q8H [address] [place], [place]      HYDROmorphone  0 5 mg Intravenous Q4H PRN Alfred Thornton MD      Or   [facility] HYDROmorphone  1 mg Intravenous Q4H PRN Alfred Thornton MD     [facility] levothyroxine  75 mcg Oral Early Morning Marco Arguelles MD      lidocaine  2 patch Topical Daily Haylee Mccullough MD      magnesium oxide  400 mg Oral BID Velma Camilo PA-C      magnesium sulfate  2 g Intravenous Once Marco Arguelles MD      multi-electrolyte  75 mL/hr Intravenous Continuous Marco Arguelles MD 75 mL/hr (02/07/21 0119)    ondansetron  4 mg Intravenous Q6H PRN Catherine Garcia PA-C      potassium chloride  40 mEq Intravenous Once Marco Arguelles MD      potassium-sodium phosphateS  1 tablet Oral TID With Meals Marco Arguelles MD          Today, Patient Was Seen By: Marco Arguelles MD    ** Please Note: Dictation voice to text software may have been used in the creation of this document   **

## 2021-02-08 LAB
ALBUMIN SERPL BCP-MCNC: 1.8 G/DL (ref 3.5–5)
ALP SERPL-CCNC: 102 U/L (ref 46–116)
ALT SERPL W P-5'-P-CCNC: 12 U/L (ref 12–78)
ANION GAP SERPL CALCULATED.3IONS-SCNC: 7 MMOL/L (ref 4–13)
AST SERPL W P-5'-P-CCNC: 15 U/L (ref 5–45)
BILIRUB SERPL-MCNC: 0.3 MG/DL (ref 0.2–1)
BUN SERPL-MCNC: 5 MG/DL (ref 5–25)
CALCIUM ALBUM COR SERPL-MCNC: 9.6 MG/DL (ref 8.3–10.1)
CALCIUM SERPL-MCNC: 7.8 MG/DL (ref 8.3–10.1)
CHLORIDE SERPL-SCNC: 108 MMOL/L (ref 100–108)
CO2 SERPL-SCNC: 27 MMOL/L (ref 21–32)
CREAT SERPL-MCNC: 0.62 MG/DL (ref 0.6–1.3)
ERYTHROCYTE [DISTWIDTH] IN BLOOD BY AUTOMATED COUNT: 17.9 % (ref 11.6–15.1)
GFR SERPL CREATININE-BSD FRML MDRD: 107 ML/MIN/1.73SQ M
GLUCOSE SERPL-MCNC: 100 MG/DL (ref 65–140)
HCT VFR BLD AUTO: 25.3 % (ref 34.8–46.1)
HGB BLD-MCNC: 8 G/DL (ref 11.5–15.4)
MAGNESIUM SERPL-MCNC: 1.9 MG/DL (ref 1.6–2.6)
MCH RBC QN AUTO: 28 PG (ref 26.8–34.3)
MCHC RBC AUTO-ENTMCNC: 31.6 G/DL (ref 31.4–37.4)
MCV RBC AUTO: 89 FL (ref 82–98)
NRBC BLD AUTO-RTO: 0 /100 WBCS
PHOSPHATE SERPL-MCNC: 2.3 MG/DL (ref 2.7–4.5)
PLATELET # BLD AUTO: 511 THOUSANDS/UL (ref 149–390)
PMV BLD AUTO: 9.8 FL (ref 8.9–12.7)
POTASSIUM SERPL-SCNC: 3.5 MMOL/L (ref 3.5–5.3)
PROT SERPL-MCNC: 5.1 G/DL (ref 6.4–8.2)
RBC # BLD AUTO: 2.86 MILLION/UL (ref 3.81–5.12)
SODIUM SERPL-SCNC: 142 MMOL/L (ref 136–145)
WBC # BLD AUTO: 9.19 THOUSAND/UL (ref 4.31–10.16)

## 2021-02-08 PROCEDURE — 97530 THERAPEUTIC ACTIVITIES: CPT

## 2021-02-08 PROCEDURE — 97116 GAIT TRAINING THERAPY: CPT

## 2021-02-08 PROCEDURE — 84100 ASSAY OF PHOSPHORUS: CPT | Performed by: PHYSICIAN ASSISTANT

## 2021-02-08 PROCEDURE — 85027 COMPLETE CBC AUTOMATED: CPT | Performed by: PHYSICIAN ASSISTANT

## 2021-02-08 PROCEDURE — 80053 COMPREHEN METABOLIC PANEL: CPT | Performed by: PHYSICIAN ASSISTANT

## 2021-02-08 PROCEDURE — 97606 NEG PRS WND THER DME>50 SQCM: CPT | Performed by: SURGERY

## 2021-02-08 PROCEDURE — 99232 SBSQ HOSP IP/OBS MODERATE 35: CPT | Performed by: INTERNAL MEDICINE

## 2021-02-08 PROCEDURE — 99024 POSTOP FOLLOW-UP VISIT: CPT | Performed by: SURGERY

## 2021-02-08 PROCEDURE — 83735 ASSAY OF MAGNESIUM: CPT | Performed by: PHYSICIAN ASSISTANT

## 2021-02-08 RX ADMIN — MAGNESIUM GLUCONATE 500 MG ORAL TABLET 400 MG: 500 TABLET ORAL at 09:52

## 2021-02-08 RX ADMIN — LIDOCAINE 5% 2 PATCH: 700 PATCH TOPICAL at 09:55

## 2021-02-08 RX ADMIN — LEVOTHYROXINE SODIUM 75 MCG: 75 TABLET ORAL at 05:37

## 2021-02-08 RX ADMIN — Medication 1 TABLET: at 12:46

## 2021-02-08 RX ADMIN — HYDROMORPHONE HYDROCHLORIDE 1 MG: 1 INJECTION, SOLUTION INTRAMUSCULAR; INTRAVENOUS; SUBCUTANEOUS at 10:43

## 2021-02-08 RX ADMIN — CALCIUM 1 TABLET: 500 TABLET ORAL at 17:17

## 2021-02-08 RX ADMIN — HYDROMORPHONE HYDROCHLORIDE 1 MG: 1 INJECTION, SOLUTION INTRAMUSCULAR; INTRAVENOUS; SUBCUTANEOUS at 14:48

## 2021-02-08 RX ADMIN — SODIUM CHLORIDE, SODIUM GLUCONATE, SODIUM ACETATE, POTASSIUM CHLORIDE AND MAGNESIUM CHLORIDE 75 ML/HR: 526; 502; 368; 37; 30 INJECTION, SOLUTION INTRAVENOUS at 04:15

## 2021-02-08 RX ADMIN — ACETAMINOPHEN 975 MG: 325 TABLET, FILM COATED ORAL at 22:08

## 2021-02-08 RX ADMIN — HEPARIN SODIUM 5000 UNITS: 5000 INJECTION INTRAVENOUS; SUBCUTANEOUS at 13:22

## 2021-02-08 RX ADMIN — FLUTICASONE FUROATE AND VILANTEROL TRIFENATATE 1 PUFF: 100; 25 POWDER RESPIRATORY (INHALATION) at 09:55

## 2021-02-08 RX ADMIN — ACETAMINOPHEN 975 MG: 325 TABLET, FILM COATED ORAL at 13:22

## 2021-02-08 RX ADMIN — HEPARIN SODIUM 5000 UNITS: 5000 INJECTION INTRAVENOUS; SUBCUTANEOUS at 05:36

## 2021-02-08 RX ADMIN — HYDROMORPHONE HYDROCHLORIDE 1 MG: 1 INJECTION, SOLUTION INTRAMUSCULAR; INTRAVENOUS; SUBCUTANEOUS at 20:12

## 2021-02-08 RX ADMIN — ACETAMINOPHEN 975 MG: 325 TABLET, FILM COATED ORAL at 05:37

## 2021-02-08 RX ADMIN — POTASSIUM PHOSPHATE, MONOBASIC AND POTASSIUM PHOSPHATE, DIBASIC 21 MMOL: 224; 236 INJECTION, SOLUTION, CONCENTRATE INTRAVENOUS at 10:02

## 2021-02-08 RX ADMIN — HEPARIN SODIUM 5000 UNITS: 5000 INJECTION INTRAVENOUS; SUBCUTANEOUS at 22:08

## 2021-02-08 RX ADMIN — Medication 1 TABLET: at 17:17

## 2021-02-08 RX ADMIN — MAGNESIUM GLUCONATE 500 MG ORAL TABLET 400 MG: 500 TABLET ORAL at 17:17

## 2021-02-08 RX ADMIN — Medication 1 TABLET: at 10:01

## 2021-02-08 RX ADMIN — FLUCONAZOLE 200 MG: 100 TABLET ORAL at 09:52

## 2021-02-08 NOTE — PROCEDURES
General Surgery  Tuan Saniya 52 y o  female MRN: [id number]  Unit/Bed#: -01 Encounter: [id number]    V  A C  Procedure Note    Date: 2/8/2021    Time: 12:30     Location of wound: mid abdomen    Sponges removed:  4 Black Sponges  0 White Sponges    Dimensions of wound: 4 cm x 4 cm x 17 cm    Description of wound:  Large full-thickness mid abdominal incision dehiscence with fascial and suture exposure on the superior part of the wound  Slough noted around sutures on superior aspect of incision  Some cloudy discharge noted on the inferior aspect of the wound  Separate wound on the LUQ from colostomy site that had to be abandoned due to colostomy blood flow compromise  Wound measures 5x3 5cm and appears well perfused with minimal slough where the sutures had previously been                 Sponges placed:  4 Black Sponges  0 White Sponges    VAC settings:  125 mmHg  Continuous    Pt tolerated procedure well  VAC sticker placed to wound dressing, per protocol      Ellen Urbina PA-C  2/8/2021

## 2021-02-08 NOTE — ASSESSMENT & PLAN NOTE
Likely acute renal failure secondary to ischemic ATN due to perforated viscus  Creatinine now stable around 0 7      Once able to increase diet intake can discontinue IV fluids    Monitor kidney function

## 2021-02-08 NOTE — NURSING NOTE
Pt c/o hearing air from her midline incision  Wound Vac alarming that there is a "blockage"  Wound Vac canister and tubing changed; additional clear dressing's placed on midline incision in attempt to stop leak  Wound vac not alarming for leak but "blockage"  Suction from wound vac reading    (should be 125 mmHg); drainage can be seen moving through the tubing  Surgery on call (Dr Ivy Kaur) made aware  Unit Charge RN and Nursing supervisor also made aware  No new orders or orders to change dressing

## 2021-02-08 NOTE — PROGRESS NOTES
Progress Note - General Surgery   Sascha Saenz 52 y o  female MRN: [id number]  Unit/Bed#: -01 Encounter: [id number]    Assessment/Plan  Sascha Saenz is a 52 y o  female     Sigmoid Colon Perforation with abscess s/p ex lap with sigmoid colon resection and colostomy on 1/26/2021  POD10 bring back to OR, washout, rectal stump reinforcement, fascial closure  POD12 bring back to OR, washout, attempted formation of descending colon colostomy, descending colon resection, partial omentectomy, formation of transverse colostomy, ABThera placement   POD13 exploratory laparotomy, sigmoid colon resection, ABThera placement    -APURVA drain with minimal amt serous output  -UO 2 1 L yesterday  -Ostomy output: 250 mL coffee ground liquid that has progressed to lighter brown liquid  -Midline incision wound vac on 125 mmhg continuous suction   -WBC stable 9 19 today (down from 12 19)    -Tolerating full liquid diet  Advanced to surgical soft today  -D/C IVF  PO hydration  UO has been adequate    -Continue nutritional supplements, Ensure with meals   -Analgesia prn  -Antiemetics prn  -DVT prophylaxis SQH  -IS q hour  -Encourage to get OOB and ambulate and sitting in chair   -Change wound vac and evaluate wound q MWF  Was changed today  Keep at 125 mmhg continuous suction    -Continue daily Diflucan for 1 week course for fungal infection around wound edges   -Has been off IV abx, no longer meeting sepsis criteria  -PT recommending STR, patient and her  are refusing but are willing to do [address]   Plan for [address] for PT, OT, and nursing upon d/c   -Appreciate medicine input for medical management    Post op Anemia  -Hbg 8 today, 9 2 yesterday  -Stable  -Monitor     Hypocalcemia - improving  - calcium 7 8, corrected calcium normal 9 6  - ionized calcium 0 93 on 2/5, will continue to monitor    Hypophosphatemia  -improved to 2 3 from 1 8 yesterday  -continue to monitor, replete as needed    Hypokalemia- resolved   - improved to 3 5  -continue to monitor, replete as needed    Protein nutrition deficiency  -low albumin (1 80) and total protein (5 1)  - poor PO intake, slowly improving, should continue to improve as diet has  advanced  - continuing with daily nutritional supplement with meals     Thrombocytosis   -511 down from 642 yesterday   -unclear etiology  -continue to monitor      Subjective/Objective     Subjective: Overnight wound vac alarming about blockage  Will change wound vac today, patient requesting pain med before change  No other acute events overnight  Patient feeling well  Abdominal pain is still moderate lower abdomen 7/10  Denies n/v  Tolerating full liquid diet but does not like the taste of the food  Has been drinking Ensure  OOB ambulated a few times yesterday and to go to the commode  Is aware of d/c plans for [facility] PT, OT, nursing  No other acute complaints denies fevers/chills SOB, CP, LE tenderness  Objective:     Blood pressure 124/80, pulse 95, temperature (!) 97 3 °F (36 3 °C), resp  rate 19, height 5' 4" (1 626 m), weight 108 kg (238 lb 12 1 oz), SpO2 98 %  ,Body mass index is 40 98 kg/m²  Intake/Output Summary (Last 24 hours) at 2/8/2021 0906  Last data filed at 2/8/2021 0836  Gross per 24 hour   Intake 2901 25 ml   Output 2605 ml   Net 296 25 ml       Invasive Devices     Peripherally Inserted Central Catheter Line            PICC Line 81/17/48 Right Basilic 4 days          Drain            Colostomy Transverse 11 days    Closed/Suction Drain RLQ Bulb 9 days    Negative Pressure Wound Therapy (V A C ) Abdomen 1 day                Physical Exam:   Physical Exam  Vitals signs and nursing note reviewed  Constitutional:       General: She is not in acute distress  Appearance: Normal appearance  Comments: Diaphoresis    HENT:      Head: Normocephalic and atraumatic  Right Ear: External ear normal       Left Ear: External ear normal       Nose: No congestion or rhinorrhea  Mouth/Throat:      Mouth: Mucous membranes are moist       Pharynx: Oropharynx is clear  Comments: Poor dentition  Eyes:      General: No scleral icterus  Extraocular Movements: Extraocular movements intact  Conjunctiva/sclera: Conjunctivae normal    Neck:      Musculoskeletal: Normal range of motion and neck supple  Cardiovascular:      Rate and Rhythm: Normal rate and regular rhythm  Heart sounds: Normal heart sounds  No murmur  No friction rub  No gallop  Pulmonary:      Effort: Pulmonary effort is normal       Breath sounds: Normal breath sounds  No wheezing, rhonchi or rales  Abdominal:      General: Bowel sounds are normal  There is no distension  Palpations: Abdomen is soft  There is no mass  Tenderness: There is abdominal tenderness (Suprapubic)  There is no guarding or rebound  Comments: Obese abdomen  Midline incision- wound vac in place  APURVA drain C/D/I, minimal serous output   Ostomy with mod amount of coffee ground output, no gas in bag   Musculoskeletal: Normal range of motion  General: No swelling or tenderness  Right lower leg: No edema  Left lower leg: No edema  Skin:     General: Skin is warm and dry  Coloration: Skin is not jaundiced  Neurological:      General: No focal deficit present  Mental Status: She is alert and oriented to person, place, and time  Psychiatric:         Mood and Affect: Mood normal          Behavior: Behavior normal          Thought Content: Thought content normal          Lab, Imaging and other studies:  I have personally reviewed pertinent lab results    , CBC:   Lab Results   Component Value Date    WBC 9 19 02/08/2021    HGB 8 0 (L) 02/08/2021    HCT 25 3 (L) 02/08/2021    MCV 89 02/08/2021     (H) 02/08/2021    MCH 28 0 02/08/2021    MCHC 31 6 02/08/2021    RDW 17 9 (H) 02/08/2021    MPV 9 8 02/08/2021    NRBC 0 02/08/2021   , CMP:   Lab Results   Component Value Date    SODIUM 142 02/08/2021    K 3 5 02/08/2021     02/08/2021    CO2 27 02/08/2021    BUN 5 02/08/2021    CREATININE 0 62 02/08/2021    CALCIUM 7 8 (L) 02/08/2021    AST 15 02/08/2021    ALT 12 02/08/2021    ALKPHOS 102 02/08/2021    EGFR 107 02/08/2021   , Coagulation: No results found for: PT, INR, APTT, Urinalysis: No results found for: [place], SPECGRAV, PHUR, LEUKOCYTESUR, NITRITE, PROTEINUA, GLUCOSEU, KETONESU, BILIRUBINUR, BLOODU, Amylase: No results found for: AMYLASE, Lipase: No results found for: LIPASE   Xr Chest Portable Icu    Result Date: 1/27/2021  Impression: No acute cardiopulmonary disease  ET tube 4 cm above the shreyas  Workstation performed: [id number]     Ct Abdomen Pelvis With Contrast    Result Date: 1/26/2021  Impression: 1  Mild pancolonic wall thickening with minimal pericolonic fat stranding extending from the cecum to sigmoid colon  Findings are suggestive of infectious/inflammatory or less likely ischemic colitis  2   Moderate volume pneumoperitoneum, suggestive of perforated viscus  However, the site of perforation is not definitively identified  Urgent surgical consultation is recommended    I personally discussed this study with [facility] on 1/26/2021 at 4:30 PM  Workstation performed: [id number]     VTE Pharmacologic Prophylaxis: Heparin  VTE Mechanical Prophylaxis: sequential compression device    Recent Results (from the past 36 hour(s))   Fingerstick Glucose (POCT)    Collection Time: 02/07/21 12:18 AM   Result Value Ref Range    POC Glucose 98 65 - 140 mg/dl   Fingerstick Glucose (POCT)    Collection Time: 02/07/21  6:28 AM   Result Value Ref Range    POC Glucose 91 65 - 140 mg/dl   Basic metabolic panel    Collection Time: 02/07/21  9:01 AM   Result Value Ref Range    Sodium 140 136 - 145 mmol/L    Potassium 3 2 (L) 3 5 - 5 3 mmol/L    Chloride 104 100 - 108 mmol/L    CO2 27 21 - 32 mmol/L    ANION GAP 9 4 - 13 mmol/L    BUN 5 5 - 25 mg/dL    Creatinine 0 66 0 60 - 1 30 mg/dL    Glucose 114 65 - 140 mg/dL    Calcium 8 0 (L) 8 3 - 10 1 mg/dL    eGFR 105 ml/min/1 73sq m   Magnesium    Collection Time: 02/07/21  9:01 AM   Result Value Ref Range    Magnesium 1 7 1 6 - 2 6 mg/dL   CBC and differential    Collection Time: 02/07/21  9:01 AM   Result Value Ref Range    WBC 12 19 (H) 4 31 - 10 16 Thousand/uL    RBC 3 39 (L) 3 81 - 5 12 Million/uL    Hemoglobin 9 2 (L) 11 5 - 15 4 g/dL    Hematocrit 29 6 (L) 34 8 - 46 1 %    MCV 87 82 - 98 fL    MCH 27 1 26 8 - 34 3 pg    MCHC 31 1 (L) 31 4 - 37 4 g/dL    RDW 17 4 (H) 11 6 - 15 1 %    MPV 9 9 8 9 - 12 7 fL    Platelets 614 (H) 905 - 390 Thousands/uL    nRBC 0 /100 WBCs   Phosphorus    Collection Time: 02/07/21  9:01 AM   Result Value Ref Range    Phosphorus 1 8 (L) 2 7 - 4 5 mg/dL   CBC and differential    Collection Time: 02/08/21  5:39 AM   Result Value Ref Range    WBC 9 19 4 31 - 10 16 Thousand/uL    RBC 2 86 (L) 3 81 - 5 12 Million/uL    Hemoglobin 8 0 (L) 11 5 - 15 4 g/dL    Hematocrit 25 3 (L) 34 8 - 46 1 %    MCV 89 82 - 98 fL    MCH 28 0 26 8 - 34 3 pg    MCHC 31 6 31 4 - 37 4 g/dL    RDW 17 9 (H) 11 6 - 15 1 %    MPV 9 8 8 9 - 12 7 fL    Platelets 742 (H) 095 - 390 Thousands/uL    nRBC 0 /100 WBCs   Comprehensive metabolic panel    Collection Time: 02/08/21  5:39 AM   Result Value Ref Range    Sodium 142 136 - 145 mmol/L    Potassium 3 5 3 5 - 5 3 mmol/L    Chloride 108 100 - 108 mmol/L    CO2 27 21 - 32 mmol/L    ANION GAP 7 4 - 13 mmol/L    BUN 5 5 - 25 mg/dL    Creatinine 0 62 0 60 - 1 30 mg/dL    Glucose 100 65 - 140 mg/dL    Calcium 7 8 (L) 8 3 - 10 1 mg/dL    Corrected Calcium 9 6 8 3 - 10 1 mg/dL    AST 15 5 - 45 U/L    ALT 12 12 - 78 U/L    Alkaline Phosphatase 102 46 - 116 U/L    Total Protein 5 1 (L) 6 4 - 8 2 g/dL    Albumin 1 8 (L) 3 5 - 5 0 g/dL    Total Bilirubin 0 30 0 20 - 1 00 mg/dL    eGFR 107 ml/min/1 73sq m   Phosphorus    Collection Time: 02/08/21  5:39 AM   Result Value Ref Range    Phosphorus 2 3 (L) 2 7 - 4 5 mg/dL   Magnesium    Collection Time: 02/08/21  5:39 AM   Result Value Ref Range    Magnesium 1 9 1 6 - 2 6 mg/dL       JUAQUIN Jackson  2/8/2021

## 2021-02-08 NOTE — PROGRESS NOTES
Progress Note -Surgery VINH Caicedo Nine 52 y o  female MRN: [id number]  Unit/Bed#: -[facility] Encounter: [id number]      Assessment   56y F w Sigmoid Colon Perforation with abscess s/p ex lap with sigmoid colon resection and colostomy on 1/26/2021  POD9 bring back to OR, washout, rectal stump reinforcement, fascial closure  POD11 bring back to OR, washout, attempted formation of descending colon colostomy, descending colon resection, partial omentectomy, formation of transverse colostomy, ABThera placement   POD12 exploratory laparotomy, sigmoid colon resection, ABThera placement  Pneumoperitoneum   -- Leukocytosis stable at 12 19 (12 36, 12 48)  -- Hgb stable 9 2  -- Ostomy output liquid stool with some coffee ground like material  -- APURVA drain 50cc, s/s  -- Wound VAc in place at surgical incision, holding suction  S/s drainage in canister  -- no further emesis, likely associated with oral potassium soln  Now switched to pill  Post op anemia  - stable  Hypocalcemia - improving  - calcium 8, corrected calcium normal  - ionized calcium 0 93, will continue to monitor  Hypokalemia  - improved today, 3 2  - will continue to replete  Protein nutrition deficiency  -albumin and total protein  - poor PO intake  -  Continuing with nutritional supplements  Thrombocytosis - 642, unclear etiology  Plan   -- Continue with full liquid diet as tolerated, nutritional supplements  -- continue with VAC and monitor output  If VAC loses suction and can not be trouble shooted, remove and dress wound with wet to dry kerlix dressing  Also wet to dry to wound in L abdomen  -- Analgesia prn  -- OOB and ambulate with patient TID  -- Monitor APURVA drain output  -- Internal medicine following for medical management, appreciated  _____________________________________________________________________  Subjective:   Patient feeling stronger today  Has more appetite  No further nausea or vomiting  No fevers or chills       Objective:    Vitals:  BP 132/78   Pulse 97   Temp 98 9 °F (37 2 °C)   Resp 17   Ht 5' 4" (1 626 m)   Wt 108 kg (237 lb 10 5 oz)   SpO2 97%   BMI 40 79 kg/m²     I/Os:  I/O last 3 completed shifts:   In: 4081 3 [P O :960; I V :3121 3]  Out: 2080 [Urine:2010; Drains:70]    I/O this shift:  In: -   Out: 575 [Urine:400; Stool:175]    Invasive Devices     Peripherally Inserted Central Catheter Line            PICC Line 22/03/76 Right Basilic 4 days          Drain            Colostomy Transverse 11 days    Closed/Suction Drain RLQ Bulb 9 days    Negative Pressure Wound Therapy (V A C ) Abdomen 1 day                Medications:  Current Facility-Administered Medications   Medication Dose Route Frequency    acetaminophen (TYLENOL) tablet 975 mg  975 mg Oral Q8H [facility]    albuterol inhalation solution 2 5 mg  2 5 mg Nebulization Q6H PRN    calcium carbonate (OYSTER SHELL,OSCAL) 500 mg tablet 1 tablet  1 tablet Oral Daily With Dinner    fluconazole (DIFLUCAN) tablet 200 mg  200 mg Oral Daily    fluticasone-vilanterol (BREO ELLIPTA) 100-25 mcg/inh inhaler 1 puff  1 puff Inhalation Daily    heparin (porcine) subcutaneous injection 5,000 Units  5,000 Units Subcutaneous Q8H [facility]    HYDROmorphone (DILAUDID) injection 0 5 mg  0 5 mg Intravenous Q4H PRN    Or    HYDROmorphone (DILAUDID) injection 1 mg  1 mg Intravenous Q4H PRN    levothyroxine tablet 75 mcg  75 mcg Oral Early Morning    lidocaine (LIDODERM) 5 % patch 2 patch  2 patch Topical Daily    magnesium oxide (MAG-OX) tablet 400 mg  400 mg Oral BID    multi-electrolyte (PLASMALYTE-A/ISOLYTE-S PH 7 4) IV solution  75 mL/hr Intravenous Continuous    ondansetron (ZOFRAN) injection 4 mg  4 mg Intravenous Q6H PRN    potassium-sodium phosphateS (K-PHOS,PHOSPHA 250) -250 mg tablet 1 tablet  1 tablet Oral TID With Meals                 Lab Results and Cultures:   CBC with diff:   Lab Results   Component Value Date    WBC 12 19 (H) 02/07/2021    HGB 9 2 (L) 02/07/2021    HCT 29 6 (L) 02/07/2021    MCV 87 02/07/2021     (H) 02/07/2021    MCH 27 1 02/07/2021    MCHC 31 1 (L) 02/07/2021    RDW 17 4 (H) 02/07/2021    MPV 9 9 02/07/2021    NRBC 0 02/07/2021       BMP/CMP:  Lab Results   Component Value Date    K 3 2 (L) 02/07/2021     02/07/2021    CO2 27 02/07/2021    CO2 22 01/28/2021    BUN 5 02/07/2021    CREATININE 0 66 02/07/2021    GLUCOSE 157 (H) 01/28/2021    CALCIUM 8 0 (L) 02/07/2021    AST 14 02/05/2021    ALT 10 (L) 02/05/2021    ALKPHOS 63 02/05/2021    EGFR 105 02/07/2021       Lipid Panel:   No results found for: CHOL    Coags:   Lab Results   Component Value Date    PTT 29 01/26/2021    INR 1 34 (H) 01/26/2021        Urinalysis:   Lab Results   Component Value Date    COLORU Yellow 01/26/2021    CLARITYU Clear 01/26/2021    SPECGRAV 1 020 01/26/2021    PHUR 6 0 01/26/2021    LEUKOCYTESUR Negative 01/26/2021    NITRITE Negative 01/26/2021    GLUCOSEU Negative 01/26/2021    KETONESU Trace (A) 01/26/2021    BILIRUBINUR Negative 01/26/2021    BLOODU Small (A) 01/26/2021        Urine Culture: No results found for: URINECX   Wound Culure: No results found for: WOUNDCULT  Blood Culture:   Lab Results   Component Value Date    BLOODCX No Growth After 5 Days  01/26/2021         Physical Exam:  General Appearance:    Alert and orientated x 3, cooperative, no distress, appears stated age   Lungs:     Clear to auscultation bilaterally, respirations unlabored, no wheezes, decreased effort    Heart:    Regular rate and rhythm, S1 and S2 normal, no murmur   Abdomen:    Normoactive BS, soft,Tenderess at incision site, non rigid, no masses, no palpated organomegaly  Wound/Dressing:  Wound VAC in place holding suction with s/s drainage in canister   Colostomy with liquid output   Extremities:  Extremities normal, no calf tenderness, no cyanosis or edema   Pulses:   2+ and symmetric all extremities   Skin:   Skin color, texture, turgor normal, no rashes   Neurologic:   CNII-XII intact, normal strength, affect appropriate       Imaging:  Xr Chest Portable Icu    Result Date: 1/27/2021  Impression: No acute cardiopulmonary disease  ET tube 4 cm above the shreyas  Workstation performed: [id number]     Ct Abdomen Pelvis With Contrast    Result Date: 1/26/2021  Impression: 1  Mild pancolonic wall thickening with minimal pericolonic fat stranding extending from the cecum to sigmoid colon  Findings are suggestive of infectious/inflammatory or less likely ischemic colitis  2   Moderate volume pneumoperitoneum, suggestive of perforated viscus  However, the site of perforation is not definitively identified  Urgent surgical consultation is recommended    I personally discussed this study with [facility] on 1/26/2021 at 4:30 PM  Workstation performed: [id number]       VTE Pharmacologic Prophylaxis: Heparin  VTE Mechanical Prophylaxis: sequential compression device    Cielo Santa PA-C   2/8/2021

## 2021-02-08 NOTE — CASE MANAGEMENT
SOHAN met with pt to review pt/ot rec for rehab  Per pt she does not want rehab and is refusing  Pt would like to return home where she reports family can support with [facility]  SOHAN sent TT to surgical ap with update

## 2021-02-08 NOTE — PLAN OF CARE
Problem: Potential for Falls  Goal: Patient will remain free of falls  Description: INTERVENTIONS:  - Assess patient frequently for physical needs  -  Identify cognitive and physical deficits and behaviors that affect risk of falls  -  [place] fall precautions as indicated by assessment   - Educate patient/family on patient safety including physical limitations  - Instruct patient to call for assistance with activity based on assessment  - Modify environment to reduce risk of injury  - Consider OT/PT consult to assist with strengthening/mobility  Outcome: Progressing     Problem: Prexisting or High Potential for Compromised Skin Integrity  Goal: Skin integrity is maintained or improved  Description: INTERVENTIONS:  - Identify patients at risk for skin breakdown  - Assess and monitor skin integrity  - Assess and monitor nutrition and hydration status  - Monitor labs   - Assess for incontinence   - Turn and reposition patient  - Assist with mobility/ambulation  - Relieve pressure over bony prominences  - Avoid friction and shearing  - Provide appropriate hygiene as needed including keeping skin clean and dry  - Evaluate need for skin moisturizer/barrier cream  - Collaborate with interdisciplinary team   - Patient/family teaching  - Consider wound care consult   Outcome: Progressing     Problem: Nutrition/Hydration-ADULT  Goal: Nutrient/Hydration intake appropriate for improving, restoring or maintaining nutritional needs  Description: Monitor and assess patient's nutrition/hydration status for malnutrition  Collaborate with interdisciplinary team and initiate plan and interventions as ordered  Monitor patient's weight and dietary intake as ordered or per policy  Utilize nutrition screening tool and intervene as necessary  Determine patient's food preferences and provide high-protein, high-caloric foods as appropriate       INTERVENTIONS:  - Monitor oral intake, urinary output, labs, and treatment plans  - Assess nutrition and hydration status and recommend course of action  - Evaluate amount of meals eaten  - Assist patient with eating if necessary   - Allow adequate time for meals  - Recommend/ encourage appropriate diets, oral nutritional supplements, and vitamin/mineral supplements  - Order, calculate, and assess calorie counts as needed  - Recommend, monitor, and adjust tube feedings or PN based on assessed needs  - Assess need for intravenous fluids  - Provide nutrition/hydration education as appropriate  - Include patient/family/caregiver in decisions related to nutrition  Outcome: Progressing     Problem: DISCHARGE PLANNING - CARE MANAGEMENT  Goal: Discharge to post-acute care or home with appropriate resources  Description: INTERVENTIONS:  - Conduct assessment to determine patient/family and health care team treatment goals, and need for post-acute services based on payer coverage, community resources, and patient preferences, and barriers to discharge  - Address psychosocial, clinical, and financial barriers to discharge as identified in assessment in conjunction with the patient/family and health care team  - Arrange appropriate level of post-acute services according to patients   needs and preference and payer coverage in collaboration with the physician and health care team  - Communicate with and update the patient/family, physician, and health care team regarding progress on the discharge plan  - Arrange appropriate transportation to post-acute venues  Outcome: Progressing     Problem: PAIN - ADULT  Goal: Verbalizes/displays adequate comfort level or baseline comfort level  Description: Interventions:  - Encourage patient to monitor pain and request assistance  - Assess pain using appropriate pain scale  - Administer analgesics based on type and severity of pain and evaluate response  - Implement non-pharmacological measures as appropriate and evaluate response  - Consider cultural and social influences on pain and pain management  - Notify physician/advanced practitioner if interventions unsuccessful or patient reports new pain  Outcome: Progressing     Problem: INFECTION - ADULT  Goal: Absence or prevention of progression during hospitalization  Description: INTERVENTIONS:  - Assess and monitor for signs and symptoms of infection  - Monitor lab/diagnostic results  - Monitor all insertion sites, i e  indwelling lines, tubes, and drains  - Monitor endotracheal if appropriate and nasal secretions for changes in amount and color  - [place] appropriate cooling/warming therapies per order  - Administer medications as ordered  - Instruct and encourage patient and family to use good hand hygiene technique  - Identify and instruct in appropriate isolation precautions for identified infection/condition  Outcome: Progressing     Problem: SAFETY ADULT  Goal: Patient will remain free of falls  Description: INTERVENTIONS:  - Assess patient frequently for physical needs  -  Identify cognitive and physical deficits and behaviors that affect risk of falls    -  [place] fall precautions as indicated by assessment   - Educate patient/family on patient safety including physical limitations  - Instruct patient to call for assistance with activity based on assessment  - Modify environment to reduce risk of injury  - Consider OT/PT consult to assist with strengthening/mobility  Outcome: Progressing  Goal: Maintain or return to baseline ADL function  Description: INTERVENTIONS:  -  Assess patient's ability to carry out ADLs; assess patient's baseline for ADL function and identify physical deficits which impact ability to perform ADLs (bathing, care of mouth/teeth, toileting, grooming, dressing, etc )  - Assess/evaluate cause of self-care deficits   - Assess range of motion  - Assess patient's mobility; develop plan if impaired  - Assess patient's need for assistive devices and provide as appropriate  - Encourage maximum independence but intervene and supervise when necessary  - Involve family in performance of ADLs  - Assess for home care needs following discharge   - Consider OT consult to assist with ADL evaluation and planning for discharge  - Provide patient education as appropriate  Outcome: Progressing  Goal: Maintain or return mobility status to optimal level  Description: INTERVENTIONS:  - Assess patient's baseline mobility status (ambulation, transfers, stairs, etc )    - Identify cognitive and physical deficits and behaviors that affect mobility  - Identify mobility aids required to assist with transfers and/or ambulation (gait belt, sit-to-stand, lift, walker, cane, etc )  - [place] fall precautions as indicated by assessment  - Record patient progress and toleration of activity level on Mobility SBAR; progress patient to next Phase/Stage  - Instruct patient to call for assistance with activity based on assessment  - Consider rehabilitation consult to assist with strengthening/weightbearing, etc   Outcome: Progressing     Problem: DISCHARGE PLANNING  Goal: Discharge to home or other facility with appropriate resources  Description: INTERVENTIONS:  - Identify barriers to discharge w/patient and caregiver  - Arrange for needed discharge resources and transportation as appropriate  - Identify discharge learning needs (meds, wound care, etc )  - Arrange for interpretive services to assist at discharge as needed  - Refer to Case Management Department for coordinating discharge planning if the patient needs post-hospital services based on physician/advanced practitioner order or complex needs related to functional status, cognitive ability, or social support system  Outcome: Progressing     Problem: Knowledge Deficit  Goal: Patient/family/caregiver demonstrates understanding of disease process, treatment plan, medications, and discharge instructions  Description: Complete learning assessment and assess knowledge base    Interventions:  - Provide teaching at level of understanding  - Provide teaching via preferred learning methods  Outcome: Progressing     Problem: RESPIRATORY - ADULT  Goal: Achieves optimal ventilation and oxygenation  Description: INTERVENTIONS:  - Assess for changes in respiratory status  - Assess for changes in mentation and behavior  - Position to facilitate oxygenation and minimize respiratory effort  - Oxygen administered by appropriate delivery if ordered  - Initiate smoking cessation education as indicated  - Encourage broncho-pulmonary hygiene including cough, deep breathe, Incentive Spirometry  - Assess the need for suctioning and aspirate as needed  - Assess and instruct to report SOB or any respiratory difficulty  - Respiratory Therapy support as indicated  Outcome: Progressing     Problem: GASTROINTESTINAL - ADULT  Goal: Minimal or absence of nausea and/or vomiting  Description: INTERVENTIONS:  - Administer IV fluids if ordered to ensure adequate hydration  - Maintain NPO status until nausea and vomiting are resolved  - Nasogastric tube if ordered  - Administer ordered antiemetic medications as needed  - Provide nonpharmacologic comfort measures as appropriate  - Advance diet as tolerated, if ordered  - Consider nutrition services referral to assist patient with adequate nutrition and appropriate food choices  Outcome: Progressing  Goal: Maintains or returns to baseline bowel function  Description: INTERVENTIONS:  - Assess bowel function  - Encourage oral fluids to ensure adequate hydration  - Administer IV fluids if ordered to ensure adequate hydration  - Administer ordered medications as needed  - Encourage mobilization and activity  - Consider nutritional services referral to assist patient with adequate nutrition and appropriate food choices  Outcome: Progressing  Goal: Maintains adequate nutritional intake  Description: INTERVENTIONS:  - Monitor percentage of each meal consumed  - Identify factors contributing to decreased intake, treat as appropriate  - Assist with meals as needed  - Monitor I&O, weight, and lab values if indicated  - Obtain nutrition services referral as needed  Outcome: Progressing     Problem: GENITOURINARY - ADULT  Goal: Maintains or returns to baseline urinary function  Description: INTERVENTIONS:  - Assess urinary function  - Encourage oral fluids to ensure adequate hydration if ordered  - Administer IV fluids as ordered to ensure adequate hydration  - Administer ordered medications as needed  - Offer frequent toileting  - Follow urinary retention protocol if ordered  Outcome: Progressing  Goal: Absence of urinary retention  Description: INTERVENTIONS:  - Assess patients ability to void and empty bladder  - Monitor I/O  - Bladder scan as needed  - Discuss with physician/AP medications to alleviate retention as needed  - Discuss catheterization for long term situations as appropriate  Outcome: Progressing  Goal: Urinary catheter remains patent  Description: INTERVENTIONS:  - Assess patency of urinary catheter  - If patient has a chronic fernandez, consider changing catheter if non-functioning  - Follow guidelines for intermittent irrigation of non-functioning urinary catheter  Outcome: Progressing     Problem: METABOLIC, FLUID AND ELECTROLYTES - ADULT  Goal: Electrolytes maintained within normal limits  Description: INTERVENTIONS:  - Monitor labs and assess patient for signs and symptoms of electrolyte imbalances  - Administer electrolyte replacement as ordered  - Monitor response to electrolyte replacements, including repeat lab results as appropriate  - Instruct patient on fluid and nutrition as appropriate  Outcome: Progressing  Goal: Fluid balance maintained  Description: INTERVENTIONS:  - Monitor labs   - Monitor I/O and WT  - Instruct patient on fluid and nutrition as appropriate  - Assess for signs & symptoms of volume excess or deficit  Outcome: Progressing  Goal: Glucose maintained within target range  Description: INTERVENTIONS:  - Monitor Blood Glucose as ordered  - Assess for signs and symptoms of hyperglycemia and hypoglycemia  - Administer ordered medications to maintain glucose within target range  - Assess nutritional intake and initiate nutrition service referral as needed  Outcome: Progressing

## 2021-02-08 NOTE — PLAN OF CARE
Problem: PHYSICAL THERAPY ADULT  Goal: Performs mobility at highest level of function for planned discharge setting  See evaluation for individualized goals  Description: Treatment/Interventions: Functional transfer training, LE strengthening/ROM, Therapeutic exercise, Endurance training, Patient/family training, Equipment eval/education, Bed mobility, Continued evaluation, Spoke to nursing, OT          See flowsheet documentation for full assessment, interventions and recommendations  Outcome: Progressing  Note: Prognosis: Good  Problem List: Decreased strength, Decreased endurance, Impaired balance, Decreased mobility, Decreased skin integrity, Pain  Assessment: Pt seen for PT treatment session this date with interventions consisting of gait training w/ emphasis on improving pt's ability to ambulate level surfaces x 40' with CGA provided by therapist with bariatric RW and therapeutic activity consisting of training: supine<>sit transfers, sit<>stand transfers and B LE exercises without worsened pain reported  Pt agreeable to PT treatment session upon arrival, pt found supine in bed w/ HOB elevated, in no apparent distress and responsive  In comparison to previous session, pt with improvements in household distance gait tolerance, decreased assistance for ambulation trial; initiation of B LE exercises  Post session: pt returned back to recliner, all needs in reach and RN notified of session findings/recommendations  Continue to recommend STR vs  Home PT at time of d/c in order to maximize pt's functional independence and safety w/ mobility  Pt continues to be functioning below baseline level, and remains limited 2* factors listed above  PT will continue to see pt while here in order to address the deficits listed above and provide interventions consistent w/ POC in effort to achieve STGs    Barriers to Discharge: Inaccessible home environment, Decreased caregiver support     PT Discharge Recommendation: Post-Acute Rehabilitation Services     PT - OK to Discharge: Yes(when medically cleared if to STR)    See flowsheet documentation for full assessment

## 2021-02-08 NOTE — PHYSICAL THERAPY NOTE
Physical Therapy Treatment Note       02/08/21 1338   PT Last Visit   PT Visit Date 02/08/21   Note Type   Note Type Treatment   Pain Assessment   Pain Assessment Tool 0-10   Pain Score 8  (8/10 pre and post session)   Pain Location/Orientation Location: Abdomen; Location: Back   Hospital Pain Intervention(s)   (RN aware of pt's pain report)   Restrictions/Precautions   Weight Bearing Precautions Per Order No   Braces or Orthoses   (none reported)   Other Precautions Multiple lines; Fall Risk;Pain  (wound VAC; abdominal precautions)   General   Chart Reviewed Yes   Response to Previous Treatment Patient with no complaints from previous session  Family/Caregiver Present No   Cognition   Overall Cognitive Status WFL   Arousal/Participation Alert; Responsive; Cooperative   Attention Within functional limits   Orientation Level Oriented X4   Memory Within functional limits   Following Commands Follows all commands and directions without difficulty   Comments pt agreeable to PT session   Subjective   Subjective "I have to use the commode"   Bed Mobility   Supine to Sit 4  Minimal assistance   Additional items Assist x 1;HOB elevated; Increased time required;Verbal cues;LE management   Transfers   Sit to Stand 4  Minimal assistance   Additional items Assist x 1; Armrests; Increased time required;Verbal cues   Stand to Sit 4  Minimal assistance   Additional items Assist x 1; Armrests; Increased time required;Verbal cues   Toilet transfer 4  Minimal assistance   Additional items Assist x 1; Increased time required;Verbal cues; Commode   Ambulation/Elevation   Gait pattern Decreased foot clearance; Short stride; Excessively slow; Step to   Gait Assistance 4  Minimal assist  (CGA)   Additional items Assist x 1;Verbal cues   Assistive Device Bariatric Rolling walker   Distance 40'   Stair Management Assistance Not tested   Balance   Static Sitting Good   Dynamic Sitting Fair +   Static Standing Elvira 83 -   Ambulatory Fair -   Endurance Deficit   Endurance Deficit Yes   Activity Tolerance   Activity Tolerance Patient limited by fatigue;Patient limited by pain   Nurse Made Aware RN Doe Duarte verbalized pt appropriate to see, made aware of session outcome/recs   Exercises   [employer]; Bilateral;15 reps   Hip Abduction Sitting;10 reps;AAROM; Bilateral   Knee AROM Short Arc Quad Sitting;10 reps;AAROM; Bilateral   Ankle Pumps Sitting;AROM; Bilateral;15 reps   Assessment   Prognosis Good   Problem List Decreased strength;Decreased endurance; Impaired balance;Decreased mobility; Decreased skin integrity;Pain   Assessment Pt seen for PT treatment session this date with interventions consisting of gait training w/ emphasis on improving pt's ability to ambulate level surfaces x 40' with CGA provided by therapist with bariatric RW and therapeutic activity consisting of training: supine<>sit transfers, sit<>stand transfers and B LE exercises without worsened pain reported  Pt agreeable to PT treatment session upon arrival, pt found supine in bed w/ HOB elevated, in no apparent distress and responsive  In comparison to previous session, pt with improvements in household distance gait tolerance, decreased assistance for ambulation trial; initiation of B LE exercises  Post session: pt returned back to [facility], all needs in reach and RN notified of session findings/recommendations  Continue to recommend STR vs  Home PT at time of d/c in order to maximize pt's functional independence and safety w/ mobility  Pt continues to be functioning below baseline level, and remains limited 2* factors listed above  PT will continue to see pt while here in order to address the deficits listed above and provide interventions consistent w/ POC in effort to achieve STGs     Barriers to Discharge Inaccessible home environment;Decreased caregiver support   Goals   Patient Goals to return home   STG Expiration Date 02/18/21   Short Term Goal #1 In 7-10 days: Increase bilateral LE strength 1/2 grade to facilitate independent mobility, Perform all bed mobility tasks modified independent to decrease caregiver burden, Perform all transfers modified independent to improve independence, Ambulate > 50 ft  x3 with least restrictive assistive device modified independent w/o LOB and w/ normalized gait pattern 100% of the time, Navigate 1 stair modified independent with unilateral handrail to facilitate return to previous living environment, Increase all balance 1/2 grade to decrease risk for falls and Tolerate 4 hr OOB to faciliate upright tolerance   PT Treatment Day 3   Plan   Treatment/Interventions Functional transfer training;LE strengthening/ROM; Elevations; Therapeutic exercise; Endurance training;Patient/family training;Equipment eval/education; Bed mobility;Gait training;Spoke to nursing   Progress Progressing toward goals   PT Frequency   (3-5x/wk)   Recommendation   PT Discharge Recommendation Post-Acute Rehabilitation Services   Equipment Recommended Walker  (RW)   PT - OK to Discharge Yes  (when medically cleared if to STR)   [address] [place] [address] Mobility Inpatient   Turning in Bed Without Bedrails 3   Lying on Back to Sitting on Edge of Flat Bed 3   Moving Bed to Chair 3   Standing Up From Chair 3   Walk in Room 2   Climb 3-5 Stairs 1   Basic Mobility Inpatient Raw Score 15   Basic Mobility Standardized Score 36 97       Alexandra Reyna, PT, DPT

## 2021-02-08 NOTE — PLAN OF CARE
Problem: Potential for Falls  Goal: Patient will remain free of falls  Description: INTERVENTIONS:  - Assess patient frequently for physical needs  -  Identify cognitive and physical deficits and behaviors that affect risk of falls  -  [place] fall precautions as indicated by assessment   - Educate patient/family on patient safety including physical limitations  - Instruct patient to call for assistance with activity based on assessment  - Modify environment to reduce risk of injury  - Consider OT/PT consult to assist with strengthening/mobility  Outcome: Progressing     Problem: Prexisting or High Potential for Compromised Skin Integrity  Goal: Skin integrity is maintained or improved  Description: INTERVENTIONS:  - Identify patients at risk for skin breakdown  - Assess and monitor skin integrity  - Assess and monitor nutrition and hydration status  - Monitor labs   - Assess for incontinence   - Turn and reposition patient  - Assist with mobility/ambulation  - Relieve pressure over bony prominences  - Avoid friction and shearing  - Provide appropriate hygiene as needed including keeping skin clean and dry  - Evaluate need for skin moisturizer/barrier cream  - Collaborate with interdisciplinary team   - Patient/family teaching  - Consider wound care consult   Outcome: Progressing     Problem: Nutrition/Hydration-ADULT  Goal: Nutrient/Hydration intake appropriate for improving, restoring or maintaining nutritional needs  Description: Monitor and assess patient's nutrition/hydration status for malnutrition  Collaborate with interdisciplinary team and initiate plan and interventions as ordered  Monitor patient's weight and dietary intake as ordered or per policy  Utilize nutrition screening tool and intervene as necessary  Determine patient's food preferences and provide high-protein, high-caloric foods as appropriate       INTERVENTIONS:  - Monitor oral intake, urinary output, labs, and treatment plans  - Assess nutrition and hydration status and recommend course of action  - Evaluate amount of meals eaten  - Assist patient with eating if necessary   - Allow adequate time for meals  - Recommend/ encourage appropriate diets, oral nutritional supplements, and vitamin/mineral supplements  - Order, calculate, and assess calorie counts as needed  - Recommend, monitor, and adjust tube feedings or PN based on assessed needs  - Assess need for intravenous fluids  - Provide nutrition/hydration education as appropriate  - Include patient/family/caregiver in decisions related to nutrition  Outcome: Progressing     Problem: DISCHARGE PLANNING - CARE MANAGEMENT  Goal: Discharge to post-acute care or home with appropriate resources  Description: INTERVENTIONS:  - Conduct assessment to determine patient/family and health care team treatment goals, and need for post-acute services based on payer coverage, community resources, and patient preferences, and barriers to discharge  - Address psychosocial, clinical, and financial barriers to discharge as identified in assessment in conjunction with the patient/family and health care team  - Arrange appropriate level of post-acute services according to patients   needs and preference and payer coverage in collaboration with the physician and health care team  - Communicate with and update the patient/family, physician, and health care team regarding progress on the discharge plan  - Arrange appropriate transportation to post-acute venues  Outcome: Progressing     Problem: PAIN - ADULT  Goal: Verbalizes/displays adequate comfort level or baseline comfort level  Description: Interventions:  - Encourage patient to monitor pain and request assistance  - Assess pain using appropriate pain scale  - Administer analgesics based on type and severity of pain and evaluate response  - Implement non-pharmacological measures as appropriate and evaluate response  - Consider cultural and social influences on pain and pain management  - Notify physician/advanced practitioner if interventions unsuccessful or patient reports new pain  Outcome: Progressing     Problem: INFECTION - ADULT  Goal: Absence or prevention of progression during hospitalization  Description: INTERVENTIONS:  - Assess and monitor for signs and symptoms of infection  - Monitor lab/diagnostic results  - Monitor all insertion sites, i e  indwelling lines, tubes, and drains  - Monitor endotracheal if appropriate and nasal secretions for changes in amount and color  - [place] appropriate cooling/warming therapies per order  - Administer medications as ordered  - Instruct and encourage patient and family to use good hand hygiene technique  - Identify and instruct in appropriate isolation precautions for identified infection/condition  Outcome: Progressing     Problem: SAFETY ADULT  Goal: Patient will remain free of falls  Description: INTERVENTIONS:  - Assess patient frequently for physical needs  -  Identify cognitive and physical deficits and behaviors that affect risk of falls    -  [place] fall precautions as indicated by assessment   - Educate patient/family on patient safety including physical limitations  - Instruct patient to call for assistance with activity based on assessment  - Modify environment to reduce risk of injury  - Consider OT/PT consult to assist with strengthening/mobility  Outcome: Progressing  Goal: Maintain or return to baseline ADL function  Description: INTERVENTIONS:  -  Assess patient's ability to carry out ADLs; assess patient's baseline for ADL function and identify physical deficits which impact ability to perform ADLs (bathing, care of mouth/teeth, toileting, grooming, dressing, etc )  - Assess/evaluate cause of self-care deficits   - Assess range of motion  - Assess patient's mobility; develop plan if impaired  - Assess patient's need for assistive devices and provide as appropriate  - Encourage maximum independence but intervene and supervise when necessary  - Involve family in performance of ADLs  - Assess for home care needs following discharge   - Consider OT consult to assist with ADL evaluation and planning for discharge  - Provide patient education as appropriate  Outcome: Progressing  Goal: Maintain or return mobility status to optimal level  Description: INTERVENTIONS:  - Assess patient's baseline mobility status (ambulation, transfers, stairs, etc )    - Identify cognitive and physical deficits and behaviors that affect mobility  - Identify mobility aids required to assist with transfers and/or ambulation (gait belt, sit-to-stand, lift, walker, cane, etc )  - [place] fall precautions as indicated by assessment  - Record patient progress and toleration of activity level on Mobility SBAR; progress patient to next Phase/Stage  - Instruct patient to call for assistance with activity based on assessment  - Consider rehabilitation consult to assist with strengthening/weightbearing, etc   Outcome: Progressing     Problem: DISCHARGE PLANNING  Goal: Discharge to home or other facility with appropriate resources  Description: INTERVENTIONS:  - Identify barriers to discharge w/patient and caregiver  - Arrange for needed discharge resources and transportation as appropriate  - Identify discharge learning needs (meds, wound care, etc )  - Arrange for interpretive services to assist at discharge as needed  - Refer to Case Management Department for coordinating discharge planning if the patient needs post-hospital services based on physician/advanced practitioner order or complex needs related to functional status, cognitive ability, or social support system  Outcome: Progressing     Problem: Knowledge Deficit  Goal: Patient/family/caregiver demonstrates understanding of disease process, treatment plan, medications, and discharge instructions  Description: Complete learning assessment and assess knowledge base    Interventions:  - Provide teaching at level of understanding  - Provide teaching via preferred learning methods  Outcome: Progressing     Problem: RESPIRATORY - ADULT  Goal: Achieves optimal ventilation and oxygenation  Description: INTERVENTIONS:  - Assess for changes in respiratory status  - Assess for changes in mentation and behavior  - Position to facilitate oxygenation and minimize respiratory effort  - Oxygen administered by appropriate delivery if ordered  - Initiate smoking cessation education as indicated  - Encourage broncho-pulmonary hygiene including cough, deep breathe, Incentive Spirometry  - Assess the need for suctioning and aspirate as needed  - Assess and instruct to report SOB or any respiratory difficulty  - Respiratory Therapy support as indicated  Outcome: Progressing     Problem: GASTROINTESTINAL - ADULT  Goal: Minimal or absence of nausea and/or vomiting  Description: INTERVENTIONS:  - Administer IV fluids if ordered to ensure adequate hydration  - Maintain NPO status until nausea and vomiting are resolved  - Nasogastric tube if ordered  - Administer ordered antiemetic medications as needed  - Provide nonpharmacologic comfort measures as appropriate  - Advance diet as tolerated, if ordered  - Consider nutrition services referral to assist patient with adequate nutrition and appropriate food choices  Outcome: Progressing  Goal: Maintains or returns to baseline bowel function  Description: INTERVENTIONS:  - Assess bowel function  - Encourage oral fluids to ensure adequate hydration  - Administer IV fluids if ordered to ensure adequate hydration  - Administer ordered medications as needed  - Encourage mobilization and activity  - Consider nutritional services referral to assist patient with adequate nutrition and appropriate food choices  Outcome: Progressing  Goal: Maintains adequate nutritional intake  Description: INTERVENTIONS:  - Monitor percentage of each meal consumed  - Identify factors contributing to decreased intake, treat as appropriate  - Assist with meals as needed  - Monitor I&O, weight, and lab values if indicated  - Obtain nutrition services referral as needed  Outcome: Progressing     Problem: GENITOURINARY - ADULT  Goal: Maintains or returns to baseline urinary function  Description: INTERVENTIONS:  - Assess urinary function  - Encourage oral fluids to ensure adequate hydration if ordered  - Administer IV fluids as ordered to ensure adequate hydration  - Administer ordered medications as needed  - Offer frequent toileting  - Follow urinary retention protocol if ordered  Outcome: Progressing  Goal: Absence of urinary retention  Description: INTERVENTIONS:  - Assess patients ability to void and empty bladder  - Monitor I/O  - Bladder scan as needed  - Discuss with physician/AP medications to alleviate retention as needed  - Discuss catheterization for long term situations as appropriate  Outcome: Progressing  Goal: Urinary catheter remains patent  Description: INTERVENTIONS:  - Assess patency of urinary catheter  - If patient has a chronic fernandez, consider changing catheter if non-functioning  - Follow guidelines for intermittent irrigation of non-functioning urinary catheter  Outcome: Progressing     Problem: METABOLIC, FLUID AND ELECTROLYTES - ADULT  Goal: Electrolytes maintained within normal limits  Description: INTERVENTIONS:  - Monitor labs and assess patient for signs and symptoms of electrolyte imbalances  - Administer electrolyte replacement as ordered  - Monitor response to electrolyte replacements, including repeat lab results as appropriate  - Instruct patient on fluid and nutrition as appropriate  Outcome: Progressing  Goal: Fluid balance maintained  Description: INTERVENTIONS:  - Monitor labs   - Monitor I/O and WT  - Instruct patient on fluid and nutrition as appropriate  - Assess for signs & symptoms of volume excess or deficit  Outcome: Progressing  Goal: Glucose maintained within target range  Description: INTERVENTIONS:  - Monitor Blood Glucose as ordered  - Assess for signs and symptoms of hyperglycemia and hypoglycemia  - Administer ordered medications to maintain glucose within target range  - Assess nutritional intake and initiate nutrition service referral as needed  Outcome: Progressing

## 2021-02-08 NOTE — TELEPHONE ENCOUNTER
As a final attempt, a third outreach has been made via telephone call and fax  Please see Contacts section for details  This encounter will be closed and completed by end of day  Should we receive the requested information because of previous outreach attempts, the requested patient's chart will be updated appropriately       Thank you  Feliz Montoya

## 2021-02-08 NOTE — UTILIZATION REVIEW
Continued Stay Review    Date  2/7                          Current Patient Class: IP Current Level of Care: MS    HPI:50 y o  female initially admitted on 1/26 w/ Sigmoid Colon Perforation with abscess s/p ex lap with sigmoid colon resection and colostomy on 1/26/2021    Assessment/Plan: POD9 bring back to OR, washout, rectal stump reinforcement, fascial closure  POD11 bring back to OR, washout, attempted formation of descending colon colostomy, descending colon resection, partial omentectomy, formation of transverse colostomy, ABThera placement   POD12 exploratory laparotomy, sigmoid colon resection, ABThera placement  Pneumoperitoneum   Whit count stable 12 19  hgb stable 9 2  ostomy output w/ coffee ground material   APURVA 50 ml s/s   Wound vac in place at surgical incision , holding suction  Hypocalcemia improving and monitor  Hypokalemia cont to replete  Protein deficiency , poor po intake , cont w/ supplements and monitor   Enc OOB ,monitor APURVA drain , IM following   Pain control   2/7 IM Note   ON surgical full liq diet   IV abx completed  Continue to replace electrolytes as needed, will replace potassium, phosphorus, magnesium  Cont IVF , pt not able to tolerate significant po intake   Monitor kidney function       Pertinent Labs/Diagnostic Results:     Results from last 7 days   Lab Units 02/07/21  0901 02/06/21  0512 02/05/21  0610 02/04/21  1218 02/03/21  1625 02/02/21  0548   WBC [place]/uL 12 19* 12 36* 12 48* 16 11* 15 01* 9 83   HEMOGLOBIN g/dL 9 2* 8 5* 8 2* 9 6* 8 5* 8 6*   HEMATOCRIT % 29 6* 26 7* 25 9* 30 6* 27 1* 27 5*   PLATELETS [place]/uL 642* 629* 611* 730* 628* 608*   NEUTROS ABS [place]/µL  --   --  10 26* 13 85* 13 26*  --    BANDS PCT %  --  3  --   --   --  4     Results from last 7 days   Lab Units 02/07/21  0901 02/06/21  0512 02/05/21  0929 02/05/21  0610 02/04/21  1218 02/04/21  0556   SODIUM mmol/L 140 142  --  142  --  145   POTASSIUM mmol/L 3 2* 3 0*  --  2 6* 3 0* 2 8* CHLORIDE mmol/L 104 108  --  107  --  109*   CO2 mmol/L 27 27  --  27  --  27   ANION GAP mmol/L 9 7  --  8  --  9   BUN mg/dL 5 7  --  7  --  15   CREATININE mg/dL 0 66 0 62  --  0 60  --  0 63   EGFR ml/min/1 73sq m 105 107  --  108  --  106   CALCIUM mg/dL 8 0* 7 6*  --  7 0*  --  7 4*   CALCIUM, IONIZED mmol/L  --   --  0 98*  --   --   --    MAGNESIUM mg/dL 1 7 1 9  --  1 6  --  1 7   PHOSPHORUS mg/dL 1 8* 2 1*  --  1 8* 1 6* 1 5*     Results from last 7 days   Lab Units 02/05/21  0610 02/04/21  0556   AST U/L 14 14   ALT U/L 10* 13   ALK PHOS U/L 63 63   TOTAL PROTEIN g/dL 4 8* 5 2*   ALBUMIN g/dL 1 7* 1 9*   TOTAL BILIRUBIN mg/dL 0 20 0 20     Results from last 7 days   Lab Units 02/07/21  0628 02/07/21  0018 02/06/21  1540 02/06/21  1109 02/06/21  0720 02/06/21  0643 02/05/21  2347 02/05/21  2120 02/05/21  1843 02/05/21  1259 02/05/21  0621 02/05/21  0000   POC GLUCOSE mg/dl 91 98 106 100 102 106 112 114 124 119 107 108     Results from last 7 days   Lab Units 02/07/21  0901 02/06/21  0512 02/05/21  0610 02/04/21  0556 02/03/21  1625 02/02/21  0548   GLUCOSE RANDOM mg/dL 114 101 108 116 140 138     Results from last 7 days   Lab Units 02/06/21  0512 02/02/21  0548   TOTAL COUNTED  100 100     Vital Signs:  02/07/21 21:33:59  98 9 °F (37 2 °C)  97  17  132/78  96  97 %  --   02/07/21 15:58:43  97 1 °F (36 2 °C)Abnormal   105  18  133/79  97  99 %  --   02/07/21 08:27:55  97 9 °F (36 6 °C)  88  18  145/88  107  98 %  None (Room air)         Medications:   Scheduled Medications:  acetaminophen, 975 mg, Oral, Q8H [address]  calcium carbonate, 1 tablet, Oral, Daily With Dinner  fluconazole, 200 mg, Oral, Daily  fluticasone-vilanterol, 1 puff, Inhalation, Daily  heparin (porcine), 5,000 Units, Subcutaneous, Q8H LUI  levothyroxine, 75 mcg, Oral, Early Morning  lidocaine, 2 patch, Topical, Daily  magnesium oxide, 400 mg, Oral, BID  potassium phosphate, 21 mmol, Intravenous, Once  potassium-sodium phosphateS, 1 tablet, Oral, TID With Meals      Continuous IV Infusions:  multi-electrolyte, 75 mL/hr, Intravenous, Continuous      PRN Meds:  albuterol, 2 5 mg, Nebulization, Q6H PRN  HYDROmorphone, 0 5 mg, Intravenous, Q4H PRN    Or  HYDROmorphone, 1 mg, Intravenous, Q4H PRN  ondansetron, 4 mg, Intravenous, Q6H PRN      Discharge Plan: TBD     Network Utilization Review Department  ATTENTION: Please call with any questions or concerns to [phone] and carefully listen to the prompts so that you are directed to the right person  All voicemails are confidential   Marlen Foster all requests for admission clinical reviews, approved or denied determinations and any other requests to dedicated fax number below belonging to the campus where the patient is receiving treatment   List of dedicated fax numbers for the Facilities:  [address] DENIALS (Administrative/Medical Necessity) [phone]   [address] (Maternity/NICU/Pediatrics) [phone]   [address]  [address] (Julien Pizano "[address]) [address] [place] [address] [address] [place] [address] [phone]

## 2021-02-08 NOTE — ASSESSMENT & PLAN NOTE
Patient had hypokalemia hypophosphatemia hypomagnesemia, this is likely due to poor p o  Intake post surgery and significant electrolyte depletion    Continue to monitor replace electrolytes as needed

## 2021-02-08 NOTE — PROGRESS NOTES
Progress Note - Tuan Saniya 1970, 52 y o  female MRN: [id number]    Unit/Bed#: -01 Encounter: [id number]    Primary Care Provider: Randall Powers MD   Date and time admitted to hospital: 1/26/2021  2:12 PM        Perforation of sigmoid colon due to diverticulitis  Assessment & Plan  · Status post Ex lap/sigmoid, descending colon & transverse colon resection status post colostomy 01/26/2021  · Postsurgical management as per surgery  · Currently on surgical diet full liquids  · Diet management by surgical team   · On p r n  Dilaudid/Zofran  · Was on IV cefepime and Flagyl, now off antibiotics  Okay to monitor off antibiotics as there are no signs of sepsis at this point    There is some concern of inflammatory bowel disease given biopsy findings, GI following  * Pneumoperitoneum  Assessment & Plan  Secondary to perforation of sigmoid colon  See plan under perforation of sigmoid colon  Mild persistent asthma  Assessment & Plan  Currently stable  Continue with p r n  Albuterol/albuterol inhaler    Electrolyte abnormality  Assessment & Plan  Patient had hypokalemia hypophosphatemia hypomagnesemia, this is likely due to poor p o  Intake post surgery and significant electrolyte depletion  Continue to monitor replace electrolytes as needed    Acute renal failure (ARF) (HCC)  Assessment & Plan  Likely acute renal failure secondary to ischemic ATN due to perforated viscus  Creatinine now stable around 0 7  Once able to increase diet intake can discontinue IV fluids    Monitor kidney function    Hypothyroid  Assessment & Plan  Levothyroxine has been resumed, continue      VTE Pharmacologic Prophylaxis:   Pharmacologic: Heparin  Mechanical VTE Prophylaxis in Place: Yes    Patient Centered Rounds: I have performed bedside rounds with nursing staff today  Education and Discussions with Family / Patient:  Discussed with patient herself    Time Spent for Care: 30 minutes    More than 50% of total time spent on counseling and coordination of care as described above  Current Length of Stay: 13 day(s)    Current Patient Status: Inpatient   Certification Statement: The patient will continue to require additional inpatient hospital stay due to Need for optimization    Discharge Plan:  Once stable    Code Status: Level 1 - Full Code      Subjective:     Patient evaluated this morning  Tolerating diet so far  Otherwise denies nausea, vomiting or diarrhea  No other events reported  Objective:     Vitals:   Temp (24hrs), Av 8 °F (36 6 °C), Min:97 1 °F (36 2 °C), Max:98 9 °F (37 2 °C)    Temp:  [97 1 °F (36 2 °C)-98 9 °F (37 2 °C)] 97 3 °F (36 3 °C)  HR:  [] 95  Resp:  [17-19] 19  BP: (124-133)/(78-80) 124/80  SpO2:  [97 %-99 %] 98 %  Body mass index is 40 98 kg/m²  Input and Output Summary (last 24 hours): Intake/Output Summary (Last 24 hours) at 2021 1159  Last data filed at 2021 1002  Gross per 24 hour   Intake 2901 25 ml   Output 2655 ml   Net 246 25 ml       Physical Exam:     Physical Exam  Vitals signs and nursing note reviewed  Constitutional:       Appearance: Normal appearance  She is normal weight  Comments: Middle-age female in bed, awake, alert   HENT:      Head: Normocephalic and atraumatic  Right Ear: External ear normal       Left Ear: External ear normal       Nose: Nose normal  No congestion  Mouth/Throat:      Mouth: Mucous membranes are moist       Pharynx: Oropharynx is clear  No oropharyngeal exudate or posterior oropharyngeal erythema  Eyes:      General: No scleral icterus  Right eye: No discharge  Left eye: No discharge  Extraocular Movements: Extraocular movements intact  Conjunctiva/sclera: Conjunctivae normal       Pupils: Pupils are equal, round, and reactive to light  Neck:      Musculoskeletal: Normal range of motion and neck supple  No neck rigidity or muscular tenderness     Cardiovascular:      Rate and Rhythm: Normal rate and regular rhythm  Pulses: Normal pulses  Heart sounds: Normal heart sounds  No murmur  No friction rub  No gallop  Pulmonary:      Effort: Pulmonary effort is normal  No respiratory distress  Breath sounds: Normal breath sounds  No stridor  No wheezing, rhonchi or rales  Chest:      Chest wall: No tenderness  Abdominal:      General: Abdomen is flat  Bowel sounds are normal  There is no distension  Palpations: Abdomen is soft  There is no mass  Tenderness: There is no abdominal tenderness  There is no guarding or rebound  Comments: Status post laparotomy, there is a colostomy bag in place  There is also a drain in place as well as wound VAC   Musculoskeletal: Normal range of motion  General: No swelling, tenderness, deformity or signs of injury  Skin:     General: Skin is warm and dry  Capillary Refill: Capillary refill takes less than 2 seconds  Coloration: Skin is not jaundiced or pale  Findings: No bruising, erythema, lesion or rash  Neurological:      General: No focal deficit present  Mental Status: She is alert and oriented to person, place, and time  Mental status is at baseline  Cranial Nerves: No cranial nerve deficit  Sensory: No sensory deficit  Motor: No weakness  Coordination: Coordination normal    Psychiatric:         Mood and Affect: Mood normal          Behavior: Behavior normal          Thought Content:  Thought content normal          Judgment: Judgment normal            Additional Data:     Labs:    Results from last 7 days   Lab Units 02/08/21  0539  02/06/21  0512 02/05/21  0610   WBC Thousand/uL 9 19   < > 12 36* 12 48*   HEMOGLOBIN g/dL 8 0*   < > 8 5* 8 2*   HEMATOCRIT % 25 3*   < > 26 7* 25 9*   PLATELETS Thousands/uL 511*   < > 629* 611*   BANDS PCT %  --   --  3  --    NEUTROS PCT %  --   --   --  82*   LYMPHS PCT %  --   --   --  10*   LYMPHO PCT %  --   --  9*  --    MONOS PCT %  --   --   --  5   MONO PCT %  --   --  4  --    EOS PCT %  --   --  2 1    < > = values in this interval not displayed  Results from last 7 days   Lab Units 02/08/21  0539   SODIUM mmol/L 142   POTASSIUM mmol/L 3 5   CHLORIDE mmol/L 108   CO2 mmol/L 27   BUN mg/dL 5   CREATININE mg/dL 0 62   ANION GAP mmol/L 7   CALCIUM mg/dL 7 8*   ALBUMIN g/dL 1 8*   TOTAL BILIRUBIN mg/dL 0 30   ALK PHOS U/L 102   ALT U/L 12   AST U/L 15   GLUCOSE RANDOM mg/dL 100         Results from last 7 days   Lab Units 02/07/21  0628 02/07/21  0018 02/06/21  1540 02/06/21  1109 02/06/21  0720 02/06/21  0643 02/05/21  2347 02/05/21  2120 02/05/21  1843 02/05/21  1259 02/05/21  0621 02/05/21  0000   POC GLUCOSE mg/dl 91 98 106 100 102 106 112 114 124 119 107 108                   * I Have Reviewed All Lab Data Listed Above  * Additional Pertinent Lab Tests Reviewed:  [facility]    Recent Cultures (last 7 days):           Last 24 Hours Medication List:   Current Facility-Administered Medications   Medication Dose Route Frequency Provider Last Rate    acetaminophen  975 mg Oral Q8H [address] Ninfa Clement MD      albuterol  2 5 mg Nebulization Q6H PRN Alvarado Loza PA-C      calcium carbonate  1 tablet Oral Daily With [employer] Us[employer]UAQUIN      fluconazole  200 mg Oral Daily JUAQUIN Jackson      fluticasone-vilanterol  1 puff Inhalation Daily Grace Driscoll PA-C      heparin (porcine)  5,000 Units Subcutaneous [place], [place]      HYDROmorphone  0 5 mg Intravenous Q4H PRN Ninfa Clement MD      Or   Yarely Matta HYDROmorphone  1 mg Intravenous Q4H PRN Ninfa Clement MD      levothyroxine  75 mcg Oral Early Morning Robert Holland MD      lidocaine  2 patch Topical Daily Ninfa Clement MD      magnesium oxide  400 mg Oral BID [place] JUAQUIN Hall      multi-electrolyte  75 mL/hr Intravenous Continuous Robert Holland MD 75 mL/hr (02/08/21 7606)    ondansetron 4 mg Intravenous Q6H PRN Jacob Coronado PA-C      potassium phosphate  21 mmol Intravenous Once Filipe Schultz MD 21 mmol (02/08/21 1002)    potassium-sodium phosphateS  1 tablet Oral TID With Ramesh Dan MD          Today, Patient Was Seen By: Filipe Schultz MD    ** Please Note: Dictation voice to text software may have been used in the creation of this document   **

## 2021-02-09 LAB
ALBUMIN SERPL BCP-MCNC: 1.8 G/DL (ref 3.5–5)
ALP SERPL-CCNC: 97 U/L (ref 46–116)
ALT SERPL W P-5'-P-CCNC: 13 U/L (ref 12–78)
ANION GAP SERPL CALCULATED.3IONS-SCNC: 7 MMOL/L (ref 4–13)
AST SERPL W P-5'-P-CCNC: 13 U/L (ref 5–45)
BAKER'S YEAST IGG QN IA: 28 UNITS (ref 0–50)
BASOPHILS # BLD MANUAL: 0 THOUSAND/UL (ref 0–0.1)
BASOPHILS NFR MAR MANUAL: 0 % (ref 0–1)
BILIRUB SERPL-MCNC: 0.3 MG/DL (ref 0.2–1)
BUN SERPL-MCNC: 6 MG/DL (ref 5–25)
CALCIUM ALBUM COR SERPL-MCNC: 9.6 MG/DL (ref 8.3–10.1)
CALCIUM SERPL-MCNC: 7.8 MG/DL (ref 8.3–10.1)
CHITOBIOSIDE IGA SERPL IA-ACNC: 52 UNITS (ref 0–90)
CHLORIDE SERPL-SCNC: 106 MMOL/L (ref 100–108)
CO2 SERPL-SCNC: 27 MMOL/L (ref 21–32)
CREAT SERPL-MCNC: 0.58 MG/DL (ref 0.6–1.3)
EOSINOPHIL # BLD MANUAL: 0.18 THOUSAND/UL (ref 0–0.4)
EOSINOPHIL NFR BLD MANUAL: 2 % (ref 0–6)
ERYTHROCYTE [DISTWIDTH] IN BLOOD BY AUTOMATED COUNT: 18.9 % (ref 11.6–15.1)
GFR SERPL CREATININE-BSD FRML MDRD: 109 ML/MIN/1.73SQ M
GLUCOSE SERPL-MCNC: 97 MG/DL (ref 65–140)
HCT VFR BLD AUTO: 25.9 % (ref 34.8–46.1)
HGB BLD-MCNC: 7.9 G/DL (ref 11.5–15.4)
HGB BLD-MCNC: 8.4 G/DL (ref 11.5–15.4)
IBD COMMENTS: NORMAL
LAMINARIBIOSIDE IGG SERPL IA-ACNC: 46 UNITS (ref 0–60)
LYMPHOCYTES # BLD AUTO: 2.6 THOUSAND/UL (ref 0.6–4.47)
LYMPHOCYTES # BLD AUTO: 29 % (ref 14–44)
MAGNESIUM SERPL-MCNC: 1.7 MG/DL (ref 1.6–2.6)
MANNOBIOSIDE IGG SERPL IA-ACNC: 72 UNITS (ref 0–100)
MCH RBC QN AUTO: 27.6 PG (ref 26.8–34.3)
MCHC RBC AUTO-ENTMCNC: 30.5 G/DL (ref 31.4–37.4)
MCV RBC AUTO: 91 FL (ref 82–98)
METAMYELOCYTES NFR BLD MANUAL: 2 % (ref 0–1)
MONOCYTES # BLD AUTO: 0.36 THOUSAND/UL (ref 0–1.22)
MONOCYTES NFR BLD: 4 % (ref 4–12)
MYELOCYTES NFR BLD MANUAL: 1 % (ref 0–1)
NEUTROPHILS # BLD MANUAL: 5.57 THOUSAND/UL (ref 1.85–7.62)
NEUTS BAND NFR BLD MANUAL: 2 % (ref 0–8)
NEUTS SEG NFR BLD AUTO: 60 % (ref 43–75)
NRBC BLD AUTO-RTO: 0 /100 WBCS
P-ANCA ATYPICAL SER QL IF: NEGATIVE
PHOSPHATE SERPL-MCNC: 2.8 MG/DL (ref 2.7–4.5)
PLATELET # BLD AUTO: 460 THOUSANDS/UL (ref 149–390)
PLATELET BLD QL SMEAR: ABNORMAL
PMV BLD AUTO: 9.9 FL (ref 8.9–12.7)
POTASSIUM SERPL-SCNC: 3.8 MMOL/L (ref 3.5–5.3)
PROT SERPL-MCNC: 5.2 G/DL (ref 6.4–8.2)
RBC # BLD AUTO: 2.86 MILLION/UL (ref 3.81–5.12)
SODIUM SERPL-SCNC: 140 MMOL/L (ref 136–145)
TOTAL CELLS COUNTED SPEC: 100
WBC # BLD AUTO: 8.98 THOUSAND/UL (ref 4.31–10.16)

## 2021-02-09 PROCEDURE — 83735 ASSAY OF MAGNESIUM: CPT | Performed by: PHYSICIAN ASSISTANT

## 2021-02-09 PROCEDURE — 94664 DEMO&/EVAL PT USE INHALER: CPT

## 2021-02-09 PROCEDURE — 85007 BL SMEAR W/DIFF WBC COUNT: CPT | Performed by: PHYSICIAN ASSISTANT

## 2021-02-09 PROCEDURE — 94760 N-INVAS EAR/PLS OXIMETRY 1: CPT

## 2021-02-09 PROCEDURE — 99024 POSTOP FOLLOW-UP VISIT: CPT | Performed by: SURGERY

## 2021-02-09 PROCEDURE — 85018 HEMOGLOBIN: CPT | Performed by: PHYSICIAN ASSISTANT

## 2021-02-09 PROCEDURE — 85027 COMPLETE CBC AUTOMATED: CPT | Performed by: PHYSICIAN ASSISTANT

## 2021-02-09 PROCEDURE — 84100 ASSAY OF PHOSPHORUS: CPT | Performed by: PHYSICIAN ASSISTANT

## 2021-02-09 PROCEDURE — 80053 COMPREHEN METABOLIC PANEL: CPT | Performed by: PHYSICIAN ASSISTANT

## 2021-02-09 PROCEDURE — 99232 SBSQ HOSP IP/OBS MODERATE 35: CPT | Performed by: INTERNAL MEDICINE

## 2021-02-09 RX ADMIN — HEPARIN SODIUM 5000 UNITS: 5000 INJECTION INTRAVENOUS; SUBCUTANEOUS at 13:33

## 2021-02-09 RX ADMIN — HYDROMORPHONE HYDROCHLORIDE 1 MG: 1 INJECTION, SOLUTION INTRAMUSCULAR; INTRAVENOUS; SUBCUTANEOUS at 22:05

## 2021-02-09 RX ADMIN — HEPARIN SODIUM 5000 UNITS: 5000 INJECTION INTRAVENOUS; SUBCUTANEOUS at 05:14

## 2021-02-09 RX ADMIN — ACETAMINOPHEN 975 MG: 325 TABLET, FILM COATED ORAL at 05:14

## 2021-02-09 RX ADMIN — MAGNESIUM GLUCONATE 500 MG ORAL TABLET 400 MG: 500 TABLET ORAL at 17:13

## 2021-02-09 RX ADMIN — ACETAMINOPHEN 975 MG: 325 TABLET, FILM COATED ORAL at 13:33

## 2021-02-09 RX ADMIN — FLUCONAZOLE 200 MG: 100 TABLET ORAL at 09:06

## 2021-02-09 RX ADMIN — LIDOCAINE 5% 2 PATCH: 700 PATCH TOPICAL at 09:07

## 2021-02-09 RX ADMIN — HEPARIN SODIUM 5000 UNITS: 5000 INJECTION INTRAVENOUS; SUBCUTANEOUS at 22:05

## 2021-02-09 RX ADMIN — LEVOTHYROXINE SODIUM 75 MCG: 75 TABLET ORAL at 05:14

## 2021-02-09 RX ADMIN — Medication 1 TABLET: at 09:06

## 2021-02-09 RX ADMIN — MAGNESIUM GLUCONATE 500 MG ORAL TABLET 400 MG: 500 TABLET ORAL at 09:06

## 2021-02-09 RX ADMIN — FLUTICASONE FUROATE AND VILANTEROL TRIFENATATE 1 PUFF: 100; 25 POWDER RESPIRATORY (INHALATION) at 09:07

## 2021-02-09 NOTE — PROGRESS NOTES
Progress Note - Elian Eldridge 1970, 52 y o  female MRN: [id number]    Unit/Bed#: -01 Encounter: [id number]    Primary Care Provider: Huong Beck MD   Date and time admitted to hospital: 1/26/2021  2:12 PM        Perforation of sigmoid colon due to diverticulitis  Assessment & Plan  · Status post Ex lap/sigmoid, descending colon & transverse colon resection status post colostomy 01/26/2021  · Postsurgical management as per surgery  · Diet advanced to regular diet as per surgery  · Diet management by surgical team   · On p r n  Dilaudid/Zofran  · Was on IV cefepime and Flagyl, now off antibiotics  Okay to monitor off antibiotics as there are no signs of sepsis at this point    There is some concern of inflammatory bowel disease given biopsy findings, GI following  * Pneumoperitoneum  Assessment & Plan  Secondary to perforation of sigmoid colon  See plan under perforation of sigmoid colon  Mild persistent asthma  Assessment & Plan  Currently stable  Continue with p r n  Albuterol/albuterol inhaler    Electrolyte abnormality  Assessment & Plan  Patient had hypokalemia hypophosphatemia hypomagnesemia, this is likely due to poor p o  Intake post surgery and significant electrolyte depletion  Continue to monitor electrolytes    Acute renal failure (ARF) (HCC)  Assessment & Plan  Likely acute renal failure secondary to ischemic ATN due to perforated viscus  Creatinine now stable    Monitor kidney function    Hypothyroid  Assessment & Plan  Levothyroxine has been resumed, continue    Given anemia concern raised by surgical team will continue to follow  Follow iron studies, if low consider venofer  VTE Pharmacologic Prophylaxis:   Pharmacologic: Heparin  Mechanical VTE Prophylaxis in Place: Yes    Patient Centered Rounds: I have performed bedside rounds with nursing staff today      Discussions with Specialists or Other Care Team Provider:  Discussed with surgery    Education and Discussions with Family / Patient:  Patient herself    Time Spent for Care: 30 minutes  More than 50% of total time spent on counseling and coordination of care as described above  Current Length of Stay: 14 day(s)    Current Patient Status: Inpatient   Certification Statement: The patient will continue to require additional inpatient hospital stay due to Surgical management    Discharge Plan:  Once stable    Code Status: Level 1 - Full Code      Subjective:     Patient evaluated this morning  She feels much better, currently tolerating diet, denies any nausea vomiting  There is still some output in the wound VAC  Abdominal pain has improved  No other events reported  Objective:     Vitals:   Temp (24hrs), Av 9 °F (36 6 °C), Min:97 9 °F (36 6 °C), Max:97 9 °F (36 6 °C)    Temp:  [97 9 °F (36 6 °C)] 97 9 °F (36 6 °C)  HR:  [] 102  Resp:  [18-20] 18  BP: (118-139)/(75-83) 139/75  SpO2:  [92 %-99 %] 93 %  Body mass index is 41 25 kg/m²  Input and Output Summary (last 24 hours): Intake/Output Summary (Last 24 hours) at 2021 1130  Last data filed at 2021 1001  Gross per 24 hour   Intake 1347 71 ml   Output 2480 ml   Net -1132 29 ml       Physical Exam:     Physical Exam  Vitals signs and nursing note reviewed  Constitutional:       Appearance: Normal appearance  She is normal weight  Comments: Middle-age female in bed, awake, alert   HENT:      Head: Normocephalic and atraumatic  Right Ear: External ear normal       Left Ear: External ear normal       Nose: Nose normal  No congestion  Mouth/Throat:      Mouth: Mucous membranes are moist       Pharynx: Oropharynx is clear  No oropharyngeal exudate or posterior oropharyngeal erythema  Eyes:      General: No scleral icterus  Right eye: No discharge  Left eye: No discharge  Extraocular Movements: Extraocular movements intact        Conjunctiva/sclera: Conjunctivae normal       Pupils: Pupils are equal, round, and reactive to light  Neck:      Musculoskeletal: Normal range of motion and neck supple  No neck rigidity or muscular tenderness  Cardiovascular:      Rate and Rhythm: Normal rate and regular rhythm  Pulses: Normal pulses  Heart sounds: Normal heart sounds  No murmur  No friction rub  No gallop  Pulmonary:      Effort: Pulmonary effort is normal  No respiratory distress  Breath sounds: Normal breath sounds  No stridor  No wheezing, rhonchi or rales  Chest:      Chest wall: No tenderness  Abdominal:      General: Abdomen is flat  Bowel sounds are normal  There is no distension  Palpations: Abdomen is soft  There is no mass  Tenderness: There is no abdominal tenderness  There is no guarding or rebound  Comments: Status post laparotomy, there is a colostomy bag in place  There is also a drain in place as well as wound VAC   Musculoskeletal: Normal range of motion  General: No swelling, tenderness, deformity or signs of injury  Skin:     General: Skin is warm and dry  Capillary Refill: Capillary refill takes less than 2 seconds  Coloration: Skin is not jaundiced or pale  Findings: No bruising, erythema, lesion or rash  Neurological:      General: No focal deficit present  Mental Status: She is alert and oriented to person, place, and time  Mental status is at baseline  Cranial Nerves: No cranial nerve deficit  Sensory: No sensory deficit  Motor: No weakness  Coordination: Coordination normal    Psychiatric:         Mood and Affect: Mood normal          Behavior: Behavior normal          Thought Content:  Thought content normal          Judgment: Judgment normal            Additional Data:     Labs:    Results from last 7 days   Lab Units 02/09/21  0604  02/05/21  0610   WBC Thousand/uL 8 98   < > 12 48*   HEMOGLOBIN g/dL 7 9*   < > 8 2*   HEMATOCRIT % 25 9*   < > 25 9*   PLATELETS Thousands/uL 460*   < > 611*   BANDS PCT % 2 < >  --    NEUTROS PCT %  --   --  82*   LYMPHS PCT %  --   --  10*   LYMPHO PCT % 29   < >  --    MONOS PCT %  --   --  5   MONO PCT % 4   < >  --    EOS PCT % 2   < > 1    < > = values in this interval not displayed  Results from last 7 days   Lab Units 02/09/21  0604   SODIUM mmol/L 140   POTASSIUM mmol/L 3 8   CHLORIDE mmol/L 106   CO2 mmol/L 27   BUN mg/dL 6   CREATININE mg/dL 0 58*   ANION GAP mmol/L 7   CALCIUM mg/dL 7 8*   ALBUMIN g/dL 1 8*   TOTAL BILIRUBIN mg/dL 0 30   ALK PHOS U/L 97   ALT U/L 13   AST U/L 13   GLUCOSE RANDOM mg/dL 97         Results from last 7 days   Lab Units 02/07/21  0628 02/07/21  0018 02/06/21  1540 02/06/21  1109 02/06/21  0720 02/06/21  0643 02/05/21  2347 02/05/21  2120 02/05/21  1843 02/05/21  1259 02/05/21  0621 02/05/21  0000   POC GLUCOSE mg/dl 91 98 106 100 102 106 112 114 124 119 107 108                   * I Have Reviewed All Lab Data Listed Above  * Additional Pertinent Lab Tests Reviewed:  [facility]      Recent Cultures (last 7 days):           Last 24 Hours Medication List:   Current Facility-Administered Medications   Medication Dose Route Frequency Provider Last Rate    acetaminophen  975 mg Oral Q8H [address] Mary Tena MD      albuterol  2 5 mg Nebulization Q6H PRN David Ashby PA-C      calcium carbonate  1 tablet Oral Daily With [employer] JUAQUIN Genao      fluconazole  200 mg Oral Daily JUAQUIN Jackson      fluticasone-vilanterol  1 puff Inhalation Daily Grace Driscoll PA-C      heparin (porcine)  5,000 Units Subcutaneous [place], [place]      HYDROmorphone  0 5 mg Intravenous Q4H PRN Mary Tena MD      Or   [facility] HYDROmorphone  1 mg Intravenous Q4H PRN Mary Tena MD      levothyroxine  75 mcg Oral Early Morning Ama Elliott MD      lidocaine  2 patch Topical Daily Mary Tena MD      magnesium oxide  400 mg Oral BID Angela Camilo PA-C      ondansetron  4 mg Intravenous Q6H PRN Mile No PA-C          Today, Patient Was Seen By: Chen Rincon MD    ** Please Note: Dictation voice to text software may have been used in the creation of this document   **

## 2021-02-09 NOTE — PROGRESS NOTES
Pt is sleeping in bed  No complaints at this time  Vac is functioning, APURVA drain has minimal drainage, colostomy is WDL  Will continue to monitor pt

## 2021-02-09 NOTE — PROGRESS NOTES
Assisted pt with help of PCA OOB to bedside commode  Colostomy bag emptied, 500 ml  Pt passing gas  Pt having 8/10 pain and given 1 mg Dilaudid  Will continue to monitor pt

## 2021-02-09 NOTE — ASSESSMENT & PLAN NOTE
Patient had hypokalemia hypophosphatemia hypomagnesemia, this is likely due to poor p o  Intake post surgery and significant electrolyte depletion    Continue to monitor electrolytes

## 2021-02-09 NOTE — ASSESSMENT & PLAN NOTE
Likely acute renal failure secondary to ischemic ATN due to perforated viscus    Creatinine now stable    Monitor kidney function

## 2021-02-09 NOTE — ASSESSMENT & PLAN NOTE
· Status post Ex lap/sigmoid, descending colon & transverse colon resection status post colostomy 01/26/2021  · Postsurgical management as per surgery  · Diet advanced to regular diet as per surgery  · Diet management by surgical team   · On p r n  Dilaudid/Zofran  · Was on IV cefepime and Flagyl, now off antibiotics  Okay to monitor off antibiotics as there are no signs of sepsis at this point    There is some concern of inflammatory bowel disease given biopsy findings, GI following

## 2021-02-09 NOTE — CASE MANAGEMENT
CM received wound vac script and sent referral to [facility] via ecin  CM awaiting nutrition consult  Per Surgical AP pt anticipated to dc tomorrow vs Thursday and wound vac can be placed her vs home  SOHAN sent message to Enrique Farmer requested updated [facility] for 2/11  CM awaiting response

## 2021-02-09 NOTE — CASE MANAGEMENT
CM s/w surgical ap for update  Per surgical ap they are recommending rehab as well and pt is refusing  Per surgical ap pt needs [facility] for wound vac  CM requested script for wound vac  CM offered via email script  CM also requested nutrition cx given poor oral intake and wound vac  Surgical AP to order and provide script to cm  CM awaiting wound vac order

## 2021-02-09 NOTE — CASE MANAGEMENT
CM received link for [facility] via email to obtain updates on process  [facility] vac has been submitted and representative is Nba Panchal, [phone]  Per [employer] they will notify CM with SOC

## 2021-02-09 NOTE — PROGRESS NOTES
Pt is watching TV in bed  Denies pain  Assisted pt to bathroom with walker  Pt is paced on heart telemetry  Will continue to monitor pt

## 2021-02-09 NOTE — PROGRESS NOTES
Progress Note - General Surgery   China Cardoza 52 y o  female MRN: [id number]  Unit/Bed#: -01 Encounter: [id number]    Assessment/Plan  China Cardoza is a 52 y o  female       Sigmoid Colon Perforation with abscess s/p ex lap with sigmoid colon resection and colostomy on 1/26/2021  POD11 bring back to OR, washout, rectal stump reinforcement, fascial closure  POD13 bring back to OR, washout, attempted formation of descending colon colostomy, descending colon resection, partial omentectomy, formation of transverse colostomy, ABThera placement   POD14 exploratory laparotomy, sigmoid colon resection, ABThera placement    -APURVA drain with 10 cc serous fluids  -Adequate UO, 1 4 L yesterday  -Ostomy output: 750 mL yesterday  -Midline incision wound vac on 125 mmhg continuous suction   -WBC stable 8 98     -Tolerating reg diet  -Continue nutritional supplements, Ensure with meals  -Analgesia prn  -Antiemetics prn  -DVT prophylaxis SQH  -IS q hour  -OOB and ambulate three times a day  -Serial abdominal exams  -Change wound vac q MWF  -Continue Diflucan for 1 week course (day #3)  -PT recommending STR, patient and her  are refusing but are willing to do Micau 78   Plan for [facility] for PT, OT, and nursing upon d/c   -Appreciate medicine input for medical management  -Anticipate discharge tomorrow; will d/c picc line and APURVA drain tomorrow  -If wound vac available tomorrow will change, if not will dress wound and plan for vac placement by Micau Marleny; will coordinate with case management   -Wound care nurse will see before d/c     Post op anemia  -Has been between 10 and 8 since admission  -7 9 today down from 8, 9 2  -Continue to monitor Hgb  -Consider transfusion if <7     Protein nutrition deficiency  -Still having low albumin (1 80) and total protein (5 2)  -Should show improvement with diet fully advanced   -Contine with daily nutritional supplement with meals      Hypocalcemia -stable  -Has been stable at calcium 7 8, corrected calcium normal 9 6  - ionized calcium 0 93 on 2/5, will continue to monitor    Thrombocytosis   -460 today, down trending (511, 642)  -unclear etiology  -continue to monitor       Subjective/Objective     Subjective: Patient is doing well  No acute events overnight  Abdominal discomfort has improved to 6/10  Got up oob once yesterday  Tolerating diet well, no n/v  Having good ostomy output, with some production of gas  Voiding with no issues  Excited for upcoming discharge  Willing to have [address], PT, OT  Denies fevers/chills, SOB, cough, CP, palpitations, LE tenderness  Objective:     Blood pressure 139/75, pulse 102, temperature 97 9 °F (36 6 °C), resp  rate 18, height 5' 4" (1 626 m), weight 109 kg (240 lb 4 8 oz), SpO2 92 %  ,Body mass index is 41 25 kg/m²  Intake/Output Summary (Last 24 hours) at 2/9/2021 0846  Last data filed at 2/9/2021 0801  Gross per 24 hour   Intake 1347 71 ml   Output 2105 ml   Net -757 29 ml       Invasive Devices     Peripherally Inserted Central Catheter Line            PICC Line 94/53/43 Right Basilic 5 days          Drain            Colostomy Transverse 12 days    Closed/Suction Drain RLQ Bulb 10 days    Negative Pressure Wound Therapy (V A C ) Abdomen 2 days                Physical Exam:   Physical Exam  Vitals signs and nursing note reviewed  Constitutional:       General: She is not in acute distress  Appearance: Normal appearance  HENT:      Head: Normocephalic and atraumatic  Right Ear: External ear normal       Left Ear: External ear normal       Nose: No congestion or rhinorrhea  Mouth/Throat:      Mouth: Mucous membranes are moist       Pharynx: Oropharynx is clear  Comments: Poor dentition  Eyes:      General: No scleral icterus  Extraocular Movements: Extraocular movements intact  Conjunctiva/sclera: Conjunctivae normal       Pupils: Pupils are equal, round, and reactive to light     Neck: Musculoskeletal: Normal range of motion and neck supple  Cardiovascular:      Rate and Rhythm: Normal rate and regular rhythm  Heart sounds: Normal heart sounds  No murmur  No friction rub  No gallop  Pulmonary:      Effort: Pulmonary effort is normal       Breath sounds: Normal breath sounds  No wheezing, rhonchi or rales  Abdominal:      General: Bowel sounds are normal  There is no distension  Palpations: Abdomen is soft  There is no mass  Tenderness: There is abdominal tenderness (mild generalized tenderness )  There is no guarding or rebound  Comments: Transverse ostomy with thick liquidy brown stool   Wound vac on midline incision with adequate suction at 125 mmHg  APURVA drain C/D/I with 10 cc serous fluid      Musculoskeletal: Normal range of motion  General: No swelling or tenderness  Right lower leg: No edema  Left lower leg: No edema  Skin:     General: Skin is warm and dry  Coloration: Skin is not jaundiced  Neurological:      General: No focal deficit present  Mental Status: She is alert and oriented to person, place, and time  Psychiatric:         Mood and Affect: Mood normal          Behavior: Behavior normal          Thought Content: Thought content normal          Lab, Imaging and other studies:  I have personally reviewed pertinent lab results    , CBC:   Lab Results   Component Value Date    WBC 8 98 02/09/2021    HGB 7 9 (L) 02/09/2021    HCT 25 9 (L) 02/09/2021    MCV 91 02/09/2021     (H) 02/09/2021    MCH 27 6 02/09/2021    MCHC 30 5 (L) 02/09/2021    RDW 18 9 (H) 02/09/2021    MPV 9 9 02/09/2021    NRBC 0 02/09/2021   , CMP:   Lab Results   Component Value Date    SODIUM 140 02/09/2021    K 3 8 02/09/2021     02/09/2021    CO2 27 02/09/2021    BUN 6 02/09/2021    CREATININE 0 58 (L) 02/09/2021    CALCIUM 7 8 (L) 02/09/2021    AST 13 02/09/2021    ALT 13 02/09/2021    ALKPHOS 97 02/09/2021    EGFR 109 02/09/2021   , Coagulation: No results found for: PT, INR, APTT, Urinalysis: No results found for: Nickola Khan, SPECGRAV, PHUR, LEUKOCYTESUR, NITRITE, PROTEINUA, GLUCOSEU, KETONESU, BILIRUBINUR, BLOODU, Amylase: No results found for: AMYLASE, Lipase: No results found for: LIPASE   Xr Chest Portable Icu    Result Date: 1/27/2021  Impression: No acute cardiopulmonary disease  ET tube 4 cm above the shreyas  Workstation performed: [id number]     Ct Abdomen Pelvis With Contrast    Result Date: 1/26/2021  Impression: 1  Mild pancolonic wall thickening with minimal pericolonic fat stranding extending from the cecum to sigmoid colon  Findings are suggestive of infectious/inflammatory or less likely ischemic colitis  2   Moderate volume pneumoperitoneum, suggestive of perforated viscus  However, the site of perforation is not definitively identified  Urgent surgical consultation is recommended    I personally discussed this study with [facility] on 1/26/2021 at 4:30 PM  Workstation performed: [id number]     VTE Pharmacologic Prophylaxis: Heparin  VTE Mechanical Prophylaxis: sequential compression device    Recent Results (from the past 36 hour(s))   CBC and differential    Collection Time: 02/08/21  5:39 AM   Result Value Ref Range    WBC 9 19 4 31 - 10 16 Thousand/uL    RBC 2 86 (L) 3 81 - 5 12 Million/uL    Hemoglobin 8 0 (L) 11 5 - 15 4 g/dL    Hematocrit 25 3 (L) 34 8 - 46 1 %    MCV 89 82 - 98 fL    MCH 28 0 26 8 - 34 3 pg    MCHC 31 6 31 4 - 37 4 g/dL    RDW 17 9 (H) 11 6 - 15 1 %    MPV 9 8 8 9 - 12 7 fL    Platelets 545 (H) 491 - 390 Thousands/uL    nRBC 0 /100 WBCs   Comprehensive metabolic panel    Collection Time: 02/08/21  5:39 AM   Result Value Ref Range    Sodium 142 136 - 145 mmol/L    Potassium 3 5 3 5 - 5 3 mmol/L    Chloride 108 100 - 108 mmol/L    CO2 27 21 - 32 mmol/L    ANION GAP 7 4 - 13 mmol/L    BUN 5 5 - 25 mg/dL    Creatinine 0 62 0 60 - 1 30 mg/dL    Glucose 100 65 - 140 mg/dL    Calcium 7 8 (L) 8 3 - 10 1 mg/dL    Corrected Calcium 9 6 8 3 - 10 1 mg/dL    AST 15 5 - 45 U/L    ALT 12 12 - 78 U/L    Alkaline Phosphatase 102 46 - 116 U/L    Total Protein 5 1 (L) 6 4 - 8 2 g/dL    Albumin 1 8 (L) 3 5 - 5 0 g/dL    Total Bilirubin 0 30 0 20 - 1 00 mg/dL    eGFR 107 ml/min/1 73sq m   Phosphorus    Collection Time: 02/08/21  5:39 AM   Result Value Ref Range    Phosphorus 2 3 (L) 2 7 - 4 5 mg/dL   Magnesium    Collection Time: 02/08/21  5:39 AM   Result Value Ref Range    Magnesium 1 9 1 6 - 2 6 mg/dL   CBC and differential    Collection Time: 02/09/21  6:04 AM   Result Value Ref Range    WBC 8 98 4 31 - 10 16 Thousand/uL    RBC 2 86 (L) 3 81 - 5 12 Million/uL    Hemoglobin 7 9 (L) 11 5 - 15 4 g/dL    Hematocrit 25 9 (L) 34 8 - 46 1 %    MCV 91 82 - 98 fL    MCH 27 6 26 8 - 34 3 pg    MCHC 30 5 (L) 31 4 - 37 4 g/dL    RDW 18 9 (H) 11 6 - 15 1 %    MPV 9 9 8 9 - 12 7 fL    Platelets 320 (H) 336 - 390 Thousands/uL    nRBC 0 /100 WBCs   Comprehensive metabolic panel    Collection Time: 02/09/21  6:04 AM   Result Value Ref Range    Sodium 140 136 - 145 mmol/L    Potassium 3 8 3 5 - 5 3 mmol/L    Chloride 106 100 - 108 mmol/L    CO2 27 21 - 32 mmol/L    ANION GAP 7 4 - 13 mmol/L    BUN 6 5 - 25 mg/dL    Creatinine 0 58 (L) 0 60 - 1 30 mg/dL    Glucose 97 65 - 140 mg/dL    Calcium 7 8 (L) 8 3 - 10 1 mg/dL    Corrected Calcium 9 6 8 3 - 10 1 mg/dL    AST 13 5 - 45 U/L    ALT 13 12 - 78 U/L    Alkaline Phosphatase 97 46 - 116 U/L    Total Protein 5 2 (L) 6 4 - 8 2 g/dL    Albumin 1 8 (L) 3 5 - 5 0 g/dL    Total Bilirubin 0 30 0 20 - 1 00 mg/dL    eGFR 109 ml/min/1 73sq m   Magnesium    Collection Time: 02/09/21  6:04 AM   Result Value Ref Range    Magnesium 1 7 1 6 - 2 6 mg/dL   Phosphorus    Collection Time: 02/09/21  6:04 AM   Result Value Ref Range    Phosphorus 2 8 2 7 - 4 5 mg/dL   Manual Differential(PHLEBS Do Not Order)    Collection Time: 02/09/21  6:04 AM   Result Value Ref Range    Segmented % 60 43 - 75 %    Bands % 2 0 - 8 %    Lymphocytes % 29 14 - 44 %    Monocytes % 4 4 - 12 %    Eosinophils, % 2 0 - 6 %    Basophils % 0 0 - 1 %    Metamyelocytes% 2 (H) 0 - 1 %    Myelocytes % 1 0 - 1 %    Absolute Neutrophils 5 57 1 85 - 7 62 Thousand/uL    Lymphocytes Absolute 2 60 0 60 - 4 47 Thousand/uL    Monocytes Absolute 0 36 0 00 - 1 22 Thousand/uL    Eosinophils Absolute 0 18 0 00 - 0 40 Thousand/uL    Basophils Absolute 0 00 0 00 - 0 10 Thousand/uL    Total Counted 100     Platelet Estimate Increased (A) Adequate       [employer], PA-C  2/9/2021

## 2021-02-10 VITALS
HEART RATE: 102 BPM | SYSTOLIC BLOOD PRESSURE: 143 MMHG | TEMPERATURE: 98.4 F | BODY MASS INDEX: 40.84 KG/M2 | DIASTOLIC BLOOD PRESSURE: 86 MMHG | OXYGEN SATURATION: 97 % | HEIGHT: 64 IN | RESPIRATION RATE: 18 BRPM | WEIGHT: 239.2 LBS

## 2021-02-10 LAB
ANION GAP SERPL CALCULATED.3IONS-SCNC: 9 MMOL/L (ref 4–13)
BUN SERPL-MCNC: 8 MG/DL (ref 5–25)
CALCIUM SERPL-MCNC: 8.8 MG/DL (ref 8.3–10.1)
CHLORIDE SERPL-SCNC: 105 MMOL/L (ref 100–108)
CO2 SERPL-SCNC: 27 MMOL/L (ref 21–32)
CREAT SERPL-MCNC: 0.71 MG/DL (ref 0.6–1.3)
ERYTHROCYTE [DISTWIDTH] IN BLOOD BY AUTOMATED COUNT: 19.4 % (ref 11.6–15.1)
FERRITIN SERPL-MCNC: 449 NG/ML (ref 8–388)
GFR SERPL CREATININE-BSD FRML MDRD: 101 ML/MIN/1.73SQ M
GLUCOSE SERPL-MCNC: 103 MG/DL (ref 65–140)
HCT VFR BLD AUTO: 27.8 % (ref 34.8–46.1)
HGB BLD-MCNC: 8.6 G/DL (ref 11.5–15.4)
IRON SATN MFR SERPL: 21 %
IRON SERPL-MCNC: 28 UG/DL (ref 50–170)
MCH RBC QN AUTO: 28.1 PG (ref 26.8–34.3)
MCHC RBC AUTO-ENTMCNC: 30.9 G/DL (ref 31.4–37.4)
MCV RBC AUTO: 91 FL (ref 82–98)
NRBC BLD AUTO-RTO: 0 /100 WBCS
PLATELET # BLD AUTO: 464 THOUSANDS/UL (ref 149–390)
PMV BLD AUTO: 10.1 FL (ref 8.9–12.7)
POTASSIUM SERPL-SCNC: 4.1 MMOL/L (ref 3.5–5.3)
RBC # BLD AUTO: 3.06 MILLION/UL (ref 3.81–5.12)
SODIUM SERPL-SCNC: 141 MMOL/L (ref 136–145)
TIBC SERPL-MCNC: 134 UG/DL (ref 250–450)
WBC # BLD AUTO: 9.17 THOUSAND/UL (ref 4.31–10.16)

## 2021-02-10 PROCEDURE — 97606 NEG PRS WND THER DME>50 SQCM: CPT | Performed by: CLINICAL NURSE SPECIALIST

## 2021-02-10 PROCEDURE — 83550 IRON BINDING TEST: CPT | Performed by: INTERNAL MEDICINE

## 2021-02-10 PROCEDURE — 99232 SBSQ HOSP IP/OBS MODERATE 35: CPT | Performed by: FAMILY MEDICINE

## 2021-02-10 PROCEDURE — 83540 ASSAY OF IRON: CPT | Performed by: INTERNAL MEDICINE

## 2021-02-10 PROCEDURE — 99024 POSTOP FOLLOW-UP VISIT: CPT | Performed by: CLINICAL NURSE SPECIALIST

## 2021-02-10 PROCEDURE — 85027 COMPLETE CBC AUTOMATED: CPT | Performed by: PHYSICIAN ASSISTANT

## 2021-02-10 PROCEDURE — 82728 ASSAY OF FERRITIN: CPT | Performed by: INTERNAL MEDICINE

## 2021-02-10 PROCEDURE — 80048 BASIC METABOLIC PNL TOTAL CA: CPT | Performed by: PHYSICIAN ASSISTANT

## 2021-02-10 RX ORDER — HYDROMORPHONE HCL/PF 1 MG/ML
0.5 SYRINGE (ML) INJECTION EVERY 4 HOURS PRN
Status: DISCONTINUED | OUTPATIENT
Start: 2021-02-10 | End: 2021-02-10 | Stop reason: HOSPADM

## 2021-02-10 RX ORDER — FLUCONAZOLE 200 MG/1
200 TABLET ORAL DAILY
Qty: 1 TABLET | Refills: 0 | Status: SHIPPED | OUTPATIENT
Start: 2021-02-11 | End: 2021-02-12

## 2021-02-10 RX ORDER — HYDROMORPHONE HCL/PF 1 MG/ML
1 SYRINGE (ML) INJECTION EVERY 4 HOURS PRN
Status: DISCONTINUED | OUTPATIENT
Start: 2021-02-10 | End: 2021-02-10 | Stop reason: HOSPADM

## 2021-02-10 RX ORDER — HYDROCODONE BITARTRATE AND ACETAMINOPHEN 5; 325 MG/1; MG/1
1 TABLET ORAL EVERY 4 HOURS PRN
Status: DISCONTINUED | OUTPATIENT
Start: 2021-02-10 | End: 2021-02-10 | Stop reason: HOSPADM

## 2021-02-10 RX ORDER — HYDROCODONE BITARTRATE AND ACETAMINOPHEN 5; 325 MG/1; MG/1
1 TABLET ORAL EVERY 4 HOURS PRN
Qty: 12 TABLET | Refills: 0 | Status: SHIPPED | OUTPATIENT
Start: 2021-02-10 | End: 2021-02-13

## 2021-02-10 RX ADMIN — LEVOTHYROXINE SODIUM 75 MCG: 75 TABLET ORAL at 05:07

## 2021-02-10 RX ADMIN — FLUCONAZOLE 200 MG: 100 TABLET ORAL at 09:10

## 2021-02-10 RX ADMIN — HEPARIN SODIUM 5000 UNITS: 5000 INJECTION INTRAVENOUS; SUBCUTANEOUS at 05:07

## 2021-02-10 RX ADMIN — HYDROMORPHONE HYDROCHLORIDE 0.5 MG: 1 INJECTION, SOLUTION INTRAMUSCULAR; INTRAVENOUS; SUBCUTANEOUS at 11:24

## 2021-02-10 RX ADMIN — ACETAMINOPHEN 975 MG: 325 TABLET, FILM COATED ORAL at 05:07

## 2021-02-10 RX ADMIN — LIDOCAINE 5% 2 PATCH: 700 PATCH TOPICAL at 09:13

## 2021-02-10 RX ADMIN — MAGNESIUM GLUCONATE 500 MG ORAL TABLET 400 MG: 500 TABLET ORAL at 09:13

## 2021-02-10 RX ADMIN — HYDROCODONE BITARTRATE AND ACETAMINOPHEN 1 TABLET: 5; 325 TABLET ORAL at 13:06

## 2021-02-10 RX ADMIN — FLUTICASONE FUROATE AND VILANTEROL TRIFENATATE 1 PUFF: 100; 25 POWDER RESPIRATORY (INHALATION) at 09:13

## 2021-02-10 NOTE — CASE MANAGEMENT
Per Joyce Lowe, I wound vac delivered this morning  Surgical AP aware and will place  Pt will have soc tomorrow, 2/11 with [facility]

## 2021-02-10 NOTE — DISCHARGE INSTRUCTIONS
Colostomy Creation   WHAT YOU NEED TO KNOW:   Colostomy creation is often done with surgery to remove parts of your colon that are injured or diseased  The surgery brings part of your colon (bowel) to the surface of your abdomen  This creates a small opening in your abdomen called a stoma  Bowel movements pass through the stoma and into a pouch that is attached to your abdomen  DISCHARGE INSTRUCTIONS:   Call 911 for any of the following:   · You feel lightheaded, short of breath, and have chest pain  · You cough up blood  Seek care immediately if:   · You are urinating very little or not at all  · No bowel movement is passing through your stoma  · You have pus or a foul-smelling odor coming from your surgery wound or stoma  · You vomit blood, are bleeding from your stoma, or see blood in your bowel movement  Your bowel movement may look like tar  · Your abdomen feels hard and tender  · Your stoma looks gray, purple, dark brown or black  · Your arm or leg feels warm, tender, and painful  It may look swollen and red  Contact your ostomy specialist or healthcare provider if:   · You have nausea or are vomiting  · You have a temperature of 101ºF (38  3ºC) or higher  · More bowel movement is draining from your stoma than normal  Your bowel movement may look watery or smell very bad  · The skin around your stoma is red, sore, itchy, swollen, or has a rash  · Your stoma opening has narrowed or you have drainage from around your stoma  · Your stoma has moved farther inside or outside of your abdomen  You see bulges under the skin around your stoma  · You have questions or concerns about your stoma, surgery, medicine, or care  Medicines:   · Prescription pain medicine  may be given  Ask your healthcare provider how to take this medicine safely  Some prescription pain medicines contain acetaminophen   Do not take other medicines that contain acetaminophen without talking to your healthcare provider  Too much acetaminophen may cause liver damage  Prescription pain medicine may cause constipation  Ask your healthcare provider how to prevent or treat constipation  · Take your medicine as directed  Contact your healthcare provider if you think your medicine is not helping or if you have side effects  Tell him or her if you are allergic to any medicine  Keep a list of the medicines, vitamins, and herbs you take  Include the amounts, and when and why you take them  Bring the list or the pill bottles to follow-up visits  Carry your medicine list with you in case of an emergency  Self-care after surgery:   · Do not lift  more than 10 pounds for 4 weeks or as directed  Do not bend or twist  This will help your surgery area heal and decrease your risk for a hernia  · Prevent blood clots and pneumonia  Walk around the inside of your house at least every 2 hours  · Check your stoma every day  Check for signs of infection, such as redness, swelling, and drainage  · Do not drive  until your healthcare provider says it is okay  Work with an ostomy specialist:  An ostomy specialist can provide more information on how to care for your colostomy  He or she will help select the right size and type of pouch for your colostomy to prevent problems  You may need a different size pouch after your stoma heals  He or she will also know how and where to get the supplies you need, and which supplies are best    Wound and stoma care:  Look at the skin around your stoma each time you change your pouch  Your stoma should be pink or red and moist  You may have a small amount of bleeding when you clean your stoma  This is normal  Your stoma will get smaller and become its normal size in about 8 weeks  · Make sure the skin barrier opening fits well  The skin barrier is the part of the pouch that sticks to the skin of your abdomen   It should be no more than ? of an inch larger than your stoma  If the opening is too large, bowel movement can leak onto your skin and cause irritation  Measure the size of your stoma with the guide that comes with your colostomy supplies  Make sure you cut the skin barrier smaller as your stoma gets smaller  · Soothe irritated skin  If your skin is red, it may mean that the skin barrier was on too long  It is important to find the cause of your skin irritation  Ask your healthcare provider if you need help finding the cause of your skin irritation  · Care for your posterior wound  Wash the area with soap and water daily  Use bandages or pads to collect any drainage  Change them daily or if the bandage or pad gets dirty or wet  Nutrition after colostomy creation:  · Eat a variety of healthy foods  Healthy foods include fruits, vegetables, whole-grain breads, low-fat dairy products, and lean meats  Do not eat foods that give you cramps or diarrhea  · Limit foods that may cause gas and odor  These include vegetables such as broccoli, cabbage, and cauliflower  Beans, eggs, and fish may also cause gas and odor  Eat slowly and do not use a straw to drink liquids  Yogurt, buttermilk, and fresh parsley may help control odor and gas  · Drink liquids as directed  Ask your healthcare provider how much liquid to drink each day and which liquids are best for you  This may help reduce constipation  For more information:   · Ramiro Faulkner ([facility])  [address], [place] [address]  Phone: [phone]  Web Address: www digestive  niddk nih gov    · [employer] of [address] [facility]  [address] [facility] Julianna Mcadams , [address] [facility]  Phone: 9- [phone]  Web Address: [employer] [address] [address] Information is for End User's use only and may not be sold, redistributed or otherwise used for commercial purposes   All illustrations and images included in CareNotes® are the copyrighted property of A D A M , Inc  or [facility] Luz Rai   The above information is an  only  It is not intended as medical advice for individual conditions or treatments  Talk to your doctor, nurse or pharmacist before following any medical regimen to see if it is safe and effective for you

## 2021-02-10 NOTE — PLAN OF CARE
Problem: Potential for Falls  Goal: Patient will remain free of falls  Description: INTERVENTIONS:  - Assess patient frequently for physical needs  -  Identify cognitive and physical deficits and behaviors that affect risk of falls  -  [place] fall precautions as indicated by assessment   - Educate patient/family on patient safety including physical limitations  - Instruct patient to call for assistance with activity based on assessment  - Modify environment to reduce risk of injury  - Consider OT/PT consult to assist with strengthening/mobility  Outcome: Progressing     Problem: Prexisting or High Potential for Compromised Skin Integrity  Goal: Skin integrity is maintained or improved  Description: INTERVENTIONS:  - Identify patients at risk for skin breakdown  - Assess and monitor skin integrity  - Assess and monitor nutrition and hydration status  - Monitor labs   - Assess for incontinence   - Turn and reposition patient  - Assist with mobility/ambulation  - Relieve pressure over bony prominences  - Avoid friction and shearing  - Provide appropriate hygiene as needed including keeping skin clean and dry  - Evaluate need for skin moisturizer/barrier cream  - Collaborate with interdisciplinary team   - Patient/family teaching  - Consider wound care consult   Outcome: Progressing     Problem: Nutrition/Hydration-ADULT  Goal: Nutrient/Hydration intake appropriate for improving, restoring or maintaining nutritional needs  Description: Monitor and assess patient's nutrition/hydration status for malnutrition  Collaborate with interdisciplinary team and initiate plan and interventions as ordered  Monitor patient's weight and dietary intake as ordered or per policy  Utilize nutrition screening tool and intervene as necessary  Determine patient's food preferences and provide high-protein, high-caloric foods as appropriate       INTERVENTIONS:  - Monitor oral intake, urinary output, labs, and treatment plans  - Assess nutrition and hydration status and recommend course of action  - Evaluate amount of meals eaten  - Assist patient with eating if necessary   - Allow adequate time for meals  - Recommend/ encourage appropriate diets, oral nutritional supplements, and vitamin/mineral supplements  - Order, calculate, and assess calorie counts as needed  - Recommend, monitor, and adjust tube feedings or PN based on assessed needs  - Assess need for intravenous fluids  - Provide nutrition/hydration education as appropriate  - Include patient/family/caregiver in decisions related to nutrition  Outcome: Progressing     Problem: DISCHARGE PLANNING - CARE MANAGEMENT  Goal: Discharge to post-acute care or home with appropriate resources  Description: INTERVENTIONS:  - Conduct assessment to determine patient/family and health care team treatment goals, and need for post-acute services based on payer coverage, community resources, and patient preferences, and barriers to discharge  - Address psychosocial, clinical, and financial barriers to discharge as identified in assessment in conjunction with the patient/family and health care team  - Arrange appropriate level of post-acute services according to patients   needs and preference and payer coverage in collaboration with the physician and health care team  - Communicate with and update the patient/family, physician, and health care team regarding progress on the discharge plan  - Arrange appropriate transportation to post-acute venues  Outcome: Progressing     Problem: PAIN - ADULT  Goal: Verbalizes/displays adequate comfort level or baseline comfort level  Description: Interventions:  - Encourage patient to monitor pain and request assistance  - Assess pain using appropriate pain scale  - Administer analgesics based on type and severity of pain and evaluate response  - Implement non-pharmacological measures as appropriate and evaluate response  - Consider cultural and social influences on pain and pain management  - Notify physician/advanced practitioner if interventions unsuccessful or patient reports new pain  Outcome: Progressing     Problem: INFECTION - ADULT  Goal: Absence or prevention of progression during hospitalization  Description: INTERVENTIONS:  - Assess and monitor for signs and symptoms of infection  - Monitor lab/diagnostic results  - Monitor all insertion sites, i e  indwelling lines, tubes, and drains  - Monitor endotracheal if appropriate and nasal secretions for changes in amount and color  - [place] appropriate cooling/warming therapies per order  - Administer medications as ordered  - Instruct and encourage patient and family to use good hand hygiene technique  - Identify and instruct in appropriate isolation precautions for identified infection/condition  Outcome: Progressing     Problem: SAFETY ADULT  Goal: Patient will remain free of falls  Description: INTERVENTIONS:  - Assess patient frequently for physical needs  -  Identify cognitive and physical deficits and behaviors that affect risk of falls    -  [place] fall precautions as indicated by assessment   - Educate patient/family on patient safety including physical limitations  - Instruct patient to call for assistance with activity based on assessment  - Modify environment to reduce risk of injury  - Consider OT/PT consult to assist with strengthening/mobility  Outcome: Progressing  Goal: Maintain or return to baseline ADL function  Description: INTERVENTIONS:  -  Assess patient's ability to carry out ADLs; assess patient's baseline for ADL function and identify physical deficits which impact ability to perform ADLs (bathing, care of mouth/teeth, toileting, grooming, dressing, etc )  - Assess/evaluate cause of self-care deficits   - Assess range of motion  - Assess patient's mobility; develop plan if impaired  - Assess patient's need for assistive devices and provide as appropriate  - Encourage maximum independence but intervene and supervise when necessary  - Involve family in performance of ADLs  - Assess for home care needs following discharge   - Consider OT consult to assist with ADL evaluation and planning for discharge  - Provide patient education as appropriate  Outcome: Progressing  Goal: Maintain or return mobility status to optimal level  Description: INTERVENTIONS:  - Assess patient's baseline mobility status (ambulation, transfers, stairs, etc )    - Identify cognitive and physical deficits and behaviors that affect mobility  - Identify mobility aids required to assist with transfers and/or ambulation (gait belt, sit-to-stand, lift, walker, cane, etc )  - [place] fall precautions as indicated by assessment  - Record patient progress and toleration of activity level on Mobility SBAR; progress patient to next Phase/Stage  - Instruct patient to call for assistance with activity based on assessment  - Consider rehabilitation consult to assist with strengthening/weightbearing, etc   Outcome: Progressing     Problem: DISCHARGE PLANNING  Goal: Discharge to home or other facility with appropriate resources  Description: INTERVENTIONS:  - Identify barriers to discharge w/patient and caregiver  - Arrange for needed discharge resources and transportation as appropriate  - Identify discharge learning needs (meds, wound care, etc )  - Arrange for interpretive services to assist at discharge as needed  - Refer to Case Management Department for coordinating discharge planning if the patient needs post-hospital services based on physician/advanced practitioner order or complex needs related to functional status, cognitive ability, or social support system  Outcome: Progressing     Problem: Knowledge Deficit  Goal: Patient/family/caregiver demonstrates understanding of disease process, treatment plan, medications, and discharge instructions  Description: Complete learning assessment and assess knowledge base    Interventions:  - Provide teaching at level of understanding  - Provide teaching via preferred learning methods  Outcome: Progressing     Problem: RESPIRATORY - ADULT  Goal: Achieves optimal ventilation and oxygenation  Description: INTERVENTIONS:  - Assess for changes in respiratory status  - Assess for changes in mentation and behavior  - Position to facilitate oxygenation and minimize respiratory effort  - Oxygen administered by appropriate delivery if ordered  - Initiate smoking cessation education as indicated  - Encourage broncho-pulmonary hygiene including cough, deep breathe, Incentive Spirometry  - Assess the need for suctioning and aspirate as needed  - Assess and instruct to report SOB or any respiratory difficulty  - Respiratory Therapy support as indicated  Outcome: Progressing     Problem: GASTROINTESTINAL - ADULT  Goal: Minimal or absence of nausea and/or vomiting  Description: INTERVENTIONS:  - Administer IV fluids if ordered to ensure adequate hydration  - Maintain NPO status until nausea and vomiting are resolved  - Nasogastric tube if ordered  - Administer ordered antiemetic medications as needed  - Provide nonpharmacologic comfort measures as appropriate  - Advance diet as tolerated, if ordered  - Consider nutrition services referral to assist patient with adequate nutrition and appropriate food choices  Outcome: Progressing  Goal: Maintains or returns to baseline bowel function  Description: INTERVENTIONS:  - Assess bowel function  - Encourage oral fluids to ensure adequate hydration  - Administer IV fluids if ordered to ensure adequate hydration  - Administer ordered medications as needed  - Encourage mobilization and activity  - Consider nutritional services referral to assist patient with adequate nutrition and appropriate food choices  Outcome: Progressing  Goal: Maintains adequate nutritional intake  Description: INTERVENTIONS:  - Monitor percentage of each meal consumed  - Identify factors contributing to decreased intake, treat as appropriate  - Assist with meals as needed  - Monitor I&O, weight, and lab values if indicated  - Obtain nutrition services referral as needed  Outcome: Progressing     Problem: GENITOURINARY - ADULT  Goal: Maintains or returns to baseline urinary function  Description: INTERVENTIONS:  - Assess urinary function  - Encourage oral fluids to ensure adequate hydration if ordered  - Administer IV fluids as ordered to ensure adequate hydration  - Administer ordered medications as needed  - Offer frequent toileting  - Follow urinary retention protocol if ordered  Outcome: Progressing  Goal: Absence of urinary retention  Description: INTERVENTIONS:  - Assess patients ability to void and empty bladder  - Monitor I/O  - Bladder scan as needed  - Discuss with physician/AP medications to alleviate retention as needed  - Discuss catheterization for long term situations as appropriate  Outcome: Progressing  Goal: Urinary catheter remains patent  Description: INTERVENTIONS:  - Assess patency of urinary catheter  - If patient has a chronic fernandez, consider changing catheter if non-functioning  - Follow guidelines for intermittent irrigation of non-functioning urinary catheter  Outcome: Progressing     Problem: METABOLIC, FLUID AND ELECTROLYTES - ADULT  Goal: Electrolytes maintained within normal limits  Description: INTERVENTIONS:  - Monitor labs and assess patient for signs and symptoms of electrolyte imbalances  - Administer electrolyte replacement as ordered  - Monitor response to electrolyte replacements, including repeat lab results as appropriate  - Instruct patient on fluid and nutrition as appropriate  Outcome: Progressing  Goal: Fluid balance maintained  Description: INTERVENTIONS:  - Monitor labs   - Monitor I/O and WT  - Instruct patient on fluid and nutrition as appropriate  - Assess for signs & symptoms of volume excess or deficit  Outcome: Progressing  Goal: Glucose maintained within target range  Description: INTERVENTIONS:  - Monitor Blood Glucose as ordered  - Assess for signs and symptoms of hyperglycemia and hypoglycemia  - Administer ordered medications to maintain glucose within target range  - Assess nutritional intake and initiate nutrition service referral as needed  Outcome: Progressing

## 2021-02-10 NOTE — PROCEDURES
General Surgery  Steve Mosquera 52 y o  female MRN: [id number]  Unit/Bed#: -01 Encounter: [id number]    V  A C  Procedure Note    Date: 2/10/2021    Time: 11:30    Location of wound: mid abdomen    Sponges removed:  4 Black Sponges  0 White Sponges    Dimensions of wound: 5 cm x 15 cm x 5 cm    Description of wound:  Wound is clean with granulation tissue in over 85% of wound bed  There are 2 areas of dehiscence where the fascia is exposed in the superior and inferior portions of the wound  Superior portion of the wound dehiscence measures approximately 4 x 3 cm  Inferior portion of the wound has a area of undermining the least to the fascia measuring approximately 1 5 cm in diameter         Sponges placed:  1 Black Sponges  2 White Sponges    VAC settings:  125 mmHg  Continuous    Pt tolerated procedure well  VAC sticker placed to wound dressing, per protocol      Deisy Johnson PA-C  2/10/2021

## 2021-02-10 NOTE — PLAN OF CARE
Problem: Potential for Falls  Goal: Patient will remain free of falls  Description: INTERVENTIONS:  - Assess patient frequently for physical needs  -  Identify cognitive and physical deficits and behaviors that affect risk of falls  -  [place] fall precautions as indicated by assessment   - Educate patient/family on patient safety including physical limitations  - Instruct patient to call for assistance with activity based on assessment  - Modify environment to reduce risk of injury  - Consider OT/PT consult to assist with strengthening/mobility  Outcome: Progressing     Problem: Prexisting or High Potential for Compromised Skin Integrity  Goal: Skin integrity is maintained or improved  Description: INTERVENTIONS:  - Identify patients at risk for skin breakdown  - Assess and monitor skin integrity  - Assess and monitor nutrition and hydration status  - Monitor labs   - Assess for incontinence   - Turn and reposition patient  - Assist with mobility/ambulation  - Relieve pressure over bony prominences  - Avoid friction and shearing  - Provide appropriate hygiene as needed including keeping skin clean and dry  - Evaluate need for skin moisturizer/barrier cream  - Collaborate with interdisciplinary team   - Patient/family teaching  - Consider wound care consult   Outcome: Progressing     Problem: Nutrition/Hydration-ADULT  Goal: Nutrient/Hydration intake appropriate for improving, restoring or maintaining nutritional needs  Description: Monitor and assess patient's nutrition/hydration status for malnutrition  Collaborate with interdisciplinary team and initiate plan and interventions as ordered  Monitor patient's weight and dietary intake as ordered or per policy  Utilize nutrition screening tool and intervene as necessary  Determine patient's food preferences and provide high-protein, high-caloric foods as appropriate       INTERVENTIONS:  - Monitor oral intake, urinary output, labs, and treatment plans  - Assess nutrition and hydration status and recommend course of action  - Evaluate amount of meals eaten  - Assist patient with eating if necessary   - Allow adequate time for meals  - Recommend/ encourage appropriate diets, oral nutritional supplements, and vitamin/mineral supplements  - Order, calculate, and assess calorie counts as needed  - Recommend, monitor, and adjust tube feedings or PN based on assessed needs  - Assess need for intravenous fluids  - Provide nutrition/hydration education as appropriate  - Include patient/family/caregiver in decisions related to nutrition  Outcome: Progressing     Problem: DISCHARGE PLANNING - CARE MANAGEMENT  Goal: Discharge to post-acute care or home with appropriate resources  Description: INTERVENTIONS:  - Conduct assessment to determine patient/family and health care team treatment goals, and need for post-acute services based on payer coverage, community resources, and patient preferences, and barriers to discharge  - Address psychosocial, clinical, and financial barriers to discharge as identified in assessment in conjunction with the patient/family and health care team  - Arrange appropriate level of post-acute services according to patients   needs and preference and payer coverage in collaboration with the physician and health care team  - Communicate with and update the patient/family, physician, and health care team regarding progress on the discharge plan  - Arrange appropriate transportation to post-acute venues  Outcome: Progressing     Problem: PAIN - ADULT  Goal: Verbalizes/displays adequate comfort level or baseline comfort level  Description: Interventions:  - Encourage patient to monitor pain and request assistance  - Assess pain using appropriate pain scale  - Administer analgesics based on type and severity of pain and evaluate response  - Implement non-pharmacological measures as appropriate and evaluate response  - Consider cultural and social influences on pain and pain management  - Notify physician/advanced practitioner if interventions unsuccessful or patient reports new pain  Outcome: Progressing     Problem: INFECTION - ADULT  Goal: Absence or prevention of progression during hospitalization  Description: INTERVENTIONS:  - Assess and monitor for signs and symptoms of infection  - Monitor lab/diagnostic results  - Monitor all insertion sites, i e  indwelling lines, tubes, and drains  - Monitor endotracheal if appropriate and nasal secretions for changes in amount and color  - [place] appropriate cooling/warming therapies per order  - Administer medications as ordered  - Instruct and encourage patient and family to use good hand hygiene technique  - Identify and instruct in appropriate isolation precautions for identified infection/condition  Outcome: Progressing     Problem: SAFETY ADULT  Goal: Patient will remain free of falls  Description: INTERVENTIONS:  - Assess patient frequently for physical needs  -  Identify cognitive and physical deficits and behaviors that affect risk of falls    -  [place] fall precautions as indicated by assessment   - Educate patient/family on patient safety including physical limitations  - Instruct patient to call for assistance with activity based on assessment  - Modify environment to reduce risk of injury  - Consider OT/PT consult to assist with strengthening/mobility  Outcome: Progressing  Goal: Maintain or return to baseline ADL function  Description: INTERVENTIONS:  -  Assess patient's ability to carry out ADLs; assess patient's baseline for ADL function and identify physical deficits which impact ability to perform ADLs (bathing, care of mouth/teeth, toileting, grooming, dressing, etc )  - Assess/evaluate cause of self-care deficits   - Assess range of motion  - Assess patient's mobility; develop plan if impaired  - Assess patient's need for assistive devices and provide as appropriate  - Encourage maximum independence but intervene and supervise when necessary  - Involve family in performance of ADLs  - Assess for home care needs following discharge   - Consider OT consult to assist with ADL evaluation and planning for discharge  - Provide patient education as appropriate  Outcome: Progressing  Goal: Maintain or return mobility status to optimal level  Description: INTERVENTIONS:  - Assess patient's baseline mobility status (ambulation, transfers, stairs, etc )    - Identify cognitive and physical deficits and behaviors that affect mobility  - Identify mobility aids required to assist with transfers and/or ambulation (gait belt, sit-to-stand, lift, walker, cane, etc )  - [place] fall precautions as indicated by assessment  - Record patient progress and toleration of activity level on Mobility SBAR; progress patient to next Phase/Stage  - Instruct patient to call for assistance with activity based on assessment  - Consider rehabilitation consult to assist with strengthening/weightbearing, etc   Outcome: Progressing     Problem: DISCHARGE PLANNING  Goal: Discharge to home or other facility with appropriate resources  Description: INTERVENTIONS:  - Identify barriers to discharge w/patient and caregiver  - Arrange for needed discharge resources and transportation as appropriate  - Identify discharge learning needs (meds, wound care, etc )  - Arrange for interpretive services to assist at discharge as needed  - Refer to Case Management Department for coordinating discharge planning if the patient needs post-hospital services based on physician/advanced practitioner order or complex needs related to functional status, cognitive ability, or social support system  Outcome: Progressing     Problem: Knowledge Deficit  Goal: Patient/family/caregiver demonstrates understanding of disease process, treatment plan, medications, and discharge instructions  Description: Complete learning assessment and assess knowledge base    Interventions:  - Provide teaching at level of understanding  - Provide teaching via preferred learning methods  Outcome: Progressing     Problem: RESPIRATORY - ADULT  Goal: Achieves optimal ventilation and oxygenation  Description: INTERVENTIONS:  - Assess for changes in respiratory status  - Assess for changes in mentation and behavior  - Position to facilitate oxygenation and minimize respiratory effort  - Oxygen administered by appropriate delivery if ordered  - Initiate smoking cessation education as indicated  - Encourage broncho-pulmonary hygiene including cough, deep breathe, Incentive Spirometry  - Assess the need for suctioning and aspirate as needed  - Assess and instruct to report SOB or any respiratory difficulty  - Respiratory Therapy support as indicated  Outcome: Progressing     Problem: GASTROINTESTINAL - ADULT  Goal: Minimal or absence of nausea and/or vomiting  Description: INTERVENTIONS:  - Administer IV fluids if ordered to ensure adequate hydration  - Maintain NPO status until nausea and vomiting are resolved  - Nasogastric tube if ordered  - Administer ordered antiemetic medications as needed  - Provide nonpharmacologic comfort measures as appropriate  - Advance diet as tolerated, if ordered  - Consider nutrition services referral to assist patient with adequate nutrition and appropriate food choices  Outcome: Progressing  Goal: Maintains or returns to baseline bowel function  Description: INTERVENTIONS:  - Assess bowel function  - Encourage oral fluids to ensure adequate hydration  - Administer IV fluids if ordered to ensure adequate hydration  - Administer ordered medications as needed  - Encourage mobilization and activity  - Consider nutritional services referral to assist patient with adequate nutrition and appropriate food choices  Outcome: Progressing  Goal: Maintains adequate nutritional intake  Description: INTERVENTIONS:  - Monitor percentage of each meal consumed  - Identify factors contributing to decreased intake, treat as appropriate  - Assist with meals as needed  - Monitor I&O, weight, and lab values if indicated  - Obtain nutrition services referral as needed  Outcome: Progressing     Problem: GENITOURINARY - ADULT  Goal: Maintains or returns to baseline urinary function  Description: INTERVENTIONS:  - Assess urinary function  - Encourage oral fluids to ensure adequate hydration if ordered  - Administer IV fluids as ordered to ensure adequate hydration  - Administer ordered medications as needed  - Offer frequent toileting  - Follow urinary retention protocol if ordered  Outcome: Progressing  Goal: Absence of urinary retention  Description: INTERVENTIONS:  - Assess patients ability to void and empty bladder  - Monitor I/O  - Bladder scan as needed  - Discuss with physician/AP medications to alleviate retention as needed  - Discuss catheterization for long term situations as appropriate  Outcome: Progressing  Goal: Urinary catheter remains patent  Description: INTERVENTIONS:  - Assess patency of urinary catheter  - If patient has a chronic fernandez, consider changing catheter if non-functioning  - Follow guidelines for intermittent irrigation of non-functioning urinary catheter  Outcome: Progressing     Problem: METABOLIC, FLUID AND ELECTROLYTES - ADULT  Goal: Electrolytes maintained within normal limits  Description: INTERVENTIONS:  - Monitor labs and assess patient for signs and symptoms of electrolyte imbalances  - Administer electrolyte replacement as ordered  - Monitor response to electrolyte replacements, including repeat lab results as appropriate  - Instruct patient on fluid and nutrition as appropriate  Outcome: Progressing  Goal: Fluid balance maintained  Description: INTERVENTIONS:  - Monitor labs   - Monitor I/O and WT  - Instruct patient on fluid and nutrition as appropriate  - Assess for signs & symptoms of volume excess or deficit  Outcome: Progressing  Goal: Glucose maintained within target range  Description: INTERVENTIONS:  - Monitor Blood Glucose as ordered  - Assess for signs and symptoms of hyperglycemia and hypoglycemia  - Administer ordered medications to maintain glucose within target range  - Assess nutritional intake and initiate nutrition service referral as needed  Outcome: Progressing

## 2021-02-10 NOTE — DISCHARGE SUMMARY
Discharge Summary - Cirilo Hargrove 52 y o  female MRN: [id number]    Unit/Bed#: -01 Encounter: [id number]    Admission Date:   Admission Orders (From admission, onward)     Ordered        01/26/21 2240  Inpatient Admission  Once                     Admitting Diagnosis: Diarrhea [R19 7]  Dehydration [E86 0]  Body mass index 38 0-38 9, adult [Z68 38]  Hypokalemia [E87 6]  Acquired hypothyroidism [E03 9]  Colitis [K52 9]  HARI (acute kidney injury) ([address] ) [N17 9]  Pneumoperitoneum of unknown etiology [K66 8]  Primary osteoarthritis of both knees [M17 0]  Perforated abdominal viscus [R19 8]  Bilateral low back pain with sciatica, sciatica laterality unspecified, unspecified chronicity [M54 40]  Diarrhea, unspecified type [R19 7]  Asthma, unspecified asthma severity, unspecified whether complicated, unspecified whether persistent [J45 909]  Type 2 diabetes mellitus without complication, unspecified whether long term insulin use ([facility] ) [E11 9]    HPI: Cirilo Hargrove is a 52 y o  female who presents with the emergency department with complaints of diarrhea and with left-sided abdominal pain that is more painful in the left lower quadrant for the last month  Patient reports that the pain has been persistent and does tend to be a little positional   Patient reports the pain right now is about a 3/10 at rest, and 6/10 with palpation  Patient reports he has been having about 3 episodes of diarrhea a day for the last month as well as associated symptoms of nausea, and intermittent episodes of nonbloody nonbilious vomiting  Patient denies any melena or hematochezia  Patient reports having decreased urine as she has not been able to have good p o  Intake and has had a decreased appetite  Patient reports that p o  Intake does aggravate the abdominal pain  Patient denies ever having a colonoscopy    Patient states that she had a tele med consult in regards to the left abdominal pain with diarrhea and was given a course of antibiotics without any improvement  Patient underwent ultrasound of the abdomen is complete, and ultrasound of the pelvis with transvaginal completed on 01/22/2021 which did not identify any acute pathology, but did not and left ovarian simple cyst measuring 3 4 x 3 1 x 3 cm  Patient reports having allergy to penicillin  Patient denies smoking, daily alcohol intake, or use of illicit drugs  Patient reports last time she ate was early this morning  Patient denies any history of abdominal surgeries  Patient does report decreased urine output but denies any other urinary symptoms  (HPI taking from H&P written by Cherrie Sahu PA-C on 01/26/2021)  Procedures Performed:   Orders Placed This Encounter   Procedures    ED ECG Documentation Only    Wound vacum dressing application          Wound vacum dressing application             Summary of Hospital Course:  Patient presented to emergency department complaining of diarrhea and left-sided abdominal pain on 01/26/2021  A CT of the abdomen pelvis was performed in the emergency department which identified moderate volume pneumoperitoneum suggestive of perforated viscus  Patient underwent emergency diagnostic laparoscopy and was converted to exploratory laparotomy after he was noted the patient had a colon perforation  Patient underwent sigmoid colon resection  Intraoperatively the patient's transverse colon, splenic flexure, and descending colon were also noted to be slightly dusky/dark so the decision was made to keep the patient's abdomen open to re-evaluate for colon viabiability  Please refer to operative report for further details  Patient return to the operating room on 01/27/2021, and underwent the following procedures according to operative report "LAPAROTOMY EXPLORATORY, RESECTION OF DESCENDING COLON, PARTIAL OMENTECTOMY, CREATION OF TRANSVERSE COLON COLOSTOMY, ABTHERA PLACEMENT, ABDOMINAL WASHOUT (N/A)"    Patient then return to the operating room on 01/21/2021 at which time she underwent further abdominal washout and abdominal wound closure  Nephrology was consulted on 01/29/2021 to assist with management of HARI  Patient's NGT was removed on 01/30/2021 and patient was advanced to clear liquids she had return of bowel function  Gastroenterology was consulted given that the pathology report showed evidence of chronic active colitis with ulceration and mural abscess suggesting the possible diagnosis of ulcerative colitis versus segmental colitis from diverticular disease  Is recommended the patient follow-up as an outpatient once she is healed from surgery to complete workup  Patient's wound was noted to have significant serous sanguinous discharge, new was decided on 02/06/2021 to remove staples and place wound VAC dressing over incision  Patient was noted to have 2 areas of wound dehiscence at the superior and inferior portions of the wound  Patient continue to tolerate slow diet advancement, and continued to have bowel function  Patient met discharge criteria on 02/10/2021 at which time she was tolerating diet without nausea vomiting, continue to have adequate bowel function, and was ambulating and urinating without difficulty  Patient was discharged home on 02/10/2021 at which time her vitals, physical exam, lab results were within normal limits  Patient was discharged home with the home wound VAC and was arranged for home health to perform wound VAC dressing changes 3 times a week  Wound care nurse educated the patient on colostomy care prior to discharge  Patient should follow up with general surgeon in 2 weeks, or to call the office with any questions or concerns before that time  Discharge instructions were provided to the patient in verbal and written form  All the patient's questions and concerns were answered addressed the patient's satisfaction    A small amount of pain medication was sent to the patient's pharmacy electronically as per protocol  Patient did not require any further antibiotics at time of discharge           Significant Findings, Care, Treatment and Services Provided:  Colon perforation, wound VAC placement over mid abdominal incision    Complications:  None    Discharge Diagnosis: Diarrhea [R19 7]  Dehydration [E86 0]  Body mass index 38 0-38 9, adult [Z68 38]  Hypokalemia [E87 6]  Acquired hypothyroidism [E03 9]  Colitis [K52 9]  HARI (acute kidney injury) ([email or web address] ) [N17 9]  Pneumoperitoneum of unknown etiology [K66 8]  Primary osteoarthritis of both knees [M17 0]  Perforated abdominal viscus [R19 8]  Bilateral low back pain with sciatica, sciatica laterality unspecified, unspecified chronicity [M54 40]  Diarrhea, unspecified type [R19 7]  Asthma, unspecified asthma severity, unspecified whether complicated, unspecified whether persistent [J45 909]  Type 2 diabetes mellitus without complication, unspecified whether long term insulin use ([facility] ) [E11 9]      Resolved Problems  Date Reviewed: 1/31/2021          Resolved    HARI (acute kidney injury) ([facility] ) 1/30/2021     Resolved by  BRAYAN Perdomo    Hyperglycemia 1/29/2021     Resolved by  BRAYAN Perdomo    History of asthma 1/26/2021     Resolved by  JUAQUIN Paz (dyspnea on exertion) 1/29/2021     Resolved by  BRAYAN Perdomo          Condition at Discharge: fair         Discharge instructions/Information to patient and family:   See after visit summary for information provided to patient and family  Provisions for Follow-Up Care:  See after visit summary for information related to follow-up care and any pertinent home health orders  PCP: Floyd Vang MD    Disposition: Home    Planned Readmission: No      Discharge Statement   I spent 30 minutes discharging the patient  This time was spent on the day of discharge  I had direct contact with the patient on the day of discharge   Additional documentation is required if more than 30 minutes were spent on discharge  Discharge Medications:  See after visit summary for reconciled discharge medications provided to patient and family

## 2021-02-10 NOTE — PROGRESS NOTES
Progress Note - Monica Curtis 1970, 52 y o  female MRN: [id number]    Unit/Bed#: -01 Encounter: [id number]    Primary Care Provider: Candace Yun MD   Date and time admitted to hospital: 1/26/2021  2:12 PM        * Pneumoperitoneum  Assessment & Plan  Secondary to perforation of sigmoid colon  See plan under perforation of sigmoid colon  Electrolyte abnormality  Assessment & Plan  Resolved    Acute renal failure (ARF) (HCC)  Assessment & Plan  Resolved    Perforation of sigmoid colon due to diverticulitis  Assessment & Plan  · Status post Ex lap/sigmoid, descending colon & transverse colon resection status post colostomy 01/26/2021  · Postsurgical management as per surgery  · Diet advanced to regular diet as per surgery  · Diet management by surgical team   · On p r n  Dilaudid/Zofran  · Was on IV cefepime and Flagyl, now off antibiotics  Okay to monitor off antibiotics as there are no signs of sepsis at this point    There is some concern of inflammatory bowel disease given biopsy findings, GI following  Hypothyroid  Assessment & Plan  Levothyroxine has been resumed, continue    Mild persistent asthma  Assessment & Plan  Currently stable  Continue with p r n  Albuterol/albuterol inhaler            Subjective/Objective     Subjective:   Seen and examined at bedside  No new complaints  Being discharged by the primary team  No new medical problems    Objective:  Vitals: Blood pressure 143/86, pulse 102, temperature 98 4 °F (36 9 °C), resp  rate 18, height 5' 4" (1 626 m), weight 109 kg (239 lb 3 2 oz), SpO2 97 %  ,Body mass index is 41 06 kg/m²        Intake/Output Summary (Last 24 hours) at 2/10/2021 1358  Last data filed at 2/10/2021 0600  Gross per 24 hour   Intake 360 ml   Output 1860 ml   Net -1500 ml       Invasive Devices     Peripherally Inserted Central Catheter Line            PICC Line 41/69/05 Right Basilic 7 days          Drain            Colostomy Transverse 13 days Closed/Suction Drain RLQ Bulb 11 days    Negative Pressure Wound Therapy (V A C ) Abdomen 3 days                Physical Exam:  S1-S2 audible, regular, lungs clear to auscultation bilaterally, alert and oriented time place and person, cooperative, abdominal tenderness which is expected, no new focal neuro deficit, oral mucosa moist    Lab, Imaging and other studies: I have personally reviewed pertinent reports      VTE Pharmacologic Prophylaxis: Sequential compression device (Venodyne)   VTE Mechanical Prophylaxis: sequential compression device      Being discharged with the primary team    Recommend follow-up with PCP within 1 week

## 2021-02-10 NOTE — WOUND OSTOMY CARE
Progress Note - Ostomy    Orlando Brown 52 y o  female MRN: [id number]  Unit/Bed#: -01 Encounter: [id number]      Assessment:  Patient is POD #14 s/p transverse colostomy creation due to pneumoperitoneum  Seen for continued ostomy education and teaching  Patient seen with acute care surgery to assist with ostomy management during placement of the home wound vac  Patient agreeable to visit, flat affect  Alert and oriented x 4  Patient reports she has been emptying her pouch with nursing assistance and did read through her educational materials  Premedicated by nursing for pain       Topics reviewed: normal stoma appearance, how and when to empty (1/3 full) to prevent pulling/lifting of the barrier, importance & how to clean the end of the pouch to prevent odor, gas/odor producing foods, frequency of changing of the pouch (every 3-4 days on a schedule), one piece/two piece pouching systems differences, daniel-stomal skin care (using warm water only, no soaps/lotions), gas valve functionality of one piece, Clothing- including avoiding placing belt-line/seat belts over the stoma, bathing, and how to obtain supplies       Step-by-step pouch change done using 1 piece ostomy pouch  pouch was removed by surgery  Reviewed with patient how and when to measure the stoma (weekly)  Demonstrated how to trace & cut one piece barrier, and how to apply it to the skin using gentle pressure / warmth of the hands  Skin prep applied to daniel-stomal skin and an negra ring applied circumferentially to decrease washout, rationale of accessories explained to patient  Good seal obtained  Will likely need to change the pouch with wound vac changes due to proximity and over lapping of the vac drape and the ostomy barrier        Midline abdominal transverse colostomy - beefy red and moist  Slightly budded  Stoma slightly oval measures 1 3/4 inches side to side and 2 inches top to bottom, os is noted centrally but superior  mucocutaneous junction is intact  Daniel-stomal skin is intact with no redness or wound - noted incision/sutures to distal to the stoma (applied maxorb and tegaderm over this area to create a flat pouching surface)  Effluent = small soft formed medium brown BM       Plan is for patient to have VNA at home  Patient withdrawn and having abdominal pain with wound vac change- poor attention noted due to pain  Provided encouragement and support   Patient denies having any specific questions regarding ostomy care  Patient states, "I think my  and son with help me, I will be ok when I go home"       Plan:   1  Will continue to follow along  2  Encourage patient to engage in self care with her ostomy - emptying ,etc with nursing assistance  3  Care to midline abdominal incision and old ostomy site per acute care surgery-- recommend to use aquacel rope to the old ostomy site  4  Sample kits ordered last week   5  Supplies left at bedside for patient to take home  6  Cleanse daniel-stomal skin with warm water, pat dry, and apply 3m no sting skin prep to the daniel-stomal skin  Apply negra ring circumferentially around the stoma  Apply maxorb to the distal incision and cover with tegaderm  Pouch with using one piece cut to fit ostomy pouch  Empty when 1/3 full and change pouch every 3-4 days and as needed for leaking  May need to change with wound vac dressing changes due to proximity and overlap of the dressings/ostomy barrier  Objective:    Vitals: Blood pressure 143/86, pulse 102, temperature 98 4 °F (36 9 °C), resp  rate 18, height 5' 4" (1 626 m), weight 109 kg (239 lb 3 2 oz), SpO2 97 %  ,Body mass index is 41 06 kg/m²        Will follow along,  Mario Rivera RN BSN [facility]

## 2021-02-11 ENCOUNTER — TRANSITIONAL CARE MANAGEMENT (OUTPATIENT)
Dept: INTERNAL MEDICINE CLINIC | Facility: CLINIC | Age: 51
End: 2021-02-11

## 2021-02-12 ENCOUNTER — OFFICE VISIT (OUTPATIENT)
Dept: INTERNAL MEDICINE CLINIC | Facility: CLINIC | Age: 51
End: 2021-02-12
Payer: COMMERCIAL

## 2021-02-12 VITALS
BODY MASS INDEX: 35.17 KG/M2 | DIASTOLIC BLOOD PRESSURE: 86 MMHG | SYSTOLIC BLOOD PRESSURE: 134 MMHG | HEIGHT: 64 IN | WEIGHT: 206 LBS | HEART RATE: 96 BPM | OXYGEN SATURATION: 97 % | TEMPERATURE: 97.9 F

## 2021-02-12 DIAGNOSIS — Z93.3 COLOSTOMY IN PLACE (HCC): Primary | ICD-10-CM

## 2021-02-12 DIAGNOSIS — K57.20 PERFORATION OF SIGMOID COLON DUE TO DIVERTICULITIS: ICD-10-CM

## 2021-02-12 DIAGNOSIS — K66.8 PNEUMOPERITONEUM: ICD-10-CM

## 2021-02-12 PROCEDURE — 1111F DSCHRG MED/CURRENT MED MERGE: CPT | Performed by: INTERNAL MEDICINE

## 2021-02-12 PROCEDURE — 99496 TRANSJ CARE MGMT HIGH F2F 7D: CPT | Performed by: INTERNAL MEDICINE

## 2021-02-12 NOTE — PROGRESS NOTES
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 mg/dL    eGFR 109 ml/min/1 73sq m   Blood gas, arterial    Collection Time: 01/27/21 12:21 PM   Result Value Ref Range    pH, Arterial 7 413 7 350 - 7 450    pCO2, Arterial 28 3 (LL) 36 0 - 44 0 mm Hg    pO2, Arterial 154 7 (H) 75 0 - 129 0 mm Hg    HCO3, Arterial 17 7 (L) 22 0 - 28 0 mmol/L    Base Excess, Arterial -5 8 mmol/L    O2 Content, Arterial 15 1 (L) 16 0 - 23 0 mL/dL    O2 HGB,Arterial  98 2 (H) 94 0 - 97 0 %    SOURCE Line, Arterial    Fingerstick Glucose (POCT)    Collection Time: 01/27/21 12:29 PM   Result Value Ref Range    POC Glucose 144 (H) 65 - 140 mg/dl   Tissue Exam    Collection Time: 01/27/21  4:03 PM   Result Value Ref Range    Case Report       Surgical Pathology Report                         Case: [id number]                                   Authorizing Provider:  Marion White DO        Collected:           01/27/2021 1603              Ordering Location:     [address] [place] [address] Received:            01/27/2021 2000                                     Operating Room                                                               Pathologist:           Law Osullivan MD                                                                 Specimens:   A) - Large Intestine, Left/Descending Colon, Portion of Descending Colon                            B) - Omentum, Partial Omentectomy                                                          Final Diagnosis       A  Descending colon, resection:  -Three portions of colon with chronic active colitis, ulcerations, mural abscess and perforation   - Chronic active colitis involves all of the resection margins   - Four benign lymph nodes (0/4)  Please see case [id number] for CMV immunohistochemistry stain results  B  Omentum, omentectomy:  - Mature adipose tissue with acute inflammation   - One benign lymph node (0/1)  Note       Interpretation performed at [address]        Additional Information       All reported additional testing was performed with appropriately reactive controls  These tests were developed and their performance characteristics determined by [facility] or appropriate performing facility, though some tests may be performed on tissues which have not been validated for performance characteristics (such as staining performed on alcohol exposed cell blocks and decalcified tissues)  Results should be interpreted with caution and in the context of the patients clinical condition  These tests may not be cleared or approved by the [place]  Food and Drug Administration, though the FDA has determined that such clearance or approval is not necessary  These tests are used for clinical purposes and they should not be regarded as investigational or for research  This laboratory has been approved by CLIA 88, designated as a high-complexity laboratory and is qualified to perform these tests  Alonso Lopez Description          A  The specimen is received in formalin, labeled with the patient's name and hospital number, and is designated "portion of descending colon    The specimen consists of 3 unoriented segments of bowel tissue, each averaging 3 cm in diameter, with lengths of 4  0, 6 0 and 8 0 cm  The serosa of each fragment exhibits hemorrhagic and dusky discoloration and is partially covered by tan exudate  The mucosa in each fragment exhibits a cobblestone appearance with areas of dusky and hemorrhagic discoloration and possible necrosis as well as the presence of a yellow-green plaque-like material   Upon cut section the 6 cm fragment exhibits an area of perforation closed with suture material, which is inked blue at the time of gross exam   No other discrete abnormalities are noted  Representative sections  Ten cassettes  1-2:  Ends of resection, 4 cm fragment  3:  Random section, 4 cm fragment  4-5:  Ends of resection, 6 cm fragment  6:  Area of perforation, 6 cm fragment   7-8:  Ends of resection, 8 cm fragment  9:  Random section, 8 cm fragment  10: 4 randomly sampled regional lymph nodes        B  The specimen is received in formalin, labeled with the patient's name and hospital number, and is designated "partial omentectomy  The specimen consists of a tan-yellow purple fibromembranous and fibrofatty tissue fragment grossly consistent with omentum measuring 20 x 5 x 2 8 cm  The fragment is sectioned revealing yellow fatty cut surfaces exhibiting focal areas of hemorrhagic discoloration  A single tan purple density suggestive of a lymph node measuring 0 5 cm is identified  Representative sections  One cassette  2 pieces, includes lymph node    Note: The estimated total formalin fixation time based upon information provided by the submitting clinician and the standard processing schedule is under 72 hours      MCrites       CBC and differential    Collection Time: 01/27/21  7:18 PM   Result Value Ref Range    WBC 17 70 (H) 4 31 - 10 16 Thousand/uL    RBC 4 35 3 81 - 5 12 Million/uL    Hemoglobin 11 9 11 5 - 15 4 g/dL    Hematocrit 37 8 34 8 - 46 1 %    MCV 87 82 - 98 fL    MCH 27 4 26 8 - 34 3 pg    MCHC 31 5 31 4 - 37 4 g/dL    RDW 14 4 11 6 - 15 1 %    MPV 10 3 8 9 - 12 7 fL    Platelets 463 (H) 340 - 390 Thousands/uL    nRBC 0 /100 WBCs   Comprehensive metabolic panel    Collection Time: 01/27/21  7:18 PM   Result Value Ref Range    Sodium 142 136 - 145 mmol/L    Potassium 4 1 3 5 - 5 3 mmol/L    Chloride 110 (H) 100 - 108 mmol/L    CO2 21 21 - 32 mmol/L    ANION GAP 11 4 - 13 mmol/L    BUN 10 5 - 25 mg/dL    Creatinine 0 73 0 60 - 1 30 mg/dL    Glucose 179 (H) 65 - 140 mg/dL    Calcium 7 5 (L) 8 3 - 10 1 mg/dL    Corrected Calcium 9 4 8 3 - 10 1 mg/dL    AST 10 5 - 45 U/L    ALT 10 (L) 12 - 78 U/L    Alkaline Phosphatase 36 (L) 46 - 116 U/L    Total Protein 4 5 (L) 6 4 - 8 2 g/dL    Albumin 1 6 (L) 3 5 - 5 0 g/dL    Total Bilirubin 0 50 0 20 - 1 00 mg/dL    eGFR 98 ml/min/1 73sq m   Blood gas, arterial    Collection Time: 01/27/21  7:18 PM   Result Value Ref Range    pH, Arterial 7 316 (L) 7 350 - 7 450    pCO2, Arterial 34 1 (L) 36 0 - 44 0 mm Hg    pO2, Arterial 120 2 75 0 - 129 0 mm Hg    HCO3, Arterial 17 0 (L) 22 0 - 28 0 mmol/L    Base Excess, Arterial -8 2 mmol/L    O2 Content, Arterial 97 7 (H) 16 0 - 23 0 mL/dL    O2 HGB,Arterial  96 7 94 0 - 97 0 %    SOURCE Line, Arterial     Vent Type- AC AC     AC Rate 14     Tidal Volume 350 ml    Inspired Air (FIO2) 40     PEEP 6    Magnesium    Collection Time: 01/27/21  7:18 PM   Result Value Ref Range    Magnesium 2 5 1 6 - 2 6 mg/dL   Phosphorus    Collection Time: 01/27/21  7:18 PM   Result Value Ref Range    Phosphorus 3 8 2 7 - 4 5 mg/dL   Lactic acid, plasma    Collection Time: 01/27/21  7:18 PM   Result Value Ref Range    LACTIC ACID 1 1 0 5 - 2 0 mmol/L   Manual Differential(PHLEBS Do Not Order)    Collection Time: 01/27/21  7:18 PM   Result Value Ref Range    Segmented % 65 43 - 75 %    Bands % 23 (H) 0 - 8 %    Lymphocytes % 5 (L) 14 - 44 %    Monocytes % 4 4 - 12 %    Eosinophils, % 0 0 - 6 %    Basophils % 0 0 - 1 %    Metamyelocytes% 3 (H) 0 - 1 %    Absolute Neutrophils 15 58 (H) 1 85 - 7 62 Thousand/uL Lymphocytes Absolute 0 89 0 60 - 4 47 Thousand/uL    Monocytes Absolute 0 71 0 00 - 1 22 Thousand/uL    Eosinophils Absolute 0 00 0 00 - 0 40 Thousand/uL    Basophils Absolute 0 00 0 00 - 0 10 Thousand/uL    Total Counted      Toxic Granulation Present     Platelet Estimate Adequate Adequate    Large Platelet Present    Fingerstick Glucose (POCT)    Collection Time: 01/28/21 12:27 AM   Result Value Ref Range    POC Glucose 154 (H) 65 - 140 mg/dl   Lactic acid, plasma    Collection Time: 01/28/21 12:29 AM   Result Value Ref Range    LACTIC ACID 1 1 0 5 - 2 0 mmol/L   POCT Blood Gas (CG8+)    Collection Time: 01/28/21  1:12 AM   Result Value Ref Range    pH, Art i-STAT 7 361 7 350 - 7 450    pCO2, Art i-STAT 36 4 36 0 - 44 0 mm HG    pO2, ART i-STAT 159 0 (H) 75 0 - 129 0 mm HG    BE, i-STAT -4 (L) -2 - 3 mmol/L    HCO3, Art i-STAT 20 6 (L) 22 0 - 28 0 mmol/L    CO2, i-STAT 22 21 - 32 mmol/L    O2 Sat, i-STAT 99 (H) 60 - 85 %    SODIUM, I-STAT 140 136 - 145 mmol/l    Potassium, i-STAT 4 0 3 5 - 5 3 mmol/L    Hct, i-STAT 41 34 8 - 46 1 %    Hgb, i-STAT 13 9 11 5 - 15 4 g/dl    Glucose, i-STAT 157 (H) 65 - 140 mg/dl    Specimen Type ARTERIAL    Blood gas, arterial    Collection Time: 01/28/21  6:18 AM   Result Value Ref Range    pH, Arterial 7 413 7 350 - 7 450    pCO2, Arterial 27 3 (LL) 36 0 - 44 0 mm Hg    pO2, Arterial 127 2 75 0 - 129 0 mm Hg    HCO3, Arterial 17 0 (L) 22 0 - 28 0 mmol/L    Base Excess, Arterial -6 4 mmol/L    O2 Content, Arterial 14 3 (L) 16 0 - 23 0 mL/dL    O2 HGB,Arterial  97 4 (H) 94 0 - 97 0 %    SOURCE Line, Arterial     Vent Type- AC AC     AC Rate 14     Tidal Volume 350 ml    Inspired Air (FIO2) 40     PEEP 6    CBC and differential    Collection Time: 01/28/21  6:18 AM   Result Value Ref Range    WBC 19 74 (H) 4 31 - 10 16 Thousand/uL    RBC 3 99 3 81 - 5 12 Million/uL    Hemoglobin 10 9 (L) 11 5 - 15 4 g/dL    Hematocrit 34 3 (L) 34 8 - 46 1 %    MCV 86 82 - 98 fL    MCH 27 3 26 8 - 34 3 pg    MCHC 31 8 31 4 - 37 4 g/dL    RDW 14 5 11 6 - 15 1 %    MPV 10 6 8 9 - 12 7 fL    Platelets 262 667 - 122 Thousands/uL    nRBC 0 /100 WBCs   Fingerstick Glucose (POCT)    Collection Time: 01/28/21  6:26 AM   Result Value Ref Range    POC Glucose 143 (H) 65 - 140 mg/dl   Basic metabolic panel    Collection Time: 01/28/21  8:03 AM   Result Value Ref Range    Sodium 141 136 - 145 mmol/L    Potassium 4 1 3 5 - 5 3 mmol/L    Chloride 109 (H) 100 - 108 mmol/L    CO2 21 21 - 32 mmol/L    ANION GAP 11 4 - 13 mmol/L    BUN 16 5 - 25 mg/dL    Creatinine 1 09 0 60 - 1 30 mg/dL    Glucose 157 (H) 65 - 140 mg/dL    Calcium 7 4 (L) 8 3 - 10 1 mg/dL    eGFR 60 ml/min/1 73sq m   Magnesium    Collection Time: 01/28/21  8:03 AM   Result Value Ref Range    Magnesium 2 5 1 6 - 2 6 mg/dL   Phosphorus    Collection Time: 01/28/21  8:03 AM   Result Value Ref Range    Phosphorus 3 4 2 7 - 4 5 mg/dL   Calcium, ionized    Collection Time: 01/28/21  8:03 AM   Result Value Ref Range    Calcium, Ionized 0 96 (L) 1 12 - 1 32 mmol/L   Lactic acid, plasma    Collection Time: 01/28/21  1:28 PM   Result Value Ref Range    LACTIC ACID 1 0 0 5 - 2 0 mmol/L   Fingerstick Glucose (POCT)    Collection Time: 01/28/21  1:33 PM   Result Value Ref Range    POC Glucose 114 65 - 140 mg/dl   Blood gas, venous    Collection Time: 01/28/21  2:24 PM   Result Value Ref Range    pH, Charles 7 523 (H) 7 300 - 7 400    pCO2, Charles 21 0 (L) 42 0 - 50 0 mm Hg    pO2, Charles 197 8 (H) 35 0 - 45 0 mm Hg    HCO3, Charles 16 9 (L) 24 - 30 mmol/L    Base Excess, Charles -4 4 mmol/L    O2 Content, Charles 14 3 ml/dL    O2 HGB, VENOUS 93 7 (H) 60 0 - 80 0 %   Basic metabolic panel    Collection Time: 01/28/21  2:28 PM   Result Value Ref Range    Sodium 142 136 - 145 mmol/L    Potassium 4 1 3 5 - 5 3 mmol/L    Chloride 111 (H) 100 - 108 mmol/L    CO2 21 21 - 32 mmol/L    ANION GAP 10 4 - 13 mmol/L    BUN 19 5 - 25 mg/dL    Creatinine 1 19 0 60 - 1 30 mg/dL    Glucose 142 (H) 65 - 140 mg/dL    Calcium 8 0 (L) 8 3 - 10 1 mg/dL    eGFR 54 ml/min/1 73sq m   Blood gas, arterial    Collection Time: 01/28/21  4:12 PM   Result Value Ref Range    pH, Arterial 7 435 7 350 - 7 450    pCO2, Arterial 33 0 (L) 36 0 - 44 0 mm Hg    pO2, Arterial 148 4 (H) 75 0 - 129 0 mm Hg    HCO3, Arterial 21 7 (L) 22 0 - 28 0 mmol/L    Base Excess, Arterial -2 1 mmol/L    O2 Content, Arterial 13 3 (L) 16 0 - 23 0 mL/dL    O2 HGB,Arterial  98 1 (H) 94 0 - 97 0 %    SOURCE Line, Arterial     HUNTER TEST Yes     Temperature 100 2 Degrees Fehrenheit    Vent Type- AC AC     AC Rate 14     Tidal Volume 350 ml    Inspired Air (FIO2) 40     PEEP 6    Fingerstick Glucose (POCT)    Collection Time: 01/28/21  6:28 PM   Result Value Ref Range    POC Glucose 117 65 - 140 mg/dl   Basic metabolic panel    Collection Time: 01/28/21  8:09 PM   Result Value Ref Range    Sodium 142 136 - 145 mmol/L    Potassium 3 8 3 5 - 5 3 mmol/L    Chloride 110 (H) 100 - 108 mmol/L    CO2 22 21 - 32 mmol/L    ANION GAP 10 4 - 13 mmol/L    BUN 22 5 - 25 mg/dL    Creatinine 1 44 (H) 0 60 - 1 30 mg/dL    Glucose 140 65 - 140 mg/dL    Calcium 7 6 (L) 8 3 - 10 1 mg/dL    eGFR 43 ml/min/1 73sq m   Fingerstick Glucose (POCT)    Collection Time: 01/29/21 12:22 AM   Result Value Ref Range    POC Glucose 121 65 - 140 mg/dl   Fingerstick Glucose (POCT)    Collection Time: 01/29/21  5:18 AM   Result Value Ref Range    POC Glucose 102 65 - 140 mg/dl   CBC and differential    Collection Time: 01/29/21  5:20 AM   Result Value Ref Range    WBC 14 66 (H) 4 31 - 10 16 Thousand/uL    RBC 2 74 (L) 3 81 - 5 12 Million/uL    Hemoglobin 7 6 (L) 11 5 - 15 4 g/dL    Hematocrit 24 1 (L) 34 8 - 46 1 %    MCV 88 82 - 98 fL    MCH 27 4 26 8 - 34 3 pg    MCHC 31 1 (L) 31 4 - 37 4 g/dL    RDW 14 6 11 6 - 15 1 %    MPV 10 3 8 9 - 12 7 fL    Platelets 654 674 - 872 Thousands/uL    nRBC 0 /100 WBCs   Basic metabolic panel    Collection Time: 01/29/21  5:20 AM   Result Value Ref Range Sodium 142 136 - 145 mmol/L    Potassium 3 6 3 5 - 5 3 mmol/L    Chloride 110 (H) 100 - 108 mmol/L    CO2 22 21 - 32 mmol/L    ANION GAP 10 4 - 13 mmol/L    BUN 25 5 - 25 mg/dL    Creatinine 1 53 (H) 0 60 - 1 30 mg/dL    Glucose 119 65 - 140 mg/dL    Calcium 7 7 (L) 8 3 - 10 1 mg/dL    eGFR 40 ml/min/1 73sq m   Magnesium    Collection Time: 01/29/21  5:20 AM   Result Value Ref Range    Magnesium 2 6 1 6 - 2 6 mg/dL   Phosphorus    Collection Time: 01/29/21  5:20 AM   Result Value Ref Range    Phosphorus 3 2 2 7 - 4 5 mg/dL   Manual Differential(PHLEBS Do Not Order)    Collection Time: 01/29/21  5:20 AM   Result Value Ref Range    Segmented % 67 43 - 75 %    Bands % 11 (H) 0 - 8 %    Lymphocytes % 13 (L) 14 - 44 %    Monocytes % 5 4 - 12 %    Eosinophils, % 0 0 - 6 %    Basophils % 0 0 - 1 %    Myelocytes % 3 (H) 0 - 1 %    Atypical Lymphocytes % 1 (H) <=0 %    Absolute Neutrophils 11 43 (H) 1 85 - 7 62 Thousand/uL    Lymphocytes Absolute 1 91 0 60 - 4 47 Thousand/uL    Monocytes Absolute 0 73 0 00 - 1 22 Thousand/uL    Eosinophils Absolute 0 00 0 00 - 0 40 Thousand/uL    Basophils Absolute 0 00 0 00 - 0 10 Thousand/uL    Total Counted 100     Anisocytosis Present     Platelet Estimate Adequate Adequate   Fingerstick Glucose (POCT)    Collection Time: 01/29/21 12:30 PM   Result Value Ref Range    POC Glucose 103 65 - 140 mg/dl   Hemoglobin and hematocrit, blood    Collection Time: 01/29/21  2:01 PM   Result Value Ref Range    Hemoglobin 8 4 (L) 11 5 - 15 4 g/dL    Hematocrit 26 7 (L) 34 8 - 46 1 %   Fingerstick Glucose (POCT)    Collection Time: 01/29/21  5:02 PM   Result Value Ref Range    POC Glucose 97 65 - 140 mg/dl   Blood gas, arterial    Collection Time: 01/29/21  8:46 PM   Result Value Ref Range    pH, Arterial 7 300 (L) 7 350 - 7 450    pCO2, Arterial 20 4 (LL) 36 0 - 44 0 mm Hg    pO2, Arterial 142 6 (H) 75 0 - 129 0 mm Hg    HCO3, Arterial 9 8 (L) 22 0 - 28 0 mmol/L    Base Excess, Arterial -15 3 mmol/L O2 Content, Arterial 8 2 (L) 16 0 - 23 0 mL/dL    O2 HGB,Arterial  97 4 (H) 94 0 - 97 0 %    SOURCE Line, Arterial     Nasal Cannula 2    CBC and Platelet    Collection Time: 01/29/21  8:47 PM   Result Value Ref Range    WBC 21 08 (H) 4 31 - 10 16 Thousand/uL    RBC 3 06 (L) 3 81 - 5 12 Million/uL    Hemoglobin 8 4 (L) 11 5 - 15 4 g/dL    Hematocrit 27 3 (L) 34 8 - 46 1 %    MCV 89 82 - 98 fL    MCH 27 5 26 8 - 34 3 pg    MCHC 30 8 (L) 31 4 - 37 4 g/dL    RDW 14 9 11 6 - 15 1 %    Platelets 846 730 - 545 Thousands/uL    MPV 10 2 8 9 - 12 7 fL   Comprehensive metabolic panel    Collection Time: 01/29/21  8:47 PM   Result Value Ref Range    Sodium 145 136 - 145 mmol/L    Potassium 3 6 3 5 - 5 3 mmol/L    Chloride 112 (H) 100 - 108 mmol/L    CO2 18 (L) 21 - 32 mmol/L    ANION GAP 15 (H) 4 - 13 mmol/L    BUN 26 (H) 5 - 25 mg/dL    Creatinine 1 25 0 60 - 1 30 mg/dL    Glucose 104 65 - 140 mg/dL    Calcium 7 6 (L) 8 3 - 10 1 mg/dL    Corrected Calcium 9 1 8 3 - 10 1 mg/dL    AST 10 5 - 45 U/L    ALT 10 (L) 12 - 78 U/L    Alkaline Phosphatase 37 (L) 46 - 116 U/L    Total Protein 4 8 (L) 6 4 - 8 2 g/dL    Albumin 2 1 (L) 3 5 - 5 0 g/dL    Total Bilirubin 0 40 0 20 - 1 00 mg/dL    eGFR 51 ml/min/1 73sq m   Lactic acid, plasma    Collection Time: 01/29/21 10:18 PM   Result Value Ref Range    LACTIC ACID 0 9 0 5 - 2 0 mmol/L   Fingerstick Glucose (POCT)    Collection Time: 01/30/21 12:05 AM   Result Value Ref Range    POC Glucose 129 65 - 140 mg/dl   Blood gas, arterial    Collection Time: 01/30/21  2:34 AM   Result Value Ref Range    pH, Arterial 7 413 7 350 - 7 450    pCO2, Arterial 27 6 (LL) 36 0 - 44 0 mm Hg    pO2, Arterial 106 4 75 0 - 129 0 mm Hg    HCO3, Arterial 17 2 (L) 22 0 - 28 0 mmol/L    Base Excess, Arterial -6 5 mmol/L    O2 Content, Arterial 11 4 (L) 16 0 - 23 0 mL/dL    O2 HGB,Arterial  97 0 94 0 - 97 0 %    SOURCE Radial, Left     HUNTER TEST Yes     Temperature 98 4 Degrees Fehrenheit    Nasal Cannula 2 CBC and differential    Collection Time: 01/30/21  6:19 AM   Result Value Ref Range    WBC 17 45 (H) 4 31 - 10 16 Thousand/uL    RBC 2 88 (L) 3 81 - 5 12 Million/uL    Hemoglobin 7 9 (L) 11 5 - 15 4 g/dL    Hematocrit 25 1 (L) 34 8 - 46 1 %    MCV 87 82 - 98 fL    MCH 27 4 26 8 - 34 3 pg    MCHC 31 5 31 4 - 37 4 g/dL    RDW 14 6 11 6 - 15 1 %    MPV 9 7 8 9 - 12 7 fL    Platelets 390 986 - 903 Thousands/uL    nRBC 0 /100 WBCs   Comprehensive metabolic panel    Collection Time: 01/30/21  6:19 AM   Result Value Ref Range    Sodium 145 136 - 145 mmol/L    Potassium 3 4 (L) 3 5 - 5 3 mmol/L    Chloride 110 (H) 100 - 108 mmol/L    CO2 23 21 - 32 mmol/L    ANION GAP 12 4 - 13 mmol/L    BUN 21 5 - 25 mg/dL    Creatinine 1 22 0 60 - 1 30 mg/dL    Glucose 119 65 - 140 mg/dL    Calcium 7 4 (L) 8 3 - 10 1 mg/dL    Corrected Calcium 8 8 8 3 - 10 1 mg/dL    AST 12 5 - 45 U/L    ALT 11 (L) 12 - 78 U/L    Alkaline Phosphatase 34 (L) 46 - 116 U/L    Total Protein 5 0 (L) 6 4 - 8 2 g/dL    Albumin 2 3 (L) 3 5 - 5 0 g/dL    Total Bilirubin 0 50 0 20 - 1 00 mg/dL    eGFR 53 ml/min/1 73sq m   Magnesium    Collection Time: 01/30/21  6:19 AM   Result Value Ref Range    Magnesium 2 2 1 6 - 2 6 mg/dL   Phosphorus    Collection Time: 01/30/21  6:19 AM   Result Value Ref Range    Phosphorus 2 1 (L) 2 7 - 4 5 mg/dL   Procalcitonin with AM Reflex    Collection Time: 01/30/21  6:19 AM   Result Value Ref Range    Procalcitonin 2 40 (H) <=0 25 ng/ml   Manual Differential(PHLEBS Do Not Order)    Collection Time: 01/30/21  6:19 AM   Result Value Ref Range    Segmented % 73 43 - 75 %    Bands % 15 (H) 0 - 8 %    Lymphocytes % 4 (L) 14 - 44 %    Monocytes % 2 (L) 4 - 12 %    Eosinophils, % 0 0 - 6 %    Basophils % 0 0 - 1 %    Metamyelocytes% 2 (H) 0 - 1 %    Myelocytes % 4 (H) 0 - 1 %    Absolute Neutrophils 15 36 (H) 1 85 - 7 62 Thousand/uL    Lymphocytes Absolute 0 70 0 60 - 4 47 Thousand/uL    Monocytes Absolute 0 35 0 00 - 1 22 Thousand/uL Eosinophils Absolute 0 00 0 00 - 0 40 Thousand/uL    Basophils Absolute 0 00 0 00 - 0 10 Thousand/uL    Total Counted 100     Anisocytosis Present     Platelet Estimate Adequate Adequate   Fingerstick Glucose (POCT)    Collection Time: 01/30/21  1:32 PM   Result Value Ref Range    POC Glucose 131 65 - 140 mg/dl   Basic metabolic panel    Collection Time: 01/30/21  3:47 PM   Result Value Ref Range    Sodium 141 136 - 145 mmol/L    Potassium 3 8 3 5 - 5 3 mmol/L    Chloride 109 (H) 100 - 108 mmol/L    CO2 24 21 - 32 mmol/L    ANION GAP 8 4 - 13 mmol/L    BUN 21 5 - 25 mg/dL    Creatinine 1 16 0 60 - 1 30 mg/dL    Glucose 133 65 - 140 mg/dL    Calcium 7 9 (L) 8 3 - 10 1 mg/dL    eGFR 56 ml/min/1 73sq m   Magnesium    Collection Time: 01/30/21  3:47 PM   Result Value Ref Range    Magnesium 2 1 1 6 - 2 6 mg/dL   Prepare Leukoreduced RBC: 2 Units    Collection Time: 01/30/21  7:57 PM   Result Value Ref Range    Unit Product Code L6015W32     Unit Number [id number]     Unit ABO A     Unit RH POS     Crossmatch Compatible     Unit Dispense Status Return to [facility]     Unit Product Code B8707U65     Unit Number [id number]     Unit ABO A     Unit RH POS     Crossmatch Compatible     Unit Dispense Status Return to [facility]    Fingerstick Glucose (POCT)    Collection Time: 01/30/21  8:23 PM   Result Value Ref Range    POC Glucose 126 65 - 140 mg/dl   Fingerstick Glucose (POCT)    Collection Time: 01/31/21 12:19 AM   Result Value Ref Range    POC Glucose 110 65 - 140 mg/dl   Procalcitonin Reflex    Collection Time: 01/31/21  6:25 AM   Result Value Ref Range    Procalcitonin 1 22 (H) <=0 25 ng/ml   CBC and differential    Collection Time: 01/31/21  6:25 AM   Result Value Ref Range    WBC 15 67 (H) 4 31 - 10 16 Thousand/uL    RBC 2 97 (L) 3 81 - 5 12 Million/uL    Hemoglobin 8 2 (L) 11 5 - 15 4 g/dL    Hematocrit 25 8 (L) 34 8 - 46 1 %    MCV 87 82 - 98 fL    MCH 27 6 26 8 - 34 3 pg    MCHC 31 8 31 4 - 37 4 g/dL    RDW 14 8 11 6 - 15 1 %    MPV 10 0 8 9 - 12 7 fL    Platelets 934 646 - 841 Thousands/uL    nRBC 0 /100 WBCs   Comprehensive metabolic panel    Collection Time: 01/31/21  6:25 AM   Result Value Ref Range    Sodium 147 (H) 136 - 145 mmol/L    Potassium 3 8 3 5 - 5 3 mmol/L    Chloride 111 (H) 100 - 108 mmol/L    CO2 25 21 - 32 mmol/L    ANION GAP 11 4 - 13 mmol/L    BUN 20 5 - 25 mg/dL    Creatinine 0 86 0 60 - 1 30 mg/dL    Glucose 117 65 - 140 mg/dL    Calcium 7 8 (L) 8 3 - 10 1 mg/dL    Corrected Calcium 9 4 8 3 - 10 1 mg/dL    AST 12 5 - 45 U/L    ALT 11 (L) 12 - 78 U/L    Alkaline Phosphatase 38 (L) 46 - 116 U/L    Total Protein 5 2 (L) 6 4 - 8 2 g/dL    Albumin 2 0 (L) 3 5 - 5 0 g/dL    Total Bilirubin 0 40 0 20 - 1 00 mg/dL    eGFR 80 ml/min/1 73sq m   Magnesium    Collection Time: 01/31/21  6:25 AM   Result Value Ref Range    Magnesium 2 2 1 6 - 2 6 mg/dL   Phosphorus    Collection Time: 01/31/21  6:25 AM   Result Value Ref Range    Phosphorus 1 7 (L) 2 7 - 4 5 mg/dL   Manual Differential(PHLEBS Do Not Order)    Collection Time: 01/31/21  6:25 AM   Result Value Ref Range    Segmented % 78 (H) 43 - 75 %    Bands % 12 (H) 0 - 8 %    Lymphocytes % 6 (L) 14 - 44 %    Monocytes % 2 (L) 4 - 12 %    Eosinophils, % 1 0 - 6 %    Basophils % 0 0 - 1 %    Metamyelocytes% 1 0 - 1 %    Absolute Neutrophils 14 10 (H) 1 85 - 7 62 Thousand/uL    Lymphocytes Absolute 0 94 0 60 - 4 47 Thousand/uL    Monocytes Absolute 0 31 0 00 - 1 22 Thousand/uL    Eosinophils Absolute 0 16 0 00 - 0 40 Thousand/uL    Basophils Absolute 0 00 0 00 - 0 10 Thousand/uL    Total Counted 100     Toxic Granulation Present     Anisocytosis Present     Platelet Estimate Adequate Adequate   ECG 12 lead    Collection Time: 01/31/21  6:35 AM   Result Value Ref Range    Ventricular Rate 89 BPM    Atrial Rate 89 BPM    AK Interval 138 ms    QRSD Interval 84 ms    QT Interval 364 ms    QTC Interval 442 ms    P Axis 51 degrees    QRS Axis 64 degrees    T Wave Axis 43 degrees   Fingerstick Glucose (POCT)    Collection Time: 01/31/21  1:10 PM   Result Value Ref Range    POC Glucose 114 65 - 140 mg/dl   Fingerstick Glucose (POCT)    Collection Time: 01/31/21  5:04 PM   Result Value Ref Range    POC Glucose 120 65 - 140 mg/dl   Basic metabolic panel    Collection Time: 01/31/21  6:23 PM   Result Value Ref Range    Sodium 147 (H) 136 - 145 mmol/L    Potassium 3 9 3 5 - 5 3 mmol/L    Chloride 109 (H) 100 - 108 mmol/L    CO2 24 21 - 32 mmol/L    ANION GAP 14 (H) 4 - 13 mmol/L    BUN 20 5 - 25 mg/dL    Creatinine 0 93 0 60 - 1 30 mg/dL    Glucose 131 65 - 140 mg/dL    Calcium 8 0 (L) 8 3 - 10 1 mg/dL    eGFR 73 ml/min/1 73sq m   Magnesium    Collection Time: 01/31/21  6:23 PM   Result Value Ref Range    Magnesium 2 0 1 6 - 2 6 mg/dL   Fingerstick Glucose (POCT)    Collection Time: 02/01/21 12:24 AM   Result Value Ref Range    POC Glucose 143 (H) 65 - 140 mg/dl   Fingerstick Glucose (POCT)    Collection Time: 02/01/21  4:51 AM   Result Value Ref Range    POC Glucose 131 65 - 140 mg/dl   CBC and differential    Collection Time: 02/01/21  6:02 AM   Result Value Ref Range    WBC 11 91 (H) 4 31 - 10 16 Thousand/uL    RBC 3 06 (L) 3 81 - 5 12 Million/uL    Hemoglobin 8 4 (L) 11 5 - 15 4 g/dL    Hematocrit 27 1 (L) 34 8 - 46 1 %    MCV 89 82 - 98 fL    MCH 27 5 26 8 - 34 3 pg    MCHC 31 0 (L) 31 4 - 37 4 g/dL    RDW 14 8 11 6 - 15 1 %    MPV 9 9 8 9 - 12 7 fL    Platelets 713 (H) 901 - 390 Thousands/uL    nRBC 0 /100 WBCs    Neutrophils Relative 88 (H) 43 - 75 %    Immat GRANS % 2 0 - 2 %    Lymphocytes Relative 6 (L) 14 - 44 %    Monocytes Relative 4 4 - 12 %    Eosinophils Relative 0 0 - 6 %    Basophils Relative 0 0 - 1 %    Neutrophils Absolute 10 40 (H) 1 85 - 7 62 Thousands/µL    Immature Grans Absolute 0 26 (H) 0 00 - 0 20 Thousand/uL    Lymphocytes Absolute 0 68 0 60 - 4 47 Thousands/µL    Monocytes Absolute 0 49 0 17 - 1 22 Thousand/µL    Eosinophils Absolute 0 04 0 00 - 0 61 Thousand/µL    Basophils Absolute 0 04 0 00 - 0 10 Thousands/µL   Comprehensive metabolic panel    Collection Time: 02/01/21  6:02 AM   Result Value Ref Range    Sodium 148 (H) 136 - 145 mmol/L    Potassium 3 5 3 5 - 5 3 mmol/L    Chloride 111 (H) 100 - 108 mmol/L    CO2 25 21 - 32 mmol/L    ANION GAP 12 4 - 13 mmol/L    BUN 18 5 - 25 mg/dL    Creatinine 0 75 0 60 - 1 30 mg/dL    Glucose 134 65 - 140 mg/dL    Calcium 7 9 (L) 8 3 - 10 1 mg/dL    Corrected Calcium 9 5 8 3 - 10 1 mg/dL    AST 10 5 - 45 U/L    ALT 12 12 - 78 U/L    Alkaline Phosphatase 41 (L) 46 - 116 U/L    Total Protein 5 6 (L) 6 4 - 8 2 g/dL    Albumin 2 0 (L) 3 5 - 5 0 g/dL    Total Bilirubin 0 40 0 20 - 1 00 mg/dL    eGFR 95 ml/min/1 73sq m   Magnesium    Collection Time: 02/01/21  6:02 AM   Result Value Ref Range    Magnesium 2 1 1 6 - 2 6 mg/dL   Phosphorus    Collection Time: 02/01/21  6:02 AM   Result Value Ref Range    Phosphorus 1 6 (L) 2 7 - 4 5 mg/dL   ECG 12 lead    Collection Time: 02/01/21  8:44 AM   Result Value Ref Range    Ventricular Rate 73 BPM    Atrial Rate 73 BPM    AZ Interval 138 ms    QRSD Interval 78 ms    QT Interval 418 ms    QTC Interval 460 ms    P Axis 44 degrees    QRS Axis 37 degrees    T Wave Axis 52 degrees   Fingerstick Glucose (POCT)    Collection Time: 02/01/21 12:08 PM   Result Value Ref Range    POC Glucose 134 65 - 140 mg/dl   Fingerstick Glucose (POCT)    Collection Time: 02/01/21  4:02 PM   Result Value Ref Range    POC Glucose 123 65 - 140 mg/dl   Phosphorus    Collection Time: 02/01/21  9:44 PM   Result Value Ref Range    Phosphorus 2 1 (L) 2 7 - 4 5 mg/dL   Potassium    Collection Time: 02/01/21  9:44 PM   Result Value Ref Range    Potassium 3 8 3 5 - 5 3 mmol/L   Fingerstick Glucose (POCT)    Collection Time: 02/01/21 11:58 PM   Result Value Ref Range    POC Glucose 122 65 - 140 mg/dl   Fingerstick Glucose (POCT)    Collection Time: 02/02/21  5:43 AM   Result Value Ref Range    POC Glucose 130 65 - 140 mg/dl   Basic metabolic panel    Collection Time: 02/02/21  5:48 AM   Result Value Ref Range    Sodium 149 (H) 136 - 145 mmol/L    Potassium 3 6 3 5 - 5 3 mmol/L    Chloride 112 (H) 100 - 108 mmol/L    CO2 26 21 - 32 mmol/L    ANION GAP 11 4 - 13 mmol/L    BUN 19 5 - 25 mg/dL    Creatinine 0 73 0 60 - 1 30 mg/dL    Glucose 138 65 - 140 mg/dL    Calcium 8 0 (L) 8 3 - 10 1 mg/dL    eGFR 98 ml/min/1 73sq m   CBC and differential    Collection Time: 02/02/21  5:48 AM   Result Value Ref Range    WBC 9 83 4 31 - 10 16 Thousand/uL    RBC 3 14 (L) 3 81 - 5 12 Million/uL    Hemoglobin 8 6 (L) 11 5 - 15 4 g/dL    Hematocrit 27 5 (L) 34 8 - 46 1 %    MCV 88 82 - 98 fL    MCH 27 4 26 8 - 34 3 pg    MCHC 31 3 (L) 31 4 - 37 4 g/dL    RDW 15 1 11 6 - 15 1 %    MPV 9 9 8 9 - 12 7 fL    Platelets 489 (H) 829 - 390 Thousands/uL    nRBC 0 /100 WBCs   Manual Differential(PHLEBS Do Not Order)    Collection Time: 02/02/21  5:48 AM   Result Value Ref Range    Segmented % 85 (H) 43 - 75 %    Bands % 4 0 - 8 %    Lymphocytes % 7 (L) 14 - 44 %    Monocytes % 4 4 - 12 %    Eosinophils, % 0 0 - 6 %    Basophils % 0 0 - 1 %    Absolute Neutrophils 8 75 (H) 1 85 - 7 62 Thousand/uL    Lymphocytes Absolute 0 69 0 60 - 4 47 Thousand/uL    Monocytes Absolute 0 39 0 00 - 1 22 Thousand/uL    Eosinophils Absolute 0 00 0 00 - 0 40 Thousand/uL    Basophils Absolute 0 00 0 00 - 0 10 Thousand/uL    Total Counted 100     Platelet Estimate Increased (A) Adequate   ECG 12 lead    Collection Time: 02/02/21  6:48 AM   Result Value Ref Range    Ventricular Rate 61 BPM    Atrial Rate 61 BPM    MT Interval 108 ms    QRSD Interval 82 ms    QT Interval 458 ms    QTC Interval 461 ms    P [place] 18 degrees    QRS Axis 36 degrees    T Wave Axis 48 degrees   Fingerstick Glucose (POCT)    Collection Time: 02/02/21 12:53 PM   Result Value Ref Range    POC Glucose 182 (H) 65 - 140 mg/dl   Fingerstick Glucose (POCT)    Collection Time: 02/02/21  4:41 PM   Result Value Ref Range    POC Glucose 175 (H) 65 - 140 mg/dl   Fingerstick Glucose (POCT)    Collection Time: 02/02/21  9:22 PM   Result Value Ref Range    POC Glucose 168 (H) 65 - 140 mg/dl   Fingerstick Glucose (POCT)    Collection Time: 02/02/21 11:28 PM   Result Value Ref Range    POC Glucose 155 (H) 65 - 140 mg/dl   Fingerstick Glucose (POCT)    Collection Time: 02/03/21  6:06 AM   Result Value Ref Range    POC Glucose 117 65 - 140 mg/dl   ECG 12 lead    Collection Time: 02/03/21  7:36 AM   Result Value Ref Range    Ventricular Rate 56 BPM    Atrial Rate 56 BPM    ME Interval 128 ms    QRSD Interval 82 ms    QT Interval 468 ms    QTC Interval 451 ms    P [place] 32 degrees    QRS Axis 46 degrees    T Wave Axis 58 degrees   Fingerstick Glucose (POCT)    Collection Time: 02/03/21 11:32 AM   Result Value Ref Range    POC Glucose 134 65 - 140 mg/dl   Fingerstick Glucose (POCT)    Collection Time: 02/03/21  4:06 PM   Result Value Ref Range    POC Glucose 138 65 - 140 mg/dl   CBC and differential    Collection Time: 02/03/21  4:25 PM   Result Value Ref Range    WBC 15 01 (H) 4 31 - 10 16 Thousand/uL    RBC 3 07 (L) 3 81 - 5 12 Million/uL    Hemoglobin 8 5 (L) 11 5 - 15 4 g/dL    Hematocrit 27 1 (L) 34 8 - 46 1 %    MCV 88 82 - 98 fL    MCH 27 7 26 8 - 34 3 pg    MCHC 31 4 31 4 - 37 4 g/dL    RDW 15 3 (H) 11 6 - 15 1 %    MPV 9 8 8 9 - 12 7 fL    Platelets 748 (H) 085 - 390 Thousands/uL    nRBC 0 /100 WBCs    Neutrophils Relative 89 (H) 43 - 75 %    Immat GRANS % 1 0 - 2 %    Lymphocytes Relative 6 (L) 14 - 44 %    Monocytes Relative 4 4 - 12 %    Eosinophils Relative 0 0 - 6 %    Basophils Relative 0 0 - 1 %    Neutrophils Absolute 13 26 (H) 1 85 - 7 62 Thousands/µL    Immature Grans Absolute 0 21 (H) 0 00 - 0 20 Thousand/uL    Lymphocytes Absolute 0 87 0 60 - 4 47 Thousands/µL    Monocytes Absolute 0 63 0 17 - 1 22 Thousand/µL    Eosinophils Absolute 0 02 0 00 - 0 61 Thousand/µL    Basophils Absolute 0 02 0 00 - 0 10 Thousands/µL Basic metabolic panel    Collection Time: 02/03/21  4:25 PM   Result Value Ref Range    Sodium 145 136 - 145 mmol/L    Potassium 2 9 (L) 3 5 - 5 3 mmol/L    Chloride 109 (H) 100 - 108 mmol/L    CO2 28 21 - 32 mmol/L    ANION GAP 8 4 - 13 mmol/L    BUN 16 5 - 25 mg/dL    Creatinine 0 70 0 60 - 1 30 mg/dL    Glucose 140 65 - 140 mg/dL    Calcium 7 6 (L) 8 3 - 10 1 mg/dL    eGFR 103 ml/min/1 73sq m   Magnesium    Collection Time: 02/03/21  4:25 PM   Result Value Ref Range    Magnesium 1 7 1 6 - 2 6 mg/dL   Phosphorus    Collection Time: 02/03/21  4:25 PM   Result Value Ref Range    Phosphorus 1 7 (L) 2 7 - 4 5 mg/dL   Inflammatory bowel disease panel    Collection Time: 02/03/21  6:19 PM   Result Value Ref Range    ACCA 52 0 - 90 units    Nelli 28 0 - 50 units    ALCA 46 0 - 60 units    AMCA 72 0 - 100 units    Atypical pANCA Negative Negative    COMMENTS Comment    Fingerstick Glucose (POCT)    Collection Time: 02/04/21 12:04 AM   Result Value Ref Range    POC Glucose 117 65 - 140 mg/dl   Comprehensive metabolic panel    Collection Time: 02/04/21  5:56 AM   Result Value Ref Range    Sodium 145 136 - 145 mmol/L    Potassium 2 8 (L) 3 5 - 5 3 mmol/L    Chloride 109 (H) 100 - 108 mmol/L    CO2 27 21 - 32 mmol/L    ANION GAP 9 4 - 13 mmol/L    BUN 15 5 - 25 mg/dL    Creatinine 0 63 0 60 - 1 30 mg/dL    Glucose 116 65 - 140 mg/dL    Calcium 7 4 (L) 8 3 - 10 1 mg/dL    Corrected Calcium 9 1 8 3 - 10 1 mg/dL    AST 14 5 - 45 U/L    ALT 13 12 - 78 U/L    Alkaline Phosphatase 63 46 - 116 U/L    Total Protein 5 2 (L) 6 4 - 8 2 g/dL    Albumin 1 9 (L) 3 5 - 5 0 g/dL    Total Bilirubin 0 20 0 20 - 1 00 mg/dL    eGFR 106 ml/min/1 73sq m   Lipid panel    Collection Time: 02/04/21  5:56 AM   Result Value Ref Range    Cholesterol 93 50 - 200 mg/dL    Triglycerides 185 (H) <=150 mg/dL    HDL, Direct 8 (L) >=40 mg/dL    LDL Calculated 48 0 - 100 mg/dL    Non-HDL-Chol (CHOL-HDL) 85 mg/dl   Magnesium    Collection Time: 02/04/21  5:56 AM   Result Value Ref Range    Magnesium 1 7 1 6 - 2 6 mg/dL   Phosphorus    Collection Time: 02/04/21  5:56 AM   Result Value Ref Range    Phosphorus 1 5 (L) 2 7 - 4 5 mg/dL   Fingerstick Glucose (POCT)    Collection Time: 02/04/21  6:12 AM   Result Value Ref Range    POC Glucose 108 65 - 140 mg/dl   ECG 12 lead    Collection Time: 02/04/21  8:03 AM   Result Value Ref Range    Ventricular Rate 63 BPM    Atrial Rate 63 BPM    HI Interval 134 ms    QRSD Interval 86 ms    QT Interval 372 ms    QTC Interval 380 ms    P Axis 36 degrees    QRS Axis 48 degrees    T Wave Axis 64 degrees   Fingerstick Glucose (POCT)    Collection Time: 02/04/21 11:55 AM   Result Value Ref Range    POC Glucose 121 65 - 140 mg/dl   Potassium    Collection Time: 02/04/21 12:18 PM   Result Value Ref Range    Potassium 3 0 (L) 3 5 - 5 3 mmol/L   Phosphorus    Collection Time: 02/04/21 12:18 PM   Result Value Ref Range    Phosphorus 1 6 (L) 2 7 - 4 5 mg/dL   CBC and differential    Collection Time: 02/04/21 12:18 PM   Result Value Ref Range    WBC 16 11 (H) 4 31 - 10 16 Thousand/uL    RBC 3 52 (L) 3 81 - 5 12 Million/uL    Hemoglobin 9 6 (L) 11 5 - 15 4 g/dL    Hematocrit 30 6 (L) 34 8 - 46 1 %    MCV 87 82 - 98 fL    MCH 27 3 26 8 - 34 3 pg    MCHC 31 4 31 4 - 37 4 g/dL    RDW 15 7 (H) 11 6 - 15 1 %    MPV 9 9 8 9 - 12 7 fL    Platelets 920 (H) 335 - 390 Thousands/uL    nRBC 0 /100 WBCs    Neutrophils Relative 86 (H) 43 - 75 %    Immat GRANS % 2 0 - 2 %    Lymphocytes Relative 8 (L) 14 - 44 %    Monocytes Relative 4 4 - 12 %    Eosinophils Relative 0 0 - 6 %    Basophils Relative 0 0 - 1 %    Neutrophils Absolute 13 85 (H) 1 85 - 7 62 Thousands/µL    Immature Grans Absolute 0 29 (H) 0 00 - 0 20 Thousand/uL    Lymphocytes Absolute 1 31 0 60 - 4 47 Thousands/µL    Monocytes Absolute 0 59 0 17 - 1 22 Thousand/µL    Eosinophils Absolute 0 05 0 00 - 0 61 Thousand/µL    Basophils Absolute 0 02 0 00 - 0 10 Thousands/µL   Fingerstick Glucose (POCT) Collection Time: 02/04/21  3:31 PM   Result Value Ref Range    POC Glucose 123 65 - 140 mg/dl   Fingerstick Glucose (POCT)    Collection Time: 02/04/21  9:20 PM   Result Value Ref Range    POC Glucose 119 65 - 140 mg/dl   Fingerstick Glucose (POCT)    Collection Time: 02/05/21 12:00 AM   Result Value Ref Range    POC Glucose 108 65 - 140 mg/dl   CBC and differential    Collection Time: 02/05/21  6:10 AM   Result Value Ref Range    WBC 12 48 (H) 4 31 - 10 16 Thousand/uL    RBC 2 97 (L) 3 81 - 5 12 Million/uL    Hemoglobin 8 2 (L) 11 5 - 15 4 g/dL    Hematocrit 25 9 (L) 34 8 - 46 1 %    MCV 87 82 - 98 fL    MCH 27 6 26 8 - 34 3 pg    MCHC 31 7 31 4 - 37 4 g/dL    RDW 15 9 (H) 11 6 - 15 1 %    MPV 9 8 8 9 - 12 7 fL    Platelets 615 (H) 239 - 390 Thousands/uL    nRBC 0 /100 WBCs    Neutrophils Relative 82 (H) 43 - 75 %    Immat GRANS % 2 0 - 2 %    Lymphocytes Relative 10 (L) 14 - 44 %    Monocytes Relative 5 4 - 12 %    Eosinophils Relative 1 0 - 6 %    Basophils Relative 0 0 - 1 %    Neutrophils Absolute 10 26 (H) 1 85 - 7 62 Thousands/µL    Immature Grans Absolute 0 25 (H) 0 00 - 0 20 Thousand/uL    Lymphocytes Absolute 1 24 0 60 - 4 47 Thousands/µL    Monocytes Absolute 0 63 0 17 - 1 22 Thousand/µL    Eosinophils Absolute 0 08 0 00 - 0 61 Thousand/µL    Basophils Absolute 0 02 0 00 - 0 10 Thousands/µL   Comprehensive metabolic panel    Collection Time: 02/05/21  6:10 AM   Result Value Ref Range    Sodium 142 136 - 145 mmol/L    Potassium 2 6 (LL) 3 5 - 5 3 mmol/L    Chloride 107 100 - 108 mmol/L    CO2 27 21 - 32 mmol/L    ANION GAP 8 4 - 13 mmol/L    BUN 7 5 - 25 mg/dL    Creatinine 0 60 0 60 - 1 30 mg/dL    Glucose 108 65 - 140 mg/dL    Calcium 7 0 (L) 8 3 - 10 1 mg/dL    Corrected Calcium 8 8 8 3 - 10 1 mg/dL    AST 14 5 - 45 U/L    ALT 10 (L) 12 - 78 U/L    Alkaline Phosphatase 63 46 - 116 U/L    Total Protein 4 8 (L) 6 4 - 8 2 g/dL    Albumin 1 7 (L) 3 5 - 5 0 g/dL    Total Bilirubin 0 20 0 20 - 1 00 mg/dL eGFR 108 ml/min/1 73sq m   Phosphorus    Collection Time: 02/05/21  6:10 AM   Result Value Ref Range    Phosphorus 1 8 (L) 2 7 - 4 5 mg/dL   Magnesium    Collection Time: 02/05/21  6:10 AM   Result Value Ref Range    Magnesium 1 6 1 6 - 2 6 mg/dL   Fingerstick Glucose (POCT)    Collection Time: 02/05/21  6:21 AM   Result Value Ref Range    POC Glucose 107 65 - 140 mg/dl   ECG 12 lead    Collection Time: 02/05/21  7:37 AM   Result Value Ref Range    Ventricular Rate 69 BPM    Atrial Rate 69 BPM    AK Interval 132 ms    QRSD Interval 84 ms    QT Interval 478 ms    QTC Interval 512 ms    P [place] 35 degrees    QRS Axis 34 degrees    T Wave Axis 72 degrees   Calcium, ionized    Collection Time: 02/05/21  9:29 AM   Result Value Ref Range    Calcium, Ionized 0 98 (L) 1 12 - 1 32 mmol/L   Fingerstick Glucose (POCT)    Collection Time: 02/05/21 12:59 PM   Result Value Ref Range    POC Glucose 119 65 - 140 mg/dl   Fingerstick Glucose (POCT)    Collection Time: 02/05/21  6:43 PM   Result Value Ref Range    POC Glucose 124 65 - 140 mg/dl   Fingerstick Glucose (POCT)    Collection Time: 02/05/21  9:20 PM   Result Value Ref Range    POC Glucose 114 65 - 140 mg/dl   Fingerstick Glucose (POCT)    Collection Time: 02/05/21 11:47 PM   Result Value Ref Range    POC Glucose 112 65 - 140 mg/dl   Basic metabolic panel    Collection Time: 02/06/21  5:12 AM   Result Value Ref Range    Sodium 142 136 - 145 mmol/L    Potassium 3 0 (L) 3 5 - 5 3 mmol/L    Chloride 108 100 - 108 mmol/L    CO2 27 21 - 32 mmol/L    ANION GAP 7 4 - 13 mmol/L    BUN 7 5 - 25 mg/dL    Creatinine 0 62 0 60 - 1 30 mg/dL    Glucose 101 65 - 140 mg/dL    Calcium 7 6 (L) 8 3 - 10 1 mg/dL    eGFR 107 ml/min/1 73sq m   Magnesium    Collection Time: 02/06/21  5:12 AM   Result Value Ref Range    Magnesium 1 9 1 6 - 2 6 mg/dL   Phosphorus    Collection Time: 02/06/21  5:12 AM   Result Value Ref Range    Phosphorus 2 1 (L) 2 7 - 4 5 mg/dL   CBC and differential    Collection Time: 02/06/21  5:12 AM   Result Value Ref Range    WBC 12 36 (H) 4 31 - 10 16 Thousand/uL    RBC 3 05 (L) 3 81 - 5 12 Million/uL    Hemoglobin 8 5 (L) 11 5 - 15 4 g/dL    Hematocrit 26 7 (L) 34 8 - 46 1 %    MCV 88 82 - 98 fL    MCH 27 9 26 8 - 34 3 pg    MCHC 31 8 31 4 - 37 4 g/dL    RDW 16 8 (H) 11 6 - 15 1 %    MPV 10 2 8 9 - 12 7 fL    Platelets 864 (H) 258 - 390 Thousands/uL    nRBC 0 /100 WBCs   Manual Differential(PHLEBS Do Not Order)    Collection Time: 02/06/21  5:12 AM   Result Value Ref Range    Segmented % 80 (H) 43 - 75 %    Bands % 3 0 - 8 %    Lymphocytes % 9 (L) 14 - 44 %    Monocytes % 4 4 - 12 %    Eosinophils, % 2 0 - 6 %    Basophils % 0 0 - 1 %    Metamyelocytes% 2 (H) 0 - 1 %    Absolute Neutrophils 10 26 (H) 1 85 - 7 62 Thousand/uL    Lymphocytes Absolute 1 11 0 60 - 4 47 Thousand/uL    Monocytes Absolute 0 49 0 00 - 1 22 Thousand/uL    Eosinophils Absolute 0 25 0 00 - 0 40 Thousand/uL    Basophils Absolute 0 00 0 00 - 0 10 Thousand/uL    Total Counted 100     Platelet Estimate Increased (A) Adequate   Fingerstick Glucose (POCT)    Collection Time: 02/06/21  6:43 AM   Result Value Ref Range    POC Glucose 106 65 - 140 mg/dl   Fingerstick Glucose (POCT)    Collection Time: 02/06/21  7:20 AM   Result Value Ref Range    POC Glucose 102 65 - 140 mg/dl     *Note: Due to a large number of results and/or encounters for the requested time period, some results have not been displayed  A complete set of results can be found in Results Review  Physical Exam  Constitutional:       Appearance: Normal appearance  HENT:      Head: Normocephalic  Right Ear: Tympanic membrane, ear canal and external ear normal       Left Ear: Tympanic membrane, ear canal and external ear normal       Nose: Nose normal  No congestion  Mouth/Throat:      Mouth: Mucous membranes are moist       Pharynx: Oropharynx is clear  No oropharyngeal exudate or posterior oropharyngeal erythema     Eyes:      Extraocular Movements: Extraocular movements intact  Conjunctiva/sclera: Conjunctivae normal       Pupils: Pupils are equal, round, and reactive to light  Neck:      Musculoskeletal: Normal range of motion and neck supple  Cardiovascular:      Rate and Rhythm: Normal rate and regular rhythm  Heart sounds: Normal heart sounds  No murmur  Pulmonary:      Effort: Pulmonary effort is normal       Breath sounds: Normal breath sounds  No wheezing or rales  Abdominal:      General: Bowel sounds are normal  There is no distension  Palpations: Abdomen is soft  Tenderness: There is no abdominal tenderness  Comments: Colostomy bag in place   Musculoskeletal: Normal range of motion  Right lower leg: No edema  Left lower leg: No edema  Lymphadenopathy:      Cervical: No cervical adenopathy  Skin:     General: Skin is warm  Neurological:      General: No focal deficit present  Mental Status: She is alert and oriented to person, place, and time

## 2021-02-13 ENCOUNTER — HOSPITAL ENCOUNTER (EMERGENCY)
Facility: HOSPITAL | Age: 51
Discharge: HOME | End: 2021-02-13
Attending: EMERGENCY MEDICINE | Admitting: EMERGENCY MEDICINE
Payer: COMMERCIAL

## 2021-02-13 VITALS
OXYGEN SATURATION: 99 % | SYSTOLIC BLOOD PRESSURE: 97 MMHG | WEIGHT: 204 LBS | HEIGHT: 64 IN | RESPIRATION RATE: 19 BRPM | HEART RATE: 88 BPM | BODY MASS INDEX: 34.83 KG/M2 | TEMPERATURE: 98.1 F | DIASTOLIC BLOOD PRESSURE: 61 MMHG

## 2021-02-13 DIAGNOSIS — Z48.01 ENCOUNTER FOR CHANGE OR REMOVAL OF SURGICAL WOUND DRESSING: Primary | ICD-10-CM

## 2021-02-13 PROCEDURE — 96375 TX/PRO/DX INJ NEW DRUG ADDON: CPT

## 2021-02-13 PROCEDURE — 99282 EMERGENCY DEPT VISIT SF MDM: CPT

## 2021-02-13 PROCEDURE — 99285 EMERGENCY DEPT VISIT HI MDM: CPT | Performed by: EMERGENCY MEDICINE

## 2021-02-13 PROCEDURE — 96374 THER/PROPH/DIAG INJ IV PUSH: CPT

## 2021-02-13 PROCEDURE — 97606 NEG PRS WND THER DME>50 SQCM: CPT | Performed by: PHYSICIAN ASSISTANT

## 2021-02-13 RX ORDER — HYDROMORPHONE HCL/PF 1 MG/ML
0.5 SYRINGE (ML) INJECTION ONCE
Status: COMPLETED | OUTPATIENT
Start: 2021-02-13 | End: 2021-02-13

## 2021-02-13 RX ORDER — ONDANSETRON 2 MG/ML
4 INJECTION INTRAMUSCULAR; INTRAVENOUS ONCE
Status: COMPLETED | OUTPATIENT
Start: 2021-02-13 | End: 2021-02-13

## 2021-02-13 RX ADMIN — HYDROMORPHONE HYDROCHLORIDE 0.5 MG: 1 INJECTION, SOLUTION INTRAMUSCULAR; INTRAVENOUS; SUBCUTANEOUS at 12:54

## 2021-02-13 RX ADMIN — ONDANSETRON 4 MG: 2 INJECTION INTRAMUSCULAR; INTRAVENOUS at 12:54
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
## 2021-02-13 NOTE — CASE MANAGEMENT
Marina contacted  and reported that patient was present in the ED  [facility] had a [place] date for 2/11 but no one came to change the dressing on her wound   called Enrique Rereelkinjarett Farmer at [phone] and spoke to [facility] reported that someone called patient on 2/4 and left a message  They called later in the week and left another message as patient did not answer again  [facility] made contact with the patient on 2/12 after her [place] date, so she missed her first meeting  [facility] reported that patient is on the schedule again for PT this coming Tuesday and nursing this coming Wednesday  Cm TT Wilfrid Kraft to report   department will continue to follow patient through discharge

## 2021-02-13 NOTE — CASE MANAGEMENT
Cm met with patient at bedside  Present were Cm, patient, patient's SO, and OR  Cm discussed phone call with Enrique Farmer  SO showed Cm his call log and patient's call log to confirm these dates did not match  SO reported that he never received a phone call at the times given by Enrique  Cm sent more referrals via [employer]  Cm attempted to call [facility] to determine acceptance at [phone]  Cm left a message  Cm called [place] at [phone] and pressed 1 to speak to an intake representative  Cm spoke to [facility] and requested determination of acceptance within 48 hours  Hallie placed cm on a brief hold  Hallie transferred to the individual responsible for [employer]  Cm spoke to Marika Wong reported that she is going to call the person responsible for the [place] office and will respond in [employer] shortly  Per [employer], [facility] unable to accept due to being at maximum capacity  Cm called [facility] at [phone] and spoke to their answering service  Identified Cm name, patient's name, , and call back number  Cm called [facility] at [phone] and spoke to Vinicius Colvin reported that she is going to call the intake nurse now and have the intake nurse call Cm right back  Cm received a call back from Bran at [facility] who reported that they may be able to accept her, but they are very limited with patient's insurance  Bran is going to review insurance policy and will call Cm back with acceptance shortly  Bran called right back and reported they cannot accept patient's insurance  Per [employer], [facility] is able to accept but have to wait until Monday to verify benefits  Cm called [facility] at [phone] and spoke to [address] Lara is going to review and call back within 15-30 minutes  Cm received a call back from Marika Wong at [facility]  Unable to service this area      Cm department will continue to follow patient through discharge

## 2021-02-14 NOTE — PROCEDURES
ARIANA  A C  Change Note    Pt reported to the ED for VAC change  Pt was to have [facility] come to the house and do dressing change  They did not come  She went to her PCP and he recommended to come to the ED for it to be changed  Case Management was called and she made in person visit with patient and her   Enrique's claims of phone calls did not match the husbands record of phone calls on his phone  Case Management did try to locate other agencies to accept the case  One would only take her if she was doing wet to dry  Another did not accept her insurance  [facility] is scheduled for [facility] on 2/16 and not before that  The VAC was changed as well as her colostomy bag without difficulty  Wound is healing and is clean  I added a black sponge to the old ostomy site with a bridge to midline due to liquid stool seeping into the wound  Colostomy bag change  No change form images in chart from 2/8/20       They were instructed to bring their supplies to Dr Lupillo Alfaro on Tuesday and she will do the [facility] change  [facility] will then start to come to the house for VAC change EOD  Date: 2/14/2021    Location of wound: midline    Dimensions of wound: 4cm x 4 cm x 17 cm    Description of wound: large full thickness d=midline abdominal incision dehiscence with fascia  and suture exposure on the superior portion, and distal portion of the incision  No slough in the wound  Old ostomy site is open 5 x 3 5 cm and fascia is closed  No slough but it had liquid stool in the packing  Sponges removed:  1 black sponge   2 white sponges removed     Sponges placed:  2 Black Sponges replaced  plus a bridge   2 white sponges replaced      VAC settings:  125 mmHg  Continuous    Francine Joshi PA-C   2/14/2021

## 2021-02-16 ENCOUNTER — OFFICE VISIT (OUTPATIENT)
Dept: SURGERY | Facility: CLINIC | Age: 51
End: 2021-02-16

## 2021-02-16 VITALS
SYSTOLIC BLOOD PRESSURE: 122 MMHG | TEMPERATURE: 97 F | WEIGHT: 204 LBS | BODY MASS INDEX: 34.83 KG/M2 | DIASTOLIC BLOOD PRESSURE: 78 MMHG | HEART RATE: 99 BPM | HEIGHT: 64 IN

## 2021-02-16 DIAGNOSIS — Z93.3 STATUS POST HARTMANN PROCEDURE (HCC): ICD-10-CM

## 2021-02-16 DIAGNOSIS — Z93.3 COLOSTOMY IN PLACE (HCC): ICD-10-CM

## 2021-02-16 DIAGNOSIS — S31.109D OPEN WOUND OF ABDOMEN, SUBSEQUENT ENCOUNTER: ICD-10-CM

## 2021-02-16 DIAGNOSIS — K57.20 PERFORATION OF SIGMOID COLON DUE TO DIVERTICULITIS: Primary | ICD-10-CM

## 2021-02-16 PROCEDURE — 99024 POSTOP FOLLOW-UP VISIT: CPT | Performed by: SURGERY

## 2021-02-16 NOTE — PROGRESS NOTES
Assessment/Plan:    Pathology Results:  Final Diagnosis   A  Descending colon, resection:  -Three portions of colon with chronic active colitis, ulcerations, mural abscess and perforation   - Chronic active colitis involves all of the resection margins   - Four benign lymph nodes (0/4)     Please see case [id number] for CMV immunohistochemistry stain results      B  Omentum, omentectomy:  - Mature adipose tissue with acute inflammation   - One benign lymph node (0/1)  1  Perforation of sigmoid colon due to diverticulitis    2  Colostomy in place [facility])    3  Status post Abhishek procedure [facility])    4  Open wound of abdomen, subsequent encounter      She is progressing as expected  Large mildine and prior ostomy site are granulating  Lack of energy and appetite are normal but she is progressing  Wound vac changed and instructed to keep [facility] appointment for tomorrow for assessment and Friday for vac and ostomy bag change  If in the off chance they cannot come out Friday she will come back for vac change in the office Friday  Subjective: wound vac     Patient ID: Isabelle Mejia is a 52 y o  female  Triage Notes:    53 yo female presenting for wound vac change after discharge from the hospital  She was admitted 1/26 - 2/10 and required multiple trips to the OR for abdominal washout, segmental colectomy with colostomy and resiting of colostomy for perforated diverticulitis with peritonitis  Her wound was ultimately managed with a wound vac to manage the amount of drainage  She was discharged from the hospital 2/10 but an anticipated [facility] start date of either 2/11 or 2/12 but had not had a visit by this Saturday 2/13 and thus presented to the ED where [address] - Surgical PA changed the ostomy appliance and wound vac  She is seen today for wound vac and ostomy appliance change prior to the start of service scheduled for tomorrow  She and her  voiced frustration with the [facility] set up process   Part of the confusion came from the agency attempting to contact the patient when she was still hospitalized on 2/4 and of course they did not hear back from her  She reports doing relatively well  Her appetite is improving and she is slowly regaining energy though still gets pretty winded from activity  No fevers or chills  Pain is with the vac changes  The following portions of the patient's history were reviewed and updated as appropriate: allergies, current medications, past family history, past medical history, past social history, past surgical history and problem list     Review of Systems      Objective:      /78   Pulse 99   Temp (!) 97 °F (36 1 °C) (Temporal)   Ht 5' 4" (1 626 m)   Wt 92 5 kg (204 lb)   LMP 01/28/2021   BMI 35 02 kg/m²     Below is the patient's most recent value for Albumin, ALT, AST, BUN, Calcium, Chloride, Cholesterol, CO2, Creatinine, GFR, Glucose, HDL, Hematocrit, Hemoglobin, Hemoglobin A1C, LDL, Magnesium, Phosphorus, Platelets, Potassium, PSA, Sodium, Triglycerides, and WBC  Lab Results   Component Value Date    ALT 13 02/09/2021    AST 13 02/09/2021    BUN 8 02/10/2021    CALCIUM 8 8 02/10/2021     02/10/2021    CO2 27 02/10/2021    CREATININE 0 71 02/10/2021    HDL 8 (L) 02/04/2021    HCT 27 8 (L) 02/10/2021    HGB 8 6 (L) 02/10/2021    HGBA1C 6 0 (H) 01/27/2021    MG 1 7 02/09/2021    PHOS 2 8 02/09/2021     (H) 02/10/2021    K 4 1 02/10/2021    TRIG 185 (H) 02/04/2021    WBC 9 17 02/10/2021     Note: for a comprehensive list of the patient's lab results, access the Results Review activity  Physical Exam        Procedures      Location of wound: midline abdomen and left mid abdomen      Dimensions of wound: 4cm x 4 cm x 16 cm  And 4 x 2 5 x 2      Description of wound: large full thickness d=midline abdominal incision with fascia  and suture exposure on the superior portion, and distal portion of the incision  No slough in the wound    granulating  Old ostomy site skin and subcu is open 4 x 2 5 cm and fascia is closed    No slough and granulating       Sponges removed:  2 black sponge (with bridge)  2 white sponges removed      Sponges placed:  2 Black Sponges replaced  plus a bridge   1 white sponges replaced (only inferior portion)      VAC settings:  125 mmHg  Continuous

## 2021-02-25 ENCOUNTER — OFFICE VISIT (OUTPATIENT)
Dept: INTERNAL MEDICINE CLINIC | Facility: CLINIC | Age: 51
End: 2021-02-25
Payer: COMMERCIAL

## 2021-02-25 VITALS
OXYGEN SATURATION: 99 % | BODY MASS INDEX: 34.83 KG/M2 | WEIGHT: 204 LBS | HEIGHT: 64 IN | DIASTOLIC BLOOD PRESSURE: 84 MMHG | HEART RATE: 102 BPM | SYSTOLIC BLOOD PRESSURE: 136 MMHG | TEMPERATURE: 98.4 F

## 2021-02-25 DIAGNOSIS — E03.9 ACQUIRED HYPOTHYROIDISM: ICD-10-CM

## 2021-02-25 DIAGNOSIS — Z93.3 COLOSTOMY IN PLACE (HCC): Primary | ICD-10-CM

## 2021-02-25 DIAGNOSIS — M17.0 PRIMARY OSTEOARTHRITIS OF BOTH KNEES: ICD-10-CM

## 2021-02-25 DIAGNOSIS — J45.30 MILD PERSISTENT ASTHMA WITHOUT COMPLICATION: ICD-10-CM

## 2021-02-25 DIAGNOSIS — K57.20 PERFORATION OF SIGMOID COLON DUE TO DIVERTICULITIS: ICD-10-CM

## 2021-02-25 DIAGNOSIS — M54.40 BILATERAL LOW BACK PAIN WITH SCIATICA, SCIATICA LATERALITY UNSPECIFIED, UNSPECIFIED CHRONICITY: ICD-10-CM

## 2021-02-25 PROCEDURE — 1111F DSCHRG MED/CURRENT MED MERGE: CPT | Performed by: INTERNAL MEDICINE

## 2021-02-25 PROCEDURE — 99214 OFFICE O/P EST MOD 30 MIN: CPT | Performed by: INTERNAL MEDICINE

## 2021-02-25 RX ORDER — LEVOTHYROXINE SODIUM 0.07 MG/1
75 TABLET ORAL DAILY
Qty: 90 TABLET | Refills: 1 | Status: SHIPPED | OUTPATIENT
Start: 2021-02-25 | End: 2021-05-03 | Stop reason: SDUPTHER

## 2021-02-25 RX ORDER — MELOXICAM 15 MG/1
15 TABLET ORAL DAILY
Qty: 30 TABLET | Refills: 1 | Status: SHIPPED | OUTPATIENT
Start: 2021-02-25 | End: 2021-05-27 | Stop reason: SDUPTHER

## 2021-02-25 NOTE — PROGRESS NOTES
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                            B) - Omentum, Partial Omentectomy                                                          Final Diagnosis       A  Descending colon, resection:  -Three portions of colon with chronic active colitis, ulcerations, mural abscess and perforation   - Chronic active colitis involves all of the resection margins   - Four benign lymph nodes (0/4)  Please see case [id number] for CMV immunohistochemistry stain results  B  Omentum, omentectomy:  - Mature adipose tissue with acute inflammation   - One benign lymph node (0/1)  Note       Interpretation performed at [address]        Additional Information       All reported additional testing was performed with appropriately reactive controls  These tests were developed and their performance characteristics determined by [address] [place] [address] [facility] or appropriate performing facility, though some tests may be performed on tissues which have not been validated for performance characteristics (such as staining performed on alcohol exposed cell blocks and decalcified tissues)  Results should be interpreted with caution and in the context of the patients clinical condition  These tests may not be cleared or approved by the [place]  Food and Drug Administration, though the FDA has determined that such clearance or approval is not necessary  These tests are used for clinical purposes and they should not be regarded as investigational or for research  This laboratory has been approved by CLIA 88, designated as a high-complexity laboratory and is qualified to perform these tests  Jessica Tee Description          A  The specimen is received in formalin, labeled with the patient's name and hospital number, and is designated "portion of descending colon  The specimen consists of 3 unoriented segments of bowel tissue, each averaging 3 cm in diameter, with lengths of 4 0, 6 0 and 8 0 cm  The serosa of each fragment exhibits hemorrhagic and dusky discoloration and is partially covered by tan exudate  The mucosa in each fragment exhibits a cobblestone appearance with areas of dusky and hemorrhagic discoloration and possible necrosis as well as the presence of a yellow-green plaque-like material   Upon cut section the 6 cm fragment exhibits an area of perforation closed with suture material, which is inked blue at the time of gross exam   No other discrete abnormalities are noted  Representative sections  Ten cassettes  1-2:  Ends of resection, 4 cm fragment  3:  Random section, 4 cm fragment  4-5:  Ends of resection, 6 cm fragment  6:  Area of perforation, 6 cm fragment   7-8:  Ends of resection, 8 cm fragment  9:  Random section, 8 cm fragment  10: 4 randomly sampled regional lymph nodes        B  The specimen is received in formalin, labeled with the patient's name and hospital number, and is designated "partial omentectomy  The specimen consists of a tan-yellow purple fibromembranous and fibrofatty tissue fragment grossly consistent with omentum measuring 20 x 5 x 2 8 cm  The fragment is sectioned revealing yellow fatty cut surfaces exhibiting focal areas of hemorrhagic discoloration  A single tan purple density suggestive of a lymph node measuring 0 5 cm is identified  Representative sections  One cassette  2 pieces, includes lymph node    Note: The estimated total formalin fixation time based upon information provided by the submitting clinician and the standard processing schedule is under 72 hours      MCrites       CBC and differential    Collection Time: 01/27/21  7:18 PM   Result Value Ref Range    WBC 17 70 (H) 4 31 - 10 16 Thousand/uL    RBC 4 35 3 81 - 5 12 Million/uL    Hemoglobin 11 9 11 5 - 15 4 g/dL    Hematocrit 37 8 34 8 - 46 1 %    MCV 87 82 - 98 fL    MCH 27 4 26 8 - 34 3 pg    MCHC 31 5 31 4 - 37 4 g/dL    RDW 14 4 11 6 - 15 1 %    MPV 10 3 8 9 - 12 7 fL    Platelets 817 (H) 629 - 390 Thousands/uL    nRBC 0 /100 WBCs   Comprehensive metabolic panel    Collection Time: 01/27/21  7:18 PM   Result Value Ref Range    Sodium 142 136 - 145 mmol/L    Potassium 4 1 3 5 - 5 3 mmol/L    Chloride 110 (H) 100 - 108 mmol/L    CO2 21 21 - 32 mmol/L    ANION GAP 11 4 - 13 mmol/L    BUN 10 5 - 25 mg/dL    Creatinine 0 73 0 60 - 1 30 mg/dL    Glucose 179 (H) 65 - 140 mg/dL    Calcium 7 5 (L) 8 3 - 10 1 mg/dL    Corrected Calcium 9 4 8 3 - 10 1 mg/dL    AST 10 5 - 45 U/L    ALT 10 (L) 12 - 78 U/L    Alkaline Phosphatase 36 (L) 46 - 116 U/L    Total Protein 4 5 (L) 6 4 - 8 2 g/dL    Albumin 1 6 (L) 3 5 - 5 0 g/dL    Total Bilirubin 0 50 0 20 - 1 00 mg/dL    eGFR 98 ml/min/1 73sq m   Blood gas, arterial    Collection Time: 01/27/21  7:18 PM   Result Value Ref Range    pH, Arterial 7 316 (L) 7 350 - 7 450    pCO2, Arterial 34 1 (L) 36 0 - 44 0 mm Hg    pO2, Arterial 120 2 75 0 - 129 0 mm Hg    HCO3, Arterial 17 0 (L) 22 0 - 28 0 mmol/L    Base Excess, Arterial -8 2 mmol/L    O2 Content, Arterial 97 7 (H) 16 0 - 23 0 mL/dL    O2 HGB,Arterial  96 7 94 0 - 97 0 %    SOURCE Line, Arterial     Vent Type- AC AC     AC Rate 14     Tidal Volume 350 ml    Inspired Air (FIO2) 40     PEEP 6    Magnesium Collection Time: 01/27/21  7:18 PM   Result Value Ref Range    Magnesium 2 5 1 6 - 2 6 mg/dL   Phosphorus    Collection Time: 01/27/21  7:18 PM   Result Value Ref Range    Phosphorus 3 8 2 7 - 4 5 mg/dL   Lactic acid, plasma    Collection Time: 01/27/21  7:18 PM   Result Value Ref Range    LACTIC ACID 1 1 0 5 - 2 0 mmol/L   Manual Differential(PHLEBS Do Not Order)    Collection Time: 01/27/21  7:18 PM   Result Value Ref Range    Segmented % 65 43 - 75 %    Bands % 23 (H) 0 - 8 %    Lymphocytes % 5 (L) 14 - 44 %    Monocytes % 4 4 - 12 %    Eosinophils, % 0 0 - 6 %    Basophils % 0 0 - 1 %    Metamyelocytes% 3 (H) 0 - 1 %    Absolute Neutrophils 15 58 (H) 1 85 - 7 62 Thousand/uL    Lymphocytes Absolute 0 89 0 60 - 4 47 Thousand/uL    Monocytes Absolute 0 71 0 00 - 1 22 Thousand/uL    Eosinophils Absolute 0 00 0 00 - 0 40 Thousand/uL    Basophils Absolute 0 00 0 00 - 0 10 Thousand/uL    Total Counted      Toxic Granulation Present     Platelet Estimate Adequate Adequate    Large Platelet Present    Fingerstick Glucose (POCT)    Collection Time: 01/28/21 12:27 AM   Result Value Ref Range    POC Glucose 154 (H) 65 - 140 mg/dl   Lactic acid, plasma    Collection Time: 01/28/21 12:29 AM   Result Value Ref Range    LACTIC ACID 1 1 0 5 - 2 0 mmol/L   POCT Blood Gas (CG8+)    Collection Time: 01/28/21  1:12 AM   Result Value Ref Range    pH, Art i-STAT 7 361 7 350 - 7 450    pCO2, Art i-STAT 36 4 36 0 - 44 0 mm HG    pO2, ART i-STAT 159 0 (H) 75 0 - 129 0 mm HG    BE, i-STAT -4 (L) -2 - 3 mmol/L    HCO3, Art i-STAT 20 6 (L) 22 0 - 28 0 mmol/L    CO2, i-STAT 22 21 - 32 mmol/L    O2 Sat, i-STAT 99 (H) 60 - 85 %    SODIUM, I-STAT 140 136 - 145 mmol/l    Potassium, i-STAT 4 0 3 5 - 5 3 mmol/L    Hct, i-STAT 41 34 8 - 46 1 %    Hgb, i-STAT 13 9 11 5 - 15 4 g/dl    Glucose, i-STAT 157 (H) 65 - 140 mg/dl    Specimen Type ARTERIAL    Blood gas, arterial    Collection Time: 01/28/21  6:18 AM   Result Value Ref Range    pH, Arterial 7 413 7 350 - 7 450    pCO2, Arterial 27 3 (LL) 36 0 - 44 0 mm Hg    pO2, Arterial 127 2 75 0 - 129 0 mm Hg    HCO3, Arterial 17 0 (L) 22 0 - 28 0 mmol/L    Base Excess, Arterial -6 4 mmol/L    O2 Content, Arterial 14 3 (L) 16 0 - 23 0 mL/dL    O2 HGB,Arterial  97 4 (H) 94 0 - 97 0 %    SOURCE Line, Arterial     Vent Type- AC AC     AC Rate 14     Tidal Volume 350 ml    Inspired Air (FIO2) 40     PEEP 6    CBC and differential    Collection Time: 01/28/21  6:18 AM   Result Value Ref Range    WBC 19 74 (H) 4 31 - 10 16 Thousand/uL    RBC 3 99 3 81 - 5 12 Million/uL    Hemoglobin 10 9 (L) 11 5 - 15 4 g/dL    Hematocrit 34 3 (L) 34 8 - 46 1 %    MCV 86 82 - 98 fL    MCH 27 3 26 8 - 34 3 pg    MCHC 31 8 31 4 - 37 4 g/dL    RDW 14 5 11 6 - 15 1 %    MPV 10 6 8 9 - 12 7 fL    Platelets 688 780 - 670 Thousands/uL    nRBC 0 /100 WBCs   Fingerstick Glucose (POCT)    Collection Time: 01/28/21  6:26 AM   Result Value Ref Range    POC Glucose 143 (H) 65 - 140 mg/dl   Basic metabolic panel    Collection Time: 01/28/21  8:03 AM   Result Value Ref Range    Sodium 141 136 - 145 mmol/L    Potassium 4 1 3 5 - 5 3 mmol/L    Chloride 109 (H) 100 - 108 mmol/L    CO2 21 21 - 32 mmol/L    ANION GAP 11 4 - 13 mmol/L    BUN 16 5 - 25 mg/dL    Creatinine 1 09 0 60 - 1 30 mg/dL    Glucose 157 (H) 65 - 140 mg/dL    Calcium 7 4 (L) 8 3 - 10 1 mg/dL    eGFR 60 ml/min/1 73sq m   Magnesium    Collection Time: 01/28/21  8:03 AM   Result Value Ref Range    Magnesium 2 5 1 6 - 2 6 mg/dL   Phosphorus    Collection Time: 01/28/21  8:03 AM   Result Value Ref Range    Phosphorus 3 4 2 7 - 4 5 mg/dL   Calcium, ionized    Collection Time: 01/28/21  8:03 AM   Result Value Ref Range    Calcium, Ionized 0 96 (L) 1 12 - 1 32 mmol/L   Lactic acid, plasma    Collection Time: 01/28/21  1:28 PM   Result Value Ref Range    LACTIC ACID 1 0 0 5 - 2 0 mmol/L   Fingerstick Glucose (POCT)    Collection Time: 01/28/21  1:33 PM   Result Value Ref Range    POC Glucose 114 65 - 140 mg/dl   Blood gas, venous    Collection Time: 01/28/21  2:24 PM   Result Value Ref Range    pH, Charles 7 523 (H) 7 300 - 7 400    pCO2, Charles 21 0 (L) 42 0 - 50 0 mm Hg    pO2, Charles 197 8 (H) 35 0 - 45 0 mm Hg    HCO3, Charles 16 9 (L) 24 - 30 mmol/L    Base Excess, Charles -4 4 mmol/L    O2 Content, Charles 14 3 ml/dL    O2 HGB, VENOUS 93 7 (H) 60 0 - 80 0 %   Basic metabolic panel    Collection Time: 01/28/21  2:28 PM   Result Value Ref Range    Sodium 142 136 - 145 mmol/L    Potassium 4 1 3 5 - 5 3 mmol/L    Chloride 111 (H) 100 - 108 mmol/L    CO2 21 21 - 32 mmol/L    ANION GAP 10 4 - 13 mmol/L    BUN 19 5 - 25 mg/dL    Creatinine 1 19 0 60 - 1 30 mg/dL    Glucose 142 (H) 65 - 140 mg/dL    Calcium 8 0 (L) 8 3 - 10 1 mg/dL    eGFR 54 ml/min/1 73sq m   Blood gas, arterial    Collection Time: 01/28/21  4:12 PM   Result Value Ref Range    pH, Arterial 7 435 7 350 - 7 450    pCO2, Arterial 33 0 (L) 36 0 - 44 0 mm Hg    pO2, Arterial 148 4 (H) 75 0 - 129 0 mm Hg    HCO3, Arterial 21 7 (L) 22 0 - 28 0 mmol/L    Base Excess, Arterial -2 1 mmol/L    O2 Content, Arterial 13 3 (L) 16 0 - 23 0 mL/dL    O2 HGB,Arterial  98 1 (H) 94 0 - 97 0 %    SOURCE Line, Arterial     HUNTER TEST Yes     Temperature 100 2 Degrees Fehrenheit    Vent Type- AC AC     AC Rate 14     Tidal Volume 350 ml    Inspired Air (FIO2) 40     PEEP 6    Fingerstick Glucose (POCT)    Collection Time: 01/28/21  6:28 PM   Result Value Ref Range    POC Glucose 117 65 - 140 mg/dl   Basic metabolic panel    Collection Time: 01/28/21  8:09 PM   Result Value Ref Range    Sodium 142 136 - 145 mmol/L    Potassium 3 8 3 5 - 5 3 mmol/L    Chloride 110 (H) 100 - 108 mmol/L    CO2 22 21 - 32 mmol/L    ANION GAP 10 4 - 13 mmol/L    BUN 22 5 - 25 mg/dL    Creatinine 1 44 (H) 0 60 - 1 30 mg/dL    Glucose 140 65 - 140 mg/dL    Calcium 7 6 (L) 8 3 - 10 1 mg/dL    eGFR 43 ml/min/1 73sq m   Fingerstick Glucose (POCT)    Collection Time: 01/29/21 12:22 AM   Result Value Ref Range POC Glucose 121 65 - 140 mg/dl   Fingerstick Glucose (POCT)    Collection Time: 01/29/21  5:18 AM   Result Value Ref Range    POC Glucose 102 65 - 140 mg/dl   CBC and differential    Collection Time: 01/29/21  5:20 AM   Result Value Ref Range    WBC 14 66 (H) 4 31 - 10 16 Thousand/uL    RBC 2 74 (L) 3 81 - 5 12 Million/uL    Hemoglobin 7 6 (L) 11 5 - 15 4 g/dL    Hematocrit 24 1 (L) 34 8 - 46 1 %    MCV 88 82 - 98 fL    MCH 27 4 26 8 - 34 3 pg    MCHC 31 1 (L) 31 4 - 37 4 g/dL    RDW 14 6 11 6 - 15 1 %    MPV 10 3 8 9 - 12 7 fL    Platelets 139 985 - 106 Thousands/uL    nRBC 0 /100 WBCs   Basic metabolic panel    Collection Time: 01/29/21  5:20 AM   Result Value Ref Range    Sodium 142 136 - 145 mmol/L    Potassium 3 6 3 5 - 5 3 mmol/L    Chloride 110 (H) 100 - 108 mmol/L    CO2 22 21 - 32 mmol/L    ANION GAP 10 4 - 13 mmol/L    BUN 25 5 - 25 mg/dL    Creatinine 1 53 (H) 0 60 - 1 30 mg/dL    Glucose 119 65 - 140 mg/dL    Calcium 7 7 (L) 8 3 - 10 1 mg/dL    eGFR 40 ml/min/1 73sq m   Magnesium    Collection Time: 01/29/21  5:20 AM   Result Value Ref Range    Magnesium 2 6 1 6 - 2 6 mg/dL   Phosphorus    Collection Time: 01/29/21  5:20 AM   Result Value Ref Range    Phosphorus 3 2 2 7 - 4 5 mg/dL   Manual Differential(PHLEBS Do Not Order)    Collection Time: 01/29/21  5:20 AM   Result Value Ref Range    Segmented % 67 43 - 75 %    Bands % 11 (H) 0 - 8 %    Lymphocytes % 13 (L) 14 - 44 %    Monocytes % 5 4 - 12 %    Eosinophils, % 0 0 - 6 %    Basophils % 0 0 - 1 %    Myelocytes % 3 (H) 0 - 1 %    Atypical Lymphocytes % 1 (H) <=0 %    Absolute Neutrophils 11 43 (H) 1 85 - 7 62 Thousand/uL    Lymphocytes Absolute 1 91 0 60 - 4 47 Thousand/uL    Monocytes Absolute 0 73 0 00 - 1 22 Thousand/uL    Eosinophils Absolute 0 00 0 00 - 0 40 Thousand/uL    Basophils Absolute 0 00 0 00 - 0 10 Thousand/uL    Total Counted 100     Anisocytosis Present     Platelet Estimate Adequate Adequate   Fingerstick Glucose (POCT)    Collection Time: 01/29/21 12:30 PM   Result Value Ref Range    POC Glucose 103 65 - 140 mg/dl   Hemoglobin and hematocrit, blood    Collection Time: 01/29/21  2:01 PM   Result Value Ref Range    Hemoglobin 8 4 (L) 11 5 - 15 4 g/dL    Hematocrit 26 7 (L) 34 8 - 46 1 %   Fingerstick Glucose (POCT)    Collection Time: 01/29/21  5:02 PM   Result Value Ref Range    POC Glucose 97 65 - 140 mg/dl   Blood gas, arterial    Collection Time: 01/29/21  8:46 PM   Result Value Ref Range    pH, Arterial 7 300 (L) 7 350 - 7 450    pCO2, Arterial 20 4 (LL) 36 0 - 44 0 mm Hg    pO2, Arterial 142 6 (H) 75 0 - 129 0 mm Hg    HCO3, Arterial 9 8 (L) 22 0 - 28 0 mmol/L    Base Excess, Arterial -15 3 mmol/L    O2 Content, Arterial 8 2 (L) 16 0 - 23 0 mL/dL    O2 HGB,Arterial  97 4 (H) 94 0 - 97 0 %    SOURCE Line, Arterial     Nasal Cannula 2    CBC and Platelet    Collection Time: 01/29/21  8:47 PM   Result Value Ref Range    WBC 21 08 (H) 4 31 - 10 16 Thousand/uL    RBC 3 06 (L) 3 81 - 5 12 Million/uL    Hemoglobin 8 4 (L) 11 5 - 15 4 g/dL    Hematocrit 27 3 (L) 34 8 - 46 1 %    MCV 89 82 - 98 fL    MCH 27 5 26 8 - 34 3 pg    MCHC 30 8 (L) 31 4 - 37 4 g/dL    RDW 14 9 11 6 - 15 1 %    Platelets 419 842 - 639 Thousands/uL    MPV 10 2 8 9 - 12 7 fL   Comprehensive metabolic panel    Collection Time: 01/29/21  8:47 PM   Result Value Ref Range    Sodium 145 136 - 145 mmol/L    Potassium 3 6 3 5 - 5 3 mmol/L    Chloride 112 (H) 100 - 108 mmol/L    CO2 18 (L) 21 - 32 mmol/L    ANION GAP 15 (H) 4 - 13 mmol/L    BUN 26 (H) 5 - 25 mg/dL    Creatinine 1 25 0 60 - 1 30 mg/dL    Glucose 104 65 - 140 mg/dL    Calcium 7 6 (L) 8 3 - 10 1 mg/dL    Corrected Calcium 9 1 8 3 - 10 1 mg/dL    AST 10 5 - 45 U/L    ALT 10 (L) 12 - 78 U/L    Alkaline Phosphatase 37 (L) 46 - 116 U/L    Total Protein 4 8 (L) 6 4 - 8 2 g/dL    Albumin 2 1 (L) 3 5 - 5 0 g/dL    Total Bilirubin 0 40 0 20 - 1 00 mg/dL    eGFR 51 ml/min/1 73sq m   Lactic acid, plasma    Collection Time: 01/29/21 10:18 PM   Result Value Ref Range    LACTIC ACID 0 9 0 5 - 2 0 mmol/L   Fingerstick Glucose (POCT)    Collection Time: 01/30/21 12:05 AM   Result Value Ref Range    POC Glucose 129 65 - 140 mg/dl   Blood gas, arterial    Collection Time: 01/30/21  2:34 AM   Result Value Ref Range    pH, Arterial 7 413 7 350 - 7 450    pCO2, Arterial 27 6 (LL) 36 0 - 44 0 mm Hg    pO2, Arterial 106 4 75 0 - 129 0 mm Hg    HCO3, Arterial 17 2 (L) 22 0 - 28 0 mmol/L    Base Excess, Arterial -6 5 mmol/L    O2 Content, Arterial 11 4 (L) 16 0 - 23 0 mL/dL    O2 HGB,Arterial  97 0 94 0 - 97 0 %    SOURCE Radial, Left     HUNTER TEST Yes     Temperature 98 4 Degrees Fehrenheit    Nasal Cannula 2    CBC and differential    Collection Time: 01/30/21  6:19 AM   Result Value Ref Range    WBC 17 45 (H) 4 31 - 10 16 Thousand/uL    RBC 2 88 (L) 3 81 - 5 12 Million/uL    Hemoglobin 7 9 (L) 11 5 - 15 4 g/dL    Hematocrit 25 1 (L) 34 8 - 46 1 %    MCV 87 82 - 98 fL    MCH 27 4 26 8 - 34 3 pg    MCHC 31 5 31 4 - 37 4 g/dL    RDW 14 6 11 6 - 15 1 %    MPV 9 7 8 9 - 12 7 fL    Platelets 650 253 - 012 Thousands/uL    nRBC 0 /100 WBCs   Comprehensive metabolic panel    Collection Time: 01/30/21  6:19 AM   Result Value Ref Range    Sodium 145 136 - 145 mmol/L    Potassium 3 4 (L) 3 5 - 5 3 mmol/L    Chloride 110 (H) 100 - 108 mmol/L    CO2 23 21 - 32 mmol/L    ANION GAP 12 4 - 13 mmol/L    BUN 21 5 - 25 mg/dL    Creatinine 1 22 0 60 - 1 30 mg/dL    Glucose 119 65 - 140 mg/dL    Calcium 7 4 (L) 8 3 - 10 1 mg/dL    Corrected Calcium 8 8 8 3 - 10 1 mg/dL    AST 12 5 - 45 U/L    ALT 11 (L) 12 - 78 U/L    Alkaline Phosphatase 34 (L) 46 - 116 U/L    Total Protein 5 0 (L) 6 4 - 8 2 g/dL    Albumin 2 3 (L) 3 5 - 5 0 g/dL    Total Bilirubin 0 50 0 20 - 1 00 mg/dL    eGFR 53 ml/min/1 73sq m   Magnesium    Collection Time: 01/30/21  6:19 AM   Result Value Ref Range    Magnesium 2 2 1 6 - 2 6 mg/dL   Phosphorus    Collection Time: 01/30/21  6:19 AM   Result Value Ref Range    Phosphorus 2 1 (L) 2 7 - 4 5 mg/dL   Procalcitonin with AM Reflex    Collection Time: 01/30/21  6:19 AM   Result Value Ref Range    Procalcitonin 2 40 (H) <=0 25 ng/ml   Manual Differential(PHLEBS Do Not Order)    Collection Time: 01/30/21  6:19 AM   Result Value Ref Range    Segmented % 73 43 - 75 %    Bands % 15 (H) 0 - 8 %    Lymphocytes % 4 (L) 14 - 44 %    Monocytes % 2 (L) 4 - 12 %    Eosinophils, % 0 0 - 6 %    Basophils % 0 0 - 1 %    Metamyelocytes% 2 (H) 0 - 1 %    Myelocytes % 4 (H) 0 - 1 %    Absolute Neutrophils 15 36 (H) 1 85 - 7 62 Thousand/uL    Lymphocytes Absolute 0 70 0 60 - 4 47 Thousand/uL    Monocytes Absolute 0 35 0 00 - 1 22 Thousand/uL    Eosinophils Absolute 0 00 0 00 - 0 40 Thousand/uL    Basophils Absolute 0 00 0 00 - 0 10 Thousand/uL    Total Counted 100     Anisocytosis Present     Platelet Estimate Adequate Adequate   Fingerstick Glucose (POCT)    Collection Time: 01/30/21  1:32 PM   Result Value Ref Range    POC Glucose 131 65 - 140 mg/dl   Basic metabolic panel    Collection Time: 01/30/21  3:47 PM   Result Value Ref Range    Sodium 141 136 - 145 mmol/L    Potassium 3 8 3 5 - 5 3 mmol/L    Chloride 109 (H) 100 - 108 mmol/L    CO2 24 21 - 32 mmol/L    ANION GAP 8 4 - 13 mmol/L    BUN 21 5 - 25 mg/dL    Creatinine 1 16 0 60 - 1 30 mg/dL    Glucose 133 65 - 140 mg/dL    Calcium 7 9 (L) 8 3 - 10 1 mg/dL    eGFR 56 ml/min/1 73sq m   Magnesium    Collection Time: 01/30/21  3:47 PM   Result Value Ref Range    Magnesium 2 1 1 6 - 2 6 mg/dL   Prepare Leukoreduced RBC: 2 Units    Collection Time: 01/30/21  7:57 PM   Result Value Ref Range    Unit Product Code X3904K51     Unit Number [id number]     Unit ABO A     Unit RH POS     Crossmatch Compatible     Unit Dispense Status Return to [facility]     Unit Product Code B7071Q00     Unit Number [id number]     Unit ABO A     Unit RH POS     Crossmatch Compatible     [facility]    Fingerstick Glucose (POCT)    Collection Time: 01/30/21  8:23 PM   Result Value Ref Range    POC Glucose 126 65 - 140 mg/dl   Fingerstick Glucose (POCT)    Collection Time: 01/31/21 12:19 AM   Result Value Ref Range    POC Glucose 110 65 - 140 mg/dl   Procalcitonin Reflex    Collection Time: 01/31/21  6:25 AM   Result Value Ref Range    Procalcitonin 1 22 (H) <=0 25 ng/ml   CBC and differential    Collection Time: 01/31/21  6:25 AM   Result Value Ref Range    WBC 15 67 (H) 4 31 - 10 16 Thousand/uL    RBC 2 97 (L) 3 81 - 5 12 Million/uL    Hemoglobin 8 2 (L) 11 5 - 15 4 g/dL    Hematocrit 25 8 (L) 34 8 - 46 1 %    MCV 87 82 - 98 fL    MCH 27 6 26 8 - 34 3 pg    MCHC 31 8 31 4 - 37 4 g/dL    RDW 14 8 11 6 - 15 1 %    MPV 10 0 8 9 - 12 7 fL    Platelets 217 318 - 842 Thousands/uL    nRBC 0 /100 WBCs   Comprehensive metabolic panel    Collection Time: 01/31/21  6:25 AM   Result Value Ref Range    Sodium 147 (H) 136 - 145 mmol/L    Potassium 3 8 3 5 - 5 3 mmol/L    Chloride 111 (H) 100 - 108 mmol/L    CO2 25 21 - 32 mmol/L    ANION GAP 11 4 - 13 mmol/L    BUN 20 5 - 25 mg/dL    Creatinine 0 86 0 60 - 1 30 mg/dL    Glucose 117 65 - 140 mg/dL    Calcium 7 8 (L) 8 3 - 10 1 mg/dL    Corrected Calcium 9 4 8 3 - 10 1 mg/dL    AST 12 5 - 45 U/L    ALT 11 (L) 12 - 78 U/L    Alkaline Phosphatase 38 (L) 46 - 116 U/L    Total Protein 5 2 (L) 6 4 - 8 2 g/dL    Albumin 2 0 (L) 3 5 - 5 0 g/dL    Total Bilirubin 0 40 0 20 - 1 00 mg/dL    eGFR 80 ml/min/1 73sq m   Magnesium    Collection Time: 01/31/21  6:25 AM   Result Value Ref Range    Magnesium 2 2 1 6 - 2 6 mg/dL   Phosphorus    Collection Time: 01/31/21  6:25 AM   Result Value Ref Range    Phosphorus 1 7 (L) 2 7 - 4 5 mg/dL   Manual Differential(PHLEBS Do Not Order)    Collection Time: 01/31/21  6:25 AM   Result Value Ref Range    Segmented % 78 (H) 43 - 75 %    Bands % 12 (H) 0 - 8 %    Lymphocytes % 6 (L) 14 - 44 %    Monocytes % 2 (L) 4 - 12 %    Eosinophils, % 1 0 - 6 %    Basophils % 0 0 - 1 % Metamyelocytes% 1 0 - 1 %    Absolute Neutrophils 14 10 (H) 1 85 - 7 62 Thousand/uL    Lymphocytes Absolute 0 94 0 60 - 4 47 Thousand/uL    Monocytes Absolute 0 31 0 00 - 1 22 Thousand/uL    Eosinophils Absolute 0 16 0 00 - 0 40 Thousand/uL    Basophils Absolute 0 00 0 00 - 0 10 Thousand/uL    Total Counted 100     Toxic Granulation Present     Anisocytosis Present     Platelet Estimate Adequate Adequate   ECG 12 lead    Collection Time: 01/31/21  6:35 AM   Result Value Ref Range    Ventricular Rate 89 BPM    Atrial Rate 89 BPM    CO Interval 138 ms    QRSD Interval 84 ms    QT Interval 364 ms    QTC Interval 442 ms    P Axis 51 degrees    QRS Axis 64 degrees    T Wave Axis 43 degrees   Fingerstick Glucose (POCT)    Collection Time: 01/31/21  1:10 PM   Result Value Ref Range    POC Glucose 114 65 - 140 mg/dl   Fingerstick Glucose (POCT)    Collection Time: 01/31/21  5:04 PM   Result Value Ref Range    POC Glucose 120 65 - 140 mg/dl   Basic metabolic panel    Collection Time: 01/31/21  6:23 PM   Result Value Ref Range    Sodium 147 (H) 136 - 145 mmol/L    Potassium 3 9 3 5 - 5 3 mmol/L    Chloride 109 (H) 100 - 108 mmol/L    CO2 24 21 - 32 mmol/L    ANION GAP 14 (H) 4 - 13 mmol/L    BUN 20 5 - 25 mg/dL    Creatinine 0 93 0 60 - 1 30 mg/dL    Glucose 131 65 - 140 mg/dL    Calcium 8 0 (L) 8 3 - 10 1 mg/dL    eGFR 73 ml/min/1 73sq m   Magnesium    Collection Time: 01/31/21  6:23 PM   Result Value Ref Range    Magnesium 2 0 1 6 - 2 6 mg/dL   Fingerstick Glucose (POCT)    Collection Time: 02/01/21 12:24 AM   Result Value Ref Range    POC Glucose 143 (H) 65 - 140 mg/dl   Fingerstick Glucose (POCT)    Collection Time: 02/01/21  4:51 AM   Result Value Ref Range    POC Glucose 131 65 - 140 mg/dl   CBC and differential    Collection Time: 02/01/21  6:02 AM   Result Value Ref Range    WBC 11 91 (H) 4 31 - 10 16 Thousand/uL    RBC 3 06 (L) 3 81 - 5 12 Million/uL    Hemoglobin 8 4 (L) 11 5 - 15 4 g/dL    Hematocrit 27 1 (L) 34 8 - 46 1 %    MCV 89 82 - 98 fL    MCH 27 5 26 8 - 34 3 pg    MCHC 31 0 (L) 31 4 - 37 4 g/dL    RDW 14 8 11 6 - 15 1 %    MPV 9 9 8 9 - 12 7 fL    Platelets 331 (H) 859 - 390 Thousands/uL    nRBC 0 /100 WBCs    Neutrophils Relative 88 (H) 43 - 75 %    Immat GRANS % 2 0 - 2 %    Lymphocytes Relative 6 (L) 14 - 44 %    Monocytes Relative 4 4 - 12 %    Eosinophils Relative 0 0 - 6 %    Basophils Relative 0 0 - 1 %    Neutrophils Absolute 10 40 (H) 1 85 - 7 62 Thousands/µL    Immature Grans Absolute 0 26 (H) 0 00 - 0 20 Thousand/uL    Lymphocytes Absolute 0 68 0 60 - 4 47 Thousands/µL    Monocytes Absolute 0 49 0 17 - 1 22 Thousand/µL    Eosinophils Absolute 0 04 0 00 - 0 61 Thousand/µL    Basophils Absolute 0 04 0 00 - 0 10 Thousands/µL   Comprehensive metabolic panel    Collection Time: 02/01/21  6:02 AM   Result Value Ref Range    Sodium 148 (H) 136 - 145 mmol/L    Potassium 3 5 3 5 - 5 3 mmol/L    Chloride 111 (H) 100 - 108 mmol/L    CO2 25 21 - 32 mmol/L    ANION GAP 12 4 - 13 mmol/L    BUN 18 5 - 25 mg/dL    Creatinine 0 75 0 60 - 1 30 mg/dL    Glucose 134 65 - 140 mg/dL    Calcium 7 9 (L) 8 3 - 10 1 mg/dL    Corrected Calcium 9 5 8 3 - 10 1 mg/dL    AST 10 5 - 45 U/L    ALT 12 12 - 78 U/L    Alkaline Phosphatase 41 (L) 46 - 116 U/L    Total Protein 5 6 (L) 6 4 - 8 2 g/dL    Albumin 2 0 (L) 3 5 - 5 0 g/dL    Total Bilirubin 0 40 0 20 - 1 00 mg/dL    eGFR 95 ml/min/1 73sq m   Magnesium    Collection Time: 02/01/21  6:02 AM   Result Value Ref Range    Magnesium 2 1 1 6 - 2 6 mg/dL   Phosphorus    Collection Time: 02/01/21  6:02 AM   Result Value Ref Range    Phosphorus 1 6 (L) 2 7 - 4 5 mg/dL   ECG 12 lead    Collection Time: 02/01/21  8:44 AM   Result Value Ref Range    Ventricular Rate 73 BPM    Atrial Rate 73 BPM    VA Interval 138 ms    QRSD Interval 78 ms    QT Interval 418 ms    QTC Interval 460 ms    P Axis 44 degrees    QRS Axis 37 degrees    T Wave Axis 52 degrees   Fingerstick Glucose (POCT) Collection Time: 02/01/21 12:08 PM   Result Value Ref Range    POC Glucose 134 65 - 140 mg/dl   Fingerstick Glucose (POCT)    Collection Time: 02/01/21  4:02 PM   Result Value Ref Range    POC Glucose 123 65 - 140 mg/dl   Phosphorus    Collection Time: 02/01/21  9:44 PM   Result Value Ref Range    Phosphorus 2 1 (L) 2 7 - 4 5 mg/dL   Potassium    Collection Time: 02/01/21  9:44 PM   Result Value Ref Range    Potassium 3 8 3 5 - 5 3 mmol/L   Fingerstick Glucose (POCT)    Collection Time: 02/01/21 11:58 PM   Result Value Ref Range    POC Glucose 122 65 - 140 mg/dl   Fingerstick Glucose (POCT)    Collection Time: 02/02/21  5:43 AM   Result Value Ref Range    POC Glucose 130 65 - 140 mg/dl   Basic metabolic panel    Collection Time: 02/02/21  5:48 AM   Result Value Ref Range    Sodium 149 (H) 136 - 145 mmol/L    Potassium 3 6 3 5 - 5 3 mmol/L    Chloride 112 (H) 100 - 108 mmol/L    CO2 26 21 - 32 mmol/L    ANION GAP 11 4 - 13 mmol/L    BUN 19 5 - 25 mg/dL    Creatinine 0 73 0 60 - 1 30 mg/dL    Glucose 138 65 - 140 mg/dL    Calcium 8 0 (L) 8 3 - 10 1 mg/dL    eGFR 98 ml/min/1 73sq m   CBC and differential    Collection Time: 02/02/21  5:48 AM   Result Value Ref Range    WBC 9 83 4 31 - 10 16 Thousand/uL    RBC 3 14 (L) 3 81 - 5 12 Million/uL    Hemoglobin 8 6 (L) 11 5 - 15 4 g/dL    Hematocrit 27 5 (L) 34 8 - 46 1 %    MCV 88 82 - 98 fL    MCH 27 4 26 8 - 34 3 pg    MCHC 31 3 (L) 31 4 - 37 4 g/dL    RDW 15 1 11 6 - 15 1 %    MPV 9 9 8 9 - 12 7 fL    Platelets 378 (H) 434 - 390 Thousands/uL    nRBC 0 /100 WBCs   Manual Differential(PHLEBS Do Not Order)    Collection Time: 02/02/21  5:48 AM   Result Value Ref Range    Segmented % 85 (H) 43 - 75 %    Bands % 4 0 - 8 %    Lymphocytes % 7 (L) 14 - 44 %    Monocytes % 4 4 - 12 %    Eosinophils, % 0 0 - 6 %    Basophils % 0 0 - 1 %    Absolute Neutrophils 8 75 (H) 1 85 - 7 62 Thousand/uL    Lymphocytes Absolute 0 69 0 60 - 4 47 Thousand/uL    Monocytes Absolute 0 39 0 00 - 1 22 Thousand/uL    Eosinophils Absolute 0 00 0 00 - 0 40 Thousand/uL    Basophils Absolute 0 00 0 00 - 0 10 Thousand/uL    Total Counted 100     Platelet Estimate Increased (A) Adequate   ECG 12 lead    Collection Time: 02/02/21  6:48 AM   Result Value Ref Range    Ventricular Rate 61 BPM    Atrial Rate 61 BPM    NH Interval 108 ms    QRSD Interval 82 ms    QT Interval 458 ms    QTC Interval 461 ms    P [place] 18 degrees    QRS Axis 36 degrees    T Wave Axis 48 degrees   Fingerstick Glucose (POCT)    Collection Time: 02/02/21 12:53 PM   Result Value Ref Range    POC Glucose 182 (H) 65 - 140 mg/dl   Fingerstick Glucose (POCT)    Collection Time: 02/02/21  4:41 PM   Result Value Ref Range    POC Glucose 175 (H) 65 - 140 mg/dl   Fingerstick Glucose (POCT)    Collection Time: 02/02/21  9:22 PM   Result Value Ref Range    POC Glucose 168 (H) 65 - 140 mg/dl   Fingerstick Glucose (POCT)    Collection Time: 02/02/21 11:28 PM   Result Value Ref Range    POC Glucose 155 (H) 65 - 140 mg/dl   Fingerstick Glucose (POCT)    Collection Time: 02/03/21  6:06 AM   Result Value Ref Range    POC Glucose 117 65 - 140 mg/dl   ECG 12 lead    Collection Time: 02/03/21  7:36 AM   Result Value Ref Range    Ventricular Rate 56 BPM    Atrial Rate 56 BPM    NH Interval 128 ms    QRSD Interval 82 ms    QT Interval 468 ms    QTC Interval 451 ms    P [place] 32 degrees    QRS Axis 46 degrees    T Wave Axis 58 degrees   Fingerstick Glucose (POCT)    Collection Time: 02/03/21 11:32 AM   Result Value Ref Range    POC Glucose 134 65 - 140 mg/dl   Fingerstick Glucose (POCT)    Collection Time: 02/03/21  4:06 PM   Result Value Ref Range    POC Glucose 138 65 - 140 mg/dl   CBC and differential    Collection Time: 02/03/21  4:25 PM   Result Value Ref Range    WBC 15 01 (H) 4 31 - 10 16 Thousand/uL    RBC 3 07 (L) 3 81 - 5 12 Million/uL    Hemoglobin 8 5 (L) 11 5 - 15 4 g/dL    Hematocrit 27 1 (L) 34 8 - 46 1 %    MCV 88 82 - 98 fL    MCH 27 7 26 8 - 34 3 pg MCHC 31 4 31 4 - 37 4 g/dL    RDW 15 3 (H) 11 6 - 15 1 %    MPV 9 8 8 9 - 12 7 fL    Platelets 566 (H) 643 - 390 Thousands/uL    nRBC 0 /100 WBCs    Neutrophils Relative 89 (H) 43 - 75 %    Immat GRANS % 1 0 - 2 %    Lymphocytes Relative 6 (L) 14 - 44 %    Monocytes Relative 4 4 - 12 %    Eosinophils Relative 0 0 - 6 %    Basophils Relative 0 0 - 1 %    Neutrophils Absolute 13 26 (H) 1 85 - 7 62 Thousands/µL    Immature Grans Absolute 0 21 (H) 0 00 - 0 20 Thousand/uL    Lymphocytes Absolute 0 87 0 60 - 4 47 Thousands/µL    Monocytes Absolute 0 63 0 17 - 1 22 Thousand/µL    Eosinophils Absolute 0 02 0 00 - 0 61 Thousand/µL    Basophils Absolute 0 02 0 00 - 0 10 Thousands/µL   Basic metabolic panel    Collection Time: 02/03/21  4:25 PM   Result Value Ref Range    Sodium 145 136 - 145 mmol/L    Potassium 2 9 (L) 3 5 - 5 3 mmol/L    Chloride 109 (H) 100 - 108 mmol/L    CO2 28 21 - 32 mmol/L    ANION GAP 8 4 - 13 mmol/L    BUN 16 5 - 25 mg/dL    Creatinine 0 70 0 60 - 1 30 mg/dL    Glucose 140 65 - 140 mg/dL    Calcium 7 6 (L) 8 3 - 10 1 mg/dL    eGFR 103 ml/min/1 73sq m   Magnesium    Collection Time: 02/03/21  4:25 PM   Result Value Ref Range    Magnesium 1 7 1 6 - 2 6 mg/dL   Phosphorus    Collection Time: 02/03/21  4:25 PM   Result Value Ref Range    Phosphorus 1 7 (L) 2 7 - 4 5 mg/dL   Inflammatory bowel disease panel    Collection Time: 02/03/21  6:19 PM   Result Value Ref Range    ACCA 52 0 - 90 units    Nelli 28 0 - 50 units    ALCA 46 0 - 60 units    AMCA 72 0 - 100 units    Atypical pANCA Negative Negative    COMMENTS Comment    Fingerstick Glucose (POCT)    Collection Time: 02/04/21 12:04 AM   Result Value Ref Range    POC Glucose 117 65 - 140 mg/dl   Comprehensive metabolic panel    Collection Time: 02/04/21  5:56 AM   Result Value Ref Range    Sodium 145 136 - 145 mmol/L    Potassium 2 8 (L) 3 5 - 5 3 mmol/L    Chloride 109 (H) 100 - 108 mmol/L    CO2 27 21 - 32 mmol/L    ANION GAP 9 4 - 13 mmol/L    BUN 15 5 - 25 mg/dL    Creatinine 0 63 0 60 - 1 30 mg/dL    Glucose 116 65 - 140 mg/dL    Calcium 7 4 (L) 8 3 - 10 1 mg/dL    Corrected Calcium 9 1 8 3 - 10 1 mg/dL    AST 14 5 - 45 U/L    ALT 13 12 - 78 U/L    Alkaline Phosphatase 63 46 - 116 U/L    Total Protein 5 2 (L) 6 4 - 8 2 g/dL    Albumin 1 9 (L) 3 5 - 5 0 g/dL    Total Bilirubin 0 20 0 20 - 1 00 mg/dL    eGFR 106 ml/min/1 73sq m   Lipid panel    Collection Time: 02/04/21  5:56 AM   Result Value Ref Range    Cholesterol 93 50 - 200 mg/dL    Triglycerides 185 (H) <=150 mg/dL    HDL, Direct 8 (L) >=40 mg/dL    LDL Calculated 48 0 - 100 mg/dL    Non-HDL-Chol (CHOL-HDL) 85 mg/dl   Magnesium    Collection Time: 02/04/21  5:56 AM   Result Value Ref Range    Magnesium 1 7 1 6 - 2 6 mg/dL   Phosphorus    Collection Time: 02/04/21  5:56 AM   Result Value Ref Range    Phosphorus 1 5 (L) 2 7 - 4 5 mg/dL   Fingerstick Glucose (POCT)    Collection Time: 02/04/21  6:12 AM   Result Value Ref Range    POC Glucose 108 65 - 140 mg/dl   ECG 12 lead    Collection Time: 02/04/21  8:03 AM   Result Value Ref Range    Ventricular Rate 63 BPM    Atrial Rate 63 BPM    MT Interval 134 ms    QRSD Interval 86 ms    QT Interval 372 ms    QTC Interval 380 ms    P Axis 36 degrees    QRS Axis 48 degrees    T Wave Axis 64 degrees   Fingerstick Glucose (POCT)    Collection Time: 02/04/21 11:55 AM   Result Value Ref Range    POC Glucose 121 65 - 140 mg/dl   Potassium    Collection Time: 02/04/21 12:18 PM   Result Value Ref Range    Potassium 3 0 (L) 3 5 - 5 3 mmol/L   Phosphorus    Collection Time: 02/04/21 12:18 PM   Result Value Ref Range    Phosphorus 1 6 (L) 2 7 - 4 5 mg/dL   CBC and differential    Collection Time: 02/04/21 12:18 PM   Result Value Ref Range    WBC 16 11 (H) 4 31 - 10 16 Thousand/uL    RBC 3 52 (L) 3 81 - 5 12 Million/uL    Hemoglobin 9 6 (L) 11 5 - 15 4 g/dL    Hematocrit 30 6 (L) 34 8 - 46 1 %    MCV 87 82 - 98 fL    MCH 27 3 26 8 - 34 3 pg    MCHC 31 4 31 4 - 37 4 g/dL RDW 15 7 (H) 11 6 - 15 1 %    MPV 9 9 8 9 - 12 7 fL    Platelets 820 (H) 303 - 390 Thousands/uL    nRBC 0 /100 WBCs    Neutrophils Relative 86 (H) 43 - 75 %    Immat GRANS % 2 0 - 2 %    Lymphocytes Relative 8 (L) 14 - 44 %    Monocytes Relative 4 4 - 12 %    Eosinophils Relative 0 0 - 6 %    Basophils Relative 0 0 - 1 %    Neutrophils Absolute 13 85 (H) 1 85 - 7 62 Thousands/µL    Immature Grans Absolute 0 29 (H) 0 00 - 0 20 Thousand/uL    Lymphocytes Absolute 1 31 0 60 - 4 47 Thousands/µL    Monocytes Absolute 0 59 0 17 - 1 22 Thousand/µL    Eosinophils Absolute 0 05 0 00 - 0 61 Thousand/µL    Basophils Absolute 0 02 0 00 - 0 10 Thousands/µL   Fingerstick Glucose (POCT)    Collection Time: 02/04/21  3:31 PM   Result Value Ref Range    POC Glucose 123 65 - 140 mg/dl   Fingerstick Glucose (POCT)    Collection Time: 02/04/21  9:20 PM   Result Value Ref Range    POC Glucose 119 65 - 140 mg/dl   Fingerstick Glucose (POCT)    Collection Time: 02/05/21 12:00 AM   Result Value Ref Range    POC Glucose 108 65 - 140 mg/dl   CBC and differential    Collection Time: 02/05/21  6:10 AM   Result Value Ref Range    WBC 12 48 (H) 4 31 - 10 16 Thousand/uL    RBC 2 97 (L) 3 81 - 5 12 Million/uL    Hemoglobin 8 2 (L) 11 5 - 15 4 g/dL    Hematocrit 25 9 (L) 34 8 - 46 1 %    MCV 87 82 - 98 fL    MCH 27 6 26 8 - 34 3 pg    MCHC 31 7 31 4 - 37 4 g/dL    RDW 15 9 (H) 11 6 - 15 1 %    MPV 9 8 8 9 - 12 7 fL    Platelets 588 (H) 025 - 390 Thousands/uL    nRBC 0 /100 WBCs    Neutrophils Relative 82 (H) 43 - 75 %    Immat GRANS % 2 0 - 2 %    Lymphocytes Relative 10 (L) 14 - 44 %    Monocytes Relative 5 4 - 12 %    Eosinophils Relative 1 0 - 6 %    Basophils Relative 0 0 - 1 %    Neutrophils Absolute 10 26 (H) 1 85 - 7 62 Thousands/µL    Immature Grans Absolute 0 25 (H) 0 00 - 0 20 Thousand/uL    Lymphocytes Absolute 1 24 0 60 - 4 47 Thousands/µL    Monocytes Absolute 0 63 0 17 - 1 22 Thousand/µL    Eosinophils Absolute 0 08 0 00 - 0 61 Thousand/µL    Basophils Absolute 0 02 0 00 - 0 10 Thousands/µL   Comprehensive metabolic panel    Collection Time: 02/05/21  6:10 AM   Result Value Ref Range    Sodium 142 136 - 145 mmol/L    Potassium 2 6 (LL) 3 5 - 5 3 mmol/L    Chloride 107 100 - 108 mmol/L    CO2 27 21 - 32 mmol/L    ANION GAP 8 4 - 13 mmol/L    BUN 7 5 - 25 mg/dL    Creatinine 0 60 0 60 - 1 30 mg/dL    Glucose 108 65 - 140 mg/dL    Calcium 7 0 (L) 8 3 - 10 1 mg/dL    Corrected Calcium 8 8 8 3 - 10 1 mg/dL    AST 14 5 - 45 U/L    ALT 10 (L) 12 - 78 U/L    Alkaline Phosphatase 63 46 - 116 U/L    Total Protein 4 8 (L) 6 4 - 8 2 g/dL    Albumin 1 7 (L) 3 5 - 5 0 g/dL    Total Bilirubin 0 20 0 20 - 1 00 mg/dL    eGFR 108 ml/min/1 73sq m   Phosphorus    Collection Time: 02/05/21  6:10 AM   Result Value Ref Range    Phosphorus 1 8 (L) 2 7 - 4 5 mg/dL   Magnesium    Collection Time: 02/05/21  6:10 AM   Result Value Ref Range    Magnesium 1 6 1 6 - 2 6 mg/dL   Fingerstick Glucose (POCT)    Collection Time: 02/05/21  6:21 AM   Result Value Ref Range    POC Glucose 107 65 - 140 mg/dl   ECG 12 lead    Collection Time: 02/05/21  7:37 AM   Result Value Ref Range    Ventricular Rate 69 BPM    Atrial Rate 69 BPM    MO Interval 132 ms    QRSD Interval 84 ms    QT Interval 478 ms    QTC Interval 512 ms    P [place] 35 degrees    QRS Axis 34 degrees    T Wave Axis 72 degrees   Calcium, ionized    Collection Time: 02/05/21  9:29 AM   Result Value Ref Range    Calcium, Ionized 0 98 (L) 1 12 - 1 32 mmol/L   Fingerstick Glucose (POCT)    Collection Time: 02/05/21 12:59 PM   Result Value Ref Range    POC Glucose 119 65 - 140 mg/dl   Fingerstick Glucose (POCT)    Collection Time: 02/05/21  6:43 PM   Result Value Ref Range    POC Glucose 124 65 - 140 mg/dl   Fingerstick Glucose (POCT)    Collection Time: 02/05/21  9:20 PM   Result Value Ref Range    POC Glucose 114 65 - 140 mg/dl   Fingerstick Glucose (POCT)    Collection Time: 02/05/21 11:47 PM   Result Value Ref Range POC Glucose 112 65 - 140 mg/dl   Basic metabolic panel    Collection Time: 02/06/21  5:12 AM   Result Value Ref Range    Sodium 142 136 - 145 mmol/L    Potassium 3 0 (L) 3 5 - 5 3 mmol/L    Chloride 108 100 - 108 mmol/L    CO2 27 21 - 32 mmol/L    ANION GAP 7 4 - 13 mmol/L    BUN 7 5 - 25 mg/dL    Creatinine 0 62 0 60 - 1 30 mg/dL    Glucose 101 65 - 140 mg/dL    Calcium 7 6 (L) 8 3 - 10 1 mg/dL    eGFR 107 ml/min/1 73sq m   Magnesium    Collection Time: 02/06/21  5:12 AM   Result Value Ref Range    Magnesium 1 9 1 6 - 2 6 mg/dL   Phosphorus    Collection Time: 02/06/21  5:12 AM   Result Value Ref Range    Phosphorus 2 1 (L) 2 7 - 4 5 mg/dL   CBC and differential    Collection Time: 02/06/21  5:12 AM   Result Value Ref Range    WBC 12 36 (H) 4 31 - 10 16 Thousand/uL    RBC 3 05 (L) 3 81 - 5 12 Million/uL    Hemoglobin 8 5 (L) 11 5 - 15 4 g/dL    Hematocrit 26 7 (L) 34 8 - 46 1 %    MCV 88 82 - 98 fL    MCH 27 9 26 8 - 34 3 pg    MCHC 31 8 31 4 - 37 4 g/dL    RDW 16 8 (H) 11 6 - 15 1 %    MPV 10 2 8 9 - 12 7 fL    Platelets 329 (H) 352 - 390 Thousands/uL    nRBC 0 /100 WBCs   Manual Differential(PHLEBS Do Not Order)    Collection Time: 02/06/21  5:12 AM   Result Value Ref Range    Segmented % 80 (H) 43 - 75 %    Bands % 3 0 - 8 %    Lymphocytes % 9 (L) 14 - 44 %    Monocytes % 4 4 - 12 %    Eosinophils, % 2 0 - 6 %    Basophils % 0 0 - 1 %    Metamyelocytes% 2 (H) 0 - 1 %    Absolute Neutrophils 10 26 (H) 1 85 - 7 62 Thousand/uL    Lymphocytes Absolute 1 11 0 60 - 4 47 Thousand/uL    Monocytes Absolute 0 49 0 00 - 1 22 Thousand/uL    Eosinophils Absolute 0 25 0 00 - 0 40 Thousand/uL    Basophils Absolute 0 00 0 00 - 0 10 Thousand/uL    Total Counted 100     Platelet Estimate Increased (A) Adequate   Fingerstick Glucose (POCT)    Collection Time: 02/06/21  6:43 AM   Result Value Ref Range    POC Glucose 106 65 - 140 mg/dl   Fingerstick Glucose (POCT)    Collection Time: 02/06/21  7:20 AM   Result Value Ref Range POC Glucose 102 65 - 140 mg/dl     *Note: Due to a large number of results and/or encounters for the requested time period, some results have not been displayed  A complete set of results can be found in Results Review  Physical Exam  Constitutional:       Appearance: Normal appearance  HENT:      Head: Normocephalic  Right Ear: Tympanic membrane, ear canal and external ear normal       Left Ear: Tympanic membrane, ear canal and external ear normal       Nose: Nose normal  No congestion  Mouth/Throat:      Mouth: Mucous membranes are moist       Pharynx: Oropharynx is clear  No oropharyngeal exudate or posterior oropharyngeal erythema  Eyes:      Extraocular Movements: Extraocular movements intact  Conjunctiva/sclera: Conjunctivae normal       Pupils: Pupils are equal, round, and reactive to light  Neck:      Musculoskeletal: Normal range of motion and neck supple  Cardiovascular:      Rate and Rhythm: Normal rate and regular rhythm  Heart sounds: Normal heart sounds  No murmur  Pulmonary:      Effort: Pulmonary effort is normal       Breath sounds: Normal breath sounds  No wheezing or rales  Abdominal:      General: Bowel sounds are normal  There is no distension  Palpations: Abdomen is soft  Tenderness: There is no abdominal tenderness  Comments: Colostomy bag in place, wound VAC in place-actively sucking   Musculoskeletal: Normal range of motion  Right lower leg: No edema  Left lower leg: No edema  Lymphadenopathy:      Cervical: No cervical adenopathy  Skin:     General: Skin is warm  Neurological:      General: No focal deficit present  Mental Status: She is alert and oriented to person, place, and time

## 2021-03-01 ENCOUNTER — OFFICE VISIT (OUTPATIENT)
Dept: SURGERY | Facility: CLINIC | Age: 51
End: 2021-03-01

## 2021-03-01 VITALS
HEIGHT: 64 IN | HEART RATE: 54 BPM | TEMPERATURE: 97.2 F | DIASTOLIC BLOOD PRESSURE: 62 MMHG | WEIGHT: 206.2 LBS | BODY MASS INDEX: 35.2 KG/M2 | SYSTOLIC BLOOD PRESSURE: 108 MMHG

## 2021-03-01 DIAGNOSIS — K57.20 PERFORATION OF SIGMOID COLON DUE TO DIVERTICULITIS: Primary | ICD-10-CM

## 2021-03-01 DIAGNOSIS — Z93.3 COLOSTOMY IN PLACE (HCC): ICD-10-CM

## 2021-03-01 PROCEDURE — 99024 POSTOP FOLLOW-UP VISIT: CPT | Performed by: STUDENT IN AN ORGANIZED HEALTH CARE EDUCATION/TRAINING PROGRAM

## 2021-03-01 RX ORDER — TRAMADOL HYDROCHLORIDE 50 MG/1
50 TABLET ORAL EVERY 6 HOURS PRN
Qty: 16 TABLET | Refills: 0 | Status: SHIPPED | OUTPATIENT
Start: 2021-03-01 | End: 2021-07-15

## 2021-03-01 NOTE — PROGRESS NOTES
Assessment/Plan:  28-year-old female status post exploratory laparotomy and sigmoid colon resection on 01/26,  Abdominal washout and creation of transverse colon colostomy 1/27, abdominal washout and closure of abdomen on 01/29  - patient is doing well and tolerating a diet  - ostomy is functioning well  - midline incision wound VAC changed, healing well with good granulation tissue  - will refer to Gastroenterology for colonoscopy and also evaluation for colitis  - follow-up in office in 2 weeks      Pathology Results:  Addendum   This addendum is issued to add immunohistochemistry results  The final diagnosis stays the same  Immunohistochemistry for cytomegalovirus (A3) is negative       Addendum electronically signed by Saira Quispe MD on 2/1/2021 at 10:08 AM   Final Diagnosis   A  Sigmoid colon, sigmoidectomy:  - Marked chronic active colitis with ulceration, mural abscess and peroration   - Chronic active colitis involves resection margins   - Additional portion of colon with chronic active colitis  - Four benign lymph nodes (0/4)  - Immunohistochemistry for Cytomegalovirus is pending; results will be issued in an addendum      Note: The differential diagnosis includes ulcerative colitis (favored) and segmental colitis of diverticular disease  Clinical correlation is suggested  Negative for dysplasia  Final Diagnosis   A  Descending colon, resection:  -Three portions of colon with chronic active colitis, ulcerations, mural abscess and perforation   - Chronic active colitis involves all of the resection margins   - Four benign lymph nodes (0/4)     Please see case [id number] for CMV immunohistochemistry stain results      B  Omentum, omentectomy:  - Mature adipose tissue with acute inflammation   - One benign lymph node (0/1)  I reviewed the pathology report and discussed the findings with the patient and their accompanying family or caregiver, if present   All questions were answered to satisfaction and the patient along with family or caregiver, if present, voiced understanding  1  Perforation of sigmoid colon due to diverticulitis  -     traMADol (ULTRAM) 50 mg tablet; Take 1 tablet (50 mg total) by mouth every 6 (six) hours as needed for moderate pain (for wound vac changes)  -     Ambulatory referral to Gastroenterology; Future    2  Colostomy in place [facility])  -     traMADol (ULTRAM) 50 mg tablet; Take 1 tablet (50 mg total) by mouth every 6 (six) hours as needed for moderate pain (for wound vac changes)  -     Ambulatory referral to Gastroenterology; Future               Subjective: wound vac     Patient ID: Leena Paniagua is a 52 y o  female  Triage Notes:     Patient is a 82-year-old female who presents to office for evaluation status post exploratory laparotomy and sigmoid colon resection with subsequent creation of transverse colon colostomy along with abdominal washout and closure  She has been tolerating a diet well and having good ostomy function  She has been ambulating more getting more active  She has abdominal pain mostly with wound VAC changes  Otherwise doing well  The following portions of the patient's history were reviewed and updated as appropriate: allergies, current medications, past family history, past medical history, past social history, past surgical history and problem list     Review of Systems   Constitutional: Negative for chills, fatigue and fever  HENT: Negative for congestion, hearing loss, rhinorrhea and sore throat  Eyes: Negative for pain and discharge  Respiratory: Negative for cough, chest tightness and shortness of breath  Cardiovascular: Negative for chest pain and palpitations  Gastrointestinal: Negative for abdominal pain, constipation, diarrhea, nausea and vomiting  Endocrine: Negative for cold intolerance and heat intolerance  Genitourinary: Negative for difficulty urinating and dysuria     Musculoskeletal: Negative for back pain and neck pain  Skin: Negative for color change and rash  Allergic/Immunologic: Negative for environmental allergies and food allergies  Neurological: Negative for seizures and headaches  Hematological: Negative for adenopathy  Does not bruise/bleed easily  Psychiatric/Behavioral: Negative for confusion and hallucinations  Objective:      /62   Pulse (!) 54   Temp (!) 97 2 °F (36 2 °C) (Temporal)   Ht 5' 4" (1 626 m)   Wt 93 5 kg (206 lb 3 2 oz)   Breastfeeding No   BMI 35 39 kg/m²     Below is the patient's most recent value for Albumin, ALT, AST, BUN, Calcium, Chloride, Cholesterol, CO2, Creatinine, GFR, Glucose, HDL, Hematocrit, Hemoglobin, Hemoglobin A1C, LDL, Magnesium, Phosphorus, Platelets, Potassium, PSA, Sodium, Triglycerides, and WBC  Lab Results   Component Value Date    ALT 13 02/09/2021    AST 13 02/09/2021    BUN 8 02/10/2021    CALCIUM 8 8 02/10/2021     02/10/2021    CO2 27 02/10/2021    CREATININE 0 71 02/10/2021    HDL 8 (L) 02/04/2021    HCT 27 8 (L) 02/10/2021    HGB 8 6 (L) 02/10/2021    HGBA1C 6 0 (H) 01/27/2021    MG 1 7 02/09/2021    PHOS 2 8 02/09/2021     (H) 02/10/2021    K 4 1 02/10/2021    TRIG 185 (H) 02/04/2021    WBC 9 17 02/10/2021     Note: for a comprehensive list of the patient's lab results, access the Results Review activity  Physical Exam  Constitutional:       Appearance: Normal appearance  HENT:      Head: Normocephalic and atraumatic  Nose: Nose normal    Eyes:      General: No scleral icterus  Conjunctiva/sclera: Conjunctivae normal    Neck:      Musculoskeletal: Neck supple  Cardiovascular:      Rate and Rhythm: Normal rate and regular rhythm  Pulses: Normal pulses  Heart sounds: Normal heart sounds  Pulmonary:      Effort: Pulmonary effort is normal       Breath sounds: Normal breath sounds  Abdominal:      General: There is no distension  Palpations: Abdomen is soft        Comments: Midline incision healing well with good granulation tissue, left upper quadrant attempted ostomy site healing well with good granulation tissue, ostomy pink patent and productive   Musculoskeletal:         General: No signs of injury  Skin:     General: Skin is warm  Coloration: Skin is not jaundiced  Neurological:      General: No focal deficit present  Mental Status: She is alert and oriented to person, place, and time  Psychiatric:         Mood and Affect: Mood normal          Behavior: Behavior normal              Suture removal    Date/Time: 3/1/2021 12:16 PM  Performed by: Rebecca Velasco DO  Authorized by: Rebecca Velasco DO   Universal Protocol:  Consent: Verbal consent obtained  Consent given by: patient  Patient understanding: patient states understanding of the procedure being performed  Patient identity confirmed: verbally with patient        Patient location:  Clinic  Location:     Laterality:  Median    Location:  Trunk    Trunk location:  Abdomen  Procedure details: Tools used:  Suture removal kit and other (comment) (Staple removal kit)    Wound appearance:  No sign(s) of infection, good wound healing and clean    Number of sutures removed:  1    Number of staples removed:  1  Post-procedure details:     Post-removal:  Dressing applied    Patient tolerance of procedure:   Tolerated well, no immediate complications

## 2021-03-04 ENCOUNTER — OFFICE VISIT (OUTPATIENT)
Dept: GASTROENTEROLOGY | Facility: CLINIC | Age: 51
End: 2021-03-04
Payer: COMMERCIAL

## 2021-03-04 ENCOUNTER — PREP FOR PROCEDURE (OUTPATIENT)
Dept: GASTROENTEROLOGY | Facility: CLINIC | Age: 51
End: 2021-03-04

## 2021-03-04 VITALS
BODY MASS INDEX: 35.24 KG/M2 | DIASTOLIC BLOOD PRESSURE: 78 MMHG | SYSTOLIC BLOOD PRESSURE: 112 MMHG | WEIGHT: 206.4 LBS | HEART RATE: 72 BPM | HEIGHT: 64 IN

## 2021-03-04 DIAGNOSIS — K57.20 PERFORATION OF SIGMOID COLON DUE TO DIVERTICULITIS: Primary | ICD-10-CM

## 2021-03-04 DIAGNOSIS — Z93.3 COLOSTOMY IN PLACE (HCC): ICD-10-CM

## 2021-03-04 DIAGNOSIS — K57.20 PERFORATION OF SIGMOID COLON DUE TO DIVERTICULITIS: ICD-10-CM

## 2021-03-04 DIAGNOSIS — K66.8 PNEUMOPERITONEUM: Primary | ICD-10-CM

## 2021-03-04 PROCEDURE — 99203 OFFICE O/P NEW LOW 30 MIN: CPT | Performed by: PHYSICIAN ASSISTANT

## 2021-03-04 PROCEDURE — 1111F DSCHRG MED/CURRENT MED MERGE: CPT | Performed by: PHYSICIAN ASSISTANT

## 2021-03-04 NOTE — PATIENT INSTRUCTIONS
Colonoscopy   AMBULATORY CARE:   What you need to know about a colonoscopy:  A colonoscopy is a procedure to examine the inside of your colon (intestine) with a scope  A scope is a flexible tube with a small light and camera on the end  Polyps or tissue growths may be removed during your colonoscopy  What you need to do the week before your colonoscopy: You will need to stop taking medicines that contain aspirin or iron for 7 days before your colonoscopy  If you take a blood thinner, such as warfarin, ask when you should stop taking it  Make plans for someone to drive you home after your procedure  How to prepare for your colonoscopy: Your healthcare provider will have you prepare your bowels before your procedure  Your bowels will need to be empty before your procedure to allow him or her to see your colon clearly  You will need to do the following:  · Have only clear liquids for the entire day before your colonoscopy  Clear liquids include plain gelatin, unsweetened fruit juices, clear soup, and broth  Do not drink any liquid that is blue, red, or purple  · Follow your bowel prep as directed  There are many different preparations that can be given before a colonoscopy  Some are given over 2 hours and others over 6 hours  Some are given earlier in the afternoon the day before the colonoscopy  Others are given the day before and then the morning of the colonoscopy  With any bowel prep, stay close to the bathroom  This liquid will cause your bowels to move often  · Use an enema if directed  Your healthcare provider may tell you to use an enema to help clean out your bowels  · Do not eat or drink anything after midnight  This will help prevent problems that can happen if you vomit while under anesthesia  What will happen during your colonoscopy:   · You will be given medicine to help you relax   You will lie on your left side and raise one or both knees toward your chest  Your healthcare provider will examine your anus and use a finger to check your rectum  You may need another enema if your bowel is not empty  The scope will be lubricated and gently placed into your anus  It will then be passed through your rectum and into your colon  Water or air will be put into your colon to help clean or expand it  This is done so your healthcare provider can see your colon clearly  · Tissue samples may be taken from the walls of your bowel and sent to a lab for tests  If you have a polyp, your healthcare provider will pass a wire loop through the scope and use it to hold the polyp  The polyp is then removed from the wall of your colon  The polyps are sent to a lab for tests  Pictures of your colon may be taken during the procedure  What will happen after your colonoscopy: You may feel bloated or have some gas and abdominal discomfort  You may need to lie on your left side with a heating pad on your abdomen  Risks of a colonoscopy: You may have pain or bleeding after the scope or polyps are removed  You may also have a slow heartbeat, decreased blood pressure, or increased sweating  Your colon may tear due to the increased pressure from the scope and other instruments  This may cause bowel contents to leak out of your colon and into your abdomen  If this happens, you will need to have surgery on your colon  Call your doctor if:   · You have a large amount of bright red blood in your bowel movements  · Your abdomen is hard and firm and you have severe pain  · You have sudden trouble breathing  · You develop a rash or hives  · You have a fever within 24 hours of your procedure  · You have not had a bowel movement for 3 days after your procedure  · You have questions or concerns about your condition or care  After your colonoscopy:   · Do not lift, strain, or run  for 3 days  · Rest as much as possible  You have been given medicine to relax you   Do not  drive or make important decisions for at least 24 hours  Return to your normal activity as directed  · Relieve gas and discomfort from bloating  by lying on your left side with a heating pad on your abdomen  You may need to take short walks to help the gas move out  Eat small meals until bloating is relieved  If you had polyps removed: For 7 days after your procedure:  · Do not  take aspirin  · Do not  go on long car rides  Help prevent constipation:   · Eat a variety of healthy foods  Healthy foods include fruit, vegetables, whole-grain breads, low-fat dairy products, beans, lean meat, and fish  Ask if you need to be on a special diet  Your healthcare provider may recommend that you eat high-fiber foods such as cooked beans  Fiber helps you have regular bowel movements  · Drink liquids as directed  Adults should drink between 9 and 13 eight-ounce cups of liquid every day  Ask what amount is best for you  For most people, good liquids to drink are water, juice, and milk  · Exercise as directed  Talk to your healthcare provider about the best exercise plan for you  Exercise can help prevent constipation, decrease your blood pressure and improve your health  Follow up with your healthcare provider as directed:  Write down your questions so you remember to ask them during your visits  © Copyright [address] [place] [address] Drive Information is for End User's use only and may not be sold, redistributed or otherwise used for commercial purposes  All illustrations and images included in CareNotes® are the copyrighted property of A D A M , Inc  or [facility]   The above information is an  only  It is not intended as medical advice for individual conditions or treatments  Talk to your doctor, nurse or pharmacist before following any medical regimen to see if it is safe and effective for you
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
## 2021-03-11 DIAGNOSIS — T14.8XXA WOUND INFECTION: Primary | ICD-10-CM

## 2021-03-11 DIAGNOSIS — L08.9 WOUND INFECTION: Primary | ICD-10-CM

## 2021-03-11 RX ORDER — CLINDAMYCIN HYDROCHLORIDE 300 MG/1
300 CAPSULE ORAL 3 TIMES DAILY
Qty: 21 CAPSULE | Refills: 0 | Status: SHIPPED | OUTPATIENT
Start: 2021-03-11 | End: 2021-03-18

## 2021-03-12 ENCOUNTER — TELEPHONE (OUTPATIENT)
Dept: SURGERY | Facility: CLINIC | Age: 51
End: 2021-03-12

## 2021-03-12 NOTE — TELEPHONE ENCOUNTER
Home health nurse called and they are holding the wound vac, upper portion granulated and almost closed  Tunnel underneath used packing  Will be seen in office 3/16   MAKENNA wanted us to be aware of no longer wound vac

## 2021-03-15 ENCOUNTER — TELEPHONE (OUTPATIENT)
Dept: INTERNAL MEDICINE CLINIC | Facility: CLINIC | Age: 51
End: 2021-03-15

## 2021-03-15 DIAGNOSIS — L08.9 WOUND INFECTION: Primary | ICD-10-CM

## 2021-03-15 DIAGNOSIS — T14.8XXA WOUND INFECTION: Primary | ICD-10-CM

## 2021-03-15 RX ORDER — SULFAMETHOXAZOLE AND TRIMETHOPRIM 800; 160 MG/1; MG/1
1 TABLET ORAL 2 TIMES DAILY
Qty: 14 TABLET | Refills: 0 | Status: SHIPPED | OUTPATIENT
Start: 2021-03-15 | End: 2021-03-22

## 2021-03-15 NOTE — TELEPHONE ENCOUNTER
PT called asking for you to change her antibiotic  The one she is currently taking is causing her to have diarrhea       # [phone]

## 2021-03-16 ENCOUNTER — OFFICE VISIT (OUTPATIENT)
Dept: SURGERY | Facility: CLINIC | Age: 51
End: 2021-03-16

## 2021-03-16 VITALS
HEART RATE: 98 BPM | SYSTOLIC BLOOD PRESSURE: 118 MMHG | DIASTOLIC BLOOD PRESSURE: 68 MMHG | WEIGHT: 205 LBS | HEIGHT: 64 IN | TEMPERATURE: 97.3 F | BODY MASS INDEX: 35 KG/M2

## 2021-03-16 DIAGNOSIS — R19.7 DIARRHEA: Primary | ICD-10-CM

## 2021-03-16 DIAGNOSIS — K57.20 PERFORATION OF SIGMOID COLON DUE TO DIVERTICULITIS: ICD-10-CM

## 2021-03-16 PROCEDURE — 99024 POSTOP FOLLOW-UP VISIT: CPT | Performed by: STUDENT IN AN ORGANIZED HEALTH CARE EDUCATION/TRAINING PROGRAM

## 2021-03-16 NOTE — PROGRESS NOTES
Assessment/Plan:  57-year-old female status post exploratory laparotomy and sigmoid colon resection on 01/26,  Abdominal washout and creation of transverse colon colostomy 1/27, abdominal washout and closure of abdomen on 01/29  -Midline incision healing well along with left upper quadrant incision  - continue packing to sinus in lower midline wound  - ostomy still remains erythematous with small amount of ulcers developing around it  - follow-up GI for colonoscopy  - will order C diff to evaluate for reason of diarrhea  - follow-up in office in 2 weeks for wound check and evaluation       1  Diarrhea  -     Clostridium difficile toxin by PCR; Future    2  Perforation of sigmoid colon due to diverticulitis               Subjective: sigmoid colon     Patient ID: Cristobal Hoang is a 52 y o  female  Triage Notes:     Patient is a 57-year-old female status post exploratory laparotomy and Abhishek's procedure  She has been tolerating a diet well  She complains of continued diarrhea from her ostomy and sometimes having issues with ostomy bag leaking  The wound VAC has been discontinued and her wounds are healing well  The following portions of the patient's history were reviewed and updated as appropriate: allergies, current medications, past family history, past medical history, past social history, past surgical history and problem list     Review of Systems   Constitutional: Negative for chills, fatigue and fever  HENT: Negative for congestion, hearing loss, rhinorrhea and sore throat  Eyes: Negative for pain and discharge  Respiratory: Negative for cough, chest tightness and shortness of breath  Cardiovascular: Negative for chest pain and palpitations  Gastrointestinal: Positive for diarrhea  Negative for abdominal pain, constipation, nausea and vomiting  Endocrine: Negative for cold intolerance and heat intolerance  Genitourinary: Negative for difficulty urinating and dysuria  Musculoskeletal: Negative for back pain and neck pain  Skin: Negative for color change and rash  Allergic/Immunologic: Negative for environmental allergies and food allergies  Neurological: Negative for seizures and headaches  Hematological: Negative for adenopathy  Does not bruise/bleed easily  Psychiatric/Behavioral: Negative for confusion and hallucinations  Objective:      /68   Pulse 98   Temp (!) 97 3 °F (36 3 °C) (Temporal)   Ht 5' 4" (1 626 m)   Wt 93 kg (205 lb)   Breastfeeding No   BMI 35 19 kg/m²     Below is the patient's most recent value for Albumin, ALT, AST, BUN, Calcium, Chloride, Cholesterol, CO2, Creatinine, GFR, Glucose, HDL, Hematocrit, Hemoglobin, Hemoglobin A1C, LDL, Magnesium, Phosphorus, Platelets, Potassium, PSA, Sodium, Triglycerides, and WBC  Lab Results   Component Value Date    ALT 13 02/09/2021    AST 13 02/09/2021    BUN 8 02/10/2021    CALCIUM 8 8 02/10/2021     02/10/2021    CO2 27 02/10/2021    CREATININE 0 71 02/10/2021    HDL 8 (L) 02/04/2021    HCT 27 8 (L) 02/10/2021    HGB 8 6 (L) 02/10/2021    HGBA1C 6 0 (H) 01/27/2021    MG 1 7 02/09/2021    PHOS 2 8 02/09/2021     (H) 02/10/2021    K 4 1 02/10/2021    TRIG 185 (H) 02/04/2021    WBC 9 17 02/10/2021     Note: for a comprehensive list of the patient's lab results, access the Results Review activity  Physical Exam  Constitutional:       Appearance: Normal appearance  HENT:      Head: Normocephalic and atraumatic  Nose: Nose normal    Eyes:      General: No scleral icterus  Conjunctiva/sclera: Conjunctivae normal    Neck:      Musculoskeletal: Neck supple  Cardiovascular:      Rate and Rhythm: Normal rate and regular rhythm  Pulses: Normal pulses  Heart sounds: Normal heart sounds  Pulmonary:      Effort: Pulmonary effort is normal       Breath sounds: Normal breath sounds  Abdominal:      General: There is no distension        Palpations: Abdomen is soft       Tenderness: There is no abdominal tenderness  Comments: Midline incision healing well, sober nitrate placed a granulation tissue, packing placed to inferior sinus and midline wound, left upper quadrant incision healing well, ostomy patent and productive of liquid stool, ostomy noted to be erythematous and friable   Musculoskeletal:         General: No signs of injury  Skin:     General: Skin is warm  Coloration: Skin is not jaundiced  Neurological:      General: No focal deficit present  Mental Status: She is alert and oriented to person, place, and time     Psychiatric:         Mood and Affect: Mood normal          Behavior: Behavior normal

## 2021-03-17 ENCOUNTER — APPOINTMENT (OUTPATIENT)
Dept: LAB | Facility: HOSPITAL | Age: 51
End: 2021-03-17
Attending: STUDENT IN AN ORGANIZED HEALTH CARE EDUCATION/TRAINING PROGRAM
Payer: COMMERCIAL

## 2021-03-17 DIAGNOSIS — R19.7 DIARRHEA: ICD-10-CM

## 2021-03-17 DIAGNOSIS — Z12.39 ENCOUNTER FOR OTHER SCREENING FOR MALIGNANT NEOPLASM OF BREAST: ICD-10-CM

## 2021-03-17 PROCEDURE — 87493 C DIFF AMPLIFIED PROBE: CPT

## 2021-03-18 ENCOUNTER — OFFICE VISIT (OUTPATIENT)
Dept: INTERNAL MEDICINE CLINIC | Facility: CLINIC | Age: 51
End: 2021-03-18
Payer: COMMERCIAL

## 2021-03-18 VITALS
WEIGHT: 203 LBS | HEART RATE: 86 BPM | HEIGHT: 64 IN | DIASTOLIC BLOOD PRESSURE: 70 MMHG | OXYGEN SATURATION: 99 % | TEMPERATURE: 98.1 F | BODY MASS INDEX: 34.66 KG/M2 | SYSTOLIC BLOOD PRESSURE: 105 MMHG

## 2021-03-18 DIAGNOSIS — K57.20 PERFORATION OF SIGMOID COLON DUE TO DIVERTICULITIS: Primary | ICD-10-CM

## 2021-03-18 DIAGNOSIS — J45.30 MILD PERSISTENT ASTHMA WITHOUT COMPLICATION: ICD-10-CM

## 2021-03-18 DIAGNOSIS — M17.0 PRIMARY OSTEOARTHRITIS OF BOTH KNEES: ICD-10-CM

## 2021-03-18 DIAGNOSIS — E03.9 ACQUIRED HYPOTHYROIDISM: ICD-10-CM

## 2021-03-18 DIAGNOSIS — Z93.3 COLOSTOMY IN PLACE (HCC): ICD-10-CM

## 2021-03-18 LAB — C DIFF TOX B TCDB STL QL NAA+PROBE: NEGATIVE

## 2021-03-18 PROCEDURE — 1036F TOBACCO NON-USER: CPT | Performed by: INTERNAL MEDICINE

## 2021-03-18 PROCEDURE — 99214 OFFICE O/P EST MOD 30 MIN: CPT | Performed by: INTERNAL MEDICINE

## 2021-03-18 PROCEDURE — 3008F BODY MASS INDEX DOCD: CPT | Performed by: INTERNAL MEDICINE

## 2021-03-18 PROCEDURE — 3725F SCREEN DEPRESSION PERFORMED: CPT | Performed by: INTERNAL MEDICINE

## 2021-03-18 NOTE — PROGRESS NOTES
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                                   Authorizing Provider:  Marion White DO        Collected:           01/27/2021 1603              Ordering Location:     [facility] Received:            01/27/2021 2000                                     Operating Room                                                               Pathologist:           Law Osullivan MD                                                                 Specimens:   A) - Large Intestine, Left/Descending Colon, Portion of Descending Colon                            B) - Omentum, Partial Omentectomy                                                          Final Diagnosis       A  Descending colon, resection:  -Three portions of colon with chronic active colitis, ulcerations, mural abscess and perforation   - Chronic active colitis involves all of the resection margins   - Four benign lymph nodes (0/4)  Please see case [id number] for CMV immunohistochemistry stain results  B  Omentum, omentectomy:  - Mature adipose tissue with acute inflammation   - One benign lymph node (0/1)          Note       Interpretation performed at [address]        Additional Information       All reported additional testing was performed with appropriately reactive controls  These tests were developed and their performance characteristics determined by [facility] or appropriate performing facility, though some tests may be performed on tissues which have not been validated for performance characteristics (such as staining performed on alcohol exposed cell blocks and decalcified tissues)  Results should be interpreted with caution and in the context of the patients clinical condition  These tests may not be cleared or approved by the [place]  Food and Drug Administration, though the FDA has determined that such clearance or approval is not necessary  These tests are used for clinical purposes and they should not be regarded as investigational or for research  This laboratory has been approved by CLIA 88, designated as a high-complexity laboratory and is qualified to perform these tests  Kassie Dunbar Description          A  The specimen is received in formalin, labeled with the patient's name and hospital number, and is designated "portion of descending colon  The specimen consists of 3 unoriented segments of bowel tissue, each averaging 3 cm in diameter, with lengths of 4 0, 6 0 and 8 0 cm  The serosa of each fragment exhibits hemorrhagic and dusky discoloration and is partially covered by tan exudate  The mucosa in each fragment exhibits a cobblestone appearance with areas of dusky and hemorrhagic discoloration and possible necrosis as well as the presence of a yellow-green plaque-like material   Upon cut section the 6 cm fragment exhibits an area of perforation closed with suture material, which is inked blue at the time of gross exam   No other discrete abnormalities are noted  Representative sections  Ten cassettes    1-2:  Ends of resection, 4 cm fragment  3:  Random section, 4 cm fragment  4-5:  Ends of resection, 6 cm fragment  6:  Area of perforation, 6 cm fragment   7-8:  Ends of resection, 8 cm fragment  9:  Random section, 8 cm fragment  10: 4 randomly sampled regional lymph nodes        B  The specimen is received in formalin, labeled with the patient's name and hospital number, and is designated "partial omentectomy  The specimen consists of a tan-yellow purple fibromembranous and fibrofatty tissue fragment grossly consistent with omentum measuring 20 x 5 x 2 8 cm  The fragment is sectioned revealing yellow fatty cut surfaces exhibiting focal areas of hemorrhagic discoloration  A single tan purple density suggestive of a lymph node measuring 0 5 cm is identified  Representative sections  One cassette  2 pieces, includes lymph node    Note: The estimated total formalin fixation time based upon information provided by the submitting clinician and the standard processing schedule is under 72 hours      MCrites       CBC and differential    Collection Time: 01/27/21  7:18 PM   Result Value Ref Range    WBC 17 70 (H) 4 31 - 10 16 Thousand/uL    RBC 4 35 3 81 - 5 12 Million/uL    Hemoglobin 11 9 11 5 - 15 4 g/dL    Hematocrit 37 8 34 8 - 46 1 %    MCV 87 82 - 98 fL    MCH 27 4 26 8 - 34 3 pg    MCHC 31 5 31 4 - 37 4 g/dL    RDW 14 4 11 6 - 15 1 %    MPV 10 3 8 9 - 12 7 fL    Platelets 077 (H) 458 - 390 Thousands/uL    nRBC 0 /100 WBCs   Comprehensive metabolic panel    Collection Time: 01/27/21  7:18 PM   Result Value Ref Range    Sodium 142 136 - 145 mmol/L    Potassium 4 1 3 5 - 5 3 mmol/L    Chloride 110 (H) 100 - 108 mmol/L    CO2 21 21 - 32 mmol/L    ANION GAP 11 4 - 13 mmol/L    BUN 10 5 - 25 mg/dL    Creatinine 0 73 0 60 - 1 30 mg/dL    Glucose 179 (H) 65 - 140 mg/dL    Calcium 7 5 (L) 8 3 - 10 1 mg/dL    Corrected Calcium 9 4 8 3 - 10 1 mg/dL    AST 10 5 - 45 U/L    ALT 10 (L) 12 - 78 U/L    Alkaline Phosphatase 36 (L) 46 - 116 U/L    Total Protein 4 5 (L) 6 4 - 8 2 g/dL    Albumin 1 6 (L) 3 5 - 5 0 g/dL    Total Bilirubin 0 50 0 20 - 1 00 mg/dL    eGFR 98 ml/min/1 73sq m   Blood gas, arterial    Collection Time: 01/27/21  7:18 PM   Result Value Ref Range    pH, Arterial 7 316 (L) 7 350 - 7 450    pCO2, Arterial 34 1 (L) 36 0 - 44 0 mm Hg    pO2, Arterial 120 2 75 0 - 129 0 mm Hg    HCO3, Arterial 17 0 (L) 22 0 - 28 0 mmol/L    Base Excess, Arterial -8 2 mmol/L    O2 Content, Arterial 97 7 (H) 16 0 - 23 0 mL/dL    O2 HGB,Arterial  96 7 94 0 - 97 0 %    SOURCE Line, Arterial     Vent Type- AC AC     AC Rate 14     Tidal Volume 350 ml    Inspired Air (FIO2) 40     PEEP 6    Magnesium    Collection Time: 01/27/21  7:18 PM   Result Value Ref Range    Magnesium 2 5 1 6 - 2 6 mg/dL   Phosphorus    Collection Time: 01/27/21  7:18 PM   Result Value Ref Range    Phosphorus 3 8 2 7 - 4 5 mg/dL   Lactic acid, plasma    Collection Time: 01/27/21  7:18 PM   Result Value Ref Range    LACTIC ACID 1 1 0 5 - 2 0 mmol/L   Manual Differential(PHLEBS Do Not Order)    Collection Time: 01/27/21  7:18 PM   Result Value Ref Range    Segmented % 65 43 - 75 %    Bands % 23 (H) 0 - 8 %    Lymphocytes % 5 (L) 14 - 44 %    Monocytes % 4 4 - 12 %    Eosinophils, % 0 0 - 6 %    Basophils % 0 0 - 1 %    Metamyelocytes% 3 (H) 0 - 1 %    Absolute Neutrophils 15 58 (H) 1 85 - 7 62 Thousand/uL    Lymphocytes Absolute 0 89 0 60 - 4 47 Thousand/uL    Monocytes Absolute 0 71 0 00 - 1 22 Thousand/uL    Eosinophils Absolute 0 00 0 00 - 0 40 Thousand/uL    Basophils Absolute 0 00 0 00 - 0 10 Thousand/uL    Total Counted      Toxic Granulation Present     Platelet Estimate Adequate Adequate    Large Platelet Present    Fingerstick Glucose (POCT)    Collection Time: 01/28/21 12:27 AM   Result Value Ref Range    POC Glucose 154 (H) 65 - 140 mg/dl   Lactic acid, plasma    Collection Time: 01/28/21 12:29 AM   Result Value Ref Range    LACTIC ACID 1 1 0 5 - 2 0 mmol/L   POCT Blood Gas (CG8+)    Collection Time: 01/28/21  1:12 AM   Result Value Ref Range    pH, Art i-STAT 7 361 7 350 - 7 450    pCO2, Art i-STAT 36 4 36 0 - 44 0 mm HG    pO2, ART i-STAT 159 0 (H) 75 0 - 129 0 mm HG    BE, i-STAT -4 (L) -2 - 3 mmol/L    HCO3, Art i-STAT 20 6 (L) 22 0 - 28 0 mmol/L    CO2, i-STAT 22 21 - 32 mmol/L    O2 Sat, i-STAT 99 (H) 60 - 85 %    SODIUM, I-STAT 140 136 - 145 mmol/l    Potassium, i-STAT 4 0 3 5 - 5 3 mmol/L    Hct, i-STAT 41 34 8 - 46 1 %    Hgb, i-STAT 13 9 11 5 - 15 4 g/dl    Glucose, i-STAT 157 (H) 65 - 140 mg/dl    Specimen Type ARTERIAL    Blood gas, arterial    Collection Time: 01/28/21  6:18 AM   Result Value Ref Range    pH, Arterial 7 413 7 350 - 7 450    pCO2, Arterial 27 3 (LL) 36 0 - 44 0 mm Hg    pO2, Arterial 127 2 75 0 - 129 0 mm Hg    HCO3, Arterial 17 0 (L) 22 0 - 28 0 mmol/L    Base Excess, Arterial -6 4 mmol/L    O2 Content, Arterial 14 3 (L) 16 0 - 23 0 mL/dL    O2 HGB,Arterial  97 4 (H) 94 0 - 97 0 %    SOURCE Line, Arterial     Vent Type- AC AC     AC Rate 14     Tidal Volume 350 ml    Inspired Air (FIO2) 40     PEEP 6    CBC and differential    Collection Time: 01/28/21  6:18 AM   Result Value Ref Range    WBC 19 74 (H) 4 31 - 10 16 Thousand/uL    RBC 3 99 3 81 - 5 12 Million/uL    Hemoglobin 10 9 (L) 11 5 - 15 4 g/dL    Hematocrit 34 3 (L) 34 8 - 46 1 %    MCV 86 82 - 98 fL    MCH 27 3 26 8 - 34 3 pg    MCHC 31 8 31 4 - 37 4 g/dL    RDW 14 5 11 6 - 15 1 %    MPV 10 6 8 9 - 12 7 fL    Platelets 852 069 - 688 Thousands/uL    nRBC 0 /100 WBCs   Fingerstick Glucose (POCT)    Collection Time: 01/28/21  6:26 AM   Result Value Ref Range    POC Glucose 143 (H) 65 - 140 mg/dl   Basic metabolic panel    Collection Time: 01/28/21  8:03 AM   Result Value Ref Range    Sodium 141 136 - 145 mmol/L    Potassium 4 1 3 5 - 5 3 mmol/L    Chloride 109 (H) 100 - 108 mmol/L    CO2 21 21 - 32 mmol/L    ANION GAP 11 4 - 13 mmol/L    BUN 16 5 - 25 mg/dL    Creatinine 1 09 0 60 - 1 30 mg/dL    Glucose 157 (H) 65 - 140 mg/dL    Calcium 7 4 (L) 8 3 - 10 1 mg/dL    eGFR 60 ml/min/1 73sq m   Magnesium    Collection Time: 01/28/21  8:03 AM   Result Value Ref Range    Magnesium 2 5 1 6 - 2 6 mg/dL   Phosphorus    Collection Time: 01/28/21  8:03 AM   Result Value Ref Range    Phosphorus 3 4 2 7 - 4 5 mg/dL   Calcium, ionized    Collection Time: 01/28/21  8:03 AM   Result Value Ref Range    Calcium, Ionized 0 96 (L) 1 12 - 1 32 mmol/L   Lactic acid, plasma    Collection Time: 01/28/21  1:28 PM   Result Value Ref Range    LACTIC ACID 1 0 0 5 - 2 0 mmol/L   Fingerstick Glucose (POCT)    Collection Time: 01/28/21  1:33 PM   Result Value Ref Range    POC Glucose 114 65 - 140 mg/dl   Blood gas, venous    Collection Time: 01/28/21  2:24 PM   Result Value Ref Range    pH, Charles 7 523 (H) 7 300 - 7 400    pCO2, Charles 21 0 (L) 42 0 - 50 0 mm Hg    pO2, Charles 197 8 (H) 35 0 - 45 0 mm Hg    HCO3, Charles 16 9 (L) 24 - 30 mmol/L    Base Excess, Charles -4 4 mmol/L    O2 Content, Charles 14 3 ml/dL    O2 HGB, VENOUS 93 7 (H) 60 0 - 80 0 %   Basic metabolic panel    Collection Time: 01/28/21  2:28 PM   Result Value Ref Range    Sodium 142 136 - 145 mmol/L    Potassium 4 1 3 5 - 5 3 mmol/L    Chloride 111 (H) 100 - 108 mmol/L    CO2 21 21 - 32 mmol/L    ANION GAP 10 4 - 13 mmol/L    BUN 19 5 - 25 mg/dL    Creatinine 1 19 0 60 - 1 30 mg/dL    Glucose 142 (H) 65 - 140 mg/dL    Calcium 8 0 (L) 8 3 - 10 1 mg/dL    eGFR 54 ml/min/1 73sq m   Blood gas, arterial    Collection Time: 01/28/21  4:12 PM   Result Value Ref Range    pH, Arterial 7 435 7 350 - 7 450    pCO2, Arterial 33 0 (L) 36 0 - 44 0 mm Hg    pO2, Arterial 148 4 (H) 75 0 - 129 0 mm Hg    HCO3, Arterial 21 7 (L) 22 0 - 28 0 mmol/L    Base Excess, Arterial -2 1 mmol/L    O2 Content, Arterial 13 3 (L) 16 0 - 23 0 mL/dL    O2 HGB,Arterial  98 1 (H) 94 0 - 97 0 %    SOURCE Line, Arterial     HUNTER TEST Yes     Temperature 100 2 Degrees Fehrenheit    Vent Type- AC AC     AC Rate 14     Tidal Volume 350 ml    Inspired Air (FIO2) 40     PEEP 6    Fingerstick Glucose (POCT)    Collection Time: 01/28/21  6:28 PM   Result Value Ref Range    POC Glucose 117 65 - 140 mg/dl   Basic metabolic panel    Collection Time: 01/28/21  8:09 PM   Result Value Ref Range    Sodium 142 136 - 145 mmol/L    Potassium 3 8 3 5 - 5 3 mmol/L    Chloride 110 (H) 100 - 108 mmol/L    CO2 22 21 - 32 mmol/L    ANION GAP 10 4 - 13 mmol/L    BUN 22 5 - 25 mg/dL    Creatinine 1 44 (H) 0 60 - 1 30 mg/dL    Glucose 140 65 - 140 mg/dL    Calcium 7 6 (L) 8 3 - 10 1 mg/dL    eGFR 43 ml/min/1 73sq m   Fingerstick Glucose (POCT)    Collection Time: 01/29/21 12:22 AM   Result Value Ref Range    POC Glucose 121 65 - 140 mg/dl   Fingerstick Glucose (POCT)    Collection Time: 01/29/21  5:18 AM   Result Value Ref Range    POC Glucose 102 65 - 140 mg/dl   CBC and differential    Collection Time: 01/29/21  5:20 AM   Result Value Ref Range    WBC 14 66 (H) 4 31 - 10 16 Thousand/uL    RBC 2 74 (L) 3 81 - 5 12 Million/uL    Hemoglobin 7 6 (L) 11 5 - 15 4 g/dL    Hematocrit 24 1 (L) 34 8 - 46 1 %    MCV 88 82 - 98 fL    MCH 27 4 26 8 - 34 3 pg    MCHC 31 1 (L) 31 4 - 37 4 g/dL    RDW 14 6 11 6 - 15 1 %    MPV 10 3 8 9 - 12 7 fL    Platelets 861 021 - 355 Thousands/uL    nRBC 0 /100 WBCs   Basic metabolic panel    Collection Time: 01/29/21  5:20 AM   Result Value Ref Range    Sodium 142 136 - 145 mmol/L    Potassium 3 6 3 5 - 5 3 mmol/L    Chloride 110 (H) 100 - 108 mmol/L    CO2 22 21 - 32 mmol/L    ANION GAP 10 4 - 13 mmol/L    BUN 25 5 - 25 mg/dL    Creatinine 1 53 (H) 0 60 - 1 30 mg/dL    Glucose 119 65 - 140 mg/dL    Calcium 7 7 (L) 8 3 - 10 1 mg/dL    eGFR 40 ml/min/1 73sq m   Magnesium    Collection Time: 01/29/21  5:20 AM   Result Value Ref Range    Magnesium 2 6 1 6 - 2 6 mg/dL   Phosphorus    Collection Time: 01/29/21  5:20 AM   Result Value Ref Range    Phosphorus 3 2 2 7 - 4 5 mg/dL   Manual Differential(PHLEBS Do Not Order)    Collection Time: 01/29/21  5:20 AM   Result Value Ref Range    Segmented % 67 43 - 75 %    Bands % 11 (H) 0 - 8 %    Lymphocytes % 13 (L) 14 - 44 %    Monocytes % 5 4 - 12 %    Eosinophils, % 0 0 - 6 % Basophils % 0 0 - 1 %    Myelocytes % 3 (H) 0 - 1 %    Atypical Lymphocytes % 1 (H) <=0 %    Absolute Neutrophils 11 43 (H) 1 85 - 7 62 Thousand/uL    Lymphocytes Absolute 1 91 0 60 - 4 47 Thousand/uL    Monocytes Absolute 0 73 0 00 - 1 22 Thousand/uL    Eosinophils Absolute 0 00 0 00 - 0 40 Thousand/uL    Basophils Absolute 0 00 0 00 - 0 10 Thousand/uL    Total Counted 100     Anisocytosis Present     Platelet Estimate Adequate Adequate   Fingerstick Glucose (POCT)    Collection Time: 01/29/21 12:30 PM   Result Value Ref Range    POC Glucose 103 65 - 140 mg/dl   Hemoglobin and hematocrit, blood    Collection Time: 01/29/21  2:01 PM   Result Value Ref Range    Hemoglobin 8 4 (L) 11 5 - 15 4 g/dL    Hematocrit 26 7 (L) 34 8 - 46 1 %   Fingerstick Glucose (POCT)    Collection Time: 01/29/21  5:02 PM   Result Value Ref Range    POC Glucose 97 65 - 140 mg/dl   Blood gas, arterial    Collection Time: 01/29/21  8:46 PM   Result Value Ref Range    pH, Arterial 7 300 (L) 7 350 - 7 450    pCO2, Arterial 20 4 (LL) 36 0 - 44 0 mm Hg    pO2, Arterial 142 6 (H) 75 0 - 129 0 mm Hg    HCO3, Arterial 9 8 (L) 22 0 - 28 0 mmol/L    Base Excess, Arterial -15 3 mmol/L    O2 Content, Arterial 8 2 (L) 16 0 - 23 0 mL/dL    O2 HGB,Arterial  97 4 (H) 94 0 - 97 0 %    SOURCE Line, Arterial     Nasal Cannula 2    CBC and Platelet    Collection Time: 01/29/21  8:47 PM   Result Value Ref Range    WBC 21 08 (H) 4 31 - 10 16 Thousand/uL    RBC 3 06 (L) 3 81 - 5 12 Million/uL    Hemoglobin 8 4 (L) 11 5 - 15 4 g/dL    Hematocrit 27 3 (L) 34 8 - 46 1 %    MCV 89 82 - 98 fL    MCH 27 5 26 8 - 34 3 pg    MCHC 30 8 (L) 31 4 - 37 4 g/dL    RDW 14 9 11 6 - 15 1 %    Platelets 962 723 - 666 Thousands/uL    MPV 10 2 8 9 - 12 7 fL   Comprehensive metabolic panel    Collection Time: 01/29/21  8:47 PM   Result Value Ref Range    Sodium 145 136 - 145 mmol/L    Potassium 3 6 3 5 - 5 3 mmol/L    Chloride 112 (H) 100 - 108 mmol/L    CO2 18 (L) 21 - 32 mmol/L ANION GAP 15 (H) 4 - 13 mmol/L    BUN 26 (H) 5 - 25 mg/dL    Creatinine 1 25 0 60 - 1 30 mg/dL    Glucose 104 65 - 140 mg/dL    Calcium 7 6 (L) 8 3 - 10 1 mg/dL    Corrected Calcium 9 1 8 3 - 10 1 mg/dL    AST 10 5 - 45 U/L    ALT 10 (L) 12 - 78 U/L    Alkaline Phosphatase 37 (L) 46 - 116 U/L    Total Protein 4 8 (L) 6 4 - 8 2 g/dL    Albumin 2 1 (L) 3 5 - 5 0 g/dL    Total Bilirubin 0 40 0 20 - 1 00 mg/dL    eGFR 51 ml/min/1 73sq m   Lactic acid, plasma    Collection Time: 01/29/21 10:18 PM   Result Value Ref Range    LACTIC ACID 0 9 0 5 - 2 0 mmol/L   Fingerstick Glucose (POCT)    Collection Time: 01/30/21 12:05 AM   Result Value Ref Range    POC Glucose 129 65 - 140 mg/dl   Blood gas, arterial    Collection Time: 01/30/21  2:34 AM   Result Value Ref Range    pH, Arterial 7 413 7 350 - 7 450    pCO2, Arterial 27 6 (LL) 36 0 - 44 0 mm Hg    pO2, Arterial 106 4 75 0 - 129 0 mm Hg    HCO3, Arterial 17 2 (L) 22 0 - 28 0 mmol/L    Base Excess, Arterial -6 5 mmol/L    O2 Content, Arterial 11 4 (L) 16 0 - 23 0 mL/dL    O2 HGB,Arterial  97 0 94 0 - 97 0 %    SOURCE Radial, Left     HUNTER TEST Yes     Temperature 98 4 Degrees Fehrenheit    Nasal Cannula 2    CBC and differential    Collection Time: 01/30/21  6:19 AM   Result Value Ref Range    WBC 17 45 (H) 4 31 - 10 16 Thousand/uL    RBC 2 88 (L) 3 81 - 5 12 Million/uL    Hemoglobin 7 9 (L) 11 5 - 15 4 g/dL    Hematocrit 25 1 (L) 34 8 - 46 1 %    MCV 87 82 - 98 fL    MCH 27 4 26 8 - 34 3 pg    MCHC 31 5 31 4 - 37 4 g/dL    RDW 14 6 11 6 - 15 1 %    MPV 9 7 8 9 - 12 7 fL    Platelets 340 949 - 760 Thousands/uL    nRBC 0 /100 WBCs   Comprehensive metabolic panel    Collection Time: 01/30/21  6:19 AM   Result Value Ref Range    Sodium 145 136 - 145 mmol/L    Potassium 3 4 (L) 3 5 - 5 3 mmol/L    Chloride 110 (H) 100 - 108 mmol/L    CO2 23 21 - 32 mmol/L    ANION GAP 12 4 - 13 mmol/L    BUN 21 5 - 25 mg/dL    Creatinine 1 22 0 60 - 1 30 mg/dL    Glucose 119 65 - 140 mg/dL Calcium 7 4 (L) 8 3 - 10 1 mg/dL    Corrected Calcium 8 8 8 3 - 10 1 mg/dL    AST 12 5 - 45 U/L    ALT 11 (L) 12 - 78 U/L    Alkaline Phosphatase 34 (L) 46 - 116 U/L    Total Protein 5 0 (L) 6 4 - 8 2 g/dL    Albumin 2 3 (L) 3 5 - 5 0 g/dL    Total Bilirubin 0 50 0 20 - 1 00 mg/dL    eGFR 53 ml/min/1 73sq m   Magnesium    Collection Time: 01/30/21  6:19 AM   Result Value Ref Range    Magnesium 2 2 1 6 - 2 6 mg/dL   Phosphorus    Collection Time: 01/30/21  6:19 AM   Result Value Ref Range    Phosphorus 2 1 (L) 2 7 - 4 5 mg/dL   Procalcitonin with AM Reflex    Collection Time: 01/30/21  6:19 AM   Result Value Ref Range    Procalcitonin 2 40 (H) <=0 25 ng/ml   Manual Differential(PHLEBS Do Not Order)    Collection Time: 01/30/21  6:19 AM   Result Value Ref Range    Segmented % 73 43 - 75 %    Bands % 15 (H) 0 - 8 %    Lymphocytes % 4 (L) 14 - 44 %    Monocytes % 2 (L) 4 - 12 %    Eosinophils, % 0 0 - 6 %    Basophils % 0 0 - 1 %    Metamyelocytes% 2 (H) 0 - 1 %    Myelocytes % 4 (H) 0 - 1 %    Absolute Neutrophils 15 36 (H) 1 85 - 7 62 Thousand/uL    Lymphocytes Absolute 0 70 0 60 - 4 47 Thousand/uL    Monocytes Absolute 0 35 0 00 - 1 22 Thousand/uL    Eosinophils Absolute 0 00 0 00 - 0 40 Thousand/uL    Basophils Absolute 0 00 0 00 - 0 10 Thousand/uL    Total Counted 100     Anisocytosis Present     Platelet Estimate Adequate Adequate   Fingerstick Glucose (POCT)    Collection Time: 01/30/21  1:32 PM   Result Value Ref Range    POC Glucose 131 65 - 140 mg/dl   Basic metabolic panel    Collection Time: 01/30/21  3:47 PM   Result Value Ref Range    Sodium 141 136 - 145 mmol/L    Potassium 3 8 3 5 - 5 3 mmol/L    Chloride 109 (H) 100 - 108 mmol/L    CO2 24 21 - 32 mmol/L    ANION GAP 8 4 - 13 mmol/L    BUN 21 5 - 25 mg/dL    Creatinine 1 16 0 60 - 1 30 mg/dL    Glucose 133 65 - 140 mg/dL    Calcium 7 9 (L) 8 3 - 10 1 mg/dL    eGFR 56 ml/min/1 73sq m   Magnesium    Collection Time: 01/30/21  3:47 PM   Result Value Ref Range    Magnesium 2 1 1 6 - 2 6 mg/dL   Prepare Leukoreduced RBC: 2 Units    Collection Time: 01/30/21  7:57 PM   Result Value Ref Range    Unit Product Code O4866U39     Unit Number [id number]     Unit ABO A     Unit RH POS     Crossmatch Compatible     Unit Dispense Status Return to [facility]     Unit Product Code M4609Q41     Unit Number [id number]     Unit ABO A     Unit RH POS     Crossmatch Compatible     Unit Dispense Status Return to [facility]    Fingerstick Glucose (POCT)    Collection Time: 01/30/21  8:23 PM   Result Value Ref Range    POC Glucose 126 65 - 140 mg/dl   Fingerstick Glucose (POCT)    Collection Time: 01/31/21 12:19 AM   Result Value Ref Range    POC Glucose 110 65 - 140 mg/dl   Procalcitonin Reflex    Collection Time: 01/31/21  6:25 AM   Result Value Ref Range    Procalcitonin 1 22 (H) <=0 25 ng/ml   CBC and differential    Collection Time: 01/31/21  6:25 AM   Result Value Ref Range    WBC 15 67 (H) 4 31 - 10 16 Thousand/uL    RBC 2 97 (L) 3 81 - 5 12 Million/uL    Hemoglobin 8 2 (L) 11 5 - 15 4 g/dL    Hematocrit 25 8 (L) 34 8 - 46 1 %    MCV 87 82 - 98 fL    MCH 27 6 26 8 - 34 3 pg    MCHC 31 8 31 4 - 37 4 g/dL    RDW 14 8 11 6 - 15 1 %    MPV 10 0 8 9 - 12 7 fL    Platelets 144 528 - 524 Thousands/uL    nRBC 0 /100 WBCs   Comprehensive metabolic panel    Collection Time: 01/31/21  6:25 AM   Result Value Ref Range    Sodium 147 (H) 136 - 145 mmol/L    Potassium 3 8 3 5 - 5 3 mmol/L    Chloride 111 (H) 100 - 108 mmol/L    CO2 25 21 - 32 mmol/L    ANION GAP 11 4 - 13 mmol/L    BUN 20 5 - 25 mg/dL    Creatinine 0 86 0 60 - 1 30 mg/dL    Glucose 117 65 - 140 mg/dL    Calcium 7 8 (L) 8 3 - 10 1 mg/dL    Corrected Calcium 9 4 8 3 - 10 1 mg/dL    AST 12 5 - 45 U/L    ALT 11 (L) 12 - 78 U/L    Alkaline Phosphatase 38 (L) 46 - 116 U/L    Total Protein 5 2 (L) 6 4 - 8 2 g/dL    Albumin 2 0 (L) 3 5 - 5 0 g/dL    Total Bilirubin 0 40 0 20 - 1 00 mg/dL    eGFR 80 ml/min/1 73sq m   Magnesium    Collection Time: 01/31/21  6:25 AM   Result Value Ref Range    Magnesium 2 2 1 6 - 2 6 mg/dL   Phosphorus    Collection Time: 01/31/21  6:25 AM   Result Value Ref Range    Phosphorus 1 7 (L) 2 7 - 4 5 mg/dL   Manual Differential(PHLEBS Do Not Order)    Collection Time: 01/31/21  6:25 AM   Result Value Ref Range    Segmented % 78 (H) 43 - 75 %    Bands % 12 (H) 0 - 8 %    Lymphocytes % 6 (L) 14 - 44 %    Monocytes % 2 (L) 4 - 12 %    Eosinophils, % 1 0 - 6 %    Basophils % 0 0 - 1 %    Metamyelocytes% 1 0 - 1 %    Absolute Neutrophils 14 10 (H) 1 85 - 7 62 Thousand/uL    Lymphocytes Absolute 0 94 0 60 - 4 47 Thousand/uL    Monocytes Absolute 0 31 0 00 - 1 22 Thousand/uL    Eosinophils Absolute 0 16 0 00 - 0 40 Thousand/uL    Basophils Absolute 0 00 0 00 - 0 10 Thousand/uL    Total Counted 100     Toxic Granulation Present     Anisocytosis Present     Platelet Estimate Adequate Adequate   ECG 12 lead    Collection Time: 01/31/21  6:35 AM   Result Value Ref Range    Ventricular Rate 89 BPM    Atrial Rate 89 BPM    MT Interval 138 ms    QRSD Interval 84 ms    QT Interval 364 ms    QTC Interval 442 ms    P Axis 51 degrees    QRS Axis 64 degrees    T Wave Axis 43 degrees   Fingerstick Glucose (POCT)    Collection Time: 01/31/21  1:10 PM   Result Value Ref Range    POC Glucose 114 65 - 140 mg/dl   Fingerstick Glucose (POCT)    Collection Time: 01/31/21  5:04 PM   Result Value Ref Range    POC Glucose 120 65 - 140 mg/dl   Basic metabolic panel    Collection Time: 01/31/21  6:23 PM   Result Value Ref Range    Sodium 147 (H) 136 - 145 mmol/L    Potassium 3 9 3 5 - 5 3 mmol/L    Chloride 109 (H) 100 - 108 mmol/L    CO2 24 21 - 32 mmol/L    ANION GAP 14 (H) 4 - 13 mmol/L    BUN 20 5 - 25 mg/dL    Creatinine 0 93 0 60 - 1 30 mg/dL    Glucose 131 65 - 140 mg/dL    Calcium 8 0 (L) 8 3 - 10 1 mg/dL    eGFR 73 ml/min/1 73sq m   Magnesium    Collection Time: 01/31/21  6:23 PM   Result Value Ref Range    Magnesium 2 0 1 6 - 2 6 mg/dL Fingerstick Glucose (POCT)    Collection Time: 02/01/21 12:24 AM   Result Value Ref Range    POC Glucose 143 (H) 65 - 140 mg/dl   Fingerstick Glucose (POCT)    Collection Time: 02/01/21  4:51 AM   Result Value Ref Range    POC Glucose 131 65 - 140 mg/dl   CBC and differential    Collection Time: 02/01/21  6:02 AM   Result Value Ref Range    WBC 11 91 (H) 4 31 - 10 16 Thousand/uL    RBC 3 06 (L) 3 81 - 5 12 Million/uL    Hemoglobin 8 4 (L) 11 5 - 15 4 g/dL    Hematocrit 27 1 (L) 34 8 - 46 1 %    MCV 89 82 - 98 fL    MCH 27 5 26 8 - 34 3 pg    MCHC 31 0 (L) 31 4 - 37 4 g/dL    RDW 14 8 11 6 - 15 1 %    MPV 9 9 8 9 - 12 7 fL    Platelets 818 (H) 978 - 390 Thousands/uL    nRBC 0 /100 WBCs    Neutrophils Relative 88 (H) 43 - 75 %    Immat GRANS % 2 0 - 2 %    Lymphocytes Relative 6 (L) 14 - 44 %    Monocytes Relative 4 4 - 12 %    Eosinophils Relative 0 0 - 6 %    Basophils Relative 0 0 - 1 %    Neutrophils Absolute 10 40 (H) 1 85 - 7 62 Thousands/µL    Immature Grans Absolute 0 26 (H) 0 00 - 0 20 Thousand/uL    Lymphocytes Absolute 0 68 0 60 - 4 47 Thousands/µL    Monocytes Absolute 0 49 0 17 - 1 22 Thousand/µL    Eosinophils Absolute 0 04 0 00 - 0 61 Thousand/µL    Basophils Absolute 0 04 0 00 - 0 10 Thousands/µL   Comprehensive metabolic panel    Collection Time: 02/01/21  6:02 AM   Result Value Ref Range    Sodium 148 (H) 136 - 145 mmol/L    Potassium 3 5 3 5 - 5 3 mmol/L    Chloride 111 (H) 100 - 108 mmol/L    CO2 25 21 - 32 mmol/L    ANION GAP 12 4 - 13 mmol/L    BUN 18 5 - 25 mg/dL    Creatinine 0 75 0 60 - 1 30 mg/dL    Glucose 134 65 - 140 mg/dL    Calcium 7 9 (L) 8 3 - 10 1 mg/dL    Corrected Calcium 9 5 8 3 - 10 1 mg/dL    AST 10 5 - 45 U/L    ALT 12 12 - 78 U/L    Alkaline Phosphatase 41 (L) 46 - 116 U/L    Total Protein 5 6 (L) 6 4 - 8 2 g/dL    Albumin 2 0 (L) 3 5 - 5 0 g/dL    Total Bilirubin 0 40 0 20 - 1 00 mg/dL    eGFR 95 ml/min/1 73sq m   Magnesium    Collection Time: 02/01/21  6:02 AM   Result Value Ref Range    Magnesium 2 1 1 6 - 2 6 mg/dL   Phosphorus    Collection Time: 02/01/21  6:02 AM   Result Value Ref Range    Phosphorus 1 6 (L) 2 7 - 4 5 mg/dL   ECG 12 lead    Collection Time: 02/01/21  8:44 AM   Result Value Ref Range    Ventricular Rate 73 BPM    Atrial Rate 73 BPM    [place] Interval 138 ms    QRSD Interval 78 ms    QT Interval 418 ms    QTC Interval 460 ms    P Axis 44 degrees    QRS Axis 37 degrees    T Wave Axis 52 degrees   Fingerstick Glucose (POCT)    Collection Time: 02/01/21 12:08 PM   Result Value Ref Range    POC Glucose 134 65 - 140 mg/dl   Fingerstick Glucose (POCT)    Collection Time: 02/01/21  4:02 PM   Result Value Ref Range    POC Glucose 123 65 - 140 mg/dl   Phosphorus    Collection Time: 02/01/21  9:44 PM   Result Value Ref Range    Phosphorus 2 1 (L) 2 7 - 4 5 mg/dL   Potassium    Collection Time: 02/01/21  9:44 PM   Result Value Ref Range    Potassium 3 8 3 5 - 5 3 mmol/L   Fingerstick Glucose (POCT)    Collection Time: 02/01/21 11:58 PM   Result Value Ref Range    POC Glucose 122 65 - 140 mg/dl   Fingerstick Glucose (POCT)    Collection Time: 02/02/21  5:43 AM   Result Value Ref Range    POC Glucose 130 65 - 140 mg/dl   Basic metabolic panel    Collection Time: 02/02/21  5:48 AM   Result Value Ref Range    Sodium 149 (H) 136 - 145 mmol/L    Potassium 3 6 3 5 - 5 3 mmol/L    Chloride 112 (H) 100 - 108 mmol/L    CO2 26 21 - 32 mmol/L    ANION GAP 11 4 - 13 mmol/L    BUN 19 5 - 25 mg/dL    Creatinine 0 73 0 60 - 1 30 mg/dL    Glucose 138 65 - 140 mg/dL    Calcium 8 0 (L) 8 3 - 10 1 mg/dL    eGFR 98 ml/min/1 73sq m   CBC and differential    Collection Time: 02/02/21  5:48 AM   Result Value Ref Range    WBC 9 83 4 31 - 10 16 Thousand/uL    RBC 3 14 (L) 3 81 - 5 12 Million/uL    Hemoglobin 8 6 (L) 11 5 - 15 4 g/dL    Hematocrit 27 5 (L) 34 8 - 46 1 %    MCV 88 82 - 98 fL    MCH 27 4 26 8 - 34 3 pg    MCHC 31 3 (L) 31 4 - 37 4 g/dL    RDW 15 1 11 6 - 15 1 %    MPV 9 9 8 9 - 12 7 fL Platelets 586 (H) 731 - 390 Thousands/uL    nRBC 0 /100 WBCs   Manual Differential(PHLEBS Do Not Order)    Collection Time: 02/02/21  5:48 AM   Result Value Ref Range    Segmented % 85 (H) 43 - 75 %    Bands % 4 0 - 8 %    Lymphocytes % 7 (L) 14 - 44 %    Monocytes % 4 4 - 12 %    Eosinophils, % 0 0 - 6 %    Basophils % 0 0 - 1 %    Absolute Neutrophils 8 75 (H) 1 85 - 7 62 Thousand/uL    Lymphocytes Absolute 0 69 0 60 - 4 47 Thousand/uL    Monocytes Absolute 0 39 0 00 - 1 22 Thousand/uL    Eosinophils Absolute 0 00 0 00 - 0 40 Thousand/uL    Basophils Absolute 0 00 0 00 - 0 10 Thousand/uL    Total Counted 100     Platelet Estimate Increased (A) Adequate   ECG 12 lead    Collection Time: 02/02/21  6:48 AM   Result Value Ref Range    Ventricular Rate 61 BPM    Atrial Rate 61 BPM    [place] Interval 108 ms    QRSD Interval 82 ms    QT Interval 458 ms    QTC Interval 461 ms    P [place] 18 degrees    QRS Axis 36 degrees    T Wave Axis 48 degrees   Fingerstick Glucose (POCT)    Collection Time: 02/02/21 12:53 PM   Result Value Ref Range    POC Glucose 182 (H) 65 - 140 mg/dl   Fingerstick Glucose (POCT)    Collection Time: 02/02/21  4:41 PM   Result Value Ref Range    POC Glucose 175 (H) 65 - 140 mg/dl   Fingerstick Glucose (POCT)    Collection Time: 02/02/21  9:22 PM   Result Value Ref Range    POC Glucose 168 (H) 65 - 140 mg/dl   Fingerstick Glucose (POCT)    Collection Time: 02/02/21 11:28 PM   Result Value Ref Range    POC Glucose 155 (H) 65 - 140 mg/dl   Fingerstick Glucose (POCT)    Collection Time: 02/03/21  6:06 AM   Result Value Ref Range    POC Glucose 117 65 - 140 mg/dl   ECG 12 lead    Collection Time: 02/03/21  7:36 AM   Result Value Ref Range    Ventricular Rate 56 BPM    Atrial Rate 56 BPM    UT Interval 128 ms    QRSD Interval 82 ms    QT Interval 468 ms    QTC Interval 451 ms    P [place] 32 degrees    QRS Axis 46 degrees    T Wave Axis 58 degrees   Fingerstick Glucose (POCT)    Collection Time: 02/03/21 11:32 AM   Result Value Ref Range    POC Glucose 134 65 - 140 mg/dl   Fingerstick Glucose (POCT)    Collection Time: 02/03/21  4:06 PM   Result Value Ref Range    POC Glucose 138 65 - 140 mg/dl   CBC and differential    Collection Time: 02/03/21  4:25 PM   Result Value Ref Range    WBC 15 01 (H) 4 31 - 10 16 Thousand/uL    RBC 3 07 (L) 3 81 - 5 12 Million/uL    Hemoglobin 8 5 (L) 11 5 - 15 4 g/dL    Hematocrit 27 1 (L) 34 8 - 46 1 %    MCV 88 82 - 98 fL    MCH 27 7 26 8 - 34 3 pg    MCHC 31 4 31 4 - 37 4 g/dL    RDW 15 3 (H) 11 6 - 15 1 %    MPV 9 8 8 9 - 12 7 fL    Platelets 940 (H) 661 - 390 Thousands/uL    nRBC 0 /100 WBCs    Neutrophils Relative 89 (H) 43 - 75 %    Immat GRANS % 1 0 - 2 %    Lymphocytes Relative 6 (L) 14 - 44 %    Monocytes Relative 4 4 - 12 %    Eosinophils Relative 0 0 - 6 %    Basophils Relative 0 0 - 1 %    Neutrophils Absolute 13 26 (H) 1 85 - 7 62 Thousands/µL    Immature Grans Absolute 0 21 (H) 0 00 - 0 20 Thousand/uL    Lymphocytes Absolute 0 87 0 60 - 4 47 Thousands/µL    Monocytes Absolute 0 63 0 17 - 1 22 Thousand/µL    Eosinophils Absolute 0 02 0 00 - 0 61 Thousand/µL    Basophils Absolute 0 02 0 00 - 0 10 Thousands/µL   Basic metabolic panel    Collection Time: 02/03/21  4:25 PM   Result Value Ref Range    Sodium 145 136 - 145 mmol/L    Potassium 2 9 (L) 3 5 - 5 3 mmol/L    Chloride 109 (H) 100 - 108 mmol/L    CO2 28 21 - 32 mmol/L    ANION GAP 8 4 - 13 mmol/L    BUN 16 5 - 25 mg/dL    Creatinine 0 70 0 60 - 1 30 mg/dL    Glucose 140 65 - 140 mg/dL    Calcium 7 6 (L) 8 3 - 10 1 mg/dL    eGFR 103 ml/min/1 73sq m   Magnesium    Collection Time: 02/03/21  4:25 PM   Result Value Ref Range    Magnesium 1 7 1 6 - 2 6 mg/dL   Phosphorus    Collection Time: 02/03/21  4:25 PM   Result Value Ref Range    Phosphorus 1 7 (L) 2 7 - 4 5 mg/dL   Inflammatory bowel disease panel    Collection Time: 02/03/21  6:19 PM   Result Value Ref Range    ACCA 52 0 - 90 units    Nelli 28 0 - 50 units    ALCA 46 0 - 60 units    AMCA 72 0 - 100 units    Atypical pANCA Negative Negative    COMMENTS Comment    Fingerstick Glucose (POCT)    Collection Time: 02/04/21 12:04 AM   Result Value Ref Range    POC Glucose 117 65 - 140 mg/dl   Comprehensive metabolic panel    Collection Time: 02/04/21  5:56 AM   Result Value Ref Range    Sodium 145 136 - 145 mmol/L    Potassium 2 8 (L) 3 5 - 5 3 mmol/L    Chloride 109 (H) 100 - 108 mmol/L    CO2 27 21 - 32 mmol/L    ANION GAP 9 4 - 13 mmol/L    BUN 15 5 - 25 mg/dL    Creatinine 0 63 0 60 - 1 30 mg/dL    Glucose 116 65 - 140 mg/dL    Calcium 7 4 (L) 8 3 - 10 1 mg/dL    Corrected Calcium 9 1 8 3 - 10 1 mg/dL    AST 14 5 - 45 U/L    ALT 13 12 - 78 U/L    Alkaline Phosphatase 63 46 - 116 U/L    Total Protein 5 2 (L) 6 4 - 8 2 g/dL    Albumin 1 9 (L) 3 5 - 5 0 g/dL    Total Bilirubin 0 20 0 20 - 1 00 mg/dL    eGFR 106 ml/min/1 73sq m   Lipid panel    Collection Time: 02/04/21  5:56 AM   Result Value Ref Range    Cholesterol 93 50 - 200 mg/dL    Triglycerides 185 (H) <=150 mg/dL    HDL, Direct 8 (L) >=40 mg/dL    LDL Calculated 48 0 - 100 mg/dL    Non-HDL-Chol (CHOL-HDL) 85 mg/dl   Magnesium    Collection Time: 02/04/21  5:56 AM   Result Value Ref Range    Magnesium 1 7 1 6 - 2 6 mg/dL   Phosphorus    Collection Time: 02/04/21  5:56 AM   Result Value Ref Range    Phosphorus 1 5 (L) 2 7 - 4 5 mg/dL   Fingerstick Glucose (POCT)    Collection Time: 02/04/21  6:12 AM   Result Value Ref Range    POC Glucose 108 65 - 140 mg/dl   ECG 12 lead    Collection Time: 02/04/21  8:03 AM   Result Value Ref Range    Ventricular Rate 63 BPM    Atrial Rate 63 BPM    [place] Interval 134 ms    QRSD Interval 86 ms    QT Interval 372 ms    QTC Interval 380 ms    P Axis 36 degrees    QRS Axis 48 degrees    T Wave Axis 64 degrees   Fingerstick Glucose (POCT)    Collection Time: 02/04/21 11:55 AM   Result Value Ref Range    POC Glucose 121 65 - 140 mg/dl   Potassium    Collection Time: 02/04/21 12:18 PM   Result Value Ref Range    Potassium 3 0 (L) 3 5 - 5 3 mmol/L   Phosphorus    Collection Time: 02/04/21 12:18 PM   Result Value Ref Range    Phosphorus 1 6 (L) 2 7 - 4 5 mg/dL   CBC and differential    Collection Time: 02/04/21 12:18 PM   Result Value Ref Range    WBC 16 11 (H) 4 31 - 10 16 Thousand/uL    RBC 3 52 (L) 3 81 - 5 12 Million/uL    Hemoglobin 9 6 (L) 11 5 - 15 4 g/dL    Hematocrit 30 6 (L) 34 8 - 46 1 %    MCV 87 82 - 98 fL    MCH 27 3 26 8 - 34 3 pg    MCHC 31 4 31 4 - 37 4 g/dL    RDW 15 7 (H) 11 6 - 15 1 %    MPV 9 9 8 9 - 12 7 fL    Platelets 325 (H) 640 - 390 Thousands/uL    nRBC 0 /100 WBCs    Neutrophils Relative 86 (H) 43 - 75 %    Immat GRANS % 2 0 - 2 %    Lymphocytes Relative 8 (L) 14 - 44 %    Monocytes Relative 4 4 - 12 %    Eosinophils Relative 0 0 - 6 %    Basophils Relative 0 0 - 1 %    Neutrophils Absolute 13 85 (H) 1 85 - 7 62 Thousands/µL    Immature Grans Absolute 0 29 (H) 0 00 - 0 20 Thousand/uL    Lymphocytes Absolute 1 31 0 60 - 4 47 Thousands/µL    Monocytes Absolute 0 59 0 17 - 1 22 Thousand/µL    Eosinophils Absolute 0 05 0 00 - 0 61 Thousand/µL    Basophils Absolute 0 02 0 00 - 0 10 Thousands/µL   Fingerstick Glucose (POCT)    Collection Time: 02/04/21  3:31 PM   Result Value Ref Range    POC Glucose 123 65 - 140 mg/dl   Fingerstick Glucose (POCT)    Collection Time: 02/04/21  9:20 PM   Result Value Ref Range    POC Glucose 119 65 - 140 mg/dl   Fingerstick Glucose (POCT)    Collection Time: 02/05/21 12:00 AM   Result Value Ref Range    POC Glucose 108 65 - 140 mg/dl   CBC and differential    Collection Time: 02/05/21  6:10 AM   Result Value Ref Range    WBC 12 48 (H) 4 31 - 10 16 Thousand/uL    RBC 2 97 (L) 3 81 - 5 12 Million/uL    Hemoglobin 8 2 (L) 11 5 - 15 4 g/dL    Hematocrit 25 9 (L) 34 8 - 46 1 %    MCV 87 82 - 98 fL    MCH 27 6 26 8 - 34 3 pg    MCHC 31 7 31 4 - 37 4 g/dL    RDW 15 9 (H) 11 6 - 15 1 %    MPV 9 8 8 9 - 12 7 fL    Platelets 652 (H) 061 - 390 Thousands/uL    nRBC 0 /100 WBCs    Neutrophils Relative 82 (H) 43 - 75 %    Immat GRANS % 2 0 - 2 %    Lymphocytes Relative 10 (L) 14 - 44 %    Monocytes Relative 5 4 - 12 %    Eosinophils Relative 1 0 - 6 %    Basophils Relative 0 0 - 1 %    Neutrophils Absolute 10 26 (H) 1 85 - 7 62 Thousands/µL    Immature Grans Absolute 0 25 (H) 0 00 - 0 20 Thousand/uL    Lymphocytes Absolute 1 24 0 60 - 4 47 Thousands/µL    Monocytes Absolute 0 63 0 17 - 1 22 Thousand/µL    Eosinophils Absolute 0 08 0 00 - 0 61 Thousand/µL    Basophils Absolute 0 02 0 00 - 0 10 Thousands/µL   Comprehensive metabolic panel    Collection Time: 02/05/21  6:10 AM   Result Value Ref Range    Sodium 142 136 - 145 mmol/L    Potassium 2 6 (LL) 3 5 - 5 3 mmol/L    Chloride 107 100 - 108 mmol/L    CO2 27 21 - 32 mmol/L    ANION GAP 8 4 - 13 mmol/L    BUN 7 5 - 25 mg/dL    Creatinine 0 60 0 60 - 1 30 mg/dL    Glucose 108 65 - 140 mg/dL    Calcium 7 0 (L) 8 3 - 10 1 mg/dL    Corrected Calcium 8 8 8 3 - 10 1 mg/dL    AST 14 5 - 45 U/L    ALT 10 (L) 12 - 78 U/L    Alkaline Phosphatase 63 46 - 116 U/L    Total Protein 4 8 (L) 6 4 - 8 2 g/dL    Albumin 1 7 (L) 3 5 - 5 0 g/dL    Total Bilirubin 0 20 0 20 - 1 00 mg/dL    eGFR 108 ml/min/1 73sq m   Phosphorus    Collection Time: 02/05/21  6:10 AM   Result Value Ref Range    Phosphorus 1 8 (L) 2 7 - 4 5 mg/dL   Magnesium    Collection Time: 02/05/21  6:10 AM   Result Value Ref Range    Magnesium 1 6 1 6 - 2 6 mg/dL   Fingerstick Glucose (POCT)    Collection Time: 02/05/21  6:21 AM   Result Value Ref Range    POC Glucose 107 65 - 140 mg/dl   ECG 12 lead    Collection Time: 02/05/21  7:37 AM   Result Value Ref Range    Ventricular Rate 69 BPM    Atrial Rate 69 BPM    NJ Interval 132 ms    QRSD Interval 84 ms    QT Interval 478 ms    QTC Interval 512 ms    P [place] 35 degrees    QRS Axis 34 degrees    T Wave Axis 72 degrees   Calcium, ionized    Collection Time: 02/05/21  9:29 AM   Result Value Ref Range    Calcium, Ionized 0 98 (L) 1 12 - 1 32 mmol/L   Fingerstick Glucose (POCT)    Collection Time: 02/05/21 12:59 PM   Result Value Ref Range    POC Glucose 119 65 - 140 mg/dl   Fingerstick Glucose (POCT)    Collection Time: 02/05/21  6:43 PM   Result Value Ref Range    POC Glucose 124 65 - 140 mg/dl   Fingerstick Glucose (POCT)    Collection Time: 02/05/21  9:20 PM   Result Value Ref Range    POC Glucose 114 65 - 140 mg/dl   Fingerstick Glucose (POCT)    Collection Time: 02/05/21 11:47 PM   Result Value Ref Range    POC Glucose 112 65 - 140 mg/dl   Basic metabolic panel    Collection Time: 02/06/21  5:12 AM   Result Value Ref Range    Sodium 142 136 - 145 mmol/L    Potassium 3 0 (L) 3 5 - 5 3 mmol/L    Chloride 108 100 - 108 mmol/L    CO2 27 21 - 32 mmol/L    ANION GAP 7 4 - 13 mmol/L    BUN 7 5 - 25 mg/dL    Creatinine 0 62 0 60 - 1 30 mg/dL    Glucose 101 65 - 140 mg/dL    Calcium 7 6 (L) 8 3 - 10 1 mg/dL    eGFR 107 ml/min/1 73sq m   Magnesium    Collection Time: 02/06/21  5:12 AM   Result Value Ref Range    Magnesium 1 9 1 6 - 2 6 mg/dL   Phosphorus    Collection Time: 02/06/21  5:12 AM   Result Value Ref Range    Phosphorus 2 1 (L) 2 7 - 4 5 mg/dL   CBC and differential    Collection Time: 02/06/21  5:12 AM   Result Value Ref Range    WBC 12 36 (H) 4 31 - 10 16 Thousand/uL    RBC 3 05 (L) 3 81 - 5 12 Million/uL    Hemoglobin 8 5 (L) 11 5 - 15 4 g/dL    Hematocrit 26 7 (L) 34 8 - 46 1 %    MCV 88 82 - 98 fL    MCH 27 9 26 8 - 34 3 pg    MCHC 31 8 31 4 - 37 4 g/dL    RDW 16 8 (H) 11 6 - 15 1 %    MPV 10 2 8 9 - 12 7 fL    Platelets 173 (H) 538 - 390 Thousands/uL    nRBC 0 /100 WBCs   Manual Differential(PHLEBS Do Not Order)    Collection Time: 02/06/21  5:12 AM   Result Value Ref Range    Segmented % 80 (H) 43 - 75 %    Bands % 3 0 - 8 %    Lymphocytes % 9 (L) 14 - 44 %    Monocytes % 4 4 - 12 %    Eosinophils, % 2 0 - 6 %    Basophils % 0 0 - 1 %    Metamyelocytes% 2 (H) 0 - 1 %    Absolute Neutrophils 10 26 (H) 1 85 - 7 62 Thousand/uL    Lymphocytes Absolute 1 11 0 60 - 4 47 Thousand/uL    Monocytes Absolute 0 49 0 00 - 1 22 Thousand/uL    Eosinophils Absolute 0 25 0 00 - 0 40 Thousand/uL    Basophils Absolute 0 00 0 00 - 0 10 Thousand/uL    Total Counted 100     Platelet Estimate Increased (A) Adequate   Fingerstick Glucose (POCT)    Collection Time: 02/06/21  6:43 AM   Result Value Ref Range    POC Glucose 106 65 - 140 mg/dl   Fingerstick Glucose (POCT)    Collection Time: 02/06/21  7:20 AM   Result Value Ref Range    POC Glucose 102 65 - 140 mg/dl   Fingerstick Glucose (POCT)    Collection Time: 02/06/21 11:09 AM   Result Value Ref Range    POC Glucose 100 65 - 140 mg/dl     *Note: Due to a large number of results and/or encounters for the requested time period, some results have not been displayed  A complete set of results can be found in Results Review  Physical Exam  Constitutional:       Appearance: Normal appearance  HENT:      Head: Normocephalic  Right Ear: Tympanic membrane, ear canal and external ear normal       Left Ear: Tympanic membrane, ear canal and external ear normal       Nose: Nose normal  No congestion  Mouth/Throat:      Mouth: Mucous membranes are moist       Pharynx: Oropharynx is clear  No oropharyngeal exudate or posterior oropharyngeal erythema  Eyes:      Extraocular Movements: Extraocular movements intact  Conjunctiva/sclera: Conjunctivae normal       Pupils: Pupils are equal, round, and reactive to light  Neck:      Musculoskeletal: Normal range of motion and neck supple  Cardiovascular:      Rate and Rhythm: Normal rate and regular rhythm  Heart sounds: Normal heart sounds  No murmur  Pulmonary:      Effort: Pulmonary effort is normal       Breath sounds: Normal breath sounds  No wheezing or rales  Abdominal:      General: Bowel sounds are normal  There is no distension  Palpations: Abdomen is soft  Tenderness: There is no abdominal tenderness        Comments: Colostomy bag in place, Wound looks healing well   Musculoskeletal: Normal range of motion  Right lower leg: No edema  Left lower leg: No edema  Lymphadenopathy:      Cervical: No cervical adenopathy  Skin:     General: Skin is warm  Neurological:      General: No focal deficit present  Mental Status: She is alert and oriented to person, place, and time

## 2021-04-06 ENCOUNTER — ANESTHESIA EVENT (OUTPATIENT)
Dept: GASTROENTEROLOGY | Facility: HOSPITAL | Age: 51
End: 2021-04-06

## 2021-04-06 ENCOUNTER — ANESTHESIA (OUTPATIENT)
Dept: GASTROENTEROLOGY | Facility: HOSPITAL | Age: 51
End: 2021-04-06

## 2021-04-06 ENCOUNTER — HOSPITAL ENCOUNTER (OUTPATIENT)
Dept: GASTROENTEROLOGY | Facility: HOSPITAL | Age: 51
Setting detail: OUTPATIENT SURGERY
Discharge: HOME | End: 2021-04-06
Attending: INTERNAL MEDICINE | Admitting: INTERNAL MEDICINE
Payer: COMMERCIAL

## 2021-04-06 VITALS
DIASTOLIC BLOOD PRESSURE: 78 MMHG | SYSTOLIC BLOOD PRESSURE: 124 MMHG | HEIGHT: 64 IN | TEMPERATURE: 97.7 F | BODY MASS INDEX: 34.93 KG/M2 | RESPIRATION RATE: 17 BRPM | OXYGEN SATURATION: 100 % | HEART RATE: 72 BPM | WEIGHT: 204.59 LBS

## 2021-04-06 DIAGNOSIS — Z93.3 COLOSTOMY IN PLACE (HCC): ICD-10-CM

## 2021-04-06 DIAGNOSIS — K57.20 PERFORATION OF SIGMOID COLON DUE TO DIVERTICULITIS: ICD-10-CM

## 2021-04-06 LAB
EXT PREGNANCY TEST URINE: NEGATIVE
EXT. CONTROL: NORMAL

## 2021-04-06 PROCEDURE — 44389 COLONOSCOPY WITH BIOPSY: CPT | Performed by: INTERNAL MEDICINE

## 2021-04-06 PROCEDURE — 45330 DIAGNOSTIC SIGMOIDOSCOPY: CPT | Performed by: INTERNAL MEDICINE

## 2021-04-06 PROCEDURE — 88305 TISSUE EXAM BY PATHOLOGIST: CPT | Performed by: PATHOLOGY

## 2021-04-06 PROCEDURE — 81025 URINE PREGNANCY TEST: CPT | Performed by: ANESTHESIOLOGY

## 2021-04-06 RX ORDER — PROPOFOL 10 MG/ML
INJECTION, EMULSION INTRAVENOUS AS NEEDED
Status: DISCONTINUED | OUTPATIENT
Start: 2021-04-06 | End: 2021-04-06

## 2021-04-06 RX ORDER — SODIUM CHLORIDE, SODIUM LACTATE, POTASSIUM CHLORIDE, CALCIUM CHLORIDE 600; 310; 30; 20 MG/100ML; MG/100ML; MG/100ML; MG/100ML
125 INJECTION, SOLUTION INTRAVENOUS CONTINUOUS
Status: DISCONTINUED | OUTPATIENT
Start: 2021-04-06 | End: 2021-04-10 | Stop reason: HOSPADM

## 2021-04-06 RX ORDER — LIDOCAINE HYDROCHLORIDE 10 MG/ML
INJECTION, SOLUTION EPIDURAL; INFILTRATION; INTRACAUDAL; PERINEURAL AS NEEDED
Status: DISCONTINUED | OUTPATIENT
Start: 2021-04-06 | End: 2021-04-06

## 2021-04-06 RX ADMIN — LIDOCAINE HYDROCHLORIDE 50 MG: 10 INJECTION, SOLUTION EPIDURAL; INFILTRATION; INTRACAUDAL; PERINEURAL at 09:17

## 2021-04-06 RX ADMIN — PROPOFOL 100 MG: 10 INJECTION, EMULSION INTRAVENOUS at 09:17

## 2021-04-06 RX ADMIN — PROPOFOL 50 MG: 10 INJECTION, EMULSION INTRAVENOUS at 09:26

## 2021-04-06 RX ADMIN — PROPOFOL 50 MG: 10 INJECTION, EMULSION INTRAVENOUS at 09:20
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
## 2021-04-06 NOTE — ANESTHESIA PREPROCEDURE EVALUATION
Procedure:  COLONOSCOPY    Relevant Problems   ENDO   (+) Hypothyroid      /RENAL   (+) Acute renal failure (ARF) (HCC)      MUSCULOSKELETAL   (+) Bilateral low back pain with sciatica   (+) Primary osteoarthritis of both knees      PULMONARY   (+) Mild persistent asthma        Physical Exam    Airway    Mallampati score: II  TM Distance: >3 FB  Neck ROM: full     Dental       Cardiovascular  Rhythm: regular, Rate: normal, Cardiovascular exam normal    Pulmonary  Pulmonary exam normal Breath sounds clear to auscultation,     Other Findings  Poor condition, cracks, chipped in front incisors      Anesthesia Plan  ASA Score- 2     Anesthesia Type- IV sedation with anesthesia with ASA Monitors  Additional Monitors:   Airway Plan:           Plan Factors-Exercise tolerance (METS): >4 METS  Chart reviewed  Patient is not a current smoker  Patient instructed to abstain from smoking on day of procedure  Patient did not smoke on day of surgery  There is medical exclusion for perioperative obstructive sleep apnea risk education  Induction- intravenous  Postoperative Plan-     Informed Consent- Anesthetic plan and risks discussed with patient  I personally reviewed this patient with the CRNA  Discussed and agreed on the Anesthesia Plan with the CRNA  Nette Cooper

## 2021-04-06 NOTE — ANESTHESIA POSTPROCEDURE EVALUATION
Post-Op Assessment Note    CV Status:  Stable  Pain Score: 0    Pain management: adequate     Mental Status:  Alert and awake   Hydration Status:  Stable   PONV Controlled:  Controlled   Airway Patency:  Patent and adequate   Two or more mitigation strategies used for obstructive sleep apnea   Post Op Vitals Reviewed: Yes      Staff: CRNA         No complications documented      BP      Temp 97 7 °F (36 5 °C) (04/06/21 0932)    Pulse 79 (04/06/21 0932)   Resp 18 (04/06/21 0932)    SpO2 100 % (04/06/21 0932)

## 2021-04-08 ENCOUNTER — OFFICE VISIT (OUTPATIENT)
Dept: SURGERY | Facility: CLINIC | Age: 51
End: 2021-04-08

## 2021-04-08 VITALS
WEIGHT: 208.6 LBS | SYSTOLIC BLOOD PRESSURE: 102 MMHG | HEIGHT: 64 IN | BODY MASS INDEX: 35.61 KG/M2 | DIASTOLIC BLOOD PRESSURE: 68 MMHG | HEART RATE: 92 BPM | TEMPERATURE: 97.5 F

## 2021-04-08 DIAGNOSIS — Z93.3 COLOSTOMY IN PLACE (HCC): Primary | ICD-10-CM

## 2021-04-08 PROCEDURE — 99024 POSTOP FOLLOW-UP VISIT: CPT | Performed by: STUDENT IN AN ORGANIZED HEALTH CARE EDUCATION/TRAINING PROGRAM

## 2021-04-08 NOTE — PROGRESS NOTES
Assessment/Plan:   53-year-old female status post exploratory laparotomy and sigmoid colon resection on 01/26,  Abdominal washout and creation of transverse colon colostomy 1/27, abdominal washout and closure of abdomen on 01/29  -  A patient midline incision has healed well, 1 small area in the fair portion of the wound still remains but is closing nicely  - patient denies any abdominal pain  - she has been tolerating a diet  - colonoscopy and GI evaluation noted, will await biopsies, colonoscopy consistent with ulcerative colitis  - operative pathology after exploratory laparotomy was consistent with inflammatory bowel disease  - discussed with patient that if she does have inflammatory bowel disease this would need to be under control prior to any consideration of  Operative intervention and reversal of ostomy  - follow-up in office in 1 month  - at this time I am okay with patient returning to work with no restrictions       1  Colostomy in place [facility])               Subjective: abdomen wound vac     Patient ID: Bijal Renteria is a 52 y o  female  Triage Notes:     Patient is a 53-year-old female who presents to office for evaluation status post exploratory laparotomy and creation of ostomy  Patient denies any abdominal pain  She states she is healing well  Her ostomy is functioning well  She is tolerating a diet      The following portions of the patient's history were reviewed and updated as appropriate: allergies, current medications, past family history, past medical history, past social history, past surgical history and problem list     Review of Systems   Constitutional: Negative for chills, fatigue and fever  HENT: Negative for congestion, hearing loss, rhinorrhea and sore throat  Eyes: Negative for pain and discharge  Respiratory: Negative for cough, chest tightness and shortness of breath  Cardiovascular: Negative for chest pain and palpitations     Gastrointestinal: Negative for abdominal pain, constipation, diarrhea, nausea and vomiting  Endocrine: Negative for cold intolerance and heat intolerance  Genitourinary: Negative for difficulty urinating and dysuria  Musculoskeletal: Negative for back pain and neck pain  Skin: Negative for color change and rash  Allergic/Immunologic: Negative for environmental allergies and food allergies  Neurological: Negative for seizures and headaches  Hematological: Negative for adenopathy  Does not bruise/bleed easily  Psychiatric/Behavioral: Negative for confusion and hallucinations  Objective:      /68   Pulse 92   Temp 97 5 °F (36 4 °C) (Temporal)   Ht 5' 4" (1 626 m)   Wt 94 6 kg (208 lb 9 6 oz)   Breastfeeding No   BMI 35 81 kg/m²     Below is the patient's most recent value for Albumin, ALT, AST, BUN, Calcium, Chloride, Cholesterol, CO2, Creatinine, GFR, Glucose, HDL, Hematocrit, Hemoglobin, Hemoglobin A1C, LDL, Magnesium, Phosphorus, Platelets, Potassium, PSA, Sodium, Triglycerides, and WBC  Lab Results   Component Value Date    ALT 13 02/09/2021    AST 13 02/09/2021    BUN 8 02/10/2021    CALCIUM 8 8 02/10/2021     02/10/2021    CO2 27 02/10/2021    CREATININE 0 71 02/10/2021    HDL 8 (L) 02/04/2021    HCT 27 8 (L) 02/10/2021    HGB 8 6 (L) 02/10/2021    HGBA1C 6 0 (H) 01/27/2021    MG 1 7 02/09/2021    PHOS 2 8 02/09/2021     (H) 02/10/2021    K 4 1 02/10/2021    TRIG 185 (H) 02/04/2021    WBC 9 17 02/10/2021     Note: for a comprehensive list of the patient's lab results, access the Results Review activity  Physical Exam  Constitutional:       Appearance: Normal appearance  HENT:      Head: Normocephalic and atraumatic  Nose: Nose normal    Eyes:      General: No scleral icterus  Conjunctiva/sclera: Conjunctivae normal    Neck:      Musculoskeletal: Neck supple  Cardiovascular:      Rate and Rhythm: Normal rate and regular rhythm  Pulses: Normal pulses        Heart sounds: Normal heart sounds  Pulmonary:      Effort: Pulmonary effort is normal       Breath sounds: Normal breath sounds  Abdominal:      General: There is no distension  Palpations: Abdomen is soft  Tenderness: There is no abdominal tenderness  Comments: Midline incision healing well, small opening area at the inferior portion of the wound which is closing, ostomy is pink patent and productive, ostomy still shows signs of bowel inflammation   Musculoskeletal:         General: No signs of injury  Skin:     General: Skin is warm  Coloration: Skin is not jaundiced  Neurological:      General: No focal deficit present  Mental Status: She is alert and oriented to person, place, and time     Psychiatric:         Mood and Affect: Mood normal          Behavior: Behavior normal

## 2021-04-09 ENCOUNTER — TELEPHONE (OUTPATIENT)
Dept: GASTROENTEROLOGY | Facility: CLINIC | Age: 51
End: 2021-04-09

## 2021-04-09 ENCOUNTER — TREATMENT (OUTPATIENT)
Dept: GASTROENTEROLOGY | Facility: CLINIC | Age: 51
End: 2021-04-09

## 2021-04-09 DIAGNOSIS — K51.00 ULCERATIVE PANCOLITIS WITHOUT COMPLICATION (HCC): Primary | ICD-10-CM

## 2021-04-09 RX ORDER — MESALAMINE 1.2 G/1
1200 TABLET, DELAYED RELEASE ORAL 4 TIMES DAILY
Qty: 124 TABLET | Refills: 2 | Status: SHIPPED | OUTPATIENT
Start: 2021-04-09 | End: 2021-05-03

## 2021-04-09 RX ORDER — PREDNISONE 10 MG/1
TABLET ORAL
Qty: 70 TABLET | Refills: 0 | Status: SHIPPED | OUTPATIENT
Start: 2021-04-09 | End: 2021-05-07

## 2021-04-09 NOTE — TELEPHONE ENCOUNTER
Marlys patient - LM that Dr Gem Phipps would like to see patient in around 6 weeks and to return call to [phone]  Option 1

## 2021-04-12 ENCOUNTER — TELEPHONE (OUTPATIENT)
Dept: GASTROENTEROLOGY | Facility: CLINIC | Age: 51
End: 2021-04-12

## 2021-04-12 NOTE — TELEPHONE ENCOUNTER
Marlys patient - mesalamine (LIALDA) 1 2 g EC tablet , patient did get this medication but it is over $100 and cannot afford it  Is there another med that can be substituted for this medication  Please call the patient at [phone] to let her know if there is and to confirm the pharmacy to send the new script to    Paul

## 2021-04-22 ENCOUNTER — TELEPHONE (OUTPATIENT)
Dept: GASTROENTEROLOGY | Facility: CLINIC | Age: 51
End: 2021-04-22

## 2021-05-03 ENCOUNTER — OFFICE VISIT (OUTPATIENT)
Dept: SURGERY | Facility: CLINIC | Age: 51
End: 2021-05-03

## 2021-05-03 ENCOUNTER — TELEPHONE (OUTPATIENT)
Dept: GASTROENTEROLOGY | Facility: CLINIC | Age: 51
End: 2021-05-03

## 2021-05-03 VITALS
HEIGHT: 64 IN | DIASTOLIC BLOOD PRESSURE: 68 MMHG | BODY MASS INDEX: 36.95 KG/M2 | HEART RATE: 90 BPM | WEIGHT: 216.4 LBS | TEMPERATURE: 97.5 F | SYSTOLIC BLOOD PRESSURE: 128 MMHG

## 2021-05-03 DIAGNOSIS — K51.018 ULCERATIVE PANCOLITIS WITH OTHER COMPLICATION (HCC): ICD-10-CM

## 2021-05-03 DIAGNOSIS — Z93.3 COLOSTOMY IN PLACE (HCC): Primary | ICD-10-CM

## 2021-05-03 DIAGNOSIS — K51.00 ULCERATIVE PANCOLITIS WITHOUT COMPLICATION (HCC): Primary | ICD-10-CM

## 2021-05-03 DIAGNOSIS — E03.9 ACQUIRED HYPOTHYROIDISM: ICD-10-CM

## 2021-05-03 PROBLEM — K51.90 ULCERATIVE COLITIS (HCC): Status: ACTIVE | Noted: 2021-05-03

## 2021-05-03 PROCEDURE — 99024 POSTOP FOLLOW-UP VISIT: CPT | Performed by: STUDENT IN AN ORGANIZED HEALTH CARE EDUCATION/TRAINING PROGRAM

## 2021-05-03 PROCEDURE — 3008F BODY MASS INDEX DOCD: CPT | Performed by: INTERNAL MEDICINE

## 2021-05-03 RX ORDER — MESALAMINE 375 MG/1
1500 CAPSULE, EXTENDED RELEASE ORAL DAILY
Qty: 120 CAPSULE | Refills: 1 | Status: SHIPPED | OUTPATIENT
Start: 2021-05-03 | End: 2021-05-05

## 2021-05-03 RX ORDER — LEVOTHYROXINE SODIUM 0.07 MG/1
75 TABLET ORAL DAILY
Qty: 90 TABLET | Refills: 1 | Status: SHIPPED | OUTPATIENT
Start: 2021-05-03 | End: 2021-10-30

## 2021-05-03 NOTE — TELEPHONE ENCOUNTER
David Peterson - patient called the medication is too expensive it is $250  Is there an alternative   Please call Shazia Skaggs at [phone] ty

## 2021-05-03 NOTE — TELEPHONE ENCOUNTER
Pt came to the office and advised the mesalamine Lialda came out in the bag in whole  As per John Xiong  Have pt stop taking the Lialda and start taking Apriso and have pt swallow capsule  If the capsule comes out in the bag have pt break the capsule and take the beads  She can swallow the bead or mix it with pudding  Pt was informed of the above

## 2021-05-03 NOTE — PROGRESS NOTES
Assessment/Plan:  73-year-old female status post exploratory laparotomy and sigmoid colon resection on 01/26,  Abdominal washout and creation of transverse colon colostomy 1/27, abdominal washout and closure of abdomen on 01/29  -  Patient denies any abdominal pain  - she has been started on a regimen for ulcerative colitis per GI  - continue local wound care to small sinus tract with packing and 4 x 4 daily  - ostomy has become less red and continues to function well  -  Discussed with patient to encourage healthy weight loss to aid in decreasing surgical risk,  Patient voiced understanding  - follow-up in office in 1 month     1  Colostomy in place [facility])    2  Ulcerative pancolitis with other complication ([facility] [address] )               Subjective: Abdomen wound vac     Patient ID: Guillermina Andrea is a 52 y o  female  Triage Notes:     Patient is a 73-year-old female who presents to office for evaluation status post exploratory laparotomy and sigmoid colon resection with creation of ostomy  He has been tolerating a diet well  She has recently started on medications per GI for ulcerative colitis  She states 1 of the pills most times comes out a hole through her colostomy bag  She is otherwise feeling well and recovering well  The following portions of the patient's history were reviewed and updated as appropriate: allergies, current medications, past family history, past medical history, past social history, past surgical history and problem list     Review of Systems   Constitutional: Negative for chills, fatigue and fever  HENT: Negative for congestion, hearing loss, rhinorrhea and sore throat  Eyes: Negative for pain and discharge  Respiratory: Negative for cough, chest tightness and shortness of breath  Cardiovascular: Negative for chest pain and palpitations  Gastrointestinal: Negative for abdominal pain, constipation, diarrhea, nausea and vomiting     Endocrine: Negative for cold intolerance and heat intolerance  Genitourinary: Negative for difficulty urinating and dysuria  Musculoskeletal: Negative for back pain and neck pain  Skin: Negative for color change and rash  Allergic/Immunologic: Negative for environmental allergies and food allergies  Neurological: Negative for seizures and headaches  Hematological: Negative for adenopathy  Does not bruise/bleed easily  Psychiatric/Behavioral: Negative for confusion and hallucinations  Objective:      /68   Pulse 90   Temp 97 5 °F (36 4 °C) (Temporal)   Ht 5' 4" (1 626 m)   Wt 98 2 kg (216 lb 6 4 oz)   Breastfeeding No   BMI 37 14 kg/m²     Below is the patient's most recent value for Albumin, ALT, AST, BUN, Calcium, Chloride, Cholesterol, CO2, Creatinine, GFR, Glucose, HDL, Hematocrit, Hemoglobin, Hemoglobin A1C, LDL, Magnesium, Phosphorus, Platelets, Potassium, PSA, Sodium, Triglycerides, and WBC  Lab Results   Component Value Date    ALT 13 02/09/2021    AST 13 02/09/2021    BUN 8 02/10/2021    CALCIUM 8 8 02/10/2021     02/10/2021    CO2 27 02/10/2021    CREATININE 0 71 02/10/2021    HDL 8 (L) 02/04/2021    HCT 27 8 (L) 02/10/2021    HGB 8 6 (L) 02/10/2021    HGBA1C 6 0 (H) 01/27/2021    MG 1 7 02/09/2021    PHOS 2 8 02/09/2021     (H) 02/10/2021    K 4 1 02/10/2021    TRIG 185 (H) 02/04/2021    WBC 9 17 02/10/2021     Note: for a comprehensive list of the patient's lab results, access the Results Review activity  Physical Exam  Constitutional:       Appearance: Normal appearance  HENT:      Head: Normocephalic and atraumatic  Nose: Nose normal    Eyes:      General: No scleral icterus  Conjunctiva/sclera: Conjunctivae normal    Neck:      Musculoskeletal: Neck supple  Cardiovascular:      Rate and Rhythm: Normal rate and regular rhythm  Pulses: Normal pulses  Heart sounds: Normal heart sounds     Pulmonary:      Effort: Pulmonary effort is normal       Breath sounds: Normal breath sounds  Abdominal:      General: There is no distension  Palpations: Abdomen is soft  Tenderness: There is no abdominal tenderness  Comments: Midline incision is well healed, small sinus in inferior portion of wound with serous drainage, no signs of infection, ostomy pink patent and productive   Musculoskeletal:         General: No signs of injury  Skin:     General: Skin is warm  Coloration: Skin is not jaundiced  Neurological:      General: No focal deficit present  Mental Status: She is alert and oriented to person, place, and time     Psychiatric:         Mood and Affect: Mood normal          Behavior: Behavior normal

## 2021-05-05 DIAGNOSIS — K51.00 ULCERATIVE PANCOLITIS WITHOUT COMPLICATION (HCC): Primary | ICD-10-CM

## 2021-05-05 RX ORDER — MESALAMINE 800 MG/1
800 TABLET, DELAYED RELEASE ORAL 4 TIMES DAILY
Qty: 120 TABLET | Refills: 3 | Status: SHIPPED | OUTPATIENT
Start: 2021-05-05 | End: 2021-09-01 | Stop reason: SDUPTHER

## 2021-05-05 NOTE — TELEPHONE ENCOUNTER
Don Betts - patient called wanted to speak with someone has a questions about medication   Please call Soledad Correia at [phone] ty

## 2021-05-05 NOTE — TELEPHONE ENCOUNTER
Spoke with patient  Will send mesalamine 800 mg 4 times a day to the pharmacy    Encouraged patient to use good Rx

## 2021-05-13 DIAGNOSIS — L98.9 SKIN DISORDER: Primary | ICD-10-CM

## 2021-05-14 ENCOUNTER — TELEPHONE (OUTPATIENT)
Dept: INTERNAL MEDICINE CLINIC | Facility: CLINIC | Age: 51
End: 2021-05-14

## 2021-05-14 DIAGNOSIS — T14.8XXA WOUND INFECTION: Primary | ICD-10-CM

## 2021-05-14 DIAGNOSIS — L08.9 WOUND INFECTION: Primary | ICD-10-CM

## 2021-05-14 RX ORDER — CLINDAMYCIN HYDROCHLORIDE 300 MG/1
300 CAPSULE ORAL 3 TIMES DAILY
Qty: 21 CAPSULE | Refills: 0 | Status: SHIPPED | OUTPATIENT
Start: 2021-05-14 | End: 2021-05-21

## 2021-05-19 ENCOUNTER — OFFICE VISIT (OUTPATIENT)
Dept: GASTROENTEROLOGY | Facility: CLINIC | Age: 51
End: 2021-05-19
Payer: COMMERCIAL

## 2021-05-19 ENCOUNTER — APPOINTMENT (OUTPATIENT)
Dept: LAB | Facility: HOSPITAL | Age: 51
End: 2021-05-19
Attending: INTERNAL MEDICINE
Payer: COMMERCIAL

## 2021-05-19 VITALS
WEIGHT: 218.4 LBS | SYSTOLIC BLOOD PRESSURE: 124 MMHG | BODY MASS INDEX: 37.28 KG/M2 | HEART RATE: 81 BPM | DIASTOLIC BLOOD PRESSURE: 74 MMHG | HEIGHT: 64 IN

## 2021-05-19 DIAGNOSIS — K57.90 DIVERTICULAR DISEASE: ICD-10-CM

## 2021-05-19 DIAGNOSIS — K51.00 ULCERATIVE PANCOLITIS WITHOUT COMPLICATION (HCC): Primary | ICD-10-CM

## 2021-05-19 DIAGNOSIS — K51.00 ULCERATIVE PANCOLITIS WITHOUT COMPLICATION (HCC): ICD-10-CM

## 2021-05-19 DIAGNOSIS — Z93.3 COLOSTOMY IN PLACE (HCC): ICD-10-CM

## 2021-05-19 LAB — ERYTHROCYTE [SEDIMENTATION RATE] IN BLOOD: 36 MM/HOUR (ref 0–19)

## 2021-05-19 PROCEDURE — 85652 RBC SED RATE AUTOMATED: CPT

## 2021-05-19 PROCEDURE — 36415 COLL VENOUS BLD VENIPUNCTURE: CPT

## 2021-05-19 PROCEDURE — 99214 OFFICE O/P EST MOD 30 MIN: CPT | Performed by: INTERNAL MEDICINE
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
## 2021-05-27 ENCOUNTER — OFFICE VISIT (OUTPATIENT)
Dept: INTERNAL MEDICINE CLINIC | Facility: CLINIC | Age: 51
End: 2021-05-27
Payer: COMMERCIAL

## 2021-05-27 VITALS
TEMPERATURE: 97.8 F | DIASTOLIC BLOOD PRESSURE: 80 MMHG | HEART RATE: 84 BPM | BODY MASS INDEX: 37.39 KG/M2 | HEIGHT: 64 IN | WEIGHT: 219 LBS | OXYGEN SATURATION: 90 % | SYSTOLIC BLOOD PRESSURE: 118 MMHG

## 2021-05-27 DIAGNOSIS — M75.102 ROTATOR CUFF SYNDROME OF LEFT SHOULDER: ICD-10-CM

## 2021-05-27 DIAGNOSIS — M17.0 PRIMARY OSTEOARTHRITIS OF BOTH KNEES: ICD-10-CM

## 2021-05-27 DIAGNOSIS — K51.018 ULCERATIVE PANCOLITIS WITH OTHER COMPLICATION (HCC): Primary | ICD-10-CM

## 2021-05-27 DIAGNOSIS — J45.30 MILD PERSISTENT ASTHMA WITHOUT COMPLICATION: ICD-10-CM

## 2021-05-27 DIAGNOSIS — Z93.3 COLOSTOMY IN PLACE (HCC): ICD-10-CM

## 2021-05-27 DIAGNOSIS — E03.9 ACQUIRED HYPOTHYROIDISM: ICD-10-CM

## 2021-05-27 DIAGNOSIS — K57.20 PERFORATION OF SIGMOID COLON DUE TO DIVERTICULITIS: ICD-10-CM

## 2021-05-27 DIAGNOSIS — M54.40 BILATERAL LOW BACK PAIN WITH SCIATICA, SCIATICA LATERALITY UNSPECIFIED, UNSPECIFIED CHRONICITY: ICD-10-CM

## 2021-05-27 PROCEDURE — 99214 OFFICE O/P EST MOD 30 MIN: CPT | Performed by: INTERNAL MEDICINE

## 2021-05-27 PROCEDURE — 3725F SCREEN DEPRESSION PERFORMED: CPT | Performed by: INTERNAL MEDICINE

## 2021-05-27 RX ORDER — MELOXICAM 15 MG/1
15 TABLET ORAL DAILY
Qty: 90 TABLET | Refills: 1 | Status: SHIPPED | OUTPATIENT
Start: 2021-05-27 | End: 2021-11-15
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
## 2021-06-01 ENCOUNTER — OFFICE VISIT (OUTPATIENT)
Dept: SURGERY | Facility: CLINIC | Age: 51
End: 2021-06-01
Payer: COMMERCIAL

## 2021-06-01 ENCOUNTER — TELEPHONE (OUTPATIENT)
Dept: INTERNAL MEDICINE CLINIC | Facility: CLINIC | Age: 51
End: 2021-06-01

## 2021-06-01 VITALS
TEMPERATURE: 97.3 F | SYSTOLIC BLOOD PRESSURE: 126 MMHG | BODY MASS INDEX: 37.73 KG/M2 | HEIGHT: 64 IN | DIASTOLIC BLOOD PRESSURE: 72 MMHG | WEIGHT: 221 LBS | HEART RATE: 80 BPM

## 2021-06-01 DIAGNOSIS — Z93.3 COLOSTOMY IN PLACE (HCC): Primary | ICD-10-CM

## 2021-06-01 PROCEDURE — 1036F TOBACCO NON-USER: CPT | Performed by: STUDENT IN AN ORGANIZED HEALTH CARE EDUCATION/TRAINING PROGRAM

## 2021-06-01 PROCEDURE — 99213 OFFICE O/P EST LOW 20 MIN: CPT | Performed by: STUDENT IN AN ORGANIZED HEALTH CARE EDUCATION/TRAINING PROGRAM

## 2021-06-01 NOTE — TELEPHONE ENCOUNTER
Pt wants [facility] paper work extended  If it is ok, can we change the date on the existing paper work? She does not have new paperwork to complete

## 2021-06-01 NOTE — PROGRESS NOTES
Assessment/Plan:  65-year-old female status post exploratory laparotomy and sigmoid colon resection on 01/26,  Abdominal washout and creation of transverse colon colostomy 1/27, abdominal washout and closure of abdomen on 01/29  -  Patient denies any abdominal pain  - she is tolerating a diet well and having normal ostomy function  - patient has started mesalamine,  Initially majority of the pills are going through hole into her ostomy bag, for the last 3 weeks the pills have been split and she notices that they are being absorbed  - patient with recent abdominal wall infection, at umbilicus there was a small hole and a small portion of stitch was removed, consistent with old PDS,  May have been a suture granuloma  - inferior portion of incision still has open area, no obvious stitch at the area, this may also be a suture granuloma causing the wound not to heal  - Gastroenterology note reviewed from 05/19/2021  - Primary care physician note reviewed from 05/27/2021  - at this time the patient's ostomy still looks erythematous though greatly improved from prior,  patient has only been on mesalamine the last 3 weeks where she has most likely been absorbing most of it  - would like to see how the ostomy looks in another month to evaluate for reversal, would want the ulcerative colitis to be under control to make an anastomosis with least risk of leakage  - patient to follow-up in office in 1 month       1  Colostomy in place [facility])               Subjective: wound / colostomy     Patient ID: Bryn Guaman is a 52 y o  female  Triage Notes:     Patient is a 65-year-old female who presents to office for evaluation status post exploratory laparotomy and colostomy creation  She has been tolerating a diet well  She has been having normal ostomy function  Patient was started on mesalamine by GI and states recently she has been cutting the pills in half and she is absorbing them better    She has been cutting the pills for 3 weeks  Prior to this the patient was noticing whole pills  In her ostomy bag  She recently had an infection at her abdominal wall in her midline incision that was treated with antibiotics  The following portions of the patient's history were reviewed and updated as appropriate: allergies, current medications, past family history, past medical history, past social history, past surgical history and problem list     Review of Systems   Constitutional: Negative for chills, fatigue and fever  HENT: Negative for congestion, hearing loss, rhinorrhea and sore throat  Eyes: Negative for pain and discharge  Respiratory: Negative for cough, chest tightness and shortness of breath  Cardiovascular: Negative for chest pain and palpitations  Gastrointestinal: Negative for abdominal pain, constipation, diarrhea, nausea and vomiting  Endocrine: Negative for cold intolerance and heat intolerance  Genitourinary: Negative for difficulty urinating and dysuria  Musculoskeletal: Negative for back pain and neck pain  Skin: Negative for color change and rash  Allergic/Immunologic: Negative for environmental allergies and food allergies  Neurological: Negative for seizures and headaches  Hematological: Negative for adenopathy  Does not bruise/bleed easily  Psychiatric/Behavioral: Negative for confusion and hallucinations  Objective:      /72   Pulse 80   Temp (!) 97 3 °F (36 3 °C) (Temporal)   Ht 5' 4" (1 626 m)   Wt 100 kg (221 lb)   Breastfeeding No   BMI 37 93 kg/m²     Below is the patient's most recent value for Albumin, ALT, AST, BUN, Calcium, Chloride, Cholesterol, CO2, Creatinine, GFR, Glucose, HDL, Hematocrit, Hemoglobin, Hemoglobin A1C, LDL, Magnesium, Phosphorus, Platelets, Potassium, PSA, Sodium, Triglycerides, and WBC     Lab Results   Component Value Date    ALT 13 02/09/2021    AST 13 02/09/2021    BUN 8 02/10/2021    CALCIUM 8 8 02/10/2021     02/10/2021 CO2 27 02/10/2021    CREATININE 0 71 02/10/2021    HDL 8 (L) 02/04/2021    HCT 27 8 (L) 02/10/2021    HGB 8 6 (L) 02/10/2021    HGBA1C 6 0 (H) 01/27/2021    MG 1 7 02/09/2021    PHOS 2 8 02/09/2021     (H) 02/10/2021    K 4 1 02/10/2021    TRIG 185 (H) 02/04/2021    WBC 9 17 02/10/2021     Note: for a comprehensive list of the patient's lab results, access the Results Review activity  Physical Exam  Constitutional:       Appearance: Normal appearance  HENT:      Head: Normocephalic and atraumatic  Nose: Nose normal    Eyes:      General: No scleral icterus  Conjunctiva/sclera: Conjunctivae normal    Cardiovascular:      Rate and Rhythm: Normal rate  Pulmonary:      Effort: Pulmonary effort is normal    Abdominal:      General: There is no distension  Palpations: Abdomen is soft  Tenderness: There is no abdominal tenderness  Comments: Midline incision without erythema induration or drainage, small sinus at umbilicus probed and small portion of old PDS stitch removed, open sinus at inferior portion of incision noted to be clean with minimal serous drainage, no obvious stitch noted; Ostomy patent and productive but still remains erythematous though improved   Musculoskeletal:         General: No signs of injury  Skin:     General: Skin is warm  Coloration: Skin is not jaundiced  Neurological:      General: No focal deficit present  Mental Status: She is alert and oriented to person, place, and time     Psychiatric:         Mood and Affect: Mood normal          Behavior: Behavior normal

## 2021-06-11 ENCOUNTER — TELEPHONE (OUTPATIENT)
Dept: GASTROENTEROLOGY | Facility: CLINIC | Age: 51
End: 2021-06-11

## 2021-06-11 DIAGNOSIS — R19.7 DIARRHEA, UNSPECIFIED TYPE: Primary | ICD-10-CM

## 2021-06-11 NOTE — TELEPHONE ENCOUNTER
Spoke with patient  History of UC pancolitis, diverticular disease, colostomy in place    Patient c/o "stomach virus feeling" mid abdomen cramping with eating, and runny water like output from her colostomy  She feels her stoma is very red an irritated  1 episode of vomiting last night  Denies nausea SOB,weakness, black or bloody stools  She finished clindamycin 1 5 weeks ago when these symptoms started  Any suggestions?

## 2021-06-11 NOTE — TELEPHONE ENCOUNTER
Dr Garcia Fly - patient called she is having pain in area around her coloscopy bag   Please call Eda Jacobs at [phone]

## 2021-06-12 ENCOUNTER — APPOINTMENT (OUTPATIENT)
Dept: LAB | Facility: HOSPITAL | Age: 51
End: 2021-06-12
Attending: INTERNAL MEDICINE
Payer: COMMERCIAL

## 2021-06-12 DIAGNOSIS — R19.7 DIARRHEA, UNSPECIFIED TYPE: ICD-10-CM

## 2021-06-12 PROCEDURE — 87493 C DIFF AMPLIFIED PROBE: CPT

## 2021-06-13 LAB — C DIFF TOX B TCDB STL QL NAA+PROBE: NEGATIVE

## 2021-06-14 ENCOUNTER — TELEPHONE (OUTPATIENT)
Dept: GASTROENTEROLOGY | Facility: CLINIC | Age: 51
End: 2021-06-14

## 2021-06-14 NOTE — TELEPHONE ENCOUNTER
Called and gave pt cdiff negative results  Pt c/o of still having stomach pain after eating meals  Please advise next step  Thanks

## 2021-06-15 ENCOUNTER — TELEPHONE (OUTPATIENT)
Dept: GASTROENTEROLOGY | Facility: CLINIC | Age: 51
End: 2021-06-15

## 2021-06-15 DIAGNOSIS — K51.00 ULCERATIVE PANCOLITIS WITHOUT COMPLICATION (HCC): Primary | ICD-10-CM

## 2021-06-15 RX ORDER — PREDNISONE 1 MG/1
TABLET ORAL
Qty: 260 TABLET | Refills: 0 | Status: SHIPPED | OUTPATIENT
Start: 2021-06-15 | End: 2021-08-23

## 2021-06-15 NOTE — TELEPHONE ENCOUNTER
Spoke to patient  Will restart a prednisone taper but taper more slowly - 5mg weekly  Continue Mesalamine QID  ?  Need for stronger therapy - immunomodulator  Pls call to schedule patient a sooner appt with Dr Mohit Todd

## 2021-06-17 ENCOUNTER — OFFICE VISIT (OUTPATIENT)
Dept: GASTROENTEROLOGY | Facility: CLINIC | Age: 51
End: 2021-06-17
Payer: COMMERCIAL

## 2021-06-17 VITALS
WEIGHT: 220 LBS | DIASTOLIC BLOOD PRESSURE: 78 MMHG | SYSTOLIC BLOOD PRESSURE: 128 MMHG | HEART RATE: 76 BPM | BODY MASS INDEX: 37.56 KG/M2 | HEIGHT: 64 IN

## 2021-06-17 DIAGNOSIS — K57.90 DIVERTICULAR DISEASE: ICD-10-CM

## 2021-06-17 DIAGNOSIS — K51.018 ULCERATIVE PANCOLITIS WITH OTHER COMPLICATION (HCC): Primary | ICD-10-CM

## 2021-06-17 DIAGNOSIS — Z93.3 COLOSTOMY IN PLACE (HCC): ICD-10-CM

## 2021-06-17 PROCEDURE — 99214 OFFICE O/P EST MOD 30 MIN: CPT | Performed by: INTERNAL MEDICINE

## 2021-06-17 PROCEDURE — 3008F BODY MASS INDEX DOCD: CPT | Performed by: STUDENT IN AN ORGANIZED HEALTH CARE EDUCATION/TRAINING PROGRAM
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
## 2021-06-21 NOTE — PROGRESS NOTES
Assessment/Plan:    No problem-specific Assessment & Plan notes found for this encounter  {Assess/PlanSmartLinks:43058}      Subjective:      Patient ID: Guillermina Andrea is a 52 y o  female      HPI    {Common ambulatory SmartLinks:39631}    Review of Systems      Objective:      /80 (BP Location: Left arm, Patient Position: Sitting, Cuff Size: Standard)   Pulse 84   Temp 97 8 °F (36 6 °C) (Temporal)   Ht 5' 4" (1 626 m)   Wt 99 3 kg (219 lb)   SpO2 90%   BMI 37 59 kg/m²          Physical Exam

## 2021-06-29 ENCOUNTER — TELEPHONE (OUTPATIENT)
Dept: SURGERY | Facility: CLINIC | Age: 51
End: 2021-06-29

## 2021-07-01 ENCOUNTER — OFFICE VISIT (OUTPATIENT)
Dept: SURGERY | Facility: CLINIC | Age: 51
End: 2021-07-01
Payer: COMMERCIAL

## 2021-07-01 VITALS
DIASTOLIC BLOOD PRESSURE: 62 MMHG | TEMPERATURE: 97.7 F | BODY MASS INDEX: 38.51 KG/M2 | HEIGHT: 64 IN | WEIGHT: 225.6 LBS | HEART RATE: 79 BPM | SYSTOLIC BLOOD PRESSURE: 114 MMHG

## 2021-07-01 DIAGNOSIS — Z93.3 COLOSTOMY IN PLACE (HCC): Primary | ICD-10-CM

## 2021-07-01 DIAGNOSIS — K51.018 ULCERATIVE PANCOLITIS WITH OTHER COMPLICATION (HCC): ICD-10-CM

## 2021-07-01 PROCEDURE — 99213 OFFICE O/P EST LOW 20 MIN: CPT | Performed by: STUDENT IN AN ORGANIZED HEALTH CARE EDUCATION/TRAINING PROGRAM

## 2021-07-01 NOTE — PROGRESS NOTES
Assessment/Plan:  24-year-old female status post exploratory laparotomy and sigmoid colon resection on 01/26,  Abdominal washout and creation of transverse colon colostomy 1/27, abdominal washout and closure of abdomen on 01/29  -  Gastroenterology note from 06/17/2021 reviewed  - patient with increasing abdominal pain prompting Gastroenterology visit will start on steroid taper for ulcerative colitis flare  - will defer any surgical intervention for ostomy reversal at this time due to ulcerative colitis flare  - patient also notes continued open wound in the inferior portion of midline, per patient a stitch was seen with visiting nursing, this is most likely a suture granuloma  - wound explored at bedside but no obvious stitch was able to be found  - continue daily packing changes at this time       1  Colostomy in place [facility])    2  Ulcerative pancolitis with other complication ([facility] )               Subjective:  colostomy     Patient ID: Michelle Marino is a 52 y o  female  Triage Notes:     Patient is a 24-year-old female who presents to office for evaluation status post exploratory laparotomy and sigmoid colon resection  Patient has been tolerating a diet  Recently patient had increasing abdominal pain and was started on a steroid taper for ulcerative colitis flare  She continues to have a wound at the lower portion of her midline incision and a possible stitch was seen there by visiting nurses  The following portions of the patient's history were reviewed and updated as appropriate: allergies, current medications, past family history, past medical history, past social history, past surgical history and problem list     Review of Systems   Constitutional: Negative for chills, fatigue and fever  HENT: Negative for congestion, hearing loss, rhinorrhea and sore throat  Eyes: Negative for pain and discharge  Respiratory: Negative for cough, chest tightness and shortness of breath      Cardiovascular: Negative for chest pain and palpitations  Gastrointestinal: Negative for abdominal pain, constipation, diarrhea, nausea and vomiting  Endocrine: Negative for cold intolerance and heat intolerance  Genitourinary: Negative for difficulty urinating and dysuria  Musculoskeletal: Negative for back pain and neck pain  Skin: Negative for color change and rash  Allergic/Immunologic: Negative for environmental allergies and food allergies  Neurological: Negative for seizures and headaches  Hematological: Negative for adenopathy  Does not bruise/bleed easily  Psychiatric/Behavioral: Negative for confusion and hallucinations  Objective:      /62   Pulse 79   Temp 97 7 °F (36 5 °C) (Temporal)   Ht 5' 4" (1 626 m)   Wt 102 kg (225 lb 9 6 oz)   Breastfeeding No   BMI 38 72 kg/m²     Below is the patient's most recent value for Albumin, ALT, AST, BUN, Calcium, Chloride, Cholesterol, CO2, Creatinine, GFR, Glucose, HDL, Hematocrit, Hemoglobin, Hemoglobin A1C, LDL, Magnesium, Phosphorus, Platelets, Potassium, PSA, Sodium, Triglycerides, and WBC  Lab Results   Component Value Date    ALT 13 02/09/2021    AST 13 02/09/2021    BUN 8 02/10/2021    CALCIUM 8 8 02/10/2021     02/10/2021    CO2 27 02/10/2021    CREATININE 0 71 02/10/2021    HDL 8 (L) 02/04/2021    HCT 27 8 (L) 02/10/2021    HGB 8 6 (L) 02/10/2021    HGBA1C 6 0 (H) 01/27/2021    MG 1 7 02/09/2021    PHOS 2 8 02/09/2021     (H) 02/10/2021    K 4 1 02/10/2021    TRIG 185 (H) 02/04/2021    WBC 9 17 02/10/2021     Note: for a comprehensive list of the patient's lab results, access the Results Review activity  Physical Exam  Constitutional:       Appearance: Normal appearance  HENT:      Head: Normocephalic and atraumatic  Nose: Nose normal    Eyes:      General: No scleral icterus  Conjunctiva/sclera: Conjunctivae normal    Cardiovascular:      Rate and Rhythm: Normal rate     Pulmonary:      Effort: Pulmonary effort is normal    Abdominal:      General: There is no distension  Palpations: Abdomen is soft  Tenderness: There is no abdominal tenderness  Comments: Ostomy pink patent and productive, ostomy looks less erythematous than last time, small opening in inferior portion of midline wound without erythema or induration   Musculoskeletal:         General: No signs of injury  Skin:     General: Skin is warm  Coloration: Skin is not jaundiced  Neurological:      General: No focal deficit present  Mental Status: She is alert and oriented to person, place, and time     Psychiatric:         Mood and Affect: Mood normal          Behavior: Behavior normal

## 2021-07-15 ENCOUNTER — OFFICE VISIT (OUTPATIENT)
Dept: INTERNAL MEDICINE CLINIC | Facility: CLINIC | Age: 51
End: 2021-07-15
Payer: COMMERCIAL

## 2021-07-15 VITALS
DIASTOLIC BLOOD PRESSURE: 68 MMHG | HEART RATE: 89 BPM | WEIGHT: 222 LBS | SYSTOLIC BLOOD PRESSURE: 120 MMHG | BODY MASS INDEX: 37.9 KG/M2 | HEIGHT: 64 IN | OXYGEN SATURATION: 98 % | TEMPERATURE: 98.4 F

## 2021-07-15 DIAGNOSIS — E03.9 ACQUIRED HYPOTHYROIDISM: ICD-10-CM

## 2021-07-15 DIAGNOSIS — M17.0 PRIMARY OSTEOARTHRITIS OF BOTH KNEES: ICD-10-CM

## 2021-07-15 DIAGNOSIS — J45.20 MILD INTERMITTENT ASTHMA WITHOUT COMPLICATION: ICD-10-CM

## 2021-07-15 DIAGNOSIS — K51.018 ULCERATIVE PANCOLITIS WITH OTHER COMPLICATION (HCC): Primary | ICD-10-CM

## 2021-07-15 DIAGNOSIS — M54.40 BILATERAL LOW BACK PAIN WITH SCIATICA, SCIATICA LATERALITY UNSPECIFIED, UNSPECIFIED CHRONICITY: ICD-10-CM

## 2021-07-15 DIAGNOSIS — Z78.9 USES BIRTH CONTROL: ICD-10-CM

## 2021-07-15 DIAGNOSIS — Z93.3 COLOSTOMY IN PLACE (HCC): ICD-10-CM

## 2021-07-15 DIAGNOSIS — M75.102 ROTATOR CUFF SYNDROME OF LEFT SHOULDER: ICD-10-CM

## 2021-07-15 PROCEDURE — 1036F TOBACCO NON-USER: CPT | Performed by: INTERNAL MEDICINE

## 2021-07-15 PROCEDURE — 3008F BODY MASS INDEX DOCD: CPT | Performed by: INTERNAL MEDICINE

## 2021-07-15 PROCEDURE — 3725F SCREEN DEPRESSION PERFORMED: CPT | Performed by: INTERNAL MEDICINE

## 2021-07-15 PROCEDURE — 99214 OFFICE O/P EST MOD 30 MIN: CPT | Performed by: INTERNAL MEDICINE

## 2021-07-15 RX ORDER — LEVONORGESTREL AND ETHINYL ESTRADIOL 0.1-0.02MG
1 KIT ORAL DAILY
Qty: 28 TABLET | Refills: 3 | Status: SHIPPED | OUTPATIENT
Start: 2021-07-15 | End: 2021-11-01
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
## 2021-07-20 ENCOUNTER — TELEPHONE (OUTPATIENT)
Dept: GASTROENTEROLOGY | Facility: CLINIC | Age: 51
End: 2021-07-20

## 2021-07-20 ENCOUNTER — APPOINTMENT (OUTPATIENT)
Dept: LAB | Facility: HOSPITAL | Age: 51
End: 2021-07-20
Payer: COMMERCIAL

## 2021-07-20 ENCOUNTER — OFFICE VISIT (OUTPATIENT)
Dept: GASTROENTEROLOGY | Facility: CLINIC | Age: 51
End: 2021-07-20
Payer: COMMERCIAL

## 2021-07-20 ENCOUNTER — HOSPITAL ENCOUNTER (OUTPATIENT)
Dept: RADIOLOGY | Facility: HOSPITAL | Age: 51
Discharge: HOME | End: 2021-07-20
Payer: COMMERCIAL

## 2021-07-20 VITALS
WEIGHT: 228.4 LBS | HEART RATE: 85 BPM | HEIGHT: 64 IN | BODY MASS INDEX: 38.99 KG/M2 | DIASTOLIC BLOOD PRESSURE: 82 MMHG | SYSTOLIC BLOOD PRESSURE: 112 MMHG

## 2021-07-20 DIAGNOSIS — M54.40 BILATERAL LOW BACK PAIN WITH SCIATICA, SCIATICA LATERALITY UNSPECIFIED, UNSPECIFIED CHRONICITY: ICD-10-CM

## 2021-07-20 DIAGNOSIS — K51.018 ULCERATIVE PANCOLITIS WITH OTHER COMPLICATION (HCC): Primary | ICD-10-CM

## 2021-07-20 DIAGNOSIS — K51.018 ULCERATIVE PANCOLITIS WITH OTHER COMPLICATION (HCC): ICD-10-CM

## 2021-07-20 LAB
ALBUMIN SERPL BCP-MCNC: 3.6 G/DL (ref 3.5–5)
ALP SERPL-CCNC: 65 U/L (ref 46–116)
ALT SERPL W P-5'-P-CCNC: 43 U/L (ref 12–78)
ANION GAP SERPL CALCULATED.3IONS-SCNC: 9 MMOL/L (ref 4–13)
AST SERPL W P-5'-P-CCNC: 19 U/L (ref 5–45)
BASOPHILS # BLD AUTO: 0.04 THOUSANDS/ΜL (ref 0–0.1)
BASOPHILS NFR BLD AUTO: 0 % (ref 0–1)
BILIRUB SERPL-MCNC: 0.44 MG/DL (ref 0.2–1)
BUN SERPL-MCNC: 24 MG/DL (ref 5–25)
CALCIUM SERPL-MCNC: 8.9 MG/DL (ref 8.3–10.1)
CHLORIDE SERPL-SCNC: 104 MMOL/L (ref 100–108)
CO2 SERPL-SCNC: 27 MMOL/L (ref 21–32)
CREAT SERPL-MCNC: 0.88 MG/DL (ref 0.6–1.3)
EOSINOPHIL # BLD AUTO: 0 THOUSAND/ΜL (ref 0–0.61)
EOSINOPHIL NFR BLD AUTO: 0 % (ref 0–6)
ERYTHROCYTE [DISTWIDTH] IN BLOOD BY AUTOMATED COUNT: 18.4 % (ref 11.6–15.1)
ERYTHROCYTE [SEDIMENTATION RATE] IN BLOOD: 10 MM/HOUR (ref 0–19)
GFR SERPL CREATININE-BSD FRML MDRD: 78 ML/MIN/1.73SQ M
GLUCOSE P FAST SERPL-MCNC: 105 MG/DL (ref 65–99)
HCT VFR BLD AUTO: 40.9 % (ref 34.8–46.1)
HGB BLD-MCNC: 12.6 G/DL (ref 11.5–15.4)
IMM GRANULOCYTES # BLD AUTO: 0.07 THOUSAND/UL (ref 0–0.2)
IMM GRANULOCYTES NFR BLD AUTO: 1 % (ref 0–2)
LYMPHOCYTES # BLD AUTO: 1.07 THOUSANDS/ΜL (ref 0.6–4.47)
LYMPHOCYTES NFR BLD AUTO: 9 % (ref 14–44)
MCH RBC QN AUTO: 26.3 PG (ref 26.8–34.3)
MCHC RBC AUTO-ENTMCNC: 30.8 G/DL (ref 31.4–37.4)
MCV RBC AUTO: 85 FL (ref 82–98)
MONOCYTES # BLD AUTO: 0.61 THOUSAND/ΜL (ref 0.17–1.22)
MONOCYTES NFR BLD AUTO: 5 % (ref 4–12)
NEUTROPHILS # BLD AUTO: 9.87 THOUSANDS/ΜL (ref 1.85–7.62)
NEUTS SEG NFR BLD AUTO: 85 % (ref 43–75)
NRBC BLD AUTO-RTO: 0 /100 WBCS
PLATELET # BLD AUTO: 356 THOUSANDS/UL (ref 149–390)
PMV BLD AUTO: 9.9 FL (ref 8.9–12.7)
POTASSIUM SERPL-SCNC: 3.8 MMOL/L (ref 3.5–5.3)
PROT SERPL-MCNC: 7.4 G/DL (ref 6.4–8.2)
RBC # BLD AUTO: 4.79 MILLION/UL (ref 3.81–5.12)
SODIUM SERPL-SCNC: 140 MMOL/L (ref 136–145)
WBC # BLD AUTO: 11.66 THOUSAND/UL (ref 4.31–10.16)

## 2021-07-20 PROCEDURE — 72110 X-RAY EXAM L-2 SPINE 4/>VWS: CPT

## 2021-07-20 PROCEDURE — 99213 OFFICE O/P EST LOW 20 MIN: CPT | Performed by: PHYSICIAN ASSISTANT

## 2021-07-20 PROCEDURE — 85652 RBC SED RATE AUTOMATED: CPT

## 2021-07-20 PROCEDURE — 1036F TOBACCO NON-USER: CPT | Performed by: PHYSICIAN ASSISTANT

## 2021-07-20 PROCEDURE — 36415 COLL VENOUS BLD VENIPUNCTURE: CPT

## 2021-07-20 PROCEDURE — 80053 COMPREHEN METABOLIC PANEL: CPT

## 2021-07-20 PROCEDURE — 85025 COMPLETE CBC W/AUTO DIFF WBC: CPT

## 2021-07-20 NOTE — TELEPHONE ENCOUNTER
Called patient  Got patients voice mail  Left message that her labs look good and that WBC is slightly elevated due to prednisone therapy  Left office phone number so that if she had any questions or concerns she can call the office

## 2021-07-20 NOTE — TELEPHONE ENCOUNTER
----- Message from Guido Goldberg, PA-C sent at 7/20/2021  1:03 PM EDT -----  Please inform patient that her labs look good! Only thing abnormal is slightly elevated WBC which is due to prednisone therapy Thanks!
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
## 2021-07-20 NOTE — PATIENT INSTRUCTIONS
Ulcerative Colitis   WHAT YOU NEED TO KNOW:   Ulcerative colitis is a chronic disease of the colon (large intestine)  Inflammation and ulcers form on the inner lining of your colon  Ulcerative colitis is a type of inflammatory bowel disease  You may have times when signs and symptoms will decrease or disappear (remission)  You will need to continue treatment in times of remission  DISCHARGE INSTRUCTIONS:   Call, or have someone call, your local emergency number (911 in the [address] [facility]) if:   · You have sudden trouble breathing  · You have a fast heart rate, fast breathing, or are too dizzy to stand up  Return to the emergency department if:   · You have severe abdominal pain  · Your vomit has blood in it or looks like coffee grounds  · You see bright red blood in your bowel movement  Call your doctor if:   · Your symptoms return  · You have questions or concerns about your condition or care  Medicines:   · Medicines  may be given to help decrease inflammation or control your immune system  · Take your medicine as directed  Contact your healthcare provider if you think your medicine is not helping or if you have side effects  Tell him or her if you are allergic to any medicine  Keep a list of the medicines, vitamins, and herbs you take  Include the amounts, and when and why you take them  Bring the list or the pill bottles to follow-up visits  Carry your medicine list with you in case of an emergency  Self-care:   · Do not take NSAID medicines , including aspirin and ibuprofen  NSAIDs can cause flare-ups  · Eat a variety of healthy foods  to keep your colon healthy  Healthy foods include fruit, vegetables, whole-grain breads, low-fat dairy products, beans, lean meat, and fish  Do not eat foods that make your symptoms worse  Your healthcare provider may give you vitamins or minerals to improve your nutrition if you have severe ulcerative colitis  · Drink liquids as directed    Ask how much liquid to drink each day and which liquids are best for you  For most people, water, juice, and milk are good choices  Do not drink alcohol  This can make your symptoms worse  · Exercise regularly  Ask about the best exercise plan for you  Any activity is better than none  Even 10 minutes a few times a day would help prevent constipation and help keep your colon healthy  · Manage stress  Stress may slow healing and cause illness  Learn new ways to relax, such as deep breathing  Follow up with your doctor as directed:  Keep a written record of your bowel movements  Include the color, form, and if they were bloody  Bring the record to your follow-up visits  Write down your questions so you remember to ask them during your visits  © Copyright [address] 2021 Information is for End User's use only and may not be sold, redistributed or otherwise used for commercial purposes  All illustrations and images included in CareNotes® are the copyrighted property of A D A M , Inc  or [address] [place], [place] [address]amy   The above information is an  only  It is not intended as medical advice for individual conditions or treatments  Talk to your doctor, nurse or pharmacist before following any medical regimen to see if it is safe and effective for you
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
## 2021-08-10 ENCOUNTER — HOSPITAL ENCOUNTER (OUTPATIENT)
Facility: HOSPITAL | Age: 51
Setting detail: SURGERY ADMIT
End: 2021-08-10
Attending: UROLOGY | Admitting: UROLOGY
Payer: COMMERCIAL

## 2021-08-10 ENCOUNTER — OFFICE VISIT (OUTPATIENT)
Dept: SURGERY | Facility: CLINIC | Age: 51
End: 2021-08-10
Payer: COMMERCIAL

## 2021-08-10 VITALS
DIASTOLIC BLOOD PRESSURE: 66 MMHG | BODY MASS INDEX: 39.54 KG/M2 | SYSTOLIC BLOOD PRESSURE: 126 MMHG | WEIGHT: 231.6 LBS | HEART RATE: 85 BPM | TEMPERATURE: 97.3 F | HEIGHT: 64 IN

## 2021-08-10 DIAGNOSIS — Z93.3 COLOSTOMY IN PLACE (HCC): Primary | ICD-10-CM

## 2021-08-10 DIAGNOSIS — K51.018 ULCERATIVE PANCOLITIS WITH OTHER COMPLICATION (HCC): ICD-10-CM

## 2021-08-10 PROCEDURE — 3008F BODY MASS INDEX DOCD: CPT | Performed by: PHYSICIAN ASSISTANT

## 2021-08-10 PROCEDURE — 99213 OFFICE O/P EST LOW 20 MIN: CPT | Performed by: STUDENT IN AN ORGANIZED HEALTH CARE EDUCATION/TRAINING PROGRAM

## 2021-08-10 RX ORDER — LEVOFLOXACIN 5 MG/ML
750 INJECTION, SOLUTION INTRAVENOUS ONCE
Status: CANCELLED | OUTPATIENT
Start: 2021-08-10 | End: 2021-08-10

## 2021-08-10 RX ORDER — ERYTHROMYCIN 250 MG/1
1000 TABLET, DELAYED RELEASE ORAL 3 TIMES DAILY
Status: CANCELLED | OUTPATIENT
Start: 2021-08-10 | End: 2021-08-11

## 2021-08-10 RX ORDER — NEOMYCIN SULFATE 500 MG/1
1000 TABLET ORAL 3 TIMES DAILY
Status: CANCELLED | OUTPATIENT
Start: 2021-08-10 | End: 2021-08-11

## 2021-08-10 RX ORDER — SODIUM CHLORIDE 9 MG/ML
125 INJECTION, SOLUTION INTRAVENOUS CONTINUOUS
Status: CANCELLED | OUTPATIENT
Start: 2021-08-10

## 2021-08-10 RX ORDER — HEPARIN SODIUM 5000 [USP'U]/ML
5000 INJECTION, SOLUTION INTRAVENOUS; SUBCUTANEOUS ONCE
Status: CANCELLED | OUTPATIENT
Start: 2021-08-10 | End: 2021-08-10

## 2021-08-10 RX ORDER — ACETAMINOPHEN 325 MG/1
975 TABLET ORAL ONCE
Status: CANCELLED | OUTPATIENT
Start: 2021-08-10 | End: 2021-08-10

## 2021-08-10 RX ORDER — POLYETHYLENE GLYCOL 3350 17 G/17G
255 POWDER, FOR SOLUTION ORAL ONCE
Status: CANCELLED | OUTPATIENT
Start: 2021-08-10 | End: 2021-08-10

## 2021-08-10 NOTE — PROGRESS NOTES
Assessment/Plan:  24-year-old female status post exploratory laparotomy and sigmoid colon resection on 01/26,  Abdominal washout and creation of transverse colon colostomy 1/27, abdominal washout and closure of abdomen on 01/29  -   Patient denies any abdominal pain  - patient has finished her prednisone taper, states her ostomy output is more solid  - on exam the ostomy is normal in color and pink without signs of inflammation  - will plan for open reversal of colostomy,  Possible diverting loop ileostomy  - will request Urology for stent placement preop  -Will request primary care physician risk stratification and optimization  - CBC, CMP, type and screen ordered  - EKG, chest x-ray ordered  - patient will require both mechanical and antibiotic bowel prep  - patient is not vaccinated for COVID, will require screening prior to hospitalization  - I will follow-up with patient in office 1 week prior to the surgery to evaluate ostomy     all risks, benefits, alternatives of the procedure were discussed at length with the patient  Will plan for open  Reversal of ostomy with possible diverting loop ileostomy  All questions were answered to satisfaction  The patient voiced understanding and signed consent  1  Colostomy in place [facility])  -     Case request operating room: COLOSTOMY TAKEDOWN / REVERSE FLORES - Open, Possible Diverting Loop Ileostomy; Standing  -     Comprehensive metabolic panel; Future  -     CBC and differential; Future  -     EKG 12 lead; Future  -     XR chest pa & lateral; Future; Expected date: 08/10/2021  -     PAT Covid Screening; Future  -     Case request operating room: COLOSTOMY TAKEDOWN / REVERSE FLORES - Open, Possible Diverting Loop Ileostomy  -     Type and screen; Future    2  Ulcerative pancolitis with other complication ([facility] [address] )               Subjective: colostomy in place / wound     Patient ID: Rex Umanzor is a 52 y o  female      Triage Notes:     Patient is a 24-year-old female who presents to office for evaluation for ostomy reversal   She has undergone a prednisone taper for her ulcerative colitis at this time states she is feeling well  The ostomy is pink in color and her stools have been more solid  She denies any abdominal pain  The following portions of the patient's history were reviewed and updated as appropriate: allergies, current medications, past family history, past medical history, past social history, past surgical history and problem list     Review of Systems   Constitutional: Negative for chills, fatigue and fever  HENT: Negative for congestion, hearing loss, rhinorrhea and sore throat  Eyes: Negative for pain and discharge  Respiratory: Negative for cough, chest tightness and shortness of breath  Cardiovascular: Negative for chest pain and palpitations  Gastrointestinal: Negative for abdominal pain, constipation, diarrhea, nausea and vomiting  Endocrine: Negative for cold intolerance and heat intolerance  Genitourinary: Negative for difficulty urinating and dysuria  Musculoskeletal: Negative for back pain and neck pain  Skin: Negative for color change and rash  Allergic/Immunologic: Negative for environmental allergies and food allergies  Neurological: Negative for seizures and headaches  Hematological: Negative for adenopathy  Does not bruise/bleed easily  Psychiatric/Behavioral: Negative for confusion and hallucinations  Objective:      /66   Pulse 85   Temp (!) 97 3 °F (36 3 °C) (Temporal)   Ht 5' 4" (1 626 m)   Wt 105 kg (231 lb 9 6 oz)   Breastfeeding No   BMI 39 75 kg/m²     Below is the patient's most recent value for Albumin, ALT, AST, BUN, Calcium, Chloride, Cholesterol, CO2, Creatinine, GFR, Glucose, HDL, Hematocrit, Hemoglobin, Hemoglobin A1C, LDL, Magnesium, Phosphorus, Platelets, Potassium, PSA, Sodium, Triglycerides, and WBC     Lab Results   Component Value Date    ALT 43 07/20/2021    AST 19 07/20/2021    BUN 24 07/20/2021    CALCIUM 8 9 07/20/2021     07/20/2021    CO2 27 07/20/2021    CREATININE 0 88 07/20/2021    HDL 8 (L) 02/04/2021    HCT 40 9 07/20/2021    HGB 12 6 07/20/2021    HGBA1C 6 0 (H) 01/27/2021    MG 1 7 02/09/2021    PHOS 2 8 02/09/2021     07/20/2021    K 3 8 07/20/2021    TRIG 185 (H) 02/04/2021    WBC 11 66 (H) 07/20/2021     Note: for a comprehensive list of the patient's lab results, access the Results Review activity  Physical Exam  Constitutional:       Appearance: Normal appearance  HENT:      Head: Normocephalic and atraumatic  Nose: Nose normal    Eyes:      General: No scleral icterus  Conjunctiva/sclera: Conjunctivae normal    Cardiovascular:      Rate and Rhythm: Normal rate  Heart sounds: Normal heart sounds  Pulmonary:      Effort: Pulmonary effort is normal       Breath sounds: Normal breath sounds  Abdominal:      General: There is no distension  Palpations: Abdomen is soft  Tenderness: There is no abdominal tenderness  Comments: Ostomy pain, no signs of erythema, at the inferior portion of the midline incision there is a small wound that present granulation tissue, no erythema or induration   Musculoskeletal:         General: No signs of injury  Skin:     General: Skin is warm  Coloration: Skin is not jaundiced  Neurological:      General: No focal deficit present  Mental Status: She is alert and oriented to person, place, and time     Psychiatric:         Mood and Affect: Mood normal          Behavior: Behavior normal

## 2021-08-11 ENCOUNTER — TELEPHONE (OUTPATIENT)
Dept: SURGERY | Facility: CLINIC | Age: 51
End: 2021-08-11

## 2021-08-18 ENCOUNTER — OFFICE VISIT (OUTPATIENT)
Dept: LAB | Facility: HOSPITAL | Age: 51
End: 2021-08-18
Attending: STUDENT IN AN ORGANIZED HEALTH CARE EDUCATION/TRAINING PROGRAM
Payer: COMMERCIAL

## 2021-08-18 ENCOUNTER — TELEPHONE (OUTPATIENT)
Dept: GASTROENTEROLOGY | Facility: CLINIC | Age: 51
End: 2021-08-18

## 2021-08-18 ENCOUNTER — HOSPITAL ENCOUNTER (OUTPATIENT)
Dept: RADIOLOGY | Facility: HOSPITAL | Age: 51
Discharge: HOME | End: 2021-08-18
Attending: STUDENT IN AN ORGANIZED HEALTH CARE EDUCATION/TRAINING PROGRAM
Payer: COMMERCIAL

## 2021-08-18 DIAGNOSIS — Z93.3 COLOSTOMY IN PLACE (HCC): Primary | ICD-10-CM

## 2021-08-18 DIAGNOSIS — Z93.3 COLOSTOMY IN PLACE (HCC): ICD-10-CM

## 2021-08-18 DIAGNOSIS — R19.7 DIARRHEA, UNSPECIFIED TYPE: ICD-10-CM

## 2021-08-18 LAB
ABO GROUP BLD: NORMAL
ALBUMIN SERPL BCP-MCNC: 3.5 G/DL (ref 3.5–5)
ALP SERPL-CCNC: 63 U/L (ref 46–116)
ALT SERPL W P-5'-P-CCNC: 21 U/L (ref 12–78)
ANION GAP SERPL CALCULATED.3IONS-SCNC: 11 MMOL/L (ref 4–13)
AST SERPL W P-5'-P-CCNC: 11 U/L (ref 5–45)
BASOPHILS # BLD AUTO: 0.04 THOUSANDS/ΜL (ref 0–0.1)
BASOPHILS NFR BLD AUTO: 1 % (ref 0–1)
BILIRUB SERPL-MCNC: 0.29 MG/DL (ref 0.2–1)
BLD GP AB SCN SERPL QL: NEGATIVE
BUN SERPL-MCNC: 18 MG/DL (ref 5–25)
CALCIUM SERPL-MCNC: 8.6 MG/DL (ref 8.3–10.1)
CHLORIDE SERPL-SCNC: 103 MMOL/L (ref 100–108)
CO2 SERPL-SCNC: 23 MMOL/L (ref 21–32)
CREAT SERPL-MCNC: 1.12 MG/DL (ref 0.6–1.3)
EOSINOPHIL # BLD AUTO: 0 THOUSAND/ΜL (ref 0–0.61)
EOSINOPHIL NFR BLD AUTO: 0 % (ref 0–6)
ERYTHROCYTE [DISTWIDTH] IN BLOOD BY AUTOMATED COUNT: 17 % (ref 11.6–15.1)
GFR SERPL CREATININE-BSD FRML MDRD: 58 ML/MIN/1.73SQ M
GLUCOSE P FAST SERPL-MCNC: 98 MG/DL (ref 65–99)
HCT VFR BLD AUTO: 39.5 % (ref 34.8–46.1)
HGB BLD-MCNC: 12.4 G/DL (ref 11.5–15.4)
IMM GRANULOCYTES # BLD AUTO: 0.02 THOUSAND/UL (ref 0–0.2)
IMM GRANULOCYTES NFR BLD AUTO: 0 % (ref 0–2)
LYMPHOCYTES # BLD AUTO: 1.01 THOUSANDS/ΜL (ref 0.6–4.47)
LYMPHOCYTES NFR BLD AUTO: 17 % (ref 14–44)
MCH RBC QN AUTO: 27 PG (ref 26.8–34.3)
MCHC RBC AUTO-ENTMCNC: 31.4 G/DL (ref 31.4–37.4)
MCV RBC AUTO: 86 FL (ref 82–98)
MONOCYTES # BLD AUTO: 0.59 THOUSAND/ΜL (ref 0.17–1.22)
MONOCYTES NFR BLD AUTO: 10 % (ref 4–12)
NEUTROPHILS # BLD AUTO: 4.39 THOUSANDS/ΜL (ref 1.85–7.62)
NEUTS SEG NFR BLD AUTO: 72 % (ref 43–75)
NRBC BLD AUTO-RTO: 0 /100 WBCS
PLATELET # BLD AUTO: 349 THOUSANDS/UL (ref 149–390)
PMV BLD AUTO: 10 FL (ref 8.9–12.7)
POTASSIUM SERPL-SCNC: 4.3 MMOL/L (ref 3.5–5.3)
PROT SERPL-MCNC: 7.7 G/DL (ref 6.4–8.2)
RBC # BLD AUTO: 4.59 MILLION/UL (ref 3.81–5.12)
RH BLD: POSITIVE
SODIUM SERPL-SCNC: 137 MMOL/L (ref 136–145)
SPECIMEN EXPIRATION DATE: NORMAL
TSH SERPL DL<=0.05 MIU/L-ACNC: 3.51 UIU/ML (ref 0.36–3.74)
WBC # BLD AUTO: 6.05 THOUSAND/UL (ref 4.31–10.16)

## 2021-08-18 PROCEDURE — 93005 ELECTROCARDIOGRAM TRACING: CPT

## 2021-08-18 PROCEDURE — 86850 RBC ANTIBODY SCREEN: CPT

## 2021-08-18 PROCEDURE — 86901 BLOOD TYPING SEROLOGIC RH(D): CPT

## 2021-08-18 PROCEDURE — 71046 X-RAY EXAM CHEST 2 VIEWS: CPT

## 2021-08-18 PROCEDURE — 85025 COMPLETE CBC W/AUTO DIFF WBC: CPT

## 2021-08-18 PROCEDURE — 80053 COMPREHEN METABOLIC PANEL: CPT

## 2021-08-18 PROCEDURE — 84443 ASSAY THYROID STIM HORMONE: CPT

## 2021-08-18 PROCEDURE — 86900 BLOOD TYPING SEROLOGIC ABO: CPT

## 2021-08-18 PROCEDURE — 36415 COLL VENOUS BLD VENIPUNCTURE: CPT

## 2021-08-19 ENCOUNTER — TELEPHONE (OUTPATIENT)
Dept: GASTROENTEROLOGY | Facility: CLINIC | Age: 51
End: 2021-08-19

## 2021-08-19 ENCOUNTER — TELEPHONE (OUTPATIENT)
Dept: INTERNAL MEDICINE CLINIC | Facility: CLINIC | Age: 51
End: 2021-08-19

## 2021-08-19 LAB
ATRIAL RATE: 90 BPM
P AXIS: 45 DEGREES
PR INTERVAL: 146 MS
QRS AXIS: 10 DEGREES
QRSD INTERVAL: 76 MS
QT INTERVAL: 358 MS
QTC INTERVAL: 437 MS
T WAVE AXIS: 11 DEGREES
VENTRICULAR RATE: 90 BPM

## 2021-08-19 PROCEDURE — 93010 ELECTROCARDIOGRAM REPORT: CPT | Performed by: INTERNAL MEDICINE

## 2021-08-19 NOTE — TELEPHONE ENCOUNTER
Paper came through the fax for missing records for this patient  I sent the paperwork to [facility]

## 2021-08-20 ENCOUNTER — TELEPHONE (OUTPATIENT)
Dept: GASTROENTEROLOGY | Facility: CLINIC | Age: 51
End: 2021-08-20

## 2021-08-23 ENCOUNTER — CONSULT (OUTPATIENT)
Dept: INTERNAL MEDICINE CLINIC | Facility: CLINIC | Age: 51
End: 2021-08-23
Payer: COMMERCIAL

## 2021-08-23 VITALS
HEIGHT: 64 IN | WEIGHT: 225 LBS | BODY MASS INDEX: 38.41 KG/M2 | OXYGEN SATURATION: 98 % | HEART RATE: 75 BPM | SYSTOLIC BLOOD PRESSURE: 136 MMHG | TEMPERATURE: 98 F | DIASTOLIC BLOOD PRESSURE: 82 MMHG

## 2021-08-23 DIAGNOSIS — M54.40 BILATERAL LOW BACK PAIN WITH SCIATICA, SCIATICA LATERALITY UNSPECIFIED, UNSPECIFIED CHRONICITY: ICD-10-CM

## 2021-08-23 DIAGNOSIS — M17.0 PRIMARY OSTEOARTHRITIS OF BOTH KNEES: ICD-10-CM

## 2021-08-23 DIAGNOSIS — E03.9 ACQUIRED HYPOTHYROIDISM: ICD-10-CM

## 2021-08-23 DIAGNOSIS — Z93.3 COLOSTOMY IN PLACE (HCC): ICD-10-CM

## 2021-08-23 DIAGNOSIS — Z01.818 PREOP EXAM FOR INTERNAL MEDICINE: Primary | ICD-10-CM

## 2021-08-23 DIAGNOSIS — J45.30 MILD PERSISTENT ASTHMA WITHOUT COMPLICATION: ICD-10-CM

## 2021-08-23 DIAGNOSIS — K51.018 ULCERATIVE PANCOLITIS WITH OTHER COMPLICATION (HCC): ICD-10-CM

## 2021-08-23 PROCEDURE — 99214 OFFICE O/P EST MOD 30 MIN: CPT | Performed by: INTERNAL MEDICINE

## 2021-08-23 RX ORDER — ERYTHROMYCIN 500 MG/1
TABLET, COATED ORAL
COMMUNITY
Start: 2021-08-12 | End: 2022-03-21

## 2021-08-23 RX ORDER — NEOMYCIN SULFATE 500 MG/1
TABLET ORAL
COMMUNITY
Start: 2021-08-11 | End: 2022-04-21
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
## 2021-08-26 ENCOUNTER — TELEPHONE (OUTPATIENT)
Dept: GASTROENTEROLOGY | Facility: CLINIC | Age: 51
End: 2021-08-26

## 2021-08-26 NOTE — TELEPHONE ENCOUNTER
Isabella Morgan - patient called [facility] about a disability Claim   Please call Linwood Romero at [phone] ty You can access the FollowMyHealth Patient Portal offered by [facility] by registering at the following website: [email or web address]. By joining [facility]’s FollowMyHealth portal, you will also be able to view your health information using other applications (apps) compatible with our system.

## 2021-08-27 NOTE — TELEPHONE ENCOUNTER
LMOM to call office back     (Catherine Pete faxed back completed form by Heladio Crawford to [employer] on 8/19/2021)

## 2021-09-01 ENCOUNTER — OFFICE VISIT (OUTPATIENT)
Dept: GASTROENTEROLOGY | Facility: CLINIC | Age: 51
End: 2021-09-01
Payer: COMMERCIAL

## 2021-09-01 VITALS
WEIGHT: 233 LBS | DIASTOLIC BLOOD PRESSURE: 84 MMHG | BODY MASS INDEX: 39.78 KG/M2 | SYSTOLIC BLOOD PRESSURE: 130 MMHG | HEIGHT: 64 IN | HEART RATE: 70 BPM

## 2021-09-01 DIAGNOSIS — K51.018 ULCERATIVE PANCOLITIS WITH OTHER COMPLICATION (HCC): Primary | ICD-10-CM

## 2021-09-01 DIAGNOSIS — K51.00 ULCERATIVE PANCOLITIS WITHOUT COMPLICATION (HCC): ICD-10-CM

## 2021-09-01 PROCEDURE — 3008F BODY MASS INDEX DOCD: CPT | Performed by: PHYSICIAN ASSISTANT

## 2021-09-01 PROCEDURE — 1036F TOBACCO NON-USER: CPT | Performed by: PHYSICIAN ASSISTANT

## 2021-09-01 PROCEDURE — 99213 OFFICE O/P EST LOW 20 MIN: CPT | Performed by: PHYSICIAN ASSISTANT

## 2021-09-01 RX ORDER — MESALAMINE 800 MG/1
800 TABLET, DELAYED RELEASE ORAL 4 TIMES DAILY
Qty: 120 TABLET | Refills: 3 | Status: SHIPPED | OUTPATIENT
Start: 2021-09-01 | End: 2021-12-28 | Stop reason: SDUPTHER
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
## 2021-09-01 NOTE — PATIENT INSTRUCTIONS
Colitis   WHAT YOU NEED TO KNOW:   Colitis is swelling and irritation of your colon  Colitis may be caused by ulcers or a problem with your immune system  Bacteria, a virus, or a parasite may also cause colitis  The cause may not be known  You may have diarrhea, abdominal pain, fever, or blood or mucus in your bowel movement  DISCHARGE INSTRUCTIONS:   Return to the emergency department if:   · You have sudden trouble breathing  · Your bowel movements are black or have blood in them  · You have blood in your vomit  · You have severe abdominal pain or your abdomen is swollen and feels hard  · You have any of the following signs of dehydration:     ? Dizziness or weakness    ? Dry mouth, cracked lips, or severe thirst    ? Fast heartbeat or breathing    ? Urinating very little or not at all    Call your doctor if:   · Your symptoms get worse or do not go away  · You have a fever, chills, cough, or feel weak and achy  · You suddenly lose weight without trying  · You have questions or concerns about your condition or care  Medicines:   · Medicines  may be given to decrease inflammation in your colon and treat diarrhea  · Take your medicine as directed  Contact your healthcare provider if you think your medicine is not helping or if you have side effects  Tell him of her if you are allergic to any medicine  Keep a list of the medicines, vitamins, and herbs you take  Include the amounts, and when and why you take them  Bring the list or the pill bottles to follow-up visits  Carry your medicine list with you in case of an emergency  Manage your symptoms:   · Drink liquids as directed to help prevent dehydration  Good liquids to drink include water, juice, and broth  Ask how much liquid to drink each day  You may need to drink an oral rehydration solution (ORS)  An ORS contains a balance of water, salt, and sugar to replace body fluids lost during diarrhea      · Eat a variety of healthy foods   Healthy foods include fruits, vegetables, whole-grain breads, beans, low-fat dairy products, lean meats, and fish  You may need to eat several small meals throughout the day instead of large meals  Avoid spicy foods, caffeine, chocolate, and foods high in fat  · Talk to your healthcare provider before you take NSAIDs  NSAIDs can cause worsen your symptoms if ulcers are causing your colitis  · Start to exercise when you feel better  Regular exercise helps your bowels work normally  Ask about the best exercise plan for you  Prevent the spread of germs:       · Wash your hands often  Wash your hands several times each day  Wash after you use the bathroom, change a child's diaper, and before you prepare or eat food  Use soap and water every time  Rub your soapy hands together, lacing your fingers  Wash the front and back of your hands, and in between your fingers  Use the fingers of one hand to scrub under the fingernails of the other hand  Wash for at least 20 seconds  Rinse with warm, running water for several seconds  Then dry your hands with a clean towel or paper towel  Use hand  that contains alcohol if soap and water are not available  Do not touch your eyes, nose, or mouth without washing your hands first          · Cover a sneeze or cough  Use a tissue that covers your mouth and nose  Throw the tissue away in a trash can right away  Use the bend of your arm if a tissue is not available  Wash your hands well with soap and water or use a hand   · Clean surfaces often  Clean doorknobs, countertops, cell phones, and other surfaces that are touched often  Use a disinfecting wipe, a single-use sponge, or a cloth you can wash and reuse  Use disinfecting  if you do not have wipes  You can create a disinfecting  by mixing 1 part bleach with 10 parts water  · Ask about vaccines you may need  Vaccines help prevent disease caused by some viruses and bacteria  Get the influenza (flu) vaccine as soon as recommended each year  The flu vaccine is usually available starting in September or October  Flu viruses change, so it is important to get a flu vaccine every year  Get the pneumonia vaccine if recommended  This vaccine is usually recommended every 5 years  Your provider will tell you when to get this vaccine, if needed  Your healthcare provider can tell you if you should get other vaccines, and when to get them  Follow up with your doctor as directed: You may need to return for a colonoscopy or other tests  Write down how often you have a bowel movements and what they look like  Bring this to your follow-up visits  Write down your questions so you remember to ask them during your visits  © Copyright [employer] 2021 Information is for End User's use only and may not be sold, redistributed or otherwise used for commercial purposes  All illustrations and images included in CareNotes® are the copyrighted property of A D A M , Inc  or Oumar Acosta  The above information is an  only  It is not intended as medical advice for individual conditions or treatments  Talk to your doctor, nurse or pharmacist before following any medical regimen to see if it is safe and effective for you

## 2021-09-08 ENCOUNTER — OFFICE VISIT (OUTPATIENT)
Dept: SURGERY | Facility: CLINIC | Age: 51
End: 2021-09-08
Payer: COMMERCIAL

## 2021-09-08 VITALS
TEMPERATURE: 97.4 F | HEIGHT: 64 IN | SYSTOLIC BLOOD PRESSURE: 112 MMHG | HEART RATE: 76 BPM | BODY MASS INDEX: 39.61 KG/M2 | WEIGHT: 232 LBS | DIASTOLIC BLOOD PRESSURE: 80 MMHG | RESPIRATION RATE: 16 BRPM

## 2021-09-08 DIAGNOSIS — Z93.3 COLOSTOMY IN PLACE (HCC): ICD-10-CM

## 2021-09-08 DIAGNOSIS — K51.018 ULCERATIVE PANCOLITIS WITH OTHER COMPLICATION (HCC): Primary | ICD-10-CM

## 2021-09-08 PROCEDURE — 3008F BODY MASS INDEX DOCD: CPT | Performed by: PHYSICIAN ASSISTANT

## 2021-09-08 PROCEDURE — 99213 OFFICE O/P EST LOW 20 MIN: CPT | Performed by: STUDENT IN AN ORGANIZED HEALTH CARE EDUCATION/TRAINING PROGRAM

## 2021-09-08 NOTE — PROGRESS NOTES
Assessment/Plan:  82-year-old female status post exploratory laparotomy and sigmoid colon resection on 01/26,  Abdominal washout and creation of transverse colon colostomy 1/27, abdominal washout and closure of abdomen on 01/29  - patient overall is feeling well, the inferior portion of her midline abdominal wound has finally closed  - sadly over the weekend it was noticed that the ostomy became more erythematous as it was when she had an ulcerative colitis flare  - the patient did respond to a steroid taper but the colitis did return, baseline therapy of mesalamine does not seem to be working  - at this time will defer any type of operative intervention due to increased erythema of the ostomy consistent with  active ulcerative colitis  -  Patient to follow-up in office in 1-2 months after GI evaluation and treatment of ulcerative colitis       1  [facility]    2  Colostomy in place [facility])               Subjective:      Patient ID: Vandana Parkinson is a 54 y o  female  Triage Notes:     Patient is a 82-year-old female who presents to office for evaluation prior to ostomy reversal   She states everything was going well up until over the weekend when the ostomy became red again  She states she does not have any increase in ostomy output  She is able to tolerate a diet well  She does states the wound on the inferior portion of her midline incision has healed      The following portions of the patient's history were reviewed and updated as appropriate: allergies, current medications, past family history, past medical history, past social history, past surgical history and problem list     Review of Systems   Constitutional: Negative for chills, fatigue and fever  HENT: Negative for congestion, hearing loss, rhinorrhea and sore throat  Eyes: Negative for pain and discharge  Respiratory: Negative for cough, chest tightness and shortness of breath      Cardiovascular: Negative for chest pain and palpitations  Gastrointestinal: Negative for abdominal pain, constipation, diarrhea, nausea and vomiting  Endocrine: Negative for cold intolerance and heat intolerance  Genitourinary: Negative for difficulty urinating and dysuria  Musculoskeletal: Negative for back pain and neck pain  Skin: Negative for color change and rash  Allergic/Immunologic: Negative for environmental allergies and food allergies  Neurological: Negative for seizures and headaches  Hematological: Negative for adenopathy  Does not bruise/bleed easily  Psychiatric/Behavioral: Negative for confusion and hallucinations  Objective:      /80 (BP Location: Left arm, Patient Position: Sitting, Cuff Size: Standard)   Pulse 76   Temp (!) 97 4 °F (36 3 °C) (Temporal)   Resp 16   Ht 5' 4" (1 626 m)   Wt 105 kg (232 lb)   BMI 39 82 kg/m²     Below is the patient's most recent value for Albumin, ALT, AST, BUN, Calcium, Chloride, Cholesterol, CO2, Creatinine, GFR, Glucose, HDL, Hematocrit, Hemoglobin, Hemoglobin A1C, LDL, Magnesium, Phosphorus, Platelets, Potassium, PSA, Sodium, Triglycerides, and WBC  Lab Results   Component Value Date    ALT 21 08/18/2021    AST 11 08/18/2021    BUN 18 08/18/2021    CALCIUM 8 6 08/18/2021     08/18/2021    CO2 23 08/18/2021    CREATININE 1 12 08/18/2021    HDL 8 (L) 02/04/2021    HCT 39 5 08/18/2021    HGB 12 4 08/18/2021    HGBA1C 6 0 (H) 01/27/2021    MG 1 7 02/09/2021    PHOS 2 8 02/09/2021     08/18/2021    K 4 3 08/18/2021    TRIG 185 (H) 02/04/2021    WBC 6 05 08/18/2021     Note: for a comprehensive list of the patient's lab results, access the Results Review activity  Physical Exam  Constitutional:       Appearance: Normal appearance  HENT:      Head: Normocephalic and atraumatic  Nose: Nose normal    Eyes:      General: No scleral icterus       Conjunctiva/sclera: Conjunctivae normal    Cardiovascular:      Rate and Rhythm: Normal rate  Pulmonary:      Effort: Pulmonary effort is normal    Abdominal:      General: There is no distension  Palpations: Abdomen is soft  Tenderness: There is no abdominal tenderness  Comments: Midline incision well healed without erythema induration or drainage, colostomy is now erythematous but no signs of bleeding and no tenderness   Musculoskeletal:         General: No signs of injury  Skin:     General: Skin is warm  Coloration: Skin is not jaundiced  Neurological:      General: No focal deficit present  Mental Status: She is alert and oriented to person, place, and time     Psychiatric:         Mood and Affect: Mood normal          Behavior: Behavior normal

## 2021-10-11 ENCOUNTER — OFFICE VISIT (OUTPATIENT)
Dept: INTERNAL MEDICINE CLINIC | Facility: CLINIC | Age: 51
End: 2021-10-11
Payer: COMMERCIAL

## 2021-10-11 VITALS
OXYGEN SATURATION: 100 % | BODY MASS INDEX: 40.63 KG/M2 | HEIGHT: 64 IN | SYSTOLIC BLOOD PRESSURE: 118 MMHG | TEMPERATURE: 98.2 F | DIASTOLIC BLOOD PRESSURE: 74 MMHG | HEART RATE: 78 BPM | WEIGHT: 238 LBS

## 2021-10-11 DIAGNOSIS — E03.9 ACQUIRED HYPOTHYROIDISM: ICD-10-CM

## 2021-10-11 DIAGNOSIS — K51.018 ULCERATIVE PANCOLITIS WITH OTHER COMPLICATION (HCC): Primary | ICD-10-CM

## 2021-10-11 DIAGNOSIS — Z78.9 USES BIRTH CONTROL: ICD-10-CM

## 2021-10-11 DIAGNOSIS — Z23 NEEDS FLU SHOT: ICD-10-CM

## 2021-10-11 DIAGNOSIS — J45.30 MILD PERSISTENT ASTHMA WITHOUT COMPLICATION: ICD-10-CM

## 2021-10-11 DIAGNOSIS — M17.0 PRIMARY OSTEOARTHRITIS OF BOTH KNEES: ICD-10-CM

## 2021-10-11 DIAGNOSIS — M75.102 ROTATOR CUFF SYNDROME OF LEFT SHOULDER: ICD-10-CM

## 2021-10-11 DIAGNOSIS — Z93.3 COLOSTOMY IN PLACE (HCC): ICD-10-CM

## 2021-10-11 PROCEDURE — 99214 OFFICE O/P EST MOD 30 MIN: CPT | Performed by: INTERNAL MEDICINE

## 2021-10-11 PROCEDURE — 90682 RIV4 VACC RECOMBINANT DNA IM: CPT

## 2021-10-11 PROCEDURE — 90471 IMMUNIZATION ADMIN: CPT

## 2021-10-11 PROCEDURE — 3725F SCREEN DEPRESSION PERFORMED: CPT | Performed by: INTERNAL MEDICINE

## 2021-10-12 ENCOUNTER — APPOINTMENT (OUTPATIENT)
Dept: LAB | Facility: HOSPITAL | Age: 51
End: 2021-10-12
Payer: COMMERCIAL

## 2021-10-12 ENCOUNTER — OFFICE VISIT (OUTPATIENT)
Dept: GASTROENTEROLOGY | Facility: CLINIC | Age: 51
End: 2021-10-12
Payer: COMMERCIAL

## 2021-10-12 VITALS
DIASTOLIC BLOOD PRESSURE: 80 MMHG | SYSTOLIC BLOOD PRESSURE: 118 MMHG | HEART RATE: 90 BPM | HEIGHT: 60 IN | BODY MASS INDEX: 46.96 KG/M2 | WEIGHT: 239.2 LBS

## 2021-10-12 DIAGNOSIS — K51.018 ULCERATIVE PANCOLITIS WITH OTHER COMPLICATION (HCC): Primary | ICD-10-CM

## 2021-10-12 DIAGNOSIS — Z93.3 COLOSTOMY IN PLACE (HCC): ICD-10-CM

## 2021-10-12 DIAGNOSIS — K51.018 ULCERATIVE PANCOLITIS WITH OTHER COMPLICATION (HCC): ICD-10-CM

## 2021-10-12 LAB
HBV CORE AB SER QL: NORMAL
HBV SURFACE AB SER-ACNC: <3.1 MIU/ML
HBV SURFACE AG SER QL: NORMAL

## 2021-10-12 PROCEDURE — 1036F TOBACCO NON-USER: CPT | Performed by: PHYSICIAN ASSISTANT

## 2021-10-12 PROCEDURE — 36415 COLL VENOUS BLD VENIPUNCTURE: CPT

## 2021-10-12 PROCEDURE — 3008F BODY MASS INDEX DOCD: CPT | Performed by: PHYSICIAN ASSISTANT

## 2021-10-12 PROCEDURE — 86704 HEP B CORE ANTIBODY TOTAL: CPT

## 2021-10-12 PROCEDURE — 86480 TB TEST CELL IMMUN MEASURE: CPT

## 2021-10-12 PROCEDURE — 87340 HEPATITIS B SURFACE AG IA: CPT

## 2021-10-12 PROCEDURE — 99213 OFFICE O/P EST LOW 20 MIN: CPT | Performed by: PHYSICIAN ASSISTANT

## 2021-10-12 PROCEDURE — 86706 HEP B SURFACE ANTIBODY: CPT

## 2021-10-12 RX ORDER — PREDNISONE 10 MG/1
10 TABLET ORAL DAILY
Qty: 60 TABLET | Refills: 0 | Status: SHIPPED | OUTPATIENT
Start: 2021-10-12 | End: 2021-11-22

## 2021-10-14 LAB
GAMMA INTERFERON BACKGROUND BLD IA-ACNC: 0.02 IU/ML
M TB IFN-G BLD-IMP: ABNORMAL
M TB IFN-G CD4+ BCKGRND COR BLD-ACNC: 0 IU/ML
M TB IFN-G CD4+ BCKGRND COR BLD-ACNC: 0.01 IU/ML
MITOGEN IGNF BCKGRD COR BLD-ACNC: 0.28 IU/ML

## 2021-10-15 DIAGNOSIS — K51.018 ULCERATIVE PANCOLITIS WITH OTHER COMPLICATION (HCC): Primary | ICD-10-CM

## 2021-10-18 RX ORDER — ADALIMUMAB 40MG/0.4ML
KIT SUBCUTANEOUS
Qty: 2 EACH | Refills: 24 | Status: SHIPPED | OUTPATIENT
Start: 2021-10-18 | End: 2021-10-29 | Stop reason: SDUPTHER

## 2021-10-20 ENCOUNTER — TELEPHONE (OUTPATIENT)
Dept: SURGERY | Facility: CLINIC | Age: 51
End: 2021-10-20

## 2021-10-25 ENCOUNTER — TELEPHONE (OUTPATIENT)
Dept: GASTROENTEROLOGY | Facility: CLINIC | Age: 51
End: 2021-10-25

## 2021-10-25 RX ORDER — ADALIMUMAB 40MG/0.4ML
KIT SUBCUTANEOUS
Qty: 2 EACH | Refills: 24 | Status: SHIPPED | OUTPATIENT
Start: 2021-10-25 | End: 2022-04-21

## 2021-10-26 ENCOUNTER — TELEPHONE (OUTPATIENT)
Dept: GASTROENTEROLOGY | Facility: CLINIC | Age: 51
End: 2021-10-26

## 2021-10-29 DIAGNOSIS — K51.018 ULCERATIVE PANCOLITIS WITH OTHER COMPLICATION (HCC): ICD-10-CM

## 2021-10-29 RX ORDER — ADALIMUMAB 40MG/0.4ML
KIT SUBCUTANEOUS
Qty: 2 EACH | Refills: 24 | Status: SHIPPED | OUTPATIENT
Start: 2021-10-29 | End: 2021-11-22

## 2021-10-30 DIAGNOSIS — E03.9 ACQUIRED HYPOTHYROIDISM: ICD-10-CM

## 2021-10-30 RX ORDER — LEVOTHYROXINE SODIUM 0.07 MG/1
TABLET ORAL
Qty: 90 TABLET | Refills: 1 | Status: SHIPPED | OUTPATIENT
Start: 2021-10-30 | End: 2022-04-21 | Stop reason: SDUPTHER

## 2021-10-31 DIAGNOSIS — Z78.9 USES BIRTH CONTROL: ICD-10-CM

## 2021-11-01 RX ORDER — LEVONORGESTREL AND ETHINYL ESTRADIOL 0.1-0.02MG
KIT ORAL
Qty: 28 TABLET | Refills: 3 | Status: SHIPPED | OUTPATIENT
Start: 2021-11-01 | End: 2022-02-15

## 2021-11-02 ENCOUNTER — TELEPHONE (OUTPATIENT)
Dept: GASTROENTEROLOGY | Facility: CLINIC | Age: 51
End: 2021-11-02

## 2021-11-05 ENCOUNTER — TELEPHONE (OUTPATIENT)
Dept: GASTROENTEROLOGY | Facility: CLINIC | Age: 51
End: 2021-11-05

## 2021-11-15 DIAGNOSIS — M54.40 BILATERAL LOW BACK PAIN WITH SCIATICA, SCIATICA LATERALITY UNSPECIFIED, UNSPECIFIED CHRONICITY: ICD-10-CM

## 2021-11-15 RX ORDER — MELOXICAM 15 MG/1
TABLET ORAL
Qty: 90 TABLET | Refills: 1 | Status: SHIPPED | OUTPATIENT
Start: 2021-11-15 | End: 2022-03-21 | Stop reason: SDUPTHER

## 2021-11-22 ENCOUNTER — OFFICE VISIT (OUTPATIENT)
Dept: INTERNAL MEDICINE CLINIC | Facility: CLINIC | Age: 51
End: 2021-11-22
Payer: COMMERCIAL

## 2021-11-22 VITALS
WEIGHT: 246 LBS | SYSTOLIC BLOOD PRESSURE: 118 MMHG | HEART RATE: 96 BPM | TEMPERATURE: 98.2 F | HEIGHT: 63 IN | DIASTOLIC BLOOD PRESSURE: 80 MMHG | BODY MASS INDEX: 43.59 KG/M2 | OXYGEN SATURATION: 98 %

## 2021-11-22 DIAGNOSIS — J45.20 MILD INTERMITTENT ASTHMA WITHOUT COMPLICATION: ICD-10-CM

## 2021-11-22 DIAGNOSIS — K51.018 ULCERATIVE PANCOLITIS WITH OTHER COMPLICATION (HCC): Primary | ICD-10-CM

## 2021-11-22 DIAGNOSIS — M17.0 PRIMARY OSTEOARTHRITIS OF BOTH KNEES: ICD-10-CM

## 2021-11-22 DIAGNOSIS — E03.9 ACQUIRED HYPOTHYROIDISM: ICD-10-CM

## 2021-11-22 DIAGNOSIS — Z93.3 COLOSTOMY IN PLACE (HCC): ICD-10-CM

## 2021-11-22 DIAGNOSIS — Z12.31 ENCOUNTER FOR SCREENING MAMMOGRAM FOR MALIGNANT NEOPLASM OF BREAST: ICD-10-CM

## 2021-11-22 PROCEDURE — 1036F TOBACCO NON-USER: CPT | Performed by: INTERNAL MEDICINE

## 2021-11-22 PROCEDURE — 3008F BODY MASS INDEX DOCD: CPT | Performed by: INTERNAL MEDICINE

## 2021-11-22 PROCEDURE — 99214 OFFICE O/P EST MOD 30 MIN: CPT | Performed by: INTERNAL MEDICINE

## 2021-11-22 PROCEDURE — 3725F SCREEN DEPRESSION PERFORMED: CPT | Performed by: INTERNAL MEDICINE

## 2021-12-01 ENCOUNTER — TELEPHONE (OUTPATIENT)
Dept: GASTROENTEROLOGY | Facility: HOSPITAL | Age: 51
End: 2021-12-01

## 2021-12-01 ENCOUNTER — OFFICE VISIT (OUTPATIENT)
Dept: GASTROENTEROLOGY | Facility: CLINIC | Age: 51
End: 2021-12-01
Payer: COMMERCIAL

## 2021-12-01 ENCOUNTER — APPOINTMENT (OUTPATIENT)
Dept: LAB | Facility: HOSPITAL | Age: 51
End: 2021-12-01
Payer: COMMERCIAL

## 2021-12-01 VITALS
HEART RATE: 92 BPM | WEIGHT: 245 LBS | HEIGHT: 63 IN | SYSTOLIC BLOOD PRESSURE: 106 MMHG | BODY MASS INDEX: 43.41 KG/M2 | DIASTOLIC BLOOD PRESSURE: 80 MMHG

## 2021-12-01 DIAGNOSIS — K51.018 ULCERATIVE PANCOLITIS WITH OTHER COMPLICATION (HCC): Primary | ICD-10-CM

## 2021-12-01 DIAGNOSIS — K51.018 ULCERATIVE PANCOLITIS WITH OTHER COMPLICATION (HCC): ICD-10-CM

## 2021-12-01 LAB
BASOPHILS # BLD AUTO: 0.05 THOUSANDS/ΜL (ref 0–0.1)
BASOPHILS NFR BLD AUTO: 1 % (ref 0–1)
CRP SERPL QL: 150.2 MG/L
EOSINOPHIL # BLD AUTO: 0.1 THOUSAND/ΜL (ref 0–0.61)
EOSINOPHIL NFR BLD AUTO: 1 % (ref 0–6)
ERYTHROCYTE [DISTWIDTH] IN BLOOD BY AUTOMATED COUNT: 13.9 % (ref 11.6–15.1)
ERYTHROCYTE [SEDIMENTATION RATE] IN BLOOD: 93 MM/HOUR (ref 0–19)
HCT VFR BLD AUTO: 38.2 % (ref 34.8–46.1)
HGB BLD-MCNC: 12.3 G/DL (ref 11.5–15.4)
IMM GRANULOCYTES # BLD AUTO: 0.09 THOUSAND/UL (ref 0–0.2)
IMM GRANULOCYTES NFR BLD AUTO: 1 % (ref 0–2)
LYMPHOCYTES # BLD AUTO: 1.15 THOUSANDS/ΜL (ref 0.6–4.47)
LYMPHOCYTES NFR BLD AUTO: 15 % (ref 14–44)
MCH RBC QN AUTO: 28.5 PG (ref 26.8–34.3)
MCHC RBC AUTO-ENTMCNC: 32.2 G/DL (ref 31.4–37.4)
MCV RBC AUTO: 88 FL (ref 82–98)
MONOCYTES # BLD AUTO: 0.8 THOUSAND/ΜL (ref 0.17–1.22)
MONOCYTES NFR BLD AUTO: 10 % (ref 4–12)
NEUTROPHILS # BLD AUTO: 5.63 THOUSANDS/ΜL (ref 1.85–7.62)
NEUTS SEG NFR BLD AUTO: 72 % (ref 43–75)
NRBC BLD AUTO-RTO: 0 /100 WBCS
PLATELET # BLD AUTO: 428 THOUSANDS/UL (ref 149–390)
PMV BLD AUTO: 9.3 FL (ref 8.9–12.7)
RBC # BLD AUTO: 4.32 MILLION/UL (ref 3.81–5.12)
WBC # BLD AUTO: 7.82 THOUSAND/UL (ref 4.31–10.16)

## 2021-12-01 PROCEDURE — 80145 DRUG ASSAY ADALIMUMAB: CPT

## 2021-12-01 PROCEDURE — 83993 ASSAY FOR CALPROTECTIN FECAL: CPT

## 2021-12-01 PROCEDURE — 87493 C DIFF AMPLIFIED PROBE: CPT

## 2021-12-01 PROCEDURE — 99213 OFFICE O/P EST LOW 20 MIN: CPT | Performed by: PHYSICIAN ASSISTANT

## 2021-12-01 PROCEDURE — 86140 C-REACTIVE PROTEIN: CPT

## 2021-12-01 PROCEDURE — 36415 COLL VENOUS BLD VENIPUNCTURE: CPT

## 2021-12-01 PROCEDURE — 82397 CHEMILUMINESCENT ASSAY: CPT

## 2021-12-01 PROCEDURE — 85652 RBC SED RATE AUTOMATED: CPT

## 2021-12-01 PROCEDURE — 85025 COMPLETE CBC W/AUTO DIFF WBC: CPT

## 2021-12-01 PROCEDURE — 3008F BODY MASS INDEX DOCD: CPT | Performed by: PHYSICIAN ASSISTANT

## 2021-12-02 ENCOUNTER — ANESTHESIA (OUTPATIENT)
Dept: GASTROENTEROLOGY | Facility: HOSPITAL | Age: 51
End: 2021-12-02

## 2021-12-02 ENCOUNTER — HOSPITAL ENCOUNTER (OUTPATIENT)
Dept: GASTROENTEROLOGY | Facility: HOSPITAL | Age: 51
Setting detail: OUTPATIENT SURGERY
Discharge: HOME | End: 2021-12-02
Admitting: PHYSICIAN ASSISTANT
Payer: COMMERCIAL

## 2021-12-02 ENCOUNTER — TELEPHONE (OUTPATIENT)
Dept: GASTROENTEROLOGY | Facility: CLINIC | Age: 51
End: 2021-12-02

## 2021-12-02 ENCOUNTER — ANESTHESIA EVENT (OUTPATIENT)
Dept: GASTROENTEROLOGY | Facility: HOSPITAL | Age: 51
End: 2021-12-02

## 2021-12-02 VITALS
DIASTOLIC BLOOD PRESSURE: 56 MMHG | RESPIRATION RATE: 19 BRPM | HEART RATE: 95 BPM | WEIGHT: 240.96 LBS | SYSTOLIC BLOOD PRESSURE: 122 MMHG | BODY MASS INDEX: 42.7 KG/M2 | HEIGHT: 63 IN | OXYGEN SATURATION: 99 % | TEMPERATURE: 97.5 F

## 2021-12-02 DIAGNOSIS — K51.018 ULCERATIVE PANCOLITIS WITH OTHER COMPLICATION (HCC): ICD-10-CM

## 2021-12-02 PROBLEM — E66.01 OBESITY, MORBID (HCC): Status: ACTIVE | Noted: 2019-08-15

## 2021-12-02 LAB
C DIFF TOX A+B STL QL IA: NEGATIVE
C DIFF TOX GENS STL QL NAA+PROBE: POSITIVE

## 2021-12-02 PROCEDURE — 88342 IMHCHEM/IMCYTCHM 1ST ANTB: CPT | Performed by: PATHOLOGY

## 2021-12-02 PROCEDURE — 44389 COLONOSCOPY WITH BIOPSY: CPT | Performed by: INTERNAL MEDICINE

## 2021-12-02 PROCEDURE — 88305 TISSUE EXAM BY PATHOLOGIST: CPT | Performed by: PATHOLOGY

## 2021-12-02 RX ORDER — SODIUM CHLORIDE, SODIUM LACTATE, POTASSIUM CHLORIDE, CALCIUM CHLORIDE 600; 310; 30; 20 MG/100ML; MG/100ML; MG/100ML; MG/100ML
INJECTION, SOLUTION INTRAVENOUS CONTINUOUS PRN
Status: DISCONTINUED | OUTPATIENT
Start: 2021-12-02 | End: 2021-12-02

## 2021-12-02 RX ORDER — SODIUM CHLORIDE, SODIUM LACTATE, POTASSIUM CHLORIDE, CALCIUM CHLORIDE 600; 310; 30; 20 MG/100ML; MG/100ML; MG/100ML; MG/100ML
125 INJECTION, SOLUTION INTRAVENOUS CONTINUOUS
Status: DISCONTINUED | OUTPATIENT
Start: 2021-12-02 | End: 2021-12-06 | Stop reason: HOSPADM

## 2021-12-02 RX ORDER — LIDOCAINE HYDROCHLORIDE 10 MG/ML
INJECTION, SOLUTION EPIDURAL; INFILTRATION; INTRACAUDAL; PERINEURAL AS NEEDED
Status: DISCONTINUED | OUTPATIENT
Start: 2021-12-02 | End: 2021-12-02

## 2021-12-02 RX ORDER — PROPOFOL 10 MG/ML
INJECTION, EMULSION INTRAVENOUS AS NEEDED
Status: DISCONTINUED | OUTPATIENT
Start: 2021-12-02 | End: 2021-12-02

## 2021-12-02 RX ADMIN — PROPOFOL 20 MG: 10 INJECTION, EMULSION INTRAVENOUS at 11:36

## 2021-12-02 RX ADMIN — PROPOFOL 100 MG: 10 INJECTION, EMULSION INTRAVENOUS at 11:28

## 2021-12-02 RX ADMIN — PROPOFOL 20 MG: 10 INJECTION, EMULSION INTRAVENOUS at 11:29

## 2021-12-02 RX ADMIN — PROPOFOL 20 MG: 10 INJECTION, EMULSION INTRAVENOUS at 11:32

## 2021-12-02 RX ADMIN — PROPOFOL 20 MG: 10 INJECTION, EMULSION INTRAVENOUS at 11:38

## 2021-12-02 RX ADMIN — PROPOFOL 30 MG: 10 INJECTION, EMULSION INTRAVENOUS at 11:30

## 2021-12-02 RX ADMIN — LIDOCAINE HYDROCHLORIDE 20 MG: 10 INJECTION, SOLUTION EPIDURAL; INFILTRATION; INTRACAUDAL; PERINEURAL at 11:28

## 2021-12-02 RX ADMIN — PROPOFOL 20 MG: 10 INJECTION, EMULSION INTRAVENOUS at 11:34

## 2021-12-02 RX ADMIN — SODIUM CHLORIDE, SODIUM LACTATE, POTASSIUM CHLORIDE, AND CALCIUM CHLORIDE 125 ML/HR: .6; .31; .03; .02 INJECTION, SOLUTION INTRAVENOUS at 11:18

## 2021-12-03 ENCOUNTER — TELEPHONE (OUTPATIENT)
Dept: GASTROENTEROLOGY | Facility: CLINIC | Age: 51
End: 2021-12-03

## 2021-12-03 LAB — CALPROTECTIN STL-MCNT: 3022 UG/G (ref 0–120)

## 2021-12-06 LAB
ADALIMUMAB AB SERPL-MCNC: 84 NG/ML
ADALIMUMAB SERPL-MCNC: 7.1 UG/ML

## 2021-12-07 ENCOUNTER — TELEPHONE (OUTPATIENT)
Dept: GASTROENTEROLOGY | Facility: CLINIC | Age: 51
End: 2021-12-07

## 2021-12-08 DIAGNOSIS — K51.018 ULCERATIVE PANCOLITIS WITH OTHER COMPLICATION (HCC): Primary | ICD-10-CM

## 2021-12-08 DIAGNOSIS — K51.00 ULCERATIVE PANCOLITIS WITHOUT COMPLICATION (HCC): ICD-10-CM

## 2021-12-08 RX ORDER — ADALIMUMAB 40MG/0.4ML
40 KIT SUBCUTANEOUS WEEKLY
Qty: 4 EACH | Refills: 11 | Status: SHIPPED | OUTPATIENT
Start: 2021-12-08

## 2021-12-09 ENCOUNTER — OFFICE VISIT (OUTPATIENT)
Dept: GASTROENTEROLOGY | Facility: CLINIC | Age: 51
End: 2021-12-09
Payer: COMMERCIAL

## 2021-12-09 ENCOUNTER — TELEPHONE (OUTPATIENT)
Dept: GASTROENTEROLOGY | Facility: CLINIC | Age: 51
End: 2021-12-09

## 2021-12-09 VITALS — SYSTOLIC BLOOD PRESSURE: 130 MMHG | DIASTOLIC BLOOD PRESSURE: 84 MMHG

## 2021-12-09 DIAGNOSIS — K51.018 ULCERATIVE PANCOLITIS WITH OTHER COMPLICATION (HCC): Primary | ICD-10-CM

## 2021-12-09 PROCEDURE — 1036F TOBACCO NON-USER: CPT | Performed by: PHYSICIAN ASSISTANT

## 2021-12-09 PROCEDURE — 99213 OFFICE O/P EST LOW 20 MIN: CPT | Performed by: PHYSICIAN ASSISTANT

## 2021-12-14 ENCOUNTER — TELEPHONE (OUTPATIENT)
Dept: GASTROENTEROLOGY | Facility: CLINIC | Age: 51
End: 2021-12-14

## 2022-01-04 RX ORDER — MESALAMINE 800 MG/1
800 TABLET, DELAYED RELEASE ORAL 4 TIMES DAILY
Qty: 120 TABLET | Refills: 3 | Status: SHIPPED | OUTPATIENT
Start: 2022-01-04 | End: 2022-05-01

## 2022-01-05 ENCOUNTER — APPOINTMENT (OUTPATIENT)
Dept: LAB | Facility: CLINIC | Age: 52
End: 2022-01-05
Payer: COMMERCIAL

## 2022-01-05 DIAGNOSIS — K51.018 ULCERATIVE PANCOLITIS WITH OTHER COMPLICATION (HCC): ICD-10-CM

## 2022-01-05 LAB
ALBUMIN SERPL BCP-MCNC: 3.6 G/DL (ref 3.5–5)
ALP SERPL-CCNC: 44 U/L (ref 46–116)
ALT SERPL W P-5'-P-CCNC: 33 U/L (ref 12–78)
ANION GAP SERPL CALCULATED.3IONS-SCNC: 7 MMOL/L (ref 4–13)
AST SERPL W P-5'-P-CCNC: 11 U/L (ref 5–45)
BASOPHILS # BLD AUTO: 0.06 THOUSANDS/ΜL (ref 0–0.1)
BASOPHILS NFR BLD AUTO: 1 % (ref 0–1)
BILIRUB SERPL-MCNC: 1.24 MG/DL (ref 0.2–1)
BUN SERPL-MCNC: 26 MG/DL (ref 5–25)
CALCIUM SERPL-MCNC: 9.4 MG/DL (ref 8.3–10.1)
CHLORIDE SERPL-SCNC: 105 MMOL/L (ref 100–108)
CO2 SERPL-SCNC: 26 MMOL/L (ref 21–32)
CREAT SERPL-MCNC: 0.94 MG/DL (ref 0.6–1.3)
CRP SERPL QL: <3 MG/L
EOSINOPHIL # BLD AUTO: 0.06 THOUSAND/ΜL (ref 0–0.61)
EOSINOPHIL NFR BLD AUTO: 1 % (ref 0–6)
ERYTHROCYTE [DISTWIDTH] IN BLOOD BY AUTOMATED COUNT: 18.1 % (ref 11.6–15.1)
ERYTHROCYTE [SEDIMENTATION RATE] IN BLOOD: 5 MM/HOUR (ref 0–19)
GFR SERPL CREATININE-BSD FRML MDRD: 71 ML/MIN/1.73SQ M
GLUCOSE P FAST SERPL-MCNC: 94 MG/DL (ref 65–99)
HCT VFR BLD AUTO: 39.3 % (ref 34.8–46.1)
HGB BLD-MCNC: 12.4 G/DL (ref 11.5–15.4)
IMM GRANULOCYTES # BLD AUTO: 0.18 THOUSAND/UL (ref 0–0.2)
IMM GRANULOCYTES NFR BLD AUTO: 2 % (ref 0–2)
LYMPHOCYTES # BLD AUTO: 1.21 THOUSANDS/ΜL (ref 0.6–4.47)
LYMPHOCYTES NFR BLD AUTO: 11 % (ref 14–44)
MCH RBC QN AUTO: 29.2 PG (ref 26.8–34.3)
MCHC RBC AUTO-ENTMCNC: 31.6 G/DL (ref 31.4–37.4)
MCV RBC AUTO: 93 FL (ref 82–98)
MONOCYTES # BLD AUTO: 0.36 THOUSAND/ΜL (ref 0.17–1.22)
MONOCYTES NFR BLD AUTO: 3 % (ref 4–12)
NEUTROPHILS # BLD AUTO: 9.45 THOUSANDS/ΜL (ref 1.85–7.62)
NEUTS SEG NFR BLD AUTO: 82 % (ref 43–75)
NRBC BLD AUTO-RTO: 0 /100 WBCS
PLATELET # BLD AUTO: 353 THOUSANDS/UL (ref 149–390)
PMV BLD AUTO: 9.9 FL (ref 8.9–12.7)
POTASSIUM SERPL-SCNC: 3.9 MMOL/L (ref 3.5–5.3)
PROT SERPL-MCNC: 7.1 G/DL (ref 6.4–8.2)
RBC # BLD AUTO: 4.25 MILLION/UL (ref 3.81–5.12)
SODIUM SERPL-SCNC: 138 MMOL/L (ref 136–145)
WBC # BLD AUTO: 11.32 THOUSAND/UL (ref 4.31–10.16)

## 2022-01-05 PROCEDURE — 85652 RBC SED RATE AUTOMATED: CPT

## 2022-01-05 PROCEDURE — 36415 COLL VENOUS BLD VENIPUNCTURE: CPT

## 2022-01-05 PROCEDURE — 80053 COMPREHEN METABOLIC PANEL: CPT

## 2022-01-05 PROCEDURE — 86140 C-REACTIVE PROTEIN: CPT

## 2022-01-05 PROCEDURE — 82542 COL CHROMOTOGRAPHY QUAL/QUAN: CPT

## 2022-01-05 PROCEDURE — 85025 COMPLETE CBC W/AUTO DIFF WBC: CPT

## 2022-01-06 ENCOUNTER — APPOINTMENT (OUTPATIENT)
Dept: LAB | Facility: CLINIC | Age: 52
End: 2022-01-06
Payer: COMMERCIAL

## 2022-01-06 ENCOUNTER — TELEPHONE (OUTPATIENT)
Dept: GASTROENTEROLOGY | Facility: CLINIC | Age: 52
End: 2022-01-06

## 2022-01-06 DIAGNOSIS — R19.7 DIARRHEA, UNSPECIFIED TYPE: ICD-10-CM

## 2022-01-06 DIAGNOSIS — R19.7 DIARRHEA OF PRESUMED INFECTIOUS ORIGIN: Primary | ICD-10-CM

## 2022-01-06 PROCEDURE — 87205 SMEAR GRAM STAIN: CPT

## 2022-01-06 PROCEDURE — 87505 NFCT AGENT DETECTION GI: CPT

## 2022-01-06 NOTE — TELEPHONE ENCOUNTER
Dr Vadim Romero - patient called to check on the status of the antibiotic    Please call Nanci Garg at [phone] ty

## 2022-01-06 NOTE — TELEPHONE ENCOUNTER
Marlys pt-  Patient is requesting an antibiotic  Lele Merle She stated her bag leaked     Uses: [facility] [phone]  Please phone [phone] to advise

## 2022-01-07 ENCOUNTER — TELEPHONE (OUTPATIENT)
Dept: GASTROENTEROLOGY | Facility: CLINIC | Age: 52
End: 2022-01-07

## 2022-01-07 ENCOUNTER — TELEPHONE (OUTPATIENT)
Dept: SURGERY | Facility: CLINIC | Age: 52
End: 2022-01-07

## 2022-01-07 LAB
C DIFF TOX A+B STL QL IA: NEGATIVE
C DIFF TOX GENS STL QL NAA+PROBE: POSITIVE
CAMPYLOBACTER DNA SPEC NAA+PROBE: NORMAL
SALMONELLA DNA SPEC QL NAA+PROBE: NORMAL
SHIGA TOXIN STX GENE SPEC NAA+PROBE: NORMAL
SHIGELLA DNA SPEC QL NAA+PROBE: NORMAL

## 2022-01-07 NOTE — TELEPHONE ENCOUNTER
Spoke with patient over phone  Will send prescription for antibiotics to the pharmacy  Patient is actually reporting significant improvement in symptoms  Patient is still on Imuran 50 mg daily as well as Humira weekly  Patient is down to 30 mg of prednisone daily  Patient reports she is emptying her bag twice a day and the stool is mostly formed  Patient denies any blood in her stool  Patient reports occasional left-sided abdominal pain  Patient denies any nausea or vomiting  Patient's appetite is well  Patient did have an appointment today for 11:00 a m  Will push back Her appointment 2 weeks  Laboratories reviewed  Stool cultures pending

## 2022-01-07 NOTE — TELEPHONE ENCOUNTER
Hallierenato Neville - patient called tried to call the [employer] discount number [phone] and all she hear was advertisements  Can we help patient to get her Humira Pen   Please call vaughn at [phone] ty

## 2022-01-08 LAB — WBC STL QL MICRO: NORMAL

## 2022-01-10 ENCOUNTER — OFFICE VISIT (OUTPATIENT)
Dept: INTERNAL MEDICINE CLINIC | Facility: CLINIC | Age: 52
End: 2022-01-10
Payer: COMMERCIAL

## 2022-01-10 VITALS
WEIGHT: 254.8 LBS | BODY MASS INDEX: 45.15 KG/M2 | TEMPERATURE: 98.4 F | OXYGEN SATURATION: 99 % | DIASTOLIC BLOOD PRESSURE: 74 MMHG | HEIGHT: 63 IN | HEART RATE: 100 BPM | SYSTOLIC BLOOD PRESSURE: 132 MMHG

## 2022-01-10 DIAGNOSIS — M17.0 PRIMARY OSTEOARTHRITIS OF BOTH KNEES: ICD-10-CM

## 2022-01-10 DIAGNOSIS — E03.9 ACQUIRED HYPOTHYROIDISM: ICD-10-CM

## 2022-01-10 DIAGNOSIS — K51.018 ULCERATIVE PANCOLITIS WITH OTHER COMPLICATION (HCC): Primary | ICD-10-CM

## 2022-01-10 DIAGNOSIS — Z93.3 COLOSTOMY IN PLACE (HCC): ICD-10-CM

## 2022-01-10 DIAGNOSIS — J45.20 MILD INTERMITTENT ASTHMA WITHOUT COMPLICATION: ICD-10-CM

## 2022-01-10 LAB
REF LAB TEST METHOD: NORMAL
TEST INTERPRETATION: NORMAL
TPMT RBC-CCNC: 24.8 UNITS/ML RBC

## 2022-01-10 PROCEDURE — 99214 OFFICE O/P EST MOD 30 MIN: CPT | Performed by: INTERNAL MEDICINE
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
## 2022-01-11 NOTE — TELEPHONE ENCOUNTER
Spoke with pt  She advised she was given the wrong phone number to patient assistance  Completed the Humira enrollment form to initiate pt assistance and faxed it to [employer]  Called Eddie () for assistant  Advised he will have Lisandro contact me to help get pt sry-up with copayment assistance  Pt is due to take her injection on Friday 1/1/22  Bernie said to let pt know that the ambassador will also be contacting pt with-in 1 -2 days to get things started to look out for the phone call  Called pt and made her aware

## 2022-01-12 NOTE — TELEPHONE ENCOUNTER
Marlys patient - Complete  LMOM that they need this patient's insurance Info for prior-auth on med ([phone])  Mon-Fri, 7-7  RTRN call - BODØ transferre to    Ze Ram specialist , [facility] [place], [place] [id number]: Gerard Riley: [id number]    x

## 2022-01-18 ENCOUNTER — OFFICE VISIT (OUTPATIENT)
Dept: GASTROENTEROLOGY | Facility: CLINIC | Age: 52
End: 2022-01-18
Payer: COMMERCIAL

## 2022-01-18 VITALS
BODY MASS INDEX: 45.18 KG/M2 | SYSTOLIC BLOOD PRESSURE: 130 MMHG | WEIGHT: 255 LBS | DIASTOLIC BLOOD PRESSURE: 72 MMHG | HEIGHT: 63 IN

## 2022-01-18 DIAGNOSIS — K51.018 ULCERATIVE PANCOLITIS WITH OTHER COMPLICATION (HCC): Primary | ICD-10-CM

## 2022-01-18 PROCEDURE — 99213 OFFICE O/P EST LOW 20 MIN: CPT | Performed by: PHYSICIAN ASSISTANT

## 2022-01-18 PROCEDURE — 3008F BODY MASS INDEX DOCD: CPT | Performed by: PHYSICIAN ASSISTANT

## 2022-01-18 PROCEDURE — 1036F TOBACCO NON-USER: CPT | Performed by: PHYSICIAN ASSISTANT

## 2022-01-18 NOTE — PATIENT INSTRUCTIONS
Colitis   WHAT YOU NEED TO KNOW:   Colitis is swelling and irritation of your colon  Colitis may be caused by ulcers or a problem with your immune system  Bacteria, a virus, or a parasite may also cause colitis  The cause may not be known  You may have diarrhea, abdominal pain, fever, or blood or mucus in your bowel movement  DISCHARGE INSTRUCTIONS:   Return to the emergency department if:   · You have sudden trouble breathing  · Your bowel movements are black or have blood in them  · You have blood in your vomit  · You have severe abdominal pain or your abdomen is swollen and feels hard  · You have any of the following signs of dehydration:     ? Dizziness or weakness    ? Dry mouth, cracked lips, or severe thirst    ? Fast heartbeat or breathing    ? Urinating very little or not at all    Call your doctor if:   · Your symptoms get worse or do not go away  · You have a fever, chills, cough, or feel weak and achy  · You suddenly lose weight without trying  · You have questions or concerns about your condition or care  Medicines:   · Medicines  may be given to decrease inflammation in your colon and treat diarrhea  · Take your medicine as directed  Contact your healthcare provider if you think your medicine is not helping or if you have side effects  Tell him of her if you are allergic to any medicine  Keep a list of the medicines, vitamins, and herbs you take  Include the amounts, and when and why you take them  Bring the list or the pill bottles to follow-up visits  Carry your medicine list with you in case of an emergency  Manage your symptoms:   · Drink liquids as directed to help prevent dehydration  Good liquids to drink include water, juice, and broth  Ask how much liquid to drink each day  You may need to drink an oral rehydration solution (ORS)  An ORS contains a balance of water, salt, and sugar to replace body fluids lost during diarrhea      · Eat a variety of healthy foods   Healthy foods include fruits, vegetables, whole-grain breads, beans, low-fat dairy products, lean meats, and fish  You may need to eat several small meals throughout the day instead of large meals  Avoid spicy foods, caffeine, chocolate, and foods high in fat  · Talk to your healthcare provider before you take NSAIDs  NSAIDs can cause worsen your symptoms if ulcers are causing your colitis  · Start to exercise when you feel better  Regular exercise helps your bowels work normally  Ask about the best exercise plan for you  Prevent the spread of germs:       · Wash your hands often  Wash your hands several times each day  Wash after you use the bathroom, change a child's diaper, and before you prepare or eat food  Use soap and water every time  Rub your soapy hands together, lacing your fingers  Wash the front and back of your hands, and in between your fingers  Use the fingers of one hand to scrub under the fingernails of the other hand  Wash for at least 20 seconds  Rinse with warm, running water for several seconds  Then dry your hands with a clean towel or paper towel  Use hand  that contains alcohol if soap and water are not available  Do not touch your eyes, nose, or mouth without washing your hands first          · Cover a sneeze or cough  Use a tissue that covers your mouth and nose  Throw the tissue away in a trash can right away  Use the bend of your arm if a tissue is not available  Wash your hands well with soap and water or use a hand   · Clean surfaces often  Clean doorknobs, countertops, cell phones, and other surfaces that are touched often  Use a disinfecting wipe, a single-use sponge, or a cloth you can wash and reuse  Use disinfecting  if you do not have wipes  You can create a disinfecting  by mixing 1 part bleach with 10 parts water  · Ask about vaccines you may need  Vaccines help prevent disease caused by some viruses and bacteria  Get the influenza (flu) vaccine as soon as recommended each year  The flu vaccine is usually available starting in September or October  Flu viruses change, so it is important to get a flu vaccine every year  Get the pneumonia vaccine if recommended  This vaccine is usually recommended every 5 years  Your provider will tell you when to get this vaccine, if needed  Your healthcare provider can tell you if you should get other vaccines, and when to get them  Follow up with your doctor as directed: You may need to return for a colonoscopy or other tests  Write down how often you have a bowel movements and what they look like  Bring this to your follow-up visits  Write down your questions so you remember to ask them during your visits  © Copyright [employer] 2021 Information is for End User's use only and may not be sold, redistributed or otherwise used for commercial purposes  All illustrations and images included in CareNotes® are the copyrighted property of A D A M , Inc  or Oumar Acosta  The above information is an  only  It is not intended as medical advice for individual conditions or treatments  Talk to your doctor, nurse or pharmacist before following any medical regimen to see if it is safe and effective for you
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
## 2022-02-15 DIAGNOSIS — Z78.9 USES BIRTH CONTROL: ICD-10-CM

## 2022-02-15 RX ORDER — LEVONORGESTREL AND ETHINYL ESTRADIOL 0.1-0.02MG
KIT ORAL
Qty: 28 TABLET | Refills: 3 | Status: SHIPPED | OUTPATIENT
Start: 2022-02-15 | End: 2022-06-09 | Stop reason: SDUPTHER

## 2022-03-02 ENCOUNTER — TELEPHONE (OUTPATIENT)
Dept: GASTROENTEROLOGY | Facility: CLINIC | Age: 52
End: 2022-03-02

## 2022-03-02 NOTE — TELEPHONE ENCOUNTER
Spoke with patient  History of Ulcerative pancolitis    Patient c/o stoma becoming more red since stopping her prednisone 2 weeks ago  Taking Humira daily, Imuran 50mg daily, mesalamine 800mg QID  Patient has a follow-up on 3/9/2022, she sees general surgery 3/16/2022  Any suggestions?

## 2022-03-02 NOTE — TELEPHONE ENCOUNTER
Tommy Torres - patient called she stopped her  Prednisone 2 weeks ago  A;; f flared up and all red again   Please call Dennis Rivas [phone] OV 03/09/22 with Tommy Torres

## 2022-03-03 NOTE — TELEPHONE ENCOUNTER
Spoke with patient she is emptying her bag twice a day a day with occasional pain  NO bleeding  No nausea or vomiting  Her stoma is red and inflamed again  This happened after stopping the steroids  Any other thoughts?

## 2022-03-07 NOTE — TELEPHONE ENCOUNTER
Spoke with patient  She is going for labs and stool test tomorrow  Please call and see if we can get patient in to see Dr Nguyen Del Real as soon as possible  Thank you!

## 2022-03-08 ENCOUNTER — TELEPHONE (OUTPATIENT)
Dept: GASTROENTEROLOGY | Facility: CLINIC | Age: 52
End: 2022-03-08

## 2022-03-08 ENCOUNTER — APPOINTMENT (OUTPATIENT)
Dept: LAB | Facility: CLINIC | Age: 52
End: 2022-03-08
Payer: COMMERCIAL

## 2022-03-08 DIAGNOSIS — K51.018 ULCERATIVE PANCOLITIS WITH OTHER COMPLICATION (HCC): ICD-10-CM

## 2022-03-08 LAB
ALBUMIN SERPL BCP-MCNC: 2.8 G/DL (ref 3.5–5)
ALP SERPL-CCNC: 63 U/L (ref 46–116)
ALT SERPL W P-5'-P-CCNC: 31 U/L (ref 12–78)
ANION GAP SERPL CALCULATED.3IONS-SCNC: 8 MMOL/L (ref 4–13)
AST SERPL W P-5'-P-CCNC: 21 U/L (ref 5–45)
BASOPHILS # BLD AUTO: 0.06 THOUSANDS/ΜL (ref 0–0.1)
BASOPHILS NFR BLD AUTO: 1 % (ref 0–1)
BILIRUB SERPL-MCNC: 0.41 MG/DL (ref 0.2–1)
BUN SERPL-MCNC: 8 MG/DL (ref 5–25)
CALCIUM ALBUM COR SERPL-MCNC: 10.1 MG/DL (ref 8.3–10.1)
CALCIUM SERPL-MCNC: 9.1 MG/DL (ref 8.3–10.1)
CHLORIDE SERPL-SCNC: 107 MMOL/L (ref 100–108)
CO2 SERPL-SCNC: 20 MMOL/L (ref 21–32)
CREAT SERPL-MCNC: 1.03 MG/DL (ref 0.6–1.3)
CRP SERPL QL: 75.7 MG/L
EOSINOPHIL # BLD AUTO: 0.24 THOUSAND/ΜL (ref 0–0.61)
EOSINOPHIL NFR BLD AUTO: 3 % (ref 0–6)
ERYTHROCYTE [DISTWIDTH] IN BLOOD BY AUTOMATED COUNT: 15.6 % (ref 11.6–15.1)
ERYTHROCYTE [SEDIMENTATION RATE] IN BLOOD: 69 MM/HOUR (ref 0–19)
GFR SERPL CREATININE-BSD FRML MDRD: 63 ML/MIN/1.73SQ M
GLUCOSE P FAST SERPL-MCNC: 98 MG/DL (ref 65–99)
HCT VFR BLD AUTO: 34.3 % (ref 34.8–46.1)
HGB BLD-MCNC: 11 G/DL (ref 11.5–15.4)
IMM GRANULOCYTES # BLD AUTO: 0.06 THOUSAND/UL (ref 0–0.2)
IMM GRANULOCYTES NFR BLD AUTO: 1 % (ref 0–2)
LYMPHOCYTES # BLD AUTO: 0.86 THOUSANDS/ΜL (ref 0.6–4.47)
LYMPHOCYTES NFR BLD AUTO: 10 % (ref 14–44)
MCH RBC QN AUTO: 30.6 PG (ref 26.8–34.3)
MCHC RBC AUTO-ENTMCNC: 32.1 G/DL (ref 31.4–37.4)
MCV RBC AUTO: 96 FL (ref 82–98)
MONOCYTES # BLD AUTO: 0.69 THOUSAND/ΜL (ref 0.17–1.22)
MONOCYTES NFR BLD AUTO: 8 % (ref 4–12)
NEUTROPHILS # BLD AUTO: 6.43 THOUSANDS/ΜL (ref 1.85–7.62)
NEUTS SEG NFR BLD AUTO: 77 % (ref 43–75)
NRBC BLD AUTO-RTO: 0 /100 WBCS
PLATELET # BLD AUTO: 504 THOUSANDS/UL (ref 149–390)
PMV BLD AUTO: 9.6 FL (ref 8.9–12.7)
POTASSIUM SERPL-SCNC: 3.9 MMOL/L (ref 3.5–5.3)
PROT SERPL-MCNC: 7.9 G/DL (ref 6.4–8.2)
RBC # BLD AUTO: 3.59 MILLION/UL (ref 3.81–5.12)
SODIUM SERPL-SCNC: 135 MMOL/L (ref 136–145)
WBC # BLD AUTO: 8.34 THOUSAND/UL (ref 4.31–10.16)

## 2022-03-08 PROCEDURE — 85025 COMPLETE CBC W/AUTO DIFF WBC: CPT

## 2022-03-08 PROCEDURE — 80053 COMPREHEN METABOLIC PANEL: CPT

## 2022-03-08 PROCEDURE — 86140 C-REACTIVE PROTEIN: CPT

## 2022-03-08 PROCEDURE — 85652 RBC SED RATE AUTOMATED: CPT

## 2022-03-08 PROCEDURE — 82397 CHEMILUMINESCENT ASSAY: CPT

## 2022-03-08 PROCEDURE — 83993 ASSAY FOR CALPROTECTIN FECAL: CPT

## 2022-03-08 PROCEDURE — 80145 DRUG ASSAY ADALIMUMAB: CPT

## 2022-03-08 PROCEDURE — 36415 COLL VENOUS BLD VENIPUNCTURE: CPT

## 2022-03-08 NOTE — TELEPHONE ENCOUNTER
I think we can cancel her appointment here tomorrow since she is going to be followed up with Dr Sergio Stanley  They going to be reaching out and scheduling an appointment for her thank you!

## 2022-03-08 NOTE — TELEPHONE ENCOUNTER
Tamir Flood-  Patient needs to know if she needs to keep her office appt tomorrow? Or are you referring her elsewhere?    Please phone [phone]

## 2022-03-10 LAB — CALPROTECTIN STL-MCNT: 778 UG/G (ref 0–120)

## 2022-03-14 DIAGNOSIS — K51.018 ULCERATIVE PANCOLITIS WITH OTHER COMPLICATION (HCC): Primary | ICD-10-CM

## 2022-03-14 NOTE — TELEPHONE ENCOUNTER
Rock - Patient called - Dr Arlette Zamarripa is scheduling into May  Benji Schrader is scheduled with a PA 04/05/22  Will this be okay  Benji Schrader stated that her stools are like water after she stopped the prednisone  What would you like her to do?

## 2022-03-15 NOTE — TELEPHONE ENCOUNTER
Yes if that is the soonest they have but maybe we should put her back on a prednisone taper    40mg for 1 week, and taper by 10mg weekly! Thank you

## 2022-03-16 ENCOUNTER — OFFICE VISIT (OUTPATIENT)
Dept: SURGERY | Facility: CLINIC | Age: 52
End: 2022-03-16
Payer: COMMERCIAL

## 2022-03-16 VITALS
OXYGEN SATURATION: 98 % | HEART RATE: 97 BPM | BODY MASS INDEX: 45.89 KG/M2 | HEIGHT: 63 IN | SYSTOLIC BLOOD PRESSURE: 118 MMHG | TEMPERATURE: 97.8 F | RESPIRATION RATE: 16 BRPM | DIASTOLIC BLOOD PRESSURE: 72 MMHG | WEIGHT: 259 LBS

## 2022-03-16 DIAGNOSIS — Z93.3 COLOSTOMY IN PLACE (HCC): ICD-10-CM

## 2022-03-16 DIAGNOSIS — K51.018 ULCERATIVE PANCOLITIS WITH OTHER COMPLICATION (HCC): Primary | ICD-10-CM

## 2022-03-16 PROCEDURE — 99213 OFFICE O/P EST LOW 20 MIN: CPT | Performed by: STUDENT IN AN ORGANIZED HEALTH CARE EDUCATION/TRAINING PROGRAM

## 2022-03-16 NOTE — PROGRESS NOTES
Assessment/Plan:  53-year-old female status post exploratory laparotomy and sigmoid colon resection on 01/26/21,  Abdominal washout and creation of transverse colon colostomy 1/27/21, abdominal washout and closure of abdomen on 01/29/21  -patient presents for evaluation for possible ostomy reversal  -patient had been on a long-standing steroid taper which had kept the ulcerative colitis in check, sadly after this was completed the patient again developed abdominal pain along with watery diarrhea from her ostomy and erythematous ostomy  -patient states that her ostomy output continues to be loose and the ostomy continues to be erythematous  -she does have intermittent abdominal pain on the left side of her abdomen  -at this time I would not plan to reverse the ostomy due to the patient having active colitis  -will refer patient to Colorectal surgery for evaluation and management of ulcerative pancolitis that at this time has only been able to be controlled with steroids, despite other medication management       1  Ulcerative pancolitis with other complication [facility])  -     Ambulatory Referral to Colorectal Surgery; Future    2  Colostomy in place [facility])  -     Ambulatory Referral to Colorectal Surgery; Future               Subjective:      Patient ID: Emil Hooper is a 54 y o  female  Triage Notes:    Patient is a 53-year-old female who presents to office for evaluation for possible colostomy reversal   Patient had been on a long steroid taper to help control her ulcerative colitis  She was also started on other medications to see if this would help  Sadly after she finished her most recent steroid taper she developed another flare and her ostomy became erythematous along with her having multiple liquid bowel movements  She also has some intermittent abdominal pain in left side of her abdomen  Otherwise she has been tolerating a diet well   Patient states that while on the steroid taper she was having normal bowel movements via her ostomy  She states she has gained significant amount of weight secondary to the steroids required to keep the ulcerative colitis in check  The following portions of the patient's history were reviewed and updated as appropriate: allergies, current medications, past family history, past medical history, past social history, past surgical history and problem list     Review of Systems   Constitutional: Negative for chills, fatigue and fever  HENT: Negative for congestion, hearing loss, rhinorrhea and sore throat  Eyes: Negative for pain and discharge  Respiratory: Negative for cough, chest tightness and shortness of breath  Cardiovascular: Negative for chest pain and palpitations  Gastrointestinal: Negative for abdominal pain, constipation, diarrhea, nausea and vomiting  Endocrine: Negative for cold intolerance and heat intolerance  Genitourinary: Negative for difficulty urinating and dysuria  Musculoskeletal: Negative for back pain and neck pain  Skin: Negative for color change and rash  Allergic/Immunologic: Negative for environmental allergies and food allergies  Neurological: Negative for seizures and headaches  Hematological: Negative for adenopathy  Does not bruise/bleed easily  Psychiatric/Behavioral: Negative for confusion and hallucinations  Objective:      /72   Pulse 97   Temp 97 8 °F (36 6 °C) (Oral)   Resp 16   Ht 5' 3" (1 6 m)   Wt 117 kg (259 lb)   SpO2 98%   BMI 45 88 kg/m²     Below is the patient's most recent value for Albumin, ALT, AST, BUN, Calcium, Chloride, Cholesterol, CO2, Creatinine, GFR, Glucose, HDL, Hematocrit, Hemoglobin, Hemoglobin A1C, LDL, Magnesium, Phosphorus, Platelets, Potassium, PSA, Sodium, Triglycerides, and WBC     Lab Results   Component Value Date    ALT 31 03/08/2022    AST 21 03/08/2022    BUN 8 03/08/2022    CALCIUM 9 1 03/08/2022     03/08/2022    CO2 20 (L) 03/08/2022    CREATININE 1 03 03/08/2022    HDL 8 (L) 02/04/2021    HCT 34 3 (L) 03/08/2022    HGB 11 0 (L) 03/08/2022    HGBA1C 6 0 (H) 01/27/2021    MG 1 7 02/09/2021    PHOS 2 8 02/09/2021     (H) 03/08/2022    K 3 9 03/08/2022    TRIG 185 (H) 02/04/2021    WBC 8 34 03/08/2022     Note: for a comprehensive list of the patient's lab results, access the Results Review activity  Physical Exam  Constitutional:       Appearance: Normal appearance  She is obese  HENT:      Head: Normocephalic and atraumatic  Nose: Nose normal    Eyes:      General: No scleral icterus  Conjunctiva/sclera: Conjunctivae normal    Cardiovascular:      Rate and Rhythm: Normal rate  Pulmonary:      Effort: Pulmonary effort is normal    Abdominal:      General: There is no distension  Palpations: Abdomen is soft  Tenderness: There is no abdominal tenderness  Comments: Ostomy in the superior portion of her midline with erythematous mucosa and liquid stool output   Musculoskeletal:         General: No signs of injury  Skin:     General: Skin is warm  Coloration: Skin is not jaundiced  Neurological:      General: No focal deficit present  Mental Status: She is alert and oriented to person, place, and time     Psychiatric:         Mood and Affect: Mood normal          Behavior: Behavior normal

## 2022-03-17 LAB
ADALIMUMAB AB SERPL-MCNC: <25 NG/ML
ADALIMUMAB SERPL-MCNC: 15 UG/ML

## 2022-03-21 ENCOUNTER — OFFICE VISIT (OUTPATIENT)
Dept: INTERNAL MEDICINE CLINIC | Facility: CLINIC | Age: 52
End: 2022-03-21
Payer: COMMERCIAL

## 2022-03-21 ENCOUNTER — TELEPHONE (OUTPATIENT)
Dept: GASTROENTEROLOGY | Facility: CLINIC | Age: 52
End: 2022-03-21

## 2022-03-21 VITALS
BODY MASS INDEX: 45.36 KG/M2 | WEIGHT: 256 LBS | DIASTOLIC BLOOD PRESSURE: 78 MMHG | HEIGHT: 63 IN | TEMPERATURE: 98.7 F | SYSTOLIC BLOOD PRESSURE: 124 MMHG | OXYGEN SATURATION: 98 % | HEART RATE: 77 BPM

## 2022-03-21 DIAGNOSIS — M17.0 PRIMARY OSTEOARTHRITIS OF BOTH KNEES: ICD-10-CM

## 2022-03-21 DIAGNOSIS — J45.30 MILD PERSISTENT ASTHMA WITHOUT COMPLICATION: ICD-10-CM

## 2022-03-21 DIAGNOSIS — K51.018 ULCERATIVE PANCOLITIS WITH OTHER COMPLICATION (HCC): Primary | ICD-10-CM

## 2022-03-21 DIAGNOSIS — J45.20 MILD INTERMITTENT ASTHMA WITHOUT COMPLICATION: ICD-10-CM

## 2022-03-21 DIAGNOSIS — Z93.3 COLOSTOMY IN PLACE (HCC): ICD-10-CM

## 2022-03-21 DIAGNOSIS — M54.40 BILATERAL LOW BACK PAIN WITH SCIATICA, SCIATICA LATERALITY UNSPECIFIED, UNSPECIFIED CHRONICITY: ICD-10-CM

## 2022-03-21 DIAGNOSIS — E03.9 ACQUIRED HYPOTHYROIDISM: ICD-10-CM

## 2022-03-21 PROCEDURE — 3008F BODY MASS INDEX DOCD: CPT | Performed by: INTERNAL MEDICINE

## 2022-03-21 PROCEDURE — 3725F SCREEN DEPRESSION PERFORMED: CPT | Performed by: INTERNAL MEDICINE

## 2022-03-21 PROCEDURE — 99214 OFFICE O/P EST MOD 30 MIN: CPT | Performed by: INTERNAL MEDICINE

## 2022-03-21 PROCEDURE — 1036F TOBACCO NON-USER: CPT | Performed by: INTERNAL MEDICINE

## 2022-03-21 RX ORDER — MELOXICAM 15 MG/1
15 TABLET ORAL DAILY
Qty: 90 TABLET | Refills: 1 | Status: SHIPPED | OUTPATIENT
Start: 2022-03-21

## 2022-03-21 NOTE — TELEPHONE ENCOUNTER
Andreea Dodson pt  Patient left message that she needs a refill of a medication if they still want her on it  Returned her call but voice mail is not set up

## 2022-03-21 NOTE — TELEPHONE ENCOUNTER
Attempted 2x to call patient  Each time got VM  VM not set up so can not leave message   Will try again later

## 2022-03-21 NOTE — LETTER
March 21, 2022     Patient: Miriam Best   YOB: 1970   Date of Visit: 3/21/2022       To Whom it May Concern:    Zechariah Moise is under my professional care  She was seen in my office on 3/21/2022  She may return to work on 07/11/2022  If you have any questions or concerns, please don't hesitate to call  Sincerely,          Kt Hernandez MD        CC: Bonnie L Kerman Gaucher
no
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
## 2022-03-22 NOTE — TELEPHONE ENCOUNTER
Kierra - Patient called lmom would like a prescription sent to  [facility] at [phone] ty   prednisone taper 40 mg 1 week, and taper by 10 mg weekly  Thank youl

## 2022-03-23 DIAGNOSIS — K51.018 ULCERATIVE PANCOLITIS WITH OTHER COMPLICATION (HCC): ICD-10-CM

## 2022-03-23 RX ORDER — PREDNISONE 10 MG/1
TABLET ORAL
Qty: 70 TABLET | Refills: 0 | Status: SHIPPED | OUTPATIENT
Start: 2022-03-23 | End: 2022-04-21

## 2022-03-23 RX ORDER — MERCAPTOPURINE 50 MG/1
50 TABLET ORAL DAILY
Qty: 30 TABLET | Refills: 3 | Status: SHIPPED | OUTPATIENT
Start: 2022-03-23 | End: 2022-07-19

## 2022-03-25 ENCOUNTER — TELEPHONE (OUTPATIENT)
Dept: SURGERY | Facility: CLINIC | Age: 52
End: 2022-03-25

## 2022-03-25 NOTE — TELEPHONE ENCOUNTER
Pt called requesting that we call [facility] for 4x4s and saline  Pt did not provide any other information  I did inform patient that we do not normally do this that's why I needed information on who to call   Pt stated that "for the past year we have been refilling these"    Please advise

## 2022-04-05 ENCOUNTER — TELEPHONE (OUTPATIENT)
Dept: SURGERY | Facility: CLINIC | Age: 52
End: 2022-04-05

## 2022-04-05 NOTE — TELEPHONE ENCOUNTER
Pt lm to return call about supplies         Called pt was ulm to lt her know we do not order the supplies

## 2022-04-08 ENCOUNTER — TELEPHONE (OUTPATIENT)
Dept: SURGERY | Facility: CLINIC | Age: 52
End: 2022-04-08

## 2022-04-08 NOTE — TELEPHONE ENCOUNTER
Dr griffin pt  Has "called several times and left msgs that she needs 4x4 gauze pad and saline solution ( she gets it though [facility]   They get shipped to her house)    VERY LOW  On supplies needs script sent  ASAP   Pt states Dr has sent them a script before and should have their info- she doesn't know their  fax   but his is their number [phone]

## 2022-04-11 ENCOUNTER — TELEPHONE (OUTPATIENT)
Dept: GASTROENTEROLOGY | Facility: MEDICAL CENTER | Age: 52
End: 2022-04-11

## 2022-04-11 NOTE — TELEPHONE ENCOUNTER
Hi,    Dr Katelin Perez reached out to me if I can see this patient  Can you add her on to my schedule either this week or in the end of April or early May ([facility] clinic in May if that works for her)  I am happy to squeeze her in anytime       Thank you

## 2022-04-18 NOTE — PROGRESS NOTES
Progress Note - Archer Landau 1970, 52 y o  female MRN: [id number]    Unit/Bed#:  Encounter: [id number]    Primary Care Provider: Bereket Claire MD   Date and time admitted to hospital: 1/26/2021  2:12 PM        * Pneumoperitoneum  Assessment & Plan  · Patient presents with approx 1 month of abdominal pain and course of ABx  · CT showing infectious/inflammatory/ less likely ischemic colitis of the whole colon from cecum to sigmoid colon and moderate volume pneumoperitoneum suggestive of perforated viscous  · Patient taken to OR  · Operations:  · 1/26: ExLap PONCHO, found to have sigmoid perf with feculent but contained abscess  Transverse, splenic flexure, and descending colon looked a bit dusky, therefore patient left open, in discontinuity, and with vac overtop with plan for washout, re-exploration 1/27  · Kept intubated overnight  · Obtain endpoints and follow closely  · Continue cefepime/flagyl for bowel perforation  · Follow up Cx  · LWC of incision  · Plan for repeat OR today    Hypokalemia  Assessment & Plan  · On admission, K 2 8  · Likely losses due to diarrhea  · Follow this AM, Replete for goal >4 ; < 5    Bandemia  Assessment & Plan  · Presents with WBC 11 03 and bands of 27  · Likely 2/2 bowel perforation  · Follow with Daily CBC    Hyperglycemia  Assessment & Plan  · No history of diabetes  · A1C 9 1 on 9/2020  · Will start on SSI  · Check new A1C  · May be 2/2 acute stress reaction    HARI (acute kidney injury) ([facility] [address] )  Assessment & Plan  Baseline Cr: 0 8-0 9  Admission Cr: 1 48  Current Cr: Post-op: 0 77  Etiology: Patient with significant dehydration, diarrhea for several weeks  Follow urine output  · Avoid nephrotoxic medications/hypotension      Hypothyroid  Assessment & Plan  · Continue synthroid once able to take PO    Mild persistent asthma  Assessment & Plan  · Patient on no controller medications at home  · No wheezing on exam or signs of exacerbation  · PRN albuterol        ----------------------------------------------------------------------------------------  HPI/24hr events: Patient s/p ExLap for perforated sigmoid colon overnight  Post operative labs showing a base deficit of 8 9, given 1 L isolyte  Repeat 2 hours later with BD of 7 so 1 additional liter and 25 g 5% albumin given (patient's serum albumin 1 9)  lactic acid WNL  Has remained normotensive  Vac output sanguinous  Disposition: Continue Critical Care   Code Status: Level 1 - Full Code  ---------------------------------------------------------------------------------------  SUBJECTIVE  None given  Intubated/sedated    Review of Systems  Review of systems was unable to be performed secondary to intubation/sedation  ---------------------------------------------------------------------------------------  OBJECTIVE    Vitals   Vitals:    21 2315 21 0000 21 0100 21 0200   BP: 104/63 106/72 102/71 121/88   BP Location: Left arm      Pulse: 101 91 93 90   Resp:  14 18   Temp: 98 8 °F (37 1 °C)      TempSrc: Oral      SpO2: 99% 99% 100% 100%   Weight: 104 kg (228 lb 2 8 oz)      Height: 5' 4" (1 626 m)        Temp (24hrs), Av 4 °F (36 9 °C), Min:97 9 °F (36 6 °C), Max:98 8 °F (37 1 °C)  Current: Temperature: 98 8 °F (37 1 °C)          Respiratory:  SpO2: SpO2: 100 %, SpO2 Activity: SpO2 Activity: At Rest, SpO2 Device: O2 Device: Ventilator       Invasive/non-invasive ventilation settings   Respiratory    Lab Data (Last 4 hours)       0057            pH, Arterial       7 359           pCO2, Arterial       31 4           pO2, Arterial       231 7           HCO3, Arterial       17 3           Base Excess, Arterial       -7 0                O2/Vent Data     None                Physical Exam  Vitals signs and nursing note reviewed  Constitutional:       Appearance: She is well-developed and well-groomed  She is not toxic-appearing        Interventions: She is sedated, intubated and restrained  HENT:      Head: Normocephalic and atraumatic  Mouth/Throat:      Mouth: Mucous membranes are dry  Eyes:      Conjunctiva/sclera: Conjunctivae normal       Pupils: Pupils are equal, round, and reactive to light  Neck:      Musculoskeletal: Neck supple  Cardiovascular:      Rate and Rhythm: Normal rate and regular rhythm  Pulmonary:      Effort: She is intubated  Breath sounds: Normal breath sounds  No wheezing or rales  Abdominal:      Comments: Bowel sounds decreased  Midline incision with vac overlying, sanguinous drainage from vac     In RLQ, trocar site with staple that is hemostatic   Genitourinary:     Comments: Deferred  Musculoskeletal: Normal range of motion  General: No tenderness, deformity or signs of injury  Skin:     General: Skin is warm and dry  Capillary Refill: Capillary refill takes less than 2 seconds  Neurological:      Comments: GCS 10T (3, 1T, 6)   Follows commands in all extremities         Laboratory and Diagnostics:  Results from last 7 days   Lab Units 01/26/21 2251 01/26/21 2047 01/26/21  1510   WBC Thousand/uL 4 45  --  11 03*   HEMOGLOBIN g/dL 12 9  --  13 7   I STAT HEMOGLOBIN g/dl  --  10 5*  --    HEMATOCRIT % 39 8  --  42 6   HEMATOCRIT, ISTAT %  --  31*  --    PLATELETS Thousands/uL 511*  --  620*   BANDS PCT %  --   --  27*   MONO PCT %  --   --  6     Results from last 7 days   Lab Units 01/26/21 2251 01/26/21 2047 01/26/21  1902 01/26/21  1510   SODIUM mmol/L 139  --   --  134*   POTASSIUM mmol/L 3 7  --  3 2* 2 8*   CHLORIDE mmol/L 106  --   --  98*   CO2 mmol/L 21  --   --  24   CO2, I-STAT mmol/L  --  22  --   --    ANION GAP mmol/L 12  --   --  12   BUN mg/dL 14  --   --  15   CREATININE mg/dL 0 77  --   --  1 48*   CALCIUM mg/dL 7 2*  --   --  8 6   GLUCOSE RANDOM mg/dL 180*  --   --  172*   ALT U/L  --   --   --  12   AST U/L  --   --   --  10   ALK PHOS U/L  --   --   --  74   ALBUMIN g/dL  --   --   -- 1 9*   TOTAL BILIRUBIN mg/dL  --   --   --  0 50     Results from last 7 days   Lab Units 01/26/21  2251   MAGNESIUM mg/dL 1 7   PHOSPHORUS mg/dL 3 5      Results from last 7 days   Lab Units 01/26/21  1622   INR  1 34*   PTT seconds 29          Results from last 7 days   Lab Units 01/26/21  2251 01/26/21  1622   LACTIC ACID mmol/L 1 0 1 6     ABG:  Results from last 7 days   Lab Units 01/27/21  0057   PH ART  7 359   PCO2 ART mm Hg 31 4*   PO2 ART mm Hg 231 7*   HCO3 ART mmol/L 17 3*   BASE EXC ART mmol/L -7 0   ABG SOURCE  Line, Arterial     VBG:  Results from last 7 days   Lab Units 01/27/21  0057   ABG SOURCE  Line, Arterial     Results from last 7 days   Lab Units 01/26/21  1622   PROCALCITONIN ng/ml 1 47*       Micro  Results from last 7 days   Lab Units 01/26/21  1621 01/26/21  1601   BLOOD CULTURE  Received in Microbiology Lab  Culture in Progress  Received in Microbiology Lab  Culture in Progress  EKG: Reviewed  Imaging: I have personally reviewed pertinent films in PACS    Intake and Output  I/O       01/25 0701 - 01/26 0700 01/26 0701 - 01/27 0700    I V  (mL/kg)  5571 7 (54 1)    IV Piggyback  1850    Total Intake(mL/kg)  7421 7 (72 1)    Urine (mL/kg/hr)  360    Emesis/NG output  50    Drains  350    Total Output  760    Net  +6661 7                Height and Weights   Height: 5' 4" (162 6 cm)  IBW: 54 7 kg  Body mass index is 39 17 kg/m²  Weight (last 2 days)     Date/Time   Weight    01/26/21 2315   104 (228 18)    01/26/21 2234   104 (228 18)    01/26/21 1355   109 (240)                Nutrition       Diet Orders   (From admission, onward)             Start     Ordered    01/26/21 2234  Diet NPO  Diet effective now     Question Answer Comment   Diet Type NPO    RD to adjust diet per protocol?  No        01/26/21 2233                  Active Medications  Scheduled Meds:  Current Facility-Administered Medications   Medication Dose Route Frequency Provider Last Rate    albuterol  2 5 mg Nebulization Q6H PRN Nubieber Halsted, PA-C      cefepime  2,000 mg Intravenous Q12H Kerri Halsted, PA-C      chlorhexidine  15 mL Swish & Spit Q12H [facility] Dorris Halsted, [facility]      fentaNYL  50 mcg/hr Intravenous Continuous Kerri Halsted, PA-C 50 mcg/hr (01/26/21 2250)    fentanyl citrate (PF)  50 mcg Intravenous Q1H PRN Kerri Halsted, PA-C      heparin (porcine)  5,000 Units Subcutaneous [facility]      insulin lispro  1-6 Units Subcutaneous Q6H [facility]      lactated ringers  125 mL/hr Intravenous Continuous [place] Custer, PA-C 125 mL/hr (01/26/21 1840)    metroNIDAZOLE  500 mg Intravenous Q8H Marina Custer, PA-C 500 mg (01/27/21 0019)    morphine injection  2 mg Intravenous Q3H PRN Nubieber Halsted, PA-C      ondansetron  4 mg Intravenous Q6H PRN Kerri Halsted, PA-C      propofol  5-50 mcg/kg/min Intravenous Titrated Marina Custer, PA-C 30 mcg/kg/min (01/27/21 3052)     Continuous Infusions:  fentaNYL, 50 mcg/hr, Last Rate: 50 mcg/hr (01/26/21 2250)  lactated ringers, 125 mL/hr, Last Rate: 125 mL/hr (01/26/21 1840)  propofol, 5-50 mcg/kg/min, Last Rate: 30 mcg/kg/min (01/27/21 0213)      PRN Meds:   albuterol, 2 5 mg, Q6H PRN  fentanyl citrate (PF), 50 mcg, Q1H PRN  morphine injection, 2 mg, Q3H PRN  ondansetron, 4 mg, Q6H PRN        Invasive Devices Review  Invasive Devices     Peripheral Intravenous Line            Peripheral IV 01/26/21 Right Wrist less than 1 day    Peripheral IV 01/26/21 Right;Ventral (anterior) Antecubital less than 1 day          Arterial Line            Arterial Line 01/26/21 Left Radial less than 1 day          Drain            NG/OG/Enteral Tube Nasogastric 18 Fr Left nares less than 1 day    Negative Pressure Wound Therapy (V A C ) Abdomen less than 1 day    Urethral Catheter Non-latex;Straight-tip 16 Fr  less than 1 day          Airway            ETT  Cuffed;Oral;Inflated; Hi-Lo 7 mm less than 1 day                Rationale for remaining devices:  Garcia catheter for return trip to OR with open abdomen  ---------------------------------------------------------------------------------------  Advance Directive and Living Will:      Power of :    POLST:    ---------------------------------------------------------------------------------------  Care Time Delivered:   No Critical Care time spent       Constantine Vega PA-C      Portions of the record may have been created with voice recognition software  Occasional wrong word or "sound a like" substitutions may have occurred due to the inherent limitations of voice recognition software    Read the chart carefully and recognize, using context, where substitutions have occurred Previously Declined (within the last year)

## 2022-04-21 ENCOUNTER — OFFICE VISIT (OUTPATIENT)
Dept: INTERNAL MEDICINE CLINIC | Facility: CLINIC | Age: 52
End: 2022-04-21
Payer: COMMERCIAL

## 2022-04-21 VITALS
DIASTOLIC BLOOD PRESSURE: 70 MMHG | HEIGHT: 63 IN | WEIGHT: 261 LBS | OXYGEN SATURATION: 99 % | HEART RATE: 96 BPM | BODY MASS INDEX: 46.25 KG/M2 | SYSTOLIC BLOOD PRESSURE: 104 MMHG | TEMPERATURE: 98.7 F

## 2022-04-21 DIAGNOSIS — M17.0 PRIMARY OSTEOARTHRITIS OF BOTH KNEES: ICD-10-CM

## 2022-04-21 DIAGNOSIS — M54.50 LOW BACK PAIN AT MULTIPLE SITES: Primary | ICD-10-CM

## 2022-04-21 DIAGNOSIS — K51.018 ULCERATIVE PANCOLITIS WITH OTHER COMPLICATION (HCC): ICD-10-CM

## 2022-04-21 DIAGNOSIS — Z00.00 ANNUAL PHYSICAL EXAM: ICD-10-CM

## 2022-04-21 DIAGNOSIS — J45.20 MILD INTERMITTENT ASTHMA WITHOUT COMPLICATION: ICD-10-CM

## 2022-04-21 DIAGNOSIS — E03.9 ACQUIRED HYPOTHYROIDISM: ICD-10-CM

## 2022-04-21 DIAGNOSIS — Z93.3 COLOSTOMY IN PLACE (HCC): ICD-10-CM

## 2022-04-21 DIAGNOSIS — Z12.31 ENCOUNTER FOR SCREENING MAMMOGRAM FOR MALIGNANT NEOPLASM OF BREAST: ICD-10-CM

## 2022-04-21 PROCEDURE — 3725F SCREEN DEPRESSION PERFORMED: CPT | Performed by: INTERNAL MEDICINE

## 2022-04-21 PROCEDURE — 1036F TOBACCO NON-USER: CPT | Performed by: INTERNAL MEDICINE

## 2022-04-21 PROCEDURE — 99396 PREV VISIT EST AGE 40-64: CPT | Performed by: INTERNAL MEDICINE

## 2022-04-21 PROCEDURE — 99214 OFFICE O/P EST MOD 30 MIN: CPT | Performed by: INTERNAL MEDICINE

## 2022-04-21 PROCEDURE — 3008F BODY MASS INDEX DOCD: CPT | Performed by: INTERNAL MEDICINE

## 2022-04-21 RX ORDER — CYCLOBENZAPRINE HCL 5 MG
5 TABLET ORAL 3 TIMES DAILY PRN
Qty: 20 TABLET | Refills: 0 | Status: SHIPPED | OUTPATIENT
Start: 2022-04-21

## 2022-04-21 RX ORDER — LEVOTHYROXINE SODIUM 0.07 MG/1
75 TABLET ORAL DAILY
Qty: 90 TABLET | Refills: 1 | Status: SHIPPED | OUTPATIENT
Start: 2022-04-21 | End: 2022-06-09
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
## 2022-04-21 NOTE — PATIENT INSTRUCTIONS

## 2022-04-30 DIAGNOSIS — K51.00 ULCERATIVE PANCOLITIS WITHOUT COMPLICATION (HCC): ICD-10-CM

## 2022-05-01 RX ORDER — MESALAMINE 800 MG/1
TABLET, DELAYED RELEASE ORAL
Qty: 120 TABLET | Refills: 3 | Status: SHIPPED | OUTPATIENT
Start: 2022-05-01

## 2022-05-04 ENCOUNTER — EVALUATION (OUTPATIENT)
Dept: PHYSICAL THERAPY | Age: 52
End: 2022-05-04
Payer: COMMERCIAL

## 2022-05-04 DIAGNOSIS — M54.50 CHRONIC BILATERAL LOW BACK PAIN, UNSPECIFIED WHETHER SCIATICA PRESENT: ICD-10-CM

## 2022-05-04 DIAGNOSIS — M54.42 ACUTE RIGHT-SIDED LOW BACK PAIN WITH LEFT-SIDED SCIATICA: Primary | ICD-10-CM

## 2022-05-04 DIAGNOSIS — G89.29 CHRONIC BILATERAL LOW BACK PAIN, UNSPECIFIED WHETHER SCIATICA PRESENT: ICD-10-CM

## 2022-05-04 PROCEDURE — 97162 PT EVAL MOD COMPLEX 30 MIN: CPT | Performed by: PHYSICAL THERAPIST

## 2022-05-04 PROCEDURE — 97110 THERAPEUTIC EXERCISES: CPT | Performed by: PHYSICAL THERAPIST

## 2022-05-04 PROCEDURE — 97140 MANUAL THERAPY 1/> REGIONS: CPT | Performed by: PHYSICAL THERAPIST
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
## 2022-05-05 ENCOUNTER — HOSPITAL ENCOUNTER (OUTPATIENT)
Dept: MAMMOGRAPHY | Facility: HOSPITAL | Age: 52
Discharge: HOME | End: 2022-05-05
Payer: COMMERCIAL

## 2022-05-05 VITALS — BODY MASS INDEX: 46.25 KG/M2 | HEIGHT: 63 IN | WEIGHT: 261 LBS

## 2022-05-05 DIAGNOSIS — Z12.31 ENCOUNTER FOR SCREENING MAMMOGRAM FOR MALIGNANT NEOPLASM OF BREAST: ICD-10-CM

## 2022-05-05 PROCEDURE — 77067 SCR MAMMO BI INCL CAD: CPT

## 2022-05-05 PROCEDURE — 77063 BREAST TOMOSYNTHESIS BI: CPT

## 2022-05-09 ENCOUNTER — APPOINTMENT (OUTPATIENT)
Dept: PHYSICAL THERAPY | Age: 52
End: 2022-05-09
Payer: COMMERCIAL

## 2022-05-11 ENCOUNTER — OFFICE VISIT (OUTPATIENT)
Dept: PHYSICAL THERAPY | Age: 52
End: 2022-05-11
Payer: COMMERCIAL

## 2022-05-11 DIAGNOSIS — M54.42 ACUTE RIGHT-SIDED LOW BACK PAIN WITH LEFT-SIDED SCIATICA: Primary | ICD-10-CM

## 2022-05-11 DIAGNOSIS — M54.50 CHRONIC BILATERAL LOW BACK PAIN, UNSPECIFIED WHETHER SCIATICA PRESENT: ICD-10-CM

## 2022-05-11 DIAGNOSIS — G89.29 CHRONIC BILATERAL LOW BACK PAIN, UNSPECIFIED WHETHER SCIATICA PRESENT: ICD-10-CM

## 2022-05-11 PROCEDURE — 97110 THERAPEUTIC EXERCISES: CPT | Performed by: PHYSICAL THERAPIST

## 2022-05-11 PROCEDURE — 97140 MANUAL THERAPY 1/> REGIONS: CPT | Performed by: PHYSICAL THERAPIST

## 2022-05-11 NOTE — PROGRESS NOTES
Daily Note     Today's date: 2022  Patient name: Phuong Clemens  : 1970  MRN: [id number]  Referring provider: Fadi Clark MD  Dx:   Encounter Diagnosis     ICD-10-CM    1  Acute right-sided low back pain with left-sided sciatica  M54 42    2  Chronic bilateral low back pain, unspecified whether sciatica present  M54 50     G89 29        Start Time: 1430  Stop Time: 1515  Total time in clinic (min): 45 minutes    Subjective: Pt noted she had soreness in her LB after initial session  She did note that maybe her leg pain was a little less  Objective: See treatment diary below      Assessment: Decreased pain post-session, decreased LE pain  Pt tolerated SL gapping well  Sensitive to piriformis STM with light pressure  Plan: Continue per plan of care        Precautions: ostomy bag      TherEx            IMER 4x10 3x10           Piriformis stretch seated 3x20" 20"x3                        Nustep warm up  5'           Manual             STM/Stretch left piriformis 8' 8           SL gapping  [facility]                                                                                                                                                                                                 Ther Activity                                       Gait Training                                       Modalities

## 2022-05-16 ENCOUNTER — APPOINTMENT (OUTPATIENT)
Dept: PHYSICAL THERAPY | Age: 52
End: 2022-05-16
Payer: COMMERCIAL

## 2022-05-18 ENCOUNTER — OFFICE VISIT (OUTPATIENT)
Dept: PHYSICAL THERAPY | Age: 52
End: 2022-05-18
Payer: COMMERCIAL

## 2022-05-18 DIAGNOSIS — G89.29 CHRONIC BILATERAL LOW BACK PAIN, UNSPECIFIED WHETHER SCIATICA PRESENT: ICD-10-CM

## 2022-05-18 DIAGNOSIS — M54.42 ACUTE RIGHT-SIDED LOW BACK PAIN WITH LEFT-SIDED SCIATICA: Primary | ICD-10-CM

## 2022-05-18 DIAGNOSIS — M54.50 CHRONIC BILATERAL LOW BACK PAIN, UNSPECIFIED WHETHER SCIATICA PRESENT: ICD-10-CM

## 2022-05-18 PROCEDURE — 97110 THERAPEUTIC EXERCISES: CPT | Performed by: PHYSICAL THERAPIST

## 2022-05-18 PROCEDURE — 97140 MANUAL THERAPY 1/> REGIONS: CPT | Performed by: PHYSICAL THERAPIST

## 2022-05-18 NOTE — PROGRESS NOTES
Daily Note     Today's date: 2022  Patient name: Verónica Rodriguez  : 1970  MRN: [id number]  Referring provider: Ericka Olivier MD  Dx:   Encounter Diagnosis     ICD-10-CM    1  Acute right-sided low back pain with left-sided sciatica  M54 42    2  Chronic bilateral low back pain, unspecified whether sciatica present  M54 50     G89 29        Start Time: 1445  Stop Time: 1520  Total time in clinic (min): 35 minutes    Subjective: Pt reports she felt good after last session and had less pain for a few days  Today she reports she has back pain > left LE pain  Objective: See treatment diary below      Assessment: Less tenderness in left buttock but still tolerates only light pressure  Responding to IMER and SL gapping mob  Will add standing ext exs NV  Plan: Continue per plan of care        Precautions: ostomy bag      TherEx           IMER 4x10 3x10 2x10          Piriformis stretch seated 3x20" 20"x3                        Nustep warm up  5' NT          Standing hip ext   NV          Standing opp arm/opp leg   NV                       Manual             STM/Stretch left piriformis 8' 8 15          SL gapping  [facility]                                                                                                                                                                                                Ther Activity                                       Gait Training                                       Modalities
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
## 2022-05-20 NOTE — ASSESSMENT & PLAN NOTE
Jeannine Baig is a 54 y o  female who presents with complaint of ulcerative pan colitis who presents for 2nd opinion  Flexible sigmoidoscopy with close off rectum and distal sigmoid with no evidence of colitis with moderate level of colitis in the descending and transverse colon  Biopsies of mild chronic active colitis in the sigmoid and moderate chronic active colitis in the descending colon  Humira level 15 with no anti drug antibodies  Previously in December was 7 1 with antidrug antibodies of 84  BUN with albumin of 2 8 but otherwise normal in terms of ALT, AST, alkaline phosphatase  CBC with white blood cell count of 8 34, hemoglobin of 11, MCV of 96, platelets of 415  ESR 69  C diff toxin positive back in January but EIA negative  Calprotectin 778  CRP 75 7  TPMT activity normal     No diagnosis found  No orders of the defined types were placed in this encounter  Weekly Humira  Continue mesalamine  Continue 6 MP  Pending above can consider Stelara versus cell chance of versus Remicade versus Entyvio  Check thiopurine metabolite level  Wean any prednisone  Repeat QuantiFERON gold  Repeat blood work including CRP, as well as fecal calprotectin    Follow-up with Colorectal surgery  Next colonoscopy in 6 months  Avoid live virus vaccines  Yearly flu shot  COVID vaccine and booster  Pneumonia vaccine  Shingrix  Dermatology, dental, ophthalmology follow-up  DEXA scan in the future given prednisone use  Routine Pap smears and mammograms

## 2022-05-23 ENCOUNTER — CONSULT (OUTPATIENT)
Dept: GASTROENTEROLOGY | Facility: MEDICAL CENTER | Age: 52
End: 2022-05-23
Payer: COMMERCIAL

## 2022-05-23 ENCOUNTER — APPOINTMENT (OUTPATIENT)
Dept: PHYSICAL THERAPY | Age: 52
End: 2022-05-23
Payer: COMMERCIAL

## 2022-05-23 VITALS
HEIGHT: 63 IN | BODY MASS INDEX: 47.24 KG/M2 | OXYGEN SATURATION: 99 % | HEART RATE: 103 BPM | WEIGHT: 266.6 LBS | SYSTOLIC BLOOD PRESSURE: 112 MMHG | DIASTOLIC BLOOD PRESSURE: 74 MMHG

## 2022-05-23 DIAGNOSIS — K51.018 ULCERATIVE PANCOLITIS WITH OTHER COMPLICATION (HCC): Primary | ICD-10-CM

## 2022-05-23 DIAGNOSIS — F19.20 STEROID DEPENDENCE (HCC): ICD-10-CM

## 2022-05-23 DIAGNOSIS — D84.9 IMMUNOCOMPROMISED PATIENT (HCC): ICD-10-CM

## 2022-05-23 DIAGNOSIS — Z93.3 COLOSTOMY IN PLACE (HCC): ICD-10-CM

## 2022-05-23 DIAGNOSIS — R19.7 DIARRHEA, UNSPECIFIED TYPE: ICD-10-CM

## 2022-05-23 PROCEDURE — 3008F BODY MASS INDEX DOCD: CPT | Performed by: INTERNAL MEDICINE

## 2022-05-23 PROCEDURE — 1036F TOBACCO NON-USER: CPT | Performed by: INTERNAL MEDICINE

## 2022-05-23 PROCEDURE — 99214 OFFICE O/P EST MOD 30 MIN: CPT | Performed by: INTERNAL MEDICINE

## 2022-05-23 NOTE — PATIENT INSTRUCTIONS
Scheduled date of Colonoscopy (as of today): 08/04/2022  Physician performing: Dr Solange Pacheco  Location of procedure: [facility]  Bowel prep reviewed with patient: Miralax/Mag Citrate  Instructions reviewed with patient by: MA  Clearances:  n/a

## 2022-05-25 ENCOUNTER — OFFICE VISIT (OUTPATIENT)
Dept: PHYSICAL THERAPY | Age: 52
End: 2022-05-25
Payer: COMMERCIAL

## 2022-05-25 DIAGNOSIS — M54.42 ACUTE RIGHT-SIDED LOW BACK PAIN WITH LEFT-SIDED SCIATICA: Primary | ICD-10-CM

## 2022-05-25 DIAGNOSIS — G89.29 CHRONIC BILATERAL LOW BACK PAIN, UNSPECIFIED WHETHER SCIATICA PRESENT: ICD-10-CM

## 2022-05-25 DIAGNOSIS — M54.50 CHRONIC BILATERAL LOW BACK PAIN, UNSPECIFIED WHETHER SCIATICA PRESENT: ICD-10-CM

## 2022-05-25 PROCEDURE — 97140 MANUAL THERAPY 1/> REGIONS: CPT | Performed by: PHYSICAL THERAPIST

## 2022-05-25 PROCEDURE — 97110 THERAPEUTIC EXERCISES: CPT | Performed by: PHYSICAL THERAPIST

## 2022-05-25 NOTE — PROGRESS NOTES
Daily Note     Today's date: 2022  Patient name: Jared Mckee  : 1970  MRN: [id number]  Referring provider: Luis Lorenzo MD  Dx:   Encounter Diagnosis     ICD-10-CM    1  Acute right-sided low back pain with left-sided sciatica  M54 42    2  Chronic bilateral low back pain, unspecified whether sciatica present  M54 50     G89 29                   Subjective: The patient states that every week is getting a little better, less pain today than last week  Notes she still has most pain with standing and walking  Objective: See treatment diary below      Assessment: Progressed patient as outlined below in daily treatment diary  Tolerated treatment well with no increase in pain noted during session  Reports feeling looser to end  Patient demonstrated fatigue post treatment and would benefit from continued PT  Plan: Continue per plan of care        Precautions: ostomy bag      TherEx          IMER 4x10 3x10 2x10 2x10         Piriformis stretch seated 3x20" 20"x3                        Nustep warm up  5' NT L3 6'         Standing hip ext   NV 2x10 Emil         Standing opp arm/opp leg   NV 15x Emil                      Manual             STM/Stretch left piriformis 8' 8 15 15'         SL gapping  MH MH ML                                                                                                                                                                                               Ther Activity                                       Gait Training                                       Modalities

## 2022-05-29 ENCOUNTER — APPOINTMENT (EMERGENCY)
Dept: RADIOLOGY | Facility: HOSPITAL | Age: 52
End: 2022-05-29
Payer: COMMERCIAL

## 2022-05-29 ENCOUNTER — HOSPITAL ENCOUNTER (EMERGENCY)
Facility: HOSPITAL | Age: 52
Discharge: HOME | End: 2022-05-29
Attending: EMERGENCY MEDICINE | Admitting: EMERGENCY MEDICINE
Payer: COMMERCIAL

## 2022-05-29 VITALS
OXYGEN SATURATION: 96 % | DIASTOLIC BLOOD PRESSURE: 99 MMHG | RESPIRATION RATE: 20 BRPM | TEMPERATURE: 97.8 F | SYSTOLIC BLOOD PRESSURE: 192 MMHG | HEART RATE: 122 BPM

## 2022-05-29 DIAGNOSIS — S83.92XA LEFT KNEE SPRAIN: Primary | ICD-10-CM

## 2022-05-29 PROCEDURE — 73564 X-RAY EXAM KNEE 4 OR MORE: CPT

## 2022-05-29 PROCEDURE — 99284 EMERGENCY DEPT VISIT MOD MDM: CPT | Performed by: EMERGENCY MEDICINE

## 2022-05-29 PROCEDURE — 99283 EMERGENCY DEPT VISIT LOW MDM: CPT
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
## 2022-06-01 ENCOUNTER — APPOINTMENT (OUTPATIENT)
Dept: LAB | Facility: CLINIC | Age: 52
End: 2022-06-01
Payer: COMMERCIAL

## 2022-06-01 DIAGNOSIS — Z93.3 COLOSTOMY IN PLACE (HCC): ICD-10-CM

## 2022-06-01 DIAGNOSIS — K51.018 ULCERATIVE PANCOLITIS WITH OTHER COMPLICATION (HCC): ICD-10-CM

## 2022-06-01 DIAGNOSIS — E03.9 ACQUIRED HYPOTHYROIDISM: ICD-10-CM

## 2022-06-01 DIAGNOSIS — R19.7 DIARRHEA, UNSPECIFIED TYPE: ICD-10-CM

## 2022-06-01 DIAGNOSIS — F19.20 STEROID DEPENDENCE (HCC): ICD-10-CM

## 2022-06-01 LAB
ALBUMIN SERPL BCP-MCNC: 3.5 G/DL (ref 3.5–5)
ALP SERPL-CCNC: 42 U/L (ref 46–116)
ALT SERPL W P-5'-P-CCNC: 33 U/L (ref 12–78)
ANION GAP SERPL CALCULATED.3IONS-SCNC: 10 MMOL/L (ref 4–13)
AST SERPL W P-5'-P-CCNC: 16 U/L (ref 5–45)
BASOPHILS # BLD AUTO: 0.09 THOUSANDS/ΜL (ref 0–0.1)
BASOPHILS NFR BLD AUTO: 1 % (ref 0–1)
BILIRUB SERPL-MCNC: 0.63 MG/DL (ref 0.2–1)
BUN SERPL-MCNC: 22 MG/DL (ref 5–25)
CALCIUM SERPL-MCNC: 9 MG/DL (ref 8.3–10.1)
CHLORIDE SERPL-SCNC: 106 MMOL/L (ref 100–108)
CHOLEST SERPL-MCNC: 173 MG/DL
CO2 SERPL-SCNC: 23 MMOL/L (ref 21–32)
CREAT SERPL-MCNC: 1.09 MG/DL (ref 0.6–1.3)
CRP SERPL QL: 21.4 MG/L
EOSINOPHIL # BLD AUTO: 0.18 THOUSAND/ΜL (ref 0–0.61)
EOSINOPHIL NFR BLD AUTO: 2 % (ref 0–6)
ERYTHROCYTE [DISTWIDTH] IN BLOOD BY AUTOMATED COUNT: 16.9 % (ref 11.6–15.1)
EST. AVERAGE GLUCOSE BLD GHB EST-MCNC: 97 MG/DL
GFR SERPL CREATININE-BSD FRML MDRD: 59 ML/MIN/1.73SQ M
GLUCOSE P FAST SERPL-MCNC: 98 MG/DL (ref 65–99)
HBA1C MFR BLD: 5 %
HBV CORE AB SER QL: NORMAL
HBV CORE IGM SER QL: NORMAL
HBV SURFACE AG SER QL: NORMAL
HCT VFR BLD AUTO: 40 % (ref 34.8–46.1)
HCV AB SER QL: NORMAL
HDLC SERPL-MCNC: 87 MG/DL
HGB BLD-MCNC: 12.7 G/DL (ref 11.5–15.4)
IMM GRANULOCYTES # BLD AUTO: 0.11 THOUSAND/UL (ref 0–0.2)
IMM GRANULOCYTES NFR BLD AUTO: 1 % (ref 0–2)
LDLC SERPL CALC-MCNC: 63 MG/DL (ref 0–100)
LYMPHOCYTES # BLD AUTO: 1.48 THOUSANDS/ΜL (ref 0.6–4.47)
LYMPHOCYTES NFR BLD AUTO: 17 % (ref 14–44)
MCH RBC QN AUTO: 32.1 PG (ref 26.8–34.3)
MCHC RBC AUTO-ENTMCNC: 31.8 G/DL (ref 31.4–37.4)
MCV RBC AUTO: 101 FL (ref 82–98)
MONOCYTES # BLD AUTO: 0.66 THOUSAND/ΜL (ref 0.17–1.22)
MONOCYTES NFR BLD AUTO: 8 % (ref 4–12)
NEUTROPHILS # BLD AUTO: 6.14 THOUSANDS/ΜL (ref 1.85–7.62)
NEUTS SEG NFR BLD AUTO: 71 % (ref 43–75)
NRBC BLD AUTO-RTO: 0 /100 WBCS
PLATELET # BLD AUTO: 286 THOUSANDS/UL (ref 149–390)
PMV BLD AUTO: 10.8 FL (ref 8.9–12.7)
POTASSIUM SERPL-SCNC: 3.9 MMOL/L (ref 3.5–5.3)
PROT SERPL-MCNC: 7.1 G/DL (ref 6.4–8.2)
RBC # BLD AUTO: 3.96 MILLION/UL (ref 3.81–5.12)
SODIUM SERPL-SCNC: 139 MMOL/L (ref 136–145)
TRIGL SERPL-MCNC: 115 MG/DL
TSH SERPL DL<=0.05 MIU/L-ACNC: 5.98 UIU/ML (ref 0.45–4.5)
WBC # BLD AUTO: 8.66 THOUSAND/UL (ref 4.31–10.16)

## 2022-06-01 PROCEDURE — 80053 COMPREHEN METABOLIC PANEL: CPT

## 2022-06-01 PROCEDURE — 80299 QUANTITATIVE ASSAY DRUG: CPT

## 2022-06-01 PROCEDURE — 85025 COMPLETE CBC W/AUTO DIFF WBC: CPT

## 2022-06-01 PROCEDURE — 83036 HEMOGLOBIN GLYCOSYLATED A1C: CPT

## 2022-06-01 PROCEDURE — 86705 HEP B CORE ANTIBODY IGM: CPT

## 2022-06-01 PROCEDURE — 84443 ASSAY THYROID STIM HORMONE: CPT

## 2022-06-01 PROCEDURE — 86704 HEP B CORE ANTIBODY TOTAL: CPT

## 2022-06-01 PROCEDURE — 86140 C-REACTIVE PROTEIN: CPT

## 2022-06-01 PROCEDURE — 80061 LIPID PANEL: CPT

## 2022-06-01 PROCEDURE — 36415 COLL VENOUS BLD VENIPUNCTURE: CPT

## 2022-06-01 PROCEDURE — 86480 TB TEST CELL IMMUN MEASURE: CPT

## 2022-06-01 PROCEDURE — 87340 HEPATITIS B SURFACE AG IA: CPT

## 2022-06-01 PROCEDURE — 3044F HG A1C LEVEL LT 7.0%: CPT | Performed by: ORTHOPAEDIC SURGERY

## 2022-06-01 PROCEDURE — 86803 HEPATITIS C AB TEST: CPT

## 2022-06-03 LAB
GAMMA INTERFERON BACKGROUND BLD IA-ACNC: 0.03 IU/ML
M TB IFN-G BLD-IMP: NEGATIVE
M TB IFN-G CD4+ BCKGRND COR BLD-ACNC: 0 IU/ML
M TB IFN-G CD4+ BCKGRND COR BLD-ACNC: 0.01 IU/ML
MITOGEN IGNF BCKGRD COR BLD-ACNC: 4.52 IU/ML

## 2022-06-06 ENCOUNTER — OFFICE VISIT (OUTPATIENT)
Dept: OBGYN CLINIC | Facility: CLINIC | Age: 52
End: 2022-06-06

## 2022-06-06 VITALS
HEART RATE: 101 BPM | SYSTOLIC BLOOD PRESSURE: 176 MMHG | BODY MASS INDEX: 47.13 KG/M2 | HEIGHT: 63 IN | DIASTOLIC BLOOD PRESSURE: 101 MMHG | WEIGHT: 266 LBS

## 2022-06-06 DIAGNOSIS — M23.92 INTERNAL DERANGEMENT OF LEFT KNEE: Primary | ICD-10-CM

## 2022-06-06 PROCEDURE — 3008F BODY MASS INDEX DOCD: CPT | Performed by: ORTHOPAEDIC SURGERY

## 2022-06-06 PROCEDURE — 99203 OFFICE O/P NEW LOW 30 MIN: CPT | Performed by: PHYSICIAN ASSISTANT
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
## 2022-06-08 ENCOUNTER — APPOINTMENT (OUTPATIENT)
Dept: LAB | Facility: CLINIC | Age: 52
End: 2022-06-08
Payer: COMMERCIAL

## 2022-06-08 DIAGNOSIS — R19.7 DIARRHEA, UNSPECIFIED TYPE: ICD-10-CM

## 2022-06-08 DIAGNOSIS — F19.20 STEROID DEPENDENCE (HCC): ICD-10-CM

## 2022-06-08 DIAGNOSIS — Z93.3 COLOSTOMY IN PLACE (HCC): ICD-10-CM

## 2022-06-08 DIAGNOSIS — K51.018 ULCERATIVE PANCOLITIS WITH OTHER COMPLICATION (HCC): ICD-10-CM

## 2022-06-08 LAB
6-TGN ENTSUB RBC: 307 PMOL/8X 10E8
6MMP ENTSUB RBC: 250 PMOL/8X 10E8

## 2022-06-08 PROCEDURE — 83993 ASSAY FOR CALPROTECTIN FECAL: CPT

## 2022-06-09 ENCOUNTER — TELEMEDICINE (OUTPATIENT)
Dept: INTERNAL MEDICINE CLINIC | Facility: CLINIC | Age: 52
End: 2022-06-09

## 2022-06-09 DIAGNOSIS — Z93.3 COLOSTOMY IN PLACE (HCC): ICD-10-CM

## 2022-06-09 DIAGNOSIS — E03.9 ACQUIRED HYPOTHYROIDISM: ICD-10-CM

## 2022-06-09 DIAGNOSIS — J04.10 TRACHEITIS: ICD-10-CM

## 2022-06-09 DIAGNOSIS — J45.30 MILD PERSISTENT ASTHMA WITHOUT COMPLICATION: ICD-10-CM

## 2022-06-09 DIAGNOSIS — M17.0 PRIMARY OSTEOARTHRITIS OF BOTH KNEES: ICD-10-CM

## 2022-06-09 DIAGNOSIS — Z78.9 USES BIRTH CONTROL: ICD-10-CM

## 2022-06-09 DIAGNOSIS — K51.018 ULCERATIVE PANCOLITIS WITH OTHER COMPLICATION (HCC): Primary | ICD-10-CM

## 2022-06-09 PROCEDURE — 99214 OFFICE O/P EST MOD 30 MIN: CPT | Performed by: INTERNAL MEDICINE

## 2022-06-09 RX ORDER — CLARITHROMYCIN 500 MG/1
500 TABLET, COATED ORAL EVERY 12 HOURS SCHEDULED
Qty: 14 TABLET | Refills: 0 | Status: SHIPPED | OUTPATIENT
Start: 2022-06-09 | End: 2022-06-16

## 2022-06-09 RX ORDER — LEVOTHYROXINE SODIUM 0.1 MG/1
100 TABLET ORAL
Qty: 90 TABLET | Refills: 1 | Status: SHIPPED | OUTPATIENT
Start: 2022-06-09

## 2022-06-09 RX ORDER — LEVONORGESTREL AND ETHINYL ESTRADIOL 0.1-0.02MG
1 KIT ORAL DAILY
Qty: 28 TABLET | Refills: 3 | Status: SHIPPED | OUTPATIENT
Start: 2022-06-09
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
## 2022-06-12 LAB — CALPROTECTIN STL-MCNT: 32 UG/G (ref 0–120)

## 2022-06-13 ENCOUNTER — TELEPHONE (OUTPATIENT)
Dept: GASTROENTEROLOGY | Facility: MEDICAL CENTER | Age: 52
End: 2022-06-13

## 2022-06-16 ENCOUNTER — HOSPITAL ENCOUNTER (OUTPATIENT)
Dept: MRI IMAGING | Facility: HOSPITAL | Age: 52
Discharge: HOME | End: 2022-06-16
Payer: COMMERCIAL

## 2022-06-16 DIAGNOSIS — M23.92 INTERNAL DERANGEMENT OF LEFT KNEE: ICD-10-CM

## 2022-06-16 PROCEDURE — 73721 MRI JNT OF LWR EXTRE W/O DYE: CPT

## 2022-06-28 ENCOUNTER — OFFICE VISIT (OUTPATIENT)
Dept: OBGYN CLINIC | Facility: CLINIC | Age: 52
End: 2022-06-28

## 2022-06-28 ENCOUNTER — APPOINTMENT (OUTPATIENT)
Dept: RADIOLOGY | Facility: CLINIC | Age: 52
End: 2022-06-28

## 2022-06-28 VITALS
SYSTOLIC BLOOD PRESSURE: 158 MMHG | HEIGHT: 63 IN | HEART RATE: 101 BPM | BODY MASS INDEX: 47.12 KG/M2 | DIASTOLIC BLOOD PRESSURE: 94 MMHG

## 2022-06-28 DIAGNOSIS — M25.562 ACUTE PAIN OF LEFT KNEE: ICD-10-CM

## 2022-06-28 DIAGNOSIS — M23.92 INTERNAL DERANGEMENT OF LEFT KNEE: Primary | ICD-10-CM

## 2022-06-28 DIAGNOSIS — S83.512A RUPTURE OF ANTERIOR CRUCIATE LIGAMENT OF LEFT KNEE, INITIAL ENCOUNTER: ICD-10-CM

## 2022-06-28 DIAGNOSIS — T14.8XXA CONTUSION OF BONE: ICD-10-CM

## 2022-06-28 DIAGNOSIS — M23.92 INTERNAL DERANGEMENT OF LEFT KNEE: ICD-10-CM

## 2022-06-28 PROCEDURE — 99214 OFFICE O/P EST MOD 30 MIN: CPT | Performed by: ORTHOPAEDIC SURGERY

## 2022-06-28 PROCEDURE — 73560 X-RAY EXAM OF KNEE 1 OR 2: CPT

## 2022-06-28 PROCEDURE — 1036F TOBACCO NON-USER: CPT | Performed by: ORTHOPAEDIC SURGERY

## 2022-06-28 PROCEDURE — 73564 X-RAY EXAM KNEE 4 OR MORE: CPT
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
## 2022-07-18 DIAGNOSIS — K51.018 ULCERATIVE PANCOLITIS WITH OTHER COMPLICATION (HCC): ICD-10-CM

## 2022-07-19 RX ORDER — MERCAPTOPURINE 50 MG/1
TABLET ORAL
Qty: 90 TABLET | Refills: 3 | Status: SHIPPED | OUTPATIENT
Start: 2022-07-19

## 2022-07-26 ENCOUNTER — TELEPHONE (OUTPATIENT)
Dept: GASTROENTEROLOGY | Facility: CLINIC | Age: 52
End: 2022-07-26

## 2022-07-26 ENCOUNTER — TELEPHONE (OUTPATIENT)
Dept: OTHER | Facility: OTHER | Age: 52
End: 2022-07-26

## 2022-07-26 NOTE — TELEPHONE ENCOUNTER
Patient reports that she had paperwork faxed to the office and she would like to know if it was received  She also recently lost her insurance and would like advice on what to do about her Humira injection

## 2022-07-27 NOTE — TELEPHONE ENCOUNTER
Connected with the patient via phone and relayed the approval information to her  She will call [facility] and set up delivery  I provided their phone number: [phone]

## 2022-07-27 NOTE — TELEPHONE ENCOUNTER
Called [facility] at [phone] and spoke to Jenni Cushing  Patient is approved until 7/26/2023 at no cost for weekly Humira injections  They have transferred the prescription over to their pharmacy, [facility], we do not need to escribe to them, instead the patient need to phone in and set up delivery  Called the patient and requested a call back to go over the above

## 2022-07-29 NOTE — PROGRESS NOTES
The patient had her PT IE on 22 and she was seen in PT for a total of 4 visits with her last treatment on 22  She did not return for more treatment  Unable to keep patient on hold, D/C PT secondary to script   D/C PT

## 2022-08-01 ENCOUNTER — TELEPHONE (OUTPATIENT)
Dept: ADMINISTRATIVE | Facility: HOSPITAL | Age: 52
End: 2022-08-01

## 2022-08-01 NOTE — TELEPHONE ENCOUNTER
Patients new insurance starts ib 9/1  Patient would like to reschedule her procedure to September  Please call patient Thank you!

## 2022-08-02 NOTE — TELEPHONE ENCOUNTER
Spoke to patient who is rescheduled to 9/9/22 at the [address] with Dr Mitul Pak  Patient will follow up with Dr Dayday Flores per her request  Patient was also given Miralax/dulcolax instructions if she is unable to purchase mag citrate for procedure

## 2022-08-03 ENCOUNTER — TRANSCRIBE ORDERS (OUTPATIENT)
Dept: GASTROENTEROLOGY | Facility: CLINIC | Age: 52
End: 2022-08-03

## 2022-08-05 ENCOUNTER — OFFICE VISIT (OUTPATIENT)
Dept: INTERNAL MEDICINE CLINIC | Facility: CLINIC | Age: 52
End: 2022-08-05

## 2022-08-05 VITALS
TEMPERATURE: 97.7 F | SYSTOLIC BLOOD PRESSURE: 132 MMHG | OXYGEN SATURATION: 97 % | HEART RATE: 96 BPM | DIASTOLIC BLOOD PRESSURE: 82 MMHG | BODY MASS INDEX: 47.12 KG/M2 | HEIGHT: 63 IN

## 2022-08-05 DIAGNOSIS — R60.0 LOCALIZED EDEMA: Primary | ICD-10-CM

## 2022-08-05 DIAGNOSIS — J45.30 MILD PERSISTENT ASTHMA WITHOUT COMPLICATION: ICD-10-CM

## 2022-08-05 DIAGNOSIS — Z93.3 COLOSTOMY IN PLACE (HCC): ICD-10-CM

## 2022-08-05 DIAGNOSIS — E03.9 ACQUIRED HYPOTHYROIDISM: ICD-10-CM

## 2022-08-05 DIAGNOSIS — K51.018 ULCERATIVE PANCOLITIS WITH OTHER COMPLICATION (HCC): ICD-10-CM

## 2022-08-05 DIAGNOSIS — S82.132D CLOSED FRACTURE OF MEDIAL PORTION OF LEFT TIBIAL PLATEAU WITH ROUTINE HEALING, SUBSEQUENT ENCOUNTER: ICD-10-CM

## 2022-08-05 DIAGNOSIS — M17.0 PRIMARY OSTEOARTHRITIS OF BOTH KNEES: ICD-10-CM

## 2022-08-05 PROBLEM — S82.132A CLOSED FRACTURE OF MEDIAL PLATEAU OF LEFT TIBIA: Status: ACTIVE | Noted: 2022-08-05

## 2022-08-05 PROCEDURE — 99214 OFFICE O/P EST MOD 30 MIN: CPT | Performed by: INTERNAL MEDICINE

## 2022-08-05 RX ORDER — FUROSEMIDE 20 MG/1
20 TABLET ORAL 2 TIMES DAILY
Qty: 30 TABLET | Refills: 1 | Status: SHIPPED | OUTPATIENT
Start: 2022-08-05
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
## 2022-08-06 NOTE — RESPIRATORY THERAPY NOTE
01/27/21 0752   Vent Information   Vent ID Dot English   Vent type Drager   Drager Vent Mode AC/VC+   Is the patient reintubated? No   $ Pulse Oximetry Spot Check Charge Completed   SpO2 100 %   AC/VC+ Settings   Resp Rate (BPM) 14 BPM   VT (mL) 350 mL   Insp Time (S) 0 95 S   FIO2 (%) 40 %   PEEP (cmH2O) 6 cmH2O   Rise Time (%) 0 2 %   Trigger Sensitivity Flow (LPM) 2 LPM   Humidification Heater   Heater Temp 98 6 °F (37 °C)   AC/VC+ Actuals   Resp Rate (BPM) 16 BPM   VT (mL) 336 mL   MV (Obs) 4 78   MAP (cmH2O) 7 1 cmH2O   Peak Pressure (cmH2O) 8 3 cmH2O   I:E Ratio (Obs) 1:2 8   Static Compliance (mL/cmH20)   (no value)   Plateau Pressure (cm H2O)   (no vlaue)   Heater Temperature (Obs) 98 6 °F (37 °C)   AC/VC+ ALARMS   High Peak Pressure (cmH2O) 40 cmH2O   High Resp Rate (BPM) 40 BPM   High MV (L/min) 14 L/min   Low MV (L/min) 3 L/min   High VT (mL) 800 mL   Maintenance   Alarm (pink) cable attached Yes   Resuscitation bag with peep valve at bedside Yes   Water bag changed No   Circuit changed No   Daily Screen   Patient safety screen outcome: Failed   Not Ready for Weaning due to: Going on Transport intubated; Underline problem not resolved   IHI Ventilator Associated Pneumonia Bundle   Daily Assessment of Readiness to Extubate Yes   Head of Bed Elevated HOB 30   Oral Care Mouth suctioned   ETT  Cuffed;Oral;Inflated; Hi-Lo 7 mm   Placement Date/Time: 01/26/21 1926   Mask Ventilation: Mask ventilation not attempted (0)  Preoxygenated: Yes  Technique: Direct laryngoscopy;Rapid sequence;Video laryngoscopy;bougie  Type: Cuffed;Oral;Inflated; Hi-Lo  Tube Size: 7 mm  Laryngoscope: Mc     Secured at (cm) 22   Measured from Lips   Secured Location Right   Repositioned Right to [address] [place] [address] by Commercial tube see   Site Condition Dry   Cuff Pressure (cm H2O) 28 cm H2O   HI-LO Suction  Continuous low suction   HI-LO Secretions Blood tinged   HI-LO Intervention Flushed Female

## 2022-08-10 ENCOUNTER — TRANSCRIBE ORDERS (OUTPATIENT)
Dept: GASTROENTEROLOGY | Facility: CLINIC | Age: 52
End: 2022-08-10

## 2022-08-11 ENCOUNTER — TELEPHONE (OUTPATIENT)
Dept: INTERNAL MEDICINE CLINIC | Facility: CLINIC | Age: 52
End: 2022-08-11

## 2022-08-11 NOTE — TELEPHONE ENCOUNTER
Pt called saying she doesn't think the water pill is working she still has swelling in feet and legs and her stomach has been hurting

## 2022-08-30 ENCOUNTER — NURSE TRIAGE (OUTPATIENT)
Dept: OTHER | Facility: OTHER | Age: 52
End: 2022-08-30

## 2022-08-30 DIAGNOSIS — K51.018 ULCERATIVE PANCOLITIS WITH OTHER COMPLICATION (HCC): Primary | ICD-10-CM

## 2022-08-30 NOTE — TELEPHONE ENCOUNTER
Regarding: colon prep medication question   ----- Message from [place] sent at 8/30/2022  4:52 PM EDT -----  "I am calling about a medication that ws called for my colonoscopy   Is this something that I need to take/?'

## 2022-08-30 NOTE — TELEPHONE ENCOUNTER
Reason for Disposition   [1] Caller has NON-URGENT medicine question about med that PCP prescribed AND [2] triager unable to answer question    Answer Assessment - Initial Assessment Questions  1  NAME of MEDICATION: "What medicine are you calling about?"      Golytely    2  QUESTION: "What is your question?" (e g , medication refill, side effect)      "Should I be taking this prep instead of the miralax/dulcolax prep?"    3  PRESCRIBING HCP: "Who prescribed it?" Reason: if prescribed by specialist, call should be referred to that group        [facility]    Protocols used: MEDICATION QUESTION CALL-ADULT-

## 2022-09-06 ENCOUNTER — OFFICE VISIT (OUTPATIENT)
Dept: OBGYN CLINIC | Facility: CLINIC | Age: 52
End: 2022-09-06
Payer: COMMERCIAL

## 2022-09-06 VITALS
SYSTOLIC BLOOD PRESSURE: 139 MMHG | HEART RATE: 90 BPM | DIASTOLIC BLOOD PRESSURE: 79 MMHG | WEIGHT: 266 LBS | HEIGHT: 63 IN | BODY MASS INDEX: 47.13 KG/M2

## 2022-09-06 DIAGNOSIS — M25.562 ACUTE PAIN OF LEFT KNEE: ICD-10-CM

## 2022-09-06 DIAGNOSIS — S83.512A RUPTURE OF ANTERIOR CRUCIATE LIGAMENT OF LEFT KNEE, INITIAL ENCOUNTER: Primary | ICD-10-CM

## 2022-09-06 DIAGNOSIS — T14.8XXA CONTUSION OF BONE: ICD-10-CM

## 2022-09-06 PROCEDURE — 99213 OFFICE O/P EST LOW 20 MIN: CPT | Performed by: ORTHOPAEDIC SURGERY

## 2022-09-06 NOTE — PROGRESS NOTES
Patient Name:  Archer Landau  MRN:  [id number]    Assessment & Plan     1  Rupture of anterior cruciate ligament of left knee, initial encounter  -     Ambulatory Referral to Physical Therapy; Future  -     Durable Medical Equipment    2  Contusion of bone  -     Ambulatory Referral to Physical Therapy; Future  -     Durable Medical Equipment    3  Acute pain of left knee        50 y o  female with Left knee injury sustained 05/29/2022 resulting in medial and lateral tibial plateau contusions, possible nondisplaced subchondral fracture of posterior medial tibial plateau along with high-grade partial to full-thickness ACL tear of uncertain chronicity  Again, discussed unknown chronicity of the ACL tear and contusions secondary to fall  Educated patient about ACL anatomy and functions including twisting, pivoting  Once patient attending outpatient PT to work on strengthening, proprioception exercises, gait training without assistive device, patient should see improvement in ambulation and strength of Left lower extremity  I do not recommend surgical intervention in regards to Left knee ACL reconstruction and discussed with patient due to age and activity level  She will follow up in office in 8-10 weeks for reevaluation of Left knee and monitor improvement with outpatient PT and home exercises  Provided patient with phone number to have ACL brace fitted  Advised patient she may use this brace specifically with increased activity throughout the day  History of the Present Illness   Archer Landau is a 50 y o  female with Left knee injury sustained 05/29/2022 resulting in medial and lateral tibial plateau contusions, possible nondisplaced subchondral fracture of posterior medial tibial plateau along with high-grade partial to full-thickness ACL tear of uncertain chronicity  Today, patient reports some giving away of Left knee with ambulation   She also has some pain at the anterior aspect of the Left knee with "pulling" in the back of the knee  She has not yet attended outpatient PT secondary to insurance concerns  She is still using one crutch for ambulation secondary to "giving away"  Review of Systems     Review of Systems   Constitutional: Negative for chills and fever  HENT: Negative for ear pain and sore throat  Eyes: Negative for pain and visual disturbance  Respiratory: Negative for cough and shortness of breath  Cardiovascular: Negative for chest pain and palpitations  Gastrointestinal: Negative for abdominal pain and vomiting  Genitourinary: Negative for dysuria and hematuria  Musculoskeletal: Negative for arthralgias and back pain  Skin: Negative for color change and rash  Neurological: Negative for seizures and syncope  All other systems reviewed and are negative  Physical Exam     /79   Pulse 90   Ht 5' 3" (1 6 m)   Wt 121 kg (266 lb)   BMI 47 12 kg/m²     Left  Knee  Range of motion from 0 to 120  There is no effusion  There is tenderness over the lateral joint line  The patient is able to perform a straight leg raise with 4/5 quad strength  Anterior drawer without gross anterior translation of proximal tibia  Lachman test with subtle anterior translation of proximal tibia  Varus stress testing reveals subtle increased laxity at 0 and 30 degrees without pain  Valgus stress testing reveals no laxity at 0 and 30 degrees  The patient is neurovascular intact distally  Data Review     I have personally reviewed pertinent films in PACS, and my interpretation follows  X-rays taken today of left knee demonstrates no fracture dislocation  Joint spaces are well maintained      MRI of left knee from 06/16/2022 reviewed in the office today displays bony contusions of posterior medial and the lateral tibial plateau fracture with suspected subchondral fracture of posterior medial tibial plateau    High-grade partial to full thickness ACL tear of uncertain chronicity appreciable      Social History     Tobacco Use    Smoking status: Never Smoker    Smokeless tobacco: Never Used   Vaping Use    Vaping Use: Never used   Substance Use Topics    Alcohol use: Not Currently    Drug use: Never           Procedures  None     Son Bean DO

## 2022-09-08 ENCOUNTER — ANESTHESIA EVENT (OUTPATIENT)
Dept: ANESTHESIOLOGY | Facility: HOSPITAL | Age: 52
End: 2022-09-08

## 2022-09-08 ENCOUNTER — ANESTHESIA (OUTPATIENT)
Dept: ANESTHESIOLOGY | Facility: HOSPITAL | Age: 52
End: 2022-09-08

## 2022-09-08 NOTE — ANESTHESIA PREPROCEDURE EVALUATION
Procedure:  PRE-OP ONLY  Colonoscopy hx of UC and s/p ex-lap with ostomy  ECG: NSR  Humara and prednisone  Lasix 20 mg BID  Relevant Problems   ENDO   (+) Acquired hypothyroidism      /RENAL   (+) Acute renal failure (ARF) (HCC)      MUSCULOSKELETAL   (+) Low back pain at multiple sites   (+) Primary osteoarthritis of both knees      PULMONARY   (+) Mild intermittent asthma without complication   (+) Mild persistent asthma without complication             Anesthesia Plan  ASA Score- 2     Anesthesia Type- IV sedation with anesthesia with ASA Monitors  Additional Monitors:   Airway Plan:           Plan Factors-    Chart reviewed  EKG reviewed  Existing labs reviewed  Patient summary reviewed              Induction-     Postoperative Plan-     Informed Consent-

## 2022-09-09 ENCOUNTER — ANESTHESIA (OUTPATIENT)
Dept: GASTROENTEROLOGY | Facility: HOSPITAL | Age: 52
End: 2022-09-09

## 2022-09-09 ENCOUNTER — ANESTHESIA EVENT (OUTPATIENT)
Dept: GASTROENTEROLOGY | Facility: HOSPITAL | Age: 52
End: 2022-09-09

## 2022-09-09 ENCOUNTER — HOSPITAL ENCOUNTER (OUTPATIENT)
Dept: GASTROENTEROLOGY | Facility: HOSPITAL | Age: 52
Setting detail: OUTPATIENT SURGERY
End: 2022-09-09
Attending: INTERNAL MEDICINE
Payer: COMMERCIAL

## 2022-09-09 VITALS
BODY MASS INDEX: 47.13 KG/M2 | HEART RATE: 92 BPM | SYSTOLIC BLOOD PRESSURE: 133 MMHG | TEMPERATURE: 97.5 F | OXYGEN SATURATION: 100 % | DIASTOLIC BLOOD PRESSURE: 65 MMHG | WEIGHT: 266 LBS | RESPIRATION RATE: 18 BRPM | HEIGHT: 63 IN

## 2022-09-09 DIAGNOSIS — K51.018 ULCERATIVE PANCOLITIS WITH OTHER COMPLICATION (HCC): ICD-10-CM

## 2022-09-09 LAB
EXT PREGNANCY TEST URINE: NEGATIVE
EXT. CONTROL: NORMAL

## 2022-09-09 PROCEDURE — 45380 COLONOSCOPY AND BIOPSY: CPT | Performed by: STUDENT IN AN ORGANIZED HEALTH CARE EDUCATION/TRAINING PROGRAM

## 2022-09-09 PROCEDURE — 81025 URINE PREGNANCY TEST: CPT | Performed by: ANESTHESIOLOGY

## 2022-09-09 PROCEDURE — 88305 TISSUE EXAM BY PATHOLOGIST: CPT | Performed by: PATHOLOGY

## 2022-09-09 RX ORDER — PROPOFOL 10 MG/ML
INJECTION, EMULSION INTRAVENOUS AS NEEDED
Status: DISCONTINUED | OUTPATIENT
Start: 2022-09-09 | End: 2022-09-09

## 2022-09-09 RX ORDER — GLYCOPYRROLATE 0.2 MG/ML
INJECTION INTRAMUSCULAR; INTRAVENOUS AS NEEDED
Status: DISCONTINUED | OUTPATIENT
Start: 2022-09-09 | End: 2022-09-09

## 2022-09-09 RX ORDER — SODIUM CHLORIDE, SODIUM LACTATE, POTASSIUM CHLORIDE, CALCIUM CHLORIDE 600; 310; 30; 20 MG/100ML; MG/100ML; MG/100ML; MG/100ML
INJECTION, SOLUTION INTRAVENOUS CONTINUOUS PRN
Status: DISCONTINUED | OUTPATIENT
Start: 2022-09-09 | End: 2022-09-09

## 2022-09-09 RX ORDER — ALBUTEROL SULFATE 2.5 MG/3ML
2.5 SOLUTION RESPIRATORY (INHALATION) ONCE AS NEEDED
Status: CANCELLED | OUTPATIENT
Start: 2022-09-09

## 2022-09-09 RX ORDER — SODIUM CHLORIDE, SODIUM LACTATE, POTASSIUM CHLORIDE, CALCIUM CHLORIDE 600; 310; 30; 20 MG/100ML; MG/100ML; MG/100ML; MG/100ML
125 INJECTION, SOLUTION INTRAVENOUS CONTINUOUS
Status: DISCONTINUED | OUTPATIENT
Start: 2022-09-09 | End: 2022-09-13 | Stop reason: HOSPADM

## 2022-09-09 RX ORDER — FENTANYL CITRATE/PF 50 MCG/ML
25 SYRINGE (ML) INJECTION
Status: CANCELLED | OUTPATIENT
Start: 2022-09-09

## 2022-09-09 RX ORDER — ONDANSETRON 2 MG/ML
4 INJECTION INTRAMUSCULAR; INTRAVENOUS ONCE AS NEEDED
Status: CANCELLED | OUTPATIENT
Start: 2022-09-09

## 2022-09-09 RX ADMIN — PROPOFOL 30 MG: 10 INJECTION, EMULSION INTRAVENOUS at 09:28

## 2022-09-09 RX ADMIN — GLYCOPYRROLATE 0.2 MCG: 0.2 INJECTION, SOLUTION INTRAMUSCULAR; INTRAVENOUS at 09:30

## 2022-09-09 RX ADMIN — PROPOFOL 100 MG: 10 INJECTION, EMULSION INTRAVENOUS at 09:23

## 2022-09-09 RX ADMIN — PROPOFOL 110 MG: 10 INJECTION, EMULSION INTRAVENOUS at 09:14

## 2022-09-09 RX ADMIN — SODIUM CHLORIDE, SODIUM LACTATE, POTASSIUM CHLORIDE, AND CALCIUM CHLORIDE: .6; .31; .03; .02 INJECTION, SOLUTION INTRAVENOUS at 09:08

## 2022-09-09 RX ADMIN — Medication 20 MG: at 09:30

## 2022-09-09 RX ADMIN — PROPOFOL 100 MG: 10 INJECTION, EMULSION INTRAVENOUS at 09:19

## 2022-09-09 RX ADMIN — PROPOFOL 90 MG: 10 INJECTION, EMULSION INTRAVENOUS at 09:15

## 2022-09-09 RX ADMIN — PROPOFOL 40 MG: 10 INJECTION, EMULSION INTRAVENOUS at 09:34

## 2022-09-09 RX ADMIN — SODIUM CHLORIDE, SODIUM LACTATE, POTASSIUM CHLORIDE, AND CALCIUM CHLORIDE 125 ML/HR: .6; .31; .03; .02 INJECTION, SOLUTION INTRAVENOUS at 07:48

## 2022-09-09 NOTE — ANESTHESIA PREPROCEDURE EVALUATION
Procedure:  COLONOSCOPY  Colonoscopy hx of UC and s/p ex-lap with ostomy  ECG: NSR  Humara and prednisone  Lasix 20 mg BID, prescribed 4 weeks ago for leg swelling/edema  Denies orthopnea    Denies the following: CP/SOB with exertion, asthma, COPD, MARTHA, stroke/TIA, seizure    Relevant Problems   ENDO   (+) Acquired hypothyroidism      /RENAL   (+) Acute renal failure (ARF) (HCC)      MUSCULOSKELETAL   (+) Low back pain at multiple sites   (+) Primary osteoarthritis of both knees      PULMONARY   (+) Mild intermittent asthma without complication   (+) Mild persistent asthma without complication        Physical Exam    Airway    Mallampati score: II  TM Distance: >3 FB  Neck ROM: full     Dental       Cardiovascular      Pulmonary      Other Findings        Anesthesia Plan  ASA Score- 3     Anesthesia Type- IV sedation with anesthesia with ASA Monitors  Additional Monitors:   Airway Plan:           Plan Factors-Exercise tolerance (METS): >4 METS  Chart reviewed  EKG reviewed  Existing labs reviewed  Patient summary reviewed  Patient is not a current smoker  Obstructive sleep apnea risk education given perioperatively  Induction-     Postoperative Plan-     Informed Consent- Anesthetic plan and risks discussed with patient  I personally reviewed this patient with the CRNA  Discussed and agreed on the Anesthesia Plan with the CRNA  Al Turner
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
## 2022-09-09 NOTE — ANESTHESIA POSTPROCEDURE EVALUATION
Post-Op Assessment Note    CV Status:  Stable    Pain management: adequate     Mental Status:  Alert and awake   Hydration Status:  Euvolemic   PONV Controlled:  Controlled   Airway Patency:  Patent      Post Op Vitals Reviewed: Yes      Staff: CRNA         No complications documented      /65 (09/09/22 0951)    Temp      Pulse 92 (09/09/22 0951)   Resp 18 (09/09/22 0951)    SpO2 100 % (09/09/22 0951)

## 2022-09-14 PROCEDURE — 88305 TISSUE EXAM BY PATHOLOGIST: CPT | Performed by: PATHOLOGY

## 2022-09-19 ENCOUNTER — EVALUATION (OUTPATIENT)
Dept: PHYSICAL THERAPY | Age: 52
End: 2022-09-19
Payer: COMMERCIAL

## 2022-09-19 DIAGNOSIS — T14.8XXA CONTUSION OF BONE: ICD-10-CM

## 2022-09-19 DIAGNOSIS — S83.512A RUPTURE OF ANTERIOR CRUCIATE LIGAMENT OF LEFT KNEE, INITIAL ENCOUNTER: Primary | ICD-10-CM

## 2022-09-19 PROCEDURE — 97110 THERAPEUTIC EXERCISES: CPT | Performed by: PHYSICAL THERAPIST

## 2022-09-19 PROCEDURE — 97162 PT EVAL MOD COMPLEX 30 MIN: CPT | Performed by: PHYSICAL THERAPIST

## 2022-09-19 NOTE — PROGRESS NOTES
PT Evaluation     Today's date: 2022  Patient name: Tuan Kothari  : 1970  MRN: [id number]  Referring provider: Ryan Null PA-C  Dx:   Encounter Diagnosis     ICD-10-CM    1  Rupture of anterior cruciate ligament of left knee, initial encounter  S83 512A                   Assessment  Assessment details: Tuan Kothari is a 50 y o  female who presents with pain, decreased strength, decreased ROM, ambulatory dysfunction and balance dysfunction  Due to these impairments, Patient has difficulty performing a/iadls  Patient's clinical presentation is consistent with their referring diagnosis of left ACL rupture  Patient would benefit from skilled physical therapy to address their aforementioned impairments, improve their level of function and to improve their overall quality of life  Impairments: abnormal gait, abnormal muscle firing, abnormal muscle tone, abnormal or restricted ROM, abnormal movement, activity intolerance, impaired balance, impaired physical strength, lacks appropriate home exercise program, pain with function, poor posture  and poor body mechanics  Understanding of Dx/Px/POC: good   Prognosis: fair    Goals  ST-3 WEEKS  1  Decrease pain by 2 points on VAS at its worst   2   Increase ROM by > 5 deg in all deficients planes  3   Increase LE by 1/2 MMT grade in all deficient planes  LT-6 WEEKS  1  Patient to be independent with a/iadls especially walking and steps  2  Increase functional activities for leisure and home activities to previous LOF    3  Independent with HEP and/or fitness program     Plan  Patient would benefit from: skilled physical therapy  Planned modality interventions: cryotherapy, electrical stimulation/Russian stimulation, thermotherapy: hydrocollator packs and unattended electrical stimulation  Planned therapy interventions: activity modification, behavior modification, body mechanics training, aquatic therapy, flexibility, functional ROM exercises, home exercise program, IADL retraining, joint mobilization, manual therapy, neuromuscular re-education, patient education, postural training, strengthening, stretching, therapeutic activities and therapeutic exercise  Frequency: 2x week (2-3x week)  Duration in weeks: 12  Plan of Care beginning date: 2022  Plan of Care expiration date: 2022  Treatment plan discussed with: patient        Subjective Evaluation    History of Present Illness  Date of onset: 2022  Mechanism of injury: Slipped in shower a ruptured left ACL per MRI, brace was ordered, currently complains left knee pain and stiffness and some giving out sensations, 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           Not a recurrent problem   Quality of life: good    Pain  Current pain ratin  At best pain ratin  At worst pain ratin  Quality: sharp, pulling and dull ache  Relieving factors: rest  Aggravating factors: stair climbing and walking  Progression: improved    Social Support  Steps to enter house: yes  Stairs in house: yes   L[facility] in: [facility]  Lives with: significant other and adult children      Diagnostic Tests  X-ray: abnormal  MRI studies: abnormal  Patient Goals  Patient goals for therapy: increased strength and independence with ADLs/IADLs          Objective     Tenderness   Left Knee   Tenderness in the MCL (proximal) and medial joint line  Active Range of Motion   Left Knee   Flexion: 115 degrees   Extension: -3 degrees     Right Knee   Hyperextension   Flexion: 120 degrees   Extension: 0 degrees     Strength/Myotome Testing     Left Knee   Flexion: 4  Prone flexion: 4-  Extension: 4-  Quadriceps contraction: fair    Tests     Left Knee   Positive anterior drawer and anterior Lachman       Swelling     Left Knee Girth Measurement (cm)   Joint line: 22 5 cm    Right Knee Girth Measurement (cm)   Joint line: 22 cm             Precautions: HTN,colitus bag      Manuals  Neuro Re-Ed                                                                                                        Ther Ex             Nu step 5 min            Leg press 70/30            Calf press 50/30            standing Hamstring curl 3/30            Hip abd 30/30            Hip add 30/30            SLR 30x                         Ther Activity                                       Gait Training                                       Modalities

## 2022-09-26 ENCOUNTER — OFFICE VISIT (OUTPATIENT)
Dept: PHYSICAL THERAPY | Age: 52
End: 2022-09-26
Payer: COMMERCIAL

## 2022-09-26 DIAGNOSIS — T14.8XXA CONTUSION OF BONE: ICD-10-CM

## 2022-09-26 DIAGNOSIS — S83.512A RUPTURE OF ANTERIOR CRUCIATE LIGAMENT OF LEFT KNEE, INITIAL ENCOUNTER: Primary | ICD-10-CM

## 2022-09-26 PROCEDURE — 97110 THERAPEUTIC EXERCISES: CPT | Performed by: PHYSICAL THERAPIST

## 2022-09-26 NOTE — PROGRESS NOTES
Daily Note     Today's date: 2022  Patient name: Lili Obando  : 1970  MRN: [id number]  Referring provider: Samson Cheng PA-C  Dx:   Encounter Diagnosis     ICD-10-CM    1  Rupture of anterior cruciate ligament of left knee, initial encounter  S83 512A    2  Contusion of bone  T14  8XXA        Start Time: 0945  Stop Time: 1030  Total time in clinic (min): 45 minutes    Subjective: patient reports knee gave out last week      Objective: See treatment diary below      Assessment: Tolerated treatment fair  Patient demonstrated fatigue post treatment, exhibited good technique with therapeutic exercises and would benefit from continued PT, uses knee brace and 1 crutch to PT and still with quad and hamstring weakness      Plan: Progress treatment as tolerated    possible wean towards independent fitness/step down program next week due to high co pay     Precautions: HTN,colitus bag      Manuals                                                                Neuro Re-Ed                          SLS balance  10x                                                                            Ther Ex             Nu step 5 min 6 min           Leg press 70/30 75/30           Calf press 50/30 50/30           standing Hamstring curl 3/30 4/30           Hip abd 30/30 30/30           Hip add 30/30 30/30           SLR 30x 1/30           LAQ  5/30           Ther Activity                                       Gait Training                                       Modalities

## 2022-09-28 ENCOUNTER — OFFICE VISIT (OUTPATIENT)
Dept: GASTROENTEROLOGY | Facility: CLINIC | Age: 52
End: 2022-09-28
Payer: COMMERCIAL

## 2022-09-28 VITALS
SYSTOLIC BLOOD PRESSURE: 132 MMHG | DIASTOLIC BLOOD PRESSURE: 80 MMHG | HEART RATE: 103 BPM | HEIGHT: 63 IN | OXYGEN SATURATION: 100 % | TEMPERATURE: 98.9 F | BODY MASS INDEX: 49.26 KG/M2 | WEIGHT: 278 LBS

## 2022-09-28 DIAGNOSIS — K57.90 DIVERTICULAR DISEASE: ICD-10-CM

## 2022-09-28 DIAGNOSIS — F19.20 STEROID DEPENDENCE (HCC): ICD-10-CM

## 2022-09-28 DIAGNOSIS — K51.018 ULCERATIVE PANCOLITIS WITH OTHER COMPLICATION (HCC): Primary | ICD-10-CM

## 2022-09-28 DIAGNOSIS — Z93.3 COLOSTOMY IN PLACE (HCC): ICD-10-CM

## 2022-09-28 DIAGNOSIS — D84.9 IMMUNOCOMPROMISED PATIENT (HCC): ICD-10-CM

## 2022-09-28 PROCEDURE — 99214 OFFICE O/P EST MOD 30 MIN: CPT | Performed by: INTERNAL MEDICINE

## 2022-09-28 RX ORDER — IBUPROFEN 200 MG
CAPSULE ORAL 2 TIMES DAILY
Qty: 100 EACH | Refills: 6 | Status: SHIPPED | OUTPATIENT
Start: 2022-09-28

## 2022-09-28 RX ORDER — MAGNESIUM HYDROXIDE 1200 MG/15ML
LIQUID ORAL
Qty: 500 ML | Refills: 3 | Status: SHIPPED | OUTPATIENT
Start: 2022-09-28

## 2022-09-28 RX ORDER — PREDNISONE 2.5 MG
TABLET ORAL
Qty: 100 TABLET | Refills: 0 | Status: SHIPPED | OUTPATIENT
Start: 2022-09-28 | End: 2022-10-27
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
## 2022-10-05 ENCOUNTER — APPOINTMENT (OUTPATIENT)
Dept: PHYSICAL THERAPY | Age: 52
End: 2022-10-05

## 2022-10-07 DIAGNOSIS — M54.40 BILATERAL LOW BACK PAIN WITH SCIATICA, SCIATICA LATERALITY UNSPECIFIED, UNSPECIFIED CHRONICITY: ICD-10-CM

## 2022-10-07 RX ORDER — MELOXICAM 15 MG/1
15 TABLET ORAL DAILY
Qty: 90 TABLET | Refills: 0 | Status: SHIPPED | OUTPATIENT
Start: 2022-10-07

## 2022-10-11 ENCOUNTER — OFFICE VISIT (OUTPATIENT)
Dept: PHYSICAL THERAPY | Age: 52
End: 2022-10-11
Payer: COMMERCIAL

## 2022-10-11 DIAGNOSIS — S83.512A RUPTURE OF ANTERIOR CRUCIATE LIGAMENT OF LEFT KNEE, INITIAL ENCOUNTER: Primary | ICD-10-CM

## 2022-10-11 PROCEDURE — 97110 THERAPEUTIC EXERCISES: CPT | Performed by: PHYSICAL THERAPIST

## 2022-10-11 NOTE — PROGRESS NOTES
Daily Note     Today's date: 10/11/2022  Patient name: Xi Zarate  : 1970  MRN: [id number]  Referring provider: Daryle Camper, PA-C  Dx:   Encounter Diagnosis     ICD-10-CM    1  Rupture of anterior cruciate ligament of left knee, initial encounter  S83 512A        Start Time: 1530  Stop Time: 1615  Total time in clinic (min): 45 minutes    Subjective: some improvement       Objective: See treatment diary below      Assessment: Tolerated treatment fair  Patient demonstrated fatigue post treatment, exhibited good technique with therapeutic exercises and would benefit from continued PT, patient continues with TE and uses knee brace and  1 crutch to ambulate  Patient will wean to fitness program due to high co- pay    Plan: Potential discharge next visit       Precautions: HTN,colitus bag      Manuals 9/19 9/26 10/11                                                              Neuro Re-Ed                          SLS balance  10x 10x                                                                           Ther Ex             Nu step 5 min 6 min 6 min          Leg press 70/30 75/30 80/30          Calf press 50/30 50/30 55/30          standing Hamstring curl 3/30 30 5/30          Hip abd 30/30 30/30 35/30          Hip add 30/30 30/30 35/30          SLR 30x 1/30 1/30          LAQ  5/30 6/30          Ther Activity             Step ups   30x                       Gait Training                                       Modalities

## 2022-10-12 PROBLEM — A08.4 VIRAL GASTROENTERITIS: Status: RESOLVED | Noted: 2021-01-19 | Resolved: 2022-10-12

## 2022-10-27 ENCOUNTER — OFFICE VISIT (OUTPATIENT)
Dept: INTERNAL MEDICINE CLINIC | Facility: CLINIC | Age: 52
End: 2022-10-27
Payer: COMMERCIAL

## 2022-10-27 VITALS
WEIGHT: 275 LBS | BODY MASS INDEX: 48.73 KG/M2 | TEMPERATURE: 97.5 F | HEIGHT: 63 IN | HEART RATE: 92 BPM | OXYGEN SATURATION: 98 % | DIASTOLIC BLOOD PRESSURE: 74 MMHG | SYSTOLIC BLOOD PRESSURE: 124 MMHG

## 2022-10-27 DIAGNOSIS — Z23 NEEDS FLU SHOT: ICD-10-CM

## 2022-10-27 DIAGNOSIS — S82.132D CLOSED FRACTURE OF MEDIAL PORTION OF LEFT TIBIAL PLATEAU WITH ROUTINE HEALING, SUBSEQUENT ENCOUNTER: ICD-10-CM

## 2022-10-27 DIAGNOSIS — R60.0 LOCALIZED EDEMA: ICD-10-CM

## 2022-10-27 DIAGNOSIS — Z93.3 COLOSTOMY IN PLACE (HCC): ICD-10-CM

## 2022-10-27 DIAGNOSIS — M17.0 PRIMARY OSTEOARTHRITIS OF BOTH KNEES: ICD-10-CM

## 2022-10-27 DIAGNOSIS — J45.20 MILD INTERMITTENT ASTHMA WITHOUT COMPLICATION: ICD-10-CM

## 2022-10-27 DIAGNOSIS — K51.018 ULCERATIVE PANCOLITIS WITH OTHER COMPLICATION (HCC): ICD-10-CM

## 2022-10-27 DIAGNOSIS — M51.26 DISC DISPLACEMENT, LUMBAR: ICD-10-CM

## 2022-10-27 DIAGNOSIS — E03.9 ACQUIRED HYPOTHYROIDISM: ICD-10-CM

## 2022-10-27 DIAGNOSIS — M75.102 ROTATOR CUFF SYNDROME OF LEFT SHOULDER: ICD-10-CM

## 2022-10-27 PROCEDURE — 90471 IMMUNIZATION ADMIN: CPT

## 2022-10-27 PROCEDURE — 99214 OFFICE O/P EST MOD 30 MIN: CPT | Performed by: INTERNAL MEDICINE

## 2022-10-27 PROCEDURE — 90682 RIV4 VACC RECOMBINANT DNA IM: CPT
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
## 2022-11-01 ENCOUNTER — OFFICE VISIT (OUTPATIENT)
Dept: OBGYN CLINIC | Facility: CLINIC | Age: 52
End: 2022-11-01

## 2022-11-01 VITALS
WEIGHT: 275 LBS | HEART RATE: 106 BPM | BODY MASS INDEX: 48.73 KG/M2 | SYSTOLIC BLOOD PRESSURE: 166 MMHG | DIASTOLIC BLOOD PRESSURE: 122 MMHG | HEIGHT: 63 IN

## 2022-11-01 DIAGNOSIS — S83.512A RUPTURE OF ANTERIOR CRUCIATE LIGAMENT OF LEFT KNEE, INITIAL ENCOUNTER: ICD-10-CM

## 2022-11-01 DIAGNOSIS — M54.42 LEFT-SIDED LOW BACK PAIN WITH LEFT-SIDED SCIATICA, UNSPECIFIED CHRONICITY: Primary | ICD-10-CM

## 2022-11-01 DIAGNOSIS — M25.562 ACUTE PAIN OF LEFT KNEE: ICD-10-CM

## 2022-11-01 RX ORDER — CYCLOBENZAPRINE HCL 5 MG
5 TABLET ORAL 3 TIMES DAILY PRN
Qty: 20 TABLET | Refills: 0 | Status: SHIPPED | OUTPATIENT
Start: 2022-11-01

## 2022-11-14 ENCOUNTER — OFFICE VISIT (OUTPATIENT)
Dept: PHYSICAL THERAPY | Age: 52
End: 2022-11-14

## 2022-11-14 DIAGNOSIS — M53.86 LOW BACK DERANGEMENT SYNDROME: Primary | ICD-10-CM

## 2022-11-14 NOTE — PROGRESS NOTES
PT Evaluation     Today's date: 2022  Patient name: Miriam Best  : 1970  MRN: [id number]  Referring provider: Dafne Manley, PT  Dx:   Encounter Diagnosis     ICD-10-CM    1  Low back derangement syndrome  M53 86        Start Time: 1600  Stop Time: 1700  Total time in clinic (min): 60 minutes    Assessment  Assessment details: Problem List:  1) chronic posterior derangement with lateral component repsponding to side glide  2) piriformis tightness    Miriam Best is a pleasant 50 y o  female who presents with left low back pain radiating through the buttocks towards the knee  She has symptoms produced with repeated flexion and extension with moderate limitations in movment, weakness in her left knee that may be more related to prior ACL injury, and piriformis tightness resulting in the pain she is experiencing and concern at no signs of improvement  No further referral appears necessary at this time based upon examination results, however, if she does not respond to conservative treatment, a referral to pain management may be considered  Positive prognostic indicators include positive attitude toward recovery and absence of observed red flags  Negative prognostic indicators include chronicity of symptoms, multiple concurrent orthopedic problems, obesity and presence of ileostomy bag making postioning for treatment difficult  Comparable signs:  1) left LE pain  2) lumbar flexion and extension  Impairments: abnormal gait, abnormal or restricted ROM, activity intolerance, lacks appropriate home exercise program, pain with function and poor posture   Functional limitations: waling, sitting, bendingBarriers to therapy: Large insurance copay    Goals  1  Promote centralization of left LE pain  2  Improved overall gait and function with pt able to wean of crutch  3  Patient will be independent with home exercise program    4  Patient will be able to manage symptoms independently  Plan  Plan details: Pt to be seen 1x per week due to insurance co pay  Patient would benefit from: skilled physical therapy  Planned therapy interventions: neuromuscular re-education, patient education, postural training, therapeutic exercise, graded exercise and home exercise program  Frequency: 1x week  Plan of Care beginning date: 2022  Plan of Care expiration date: 2023        Subjective Evaluation    History of Present Illness  Mechanism of injury: Pt has a history of prior low back pain with left LE pain which she had previous PT with relief and resolution of symptoms  She reports new onset occurred several days after attending her last PT session for her left knee  She does not recall any specific incident but does relate to possible affected gait due to left knee injury  She does use a single crutch for safety because her knee feels unstable  She describes pain with prolonged sitting and then when attempting to stand and walk  She then reports the pain gets worse with prolonged walking  She reports no difficulty sleeping  She initially had some issues with urinating but has since resolved and denies any numbness in her saddle region  BB have normalized, however pt does have an iliostomy bag  The pain does radiate form the low back through the buttocks to the knee and occasionally to the calf  The pain is constant in the back and buttocks and intermittent in the leg  She reports occasional paresthesias after sitting too long  Pt currently walks with a single crutch and has had a injury to her left knee involving the ACL which has affected her gait and posturing  The patient's greatest concern is she wants to be able to walk without feeling pain    Pain  Current pain ratin  At worst pain ratin  Location: left low back, radiates through the buttocks to knee and occasioanlly to calf  Quality: sharp and radiating  Aggravating factors: sitting, standing and walking    Social Support  Steps to enter house: yes (1 step)  Lives in: one-story house    Treatments  Current treatment: medication  Patient Goals  Patient goals for therapy: decreased pain and increased strength  Patient goal: improve walking         Objective     Concurrent Complaints      Additional Special Questions  No current red flags    Palpation     Additional Palpation Details  Tenderness left piriformis    Neurological Testing     Sensation     Lumbar   Left   Intact: light touch and pin prick    Right   Intact: light touch and pin prick    Reflexes   Left   Patellar (L4): trace (1+)  Achilles (S1): trace (1+)    Right   Patellar (L4): normal (2+)  Achilles (S1): trace (1+)    Active Range of Motion     Lumbar   Flexion:  Restriction level: maximal  Extension:  Restriction level: maximal  Left lateral flexion:  WFL  Right lateral flexion:  Restriction level: minimal  Left rotation:  Restriction level: minimal  Right rotation:  with pain Restriction level: moderate  Mechanical Assessment    Cervical      Thoracic      Lumbar    Standing flexion: repeated movements   Pain level: produced  Standing extension: repeated movements  Pain level: produced  Right sidegliding: repeated movements  Pain level: decreased    Strength/Myotome Testing     Left Hip   Planes of Motion   Flexion: 4  Abduction: 4+  Adduction: 5    Right Hip   Planes of Motion   Flexion: 5  Abduction: 4+  Adduction: 5    Left Knee   Flexion: 5  Extension: 4    Right Knee   Flexion: 5  Extension: 5    Left Ankle/Foot   Dorsiflexion: 5  Plantar flexion: 5  Great toe extension: 5    Right Ankle/Foot   Dorsiflexion: 5  Plantar flexion: 5  Great toe extension: 5    Tests     Lumbar     Left   Positive slump test    Negative passive SLR  Right   Negative crossed SLR and passive SLR  Left Hip   Positive FADIR  Negative ANNABELLE       Additional Tests Details  Piriformis tightness, when placed on stretch, produced chief complaint pain    Ambulation Weight-Bearing Status   Ambulation assistive devices: single cructch               Precautions: left knee ACL tear      Manual 11/14            Piriformis stretch MH                          TherEx             Pt education/HEP 5'            Side glide 2x10            IMER 10x                                                                                          Ther Ex                                                                                                                     Ther Activity                                       Gait Training                                       Modalities                                        Pt responded well to right side glide with symptoms beginning to centralize form left leg into back with decreased LE pain post-session
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
## 2022-11-15 NOTE — TELEPHONE ENCOUNTER
Additional Information  • Negative: Is this related to a work injury?     Protocols used: [facility] COMPREHENSIVE SPINE PROGRAM PROTOCOL
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
## 2022-11-21 ENCOUNTER — OFFICE VISIT (OUTPATIENT)
Dept: PHYSICAL THERAPY | Age: 52
End: 2022-11-21

## 2022-11-21 DIAGNOSIS — M53.86 LOW BACK DERANGEMENT SYNDROME: Primary | ICD-10-CM

## 2022-11-21 NOTE — PROGRESS NOTES
Daily Note     Today's date: 2022  Patient name: Cynthia Mccollum  : 1970  MRN: [id number]  Referring provider: Jamshid Pabon, PT  Dx:   Encounter Diagnosis     ICD-10-CM    1  Low back derangement syndrome  M53 86             Subjective: patient reports low back isn't too bad today, her knee is bothering her more  No issues after last visit  Objective: See treatment diary below      Assessment: Tolerated treatment well  Patient demonstrated fatigue post treatment, exhibited good technique with therapeutic exercises and would benefit from continued PT      Plan: Continue per plan of care        Precautions: left knee ACL tear      Manual            Piriformis stretch MH  SK                        TherEx             Pt education/HEP 5'            Side glide 2x10 2x10           IMER 10x 10x (end)           Standing Hip Ext  x10 michelle           Standing opp UE/LE  x10 ea           Nustep  lvl 3 6'           hip adduction iso c/ ball  5" 2x10           Hip adduction c/ TB supine  Red 5" 2x10                        Ther Ex                                                                                                                     Ther Activity                                       Gait Training                                       Modalities

## 2022-11-23 ENCOUNTER — APPOINTMENT (OUTPATIENT)
Dept: LAB | Facility: CLINIC | Age: 52
End: 2022-11-23

## 2022-11-23 DIAGNOSIS — D84.9 IMMUNOCOMPROMISED PATIENT (HCC): ICD-10-CM

## 2022-11-23 DIAGNOSIS — K51.018 ULCERATIVE PANCOLITIS WITH OTHER COMPLICATION (HCC): ICD-10-CM

## 2022-11-23 LAB
ALBUMIN SERPL BCP-MCNC: 3.4 G/DL (ref 3.5–5)
ALP SERPL-CCNC: 51 U/L (ref 46–116)
ALT SERPL W P-5'-P-CCNC: 52 U/L (ref 12–78)
ANION GAP SERPL CALCULATED.3IONS-SCNC: 5 MMOL/L (ref 4–13)
AST SERPL W P-5'-P-CCNC: 37 U/L (ref 5–45)
BASOPHILS # BLD AUTO: 0.07 THOUSANDS/ÂΜL (ref 0–0.1)
BASOPHILS NFR BLD AUTO: 2 % (ref 0–1)
BILIRUB SERPL-MCNC: 0.64 MG/DL (ref 0.2–1)
BUN SERPL-MCNC: 9 MG/DL (ref 5–25)
CALCIUM ALBUM COR SERPL-MCNC: 10.5 MG/DL (ref 8.3–10.1)
CALCIUM SERPL-MCNC: 10 MG/DL (ref 8.3–10.1)
CHLORIDE SERPL-SCNC: 107 MMOL/L (ref 96–108)
CO2 SERPL-SCNC: 25 MMOL/L (ref 21–32)
CREAT SERPL-MCNC: 1.13 MG/DL (ref 0.6–1.3)
CRP SERPL QL: 6.6 MG/L
EOSINOPHIL # BLD AUTO: 0.2 THOUSAND/ÂΜL (ref 0–0.61)
EOSINOPHIL NFR BLD AUTO: 5 % (ref 0–6)
ERYTHROCYTE [DISTWIDTH] IN BLOOD BY AUTOMATED COUNT: 14.6 % (ref 11.6–15.1)
GFR SERPL CREATININE-BSD FRML MDRD: 56 ML/MIN/1.73SQ M
GLUCOSE P FAST SERPL-MCNC: 104 MG/DL (ref 65–99)
HCT VFR BLD AUTO: 42 % (ref 34.8–46.1)
HGB BLD-MCNC: 12.8 G/DL (ref 11.5–15.4)
IMM GRANULOCYTES # BLD AUTO: 0.01 THOUSAND/UL (ref 0–0.2)
IMM GRANULOCYTES NFR BLD AUTO: 0 % (ref 0–2)
LYMPHOCYTES # BLD AUTO: 1.04 THOUSANDS/ÂΜL (ref 0.6–4.47)
LYMPHOCYTES NFR BLD AUTO: 24 % (ref 14–44)
MCH RBC QN AUTO: 30.4 PG (ref 26.8–34.3)
MCHC RBC AUTO-ENTMCNC: 30.5 G/DL (ref 31.4–37.4)
MCV RBC AUTO: 100 FL (ref 82–98)
MONOCYTES # BLD AUTO: 0.56 THOUSAND/ÂΜL (ref 0.17–1.22)
MONOCYTES NFR BLD AUTO: 13 % (ref 4–12)
NEUTROPHILS # BLD AUTO: 2.39 THOUSANDS/ÂΜL (ref 1.85–7.62)
NEUTS SEG NFR BLD AUTO: 56 % (ref 43–75)
NRBC BLD AUTO-RTO: 0 /100 WBCS
PLATELET # BLD AUTO: 375 THOUSANDS/UL (ref 149–390)
PMV BLD AUTO: 10.4 FL (ref 8.9–12.7)
POTASSIUM SERPL-SCNC: 4.1 MMOL/L (ref 3.5–5.3)
PROT SERPL-MCNC: 7.9 G/DL (ref 6.4–8.4)
RBC # BLD AUTO: 4.21 MILLION/UL (ref 3.81–5.12)
SODIUM SERPL-SCNC: 137 MMOL/L (ref 135–147)
WBC # BLD AUTO: 4.27 THOUSAND/UL (ref 4.31–10.16)

## 2022-11-25 ENCOUNTER — APPOINTMENT (OUTPATIENT)
Dept: LAB | Facility: CLINIC | Age: 52
End: 2022-11-25

## 2022-11-25 DIAGNOSIS — K51.018 ULCERATIVE PANCOLITIS WITH OTHER COMPLICATION (HCC): ICD-10-CM

## 2022-11-25 DIAGNOSIS — D84.9 IMMUNOCOMPROMISED PATIENT (HCC): ICD-10-CM

## 2022-11-28 ENCOUNTER — OFFICE VISIT (OUTPATIENT)
Dept: PHYSICAL THERAPY | Age: 52
End: 2022-11-28

## 2022-11-28 DIAGNOSIS — M53.86 LOW BACK DERANGEMENT SYNDROME: Primary | ICD-10-CM

## 2022-11-28 NOTE — PROGRESS NOTES
Daily Note     Today's date: 2022  Patient name: Isabelle Mejia  : 1970  MRN: [id number]  Referring provider: Claudell Fryer, PT  Dx:   Encounter Diagnosis     ICD-10-CM    1  Low back derangement syndrome  M53 86                      Subjective: Pain is mostly in her left low back and is sharp at times with burning  Pt reports it will still radiate down leg and she continues to be hindered by left knee pain related to ACL tear  She continues to use single crutch for ambulation  Objective: See treatment diary below      Assessment:  Side glides and IMER do reduce symptoms but no long lasting relief to date  Pt also hindered by left LE pain related to prior ACL injury and limits function  Pt unable to lay prone due to ileostomy and therefore unable to dtermine if symptoms could centralize or abolish in prone  Plan: Continue per plan of care        Precautions: left knee ACL tear      Manual           Piriformis stretch MH  SK MH                       TherEx             Pt education/HEP 5'            Side glide 2x10 2x10           IMER 10x 10x (end) 10x          Standing Hip Ext  x10 michelle 15x ea           Standing opp UE/LE  x10 ea 15x ea          Nustep  lvl 3 6' 6          hip adduction iso c/ ball  5" 2x10 3x10          Hip abduction c/ TB supine  Red 5" 2x10 3x10  GTB                                                                                                                                            Ther Activity                                       Gait Training                                       Modalities

## 2022-11-29 ENCOUNTER — OFFICE VISIT (OUTPATIENT)
Dept: GASTROENTEROLOGY | Facility: CLINIC | Age: 52
End: 2022-11-29

## 2022-11-29 VITALS
HEART RATE: 81 BPM | TEMPERATURE: 97.7 F | DIASTOLIC BLOOD PRESSURE: 72 MMHG | BODY MASS INDEX: 51.37 KG/M2 | WEIGHT: 290 LBS | SYSTOLIC BLOOD PRESSURE: 136 MMHG

## 2022-11-29 DIAGNOSIS — K51.018 ULCERATIVE PANCOLITIS WITH OTHER COMPLICATION (HCC): ICD-10-CM

## 2022-11-29 DIAGNOSIS — K51.018 ULCERATIVE PANCOLITIS WITH OTHER COMPLICATION (HCC): Primary | ICD-10-CM

## 2022-11-29 DIAGNOSIS — Z93.3 COLOSTOMY IN PLACE (HCC): ICD-10-CM

## 2022-11-29 DIAGNOSIS — D84.9 IMMUNOCOMPROMISED PATIENT (HCC): ICD-10-CM

## 2022-11-29 LAB — CALPROTECTIN STL-MCNT: 25 UG/G (ref 0–120)

## 2022-11-29 NOTE — TELEPHONE ENCOUNTER
Hi,    Can you call in rd sponges to [facility]? Their number is [phone]    Thank you!
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
## 2022-11-30 ENCOUNTER — TELEPHONE (OUTPATIENT)
Dept: GASTROENTEROLOGY | Facility: CLINIC | Age: 52
End: 2022-11-30

## 2022-11-30 DIAGNOSIS — D84.9 IMMUNOCOMPROMISED PATIENT (HCC): ICD-10-CM

## 2022-11-30 DIAGNOSIS — K51.018 ULCERATIVE PANCOLITIS WITH OTHER COMPLICATION (HCC): ICD-10-CM

## 2022-11-30 DIAGNOSIS — Z93.3 COLOSTOMY IN PLACE (HCC): ICD-10-CM

## 2022-11-30 LAB
6-TGN ENTSUB RBC: 355 PMOL/8X 10E8
6MMP ENTSUB RBC: 2208 PMOL/8X 10E8

## 2022-11-30 RX ORDER — IBUPROFEN 200 MG
CAPSULE ORAL 2 TIMES DAILY
Qty: 100 EACH | Refills: 6 | Status: SHIPPED | OUTPATIENT
Start: 2022-11-30 | End: 2022-11-30 | Stop reason: SDUPTHER

## 2022-11-30 RX ORDER — IBUPROFEN 200 MG
CAPSULE ORAL 2 TIMES DAILY
Qty: 100 EACH | Refills: 6 | Status: SHIPPED | OUTPATIENT
Start: 2022-11-30

## 2022-12-05 ENCOUNTER — OFFICE VISIT (OUTPATIENT)
Dept: PHYSICAL THERAPY | Age: 52
End: 2022-12-05

## 2022-12-05 DIAGNOSIS — M53.86 LOW BACK DERANGEMENT SYNDROME: Primary | ICD-10-CM

## 2022-12-05 NOTE — PROGRESS NOTES
Daily Note     Today's date: 2022  Patient name: Tim Byers  : 1970  MRN: [id number]  Referring provider: Shawnee Erazo, PT  Dx:   Encounter Diagnosis     ICD-10-CM    1  Low back derangement syndrome  M53 86           Start Time:   Stop Time:   Total time in clinic (min): 32 minutes    Subjective: Reports feeling a little better today, pain levels 6-7/10 at her LB and down her LLE  Objective: See treatment diary below      Assessment: Tolerated treatment fair  Good tolerance to manual piriformis stretching  Mild pulling at LB with IMER, no significant change in leg pain  Patient was able to complete program as documented below with VCs for carryover and proper dosage  Patient would benefit from continued PT  Plan: Continue per plan of care        Precautions: left knee ACL tear      Manual          Piriformis stretch MH  SK MH JR                       TherEx             Pt education/HEP 5'            Side glide 2x10 2x10           IMER 10x 10x (end) 10x x10          Standing Hip Ext  x10 michelle 15x ea  2x10 ea         Standing hip abduction    2x10 ea         Standing opp UE/LE  x10 ea 15x ea 15x ea          Nustep  lvl 3 6' 6 8' L4          hip adduction iso c/ ball  5" 2x10 3x10 3x10         Hip abduction c/ TB supine  Red 5" 2x10 3x10  GTB 3x10 BTB                                                                                                                                            Ther Activity                                       Gait Training                                       Modalities

## 2022-12-12 ENCOUNTER — APPOINTMENT (OUTPATIENT)
Dept: PHYSICAL THERAPY | Age: 52
End: 2022-12-12

## 2022-12-12 DIAGNOSIS — M53.86 LOW BACK DERANGEMENT SYNDROME: Primary | ICD-10-CM

## 2022-12-12 NOTE — PROGRESS NOTES
Daily Note     Today's date: 2022  Patient name: China Cardoza  : 1970  MRN: [id number]  Referring provider: Jaden Black PT  Dx:   Encounter Diagnosis     ICD-10-CM    1  Low back derangement syndrome  M53 86                      Subjective: ***      Objective: See treatment diary below      Assessment: Tolerated treatment {Tolerated treatment :7203723517}   Patient {assessment:}      Plan: {PLAN:0179155655}     Precautions: left knee ACL tear      Manual          Piriformis stretch MH  SK MH JR                       TherEx             Pt education/HEP 5'            Side glide 2x10 2x10           IMER 10x 10x (end) 10x x10          Standing Hip Ext  x10 michelle 15x ea  2x10 ea         Standing hip abduction    2x10 ea         Standing opp UE/LE  x10 ea 15x ea 15x ea          Nustep  lvl 3 6' 6 8' L4          hip adduction iso c/ ball  5" 2x10 3x10 3x10         Hip abduction c/ TB supine  Red 5" 2x10 3x10  GTB 3x10 BTB                                                                                                                                            Ther Activity                                       Gait Training                                       Modalities

## 2022-12-19 ENCOUNTER — OFFICE VISIT (OUTPATIENT)
Dept: PHYSICAL THERAPY | Age: 52
End: 2022-12-19

## 2022-12-19 DIAGNOSIS — M53.86 LOW BACK DERANGEMENT SYNDROME: Primary | ICD-10-CM

## 2022-12-19 NOTE — PROGRESS NOTES
Daily Note     Today's date: 2022  Patient name: Yarelis Bee  : 1970  MRN: [id number]  Referring provider: Lashawn Morrison PT  Dx:   Encounter Diagnosis     ICD-10-CM    1  Low back derangement syndrome  M53 86           Start Time: 845  Stop Time: 930  Total time in clinic (min): 45 minutes    Subjective: Intermittent pain radiating into leg if sitting too long or sitting certain ways provokes sharp pain down left leg  Pt is still walking with single crutch due to ACL injury to left knee  Objective: See treatment diary below      Assessment: Unable to centralize likely related to inability to achieve end range and postioning due to ileostomy bag  Plan: Continue per plan of care        Precautions: left knee ACL tear      Manual         Piriformis stretch MH  SK MH JR  MH                     TherEx             Pt education/HEP 5'            Side glide 2x10 2x10           IMER 10x 10x (end) 10x x10  10x         Standing Hip Ext  x10 michelle 15x ea  2x10 ea 3x10        Standing hip abduction    2x10 ea 3x10        Standing opp UE/LE  x10 ea 15x ea 15x ea  20x ea        Nustep  lvl 3 6' 6 8' L4  8' L5        hip adduction iso c/ ball  5" 2x10 3x10 3x10 30x        Hip abduction c/ TB supine  Red 5" 2x10 3x10  GTB 3x10 BTB  30x        Multifidus press     10x5"                                                                                                                             Ther Activity                                       Gait Training                                       Modalities

## 2022-12-27 ENCOUNTER — APPOINTMENT (OUTPATIENT)
Dept: RADIOLOGY | Facility: CLINIC | Age: 52
End: 2022-12-27

## 2022-12-27 ENCOUNTER — OFFICE VISIT (OUTPATIENT)
Dept: OBGYN CLINIC | Facility: CLINIC | Age: 52
End: 2022-12-27

## 2022-12-27 VITALS — BODY MASS INDEX: 51.03 KG/M2 | WEIGHT: 288 LBS | HEIGHT: 63 IN

## 2022-12-27 DIAGNOSIS — M25.562 ACUTE PAIN OF LEFT KNEE: ICD-10-CM

## 2022-12-27 DIAGNOSIS — S83.512A RUPTURE OF ANTERIOR CRUCIATE LIGAMENT OF LEFT KNEE, INITIAL ENCOUNTER: ICD-10-CM

## 2022-12-27 DIAGNOSIS — M54.16 LUMBAR RADICULOPATHY: ICD-10-CM

## 2022-12-27 DIAGNOSIS — M54.32 SCIATICA OF LEFT SIDE: ICD-10-CM

## 2022-12-27 DIAGNOSIS — M25.562 LEFT KNEE PAIN, UNSPECIFIED CHRONICITY: Primary | ICD-10-CM

## 2022-12-27 RX ORDER — LIDOCAINE HYDROCHLORIDE 10 MG/ML
2 INJECTION, SOLUTION INFILTRATION; PERINEURAL
Status: COMPLETED | OUTPATIENT
Start: 2022-12-27 | End: 2022-12-27

## 2022-12-27 RX ORDER — METHYLPREDNISOLONE ACETATE 40 MG/ML
2 INJECTION, SUSPENSION INTRA-ARTICULAR; INTRALESIONAL; INTRAMUSCULAR; SOFT TISSUE
Status: COMPLETED | OUTPATIENT
Start: 2022-12-27 | End: 2022-12-27

## 2022-12-27 RX ORDER — BUPIVACAINE HYDROCHLORIDE 2.5 MG/ML
2 INJECTION, SOLUTION INFILTRATION; PERINEURAL
Status: COMPLETED | OUTPATIENT
Start: 2022-12-27 | End: 2022-12-27

## 2022-12-27 RX ADMIN — METHYLPREDNISOLONE ACETATE 2 ML: 40 INJECTION, SUSPENSION INTRA-ARTICULAR; INTRALESIONAL; INTRAMUSCULAR; SOFT TISSUE at 12:46

## 2022-12-27 RX ADMIN — BUPIVACAINE HYDROCHLORIDE 2 ML: 2.5 INJECTION, SOLUTION INFILTRATION; PERINEURAL at 12:46

## 2022-12-27 RX ADMIN — LIDOCAINE HYDROCHLORIDE 2 ML: 10 INJECTION, SOLUTION INFILTRATION; PERINEURAL at 12:46
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
## 2022-12-30 DIAGNOSIS — E03.9 ACQUIRED HYPOTHYROIDISM: ICD-10-CM

## 2022-12-30 DIAGNOSIS — M54.40 BILATERAL LOW BACK PAIN WITH SCIATICA, SCIATICA LATERALITY UNSPECIFIED, UNSPECIFIED CHRONICITY: ICD-10-CM

## 2022-12-30 RX ORDER — MELOXICAM 15 MG/1
15 TABLET ORAL DAILY
Qty: 90 TABLET | Refills: 0 | Status: SHIPPED | OUTPATIENT
Start: 2022-12-30

## 2022-12-30 RX ORDER — LEVOTHYROXINE SODIUM 0.1 MG/1
100 TABLET ORAL
Qty: 90 TABLET | Refills: 0 | Status: SHIPPED | OUTPATIENT
Start: 2022-12-30

## 2023-01-11 ENCOUNTER — OFFICE VISIT (OUTPATIENT)
Dept: PHYSICAL THERAPY | Age: 53
End: 2023-01-11

## 2023-01-11 DIAGNOSIS — M54.32 SCIATICA OF LEFT SIDE: ICD-10-CM

## 2023-01-11 DIAGNOSIS — M53.86 LOW BACK DERANGEMENT SYNDROME: Primary | ICD-10-CM

## 2023-01-11 DIAGNOSIS — M54.16 LUMBAR RADICULOPATHY: ICD-10-CM

## 2023-01-11 NOTE — PROGRESS NOTES
Daily Note     Today's date: 2023  Patient name: Lili Obando  : 1970  MRN: [id number]  Referring provider: Lauren Robertson PT  Dx:   Encounter Diagnosis     ICD-10-CM    1  Low back derangement syndrome  M53 86                      Subjective: Pt is utilizing a single ambulatory crutch secondary to ACL injury to left knee  Radicular pains noted into R LE descending to toes  Prolonged standing and sitting provokes the pain  Patient has appointment with Pain management on the   Objective: See treatment diary below      Assessment: Open books added this visit R rotation limited vs L  Addition of sciatic N  Glides with good tolerance reduction in sx  Continued with core strengthening to promote stabilization  Time spent on patient education regarding directional preference and sx management  Updated HEP with patient demonstrating  verbal  understanding of prescribed exercises and instructions  Patient advised to stop performing home exercise program if symptoms increase or new complaints developed  Monitor Nv  Reduced sx to glut this visit, residual stiffness noted post session  Patient would benefit from continued PT to address listed impairments  Plan: Continue per plan of care        Precautions: left knee ACL tear      Manual        Piriformis stretch   SK [facility]                    TherEx             Pt education/HEP 5'            Side glide 2x10 2x10           IMER 10x 10x (end) 10x x10  10x  10x 2       Standing Hip Ext  x10 michelle 15x ea  2x10 ea 3x10        Standing hip abduction    2x10 ea 3x10        Standing opp UE/LE  x10 ea 15x ea 15x ea  20x ea        Nustep  lvl 3 6' 6 8' L4  8' L5 5' L4       hip adduction iso c/ ball  5" 2x10 3x10 3x10 30x 30x       Hip abduction c/ TB supine  Red 5" 2x10 3x10  GTB 3x10 BTB  30x 30x       Multifidus press     10x5"                     Open book      10x ea       Sciatic N glides      10x floss Ther Activity                                       Gait Training                                       Modalities

## 2023-01-13 ENCOUNTER — APPOINTMENT (OUTPATIENT)
Dept: PHYSICAL THERAPY | Age: 53
End: 2023-01-13

## 2023-01-16 DIAGNOSIS — K51.018 ULCERATIVE PANCOLITIS WITH OTHER COMPLICATION (HCC): Primary | ICD-10-CM

## 2023-01-16 NOTE — LETTER
Tracy Bedolla  [address] [facility] [address]        ------------------------------------------------------------------------------------------------------------  1/17/2023    Dear Tracy Bedolla,    Your surgery is scheduled for: 2/22/2023   The hospital will call the evening prior to your surgery with your expected arrival time  Location:[place] [address] , [facility] [address]    CHECK LIST PRIOR TO INPATIENT SURGERY  It is your responsibility to obtain any/all referrals needed for your surgery if required by your insurance  Our office will contact you to discuss your insurance coverage for this procedure  Special instructions required if you are taking any blood thinners  Please verify with the prescribing physician  Examples include Coumadin, Plavix, Xarelto, Eliquis, Pradaxa, etc     Please check with your family physician if you are taking the following medications: Aspirin or any Aspirin containing medication, Gingko biloba, Ginseng, Feverfew, and/or St  Lalo’s Wort  We suggest stopping these for 3 days  The night before and the day of your surgery, wash from your neck to groin with chlorhexidine soap  This soap is available at most retail pharmacies under such brand names as Hibiclens, Endure or Aplicare  Pre-admission testing Required: YES x NO     If Yes, what type:  BLOOD-WORK  x EKG   x CHEST X-RAY         Other Clearances/ Additional Testing: NONE    BOWEL PREPARATION REQUIRED: YES  x NO   If yes, start date: 02/21/2023   Please see attached bowel preparation instructions  NOTHING TO EAT OR DRINK AFTER MIDNIGHT PRIOR TO SURGERY  Please do not hesitate to call our office with any questions regarding your surgery                   MIRALAX/GATORADE SURGERY PREPARATION INSTRUCTIONS    Purchase:   (1) 238g bottle of MiraLax or Glycolax - no prescription needed   4 Dulcolax Laxative Tablets - no prescription needed   64 oz  bottle of Gatorade (NOT RED), Crystal Light, or another clear liquid of  choice  **REMEMBER** A prescription for antibiotics has been called into your pharmacy for  prior to your surgery  One Day Prior to Surgery  • Have a normal breakfast, light lunch, and clear liquids thereafter  It is important that you drink plenty of clear liquids throughout the day to prevent dehydration  CLEAR LIQUIDS INCLUDE:  Water, Clear Fruit Juice (Apple, Cranberry, Grape), Black Coffee, Tea, Ginger Ale, Gatorade, Jerardo-Aid, Hi-C, plain Jello, Ice Pops, Clear Broth or Bouillon, Crystal Light, Lemonade, Soda (regular or diet)  No Milk Products! • At 1:00 pm, take (4) Dulcolax Tablets with 8 oz  of water  Swallow the tablets whole with a full glass of water  The package may direct you not to exceed (2) tablets at any time but for the purpose of this examination, you should take (4)  • At 1:00 pm, take your first dose of antibiotic as prescribed  • At 1:00 pm, Mix the 238g bottle of MiraLax in 64 oz  of Gatorade and shake the solution until the MiraLax is dissolved  Drink 8 oz  glassfuls at your own pace  It may take 3-4 hours to drink all the solution  • At 10:00 pm, take your last dose of antibiotic as prescribed  • REMEMBER TO STAY CLOSE TO TOILET FACILITIES  The Day of Surgery  • Take nothing by mouth until after surgery

## 2023-01-16 NOTE — TELEPHONE ENCOUNTER
----- Message from Chandler Govea MD sent at 12/15/2022 10:14 AM EST -----  Call to schedule elective surgery

## 2023-01-16 NOTE — LETTER
Omaira Matta  [address] [facility] [address]        ------------------------------------------------------------------------------------------------------------    1/18/2023    Dear Omaira Matta,    Your surgery is scheduled for: 3/1/2023   The hospital will call the evening prior to your surgery with your expected arrival time  Location:[address] [address]    CHECK LIST PRIOR TO INPATIENT SURGERY  It is your responsibility to obtain any/all referrals needed for your surgery if required by your insurance  Our office will contact you to discuss your insurance coverage for this procedure  Special instructions required if you are taking any blood thinners  Please verify with the prescribing physician  Examples include Coumadin, Plavix, Xarelto, Eliquis, Pradaxa, etc     Please check with your family physician if you are taking the following medications: Aspirin or any Aspirin containing medication, Gingko biloba, Ginseng, Feverfew, and/or St  Lalo’s Wort  We suggest stopping these for 3 days  The night before and the day of your surgery, wash from your neck to groin with chlorhexidine soap  This soap is available at most retail pharmacies under such brand names as Hibiclens, Endure or Aplicare  Pre-admission testing Required: YES x NO     If Yes, what type:  BLOOD-WORK  x EKG  x CHEST X-RAY       Other Clearances/ Additional Testing: NONE    BOWEL PREPARATION REQUIRED: YES x NO   If yes, start date: 02/28/2023   Please see attached bowel preparation instructions  NOTHING TO EAT OR DRINK AFTER MIDNIGHT PRIOR TO SURGERY  Please do not hesitate to call our office with any questions regarding your surgery

## 2023-01-16 NOTE — TELEPHONE ENCOUNTER
Attempted to contact patient to schedule surgery,no answer  Left v/m requesting call back  Waiting on response from patient

## 2023-01-17 ENCOUNTER — CONSULT (OUTPATIENT)
Dept: PAIN MEDICINE | Facility: CLINIC | Age: 53
End: 2023-01-17

## 2023-01-17 VITALS
BODY MASS INDEX: 51.03 KG/M2 | SYSTOLIC BLOOD PRESSURE: 159 MMHG | WEIGHT: 288 LBS | HEIGHT: 63 IN | DIASTOLIC BLOOD PRESSURE: 93 MMHG | HEART RATE: 92 BPM

## 2023-01-17 DIAGNOSIS — G89.29 CHRONIC LEFT-SIDED LOW BACK PAIN WITH LEFT-SIDED SCIATICA: ICD-10-CM

## 2023-01-17 DIAGNOSIS — M54.42 CHRONIC LEFT-SIDED LOW BACK PAIN WITH LEFT-SIDED SCIATICA: ICD-10-CM

## 2023-01-17 RX ORDER — NEOMYCIN SULFATE 500 MG/1
TABLET ORAL
Qty: 4 TABLET | Refills: 0 | Status: SHIPPED | OUTPATIENT
Start: 2023-02-10 | End: 2023-02-10

## 2023-01-17 RX ORDER — METHOCARBAMOL 500 MG/1
TABLET, FILM COATED ORAL
Qty: 90 TABLET | Refills: 2 | Status: SHIPPED | OUTPATIENT
Start: 2023-01-17

## 2023-01-17 RX ORDER — METRONIDAZOLE 500 MG/1
TABLET ORAL
Qty: 2 TABLET | Refills: 0 | Status: SHIPPED | OUTPATIENT
Start: 2023-02-10 | End: 2023-02-10

## 2023-01-17 NOTE — PATIENT INSTRUCTIONS
Continue physical therapy  Start Robaxin (methocarbamol) - take 1 pill up to three times a day as needed  This medication can make you drowsy
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
## 2023-01-17 NOTE — TELEPHONE ENCOUNTER
Patient scheduled for surgery  3/1/2023  at \[facility]"" [place]   Instructions and PAT's gone over with and mailed to the patient      Pre op meds routed to Dr Jessica Murphy

## 2023-01-18 ENCOUNTER — OFFICE VISIT (OUTPATIENT)
Dept: PHYSICAL THERAPY | Age: 53
End: 2023-01-18

## 2023-01-18 DIAGNOSIS — M54.32 SCIATICA OF LEFT SIDE: ICD-10-CM

## 2023-01-18 DIAGNOSIS — M53.86 LOW BACK DERANGEMENT SYNDROME: Primary | ICD-10-CM

## 2023-01-18 DIAGNOSIS — M54.16 LUMBAR RADICULOPATHY: ICD-10-CM

## 2023-01-18 NOTE — PROGRESS NOTES
Daily Note     Today's date: 2023  Patient name: Ethan Jeff  : 1970  MRN: [id number]  Referring provider: Andrae Luna, PT  Dx:   Encounter Diagnosis     ICD-10-CM    1  Low back derangement syndrome  M53 86       2  Lumbar radiculopathy  M54 16       3  Sciatica of left side  M54 32           Start Time: 0745  Stop Time: 0830  Total time in clinic (min): 45 minutes    Subjective: Pt continues to c/o radiating pain in left leg, however she continues to use crutch on left side due to left knee instability  Pt did see pain management and it appears was prescribed meloxicam    Pt scheduled for surgery for colon next month  Objective: See treatment diary below      Assessment: Pt gait with crutch likely contributes to continued LBP  Pt feels she is unable to progress from crutch due to knee instability and giving way  Discussed use of can and will begin trial next visit  Minimal change in symptoms with IMER  Plan: Continue per plan of care        Precautions: left knee ACL tear      Manual       Piriformis stretch   SK [facility]                    TherEx             Pt education/HEP 5'            Side glide 2x10 2x10           IMER 10x 10x (end) 10x x10  10x  10x 2 2x10      Standing Hip Ext  x10 michelle 15x ea  2x10 ea 3x10  20x ea      Standing hip abduction    2x10 ea 3x10        Standing opp UE/LE  x10 ea 15x ea 15x ea  20x ea        Nustep  lvl 3 6' 6 8' L4  8' L5 5' L4 5'      hip adduction iso c/ ball  5" 2x10 3x10 3x10 30x 30x 30x      Hip abduction c/ TB supine  Red 5" 2x10 3x10  GTB 3x10 BTB  30x 30x 30x      Multifidus press     10x5"                     Open book      10x ea 15x      Sciatic N glides      10x floss 20x                                                                                    Ther Activity                                       Gait Training                                       Modalities

## 2023-01-24 ENCOUNTER — APPOINTMENT (OUTPATIENT)
Dept: PHYSICAL THERAPY | Age: 53
End: 2023-01-24

## 2023-01-31 ENCOUNTER — APPOINTMENT (OUTPATIENT)
Dept: PHYSICAL THERAPY | Age: 53
End: 2023-01-31

## 2023-02-06 ENCOUNTER — APPOINTMENT (OUTPATIENT)
Dept: LAB | Facility: HOSPITAL | Age: 53
End: 2023-02-06

## 2023-02-06 ENCOUNTER — LAB REQUISITION (OUTPATIENT)
Dept: LAB | Facility: HOSPITAL | Age: 53
End: 2023-02-06

## 2023-02-06 ENCOUNTER — OFFICE VISIT (OUTPATIENT)
Dept: LAB | Facility: HOSPITAL | Age: 53
End: 2023-02-06

## 2023-02-06 DIAGNOSIS — K51.018 ULCERATIVE PANCOLITIS WITH OTHER COMPLICATION (HCC): ICD-10-CM

## 2023-02-06 DIAGNOSIS — K51.018 ULCERATIVE (CHRONIC) PANCOLITIS WITH OTHER COMPLICATION (HCC): ICD-10-CM

## 2023-02-06 LAB
ABO GROUP BLD: NORMAL
ANION GAP SERPL CALCULATED.3IONS-SCNC: 7 MMOL/L (ref 4–13)
BASOPHILS # BLD AUTO: 0.04 THOUSANDS/ÂΜL (ref 0–0.1)
BASOPHILS NFR BLD AUTO: 1 % (ref 0–1)
BLD GP AB SCN SERPL QL: NEGATIVE
BUN SERPL-MCNC: 18 MG/DL (ref 5–25)
CALCIUM SERPL-MCNC: 9.4 MG/DL (ref 8.4–10.2)
CHLORIDE SERPL-SCNC: 103 MMOL/L (ref 96–108)
CO2 SERPL-SCNC: 27 MMOL/L (ref 21–32)
CREAT SERPL-MCNC: 0.84 MG/DL (ref 0.6–1.3)
EOSINOPHIL # BLD AUTO: 0.21 THOUSAND/ÂΜL (ref 0–0.61)
EOSINOPHIL NFR BLD AUTO: 8 % (ref 0–6)
ERYTHROCYTE [DISTWIDTH] IN BLOOD BY AUTOMATED COUNT: 15.2 % (ref 11.6–15.1)
EST. AVERAGE GLUCOSE BLD GHB EST-MCNC: 108 MG/DL
GFR SERPL CREATININE-BSD FRML MDRD: 81 ML/MIN/1.73SQ M
GLUCOSE P FAST SERPL-MCNC: 92 MG/DL (ref 65–99)
HBA1C MFR BLD: 5.4 %
HCT VFR BLD AUTO: 39.9 % (ref 34.8–46.1)
HGB BLD-MCNC: 12.8 G/DL (ref 11.5–15.4)
IMM GRANULOCYTES # BLD AUTO: 0 THOUSAND/UL (ref 0–0.2)
IMM GRANULOCYTES NFR BLD AUTO: 0 % (ref 0–2)
LYMPHOCYTES # BLD AUTO: 0.79 THOUSANDS/ÂΜL (ref 0.6–4.47)
LYMPHOCYTES NFR BLD AUTO: 28 % (ref 14–44)
MCH RBC QN AUTO: 28.8 PG (ref 26.8–34.3)
MCHC RBC AUTO-ENTMCNC: 32.1 G/DL (ref 31.4–37.4)
MCV RBC AUTO: 90 FL (ref 82–98)
MONOCYTES # BLD AUTO: 0.44 THOUSAND/ÂΜL (ref 0.17–1.22)
MONOCYTES NFR BLD AUTO: 16 % (ref 4–12)
NEUTROPHILS # BLD AUTO: 1.3 THOUSANDS/ÂΜL (ref 1.85–7.62)
NEUTS SEG NFR BLD AUTO: 47 % (ref 43–75)
NRBC BLD AUTO-RTO: 0 /100 WBCS
PLATELET # BLD AUTO: 257 THOUSANDS/UL (ref 149–390)
PMV BLD AUTO: 10.9 FL (ref 8.9–12.7)
POTASSIUM SERPL-SCNC: 3.9 MMOL/L (ref 3.5–5.3)
RBC # BLD AUTO: 4.44 MILLION/UL (ref 3.81–5.12)
RH BLD: POSITIVE
SODIUM SERPL-SCNC: 137 MMOL/L (ref 135–147)
SPECIMEN EXPIRATION DATE: NORMAL
WBC # BLD AUTO: 2.78 THOUSAND/UL (ref 4.31–10.16)

## 2023-02-07 LAB
ATRIAL RATE: 71 BPM
PR INTERVAL: 152 MS
QRS AXIS: -20 DEGREES
QRSD INTERVAL: 90 MS
QT INTERVAL: 398 MS
QTC INTERVAL: 432 MS
T WAVE AXIS: 67 DEGREES
VENTRICULAR RATE: 71 BPM

## 2023-02-10 ENCOUNTER — ANESTHESIA EVENT (OUTPATIENT)
Dept: PERIOP | Facility: HOSPITAL | Age: 53
End: 2023-02-10

## 2023-02-10 NOTE — PRE-PROCEDURE INSTRUCTIONS
Pre-Surgery Instructions:   Medication Instructions   • Adalimumab (Humira Pen) 40 MG/0 4ML PNKT Hold day of surgery  • Albuterol Sulfate 108 (90 Base) MCG/ACT AEPB Uses PRN- OK to take day of surgery   • levothyroxine (Euthyrox) 100 mcg tablet Take day of surgery  • loratadine (CLARITIN) 10 mg tablet Hold day of surgery  • meloxicam (MOBIC) 15 mg tablet Stop taking 7 days prior to surgery  • mercaptopurine (PURINETHOL) 50 mg tablet Instructions provided by MD Clemente Birmingham MD   this med is an immunosuppressant  If there are no instructions from [facility] that discusses when to hold immunosuppressants, would hold day of surgery - Dr Jes Roberts       •     • methocarbamol (ROBAXIN) 500 mg tablet Uses PRN- OK to take day of surgery   • Semaglutide,0 25 or 0 5MG/DOS, 2 MG/1 5ML SOPN Hold day of surgery  NPO after midnight, meds in am with sips of water  Understands when to expect preop phone call  Remove all jewelry  Advised of visitation policy  Before your operation, you play an important role in decreasing your risk for infection by washing with special antiseptic soap  This is an effective way to reduce bacteria on the skin which may help to prevent infections at the surgical site  Please read the following directions in advance  1  In the week before your operation purchase a 4 ounce bottle of antiseptic soap containing chlorhexidine gluconate 4%  Some brand names include: Aplicare, Endure, and Hibiclens  The cost is usually less than $5 00  · For your convenience, the [address] [place] [address] carries the soap  · It may also be available at your doctor's office or pre-admission testing center, and at most retail pharmacies  · If you are allergic or sensitive to soaps containing chlorhexidine gluconate (CHG), please let your doctor know so another antiseptic soap can be suggested  · CHG antiseptic soap is for external use only    2      The day before your operation follow these directions carefully to get ready  · Place clean lines (sheets) on your bed; you should sleep on clean sheets after your evening shower  · Get clean towels and washcloths ready - you need enough for 2 showers  · Set aside clean underwear, pajamas, and clothing to wear after the shower  Reminders:  · DO NOT use any other soap or body rinse on your skin during or after the antiseptic showers  · DO NOT use lotion , powder, deodorant, or perfume/aftershave of any kind on your skin after your antiseptic shower  · DO NOT shave any body parts in the 24 hours/the day before your operation  · DO NOT get the antiseptic soap in your eyes, ears, nose, mouth, or vaginal area  3      You will need to shower the night before AND the morning of your Surgery  Shower 1:  · The evening before your operation, take the fist shower  · First, shampoo your hair with regular shampoo and rinse it completely before you use the anitseptic soap  After washing and rinsing your hair, rinse your body  · Next, use a clean wash cloth to apply the antiseptic soap and wash your body from the neck down to your toes using 1/2 bottle of the antiseptic soap  You will use the other 1/2 bottle for the second shower  · Clean the area where your incision will be; later this area well for about 2 minutes  · If you ar having head or neck surgery, wash areas with the antiseptic soap  · Rinse yourself completely with running water  · Use a clean towel to dry off  · Wear clean underwear and clothing/pajamas  Shower 2:  · The Morning of your operation, take the second shower following the same steps as Shower 1 using the second 1/2 of the bottle of antiseptic soap  · Use clean cloths and towels to was and dry yourself off  · Wear clean underwear and clothing

## 2023-03-01 ENCOUNTER — HOSPITAL ENCOUNTER (INPATIENT)
Facility: HOSPITAL | Age: 53
LOS: 5 days | Discharge: HOME HEALTH | End: 2023-03-06
Attending: COLON & RECTAL SURGERY | Admitting: COLON & RECTAL SURGERY

## 2023-03-01 ENCOUNTER — ANESTHESIA (OUTPATIENT)
Dept: PERIOP | Facility: HOSPITAL | Age: 53
End: 2023-03-01

## 2023-03-01 DIAGNOSIS — K51.018 ULCERATIVE PANCOLITIS WITH OTHER COMPLICATION (HCC): Primary | ICD-10-CM

## 2023-03-01 DIAGNOSIS — N28.9 RENAL INSUFFICIENCY: ICD-10-CM

## 2023-03-01 LAB
EXT PREGNANCY TEST URINE: NEGATIVE
EXT. CONTROL: NORMAL
GLUCOSE SERPL-MCNC: 110 MG/DL (ref 65–140)

## 2023-03-01 PROCEDURE — 0DTP0ZZ RESECTION OF RECTUM, OPEN APPROACH: ICD-10-PCS | Performed by: COLON & RECTAL SURGERY

## 2023-03-01 PROCEDURE — 0DTF0ZZ RESECTION OF RIGHT LARGE INTESTINE, OPEN APPROACH: ICD-10-PCS | Performed by: COLON & RECTAL SURGERY

## 2023-03-01 PROCEDURE — 0UB10ZZ EXCISION OF LEFT OVARY, OPEN APPROACH: ICD-10-PCS | Performed by: COLON & RECTAL SURGERY

## 2023-03-01 PROCEDURE — 0D1B0Z4 BYPASS ILEUM TO CUTANEOUS, OPEN APPROACH: ICD-10-PCS | Performed by: COLON & RECTAL SURGERY

## 2023-03-01 RX ORDER — HYDROMORPHONE HCL 110MG/55ML
PATIENT CONTROLLED ANALGESIA SYRINGE INTRAVENOUS AS NEEDED
Status: DISCONTINUED | OUTPATIENT
Start: 2023-03-01 | End: 2023-03-01

## 2023-03-01 RX ORDER — DEXAMETHASONE SODIUM PHOSPHATE 10 MG/ML
INJECTION, SOLUTION INTRAMUSCULAR; INTRAVENOUS AS NEEDED
Status: DISCONTINUED | OUTPATIENT
Start: 2023-03-01 | End: 2023-03-01

## 2023-03-01 RX ORDER — ONDANSETRON 2 MG/ML
4 INJECTION INTRAMUSCULAR; INTRAVENOUS ONCE AS NEEDED
Status: DISCONTINUED | OUTPATIENT
Start: 2023-03-01 | End: 2023-03-01 | Stop reason: HOSPADM

## 2023-03-01 RX ORDER — LEVOFLOXACIN 5 MG/ML
500 INJECTION, SOLUTION INTRAVENOUS ONCE
Status: COMPLETED | OUTPATIENT
Start: 2023-03-01 | End: 2023-03-01

## 2023-03-01 RX ORDER — IPRATROPIUM BROMIDE AND ALBUTEROL SULFATE 2.5; .5 MG/3ML; MG/3ML
3 SOLUTION RESPIRATORY (INHALATION)
Status: DISCONTINUED | OUTPATIENT
Start: 2023-03-01 | End: 2023-03-01

## 2023-03-01 RX ORDER — LIDOCAINE HYDROCHLORIDE 10 MG/ML
INJECTION, SOLUTION EPIDURAL; INFILTRATION; INTRACAUDAL; PERINEURAL AS NEEDED
Status: DISCONTINUED | OUTPATIENT
Start: 2023-03-01 | End: 2023-03-01

## 2023-03-01 RX ORDER — SODIUM CHLORIDE, SODIUM LACTATE, POTASSIUM CHLORIDE, CALCIUM CHLORIDE 600; 310; 30; 20 MG/100ML; MG/100ML; MG/100ML; MG/100ML
125 INJECTION, SOLUTION INTRAVENOUS CONTINUOUS
Status: DISCONTINUED | OUTPATIENT
Start: 2023-03-01 | End: 2023-03-03

## 2023-03-01 RX ORDER — MIDAZOLAM HYDROCHLORIDE 2 MG/2ML
INJECTION, SOLUTION INTRAMUSCULAR; INTRAVENOUS AS NEEDED
Status: DISCONTINUED | OUTPATIENT
Start: 2023-03-01 | End: 2023-03-01

## 2023-03-01 RX ORDER — ROCURONIUM BROMIDE 10 MG/ML
INJECTION, SOLUTION INTRAVENOUS AS NEEDED
Status: DISCONTINUED | OUTPATIENT
Start: 2023-03-01 | End: 2023-03-01

## 2023-03-01 RX ORDER — PROPOFOL 10 MG/ML
INJECTION, EMULSION INTRAVENOUS AS NEEDED
Status: DISCONTINUED | OUTPATIENT
Start: 2023-03-01 | End: 2023-03-01

## 2023-03-01 RX ORDER — ONDANSETRON 2 MG/ML
INJECTION INTRAMUSCULAR; INTRAVENOUS AS NEEDED
Status: DISCONTINUED | OUTPATIENT
Start: 2023-03-01 | End: 2023-03-01

## 2023-03-01 RX ORDER — HEPARIN SODIUM 5000 [USP'U]/ML
7500 INJECTION, SOLUTION INTRAVENOUS; SUBCUTANEOUS EVERY 8 HOURS SCHEDULED
Status: DISCONTINUED | OUTPATIENT
Start: 2023-03-01 | End: 2023-03-06 | Stop reason: HOSPADM

## 2023-03-01 RX ORDER — FENTANYL CITRATE 50 UG/ML
INJECTION, SOLUTION INTRAMUSCULAR; INTRAVENOUS AS NEEDED
Status: DISCONTINUED | OUTPATIENT
Start: 2023-03-01 | End: 2023-03-01

## 2023-03-01 RX ORDER — ALBUMIN, HUMAN INJ 5% 5 %
SOLUTION INTRAVENOUS CONTINUOUS PRN
Status: DISCONTINUED | OUTPATIENT
Start: 2023-03-01 | End: 2023-03-01

## 2023-03-01 RX ORDER — SUCCINYLCHOLINE/SOD CL,ISO/PF 100 MG/5ML
SYRINGE (ML) INTRAVENOUS AS NEEDED
Status: DISCONTINUED | OUTPATIENT
Start: 2023-03-01 | End: 2023-03-01

## 2023-03-01 RX ORDER — HYDROMORPHONE HCL/PF 1 MG/ML
0.4 SYRINGE (ML) INJECTION
Status: DISCONTINUED | OUTPATIENT
Start: 2023-03-01 | End: 2023-03-01 | Stop reason: HOSPADM

## 2023-03-01 RX ORDER — ERYTHROMYCIN 333 MG/1
1000 TABLET, DELAYED RELEASE ORAL 3 TIMES DAILY
Status: DISCONTINUED | OUTPATIENT
Start: 2023-03-01 | End: 2023-03-01

## 2023-03-01 RX ORDER — METRONIDAZOLE 500 MG/100ML
500 INJECTION, SOLUTION INTRAVENOUS ONCE
Status: COMPLETED | OUTPATIENT
Start: 2023-03-01 | End: 2023-03-01

## 2023-03-01 RX ORDER — KETOROLAC TROMETHAMINE 30 MG/ML
INJECTION, SOLUTION INTRAMUSCULAR; INTRAVENOUS AS NEEDED
Status: DISCONTINUED | OUTPATIENT
Start: 2023-03-01 | End: 2023-03-01

## 2023-03-01 RX ORDER — FENTANYL CITRATE/PF 50 MCG/ML
25 SYRINGE (ML) INJECTION
Status: DISCONTINUED | OUTPATIENT
Start: 2023-03-01 | End: 2023-03-01 | Stop reason: HOSPADM

## 2023-03-01 RX ORDER — HYDROMORPHONE HCL/PF 1 MG/ML
0.5 SYRINGE (ML) INJECTION
Status: DISCONTINUED | OUTPATIENT
Start: 2023-03-01 | End: 2023-03-06 | Stop reason: HOSPADM

## 2023-03-01 RX ORDER — DIPHENHYDRAMINE HYDROCHLORIDE 50 MG/ML
25 INJECTION INTRAMUSCULAR; INTRAVENOUS EVERY 6 HOURS PRN
Status: DISCONTINUED | OUTPATIENT
Start: 2023-03-01 | End: 2023-03-06 | Stop reason: HOSPADM

## 2023-03-01 RX ORDER — SODIUM CHLORIDE 9 MG/ML
INJECTION, SOLUTION INTRAVENOUS CONTINUOUS PRN
Status: DISCONTINUED | OUTPATIENT
Start: 2023-03-01 | End: 2023-03-01

## 2023-03-01 RX ORDER — ALBUTEROL SULFATE 2.5 MG/3ML
2.5 SOLUTION RESPIRATORY (INHALATION) ONCE AS NEEDED
Status: DISCONTINUED | OUTPATIENT
Start: 2023-03-01 | End: 2023-03-01 | Stop reason: HOSPADM

## 2023-03-01 RX ORDER — NEOMYCIN SULFATE 500 MG/1
1000 TABLET ORAL 3 TIMES DAILY
Status: DISCONTINUED | OUTPATIENT
Start: 2023-03-01 | End: 2023-03-01

## 2023-03-01 RX ORDER — SODIUM CHLORIDE, SODIUM LACTATE, POTASSIUM CHLORIDE, CALCIUM CHLORIDE 600; 310; 30; 20 MG/100ML; MG/100ML; MG/100ML; MG/100ML
125 INJECTION, SOLUTION INTRAVENOUS CONTINUOUS
Status: DISCONTINUED | OUTPATIENT
Start: 2023-03-01 | End: 2023-03-01

## 2023-03-01 RX ORDER — ALBUTEROL SULFATE 90 UG/1
2 AEROSOL, METERED RESPIRATORY (INHALATION) EVERY 4 HOURS PRN
Status: DISCONTINUED | OUTPATIENT
Start: 2023-03-01 | End: 2023-03-06 | Stop reason: HOSPADM

## 2023-03-01 RX ORDER — PANTOPRAZOLE SODIUM 40 MG/10ML
40 INJECTION, POWDER, LYOPHILIZED, FOR SOLUTION INTRAVENOUS
Status: DISCONTINUED | OUTPATIENT
Start: 2023-03-02 | End: 2023-03-06

## 2023-03-01 RX ORDER — ONDANSETRON 2 MG/ML
4 INJECTION INTRAMUSCULAR; INTRAVENOUS EVERY 6 HOURS PRN
Status: DISCONTINUED | OUTPATIENT
Start: 2023-03-01 | End: 2023-03-06 | Stop reason: HOSPADM

## 2023-03-01 RX ADMIN — PROPOFOL 200 MG: 10 INJECTION, EMULSION INTRAVENOUS at 10:51

## 2023-03-01 RX ADMIN — SODIUM CHLORIDE, SODIUM LACTATE, POTASSIUM CHLORIDE, AND CALCIUM CHLORIDE: .6; .31; .03; .02 INJECTION, SOLUTION INTRAVENOUS at 12:46

## 2023-03-01 RX ADMIN — FENTANYL CITRATE 25 MCG: 50 INJECTION INTRAMUSCULAR; INTRAVENOUS at 17:15

## 2023-03-01 RX ADMIN — SODIUM CHLORIDE: 0.9 INJECTION, SOLUTION INTRAVENOUS at 10:58

## 2023-03-01 RX ADMIN — ROCURONIUM BROMIDE 50 MG: 50 INJECTION INTRAVENOUS at 11:15

## 2023-03-01 RX ADMIN — KETOROLAC TROMETHAMINE 15 MG: 30 INJECTION, SOLUTION INTRAMUSCULAR at 15:14

## 2023-03-01 RX ADMIN — ALBUMIN (HUMAN): 12.5 INJECTION, SOLUTION INTRAVENOUS at 13:16

## 2023-03-01 RX ADMIN — SODIUM CHLORIDE, SODIUM LACTATE, POTASSIUM CHLORIDE, AND CALCIUM CHLORIDE 125 ML/HR: .6; .31; .03; .02 INJECTION, SOLUTION INTRAVENOUS at 21:18

## 2023-03-01 RX ADMIN — SODIUM CHLORIDE: 0.9 INJECTION, SOLUTION INTRAVENOUS at 13:52

## 2023-03-01 RX ADMIN — MIDAZOLAM 2 MG: 1 INJECTION INTRAMUSCULAR; INTRAVENOUS at 10:38

## 2023-03-01 RX ADMIN — ONDANSETRON 4 MG: 2 INJECTION INTRAMUSCULAR; INTRAVENOUS at 15:14

## 2023-03-01 RX ADMIN — LIDOCAINE HYDROCHLORIDE 50 MG: 10 INJECTION, SOLUTION EPIDURAL; INFILTRATION; INTRACAUDAL; PERINEURAL at 10:51

## 2023-03-01 RX ADMIN — SODIUM CHLORIDE, SODIUM LACTATE, POTASSIUM CHLORIDE, AND CALCIUM CHLORIDE 125 ML/HR: .6; .31; .03; .02 INJECTION, SOLUTION INTRAVENOUS at 16:44

## 2023-03-01 RX ADMIN — HYDROMORPHONE HYDROCHLORIDE: 10 INJECTION INTRAMUSCULAR; INTRAVENOUS; SUBCUTANEOUS at 16:43

## 2023-03-01 RX ADMIN — ALBUMIN (HUMAN): 12.5 INJECTION, SOLUTION INTRAVENOUS at 13:53

## 2023-03-01 RX ADMIN — ROCURONIUM BROMIDE 20 MG: 50 INJECTION INTRAVENOUS at 12:32

## 2023-03-01 RX ADMIN — FENTANYL CITRATE 50 MCG: 50 INJECTION, SOLUTION INTRAMUSCULAR; INTRAVENOUS at 15:39

## 2023-03-01 RX ADMIN — LEVOFLOXACIN: 500 INJECTION, SOLUTION INTRAVENOUS at 10:49

## 2023-03-01 RX ADMIN — HEPARIN SODIUM 7500 UNITS: 5000 INJECTION INTRAVENOUS; SUBCUTANEOUS at 21:18

## 2023-03-01 RX ADMIN — FENTANYL CITRATE 50 MCG: 50 INJECTION, SOLUTION INTRAMUSCULAR; INTRAVENOUS at 11:23

## 2023-03-01 RX ADMIN — DEXAMETHASONE SODIUM PHOSPHATE 10 MG: 10 INJECTION, SOLUTION INTRAMUSCULAR; INTRAVENOUS at 10:58

## 2023-03-01 RX ADMIN — FENTANYL CITRATE 25 MCG: 50 INJECTION INTRAMUSCULAR; INTRAVENOUS at 16:50

## 2023-03-01 RX ADMIN — FENTANYL CITRATE 100 MCG: 50 INJECTION, SOLUTION INTRAMUSCULAR; INTRAVENOUS at 10:51

## 2023-03-01 RX ADMIN — METRONIDAZOLE: 500 SOLUTION INTRAVENOUS at 10:58

## 2023-03-01 RX ADMIN — SODIUM CHLORIDE, SODIUM LACTATE, POTASSIUM CHLORIDE, AND CALCIUM CHLORIDE 125 ML/HR: .6; .31; .03; .02 INJECTION, SOLUTION INTRAVENOUS at 09:53

## 2023-03-01 RX ADMIN — HYDROMORPHONE HYDROCHLORIDE 1 MG: 2 INJECTION, SOLUTION INTRAMUSCULAR; INTRAVENOUS; SUBCUTANEOUS at 15:57

## 2023-03-01 RX ADMIN — Medication 160 MG: at 10:51

## 2023-03-01 RX ADMIN — SUGAMMADEX 200 MG: 100 INJECTION, SOLUTION INTRAVENOUS at 15:33

## 2023-03-01 NOTE — OP NOTE
OPERATIVE REPORT  PATIENT NAME: Guerita Sow    :  1970  MRN: [id number]  Pt Location: [facility] OR ROOM 07    SURGERY DATE: 3/1/2023    Surgeon(s) and Role:     * Josh Alexander MD - Primary     * Jose Nelson MD - Assisting    Preop Diagnosis:  Ulcerative pancolitis with other complication ([address] ) [J08 103]    Post-Op Diagnosis Codes:     * Ulcerative pancolitis with other complication ([facility] [address] ) [X55 561]    Procedure(s):  PROCTOCOLECTOMY TOTAL W ILEO ANAL POUCH  diverting loop ileostomy    Specimen(s):  ID Type Source Tests Collected by Time Destination   1 : right hemicolectomy Tissue Soft Tissue, Other TISSUE EXAM Josh Alexander MD 3/1/2023 1210    2 : left ovarian cyst Tissue Soft Tissue, Other TISSUE EXAM Josh Alexander MD 3/1/2023 1226    3 : Rectum  Tissue Rectum TISSUE EXAM Josh Alexander MD 3/1/2023 1255        Estimated Blood Loss:   100 mL    Drains:  Closed/Suction Drain Right Abdomen Bulb 10 Fr  (Active)   Number of days: 0       NG/OG/Enteral Tube Nasogastric 18 Fr Left nare (Active)   Number of days: 0       Colostomy Transverse (Active)   Stomal Appliance 1 piece 23 0954   Stoma Assessment Pink 23 0954   Peristomal Assessment Clean 23 0954   Number of days: 763       Ileostomy Loop LLQ (Active)   Number of days: 0       Urethral Catheter Latex 16 Fr  (Active)   Number of days: 0       Anesthesia Type:   General    Operative Indications:  Ulcerative pancolitis with other complication ([facility] [address] ) [E36 071]      Operative Findings:  End transverse colostomy  Rectal stump with adhesions  Attenuated fascia in the midline    Complications:   None    Procedure and Technique:  After preoperative identification in the preoperative holding area the patient was taken the operating room placed in the supine position    After satisfactory induction of general endotracheal anesthesia appropriate placement of anesthesia monitors, patient was placed in a modified low lithotomy position  Patient was secured to the operating room table  Patient was prepped and draped in usual sterile fashion  Antibiotics were given prior to incision  Time-out was undertaken procedure begun  A long midline incision was made  This was taken down to level of fascia  Fascia was incised sharply  Starting in the lower portion of the abdomen the entire layer of the fascia was opened  The fascia was quite attenuated with the significant diastases that it formed with the rectus musculature being quite wide  Adhesions were lysed of a few loops of small bowel to the anterior abdominal wall all the way up to the apex of the incision where the transverse and colostomy was  Wound retractor was placed  We then completely lysed adhesions to the all small bowel adhesions were cleared  Care was taken not to injure the small bowel as he would need this for ileal pouch  Exploration was performed  Patient had a left ovarian cyst   Uterus appeared grossly normal   Rectum started at the pelvic brim and extended distal   No other concerning mass lesions were noted  As such we prepared for our mobilization  Starting at the terminal ileum, the cecum ascending colon and transverse colon were all completely mobilized up to the level of the stoma  The terminal ileum was then transected using a single firing of the JESSI 100  A ligation close to the wall was then performed of the cecum entire right colon and transverse colon up to the level of the colostomy  This was taken to not disrupt the blood supply to the ileum  The wound protector was then removed  Circular incision was made around the end colostomy  This was then used to remove the entire right colon  We then lysed all adhesions to the terminal ileum posteriorly up to the level of the duodenum  This was done to allow the ileum to come down into the pelvis    Were able to identify a loop with the apex being approximately 20 cm from the terminal ileum that came down into the pelvis  This came past the pubic tubercle suggesting that this would reach the anus for an ileal pouch anal anastomosis  As such we prepared for proctectomy  Wound protector was replaced  Retrorectal space was entered after identifying left and right ureters  The left ovary had an ovarian cyst that was excised and sent off the field for pathology to ensure no malignancy  Dissection was then taken posteriorly down to the level of the levators  Lateral dissection was taken in a similar manner  Anteriorly we dissected below the peritoneal reflection until we came down towards the anal canal   At this level we thinned the mesorectum using the Enseal's  Contour stapler was placed in the distal rectum/anus was transected  The rectum was then brought up out of the pelvis  Pelvis was inspected for hemostasis  This was noted to be excellent  We now prepared for ileal pouch anal anastomosis  As previously noted a loop of bowel seem to come down easily in the pelvis  This will be under no significant tension  The apex was grasped with an Allis clamp  The sides of the J-pouch were then opposed to 1 another using 3-0 Vicryl suture  Enterotomy was made and 3 firings of a Denio 60 mm stapler was used to create a 15 cm long ileal pouch  The open end was then reinforced with a pursestring of 2-0 Prolene  This was tied down around a 29 mm [facility] stapler anvil  No gaps are noted  This area was noted to be well vascularized  This came into the pelvis under no tension  Perineal  brought the [facility] stapler up to the transected staple line  Raphael was deployed  Raphael was then affixed to the anvil  Stapler was slowly closed  Care was taken not to twist the small bowel along its mesentery  Care was taken to ensure no extraneous materials noted in our anastomosis  Stapler was completely closed and fired  Stapler was removed from the anus    2 intact full-thickness anastomotic rings were noted  This again sent the pelvis under no tension  10 flat Eduin-Morel drain was placed through the right lower quadrant into the pelvis  A flap of omentum off of the stomach was then placed in a similar location  A loop of ileum was identified approxi-40 cm proximal to the ileal pouch  It was noted on the right side of the abdomen there was significant space between the anterior rectus sheath and the subcutaneous tissue consistent with essentially a hernia sac  As such we prepared to bring the ileostomy through the left lower quadrant  With significant difficulty due to tolerating and the patient's very thick abdominal wall, a circular incision was made in the left lower quadrant and muscle-splitting fashion  This allowed a loop of small bowel to come out of this under no significant tension  Unfortunately this francia to approxi-1 cm above the skin level only  We now prepared for closure  Gown and gloves were changed  Clean instruments were used  As previously mentioned the midline fascia was quite attenuated particularly above the umbilicus  We were able with great difficulty to close the abdominal wall and multiple interrupted #1 PDS sutures  Skin and subcutaneous tissues were irrigated  Midline wound was closed with staples  Stoma was matured in a standard Rut fashion using 3-0 Vicryl suture  Penrose drain was left as stoma bar  This was secured to itself using silk suture  Sterile dressing was applied  Patient was awakened from anesthesia and transferred to recovery room in stable condition       I was present for the entire procedure    Patient Disposition:  PACU     This procedure was not performed to treat colon cancer through resection      SIGNATURE: Mookie Ortiz MD  DATE: March 1, 2023  TIME: 3:15 PM

## 2023-03-01 NOTE — ANESTHESIA PROCEDURE NOTES
Arterial Line Insertion  Performed by: Felice Camara CRNA  Authorized by: Ivy Buchanan MD   Patient understanding: patient states understanding of the procedure being performed  Patient consent: the patient's understanding of the procedure matches consent given  Procedure consent: procedure consent matches procedure scheduled  Relevant documents: relevant documents present and verified  Required items: required blood products, implants, devices, and special equipment available  Patient identity confirmed: verbally with patient and arm band  Preparation: Patient was prepped and draped in the usual sterile fashion    Indications: hemodynamic monitoring  Orientation:  Left  Location: radial artery  Procedure Details:  Needle gauge: 20  Seldinger technique: Seldinger technique used  Number of attempts: 2    Post-procedure:  Post-procedure: dressing applied  Waveform: good waveform and waveform confirmed  Patient tolerance: Patient tolerated the procedure well with no immediate complications  Comments: US guidance

## 2023-03-01 NOTE — ANESTHESIA POSTPROCEDURE EVALUATION
Post-Op Assessment Note    CV Status:  Stable    Pain management: adequate     Mental Status:  Awake   Hydration Status:  Stable   PONV Controlled:  Controlled   Airway Patency:  Patent      Post Op Vitals Reviewed: Yes      Staff: Anesthesiologist, CRNA         No notable events documented      BP   144/96   Temp  97 6   Pulse  88   Resp   18   SpO2   98
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
## 2023-03-02 LAB
ANION GAP SERPL CALCULATED.3IONS-SCNC: 8 MMOL/L (ref 4–13)
BASOPHILS # BLD AUTO: 0.02 THOUSANDS/ÂΜL (ref 0–0.1)
BASOPHILS NFR BLD AUTO: 0 % (ref 0–1)
BUN SERPL-MCNC: 16 MG/DL (ref 5–25)
CALCIUM SERPL-MCNC: 8.3 MG/DL (ref 8.3–10.1)
CHLORIDE SERPL-SCNC: 109 MMOL/L (ref 96–108)
CO2 SERPL-SCNC: 20 MMOL/L (ref 21–32)
CREAT SERPL-MCNC: 1.31 MG/DL (ref 0.6–1.3)
EOSINOPHIL # BLD AUTO: 0 THOUSAND/ÂΜL (ref 0–0.61)
EOSINOPHIL NFR BLD AUTO: 0 % (ref 0–6)
ERYTHROCYTE [DISTWIDTH] IN BLOOD BY AUTOMATED COUNT: 16.8 % (ref 11.6–15.1)
GFR SERPL CREATININE-BSD FRML MDRD: 47 ML/MIN/1.73SQ M
GLUCOSE SERPL-MCNC: 121 MG/DL (ref 65–140)
HCT VFR BLD AUTO: 37.5 % (ref 34.8–46.1)
HGB BLD-MCNC: 11.5 G/DL (ref 11.5–15.4)
IMM GRANULOCYTES # BLD AUTO: 0.02 THOUSAND/UL (ref 0–0.2)
IMM GRANULOCYTES NFR BLD AUTO: 0 % (ref 0–2)
LYMPHOCYTES # BLD AUTO: 0.7 THOUSANDS/ÂΜL (ref 0.6–4.47)
LYMPHOCYTES NFR BLD AUTO: 9 % (ref 14–44)
MAGNESIUM SERPL-MCNC: 2 MG/DL (ref 1.6–2.6)
MCH RBC QN AUTO: 28.6 PG (ref 26.8–34.3)
MCHC RBC AUTO-ENTMCNC: 30.7 G/DL (ref 31.4–37.4)
MCV RBC AUTO: 93 FL (ref 82–98)
MONOCYTES # BLD AUTO: 0.91 THOUSAND/ÂΜL (ref 0.17–1.22)
MONOCYTES NFR BLD AUTO: 11 % (ref 4–12)
NEUTROPHILS # BLD AUTO: 6.3 THOUSANDS/ÂΜL (ref 1.85–7.62)
NEUTS SEG NFR BLD AUTO: 80 % (ref 43–75)
NRBC BLD AUTO-RTO: 0 /100 WBCS
PHOSPHATE SERPL-MCNC: 4.6 MG/DL (ref 2.7–4.5)
PLATELET # BLD AUTO: 202 THOUSANDS/UL (ref 149–390)
PMV BLD AUTO: 10.6 FL (ref 8.9–12.7)
POTASSIUM SERPL-SCNC: 4.3 MMOL/L (ref 3.5–5.3)
RBC # BLD AUTO: 4.02 MILLION/UL (ref 3.81–5.12)
SODIUM SERPL-SCNC: 137 MMOL/L (ref 135–147)
WBC # BLD AUTO: 7.95 THOUSAND/UL (ref 4.31–10.16)

## 2023-03-02 RX ADMIN — HEPARIN SODIUM 7500 UNITS: 5000 INJECTION INTRAVENOUS; SUBCUTANEOUS at 05:21

## 2023-03-02 RX ADMIN — HEPARIN SODIUM 7500 UNITS: 5000 INJECTION INTRAVENOUS; SUBCUTANEOUS at 13:10

## 2023-03-02 RX ADMIN — HEPARIN SODIUM 7500 UNITS: 5000 INJECTION INTRAVENOUS; SUBCUTANEOUS at 21:59

## 2023-03-02 RX ADMIN — SODIUM CHLORIDE, SODIUM LACTATE, POTASSIUM CHLORIDE, AND CALCIUM CHLORIDE 125 ML/HR: .6; .31; .03; .02 INJECTION, SOLUTION INTRAVENOUS at 13:11

## 2023-03-02 RX ADMIN — PANTOPRAZOLE SODIUM 40 MG: 40 INJECTION, POWDER, FOR SOLUTION INTRAVENOUS at 08:04

## 2023-03-02 RX ADMIN — SODIUM CHLORIDE 1000 ML: 0.9 INJECTION, SOLUTION INTRAVENOUS at 08:04

## 2023-03-02 RX ADMIN — SODIUM CHLORIDE, SODIUM LACTATE, POTASSIUM CHLORIDE, AND CALCIUM CHLORIDE 125 ML/HR: .6; .31; .03; .02 INJECTION, SOLUTION INTRAVENOUS at 20:23

## 2023-03-02 NOTE — UTILIZATION REVIEW
NOTIFICATION OF INPATIENT ADMISSION   AUTHORIZATION REQUEST   SERVICING FACILITY:   [facility]  Address: [address] [place] [address], [address] [place] [address]  Tax ID: [id number]  NPI: [id number] ATTENDING PROVIDER:  Attending Name and NPI#: Jere Calderon Md [[id number]]  Address: [address] [place] [address], [address] [place] [id number]  Phone: [phone]   ADMISSION INFORMATION:  Place of Service: Inpatient [address] [facility]  [address]  Place of Service Code: 21  Inpatient Admission Date/Time: 3/1/23  4:08 PM  Discharge Date/Time: No discharge date for patient encounter  Admitting Diagnosis Code/Description:  [facility] pancol[facility] with other complication ([facility] [V70 469]     UTILIZATION REVIEW CONTACT:  Carl Oliveira Utilization   Network Utilization Review Department  Phone: [phone]  Fax: [phone]  Email: [email or web address]  Contact for approvals/pending authorizations, clinical reviews, and discharge  PHYSICIAN ADVISORY SERVICES:  Medical Necessity Denial & [id number] Review  Phone: [phone]  Fax: [phone]  Email: [email or web address]
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
## 2023-03-02 NOTE — QUICK NOTE
Post Op Check:    49 yo F who is POD0 s/p total proctocolectomy with ileoanal pouch and diverting loop ileostomy for treatment of her ulcerative colitis  Patients pain is well controlled  Consistently using PCA  She denied any nausea, vomiting, chest pain, or shortness of breath  Adequate UOP from fernandez in bag  NG tube with dark brownish output  R APURVA drain with bloody output  Patient states she has minimal to no pain  Ileostomy bag with dark brownish output, no flakes  On 1 5L NC of O2 saturating >92%  Overall, doing well  General: NAD  HENT: NCAT MMM  Neck: supple, no JVD  CV: nl rate  Lungs: nl wob  No resp distress  ABD: Soft, tender to palpation, nondistended  Incisions appear c/d/i  Midline mepilex dressing with minimal saturation  See above for character of drain outputs     Extrem: No CCE  Neuro: AAOx3     Plan:  Diet NPO; Ice chips  Continue to monitor  Pain and nausea control PRN    John Olivo MD  General Surgery Resident

## 2023-03-02 NOTE — PROGRESS NOTES
Progress Note - Colorectal Surgery   JOSE Resident on [facility] Seen 50 y o  female MRN: [id number]  Unit/Bed#: [facility] 832-01 Encounter: [id number]    Assessment:  49 yo F who is s/p total proctocolectomy with ileoanal pouch and diverting loop ileostomy on 3/1 for treatment of her ulcerative colitis  Overall doing well and continuing to progress  Afebrile  VSS   1L NC saturating greater than 97%    NG tube 2509 cc dark brwon output  R APURVA with 140cc of bloody output  Ostomy 25 cc dark brown/green output  WBC 7 9 from 2 7  hgb 11 5 from 12 8  CR of 1 31 from 0 84    Plan:  - NPO/NG tube  - [email or web address], decrease rate of fluid infusion  - Continue PCA  - monitor drain output  - DC fernandez  - PRN pain meds  - DVT prophylaxis  - Encourage IS   - Wean oxygen      Subjective/Objective     Subjective: NAEO  Patient states she is doing about the same  Denies having nausea, vomting, chest pain, shortness of breath  Denies having fevers and chills  Currently NPO      Objective:     Blood pressure 123/70, pulse 88, temperature (!) 97 2 °F (36 2 °C), resp  rate 12, height 5' 3" (1 6 m), weight 131 kg (288 lb), SpO2 95 %, not currently breastfeeding  ,Body mass index is 51 02 kg/m²  Intake/Output Summary (Last 24 hours) at 3/2/2023 4490  Last data filed at 3/2/2023 0501  Gross per 24 hour   Intake 5797 35 ml   Output 1415 ml   Net 4382 35 ml       Invasive Devices     Peripheral Intravenous Line  Duration           Peripheral IV 03/01/23 Left Antecubital <1 day    Peripheral IV 03/01/23 Right Hand <1 day          Drain  Duration           Closed/Suction Drain Right Abdomen Bulb 10 Fr  <1 day    Ileostomy Loop LLQ <1 day    NG/OG/Enteral Tube Nasogastric 18 Fr Left nare <1 day    Urethral Catheter Latex 16 Fr  <1 day                General: NAD  HENT: NCAT MMM  Neck: supple, no JVD  CV: nl rate  Lungs: nl wob  No resp distress  ABD: Soft, appropriately tender, nondistended   Mepilex dressing partially saturated  See above for drain outputs  Extrem: No CCE  Neuro: AAOx3       Scheduled Meds:  Current Facility-Administered Medications   Medication Dose Route Frequency Provider Last Rate   • albuterol  2 puff Inhalation Q4H PRN Mavis White MD     • diphenhydrAMINE  25 mg Intravenous Q6H PRN Ngoc Taylor MD     • heparin (porcine)  7,500 Units Subcutaneous Q8H Garey Pallas, MD     • HYDROmorphone   Intravenous Continuous Ngoc Taylor MD     • HYDROmorphone  0 5 mg Intravenous Q3H PRN Ngoc Taylor MD     • lactated ringers  125 mL/hr Intravenous Continuous Ngoc Taylor  mL/hr (03/01/23 2118)   • naloxone  0 04 mg Intravenous Q1MIN PRN Ngoc Taylor MD     • ondansetron  4 mg Intravenous Q6H PRN Ngoc Taylor MD     • pantoprazole  40 mg Intravenous Q24H [address] Ngoc Taylor MD       Continuous Infusions:HYDROmorphone,   lactated ringers, 125 mL/hr, Last Rate: 125 mL/hr (03/01/23 2118)      PRN Meds: •  albuterol  •  diphenhydrAMINE  •  HYDROmorphone  •  naloxone  •  ondansetron      Lab, Imaging and other studies:I have personally reviewed pertinent lab results    , CBC:   Lab Results   Component Value Date    WBC 7 95 03/02/2023    HGB 11 5 03/02/2023    HCT 37 5 03/02/2023    MCV 93 03/02/2023     03/02/2023    MCH 28 6 03/02/2023    MCHC 30 7 (L) 03/02/2023    RDW 16 8 (H) 03/02/2023    MPV 10 6 03/02/2023    NRBC 0 03/02/2023   , CMP:   Lab Results   Component Value Date    SODIUM 137 03/02/2023    K 4 3 03/02/2023     (H) 03/02/2023    CO2 20 (L) 03/02/2023    BUN 16 03/02/2023    CREATININE 1 31 (H) 03/02/2023    CALCIUM 8 3 03/02/2023    EGFR 47 03/02/2023   , Coagulation: No results found for: PT, INR, APTT, Urinalysis: No results found for: COLORU, CLARITYU, SPECGRAV, PHUR, LEUKOCYTESUR, NITRITE, PROTEINUA, GLUCOSEU, KETONESU, BILIRUBINUR, BLOODU, Amylase: No results found for: AMYLASE, Lipase: No results found for: LIPASE  VTE Pharmacologic Prophylaxis: Heparin  VTE Mechanical Prophylaxis: sequential compression device      Carissa Odonnell MD  3/2/2023 6:22 AM

## 2023-03-02 NOTE — WOUND OSTOMY CARE
Consult Note- Ostomy  Ale Roblero 50 y o  female  [id number]  PPHP [id number]-PPHP 200-5        Assessment:   49 yo female admitted to B for treatment of U C       POD 1: total proctocolectomy with ileoanal pouch and diverting loop ileostomy on 3/1    Patient seen today for introduction of [address] care team and their roles after their surgery  Patient reports having previous colostomy and is familiar with ostomy care- was independent for colostomy care  [address] Alison Merino RN and patient discussed difference between colostomy and ileostomy patient stated understanding  Patient on PCA pump with NG tube in place; remains NPO  Patient reports soreness but otherwise feels okay  Provided teaching materials, "what to expect after your ileostomy surgery"  WOC RN stated that they would return tomorrow for ostomy teaching/ostomy refresher depending on patient status  Patient aggreeable to plan of care  Stoma appearance:   Stoma: Dried blood drainage found in bag and around stoma  Bag appears to be adhered properly  Small amount of liquid brown effluent noted in pouch  Ostomy Care:  1  Peel back pouch using push-pull method, may use non-alcohol adhesive remover(Purple and white package)  2  Use warm water only to cleanse skin around the stoma (daniel-stomal skin)  3  Make sure all adhesive residue is removed and skin is dry and not oily  4  Measure stoma size using measuring guide and trace correct measurements onto back of pouch  5  Then mold the backing of pouch out to correct shape/size  6  Use 3M no sting barrier film to prep the skin around the stoma  7  Place pouch over stoma and onto skin  8  Use warmth of hand to apply gentle pressure to help backing of pouch to adhere well to skin  9  Empty pouch when 1/3 full  10  Change pouch every 4-5 days or if signs of leaking  11  Will continue to follow patient while an inpatient for Ostomy teaching/education          Garth Limon RN, BSN
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
## 2023-03-02 NOTE — UTILIZATION REVIEW
Initial Clinical Review    Elective Inpatient surgical procedure  Age/Sex: 50 y o  female  Surgery Date: 3/1/23  Procedure: PROCTOCOLECTOMY TOTAL W ILEO ANAL POUCH  diverting loop ileostomy  Anesthesia: general  Operative Findings: End transverse colostomy  Rectal stump with adhesions  Attenuated fascia in the midline    POD#1 Progress Note:  Patients pain is well controlled  Consistently using PCA  Will ambulate today  Awaiting return of bowel function  Has a mild HARI today and will continue hydration  , NGT 2509 cc dark brown output, R APURVA with 140 cc blood output, Ostomy 25 cc dark brown/green output   Keep NPO, maintain NGT, continue IVF, PCA, monitor drain output, DC Garcia, encourage inc spirometry use        Admission Orders: Date/Time/Statement:   Admission Orders (From admission, onward)     Ordered        03/01/23 1608  Inpatient Admission  Once                      Orders Placed This Encounter   Procedures   • Inpatient Admission     Standing Status:   Standing     Number of Occurrences:   1     Order Specific Question:   Level of Care     Answer:   Level 2 Stepdown / HOT [14]     Order Specific Question:   Estimated length of stay     Answer:   Inpatient Only Surgery     Vital Signs: /71   Pulse 95   Temp 97 5 °F (36 4 °C)   Resp 20   Ht 5' 3" (1 6 m)   Wt 131 kg (288 lb)   SpO2 90%   BMI 51 02 kg/m²     Pertinent Labs/Diagnostic Test Results:   No orders to display         Results from last 7 days   Lab Units 03/02/23  0529   WBC Thousand/uL 7 95   HEMOGLOBIN g/dL 11 5   HEMATOCRIT % 37 5   PLATELETS Thousands/uL 202   NEUTROS ABS Thousands/µL 6 30         Results from last 7 days   Lab Units 03/02/23  0529   SODIUM mmol/L 137   POTASSIUM mmol/L 4 3   CHLORIDE mmol/L 109*   CO2 mmol/L 20*   ANION GAP mmol/L 8   BUN mg/dL 16   CREATININE mg/dL 1 31*   EGFR ml/min/1 73sq m 47   CALCIUM mg/dL 8 3   MAGNESIUM mg/dL 2 0   PHOSPHORUS mg/dL 4 6*         Results from last 7 days   Lab Units 03/01/23  0937   POC GLUCOSE mg/dl 110     Results from last 7 days   Lab Units 03/02/23  0529   GLUCOSE RANDOM mg/dL 121       Diet: NPO  Mobility: OOB and ambulatory  DVT Prophylaxis: heparin, scd, ambulation    Medications/Pain Control:   Scheduled Medications:  heparin (porcine), 7,500 Units, Subcutaneous, Q8H LUI  pantoprazole, 40 mg, Intravenous, Q24H LUI      Continuous IV Infusions:  HYDROmorphone, , Intravenous, Continuous  lactated ringers, 125 mL/hr, Intravenous, Continuous      PRN Meds:  albuterol, 2 puff, Inhalation, Q4H PRN  diphenhydrAMINE, 25 mg, Intravenous, Q6H PRN  HYDROmorphone, 0 5 mg, Intravenous, Q3H PRN  naloxone, 0 04 mg, Intravenous, Q1MIN PRN  ondansetron, 4 mg, Intravenous, Q6H PRN        Network Utilization Review Department  ATTENTION: Please call with any questions or concerns to [phone] and carefully listen to the prompts so that you are directed to the right person  All voicemails are confidential   Cliff Beckford all requests for admission clinical reviews, approved or denied determinations and any other requests to dedicated fax number below belonging to the campus where the patient is receiving treatment   List of dedicated fax numbers for the Facilities:  [address] DENIALS (Administrative/Medical Necessity) [phone]   [address] (Maternity/NICU/Pediatrics) [phone]   [address] [phone]   [place] [address] [phone]   [facility] [address] [facility] [phone]   [facility] [phone]   [address] [phone]

## 2023-03-02 NOTE — PLAN OF CARE
Problem: MOBILITY - ADULT  Goal: Maintain or return to baseline ADL function  Description: INTERVENTIONS:  -  Assess patient's ability to carry out ADLs; assess patient's baseline for ADL function and identify physical deficits which impact ability to perform ADLs (bathing, care of mouth/teeth, toileting, grooming, dressing, etc )  - Assess/evaluate cause of self-care deficits   - Assess range of motion  - Assess patient's mobility; develop plan if impaired  - Assess patient's need for assistive devices and provide as appropriate  - Encourage maximum independence but intervene and supervise when necessary  - Involve family in performance of ADLs  - Assess for home care needs following discharge   - Consider OT consult to assist with ADL evaluation and planning for discharge  - Provide patient education as appropriate  Outcome: Progressing  Goal: Maintains/Returns to pre admission functional level  Description: INTERVENTIONS:  - Perform BMAT or MOVE assessment daily    - Set and communicate daily mobility goal to care team and patient/family/caregiver  - Collaborate with rehabilitation services on mobility goals if consulted  - Perform Range of Motion 3 times a day  - Reposition patient every 2 hours    - Dangle patient 3 times a day  - Stand patient 3 times a day  - Ambulate patient 3 times a day  - Out of bed to chair 3 times a day   - Out of bed for meals 3 times a day  - Out of bed for toileting  - Record patient progress and toleration of activity level   Outcome: Progressing     Problem: Prexisting or High Potential for Compromised Skin Integrity  Goal: Skin integrity is maintained or improved  Description: INTERVENTIONS:  - Identify patients at risk for skin breakdown  - Assess and monitor skin integrity  - Assess and monitor nutrition and hydration status  - Monitor labs   - Assess for incontinence   - Turn and reposition patient  - Assist with mobility/ambulation  - Relieve pressure over bony prominences  - Avoid friction and shearing  - Provide appropriate hygiene as needed including keeping skin clean and dry  - Evaluate need for skin moisturizer/barrier cream  - Collaborate with interdisciplinary team   - Patient/family teaching  - Consider wound care consult   Outcome: Progressing     Problem: PAIN - ADULT  Goal: Verbalizes/displays adequate comfort level or baseline comfort level  Description: Interventions:  - Encourage patient to monitor pain and request assistance  - Assess pain using appropriate pain scale  - Administer analgesics based on type and severity of pain and evaluate response  - Implement non-pharmacological measures as appropriate and evaluate response  - Consider cultural and social influences on pain and pain management  - Notify physician/advanced practitioner if interventions unsuccessful or patient reports new pain  Outcome: Progressing     Problem: INFECTION - ADULT  Goal: Absence or prevention of progression during hospitalization  Description: INTERVENTIONS:  - Assess and monitor for signs and symptoms of infection  - Monitor lab/diagnostic results  - Monitor all insertion sites, i e  indwelling lines, tubes, and drains  - Monitor endotracheal if appropriate and nasal secretions for changes in amount and color  - [place] appropriate cooling/warming therapies per order  - Administer medications as ordered  - Instruct and encourage patient and family to use good hand hygiene technique  - Identify and instruct in appropriate isolation precautions for identified infection/condition  Outcome: Progressing  Goal: Absence of fever/infection during neutropenic period  Description: INTERVENTIONS:  - Monitor WBC    Outcome: Progressing     Problem: SAFETY ADULT  Goal: Maintain or return to baseline ADL function  Description: INTERVENTIONS:  -  Assess patient's ability to carry out ADLs; assess patient's baseline for ADL function and identify physical deficits which impact ability to perform ADLs (bathing, care of mouth/teeth, toileting, grooming, dressing, etc )  - Assess/evaluate cause of self-care deficits   - Assess range of motion  - Assess patient's mobility; develop plan if impaired  - Assess patient's need for assistive devices and provide as appropriate  - Encourage maximum independence but intervene and supervise when necessary  - Involve family in performance of ADLs  - Assess for home care needs following discharge   - Consider OT consult to assist with ADL evaluation and planning for discharge  - Provide patient education as appropriate  Outcome: Progressing  Goal: Maintains/Returns to pre admission functional level  Description: INTERVENTIONS:  - Perform BMAT or MOVE assessment daily    - Set and communicate daily mobility goal to care team and patient/family/caregiver     - Collaborate with rehabilitation services on mobility goals if consulted  - Out of bed for toileting  - Record patient progress and toleration of activity level   Outcome: Progressing  Goal: Patient will remain free of falls  Description: INTERVENTIONS:  - Educate patient/family on patient safety including physical limitations  - Instruct patient to call for assistance with activity   - Consult OT/PT to assist with strengthening/mobility   - Keep Call bell within reach  - Keep bed low and locked with side rails adjusted as appropriate  - Keep care items and personal belongings within reach  - Initiate and maintain comfort rounds  - Make Fall Risk Sign visible to staff  - Apply yellow socks and bracelet for high fall risk patients  - Consider moving patient to room near nurses station  Outcome: Progressing     Problem: DISCHARGE PLANNING  Goal: Discharge to home or other facility with appropriate resources  Description: INTERVENTIONS:  - Identify barriers to discharge w/patient and caregiver  - Arrange for needed discharge resources and transportation as appropriate  - Identify discharge learning needs (meds, wound care, etc )  - Arrange for interpretive services to assist at discharge as needed  - Refer to Case Management Department for coordinating discharge planning if the patient needs post-hospital services based on physician/advanced practitioner order or complex needs related to functional status, cognitive ability, or social support system  Outcome: Progressing     Problem: Knowledge Deficit  Goal: Patient/family/caregiver demonstrates understanding of disease process, treatment plan, medications, and discharge instructions  Description: Complete learning assessment and assess knowledge base    Interventions:  - Provide teaching at level of understanding  - Provide teaching via preferred learning methods  Outcome: Progressing

## 2023-03-02 NOTE — PROGRESS NOTES
[address] [facility] NOTE   Vanessa Zapata 50 y o  female MRN: [id number]  Unit/Bed#: [facility] 832-01 Encounter: [id number]  Reason for Consult: CKD, HARI    ASSESSMENT and PLAN:    68-year-old female with a past medical history of hypothyroidism, ulcerative colitis, prior perforation with left hemicolectomy and transverse end colostomy, who initially presents for elective surgical care  Patient underwent surgery on 3/1  Nephrology is on board for perioperative renal management    1-acute kidney injury-baseline creatinine 0 8 to 1 mg/dL  - Creatinine rising to 1 3 on 3/2  - Prior ultrasound 2021 with right kidney 9 8 cm, left kidney 11 8 cm, possible angiomyolipoma  Patient is on Mobic at home per med list  -Patient did have intraoperative hypotension to 72C systolic     - Etiology of acute kidney injury possibly in the setting of intraoperative hypotension  Possible intravascular volume depletion  Agree with volume expansion 3/2  Plan  -- Avoid NSAIDs  - Agree with intravenous fluids  - Would benefit from angiomyolipoma follow-up as outpatient  - I/os; avoid nephrotoxic agents  - Avoid hypotension  - Fernandez care per primary team  - Continue holding home diuretic  - Trend phosphorus for now  - if creat rises, will need to check renal u/s but currently with fernandez that is draining well  - Reviewed case with primary team who is in agreement with renal plan    2 -electrolytes-hyperphosphatemia trend for now    3-acid/base-bicarbonate 20  Trend for now  Is on LR  Agree with avoiding normal saline  4- ulcerative colitis-Per surgical team   Status post proctocolectomy total with ileoanal pouch with diverting loop ileostomy  SUBJECTIVE / 24H INTERVAL HISTORY:  Patient appears weak but overall denies complaints  States pain is controlled      OBJECTIVE:  Current Weight: Weight - Scale: 131 kg (288 lb)  Vitals:    03/01/23 2300 03/01/23 2333 03/02/23 0325 03/02/23 0827   BP:   123/70 123/71   Pulse:  86 88 95   Resp: 18  12 20   Temp:  (!) 97 2 °F (36 2 °C) (!) 97 2 °F (36 2 °C) 97 5 °F (36 4 °C)   TempSrc:       SpO2:  95% 95% 90%   Weight:       Height:           Intake/Output Summary (Last 24 hours) at 3/2/2023 1014  Last data filed at 3/2/2023 0501  Gross per 24 hour   Intake 5797 35 ml   Output 1415 ml   Net 4382 35 ml     General: NAD  Skin: no rash  Eyes: anicteric sclera  ENT: dry mucous membrane  Neck: supple  Chest: CTA b/l, no ronchii, no wheeze, no rubs, no rales but diminished intake bases  CVS: s1s2, no murmur, no gallop, no rub  Abdomen: Surgical site covered, soft,  Extremities: no significant pitting edema LE b/l  : Positive fernandez with clear yellow  Neuro: AAOX3  Psych: normal affect    Medications:    Current Facility-Administered Medications:   •  albuterol (PROVENTIL HFA,VENTOLIN HFA) inhaler 2 puff, 2 puff, Inhalation, Q4H PRN, Janice Parikh MD  •  diphenhydrAMINE (BENADRYL) injection 25 mg, 25 mg, Intravenous, Q6H PRN, Nona Hurtado MD  •  heparin (porcine) subcutaneous injection 7,500 Units, 7,500 Units, Subcutaneous, Q8H [facility], Nona Hurtado MD, 7,500 Units at 03/02/23 0521  •  HYDROmorphone (DILAUDID) 1 mg/mL 50 mL PCA, , Intravenous, Continuous, Nona Hurtado MD, New Bag at 03/01/23 1643  •  HYDROmorphone (DILAUDID) injection 0 5 mg, 0 5 mg, Intravenous, Q3H PRN, Nona Hurtado MD  •  lactated ringers infusion, 125 mL/hr, Intravenous, Continuous, Nona Hurtado MD, Last Rate: 125 mL/hr at 03/01/23 2118, 125 mL/hr at 03/01/23 2118  •  naloxone (NARCAN) 0 04 mg/mL syringe 0 04 mg, 0 04 mg, Intravenous, Q1MIN PRN, Nona Hurtado MD  •  ondansetron (ZOFRAN) injection 4 mg, 4 mg, Intravenous, Q6H PRN, Nona Hurtado MD  •  pantoprazole (PROTONIX) injection 40 mg, 40 mg, Intravenous, Q24H [facility], Nona Hurtado MD, 40 mg at 03/02/23 0804    Laboratory Results:  Results from last 7 days   Lab Units 03/02/23  0529   WBC Thousand/uL 7 95   HEMOGLOBIN g/dL 11 5   HEMATOCRIT % 37 5 PLATELETS Thousands/uL 202   POTASSIUM mmol/L 4 3   CHLORIDE mmol/L 109*   CO2 mmol/L 20*   BUN mg/dL 16   CREATININE mg/dL 1 31*   CALCIUM mg/dL 8 3   MAGNESIUM mg/dL 2 0   PHOSPHORUS mg/dL 4 6*

## 2023-03-02 NOTE — MALNUTRITION/BMI
This medical record reflects one or more clinical indicators suggestive of morbid obesity  BMI Findings:  Adult BMI Classifications: Morbid Obesity 50-59 9        Body mass index is 51 02 kg/m²  See Nutrition note dated 3/2/23 for additional details  Completed nutrition assessment is viewable in the nutrition documentation
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
## 2023-03-02 NOTE — CONSULTS
Inpatient consult to Acute Pain Service  Consult performed by: Kalani Foster MD  Consult ordered by: Mela Hammond MD        Progress Note - Acute Pain Service    Maurie Goldberg 50 y o  female MRN: [id number]  [facility] Encounter: [id number]      Assessment:   Principal Problem:    Ulcerative colitis ([facility] [address] )    Maurie Goldberg is a 50 y o  female  s/p total proctocolectomy with ileoanal pouch and diverting loop ileostomy on 3/1 for treatment of her ulcerative colitis  Consulted for post op pain control  Pt seen and examined, sitting in chair without issue  NGT in place  Pain controlled with PCA, 3 6mg in total   No other issues at this time, discussed transition to PO medications when appropriate  Plan:   - Continue Dilaudid PCA while NPO  - Anticipate switch to Oxycodone 5-10mg q4hrs PRN when able   - Multimodal analgesia with tylenol, robaxin, lidoderm patches  - Encouraged is/oob    Bowel Regimen:  - Bowel regimen when appropriate from surgical perspective    APS will sign off at this time  Thank you for the consult  All opioids and other analgesics to be written at discretion of primary team  Please contact Acute Pain Service - [facility] via Qoopl from [phone] with additional questions or concerns  See Yippee Artst or Amion for additional contacts and after hours information  Pain History  Current pain location(s): lower incision  Pain Scale:   0-5  Quality: sore  24 hour history: as above    Opioid requirement previous 24 hours: 3 6mg IV Dilaudid    Meds/Allergies   all current active meds have been reviewed    Allergies   Allergen Reactions   • Penicillins Hives       Objective     Temp:  [97 °F (36 1 °C)-97 5 °F (36 4 °C)] 97 5 °F (36 4 °C)  HR:  [] 96  Resp:  [12-20] 14  BP: ()/(57-96) 90/57  Arterial Line BP: (118-138)/(72-80) 136/80    Physical Exam  Vitals and nursing note reviewed  Constitutional:       General: She is not in acute distress       Appearance: Normal appearance  HENT:      Head: Normocephalic and atraumatic  Nose:      Comments: NGT  Cardiovascular:      Rate and Rhythm: Normal rate  Pulmonary:      Effort: Pulmonary effort is normal    Abdominal:      Palpations: Abdomen is soft  Tenderness: There is abdominal tenderness (moderate)  There is guarding  Musculoskeletal:         General: No swelling  Skin:     General: Skin is warm and dry  Neurological:      Mental Status: She is alert and oriented to person, place, and time  Psychiatric:         Mood and Affect: Mood normal          Behavior: Behavior normal          Lab Results:   Results from last 7 days   Lab Units 03/02/23  0529   WBC Thousand/uL 7 95   HEMOGLOBIN g/dL 11 5   HEMATOCRIT % 37 5   PLATELETS Thousands/uL 202      Results from last 7 days   Lab Units 03/02/23  0529   POTASSIUM mmol/L 4 3   CHLORIDE mmol/L 109*   CO2 mmol/L 20*   BUN mg/dL 16   CREATININE mg/dL 1 31*   CALCIUM mg/dL 8 3       Imaging Studies: I have personally reviewed pertinent reports  EKG, Pathology, and Other Studies: I have personally reviewed pertinent reports  Counseling / Coordination of Care  Total floor / unit time spent today 20 minutes  Greater than 50% of total time was spent with the patient and / or family counseling and / or coordination of care  A description of the counseling / coordination of care: chart review, post op pain and regional/neuraxial pain management, discussion/planning with nursing/medical/surgical teams    Please note that the APS provides consultative services regarding pain management only  With the exception of ketamine and epidural infusions and except when indicated, final decisions regarding starting or changing doses of analgesic medications are at the discretion of the consulting service  Off hours consultation and/or medication management is generally not available      Gardenia Vaughn MD  Acute Pain Service

## 2023-03-02 NOTE — DISCHARGE INSTR - OTHER ORDERS
Ostomy Care:  -Stoma Measurement: 1 1/8 inches round (Measure stoma weekly for next 6-8 weeks- stoma will decrease in size due to decrease in swelling)     -Appliance Used During Bag Change: Convatec One Piece Flat Pouch  -Accessories Used: 3M No Sting Skin Protectant    *Can shower with pouch on or off  Make sure to dry off pouch after shower  Bag Change Steps:  1  Peel back pouch using push-pull method, may use non-alcohol adhesive remover  Remove pouch from top to bottom  2  Use warm water only to cleanse skin around the stoma (daniel-stomal skin)  3  Make sure all adhesive residue is removed and skin is dry and not oily  4  Measure stoma size using measuring guide and trace correct measurements onto back of pouch  5  Then cut or mold the backing of pouch out to correct shape/size  6  Use 3M no sting barrier film to prep the skin around the stoma  (If the skin is open, moist or fragile, Crusting can be done now to create dry skin and assist with pouch adherence- steps are listed below)  7  Gently feed Drain through opening of pouch  Place pouch over stoma and onto skin  8  Use warmth of hand to apply gentle pressure to help backing of pouch to adhere well to skin  TIPS:  Empty pouch when 1/3 -1/2 full  Change pouch every 4-5 days or if signs of leaking  Crusting as needed for moist, red, open skin around the stoma: (Done prior to pouch application)   Apply stoma powder to excoriation, move excess powder away with hand  Use no sting barrier to pat area to form "crust"  Repeat X2 before placing new ostomy pouch     Please call [facility] @ [phone] with any concerns, questions, or help needed  Ostomy Education Resources:   Wilfrido Marli  org   Www [employer] [employer] [facility] [employer] com   Www [employer] us   Www michele  com

## 2023-03-03 LAB
ANION GAP SERPL CALCULATED.3IONS-SCNC: 2 MMOL/L (ref 4–13)
BUN SERPL-MCNC: 14 MG/DL (ref 5–25)
CALCIUM SERPL-MCNC: 7.9 MG/DL (ref 8.3–10.1)
CHLORIDE SERPL-SCNC: 112 MMOL/L (ref 96–108)
CO2 SERPL-SCNC: 25 MMOL/L (ref 21–32)
CREAT SERPL-MCNC: 0.76 MG/DL (ref 0.6–1.3)
GFR SERPL CREATININE-BSD FRML MDRD: 91 ML/MIN/1.73SQ M
GLUCOSE SERPL-MCNC: 92 MG/DL (ref 65–140)
PHOSPHATE SERPL-MCNC: 1.1 MG/DL (ref 2.7–4.5)
POTASSIUM SERPL-SCNC: 3.3 MMOL/L (ref 3.5–5.3)
SODIUM SERPL-SCNC: 139 MMOL/L (ref 135–147)

## 2023-03-03 RX ORDER — METHOCARBAMOL 750 MG/1
750 TABLET, FILM COATED ORAL EVERY 8 HOURS SCHEDULED
Status: DISCONTINUED | OUTPATIENT
Start: 2023-03-03 | End: 2023-03-06 | Stop reason: HOSPADM

## 2023-03-03 RX ORDER — GABAPENTIN 100 MG/1
100 CAPSULE ORAL 3 TIMES DAILY
Status: DISCONTINUED | OUTPATIENT
Start: 2023-03-03 | End: 2023-03-06 | Stop reason: HOSPADM

## 2023-03-03 RX ORDER — DEXTROSE, SODIUM CHLORIDE, AND POTASSIUM CHLORIDE 5; .45; .15 G/100ML; G/100ML; G/100ML
75 INJECTION INTRAVENOUS CONTINUOUS
Status: DISCONTINUED | OUTPATIENT
Start: 2023-03-03 | End: 2023-03-05

## 2023-03-03 RX ORDER — OXYCODONE HYDROCHLORIDE 5 MG/1
5 TABLET ORAL EVERY 4 HOURS PRN
Status: DISCONTINUED | OUTPATIENT
Start: 2023-03-03 | End: 2023-03-06 | Stop reason: HOSPADM

## 2023-03-03 RX ORDER — POTASSIUM CHLORIDE 14.9 MG/ML
20 INJECTION INTRAVENOUS
Status: COMPLETED | OUTPATIENT
Start: 2023-03-03 | End: 2023-03-04

## 2023-03-03 RX ORDER — OXYCODONE HYDROCHLORIDE 10 MG/1
10 TABLET ORAL EVERY 4 HOURS PRN
Status: DISCONTINUED | OUTPATIENT
Start: 2023-03-03 | End: 2023-03-06 | Stop reason: HOSPADM

## 2023-03-03 RX ORDER — SODIUM CHLORIDE, SODIUM GLUCONATE, SODIUM ACETATE, POTASSIUM CHLORIDE, MAGNESIUM CHLORIDE, SODIUM PHOSPHATE, DIBASIC, AND POTASSIUM PHOSPHATE .53; .5; .37; .037; .03; .012; .00082 G/100ML; G/100ML; G/100ML; G/100ML; G/100ML; G/100ML; G/100ML
1000 INJECTION, SOLUTION INTRAVENOUS ONCE
Status: COMPLETED | OUTPATIENT
Start: 2023-03-03 | End: 2023-03-04

## 2023-03-03 RX ADMIN — SODIUM CHLORIDE 1000 ML: 0.9 INJECTION, SOLUTION INTRAVENOUS at 05:16

## 2023-03-03 RX ADMIN — HEPARIN SODIUM 7500 UNITS: 5000 INJECTION INTRAVENOUS; SUBCUTANEOUS at 13:32

## 2023-03-03 RX ADMIN — PANTOPRAZOLE SODIUM 40 MG: 40 INJECTION, POWDER, FOR SOLUTION INTRAVENOUS at 08:07

## 2023-03-03 RX ADMIN — POTASSIUM CHLORIDE 20 MEQ: 14.9 INJECTION, SOLUTION INTRAVENOUS at 11:36

## 2023-03-03 RX ADMIN — GABAPENTIN 100 MG: 100 CAPSULE ORAL at 22:51

## 2023-03-03 RX ADMIN — METHOCARBAMOL 750 MG: 750 TABLET ORAL at 22:51

## 2023-03-03 RX ADMIN — HEPARIN SODIUM 7500 UNITS: 5000 INJECTION INTRAVENOUS; SUBCUTANEOUS at 05:12

## 2023-03-03 RX ADMIN — SODIUM PHOSPHATE, MONOBASIC, MONOHYDRATE AND SODIUM PHOSPHATE, DIBASIC, ANHYDROUS 12 MMOL: 142; 276 INJECTION, SOLUTION INTRAVENOUS at 13:32

## 2023-03-03 RX ADMIN — HEPARIN SODIUM 7500 UNITS: 5000 INJECTION INTRAVENOUS; SUBCUTANEOUS at 22:51

## 2023-03-03 RX ADMIN — SODIUM CHLORIDE, SODIUM GLUCONATE, SODIUM ACETATE, POTASSIUM CHLORIDE, MAGNESIUM CHLORIDE, SODIUM PHOSPHATE, DIBASIC, AND POTASSIUM PHOSPHATE 1000 ML: .53; .5; .37; .037; .03; .012; .00082 INJECTION, SOLUTION INTRAVENOUS at 17:45

## 2023-03-03 RX ADMIN — GABAPENTIN 100 MG: 100 CAPSULE ORAL at 10:12

## 2023-03-03 RX ADMIN — HYDROMORPHONE HYDROCHLORIDE 0.5 MG: 1 INJECTION, SOLUTION INTRAMUSCULAR; INTRAVENOUS; SUBCUTANEOUS at 05:23

## 2023-03-03 RX ADMIN — GABAPENTIN 100 MG: 100 CAPSULE ORAL at 16:12

## 2023-03-03 RX ADMIN — DEXTROSE, SODIUM CHLORIDE, AND POTASSIUM CHLORIDE 125 ML/HR: 5; .45; .15 INJECTION INTRAVENOUS at 10:12

## 2023-03-03 RX ADMIN — POTASSIUM CHLORIDE 20 MEQ: 14.9 INJECTION, SOLUTION INTRAVENOUS at 09:32

## 2023-03-03 RX ADMIN — METHOCARBAMOL 750 MG: 750 TABLET ORAL at 13:32
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
## 2023-03-03 NOTE — PROGRESS NOTES
[address] [facility] NOTE   Maurie Goldberg 50 y o  female MRN: [id number]  Unit/Bed#: [address] 827-43 Encounter: [id number]  Reason for Consult: HARI    ASSESSMENT and PLAN:    68-year-old female with a past medical history of hypothyroidism, ulcerative colitis, prior perforation with left hemicolectomy and transverse end colostomy, who initially presents for elective surgical care  Patient underwent surgery on 3/1  Nephrology is on board for perioperative renal management     1-acute kidney injury-baseline creatinine 0 8 to 1 mg/dL     - Creatinine rising to 1 3 on 3/2  - Prior ultrasound 2021 with right kidney 9 8 cm, left kidney 11 8 cm, possible angiomyolipoma  Patient is on Mobic at home per med list  -Patient did have intraoperative hypotension to 94Q systolic      - Etiology of acute kidney injury possibly in the setting of intraoperative hypotension  Possible intravascular volume depletion  Agree with volume expansion 3/2  Creat improved 3/3 to baseline  hypophos and hypoK being repleted     Plan  -- Avoid NSAIDs  - Agree with intravenous fluids per primary team    - Would benefit from angiomyolipoma follow-up as outpatient - will need Urology f/u on discharge  - I/os; avoid nephrotoxic agents  - Avoid hypotension  - Garcia care per primary team  - Continue holding home diuretic and can restart if shows sign of pulm edema or on d/c  - replete K and phos - ordered  - BMP and phos in AM  - please call renal team back if questions or issues arise  I have reviewed case with primary team AP in person who is in agreement with renal plan and primary team will monitor and manage electrolyte repletion if needed starting tomorrow  2 -electrolytes-see plan above     3-acid/base- improved bicarb     4- ulcerative colitis-Per surgical team   Status post proctocolectomy total with ileoanal pouch with diverting loop ileostomy        SUBJECTIVE / 24H INTERVAL HISTORY:    SBP 120s  Afebrile  On RA    Denies complaints  No pain  No SOB      OBJECTIVE:  Current Weight: Weight - Scale: 131 kg (288 lb)  Vitals:    03/03/23 0247 03/03/23 0300 03/03/23 0653 03/03/23 0808   BP: 123/69 123/69 123/72 120/72   Pulse: (!) 111 (!) 111 (!) 109 (!) 109   Resp: 16 16 15 16   Temp: 97 9 °F (36 6 °C) 97 9 °F (36 6 °C) 97 5 °F (36 4 °C) 97 5 °F (36 4 °C)   TempSrc:  Temporal     SpO2: 90% 93% 91% 91%   Weight:       Height:           Intake/Output Summary (Last 24 hours) at 3/3/2023 1043  Last data filed at 3/3/2023 0270  Gross per 24 hour   Intake 1024 83 ml   Output 1345 ml   Net -320 17 ml     General: NAD  Skin: no rash  Eyes: anicteric sclera  ENT: moist mucous membrane  Neck: supple  Chest: CTA b/l, no ronchii, no wheeze, no rubs, no sig rales  CVS: s1s2, no murmur, no gallop, no rub  Abdomen: soft  Extremities: no sig pitting edema LE b/l  Neuro: AAOX3  Psych: normal affect    Medications:    Current Facility-Administered Medications:   •  albuterol (PROVENTIL HFA,VENTOLIN HFA) inhaler 2 puff, 2 puff, Inhalation, Q4H PRN, Mookie Ortiz MD  •  dextrose 5 % and sodium chloride 0 45 % with KCl 20 mEq/L infusion, 125 mL/hr, Intravenous, Continuous, Antonia Albert PA-C, Last Rate: 125 mL/hr at 03/03/23 1012, 125 mL/hr at 03/03/23 1012  •  diphenhydrAMINE (BENADRYL) injection 25 mg, 25 mg, Intravenous, Q6H PRN, Juan Triana MD  •  gabapentin (NEURONTIN) capsule 100 mg, 100 mg, Oral, TID, Antonia Albert PA-C, 100 mg at 03/03/23 1012  •  heparin (porcine) subcutaneous injection 7,500 Units, 7,500 Units, Subcutaneous, Q8H [address], Juan Triana MD, 7,500 Units at 03/03/23 0512  •  HYDROmorphone (DILAUDID) 1 mg/mL 50 mL PCA, , Intravenous, Continuous, Juan Triana MD, New Bag at 03/01/23 1643  •  HYDROmorphone (DILAUDID) injection 0 5 mg, 0 5 mg, Intravenous, Q3H PRN, Juan Triana MD, 0 5 mg at 03/03/23 0523  •  methocarbamol (ROBAXIN) tablet 750 mg, 750 mg, Oral, Q8H [address], Antonia Albert PA-C  • naloxone (NARCAN) 0 04 mg/mL syringe 0 04 mg, 0 04 mg, Intravenous, Q1MIN PRN, Ngoc Taylor MD  •  ondansetron (ZOFRAN) injection 4 mg, 4 mg, Intravenous, Q6H PRN, Ngoc Taylor MD  •  oxyCODONE (ROXICODONE) immediate release tablet 10 mg, 10 mg, Oral, Q4H PRN, Antonia Albert PA-C  •  oxyCODONE (ROXICODONE) IR tablet 5 mg, 5 mg, Oral, Q4H PRN, Antonia Albert PA-C  •  pantoprazole (PROTONIX) injection 40 mg, 40 mg, Intravenous, Q24H [address], Ngoc Taylor MD, 40 mg at 03/03/23 0807  •  potassium chloride 20 mEq IVPB (premix), 20 mEq, Intravenous, Q2H, Preston Lim MD, Last Rate: 50 mL/hr at 03/03/23 0932, 20 mEq at 03/03/23 0932  •  sodium phosphate 12 mmol in sodium chloride 0 9 % 100 mL Infusion, 12 mmol, Intravenous, Once, Preston Lim MD    Laboratory Results:  Results from last 7 days   Lab Units 03/03/23  0449 03/02/23  0529   WBC Thousand/uL  --  7 95   HEMOGLOBIN g/dL  --  11 5   HEMATOCRIT %  --  37 5   PLATELETS Thousands/uL  --  202   POTASSIUM mmol/L 3 3* 4 3   CHLORIDE mmol/L 112* 109*   CO2 mmol/L 25 20*   BUN mg/dL 14 16   CREATININE mg/dL 0 76 1 31*   CALCIUM mg/dL 7 9* 8 3   MAGNESIUM mg/dL  --  2 0   PHOSPHORUS mg/dL 1 1* 4 6*

## 2023-03-03 NOTE — WOUND OSTOMY CARE
Progress Note- Ostomy  Maximino Seen 50 y o  female  [id number]  PPHP 832-PPHP 200-5    Assessment:  51 yo female admitted to [facility] for treatment of U C        POD 2: total proctocolectomy with ileoanal pouch and diverting loop ileostomy on 3/1  Patient seen today for ostomy teaching  Patient is awake and alert  Patient is agreeable to visit  Patient/family is primary learner  Patient states understanding of pouch changes and states that her and her fiance are comfortable performing changes  Patient stated that she would like to watch pouch change of ileostomy  Patient on PCA pump with NG tube in place; remains NPO at time of assessment  Topics reviewed with patient: normal & abdormal stoma appearance, how and when to empty the pouch (1/3- 1/2 full) to prevent pulling/lifting of the barrier, importance & how to clean the end of the pouch to prevent odor after pouch emptying, gas/odor producing foods, frequency of changing of the pouch (every 3-4 days on a schedule), burping, one piece/two piece pouching systems differences, open and closed end pouches, gas valve functionality of one piece, Clothing- including avoiding placing belt-line/seat belts over the stoma, bathing, daniel-stomal skin care, how to measure the stoma/ cut the wafer to the correct size, how to close the pouch, and how to obtain supplies  Stressed importance of hydration and prevention of blockages, including the s/s and what to do if a blockage occurs  Step-by-step pouch change done using a convatec One Piece flat pouch  Reviewed with patient how and when to measure the stoma (weekly)  Demonstrated how to mold two piece barrier/ trace & cut one piece barrier, and how to apply it to the skin using gentle pressure / warmth of the hands  Skin prep applied to daniel-stomal skin, rationale explained to patient  Good seal obtained  Patient verbalized understanding of education and teaching  Patient is active learner   All questions and concerns answered  Encouraged patient to continue to read the educational materials provided - "Life after Ileostomy Surgery" by [facility]  Stoma appearance:         Stoma: Red, round, flushed well stoma with downward pointing os, peristomal skin is intact  Penrose Drain noted  Stoma attached to skin junction, no separation noted  Small-Moderate amount of brown liquid effluent in bag  STOMA MEASUREMENT: 1 1/8 inches round     Ostomy Care:  -Stoma Measurement: 1 1/8 inches round (Measure stoma weekly for next 6-8 weeks- stoma will decrease in size due to decrease in swelling)     -Appliance Used During Bag Change: Convatec One Piece Flat Pouch  -Accessories Used: 3M No Sting Skin Protectant    *Can shower with pouch on or off  Make sure to dry off pouch after shower  Bag Change Steps:  1  Peel back pouch using push-pull method, may use non-alcohol adhesive remover  Remove pouch from top to bottom  2  Use warm water only to cleanse skin around the stoma (daniel-stomal skin)  3  Make sure all adhesive residue is removed and skin is dry and not oily  4  Measure stoma size using measuring guide and trace correct measurements onto back of pouch  5  Then cut or mold the backing of pouch out to correct shape/size  6  Use 3M no sting barrier film to prep the skin around the stoma  (If the skin is open, moist or fragile, Crusting can be done now to create dry skin and assist with pouch adherence- steps are listed below)  7  Gently feed Drain through opening of pouch  Place pouch over stoma and onto skin  8  Use warmth of hand to apply gentle pressure to help backing of pouch to adhere well to skin  TIPS:  Empty pouch when 1/3 -1/2 full  Change pouch every 4-5 days or if signs of leaking      Crusting as needed for moist, red, open skin around the stoma: (Done prior to pouch application)   Apply stoma powder to excoriation, move excess powder away with hand  Use no sting barrier to pat area to form "crust"  Repeat X2 before placing new ostomy pouch     Orders listed below and wound care will continue to follow, call or tiger text with questions  Bedside nurse updated of findings and orders  Flowsheets below with pictures and measurements        Surya Alexander RN, BSN

## 2023-03-03 NOTE — PROGRESS NOTES
Progress Note - Colorectal   Adwoa Courts 50 y o  female MRN: [id number]  Unit/Bed#: [facility] 832-01 Encounter: [id number]      Objective: Patient doing okay this morning  Pain is not as controlled as she would like  Patient is using her Dilaudid PCA  Patient was out of bed and ambulating yesterday  Patient's ileostomy is starting to function with all of green fluid  No air in the bag however  Minimal drainage from her NG tube the last 24 hours  Urine output has been low, patient has been tachycardic in the 100s 110s  Her Garcia is to straight drainage  600 Garcia  230 NG tube  65 APURVA -serosanguineous  150 ileostomy    Blood pressure 123/69, pulse (!) 111, temperature 97 9 °F (36 6 °C), temperature source Temporal, resp  rate 16, height 5' 3" (1 6 m), weight 131 kg (288 lb), SpO2 93 %, not currently breastfeeding  ,Body mass index is 51 02 kg/m²        Intake/Output Summary (Last 24 hours) at 3/3/2023 0516  Last data filed at 3/3/2023 0255  Gross per 24 hour   Intake 1022 83 ml   Output 1045 ml   Net -22 17 ml       Invasive Devices     Peripheral Intravenous Line  Duration           Peripheral IV 03/01/23 Left Antecubital 1 day    Peripheral IV 03/01/23 Right Hand 1 day          Drain  Duration           Closed/Suction Drain Right Abdomen Bulb 10 Fr  1 day    Ileostomy Loop LLQ 1 day    NG/OG/Enteral Tube Nasogastric 18 Fr Left nare 1 day    Urethral Catheter Latex 16 Fr  1 day                Physical Exam:   Abdomen: Soft, abdominal binder on, midline surgical wound under the dressing is clean and dry with staples intact, ileostomy with no air in the bag but does have a small amount of all of green fluid, incisional tenderness, APURVA drain right abdomen is serosanguineous  Extremities: Venodyne's were on and functioning, no calf tenderness bilaterally, no pedal edema    Lab, Imaging and other studies: Pending  VTE Pharmacologic Prophylaxis: Heparin 7500 SQ every 8 hours  VTE Mechanical Prophylaxis: sequential compression device    Assessment:  POD # 2 exploratory laparotomy, total proctocolectomy, ileal anal pouch, diverting loop ileostomy    Plan:  1  Bolus with 1 L normal saline for low urine output as well as tachycardia, creatinine had increased yesterday to 1 3 from 0 84  2  Follow-up a m  labs today  3  Continue out of bed and ambulating  4  Continue ileostomy care and teaching  5  Monitor urine output closely  6  Continue with incentive spirometry  7  Continue subcu heparin for DVT prophylaxis  8  Case management for DC planning  9  Possible removal NG tube  10    Would keep Garcia for accurate I/O's if creatinine rises higher today

## 2023-03-03 NOTE — PLAN OF CARE
Problem: MOBILITY - ADULT  Goal: Maintain or return to baseline ADL function  Description: INTERVENTIONS:  -  Assess patient's ability to carry out ADLs; assess patient's baseline for ADL function and identify physical deficits which impact ability to perform ADLs (bathing, care of mouth/teeth, toileting, grooming, dressing, etc )  - Assess/evaluate cause of self-care deficits   - Assess range of motion  - Assess patient's mobility; develop plan if impaired  - Assess patient's need for assistive devices and provide as appropriate  - Encourage maximum independence but intervene and supervise when necessary  - Involve family in performance of ADLs  - Assess for home care needs following discharge   - Consider OT consult to assist with ADL evaluation and planning for discharge  - Provide patient education as appropriate  Outcome: Progressing  Goal: Maintains/Returns to pre admission functional level  Description: INTERVENTIONS:  - Perform BMAT or MOVE assessment daily    - Set and communicate daily mobility goal to care team and patient/family/caregiver     - Collaborate with rehabilitation services on mobility goals if consulted  - Out of bed for toileting  - Record patient progress and toleration of activity level   Outcome: Progressing     Problem: Prexisting or High Potential for Compromised Skin Integrity  Goal: Skin integrity is maintained or improved  Description: INTERVENTIONS:  - Identify patients at risk for skin breakdown  - Assess and monitor skin integrity  - Assess and monitor nutrition and hydration status  - Monitor labs   - Assess for incontinence   - Turn and reposition patient  - Assist with mobility/ambulation  - Relieve pressure over bony prominences  - Avoid friction and shearing  - Provide appropriate hygiene as needed including keeping skin clean and dry  - Evaluate need for skin moisturizer/barrier cream  - Collaborate with interdisciplinary team   - Patient/family teaching  - Consider wound care consult   Outcome: Progressing     Problem: PAIN - ADULT  Goal: Verbalizes/displays adequate comfort level or baseline comfort level  Description: Interventions:  - Encourage patient to monitor pain and request assistance  - Assess pain using appropriate pain scale  - Administer analgesics based on type and severity of pain and evaluate response  - Implement non-pharmacological measures as appropriate and evaluate response  - Consider cultural and social influences on pain and pain management  - Notify physician/advanced practitioner if interventions unsuccessful or patient reports new pain  Outcome: Progressing     Problem: INFECTION - ADULT  Goal: Absence or prevention of progression during hospitalization  Description: INTERVENTIONS:  - Assess and monitor for signs and symptoms of infection  - Monitor lab/diagnostic results  - Monitor all insertion sites, i e  indwelling lines, tubes, and drains  - Monitor endotracheal if appropriate and nasal secretions for changes in amount and color  - [place] appropriate cooling/warming therapies per order  - Administer medications as ordered  - Instruct and encourage patient and family to use good hand hygiene technique  - Identify and instruct in appropriate isolation precautions for identified infection/condition  Outcome: Progressing  Goal: Absence of fever/infection during neutropenic period  Description: INTERVENTIONS:  - Monitor WBC    Outcome: Progressing     Problem: SAFETY ADULT  Goal: Maintain or return to baseline ADL function  Description: INTERVENTIONS:  -  Assess patient's ability to carry out ADLs; assess patient's baseline for ADL function and identify physical deficits which impact ability to perform ADLs (bathing, care of mouth/teeth, toileting, grooming, dressing, etc )  - Assess/evaluate cause of self-care deficits   - Assess range of motion  - Assess patient's mobility; develop plan if impaired  - Assess patient's need for assistive devices and provide as appropriate  - Encourage maximum independence but intervene and supervise when necessary  - Involve family in performance of ADLs  - Assess for home care needs following discharge   - Consider OT consult to assist with ADL evaluation and planning for discharge  - Provide patient education as appropriate  Outcome: Progressing  Goal: Maintains/Returns to pre admission functional level  Description: INTERVENTIONS:  - Perform BMAT or MOVE assessment daily    - Set and communicate daily mobility goal to care team and patient/family/caregiver     - Collaborate with rehabilitation services on mobility goals if consulted  - Out of bed for toileting  - Record patient progress and toleration of activity level   Outcome: Progressing  Goal: Patient will remain free of falls  Description: INTERVENTIONS:  -  Assess patient's ability to carry out ADLs; assess patient's baseline for ADL function and identify physical deficits which impact ability to perform ADLs (bathing, care of mouth/teeth, toileting, grooming, dressing, etc )  - Assess/evaluate cause of self-care deficits   - Assess range of motion  - Assess patient's mobility; develop plan if impaired  - Assess patient's need for assistive devices and provide as appropriate  - Encourage maximum independence but intervene and supervise when necessary  - Involve family in performance of ADLs  - Assess for home care needs following discharge   - Consider OT consult to assist with ADL evaluation and planning for discharge  - Provide patient education as appropriate  Outcome: Progressing     Problem: DISCHARGE PLANNING  Goal: Discharge to home or other facility with appropriate resources  Description: INTERVENTIONS:  - Identify barriers to discharge w/patient and caregiver  - Arrange for needed discharge resources and transportation as appropriate  - Identify discharge learning needs (meds, wound care, etc )  - Arrange for interpretive services to assist at discharge as needed  - Refer to Case Management Department for coordinating discharge planning if the patient needs post-hospital services based on physician/advanced practitioner order or complex needs related to functional status, cognitive ability, or social support system  Outcome: Progressing     Problem: Knowledge Deficit  Goal: Patient/family/caregiver demonstrates understanding of disease process, treatment plan, medications, and discharge instructions  Description: Complete learning assessment and assess knowledge base    Interventions:  - Provide teaching at level of understanding  - Provide teaching via preferred learning methods  Outcome: Progressing
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
## 2023-03-04 LAB
ANION GAP SERPL CALCULATED.3IONS-SCNC: 2 MMOL/L (ref 4–13)
BUN SERPL-MCNC: 6 MG/DL (ref 5–25)
CALCIUM SERPL-MCNC: 8 MG/DL (ref 8.3–10.1)
CHLORIDE SERPL-SCNC: 110 MMOL/L (ref 96–108)
CO2 SERPL-SCNC: 24 MMOL/L (ref 21–32)
CREAT SERPL-MCNC: 0.78 MG/DL (ref 0.6–1.3)
GFR SERPL CREATININE-BSD FRML MDRD: 88 ML/MIN/1.73SQ M
GLUCOSE SERPL-MCNC: 114 MG/DL (ref 65–140)
PHOSPHATE SERPL-MCNC: 1.3 MG/DL (ref 2.7–4.5)
POTASSIUM SERPL-SCNC: 3.5 MMOL/L (ref 3.5–5.3)
SODIUM SERPL-SCNC: 136 MMOL/L (ref 135–147)

## 2023-03-04 RX ORDER — POTASSIUM CHLORIDE 20 MEQ/1
40 TABLET, EXTENDED RELEASE ORAL ONCE
Status: COMPLETED | OUTPATIENT
Start: 2023-03-04 | End: 2023-03-04

## 2023-03-04 RX ADMIN — POTASSIUM CHLORIDE 40 MEQ: 1500 TABLET, EXTENDED RELEASE ORAL at 09:34

## 2023-03-04 RX ADMIN — HEPARIN SODIUM 7500 UNITS: 5000 INJECTION INTRAVENOUS; SUBCUTANEOUS at 21:28

## 2023-03-04 RX ADMIN — METHOCARBAMOL 750 MG: 750 TABLET ORAL at 13:00

## 2023-03-04 RX ADMIN — METHOCARBAMOL 750 MG: 750 TABLET ORAL at 21:28

## 2023-03-04 RX ADMIN — GABAPENTIN 100 MG: 100 CAPSULE ORAL at 21:28

## 2023-03-04 RX ADMIN — HEPARIN SODIUM 7500 UNITS: 5000 INJECTION INTRAVENOUS; SUBCUTANEOUS at 13:00

## 2023-03-04 RX ADMIN — DEXTROSE, SODIUM CHLORIDE, AND POTASSIUM CHLORIDE 75 ML/HR: 5; .45; .15 INJECTION INTRAVENOUS at 09:59

## 2023-03-04 RX ADMIN — GABAPENTIN 100 MG: 100 CAPSULE ORAL at 15:30

## 2023-03-04 RX ADMIN — PANTOPRAZOLE SODIUM 40 MG: 40 INJECTION, POWDER, FOR SOLUTION INTRAVENOUS at 09:02

## 2023-03-04 RX ADMIN — GABAPENTIN 100 MG: 100 CAPSULE ORAL at 09:02

## 2023-03-04 RX ADMIN — HEPARIN SODIUM 7500 UNITS: 5000 INJECTION INTRAVENOUS; SUBCUTANEOUS at 05:31

## 2023-03-04 RX ADMIN — METHOCARBAMOL 750 MG: 750 TABLET ORAL at 05:31

## 2023-03-04 RX ADMIN — SODIUM PHOSPHATE, MONOBASIC, MONOHYDRATE AND SODIUM PHOSPHATE, DIBASIC, ANHYDROUS 21 MMOL: 142; 276 INJECTION, SOLUTION INTRAVENOUS at 10:41

## 2023-03-04 RX ADMIN — ALBUTEROL SULFATE 2 PUFF: 90 AEROSOL, METERED RESPIRATORY (INHALATION) at 21:28

## 2023-03-04 NOTE — PROGRESS NOTES
Progress Note - Colorectal Surgery   JOSE Resident on [facility] 50 y o  female MRN: [id number]  Unit/Bed#: [facility] 832-01 Encounter: [id number]    Assessment:  49 yo F who is s/p total proctocolectomy with ileoanal pouch and diverting loop ileostomy on 3/1 for treatment of her ulcerative colitis  Overall, doing well and continuing to progress  Afebrile  VSS   UOP 1200  Right APURVA drain with 125 cc of serosanguineous output  Loop ileostomy with 525 cc of dark liquid output with flakes  Yesterday WBC of 7 9  Yesterday Hgb of 11 5  Yesterday CR 0 78  A m  labs pending    Plan:  - Clear liquid diet, possibly advance to clears toast crackers  - Decrease rate of maintenance IV fluids  - Consider discontinuation of PCA today  - monitor drain output  - PRN pain meds  - DVT prophylaxis  - Encourage IS   - OOB and ambulating    Subjective/Objective     Subjective: No acute events overnight  Patient states she has minimal pain  She is tolerating oral diet  She currently denies having nausea, vomiting, chest pain, shortness of breath, fevers, chills  Has only used her PCA 3 times over the past 24 hours  We will consider discontinuing PCA today  Having adequate ostomy output  No complaints at this point time  Objective:     Blood pressure 109/58, pulse 94, temperature 97 9 °F (36 6 °C), resp  rate 20, height 5' 3" (1 6 m), weight 131 kg (288 lb), SpO2 92 %, not currently breastfeeding  ,Body mass index is 51 02 kg/m²        Intake/Output Summary (Last 24 hours) at 3/4/2023 9500  Last data filed at 3/4/2023 0555  Gross per 24 hour   Intake 2 ml   Output 1850 ml   Net -1848 ml       Invasive Devices     Peripheral Intravenous Line  Duration           Peripheral IV 03/01/23 Left Antecubital 2 days    Peripheral IV 03/01/23 Right Hand 2 days          Drain  Duration           Closed/Suction Drain Right Abdomen Bulb 10 Fr  2 days    Ileostomy Loop LLQ 2 days                General: NAD  HENT: NCAT MMM  Neck: supple, no JVD  CV: nl rate  Lungs: nl wob  No resp distress  ABD: Soft, appropriately tender, nondistended  See above for drain outputs and character of drainage  Extrem: No CCE  Neuro: AAOx3       Scheduled Meds:  Current Facility-Administered Medications   Medication Dose Route Frequency Provider Last Rate   • albuterol  2 puff Inhalation Q4H PRN Maritza Holt MD     • dextrose 5 % and sodium chloride 0 45 % with KCl 20 mEq/L  125 mL/hr Intravenous Continuous Antonia Albert PA-C 125 mL/hr (03/03/23 1012)   • diphenhydrAMINE  25 mg Intravenous Q6H PRN Renetta Rios MD     • gabapentin  100 mg Oral TID Antonia Albert PA-C     • heparin (porcine)  7,500 Units Subcutaneous Q8H Rojelio Limon MD     • HYDROmorphone   Intravenous Continuous Renetta Rios MD     • HYDROmorphone  0 5 mg Intravenous Q3H PRN Renetta Rios MD     • methocarbamol  750 mg Oral Q8H [address] Antonia Albert PA-C     • naloxone  0 04 mg Intravenous Q1MIN PRN Renetta Rios MD     • ondansetron  4 mg Intravenous Q6H PRN Renetta Rios MD     • oxyCODONE  10 mg Oral Q4H PRN Antonia Albert PA-C     • oxyCODONE  5 mg Oral Q4H PRN Antonia Albert PA-C     • pantoprazole  40 mg Intravenous Q24H [address] Renetta Rios MD       Continuous Infusions:dextrose 5 % and sodium chloride 0 45 % with KCl 20 mEq/L, 125 mL/hr, Last Rate: 125 mL/hr (03/03/23 1012)  HYDROmorphone,       PRN Meds: •  albuterol  •  diphenhydrAMINE  •  HYDROmorphone  •  naloxone  •  ondansetron  •  oxyCODONE  •  oxyCODONE      Lab, Imaging and other studies:I have personally reviewed pertinent lab results    , CBC: No results found for: WBC, HGB, HCT, MCV, PLT, ADJUSTEDWBC, MCH, MCHC, RDW, MPV, NRBC, CMP: No results found for: SODIUM, K, CL, CO2, ANIONGAP, BUN, CREATININE, GLUCOSE, CALCIUM, AST, ALT, ALKPHOS, PROT, BILITOT, EGFR, Coagulation: No results found for: PT, INR, APTT, Urinalysis: No results found for: Macarena Light, SPECGRAV, PHUR, LEUKOCYTESUR, NITRITE, PROTEINUA, GLUCOSEU, KETONESU, BILIRUBINUR, BLOODU, Amylase: No results found for: AMYLASE, Lipase: No results found for: LIPASE  VTE Pharmacologic Prophylaxis: Heparin  VTE Mechanical Prophylaxis: sequential compression device      Susan Johansen MD  3/4/2023 6:32 AM

## 2023-03-04 NOTE — RESTORATIVE TECHNICIAN NOTE
Restorative Technician Note      Patient Name: Anabella Snow     Restorative Tech Visit Date: 03/04/23  Note Type: Mobility  Patient Position Upon Consult: Bedside chair  Activity Performed: Ambulated  Assistive Device: Standard walker  Education Provided: Yes  Patient Position at End of Consult: Bedside chair;  All needs within reach    Salazar Bernal  DPT, Restorative Technician

## 2023-03-04 NOTE — PLAN OF CARE
Problem: PAIN - ADULT  Goal: Verbalizes/displays adequate comfort level or baseline comfort level  Description: Interventions:  - Encourage patient to monitor pain and request assistance  - Assess pain using appropriate pain scale  - Administer analgesics based on type and severity of pain and evaluate response  - Implement non-pharmacological measures as appropriate and evaluate response  - Consider cultural and social influences on pain and pain management  - Notify physician/advanced practitioner if interventions unsuccessful or patient reports new pain  Outcome: Progressing     Problem: INFECTION - ADULT  Goal: Absence or prevention of progression during hospitalization  Description: INTERVENTIONS:  - Assess and monitor for signs and symptoms of infection  - Monitor lab/diagnostic results  - Monitor all insertion sites, i e  indwelling lines, tubes, and drains  - Monitor endotracheal if appropriate and nasal secretions for changes in amount and color  - Tyler appropriate cooling/warming therapies per order  - Administer medications as ordered  - Instruct and encourage patient and family to use good hand hygiene technique  - Identify and instruct in appropriate isolation precautions for identified infection/condition  Outcome: Progressing  Goal: Absence of fever/infection during neutropenic period  Description: INTERVENTIONS:  - Monitor WBC    Outcome: Progressing     Problem: DISCHARGE PLANNING  Goal: Discharge to home or other facility with appropriate resources  Description: INTERVENTIONS:  - Identify barriers to discharge w/patient and caregiver  - Arrange for needed discharge resources and transportation as appropriate  - Identify discharge learning needs (meds, wound care, etc )  - Arrange for interpretive services to assist at discharge as needed  - Refer to Case Management Department for coordinating discharge planning if the patient needs post-hospital services based on physician/advanced practitioner order or complex needs related to functional status, cognitive ability, or social support system  Outcome: Progressing     Problem: Knowledge Deficit  Goal: Patient/family/caregiver demonstrates understanding of disease process, treatment plan, medications, and discharge instructions  Description: Complete learning assessment and assess knowledge base  Interventions:  - Provide teaching at level of understanding  - Provide teaching via preferred learning methods  Outcome: Progressing     Problem: Nutrition/Hydration-ADULT  Goal: Nutrient/Hydration intake appropriate for improving, restoring or maintaining nutritional needs  Description: Monitor and assess patient's nutrition/hydration status for malnutrition  Collaborate with interdisciplinary team and initiate plan and interventions as ordered  Monitor patient's weight and dietary intake as ordered or per policy  Utilize nutrition screening tool and intervene as necessary  Determine patient's food preferences and provide high-protein, high-caloric foods as appropriate       INTERVENTIONS:  - Monitor oral intake, urinary output, labs, and treatment plans  - Assess nutrition and hydration status and recommend course of action  - Evaluate amount of meals eaten  - Assist patient with eating if necessary   - Allow adequate time for meals  - Recommend/ encourage appropriate diets, oral nutritional supplements, and vitamin/mineral supplements  - Order, calculate, and assess calorie counts as needed  - Recommend, monitor, and adjust tube feedings and TPN/PPN based on assessed needs  - Assess need for intravenous fluids  - Provide specific nutrition/hydration education as appropriate  - Include patient/family/caregiver in decisions related to nutrition  Outcome: Progressing

## 2023-03-05 LAB
ANION GAP SERPL CALCULATED.3IONS-SCNC: 3 MMOL/L (ref 4–13)
BUN SERPL-MCNC: 3 MG/DL (ref 5–25)
CALCIUM SERPL-MCNC: 7.9 MG/DL (ref 8.3–10.1)
CHLORIDE SERPL-SCNC: 113 MMOL/L (ref 96–108)
CO2 SERPL-SCNC: 24 MMOL/L (ref 21–32)
CREAT SERPL-MCNC: 0.65 MG/DL (ref 0.6–1.3)
GFR SERPL CREATININE-BSD FRML MDRD: 103 ML/MIN/1.73SQ M
GLUCOSE SERPL-MCNC: 109 MG/DL (ref 65–140)
PHOSPHATE SERPL-MCNC: 2.4 MG/DL (ref 2.7–4.5)
POTASSIUM SERPL-SCNC: 3.8 MMOL/L (ref 3.5–5.3)
SODIUM SERPL-SCNC: 140 MMOL/L (ref 135–147)

## 2023-03-05 RX ORDER — ACETAMINOPHEN 325 MG/1
650 TABLET ORAL EVERY 6 HOURS SCHEDULED
Status: DISCONTINUED | OUTPATIENT
Start: 2023-03-05 | End: 2023-03-06 | Stop reason: HOSPADM

## 2023-03-05 RX ADMIN — ACETAMINOPHEN 650 MG: 325 TABLET ORAL at 16:55

## 2023-03-05 RX ADMIN — GABAPENTIN 100 MG: 100 CAPSULE ORAL at 21:02

## 2023-03-05 RX ADMIN — METHOCARBAMOL 750 MG: 750 TABLET ORAL at 05:00

## 2023-03-05 RX ADMIN — PANTOPRAZOLE SODIUM 40 MG: 40 INJECTION, POWDER, FOR SOLUTION INTRAVENOUS at 08:00

## 2023-03-05 RX ADMIN — OXYCODONE HYDROCHLORIDE 10 MG: 10 TABLET ORAL at 07:59

## 2023-03-05 RX ADMIN — GABAPENTIN 100 MG: 100 CAPSULE ORAL at 08:00

## 2023-03-05 RX ADMIN — POTASSIUM PHOSPHATE, MONOBASIC AND POTASSIUM PHOSPHATE, DIBASIC 12 MMOL: 224; 236 INJECTION, SOLUTION, CONCENTRATE INTRAVENOUS at 09:06

## 2023-03-05 RX ADMIN — HEPARIN SODIUM 7500 UNITS: 5000 INJECTION INTRAVENOUS; SUBCUTANEOUS at 05:00

## 2023-03-05 RX ADMIN — HEPARIN SODIUM 7500 UNITS: 5000 INJECTION INTRAVENOUS; SUBCUTANEOUS at 13:27

## 2023-03-05 RX ADMIN — DEXTROSE, SODIUM CHLORIDE, AND POTASSIUM CHLORIDE 75 ML/HR: 5; .45; .15 INJECTION INTRAVENOUS at 05:39

## 2023-03-05 RX ADMIN — ACETAMINOPHEN 650 MG: 325 TABLET ORAL at 07:59

## 2023-03-05 RX ADMIN — OXYCODONE HYDROCHLORIDE 10 MG: 10 TABLET ORAL at 12:24

## 2023-03-05 RX ADMIN — GABAPENTIN 100 MG: 100 CAPSULE ORAL at 16:55

## 2023-03-05 RX ADMIN — METHOCARBAMOL 750 MG: 750 TABLET ORAL at 13:27

## 2023-03-05 RX ADMIN — HEPARIN SODIUM 7500 UNITS: 5000 INJECTION INTRAVENOUS; SUBCUTANEOUS at 21:02

## 2023-03-05 RX ADMIN — METHOCARBAMOL 750 MG: 750 TABLET ORAL at 21:02

## 2023-03-05 RX ADMIN — ACETAMINOPHEN 650 MG: 325 TABLET ORAL at 12:22

## 2023-03-05 RX ADMIN — OXYCODONE HYDROCHLORIDE 10 MG: 10 TABLET ORAL at 16:55

## 2023-03-05 NOTE — RESTORATIVE TECHNICIAN NOTE
Restorative Technician Note      Patient Name: Rajesh Nelson     Restorative Tech Visit Date: 03/05/23  Note Type: Mobility  Patient Position Upon Consult: Other (Comment) (in BR)  Activity Performed: Ambulated  Assistive Device: Standard walker  Education Provided: Yes  Patient Position at End of Consult: Bedside chair;  All needs within reach    Pauline Estrada  DPT, Restorative Technician

## 2023-03-05 NOTE — PROGRESS NOTES
Progress Note - Colorectal Surgery   JOSE Resident on [facility]   Luisana Aide 50 y o  female MRN: [id number]  Unit/Bed#: [facility] 832-01 Encounter: [id number]    Assessment:  49 yo F who is s/p total proctocolectomy with ileoanal pouch and diverting loop ileostomy on 3/1 for treatment of her ulcerative colitis  Overall, doing well and continuing to progress  Afebrile  VSS  On room air saturating greater 92%  UOP of 2400  Right APURVA with 50 cc of output serosanguineous  Ostomy with 825 cc of solid/liquid stool  Yesterday CR of 0 78  A m  labs pending    Plan:  - Clear/toast/cracker diet  Advance to fulls  - Dc IV fluids  - Consider discontinuation of PCA today  - monitor drain output  - PRN pain meds  - DVT prophylaxis  - Encourage IS   - OOB and ambulating    Subjective/Objective     Subjective: No acute events overnight  Patient states she feels abdominal pain  Currently denies having nausea, vomiting, fevers, chills  Endorses some wheezing  Denies having chest pain or shortness of breath  Tolerating oral diet  Only used her PCA 7 times  Urinating without difficulty  40 more than 5 times  Objective:     Blood pressure 100/58, pulse 91, temperature 97 7 °F (36 5 °C), resp  rate 16, height 5' 3" (1 6 m), weight 131 kg (288 lb), SpO2 96 %, not currently breastfeeding  ,Body mass index is 51 02 kg/m²  Intake/Output Summary (Last 24 hours) at 3/5/2023 0641  Last data filed at 3/5/2023 6857  Gross per 24 hour   Intake 1322 6 ml   Output 3275 ml   Net -1952 4 ml       Invasive Devices     Peripheral Intravenous Line  Duration           Peripheral IV 03/05/23 Left Forearm <1 day          Drain  Duration           Closed/Suction Drain Right Abdomen Bulb 10 Fr  3 days    Ileostomy Loop LLQ 3 days                General: NAD  HENT: NCAT MMM  Neck: supple, no JVD  CV: nl rate  Lungs: nl wob  No resp distress  ABD: Soft, nontender, nondistended  Incisions clean dry and intact    See above for drain output and drain character  Extrem: No CCE  Neuro: AAOx3       Scheduled Meds:  Current Facility-Administered Medications   Medication Dose Route Frequency Provider Last Rate   • albuterol  2 puff Inhalation Q4H PRN Yady Smith MD     • dextrose 5 % and sodium chloride 0 45 % with KCl 20 mEq/L  75 mL/hr Intravenous Continuous Nuria Majano MD 75 mL/hr (03/05/23 0539)   • diphenhydrAMINE  25 mg Intravenous Q6H PRN Ricky Valencia MD     • gabapentin  100 mg Oral TID Antonia Albert PA-C     • heparin (porcine)  7,500 Units Subcutaneous Q8H [address] Ricky Valencia MD     • HYDROmorphone   Intravenous Continuous Ricky Valencia MD     • HYDROmorphone  0 5 mg Intravenous Q3H PRN Ricky Valencia MD     • methocarbamol  750 mg Oral Q8H [address] Antonia Albert PA-C     • naloxone  0 04 mg Intravenous Q1MIN PRN Ricky Valencia MD     • ondansetron  4 mg Intravenous Q6H PRN Ricky Valencia MD     • oxyCODONE  10 mg Oral Q4H PRN Antonia Albert PA-C     • oxyCODONE  5 mg Oral Q4H PRN Antonia Albert PA-C     • pantoprazole  40 mg Intravenous Q24H [address] Ricky Valencia MD       Continuous Infusions:dextrose 5 % and sodium chloride 0 45 % with KCl 20 mEq/L, 75 mL/hr, Last Rate: 75 mL/hr (03/05/23 0539)  HYDROmorphone,       PRN Meds: •  albuterol  •  diphenhydrAMINE  •  HYDROmorphone  •  naloxone  •  ondansetron  •  oxyCODONE  •  oxyCODONE      Lab, Imaging and other studies:  I have personally reviewed pertinent lab results  , CBC: No results found for: WBC, HGB, HCT, MCV, PLT, ADJUSTEDWBC, MCH, MCHC, RDW, MPV, NRBC, CMP:   No results found for: SODIUM, K, CL, CO2, ANIONGAP, BUN, CREATININE, GLUCOSE, CALCIUM, AST, ALT, ALKPHOS, PROT, BILITOT, EGFR, Coagulation: No results found for: PT, INR, APTT, Urinalysis: No results found for: COLORU, CLARITYU, SPECGRAV, PHUR, LEUKOCYTESUR, NITRITE, PROTEINUA, GLUCOSEU, KETONESU, BILIRUBINUR, BLOODU, Amylase: No results found for: AMYLASE, Lipase:  No results found for: LIPASE  VTE Pharmacologic Prophylaxis: Heparin  VTE Mechanical Prophylaxis: sequential compression device      Pepito Jc MD  3/5/2023 6:41 AM

## 2023-03-06 VITALS
HEART RATE: 78 BPM | HEIGHT: 63 IN | BODY MASS INDEX: 51.03 KG/M2 | DIASTOLIC BLOOD PRESSURE: 65 MMHG | RESPIRATION RATE: 16 BRPM | OXYGEN SATURATION: 94 % | SYSTOLIC BLOOD PRESSURE: 123 MMHG | WEIGHT: 288 LBS | TEMPERATURE: 97.7 F

## 2023-03-06 LAB
ANION GAP SERPL CALCULATED.3IONS-SCNC: 0 MMOL/L (ref 4–13)
BUN SERPL-MCNC: 2 MG/DL (ref 5–25)
CALCIUM SERPL-MCNC: 8.7 MG/DL (ref 8.3–10.1)
CHLORIDE SERPL-SCNC: 112 MMOL/L (ref 96–108)
CO2 SERPL-SCNC: 27 MMOL/L (ref 21–32)
CREAT SERPL-MCNC: 0.71 MG/DL (ref 0.6–1.3)
GFR SERPL CREATININE-BSD FRML MDRD: 99 ML/MIN/1.73SQ M
GLUCOSE SERPL-MCNC: 103 MG/DL (ref 65–140)
PHOSPHATE SERPL-MCNC: 3.1 MG/DL (ref 2.7–4.5)
POTASSIUM SERPL-SCNC: 3.7 MMOL/L (ref 3.5–5.3)
SODIUM SERPL-SCNC: 139 MMOL/L (ref 135–147)

## 2023-03-06 RX ORDER — POTASSIUM CHLORIDE 20 MEQ/1
20 TABLET, EXTENDED RELEASE ORAL ONCE
Status: COMPLETED | OUTPATIENT
Start: 2023-03-06 | End: 2023-03-06

## 2023-03-06 RX ORDER — PANTOPRAZOLE SODIUM 40 MG/1
40 TABLET, DELAYED RELEASE ORAL
Status: DISCONTINUED | OUTPATIENT
Start: 2023-03-06 | End: 2023-03-06 | Stop reason: HOSPADM

## 2023-03-06 RX ORDER — OXYCODONE HYDROCHLORIDE 5 MG/1
5 TABLET ORAL EVERY 4 HOURS PRN
Qty: 20 TABLET | Refills: 0 | Status: SHIPPED | OUTPATIENT
Start: 2023-03-06 | End: 2023-03-11

## 2023-03-06 RX ORDER — CLINDAMYCIN HYDROCHLORIDE 300 MG/1
300 CAPSULE ORAL 4 TIMES DAILY
Qty: 28 CAPSULE | Refills: 0 | Status: SHIPPED | OUTPATIENT
Start: 2023-03-06 | End: 2023-03-13

## 2023-03-06 RX ORDER — GABAPENTIN 100 MG/1
100 CAPSULE ORAL 3 TIMES DAILY
Qty: 21 CAPSULE | Refills: 0 | Status: SHIPPED | OUTPATIENT
Start: 2023-03-06 | End: 2023-03-13

## 2023-03-06 RX ORDER — ACETAMINOPHEN 325 MG/1
650 TABLET ORAL EVERY 6 HOURS SCHEDULED
Refills: 0 | COMMUNITY
Start: 2023-03-06

## 2023-03-06 RX ORDER — METHOCARBAMOL 750 MG/1
750 TABLET, FILM COATED ORAL EVERY 8 HOURS SCHEDULED
Qty: 21 TABLET | Refills: 0 | Status: SHIPPED | OUTPATIENT
Start: 2023-03-06 | End: 2023-03-16

## 2023-03-06 RX ADMIN — GABAPENTIN 100 MG: 100 CAPSULE ORAL at 08:15

## 2023-03-06 RX ADMIN — OXYCODONE HYDROCHLORIDE 10 MG: 10 TABLET ORAL at 07:43

## 2023-03-06 RX ADMIN — POTASSIUM CHLORIDE 20 MEQ: 1500 TABLET, EXTENDED RELEASE ORAL at 09:12

## 2023-03-06 RX ADMIN — METHOCARBAMOL 750 MG: 750 TABLET ORAL at 06:07

## 2023-03-06 RX ADMIN — OXYCODONE HYDROCHLORIDE 10 MG: 10 TABLET ORAL at 12:37

## 2023-03-06 RX ADMIN — ACETAMINOPHEN 650 MG: 325 TABLET ORAL at 06:07

## 2023-03-06 RX ADMIN — OXYCODONE HYDROCHLORIDE 10 MG: 10 TABLET ORAL at 01:56

## 2023-03-06 RX ADMIN — PANTOPRAZOLE SODIUM 40 MG: 40 INJECTION, POWDER, FOR SOLUTION INTRAVENOUS at 08:14

## 2023-03-06 RX ADMIN — HEPARIN SODIUM 7500 UNITS: 5000 INJECTION INTRAVENOUS; SUBCUTANEOUS at 06:07

## 2023-03-06 NOTE — PROGRESS NOTES
Colorectal Surgery  Progress Note   Tacho Morgan 50 y o  female MRN: [id number]  Unit/Bed#: [facility] 832-01 Encounter: [id number]    Assessment:  51 yo F who is s/p total proctocolectomy with ileoanal pouch and diverting loop ileostomy on 3/1 for treatment of her ulcerative colitis  Vital signs stable, afebrile, on room air    UOP: 2 1 L  R APURVA: 50 cc serosang  Ileostomy: 1025 mL liquid stool    AM labs pending    Plan:  • Soft/ lo res diet as tolerated with nutritional supplementation   • Monitor drain output   • DVT ppx: SQH  • Pain/ nausea control PRN  • OOB/ ambulation  • Appreciate ostomy teaching by ostomy team - will have them re-evaluate due to skin excoriation for recommendations   • Incentive Spirometry  • PT/OT: HHR  • dispo planning, possible d/c home today     Subjective/Objective     Subjective: Patient seen and examined at bedside, in no acute distress  No acute events overnight  Patient's pain is well controlled  OOB ambulating with walker  She denies nausea or vomiting  Objective:     Vitals:Blood pressure 125/76, pulse 90, temperature 98 1 °F (36 7 °C), resp  rate 18, height 5' 3" (1 6 m), weight 131 kg (288 lb), SpO2 97 %, not currently breastfeeding  ,Body mass index is 51 02 kg/m²       Temp (24hrs), Av 7 °F (36 5 °C), Min:97 5 °F (36 4 °C), Max:98 1 °F (36 7 °C)  Current: Temperature: 98 1 °F (36 7 °C)      Intake/Output Summary (Last 24 hours) at 3/6/2023 0622  Last data filed at 3/6/2023 0157  Gross per 24 hour   Intake 120 ml   Output 3250 ml   Net -3130 ml       Invasive Devices     Peripheral Intravenous Line  Duration           Peripheral IV 23 Left Forearm 1 day          Drain  Duration           Closed/Suction Drain Right Abdomen Bulb 10 Fr  4 days    Ileostomy Loop LLQ 4 days                Physical Exam:  General: No acute distress, alert and oriented  CV: Well perfused, regular rate and rhythm  Lungs: Normal work of breathing, no increased respiratory effort  Abdomen: Soft, appropriately tender, non-distended  Incisions clean, dry and intact  Ileostomy with pink/ healthy stoma  Stool in appliance  APURVA drain clean at skin site, serosanguinous output  Leakage of stool into incision site this morning, dressing was taken down and ostomy appliance replaced  Notable skin excoriation medial to ileostomy, tender     Extremities: No edema, clubbing or cyanosis  Skin: Warm, dry    Lab Results: Results: pending  VTE Prophylaxis: Sequential compression device (Venodyne)  and Heparin    Rufino Jenkins MD  3/6/2023

## 2023-03-06 NOTE — WOUND OSTOMY CARE
Progress Note- Ostomy  Vanessa Zapata 50 y o  female  [id number]  [facility] 832-[facility] 832-01        Assessment: Patient is seen for ostomy care and teaching   Patient is in the chair with the ileostomy to note leaking at the distal part of the ostomy appliance   Discussed with the patient removal of the appliance , measuring the stoma , treatment of the glenda stomal skin with the stoma powder then seal in with 3 M no sting to the denuded skin from the leaking appliance   Noted stoma is flush at the skin level at the 6 o'clock area with a penrose drain in place   The drain is to stay in place until seen in the office   The stoma is moist red and measuring 1 1/4 inch   Discussed convexity and place the patient in soft convexity with a ostomy belt to keep secure   Discussed treating the skin with the stoma powder then sealed in with the 3 M no sting , cutting of the soft convexity appliance to the size of 1 1/4 inch   Applied the appliance then secured with the belt   Bag set up for supplies of the soft convexity , belt , stoma belt , stoma powder , negra rings , 1 piece appliances , strip paste and information   Discussed that the home care will have to help order additional ostomy supplies to get the patient set up   Patient with understanding of the information   Patient had a ostomy for 2 years and is familiar with the ostomy bags , emptying , shower and caring for the ostomy for appliance changes   She is aware she will need convexity and a belt   Plan:  Continue to provide ostomy education                 Vitals:    03/06/23 0758   BP: 123/65   Pulse: 78   Resp: 16   Temp: 97 7 °F (36 5 °C)   SpO2: 94%             Wound 01/29/21 Abdomen N/A (Active)   Number of days: 766       Wound 03/01/23 Abdomen N/A (Active)   Wound Image   03/06/23 1122   Wound Description Dry;Edema;Pink 03/06/23 0758   Glenda-wound Assessment Clean;Dry;Fragile;Pink 03/06/23 0758   Wound Site Closure Staples 03/06/23 0758   Drainage Amount Small 03/03/23 0315   Dressing Silver dressing 03/05/23 1627   Dressing Status Old drainage 03/05/23 1627   Number of days: 5             Ileostomy Loop LLQ (Active)   Stomal Appliance 2 piece 03/06/23 0758   Stoma Assessment [place] 03/06/23 0758   Stoma size (in) 1 13 in 03/03/23 1301   Stoma Shape Round 03/06/23 0758   Peristomal Assessment Excoriation;Pink 03/06/23 0758   Treatment Bag change;Site care 03/06/23 0758   Output (mL) 300 mL 03/05/23 2031   Number of days: [address]

## 2023-03-06 NOTE — DISCHARGE SUMMARY
Discharge Summary - Colorectal Surgery   Edison Osler 50 y o  female MRN: [id number]  Unit/Bed#: [address] 832-01 Encounter: [id number]        Admitting Diagnosis: Panulcerative colitis    Admit Date: 3/1/2023    Discharge Diagnosis: Same      Discharge Date: 3/6/2023    HPI: Dayna Ozuna is a 53-year-old woman who has a history of ulcerative colitis  Patient has had a previous left hemicolectomy after a perforation which ended with an end transverse colostomy this was back in 2021  Patient has failed medical management of her and ulcerative colitis and now is being admitted for surgical intervention       Procedures Performed: 3/1/2023 total proctocolectomy with ileoanal pouch, diverting loop ileostomy -Dr Sami Sharma Course: Patient has done extremely well postoperatively  She was able to start on a clear liquid diet for 2 days of her nasogastric tube to low continuous suction  Patient's ileostomy started to show signs patient was slowly advanced to a surgical soft diet which she is tolerating well  Patient did have a creatinine bump to 1 31 from 0 84 on postop day 2 her creatinine has stayed normal and stable since that time on discharge her creatinine is 0 71  Patient's Eduin-Morel drain went from serosanguineous to serous and therefore was hold prior to discharge  Patient's ileostomy output is approximately with 1 L/day her midline wound with blanching to it  Since patient is allergic to penicillins which she states is a hive reaction  We opted for clindamycin 300 mg 4 times daily for 7 days  Patient has [facility] for ileostomy care  Patient's ileostomy still has a Penrose drain encircling it which will be removed in the office  Patient has been cleared by physical therapy and Occupational Therapy for home  Case management has been working with the patient and she will have home visiting nurses    [facility] [place], [place] milligrams every 6 hours scheduled for the next 7 days and no refills, gabapentin 100 mg every 8 hours for the next 7 days and no refills  Oxycodone 5 mg every 4-6 hours as needed for pain 20 pills were prescribed and no refills patient may also use extra strength Tylenol every 8 hours as needed  Patient is not to hesitate to call the office for any fevers of 101 5 or higher, increasing abdominal pain, drainage from her midline incision or problems with her ostomy  Pathology still pending    Complications: None      Condition at Discharge: good     Discharge instructions/Information to patient and family:   See after visit summary for information provided to patient and family  Provisions for Follow-Up Care:  See after visit summary for information related to follow-up care and any pertinent home health orders  Disposition: Home with [facility]    Planned Readmission: No    Discharge Statement   I spent 45 minutes discharging the patient  This time was spent on the day of discharge  I had direct contact with the patient on the day of discharge  Additional documentation is required if more than 30 minutes were spent on discharge  Discharge Medications:  See after visit summary for reconciled discharge medications provided to patient and family

## 2023-03-07 ENCOUNTER — TELEPHONE (OUTPATIENT)
Dept: INTERNAL MEDICINE CLINIC | Facility: OTHER | Age: 53
End: 2023-03-07

## 2023-03-07 ENCOUNTER — TRANSITIONAL CARE MANAGEMENT (OUTPATIENT)
Dept: INTERNAL MEDICINE CLINIC | Facility: OTHER | Age: 53
End: 2023-03-07

## 2023-03-07 NOTE — UTILIZATION REVIEW
NOTIFICATION OF ADMISSION DISCHARGE   This is a Notification of Discharge from [address]  Please be advised that this patient has been discharge from our facility  Below you will find the admission and discharge date and time including the patient’s disposition  UTILIZATION REVIEW CONTACT:  Monserrat Loja  Utilization   Network Utilization Review Department  Phone: [phone] x carefully listen to the prompts  All voicemails are confidential   Email: [email or web address]     ADMISSION INFORMATION  PRESENTATION DATE: 3/1/2023  8:22 AM  OBERVATION ADMISSION DATE:   INPATIENT ADMISSION DATE: 3/1/23  4:08 PM   DISCHARGE DATE: 3/6/2023 12:52 PM   DISPOSITION:Home with [address] INFORMATION:  Send all requests for admission clinical reviews, approved or denied determinations and any other requests to dedicated fax number below belonging to the campus where the patient is receiving treatment   List of dedicated fax numbers:  [address] DENIALS (Administrative/Medical Necessity) [phone]   [address] (Maternity/NICU/Pediatrics) [phone]   [facility] [phone]   [facility] 87 [phone]   [address] [phone]   220 [facility] [phone] 90 [facility] [phone]   [address] [place] [address] 119 [phone]   [facility]  [phone]   405 [facility] [phone]   412 [facility] [address] [facility] [phone]

## 2023-03-08 ENCOUNTER — TELEPHONE (OUTPATIENT)
Dept: GASTROENTEROLOGY | Facility: CLINIC | Age: 53
End: 2023-03-08
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
## 2023-03-16 ENCOUNTER — OFFICE VISIT (OUTPATIENT)
Dept: INTERNAL MEDICINE CLINIC | Facility: CLINIC | Age: 53
End: 2023-03-16

## 2023-03-16 VITALS
SYSTOLIC BLOOD PRESSURE: 128 MMHG | BODY MASS INDEX: 44.12 KG/M2 | HEIGHT: 63 IN | TEMPERATURE: 98 F | WEIGHT: 249 LBS | DIASTOLIC BLOOD PRESSURE: 84 MMHG | HEART RATE: 91 BPM | OXYGEN SATURATION: 96 %

## 2023-03-16 DIAGNOSIS — K51.018 ULCERATIVE PANCOLITIS WITH OTHER COMPLICATION (HCC): Primary | ICD-10-CM

## 2023-03-16 DIAGNOSIS — E03.9 ACQUIRED HYPOTHYROIDISM: ICD-10-CM

## 2023-03-16 DIAGNOSIS — M17.0 PRIMARY OSTEOARTHRITIS OF BOTH KNEES: ICD-10-CM

## 2023-03-16 DIAGNOSIS — J45.20 MILD INTERMITTENT ASTHMA WITHOUT COMPLICATION: ICD-10-CM

## 2023-03-16 RX ORDER — NEOMYCIN SULFATE 500 MG/1
TABLET ORAL
COMMUNITY
Start: 2023-02-10 | End: 2023-03-16

## 2023-03-16 RX ORDER — METRONIDAZOLE 500 MG/1
TABLET ORAL
COMMUNITY
Start: 2023-02-10 | End: 2023-03-16
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
## 2023-03-17 ENCOUNTER — OFFICE VISIT (OUTPATIENT)
Age: 53
End: 2023-03-17

## 2023-03-17 ENCOUNTER — PREP FOR PROCEDURE (OUTPATIENT)
Age: 53
End: 2023-03-17

## 2023-03-17 ENCOUNTER — TELEPHONE (OUTPATIENT)
Age: 53
End: 2023-03-17

## 2023-03-17 VITALS — BODY MASS INDEX: 44.47 KG/M2 | HEIGHT: 63 IN | WEIGHT: 251 LBS

## 2023-03-17 DIAGNOSIS — K51.018 ULCERATIVE PANCOLITIS WITH OTHER COMPLICATION (HCC): Primary | ICD-10-CM

## 2023-03-17 NOTE — TELEPHONE ENCOUNTER
OV DB, UC s/p proctocolectomy 3/1/2023, BMI 44    Scheduled 5/1/2023 for Flex sig   At [facility] w/DB    paperwork handed to pt

## 2023-03-17 NOTE — ASSESSMENT & PLAN NOTE
Patient presents for follow-up after recent hospital visit  Patient underwent completion proctocolectomy with ileal pouch anal anastomosis and diverting loop ileostomy  She notes she has had some difficulties with pouching  This sounds about what would be expected given the difficult time in creation of her ileostomy  Seems like she is keeping up relatively well with her fluids but her urine output seems to be variable  I have encouraged p o  intake as well as continued care for stoma  Plan will be for follow-up flexible sigmoidoscopy in 4 weeks to evaluate her ileal pouch anal anastomosis with plan for ileostomy closure pending results

## 2023-03-17 NOTE — LETTER
Adrienne Velez [id number]        ------------------------------------------------------------------------------------------------------------    FLEXIBLE SIGMOIDOSCOPY WITH SEDATION INSTRUCTIONS  PLEASE NOTE…  AS OF JUNE 1, 2014, OUR OFFICE REQUIRES 50 HOURS NOTICE OF CANCELLATION/RESCHEDULE OF A PROCEDURE TO AVOID INCURRING A MISSED APPOINTMENT FEE  Your Sigmoidoscopy Procedure has been scheduled at:  [facility]  The Date and Time of your Procedure is: 5/1/2023  Patient is to have nothing to eat or drink after midnight  You are to report to the GI Lab ([facility] 1st Floor) ONE (1) HOUR PRIOR to your procedure  Special instructions may be needed if you are taking aspirin or any aspirin-containing medication  Check with your family physician  Check with your family doctor if you are taking Coumadin (a blood thinner), Plavix, Gingko biloba, Ginseng, Feverfew, and/or St  Lalo's Wort  We suggest stopping these for 3 days  If you are on DIABETIC MEDICATION (tablets or insulin) your doctor may make changes in your preparation  Take all medications usual unless otherwise instructed  As always, if you have any questions or concerns, feel free to call the office  Our  staff will be happy to connect you with one of the nurses to answer any questions or address any concerns you have regarding your procedure  Arrangements must be made for a responsible party to drive you home from the procedure  If you do not have a responsible family member or friend to drive you home, the procedure will not be done  Vast or a taxi is not acceptable  It is important you notify our office of any insurance changes prior to your procedure and, if necessary, supply us with referrals from your primary care physician
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
## 2023-03-28 DIAGNOSIS — E03.9 ACQUIRED HYPOTHYROIDISM: ICD-10-CM

## 2023-03-29 ENCOUNTER — TELEMEDICINE (OUTPATIENT)
Dept: INTERNAL MEDICINE CLINIC | Facility: CLINIC | Age: 53
End: 2023-03-29

## 2023-03-29 VITALS — BODY MASS INDEX: 44.3 KG/M2 | HEIGHT: 63 IN | TEMPERATURE: 99.6 F | WEIGHT: 250 LBS

## 2023-03-29 DIAGNOSIS — A08.4 VIRAL GASTROENTERITIS: Primary | ICD-10-CM

## 2023-03-29 RX ORDER — ONDANSETRON 4 MG/1
4 TABLET, FILM COATED ORAL EVERY 8 HOURS PRN
Qty: 20 TABLET | Refills: 0 | Status: SHIPPED | OUTPATIENT
Start: 2023-03-29

## 2023-03-29 RX ORDER — LEVOTHYROXINE SODIUM 0.1 MG/1
100 TABLET ORAL
Qty: 90 TABLET | Refills: 2 | Status: SHIPPED | OUTPATIENT
Start: 2023-03-29
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
## 2023-03-31 ENCOUNTER — TELEPHONE (OUTPATIENT)
Dept: INTERNAL MEDICINE CLINIC | Facility: CLINIC | Age: 53
End: 2023-03-31

## 2023-03-31 DIAGNOSIS — A09 INFECTIOUS DIARRHEA: Primary | ICD-10-CM

## 2023-03-31 RX ORDER — METRONIDAZOLE 500 MG/1
500 TABLET ORAL EVERY 8 HOURS SCHEDULED
Qty: 21 TABLET | Refills: 0 | Status: SHIPPED | OUTPATIENT
Start: 2023-03-31 | End: 2023-04-05

## 2023-03-31 NOTE — TELEPHONE ENCOUNTER
Patient had a virtual visit with you on 3/29 and is still not feeling any better       # [phone]

## 2023-04-03 ENCOUNTER — APPOINTMENT (EMERGENCY)
Dept: CT IMAGING | Facility: HOSPITAL | Age: 53
End: 2023-04-03

## 2023-04-03 ENCOUNTER — HOSPITAL ENCOUNTER (INPATIENT)
Facility: HOSPITAL | Age: 53
LOS: 2 days | Discharge: HOME HEALTH | End: 2023-04-05
Attending: EMERGENCY MEDICINE | Admitting: STUDENT IN AN ORGANIZED HEALTH CARE EDUCATION/TRAINING PROGRAM

## 2023-04-03 DIAGNOSIS — A08.4 VIRAL GASTROENTERITIS: ICD-10-CM

## 2023-04-03 DIAGNOSIS — K51.018 ULCERATIVE PANCOLITIS WITH OTHER COMPLICATION (HCC): ICD-10-CM

## 2023-04-03 DIAGNOSIS — R19.7 DIARRHEA, UNSPECIFIED TYPE: ICD-10-CM

## 2023-04-03 DIAGNOSIS — N17.9 ACUTE RENAL FAILURE (ARF) (HCC): Primary | ICD-10-CM

## 2023-04-03 PROBLEM — R11.2 NAUSEA VOMITING AND DIARRHEA: Status: ACTIVE | Noted: 2023-04-03

## 2023-04-03 PROBLEM — E87.1 HYPONATREMIA: Status: ACTIVE | Noted: 2023-04-03

## 2023-04-03 LAB
ALBUMIN SERPL BCP-MCNC: 4.1 G/DL (ref 3.5–5)
ALP SERPL-CCNC: 118 U/L (ref 34–104)
ALT SERPL W P-5'-P-CCNC: 22 U/L (ref 7–52)
ANION GAP SERPL CALCULATED.3IONS-SCNC: 15 MMOL/L (ref 4–13)
ANION GAP SERPL CALCULATED.3IONS-SCNC: 16 MMOL/L (ref 4–13)
ANION GAP SERPL CALCULATED.3IONS-SCNC: 19 MMOL/L (ref 4–13)
AST SERPL W P-5'-P-CCNC: 12 U/L (ref 13–39)
BACTERIA UR QL AUTO: ABNORMAL /HPF
BASOPHILS # BLD AUTO: 0.04 THOUSANDS/ÂΜL (ref 0–0.1)
BASOPHILS NFR BLD AUTO: 1 % (ref 0–1)
BILIRUB SERPL-MCNC: 0.42 MG/DL (ref 0.2–1)
BILIRUB UR QL STRIP: ABNORMAL
BUN SERPL-MCNC: 77 MG/DL (ref 5–25)
BUN SERPL-MCNC: 82 MG/DL (ref 5–25)
BUN SERPL-MCNC: 82 MG/DL (ref 5–25)
CALCIUM SERPL-MCNC: 8.9 MG/DL (ref 8.4–10.2)
CALCIUM SERPL-MCNC: 9 MG/DL (ref 8.4–10.2)
CALCIUM SERPL-MCNC: 9.9 MG/DL (ref 8.4–10.2)
CHLORIDE SERPL-SCNC: 93 MMOL/L (ref 96–108)
CHLORIDE SERPL-SCNC: 97 MMOL/L (ref 96–108)
CHLORIDE SERPL-SCNC: 99 MMOL/L (ref 96–108)
CLARITY UR: ABNORMAL
CO2 SERPL-SCNC: 14 MMOL/L (ref 21–32)
CO2 SERPL-SCNC: 15 MMOL/L (ref 21–32)
CO2 SERPL-SCNC: 16 MMOL/L (ref 21–32)
COLOR UR: YELLOW
CREAT SERPL-MCNC: 4.16 MG/DL (ref 0.6–1.3)
CREAT SERPL-MCNC: 4.32 MG/DL (ref 0.6–1.3)
CREAT SERPL-MCNC: 4.83 MG/DL (ref 0.6–1.3)
EOSINOPHIL # BLD AUTO: 0.02 THOUSAND/ÂΜL (ref 0–0.61)
EOSINOPHIL NFR BLD AUTO: 0 % (ref 0–6)
ERYTHROCYTE [DISTWIDTH] IN BLOOD BY AUTOMATED COUNT: 17.3 % (ref 11.6–15.1)
GFR SERPL CREATININE-BSD FRML MDRD: 11 ML/MIN/1.73SQ M
GFR SERPL CREATININE-BSD FRML MDRD: 11 ML/MIN/1.73SQ M
GFR SERPL CREATININE-BSD FRML MDRD: 9 ML/MIN/1.73SQ M
GLUCOSE SERPL-MCNC: 103 MG/DL (ref 65–140)
GLUCOSE SERPL-MCNC: 118 MG/DL (ref 65–140)
GLUCOSE SERPL-MCNC: 139 MG/DL (ref 65–140)
GLUCOSE UR STRIP-MCNC: NEGATIVE MG/DL
HCT VFR BLD AUTO: 39.6 % (ref 34.8–46.1)
HGB BLD-MCNC: 13.5 G/DL (ref 11.5–15.4)
HGB UR QL STRIP.AUTO: ABNORMAL
IMM GRANULOCYTES # BLD AUTO: 0.09 THOUSAND/UL (ref 0–0.2)
IMM GRANULOCYTES NFR BLD AUTO: 2 % (ref 0–2)
KETONES UR STRIP-MCNC: ABNORMAL MG/DL
LACTATE SERPL-SCNC: 1.7 MMOL/L (ref 0.5–2)
LEUKOCYTE ESTERASE UR QL STRIP: ABNORMAL
LIPASE SERPL-CCNC: 40 U/L (ref 11–82)
LYMPHOCYTES # BLD AUTO: 1.3 THOUSANDS/ÂΜL (ref 0.6–4.47)
LYMPHOCYTES NFR BLD AUTO: 22 % (ref 14–44)
MCH RBC QN AUTO: 28.2 PG (ref 26.8–34.3)
MCHC RBC AUTO-ENTMCNC: 34.1 G/DL (ref 31.4–37.4)
MCV RBC AUTO: 83 FL (ref 82–98)
MONOCYTES # BLD AUTO: 0.75 THOUSAND/ÂΜL (ref 0.17–1.22)
MONOCYTES NFR BLD AUTO: 12 % (ref 4–12)
NEUTROPHILS # BLD AUTO: 3.84 THOUSANDS/ÂΜL (ref 1.85–7.62)
NEUTS SEG NFR BLD AUTO: 63 % (ref 43–75)
NITRITE UR QL STRIP: NEGATIVE
NON-SQ EPI CELLS URNS QL MICRO: ABNORMAL /HPF
NRBC BLD AUTO-RTO: 0 /100 WBCS
PH UR STRIP.AUTO: 5.5 [PH]
PLATELET # BLD AUTO: 318 THOUSANDS/UL (ref 149–390)
PMV BLD AUTO: 11.5 FL (ref 8.9–12.7)
POTASSIUM SERPL-SCNC: 3.6 MMOL/L (ref 3.5–5.3)
POTASSIUM SERPL-SCNC: 3.8 MMOL/L (ref 3.5–5.3)
POTASSIUM SERPL-SCNC: 3.9 MMOL/L (ref 3.5–5.3)
PROT SERPL-MCNC: 10.1 G/DL (ref 6.4–8.4)
PROT UR STRIP-MCNC: ABNORMAL MG/DL
RBC # BLD AUTO: 4.79 MILLION/UL (ref 3.81–5.12)
RBC #/AREA URNS AUTO: ABNORMAL /HPF
SODIUM SERPL-SCNC: 127 MMOL/L (ref 135–147)
SODIUM SERPL-SCNC: 127 MMOL/L (ref 135–147)
SODIUM SERPL-SCNC: 130 MMOL/L (ref 135–147)
SP GR UR STRIP.AUTO: >=1.03
UROBILINOGEN UR QL STRIP.AUTO: 0.2 E.U./DL
WBC # BLD AUTO: 6.04 THOUSAND/UL (ref 4.31–10.16)
WBC #/AREA URNS AUTO: ABNORMAL /HPF

## 2023-04-03 RX ORDER — ACETAMINOPHEN 325 MG/1
650 TABLET ORAL EVERY 6 HOURS SCHEDULED
Status: DISCONTINUED | OUTPATIENT
Start: 2023-04-03 | End: 2023-04-05 | Stop reason: HOSPADM

## 2023-04-03 RX ORDER — SODIUM CHLORIDE, SODIUM GLUCONATE, SODIUM ACETATE, POTASSIUM CHLORIDE, MAGNESIUM CHLORIDE, SODIUM PHOSPHATE, DIBASIC, AND POTASSIUM PHOSPHATE .53; .5; .37; .037; .03; .012; .00082 G/100ML; G/100ML; G/100ML; G/100ML; G/100ML; G/100ML; G/100ML
125 INJECTION, SOLUTION INTRAVENOUS CONTINUOUS
Status: DISCONTINUED | OUTPATIENT
Start: 2023-04-03 | End: 2023-04-05 | Stop reason: HOSPADM

## 2023-04-03 RX ORDER — ONDANSETRON 2 MG/ML
4 INJECTION INTRAMUSCULAR; INTRAVENOUS EVERY 6 HOURS PRN
Status: DISCONTINUED | OUTPATIENT
Start: 2023-04-03 | End: 2023-04-05 | Stop reason: HOSPADM

## 2023-04-03 RX ORDER — LEVOTHYROXINE SODIUM 0.1 MG/1
100 TABLET ORAL
Status: DISCONTINUED | OUTPATIENT
Start: 2023-04-04 | End: 2023-04-05 | Stop reason: HOSPADM

## 2023-04-03 RX ORDER — LOPERAMIDE HYDROCHLORIDE 2 MG/1
2 CAPSULE ORAL 4 TIMES DAILY PRN
Status: DISCONTINUED | OUTPATIENT
Start: 2023-04-03 | End: 2023-04-05 | Stop reason: HOSPADM

## 2023-04-03 RX ORDER — HEPARIN SODIUM 5000 [USP'U]/ML
5000 INJECTION, SOLUTION INTRAVENOUS; SUBCUTANEOUS EVERY 8 HOURS SCHEDULED
Status: DISCONTINUED | OUTPATIENT
Start: 2023-04-03 | End: 2023-04-05 | Stop reason: HOSPADM

## 2023-04-03 RX ORDER — LORATADINE 10 MG/1
10 TABLET ORAL DAILY
Status: DISCONTINUED | OUTPATIENT
Start: 2023-04-04 | End: 2023-04-05 | Stop reason: HOSPADM

## 2023-04-03 RX ORDER — ONDANSETRON 2 MG/ML
4 INJECTION INTRAMUSCULAR; INTRAVENOUS ONCE
Status: COMPLETED | OUTPATIENT
Start: 2023-04-03 | End: 2023-04-03

## 2023-04-03 RX ADMIN — SODIUM CHLORIDE 1000 ML: 0.9 INJECTION, SOLUTION INTRAVENOUS at 13:20

## 2023-04-03 RX ADMIN — SODIUM CHLORIDE, SODIUM GLUCONATE, SODIUM ACETATE, POTASSIUM CHLORIDE AND MAGNESIUM CHLORIDE 125 ML/HR: 526; 502; 368; 37; 30 INJECTION, SOLUTION INTRAVENOUS at 17:12

## 2023-04-03 RX ADMIN — IOHEXOL 50 ML: 240 INJECTION, SOLUTION INTRATHECAL; INTRAVASCULAR; INTRAVENOUS; ORAL at 14:39

## 2023-04-03 RX ADMIN — ACETAMINOPHEN 650 MG: 325 TABLET ORAL at 23:52

## 2023-04-03 RX ADMIN — HEPARIN SODIUM 5000 UNITS: 5000 INJECTION INTRAVENOUS; SUBCUTANEOUS at 17:13

## 2023-04-03 RX ADMIN — ONDANSETRON 4 MG: 2 INJECTION INTRAMUSCULAR; INTRAVENOUS at 12:35

## 2023-04-03 RX ADMIN — HEPARIN SODIUM 5000 UNITS: 5000 INJECTION INTRAVENOUS; SUBCUTANEOUS at 21:01

## 2023-04-03 RX ADMIN — SODIUM CHLORIDE 500 ML: 0.9 INJECTION, SOLUTION INTRAVENOUS at 12:34
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
## 2023-04-03 NOTE — PLAN OF CARE
Problem: Potential for Falls  Goal: Patient will remain free of falls  Description: INTERVENTIONS:  - Educate patient/family on patient safety including physical limitations  - Instruct patient to call for assistance with activity   - Consult OT/PT to assist with strengthening/mobility   - Keep Call bell within reach  - Keep bed low and locked with side rails adjusted as appropriate  - Keep care items and personal belongings within reach  - Initiate and maintain comfort rounds  - Make Fall Risk Sign visible to staff  - Offer Toileting every 1 Hours, in advance of need  - Initiate/Maintain bed alarm  - Obtain necessary fall risk management equipment: bed  - Apply yellow socks and bracelet for high fall risk patients  - Consider moving patient to room near nurses station  Outcome: Progressing     Problem: PAIN - ADULT  Goal: Verbalizes/displays adequate comfort level or baseline comfort level  Description: Interventions:  - Encourage patient to monitor pain and request assistance  - Assess pain using appropriate pain scale  - Administer analgesics based on type and severity of pain and evaluate response  - Implement non-pharmacological measures as appropriate and evaluate response  - Consider cultural and social influences on pain and pain management  - Notify physician/advanced practitioner if interventions unsuccessful or patient reports new pain  Outcome: Progressing     Problem: INFECTION - ADULT  Goal: Absence or prevention of progression during hospitalization  Description: INTERVENTIONS:  - Assess and monitor for signs and symptoms of infection  - Monitor lab/diagnostic results  - Monitor all insertion sites, i e  indwelling lines, tubes, and drains  - Monitor endotracheal if appropriate and nasal secretions for changes in amount and color  - [place] appropriate cooling/warming therapies per order  - Administer medications as ordered  - Instruct and encourage patient and family to use good hand hygiene technique  - Identify and instruct in appropriate isolation precautions for identified infection/condition  Outcome: Progressing  Goal: Absence of fever/infection during neutropenic period  Description: INTERVENTIONS:  - Monitor WBC    Outcome: Progressing     Problem: SAFETY ADULT  Goal: Patient will remain free of falls  Description: INTERVENTIONS:  - Educate patient/family on patient safety including physical limitations  - Instruct patient to call for assistance with activity   - Consult OT/PT to assist with strengthening/mobility   - Keep Call bell within reach  - Keep bed low and locked with side rails adjusted as appropriate  - Keep care items and personal belongings within reach  - Initiate and maintain comfort rounds  - Make Fall Risk Sign visible to staff  - Offer Toileting every 1 Hours, in advance of need  - Initiate/Maintain bed alarm  - Obtain necessary fall risk management equipment: bed   - Apply yellow socks and bracelet for high fall risk patients  - Consider moving patient to room near nurses station  Outcome: Progressing  Goal: Maintain or return to baseline ADL function  Description: INTERVENTIONS:  -  Assess patient's ability to carry out ADLs; assess patient's baseline for ADL function and identify physical deficits which impact ability to perform ADLs (bathing, care of mouth/teeth, toileting, grooming, dressing, etc )  - Assess/evaluate cause of self-care deficits   - Assess range of motion  - Assess patient's mobility; develop plan if impaired  - Assess patient's need for assistive devices and provide as appropriate  - Encourage maximum independence but intervene and supervise when necessary  - Involve family in performance of ADLs  - Assess for home care needs following discharge   - Consider OT consult to assist with ADL evaluation and planning for discharge  - Provide patient education as appropriate  Outcome: Progressing  Goal: Maintains/Returns to pre admission functional level  Description: INTERVENTIONS:  - Perform BMAT or MOVE assessment daily    - Set and communicate daily mobility goal to care team and patient/family/caregiver  - Collaborate with rehabilitation services on mobility goals if consulted  - Perform Range of Motion 3 times a day  - Reposition patient every 2 hours  - Dangle patient 3 times a day  - Stand patient 3 times a day  - Ambulate patient 3 times a day  - Out of bed to chair 3 times a day   - Out of bed for meals 3 times a day  - Out of bed for toileting  - Record patient progress and toleration of activity level   Outcome: Progressing     Problem: DISCHARGE PLANNING  Goal: Discharge to home or other facility with appropriate resources  Description: INTERVENTIONS:  - Identify barriers to discharge w/patient and caregiver  - Arrange for needed discharge resources and transportation as appropriate  - Identify discharge learning needs (meds, wound care, etc )  - Arrange for interpretive services to assist at discharge as needed  - Refer to Case Management Department for coordinating discharge planning if the patient needs post-hospital services based on physician/advanced practitioner order or complex needs related to functional status, cognitive ability, or social support system  Outcome: Progressing     Problem: Knowledge Deficit  Goal: Patient/family/caregiver demonstrates understanding of disease process, treatment plan, medications, and discharge instructions  Description: Complete learning assessment and assess knowledge base    Interventions:  - Provide teaching at level of understanding  - Provide teaching via preferred learning methods  Outcome: Progressing     Problem: GASTROINTESTINAL - ADULT  Goal: Minimal or absence of nausea and/or vomiting  Description: INTERVENTIONS:  - Administer IV fluids if ordered to ensure adequate hydration  - Maintain NPO status until nausea and vomiting are resolved  - Nasogastric tube if ordered  - Administer ordered antiemetic medications as needed  - Provide nonpharmacologic comfort measures as appropriate  - Advance diet as tolerated, if ordered  - Consider nutrition services referral to assist patient with adequate nutrition and appropriate food choices  Outcome: Progressing  Goal: Maintains or returns to baseline bowel function  Description: INTERVENTIONS:  - Assess bowel function  - Encourage oral fluids to ensure adequate hydration  - Administer IV fluids if ordered to ensure adequate hydration  - Administer ordered medications as needed  - Encourage mobilization and activity  - Consider nutritional services referral to assist patient with adequate nutrition and appropriate food choices  Outcome: Progressing  Goal: Maintains adequate nutritional intake  Description: INTERVENTIONS:  - Monitor percentage of each meal consumed  - Identify factors contributing to decreased intake, treat as appropriate  - Assist with meals as needed  - Monitor I&O, weight, and lab values if indicated  - Obtain nutrition services referral as needed  Outcome: Progressing  Goal: Establish and maintain optimal ostomy function  Description: INTERVENTIONS:  - Assess bowel function  - Encourage oral fluids to ensure adequate hydration  - Administer IV fluids if ordered to ensure adequate hydration   - Administer ordered medications as needed  - Encourage mobilization and activity  - Nutrition services referral to assist patient with appropriate food choices  - Assess stoma site  - Consider wound care consult   Outcome: Progressing  Goal: Oral mucous membranes remain intact  Description: INTERVENTIONS  - Assess oral mucosa and hygiene practices  - Implement preventative oral hygiene regimen  - Implement oral medicated treatments as ordered  - Initiate Nutrition services referral as needed  Outcome: Progressing
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
## 2023-04-03 NOTE — ASSESSMENT & PLAN NOTE
"Presenting for evaluation of 2-week course of nausea, vomiting and diarrhea  Endorses poor oral intake  Was prescribed antibiotics by PCP  CT imaging revealed \"Soft tissue and gas in the presacral space adjacent to the rectal stump  This is likely likely related to prior surgery though clinical significant is uncertain given lack of prior imaging  Collection related to anastomotic leak cannot be excluded and correlation with prior postoperative imaging recommended  \"  Case was discussed with colorectal surgery by ED physician, represent postoperative changes no need for transfer    -Zofran  -Will hold antibiotics at this time  -Imodium  "

## 2023-04-03 NOTE — ASSESSMENT & PLAN NOTE
Patient presenting with acute renal failure likely secondary to poor oral intake  Cr on admission 4 8 baseline around 0 7    -IV fluids  -Nephrology consulted, appreciate recommendations  -Repeat BMP in a m

## 2023-04-04 PROBLEM — E87.29 HIGH ANION GAP METABOLIC ACIDOSIS: Status: ACTIVE | Noted: 2023-04-04

## 2023-04-04 LAB
ANION GAP SERPL CALCULATED.3IONS-SCNC: 15 MMOL/L (ref 4–13)
ANION GAP SERPL CALCULATED.3IONS-SCNC: 16 MMOL/L (ref 4–13)
BUN SERPL-MCNC: 64 MG/DL (ref 5–25)
BUN SERPL-MCNC: 71 MG/DL (ref 5–25)
C DIFF TOX GENS STL QL NAA+PROBE: NEGATIVE
CALCIUM SERPL-MCNC: 8.7 MG/DL (ref 8.4–10.2)
CALCIUM SERPL-MCNC: 8.9 MG/DL (ref 8.4–10.2)
CAMPYLOBACTER DNA SPEC NAA+PROBE: NORMAL
CHLORIDE SERPL-SCNC: 100 MMOL/L (ref 96–108)
CHLORIDE SERPL-SCNC: 101 MMOL/L (ref 96–108)
CO2 SERPL-SCNC: 15 MMOL/L (ref 21–32)
CO2 SERPL-SCNC: 15 MMOL/L (ref 21–32)
CREAT SERPL-MCNC: 3.04 MG/DL (ref 0.6–1.3)
CREAT SERPL-MCNC: 3.41 MG/DL (ref 0.6–1.3)
CREAT UR-MCNC: 85.3 MG/DL
CREAT UR-MCNC: 85.3 MG/DL
ERYTHROCYTE [DISTWIDTH] IN BLOOD BY AUTOMATED COUNT: 17.2 % (ref 11.6–15.1)
GFR SERPL CREATININE-BSD FRML MDRD: 14 ML/MIN/1.73SQ M
GFR SERPL CREATININE-BSD FRML MDRD: 17 ML/MIN/1.73SQ M
GLUCOSE SERPL-MCNC: 101 MG/DL (ref 65–140)
GLUCOSE SERPL-MCNC: 105 MG/DL (ref 65–140)
HCT VFR BLD AUTO: 36.3 % (ref 34.8–46.1)
HGB BLD-MCNC: 12.1 G/DL (ref 11.5–15.4)
MAGNESIUM SERPL-MCNC: 2.3 MG/DL (ref 1.9–2.7)
MCH RBC QN AUTO: 27.5 PG (ref 26.8–34.3)
MCHC RBC AUTO-ENTMCNC: 33.3 G/DL (ref 31.4–37.4)
MCV RBC AUTO: 83 FL (ref 82–98)
MICROALBUMIN UR-MCNC: 8.1 MG/L (ref 0–20)
MICROALBUMIN/CREAT 24H UR: 9 MG/G CREATININE (ref 0–30)
PLATELET # BLD AUTO: 302 THOUSANDS/UL (ref 149–390)
PMV BLD AUTO: 11.2 FL (ref 8.9–12.7)
POTASSIUM SERPL-SCNC: 3.4 MMOL/L (ref 3.5–5.3)
POTASSIUM SERPL-SCNC: 3.5 MMOL/L (ref 3.5–5.3)
PROT UR-MCNC: 23 MG/DL
PROT/CREAT UR: 0.27 MG/G{CREAT} (ref 0–0.1)
RBC # BLD AUTO: 4.4 MILLION/UL (ref 3.81–5.12)
SALMONELLA DNA SPEC QL NAA+PROBE: NORMAL
SHIGA TOXIN STX GENE SPEC NAA+PROBE: NORMAL
SHIGELLA DNA SPEC QL NAA+PROBE: NORMAL
SODIUM 24H UR-SCNC: <5 MOL/L
SODIUM SERPL-SCNC: 130 MMOL/L (ref 135–147)
SODIUM SERPL-SCNC: 132 MMOL/L (ref 135–147)
WBC # BLD AUTO: 5.93 THOUSAND/UL (ref 4.31–10.16)

## 2023-04-04 RX ORDER — POTASSIUM CHLORIDE 20 MEQ/1
20 TABLET, EXTENDED RELEASE ORAL ONCE
Status: COMPLETED | OUTPATIENT
Start: 2023-04-04 | End: 2023-04-04

## 2023-04-04 RX ORDER — SODIUM BICARBONATE 650 MG/1
1300 TABLET ORAL
Status: DISCONTINUED | OUTPATIENT
Start: 2023-04-04 | End: 2023-04-05 | Stop reason: HOSPADM

## 2023-04-04 RX ADMIN — SODIUM CHLORIDE, SODIUM GLUCONATE, SODIUM ACETATE, POTASSIUM CHLORIDE AND MAGNESIUM CHLORIDE 125 ML/HR: 526; 502; 368; 37; 30 INJECTION, SOLUTION INTRAVENOUS at 03:04

## 2023-04-04 RX ADMIN — POTASSIUM CHLORIDE 20 MEQ: 1500 TABLET, EXTENDED RELEASE ORAL at 11:48

## 2023-04-04 RX ADMIN — LOPERAMIDE HYDROCHLORIDE 2 MG: 2 CAPSULE ORAL at 07:54

## 2023-04-04 RX ADMIN — HEPARIN SODIUM 5000 UNITS: 5000 INJECTION INTRAVENOUS; SUBCUTANEOUS at 05:50

## 2023-04-04 RX ADMIN — LEVOTHYROXINE SODIUM 100 MCG: 0.1 TABLET ORAL at 05:50

## 2023-04-04 RX ADMIN — SODIUM CHLORIDE, SODIUM GLUCONATE, SODIUM ACETATE, POTASSIUM CHLORIDE AND MAGNESIUM CHLORIDE 125 ML/HR: 526; 502; 368; 37; 30 INJECTION, SOLUTION INTRAVENOUS at 22:27

## 2023-04-04 RX ADMIN — ACETAMINOPHEN 650 MG: 325 TABLET ORAL at 11:48

## 2023-04-04 RX ADMIN — LORATADINE 10 MG: 10 TABLET ORAL at 08:03

## 2023-04-04 RX ADMIN — HEPARIN SODIUM 5000 UNITS: 5000 INJECTION INTRAVENOUS; SUBCUTANEOUS at 13:19

## 2023-04-04 RX ADMIN — HEPARIN SODIUM 5000 UNITS: 5000 INJECTION INTRAVENOUS; SUBCUTANEOUS at 21:08

## 2023-04-04 RX ADMIN — ONDANSETRON 4 MG: 2 INJECTION INTRAMUSCULAR; INTRAVENOUS at 07:55

## 2023-04-04 RX ADMIN — SODIUM BICARBONATE 1300 MG: 650 TABLET ORAL at 11:48

## 2023-04-04 RX ADMIN — SODIUM BICARBONATE 1300 MG: 650 TABLET ORAL at 17:47

## 2023-04-04 RX ADMIN — ACETAMINOPHEN 650 MG: 325 TABLET ORAL at 17:47

## 2023-04-04 RX ADMIN — ACETAMINOPHEN 650 MG: 325 TABLET ORAL at 05:50

## 2023-04-04 NOTE — ASSESSMENT & PLAN NOTE
· In the setting of diarrhea and acute renal failure  · Continue bicarb per nephrology recommendations

## 2023-04-04 NOTE — UTILIZATION REVIEW
NOTIFICATION OF INPATIENT ADMISSION   AUTHORIZATION REQUEST   SERVICING FACILITY:   [facility]  [address] [facility] [place], [address]  Tax ID: [id number]  NPI: [id number]   ATTENDING PROVIDER:  Attending Name and NPI#: Steve Medina [[id number]]  Address: [address] [facility] [place], [address]  Phone: [phone]     ADMISSION INFORMATION:  Place of Service: Inpatient [address] [facility]  [address]  Place of Service Code: 21  Inpatient Admission Date/Time: 4/3/23  4:10 PM  Discharge Date/Time: No discharge date for patient encounter  Admitting Diagnosis Code/Description:  Nausea [R11 0]  Vomiting [R11 10]  Fever [R50 9]  Acute renal failure (ARF) ([facility] [address] ) [N17 9]     UTILIZATION REVIEW CONTACT:  Omayra Zarate Utilization   Network Utilization Review Department  Phone: [phone]  Fax [phone]  Email: [email or web address]  Contact for approvals/pending authorizations, clinical reviews, and discharge  PHYSICIAN ADVISORY SERVICES:  Medical Necessity Denial & [employer] Review  Phone: [phone]  Fax: [phone]  Email: [email or web address]
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
## 2023-04-04 NOTE — ASSESSMENT & PLAN NOTE
· Presented with acute renal failure in the setting of poor oral intake and significant diarrhea  · This is likely prerenal in nature  · Baseline creatinine is around 0 7  · Creatinine was 4 8 on admission  · Continue IV fluid resuscitation  · Nephrology following

## 2023-04-04 NOTE — PLAN OF CARE
Problem: Potential for Falls  Goal: Patient will remain free of falls  Description: INTERVENTIONS:  - Educate patient/family on patient safety including physical limitations  - Instruct patient to call for assistance with activity   - Consult OT/PT to assist with strengthening/mobility   - Keep Call bell within reach  - Keep bed low and locked with side rails adjusted as appropriate  - Keep care items and personal belongings within reach  - Initiate and maintain comfort rounds  - Make Fall Risk Sign visible to staff  - Offer Toileting every  Hours, in advance of need  - Initiate/Maintainalarm  - Obtain necessary fall risk management equipment:   - Apply yellow socks and bracelet for high fall risk patients  - Consider moving patient to room near nurses station  Outcome: Progressing     Problem: PAIN - ADULT  Goal: Verbalizes/displays adequate comfort level or baseline comfort level  Description: Interventions:  - Encourage patient to monitor pain and request assistance  - Assess pain using appropriate pain scale  - Administer analgesics based on type and severity of pain and evaluate response  - Implement non-pharmacological measures as appropriate and evaluate response  - Consider cultural and social influences on pain and pain management  - Notify physician/advanced practitioner if interventions unsuccessful or patient reports new pain  Outcome: Progressing     Problem: INFECTION - ADULT  Goal: Absence or prevention of progression during hospitalization  Description: INTERVENTIONS:  - Assess and monitor for signs and symptoms of infection  - Monitor lab/diagnostic results  - Monitor all insertion sites, i e  indwelling lines, tubes, and drains  - Monitor endotracheal if appropriate and nasal secretions for changes in amount and color  - [place] appropriate cooling/warming therapies per order  - Administer medications as ordered  - Instruct and encourage patient and family to use good hand hygiene technique  - Identify and instruct in appropriate isolation precautions for identified infection/condition  Outcome: Progressing  Goal: Absence of fever/infection during neutropenic period  Description: INTERVENTIONS:  - Monitor WBC    Outcome: Progressing     Problem: SAFETY ADULT  Goal: Patient will remain free of falls  Description: INTERVENTIONS:  - Educate patient/family on patient safety including physical limitations  - Instruct patient to call for assistance with activity   - Consult OT/PT to assist with strengthening/mobility   - Keep Call bell within reach  - Keep bed low and locked with side rails adjusted as appropriate  - Keep care items and personal belongings within reach  - Initiate and maintain comfort rounds  - Make Fall Risk Sign visible to staff  - Offer Toileting every  Hours, in advance of need  - Initiate/Maintainalarm  - Obtain necessary fall risk management equipment:   - Apply yellow socks and bracelet for high fall risk patients  - Consider moving patient to room near nurses station  Outcome: Progressing  Goal: Maintain or return to baseline ADL function  Description: INTERVENTIONS:  -  Assess patient's ability to carry out ADLs; assess patient's baseline for ADL function and identify physical deficits which impact ability to perform ADLs (bathing, care of mouth/teeth, toileting, grooming, dressing, etc )  - Assess/evaluate cause of self-care deficits   - Assess range of motion  - Assess patient's mobility; develop plan if impaired  - Assess patient's need for assistive devices and provide as appropriate  - Encourage maximum independence but intervene and supervise when necessary  - Involve family in performance of ADLs  - Assess for home care needs following discharge   - Consider OT consult to assist with ADL evaluation and planning for discharge  - Provide patient education as appropriate  Outcome: Progressing  Goal: Maintains/Returns to pre admission functional level  Description: INTERVENTIONS:  - Perform BMAT or MOVE assessment daily    - Set and communicate daily mobility goal to care team and patient/family/caregiver  - Collaborate with rehabilitation services on mobility goals if consulted  - Perform Range of Motion times a day  - Reposition patient every  hours  - Dangle patient times a day  - Stand patient  times a day  - Ambulate patient times a day  - Out of bed to chair  times a day   - Out of bed for meals times a day  - Out of bed for toileting  - Record patient progress and toleration of activity level   Outcome: Progressing     Problem: DISCHARGE PLANNING  Goal: Discharge to home or other facility with appropriate resources  Description: INTERVENTIONS:  - Identify barriers to discharge w/patient and caregiver  - Arrange for needed discharge resources and transportation as appropriate  - Identify discharge learning needs (meds, wound care, etc )  - Arrange for interpretive services to assist at discharge as needed  - Refer to Case Management Department for coordinating discharge planning if the patient needs post-hospital services based on physician/advanced practitioner order or complex needs related to functional status, cognitive ability, or social support system  Outcome: Progressing     Problem: Knowledge Deficit  Goal: Patient/family/caregiver demonstrates understanding of disease process, treatment plan, medications, and discharge instructions  Description: Complete learning assessment and assess knowledge base    Interventions:  - Provide teaching at level of understanding  - Provide teaching via preferred learning methods  Outcome: Progressing     Problem: GASTROINTESTINAL - ADULT  Goal: Minimal or absence of nausea and/or vomiting  Description: INTERVENTIONS:  - Administer IV fluids if ordered to ensure adequate hydration  - Maintain NPO status until nausea and vomiting are resolved  - Nasogastric tube if ordered  - Administer ordered antiemetic medications as needed  - Provide nonpharmacologic comfort measures as appropriate  - Advance diet as tolerated, if ordered  - Consider nutrition services referral to assist patient with adequate nutrition and appropriate food choices  Outcome: Progressing  Goal: Maintains or returns to baseline bowel function  Description: INTERVENTIONS:  - Assess bowel function  - Encourage oral fluids to ensure adequate hydration  - Administer IV fluids if ordered to ensure adequate hydration  - Administer ordered medications as needed  - Encourage mobilization and activity  - Consider nutritional services referral to assist patient with adequate nutrition and appropriate food choices  Outcome: Progressing  Goal: Maintains adequate nutritional intake  Description: INTERVENTIONS:  - Monitor percentage of each meal consumed  - Identify factors contributing to decreased intake, treat as appropriate  - Assist with meals as needed  - Monitor I&O, weight, and lab values if indicated  - Obtain nutrition services referral as needed  Outcome: Progressing  Goal: Establish and maintain optimal ostomy function  Description: INTERVENTIONS:  - Assess bowel function  - Encourage oral fluids to ensure adequate hydration  - Administer IV fluids if ordered to ensure adequate hydration   - Administer ordered medications as needed  - Encourage mobilization and activity  - Nutrition services referral to assist patient with appropriate food choices  - Assess stoma site  - Consider wound care consult   Outcome: Progressing  Goal: Oral mucous membranes remain intact  Description: INTERVENTIONS  - Assess oral mucosa and hygiene practices  - Implement preventative oral hygiene regimen  - Implement oral medicated treatments as ordered  - Initiate Nutrition services referral as needed  Outcome: Progressing
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
## 2023-04-04 NOTE — UTILIZATION REVIEW
NOTIFICATION OF INPATIENT ADMISSION   AUTHORIZATION REQUEST   SERVIC[facility] FACILITY[facility]   [facility]  [address] [facility] , [address] [facility], [address]  Tax ID: [id number]  NPI: [id number]   ATTENDING PROVIDER:  Attending Name and NPI#: Antonio Vogel [[id number]]  Address: [address] [facility] , [address] [facility], [address]  Phone: [phone]     ADMISSION INFORMATION:  Place of Service: Inpatient [address] [facility]  [address]  Place of Service Code: 21  Inpatient Admission Date/Time: 4/3/23  4:10 PM  Discharge Date/Time: No discharge date for patient encounter  Admitting Diagnosis Code/Description:  Nausea [R11 0]  Vomiting [R11 10]  Fever [R50 9]  Acute renal failure (ARF) ([facility] [address] ) [N17 9]     UTILIZATION REVIEW CONTACT:  Celena Cole Utilization   Network Utilization Review Department  Phone: [phone]  Fax [phone]  Email: [email or web address]  Contact for approvals/pending authorizations, clinical reviews, and discharge  PHYSICIAN ADVISORY SERVICES:  Medical Necessity Denial & [facility]sh[facility]-ps-F[facility] Review  Phone: [phone]  Fax: [phone]  Email: [email or web address]"

## 2023-04-04 NOTE — PROGRESS NOTES
[facility]  Progress Note  Name: Hoda Sharpe  MRN: [id number]  Unit/Bed#: -01 I Date of Admission: 4/3/2023   Date of Service: 4/4/2023 I Hospital Day: 1    Assessment/Plan   * Acute renal failure (ARF) ([facility] [address] )  Assessment & Plan  · Presented with acute renal failure in the setting of poor oral intake and significant diarrhea  · This is likely prerenal in nature  · Baseline creatinine is around 0 7  · Creatinine was 4 8 on admission  · Continue IV fluid resuscitation  · Nephrology following    Diarrhea  Assessment & Plan  · In the setting of a patient with history of ulcerative colitis and recent surgery on 3/1  · Total proctocolectomy with ileoanal pouch, diverting loop ileostomy with Dr Julius Ríos  · Stool studies including C  difficile negative  · CT abd/pelvis (4/3): No evidence of bowel obstruction  Soft tissue and gas in the presacral space adjacent to the rectal stump  This is likely likely related to prior surgery though clinical significant is uncertain given lack of prior imaging  Collection related to anastomotic leak cannot be excluded and correlation with prior postoperative imaging recommended  Fat stranding in the anterior abdominal wall and underlying extraperitoneal fat, also likely postoperative though this cannot be confirmed without prior imaging    · Continue as needed Imodium  · Consult general surgery    High anion gap metabolic acidosis  Assessment & Plan  · In the setting of diarrhea and acute renal failure  · Continue bicarb per nephrology recommendations    Hyponatremia  Assessment & Plan  · In the setting of significant diarrhea and poor oral intake  · Sodium of 127 on admission has improved to 130  · Continue IV fluid resuscitation  · Follow-up a m  lab    Acquired hypothyroidism  Assessment & Plan  · Continue levothyroxine 100 mcg daily         VTE Pharmacologic Prophylaxis: VTE Score: 3 Moderate Risk (Score 3-4) - Pharmacological DVT Prophylaxis Ordered: heparin drip  Patient Centered Rounds: I performed bedside rounds with nursing staff today  Discussions with Specialists or Other Care Team Provider: Nephrology, Nursing, and CM    Education and Discussions with Family / Patient: Patient updated on plan of care  Total Time Spent on Date of Encounter in care of patient: 45 minutes This time was spent on one or more of the following: performing physical exam; counseling and coordination of care; obtaining or reviewing history; documenting in the medical record; reviewing/ordering tests, medications or procedures; communicating with other healthcare professionals and discussing with patient's family/caregivers  Current Length of Stay: 1 day(s)  Current Patient Status: Inpatient   Certification Statement: The patient will continue to require additional inpatient hospital stay due to Acute renal failure due to diarrhea and poor oral intake requiring IV fluid resuscitation  Discharge Plan: To be determined based on clinical improvement  Code Status: Level 1 - Full Code    Subjective:   Patient is sitting up in bedside chair without any acute complaints  She continues to report significant diarrhea with little improvement since admission  No significant overnight events reported by nursing  Objective:     Vitals:   Temp (24hrs), Av 2 °F (36 8 °C), Min:98 °F (36 7 °C), Max:98 4 °F (36 9 °C)    Temp:  [98 °F (36 7 °C)-98 4 °F (36 9 °C)] 98 4 °F (36 9 °C)  HR:  [76-87] 76  Resp:  [17-20] 20  BP: ()/(56-78) 107/56  SpO2:  [95 %-100 %] 99 %  Body mass index is 42 49 kg/m²  Input and Output Summary (last 24 hours): Intake/Output Summary (Last 24 hours) at 2023 1546  Last data filed at 2023 1300  Gross per 24 hour   Intake 805 ml   Output 1220 ml   Net -415 ml       Physical Exam:   Physical Exam  Vitals and nursing note reviewed  Constitutional:       General: She is not in acute distress  Appearance: She is well-developed   She is obese  HENT:      Head: Normocephalic and atraumatic  Mouth/Throat:      Mouth: Mucous membranes are moist       Pharynx: Oropharynx is clear  Eyes:      Extraocular Movements: Extraocular movements intact  Conjunctiva/sclera: Conjunctivae normal    Cardiovascular:      Rate and Rhythm: Normal rate and regular rhythm  Pulses: Normal pulses  Heart sounds: Normal heart sounds  No murmur heard  Pulmonary:      Effort: Pulmonary effort is normal  No respiratory distress  Breath sounds: Normal breath sounds  Abdominal:      General: Bowel sounds are normal  There is no distension  Palpations: Abdomen is soft  Tenderness: There is no abdominal tenderness  Comments: Ostomy in place   Musculoskeletal:         General: No swelling  Normal range of motion  Cervical back: Normal range of motion and neck supple  Skin:     General: Skin is warm and dry  Capillary Refill: Capillary refill takes less than 2 seconds  Neurological:      General: No focal deficit present  Mental Status: She is alert and oriented to person, place, and time  Mental status is at baseline     Psychiatric:         Mood and Affect: Mood normal          Behavior: Behavior normal          Judgment: Judgment normal           Additional Data:     Labs:  Results from last 7 days   Lab Units 04/04/23  0610 04/03/23  1225   WBC Thousand/uL 5 93 6 04   HEMOGLOBIN g/dL 12 1 13 5   HEMATOCRIT % 36 3 39 6   PLATELETS Thousands/uL 302 318   NEUTROS PCT %  --  63   LYMPHS PCT %  --  22   MONOS PCT %  --  12   EOS PCT %  --  0     Results from last 7 days   Lab Units 04/04/23  0610 04/03/23  1608 04/03/23  1225   SODIUM mmol/L 132*   < > 127*   POTASSIUM mmol/L 3 5   < > 3 8   CHLORIDE mmol/L 101   < > 93*   CO2 mmol/L 15*   < > 15*   BUN mg/dL 64*   < > 82*   CREATININE mg/dL 3 04*   < > 4 83*   ANION GAP mmol/L 16*   < > 19*   CALCIUM mg/dL 8 9   < > 9 9   ALBUMIN g/dL  --   --  4 1   TOTAL BILIRUBIN mg/dL  --   --  0 42   ALK PHOS U/L  --   --  118*   ALT U/L  --   --  22   AST U/L  --   --  12*   GLUCOSE RANDOM mg/dL 105   < > 139    < > = values in this interval not displayed  Results from last 7 days   Lab Units 04/03/23  1225   LACTIC ACID mmol/L 1 7       Lines/Drains:  Invasive Devices     Peripheral Intravenous Line  Duration           Peripheral IV 04/03/23 Left Antecubital 1 day          Drain  Duration           Closed/Suction Drain Right Abdomen Bulb 10 Fr  34 days    Ileostomy Loop LLQ 34 days                Imaging: Reviewed radiology reports from this admission including: abdominal/pelvic CT    Recent Cultures (last 7 days):   Results from last 7 days   Lab Units 04/03/23  2301 04/03/23  1905 04/03/23  1225   BLOOD CULTURE  Received in Microbiology Lab  Culture in Progress  --  Received in Microbiology Lab  Culture in Progress  C DIFF TOXIN B BY PCR   --  Negative  --        Last 24 Hours Medication List:   Current Facility-Administered Medications   Medication Dose Route Frequency Provider Last Rate   • acetaminophen  650 mg Oral Q6H [address] Miguel Sim DO     • heparin (porcine)  5,000 Units Subcutaneous [facility] Miguel Sim DO     • levothyroxine  100 mcg Oral Early Morning Miguel Sim DO     • loperamide  2 mg Oral 4x Daily PRN Miguel Sim DO     • loratadine  10 mg Oral Daily Miguel Sim DO     • multi-electrolyte  125 mL/hr Intravenous Continuous Miguel Sim  mL/hr (04/04/23 0304)   • ondansetron  4 mg Intravenous Q6H PRN Miguel Sim DO     • sodium bicarbonate  1,300 mg Oral BID after meals BRAYAN Hall          Today, Patient Was Seen By: Terence Licona DO    **Please Note: This note may have been constructed using a voice recognition system  **

## 2023-04-04 NOTE — ASSESSMENT & PLAN NOTE
· In the setting of significant diarrhea and poor oral intake  · Sodium of 127 on admission has improved to 130  · Continue IV fluid resuscitation  · Follow-up a m  lab

## 2023-04-04 NOTE — ASSESSMENT & PLAN NOTE
· In the setting of a patient with history of ulcerative colitis and recent surgery on 3/1  · Total proctocolectomy with ileoanal pouch, diverting loop ileostomy with Dr Miller Apt  · Stool studies including C  difficile negative  · CT abd/pelvis (4/3): No evidence of bowel obstruction  Soft tissue and gas in the presacral space adjacent to the rectal stump  This is likely likely related to prior surgery though clinical significant is uncertain given lack of prior imaging  Collection related to anastomotic leak cannot be excluded and correlation with prior postoperative imaging recommended  Fat stranding in the anterior abdominal wall and underlying extraperitoneal fat, also likely postoperative though this cannot be confirmed without prior imaging    · Continue as needed Imodium  · Consult general surgery

## 2023-04-04 NOTE — NUTRITION
"   04/04/23 1506   Biochemical Data,Medical Tests, and Procedures   Biochemical Data/Medical Tests/Procedures Lab values reviewed; Meds reviewed   Labs (Comment) 4/4/23 Na 132, BUN 64, creat 3 04   Meds (Comment) heparin, levothyroxine, sodium bicarbonate, plasmalyte   Nutrition-Focused Physical Exam   Nutrition-Focused Physical Exam Findings RN skin assessment reviewed;Edema; No skin issues documented   Nutrition-Focused Physical Exam Findings trace BL LE edema, appears adequately nourished   Current PO Intake   Current Diet Order Clear Liquids   Current Meal Intake Suboptimal, but improving   Estimated calorie intake compared to estimated need Nutrient needs not met at this time  PES Statement   Oral or Nutritional Support Intake (2) Inadequate oral intake NI-2 1   Related to Other (Comment)  (clinical condition)   As evidenced by: Intake < estimated needs;Per patient/family interview   Recommendations/Interventions   Summary High BMI 42 49  Presents with nausea, vomiting, and diarrhea  Decreased PO intake noted upon admission, \"taking in less than 12 ounces of water a day  \" Past medial history significant for recent proctocolectomy for ulcerative colitis, hypothyroidism, obesity  Weight history reviewed  Noted significant 49# weight loss in 3 months (17% body weight), 36# in 6 months (13% body weight)  Ordered for Clear Liquid diet  Pt reports here appetite is increasing  She is tolerating clear liquid diet at present  At home, she has 2 meals daily with snacks  Proctocolectomy procedure with ileostomy creation to address ulcerative colitis March 1st 2023  Pt reports tolerating pre-operation diet as post-op until recent bout of illness  States recent weight loss has not been intentional however she is happy about it  She has no nutrition questions at this time  Recommend advancing diet to surgical soft as clinically indicated  RD to follow     Interventions/Recommendations Diet to advance;Monitor I & O's " Education Assessment   Education Education not indicated at this time   Patient Nutrition Goals   Goal Adequate hydration; Adequate intake;Meet PO needs

## 2023-04-05 ENCOUNTER — TELEPHONE (OUTPATIENT)
Dept: OBGYN CLINIC | Facility: HOSPITAL | Age: 53
End: 2023-04-05

## 2023-04-05 ENCOUNTER — TRANSITIONAL CARE MANAGEMENT (OUTPATIENT)
Dept: INTERNAL MEDICINE CLINIC | Facility: CLINIC | Age: 53
End: 2023-04-05

## 2023-04-05 ENCOUNTER — TELEPHONE (OUTPATIENT)
Dept: OTHER | Facility: HOSPITAL | Age: 53
End: 2023-04-05

## 2023-04-05 VITALS
DIASTOLIC BLOOD PRESSURE: 61 MMHG | SYSTOLIC BLOOD PRESSURE: 102 MMHG | HEIGHT: 63 IN | RESPIRATION RATE: 17 BRPM | BODY MASS INDEX: 42.5 KG/M2 | OXYGEN SATURATION: 95 % | TEMPERATURE: 97.9 F | WEIGHT: 239.86 LBS | HEART RATE: 69 BPM

## 2023-04-05 LAB
ALBUMIN SERPL BCP-MCNC: 3.1 G/DL (ref 3.5–5)
ALP SERPL-CCNC: 82 U/L (ref 34–104)
ALT SERPL W P-5'-P-CCNC: 12 U/L (ref 7–52)
ANION GAP SERPL CALCULATED.3IONS-SCNC: 10 MMOL/L (ref 4–13)
AST SERPL W P-5'-P-CCNC: 9 U/L (ref 13–39)
BILIRUB SERPL-MCNC: 0.36 MG/DL (ref 0.2–1)
BUN SERPL-MCNC: 43 MG/DL (ref 5–25)
CALCIUM ALBUM COR SERPL-MCNC: 9.1 MG/DL (ref 8.3–10.1)
CALCIUM SERPL-MCNC: 8.4 MG/DL (ref 8.4–10.2)
CHLORIDE SERPL-SCNC: 103 MMOL/L (ref 96–108)
CO2 SERPL-SCNC: 23 MMOL/L (ref 21–32)
CREAT SERPL-MCNC: 1.74 MG/DL (ref 0.6–1.3)
ERYTHROCYTE [DISTWIDTH] IN BLOOD BY AUTOMATED COUNT: 17.3 % (ref 11.6–15.1)
GFR SERPL CREATININE-BSD FRML MDRD: 33 ML/MIN/1.73SQ M
GLUCOSE SERPL-MCNC: 96 MG/DL (ref 65–140)
HCT VFR BLD AUTO: 31.2 % (ref 34.8–46.1)
HGB BLD-MCNC: 10.5 G/DL (ref 11.5–15.4)
MAGNESIUM SERPL-MCNC: 2.3 MG/DL (ref 1.9–2.7)
MCH RBC QN AUTO: 27.9 PG (ref 26.8–34.3)
MCHC RBC AUTO-ENTMCNC: 33.7 G/DL (ref 31.4–37.4)
MCV RBC AUTO: 83 FL (ref 82–98)
PHOSPHATE SERPL-MCNC: 2.8 MG/DL (ref 2.7–4.5)
PLATELET # BLD AUTO: 226 THOUSANDS/UL (ref 149–390)
PMV BLD AUTO: 10.9 FL (ref 8.9–12.7)
POTASSIUM SERPL-SCNC: 3.1 MMOL/L (ref 3.5–5.3)
PROT SERPL-MCNC: 7.2 G/DL (ref 6.4–8.4)
RBC # BLD AUTO: 3.77 MILLION/UL (ref 3.81–5.12)
SODIUM SERPL-SCNC: 136 MMOL/L (ref 135–147)
WBC # BLD AUTO: 5.15 THOUSAND/UL (ref 4.31–10.16)

## 2023-04-05 RX ORDER — LOPERAMIDE HYDROCHLORIDE 2 MG/1
2 CAPSULE ORAL 4 TIMES DAILY PRN
Qty: 30 CAPSULE | Refills: 0 | Status: SHIPPED | OUTPATIENT
Start: 2023-04-05

## 2023-04-05 RX ORDER — POTASSIUM CHLORIDE 14.9 MG/ML
20 INJECTION INTRAVENOUS
Status: COMPLETED | OUTPATIENT
Start: 2023-04-05 | End: 2023-04-05

## 2023-04-05 RX ADMIN — POTASSIUM CHLORIDE 20 MEQ: 14.9 INJECTION, SOLUTION INTRAVENOUS at 12:34

## 2023-04-05 RX ADMIN — SODIUM BICARBONATE 1300 MG: 650 TABLET ORAL at 09:00

## 2023-04-05 RX ADMIN — ACETAMINOPHEN 650 MG: 325 TABLET ORAL at 05:23

## 2023-04-05 RX ADMIN — ACETAMINOPHEN 650 MG: 325 TABLET ORAL at 12:33

## 2023-04-05 RX ADMIN — POTASSIUM CHLORIDE 20 MEQ: 14.9 INJECTION, SOLUTION INTRAVENOUS at 09:14

## 2023-04-05 RX ADMIN — HEPARIN SODIUM 5000 UNITS: 5000 INJECTION INTRAVENOUS; SUBCUTANEOUS at 05:23

## 2023-04-05 RX ADMIN — LEVOTHYROXINE SODIUM 100 MCG: 0.1 TABLET ORAL at 05:23

## 2023-04-05 NOTE — NURSING NOTE
Pt discharged home with home care  Vss  Pt denies pain, denies shortness of breath  IV removed without complications and tip intact  All belongings with pt  Pt escorted to front entrance and assisted into car of

## 2023-04-05 NOTE — ASSESSMENT & PLAN NOTE
· In the setting of a patient with history of ulcerative colitis and recent surgery on 3/1  · Total proctocolectomy with ileoanal pouch, diverting loop ileostomy with Dr Ubaldo Rollins  · Stool studies including C  difficile negative  · CT abd/pelvis (4/3): No evidence of bowel obstruction  Soft tissue and gas in the presacral space adjacent to the rectal stump  This is likely likely related to prior surgery though clinical significant is uncertain given lack of prior imaging  Collection related to anastomotic leak cannot be excluded and correlation with prior postoperative imaging recommended  Fat stranding in the anterior abdominal wall and underlying extraperitoneal fat, also likely postoperative though this cannot be confirmed without prior imaging  · Continue as needed Imodium  · Status post a surgical evaluation here, no further inpatient testing, treatment, and/or work-up is needed    Patient will follow-up in the near future in the outpatient setting with Dr Ubaldo Rollins at which time she will have a flexible sigmoidoscopy performed

## 2023-04-05 NOTE — PLAN OF CARE
Problem: Potential for Falls  Goal: Patient will remain free of falls  Description: INTERVENTIONS:  - Educate patient/family on patient safety including physical limitations  - Instruct patient to call for assistance with activity   - Consult OT/PT to assist with strengthening/mobility   - Keep Call bell within reach  - Keep bed low and locked with side rails adjusted as appropriate  - Keep care items and personal belongings within reach  - Initiate and maintain comfort rounds  - Make Fall Risk Sign visible to staff  - Offer Toileting every  Hours, in advance of need  - Initiate/Maintainalarm  - Obtain necessary fall risk management equipment:   - Apply yellow socks and bracelet for high fall risk patients  - Consider moving patient to room near nurses station  Outcome: Progressing     Problem: PAIN - ADULT  Goal: Verbalizes/displays adequate comfort level or baseline comfort level  Description: Interventions:  - Encourage patient to monitor pain and request assistance  - Assess pain using appropriate pain scale  - Administer analgesics based on type and severity of pain and evaluate response  - Implement non-pharmacological measures as appropriate and evaluate response  - Consider cultural and social influences on pain and pain management  - Notify physician/advanced practitioner if interventions unsuccessful or patient reports new pain  Outcome: Progressing     Problem: INFECTION - ADULT  Goal: Absence or prevention of progression during hospitalization  Description: INTERVENTIONS:  - Assess and monitor for signs and symptoms of infection  - Monitor lab/diagnostic results  - Monitor all insertion sites, i e  indwelling lines, tubes, and drains  - Monitor endotracheal if appropriate and nasal secretions for changes in amount and color  - [place] appropriate cooling/warming therapies per order  - Administer medications as ordered  - Instruct and encourage patient and family to use good hand hygiene technique  - Identify and instruct in appropriate isolation precautions for identified infection/condition  Outcome: Progressing  Goal: Absence of fever/infection during neutropenic period  Description: INTERVENTIONS:  - Monitor WBC    Outcome: Progressing     Problem: SAFETY ADULT  Goal: Patient will remain free of falls  Description: INTERVENTIONS:  - Educate patient/family on patient safety including physical limitations  - Instruct patient to call for assistance with activity   - Consult OT/PT to assist with strengthening/mobility   - Keep Call bell within reach  - Keep bed low and locked with side rails adjusted as appropriate  - Keep care items and personal belongings within reach  - Initiate and maintain comfort rounds  - Make Fall Risk Sign visible to staff  - Offer Toileting every Hours, in advance of need  - Initiate/Maintain alarm  - Obtain necessary fall risk management equipment:   - Apply yellow socks and bracelet for high fall risk patients  - Consider moving patient to room near nurses station  Outcome: Progressing  Goal: Maintain or return to baseline ADL function  Description: INTERVENTIONS:  -  Assess patient's ability to carry out ADLs; assess patient's baseline for ADL function and identify physical deficits which impact ability to perform ADLs (bathing, care of mouth/teeth, toileting, grooming, dressing, etc )  - Assess/evaluate cause of self-care deficits   - Assess range of motion  - Assess patient's mobility; develop plan if impaired  - Assess patient's need for assistive devices and provide as appropriate  - Encourage maximum independence but intervene and supervise when necessary  - Involve family in performance of ADLs  - Assess for home care needs following discharge   - Consider OT consult to assist with ADL evaluation and planning for discharge  - Provide patient education as appropriate  Outcome: Progressing  Goal: Maintains/Returns to pre admission functional level  Description: INTERVENTIONS:  - Perform BMAT or MOVE assessment daily    - Set and communicate daily mobility goal to care team and patient/family/caregiver  - Collaborate with rehabilitation services on mobility goals if consulted  - Perform Range of Motion times a day  - Reposition patient every  hours  - Dangle patient times a day  - Stand patient  times a day  - Ambulate patient times a day  - Out of bed to chair  times a day   - Out of bed for meals times a day  - Out of bed for toileting  - Record patient progress and toleration of activity level   Outcome: Progressing     Problem: DISCHARGE PLANNING  Goal: Discharge to home or other facility with appropriate resources  Description: INTERVENTIONS:  - Identify barriers to discharge w/patient and caregiver  - Arrange for needed discharge resources and transportation as appropriate  - Identify discharge learning needs (meds, wound care, etc )  - Arrange for interpretive services to assist at discharge as needed  - Refer to Case Management Department for coordinating discharge planning if the patient needs post-hospital services based on physician/advanced practitioner order or complex needs related to functional status, cognitive ability, or social support system  Outcome: Progressing     Problem: Knowledge Deficit  Goal: Patient/family/caregiver demonstrates understanding of disease process, treatment plan, medications, and discharge instructions  Description: Complete learning assessment and assess knowledge base    Interventions:  - Provide teaching at level of understanding  - Provide teaching via preferred learning methods  Outcome: Progressing     Problem: GASTROINTESTINAL - ADULT  Goal: Minimal or absence of nausea and/or vomiting  Description: INTERVENTIONS:  - Administer IV fluids if ordered to ensure adequate hydration  - Maintain NPO status until nausea and vomiting are resolved  - Nasogastric tube if ordered  - Administer ordered antiemetic medications as needed  - Provide nonpharmacologic comfort measures as appropriate  - Advance diet as tolerated, if ordered  - Consider nutrition services referral to assist patient with adequate nutrition and appropriate food choices  Outcome: Progressing  Goal: Maintains or returns to baseline bowel function  Description: INTERVENTIONS:  - Assess bowel function  - Encourage oral fluids to ensure adequate hydration  - Administer IV fluids if ordered to ensure adequate hydration  - Administer ordered medications as needed  - Encourage mobilization and activity  - Consider nutritional services referral to assist patient with adequate nutrition and appropriate food choices  Outcome: Progressing  Goal: Maintains adequate nutritional intake  Description: INTERVENTIONS:  - Monitor percentage of each meal consumed  - Identify factors contributing to decreased intake, treat as appropriate  - Assist with meals as needed  - Monitor I&O, weight, and lab values if indicated  - Obtain nutrition services referral as needed  Outcome: Progressing  Goal: Establish and maintain optimal ostomy function  Description: INTERVENTIONS:  - Assess bowel function  - Encourage oral fluids to ensure adequate hydration  - Administer IV fluids if ordered to ensure adequate hydration   - Administer ordered medications as needed  - Encourage mobilization and activity  - Nutrition services referral to assist patient with appropriate food choices  - Assess stoma site  - Consider wound care consult   Outcome: Progressing  Goal: Oral mucous membranes remain intact  Description: INTERVENTIONS  - Assess oral mucosa and hygiene practices  - Implement preventative oral hygiene regimen  - Implement oral medicated treatments as ordered  - Initiate Nutrition services referral as needed  Outcome: Progressing     Problem: MOBILITY - ADULT  Goal: Maintain or return to baseline ADL function  Description: INTERVENTIONS:  -  Assess patient's ability to carry out ADLs; assess patient's baseline for ADL function and identify physical deficits which impact ability to perform ADLs (bathing, care of mouth/teeth, toileting, grooming, dressing, etc )  - Assess/evaluate cause of self-care deficits   - Assess range of motion  - Assess patient's mobility; develop plan if impaired  - Assess patient's need for assistive devices and provide as appropriate  - Encourage maximum independence but intervene and supervise when necessary  - Involve family in performance of ADLs  - Assess for home care needs following discharge   - Consider OT consult to assist with ADL evaluation and planning for discharge  - Provide patient education as appropriate  Outcome: Progressing  Goal: Maintains/Returns to pre admission functional level  Description: INTERVENTIONS:  - Perform BMAT or MOVE assessment daily    - Set and communicate daily mobility goal to care team and patient/family/caregiver  - Collaborate with rehabilitation services on mobility goals if consulted  - Perform Range of Motion  times a day  - Reposition patient every  hours  - Dangle patient times a day  - Stand patient  times a day  - Ambulate patient  times a day  - Out of bed to chair times a day   - Out of bed for meals times a day  - Out of bed for toileting  - Record patient progress and toleration of activity level   Outcome: Progressing     Problem: Nutrition/Hydration-ADULT  Goal: Nutrient/Hydration intake appropriate for improving, restoring or maintaining nutritional needs  Description: Monitor and assess patient's nutrition/hydration status for malnutrition  Collaborate with interdisciplinary team and initiate plan and interventions as ordered  Monitor patient's weight and dietary intake as ordered or per policy  Utilize nutrition screening tool and intervene as necessary  Determine patient's food preferences and provide high-protein, high-caloric foods as appropriate       INTERVENTIONS:  - Monitor oral intake, urinary output, labs, and treatment plans  - Assess nutrition and hydration status and recommend course of action  - Evaluate amount of meals eaten  - Assist patient with eating if necessary   - Allow adequate time for meals  - Recommend/ encourage appropriate diets, oral nutritional supplements, and vitamin/mineral supplements  - Order, calculate, and assess calorie counts as needed  - Recommend, monitor, and adjust tube feedings and TPN/PPN based on assessed needs  - Assess need for intravenous fluids  - Provide specific nutrition/hydration education as appropriate  - Include patient/family/caregiver in decisions related to nutrition  Outcome: Progressing

## 2023-04-05 NOTE — ASSESSMENT & PLAN NOTE
· Has resolved   · It occurred in the setting of diarrhea and acute renal failure  · Status post sodium bicarb  · Okay for discharge
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
## 2023-04-05 NOTE — ASSESSMENT & PLAN NOTE
· Prerenal, has significantly improved if not almost resolved   · Creatinine on admission was 4 83   · Status post treatment with IV fluid   · Discharge creatinine is 1 74  · Appreciate nephrology input  · Hypokalemia:-Was repleted IV prior to discharge today  · Patient is medically stable for discharge  · Will need repeat BMP testing within 7 to 10 days  · Outpatient follow-up with PCP

## 2023-04-05 NOTE — DISCHARGE INSTR - AVS FIRST PAGE
Dear Janey Tong,     It was our [facility] to care for [facility] at [facility]  It is our hope that we were always able to exceed the expected standards for your care during your stay  You were hospitalized due to severe dehydration caused by loose stools  You were cared for on the medical/surgical floor by Cathie Michelle MD with the [facility] who covers for your primary care physician (PCP), Stefano Stoner MD, while you were hospitalized  You were additionally seen by the [place] [address] [facility], and by the [facility]  If you have any questions or concerns related to this hospitalization, you may contact us at [phone]  For follow up as well as any medication refills, we recommend that you follow up with your primary care physician  A registered nurse will reach out to you by phone within a few days after your discharge to answer any additional questions that you may have after going home  However, at this time we provide for you here, the most important instructions / recommendations at discharge:     Notable Medication Adjustments -   Okay to resume all preadmission medications at the preadmission doses  Testing Required after Discharge -   Please ensure that your family physician orders repeat blood work within 7 to 10 days  Important follow up information -   Please follow-up with the providers as outlined in this discharge packet  Other Instructions -   Please maintain a healthy diet  Please review this entire after visit summary as additional general instructions including medication list, appointments, activity, diet, any pertinent wound care, and other additional recommendations from your care team that may be provided for you        Sincerely,     Cathie Michelle MD

## 2023-04-05 NOTE — CASE MANAGEMENT
Case Management Progress Note    Patient name Shashankavtar Perdue  Location [address] 316/-[id number] MRN [id number]  : 1970 Date 2023       LOS (days): 2  Geometric Mean LOS (GMLOS) (days):   Days to GMLOS:        OBJECTIVE:        Current admission status: Inpatient  Preferred Pharmacy:   [address] [place] [address] [id number] [id number]  Phone: [phone] Fax: [phone]    [place] [address] [address]  [facility] [address]  Phone: [phone] Fax: [phone]    [address] [place] [address]  [address] [facility]  Mail Order for [address] [place] [address] Jenna Manrique   Phone: [phone] Fax: [phone]    Primary Care Provider: Wendie Ny MD    Primary Insurance: Brandon Ferguson Insurance:     PROGRESS NOTE:    Kacy Farmer notified of discharge and AVS faxed

## 2023-04-05 NOTE — DISCHARGE SUMMARY
Leonel 47  Discharge- Viola Lennox 1970, 50 y o  female MRN: [id number]  Unit/Bed#: -01 Encounter: [id number]  Primary Care Provider: Jose Alberto Raymond MD   Date and time admitted to hospital: 4/3/2023 12:01 PM    * Acute renal failure (ARF) ([address] )  Assessment & Plan  · Prerenal, has significantly improved if not almost resolved   · Creatinine on admission was 4 83   · Status post treatment with IV fluid   · Discharge creatinine is 1 74  · Appreciate nephrology input  · Hypokalemia:-Was repleted IV prior to discharge today  · Patient is medically stable for discharge  · Will need repeat BMP testing within 7 to 10 days  · Outpatient follow-up with PCP    High anion gap metabolic acidosis  Assessment & Plan  · Has resolved   · It occurred in the setting of diarrhea and acute renal failure  · Status post sodium bicarb  · Okay for discharge    Diarrhea  Assessment & Plan  · In the setting of a patient with history of ulcerative colitis and recent surgery on 3/1  · Total proctocolectomy with ileoanal pouch, diverting loop ileostomy with Dr Diane Hill  · Stool studies including C  difficile negative  · CT abd/pelvis (4/3): No evidence of bowel obstruction  Soft tissue and gas in the presacral space adjacent to the rectal stump  This is likely likely related to prior surgery though clinical significant is uncertain given lack of prior imaging  Collection related to anastomotic leak cannot be excluded and correlation with prior postoperative imaging recommended  Fat stranding in the anterior abdominal wall and underlying extraperitoneal fat, also likely postoperative though this cannot be confirmed without prior imaging  · Continue as needed Imodium  · Status post a surgical evaluation here, no further inpatient testing, treatment, and/or work-up is needed    Patient will follow-up in the near future in the outpatient setting with Dr Diane Hill at which time she will have a flexible sigmoidoscopy performed    Hyponatremia  Assessment & Plan  · Hypovolemic hyponatremia has resolved with IV fluids  · Repeat BMP in 7 to 10 days    Acquired hypothyroidism  Assessment & Plan  · Continue levothyroxine 100 mcg daily post discharge        Discharging Physician / Practitioner: Mark Bravo MD  PCP: Reyes Moll, MD  Admission Date:   Admission Orders (From admission, onward)     Ordered        04/03/23 1610  INPATIENT ADMISSION  Once                      Discharge Date: 04/05/23    Medical Problems     Resolved Problems  Date Reviewed: 4/3/2023   None         Consultations During Hospital Stay:  · Nephrology  · General surgery    Procedures Performed:   · None    Significant Findings / Test Results:   · CAT scan abdomen pelvis-1   No evidence of bowel obstruction  2   Soft tissue and gas in the presacral space adjacent to the rectal stump  This is likely likely related to prior surgery though clinical significant is uncertain given lack of prior imaging  Collection related to anastomotic leak cannot be excluded and correlation with prior postoperative imaging recommended  3   Fat stranding in the anterior abdominal wall and underlying extraperitoneal fat, also likely postoperative though this cannot be confirmed without prior imaging to 4   Diffuse hepatic steatosis  Incidental Findings:   · None    Test Results Pending at Discharge (will require follow up): · None     Outpatient Tests Requested:  · None    Complications:    • None    Reason for Admission: Acute kidney injury    Hospital Course:     Karmen Dooley is a 50 y o  female patient who originally presented to the hospital on 4/3/2023 due to nausea, vomiting, and diarrhea  Is refer to the initial history and physical examination completed by Dr Kerry Carrion for the initial presenting for history physical complaints    In brief, the patient is a 82-year-old female, with a known history of ulcerative colitis, and who is recently "status post a total proctocolectomy with ileoanal pouch, diverting loop ileostomy and is a patient that presented to the ER with nausea, vomiting, and diarrhea  She was diagnosed with an acute kidney injury, and started on IV fluids  A CAT scan of the abdomen and pelvis was performed with the results as outlined above  Based upon the CAT scan findings, discussion was had both with colorectal surgery at time of arrival, and with general surgery who were consulted to follow along, and it was in agreement of further acute phase intervention was needed on this particular admission based upon the CAT scan abdomen results  He felt they were all postop changes  Patient was treated with IV fluids for the aforementioned acute kidney injury, and an anion gap metabolic acidosis  She was seen in conjunction with nephrology  After a few days worth of IV fluids, by Wednesday, 4/5/2023, her creatinine did come back down to 1 74  At that point she was deemed medically stable for discharge, she will follow-up in the near future in the outpatient setting with her PCP  Please refer to the assessment/plan portion of this discharge summary as outlined above for additional details regarding her stay    Please see above list of diagnoses and related plan for additional information  Condition at Discharge: good     Discharge Day Visit / Exam:     Subjective: Patient seen and examined, lying in bed, feels well  Vitals: Blood Pressure: 102/61 (04/05/23 0720)  Pulse: 69 (04/05/23 0720)  Temperature: 97 9 °F (36 6 °C) (04/05/23 0720)  Temp Source: Tympanic (04/03/23 1700)  Respirations: 17 (04/05/23 0720)  Height: 5' 3\" (160 cm) (04/03/23 1646)  Weight - Scale: 109 kg (239 lb 13 8 oz) (04/03/23 1646)  SpO2: 95 % (04/05/23 0720)  Exam:   Physical Exam  Constitutional:       General: She is not in acute distress  Appearance: Normal appearance  She is normal weight  She is not ill-appearing     HENT:      Head: Normocephalic and " atraumatic  Nose: Nose normal       Mouth/Throat:      Mouth: Mucous membranes are moist    Eyes:      Extraocular Movements: Extraocular movements intact  Pupils: Pupils are equal, round, and reactive to light  Cardiovascular:      Rate and Rhythm: Normal rate and regular rhythm  Pulses: Normal pulses  Heart sounds: Normal heart sounds  No murmur heard  No friction rub  No gallop  Pulmonary:      Effort: Pulmonary effort is normal  No respiratory distress  Breath sounds: Normal breath sounds  No wheezing, rhonchi or rales  Abdominal:      General: There is no distension  Palpations: Abdomen is soft  There is no mass  Tenderness: There is no abdominal tenderness  Hernia: No hernia is present  Comments: Colostomy in place   Musculoskeletal:         General: No swelling or tenderness  Normal range of motion  Cervical back: Normal range of motion and neck supple  No rigidity  Right lower leg: No edema  Left lower leg: No edema  Skin:     General: Skin is warm  Capillary Refill: Capillary refill takes less than 2 seconds  Findings: No erythema or rash  Neurological:      General: No focal deficit present  Mental Status: She is alert and oriented to person, place, and time  Mental status is at baseline  Cranial Nerves: No cranial nerve deficit  Motor: No weakness  Psychiatric:         Mood and Affect: Mood normal          Behavior: Behavior normal          Discussion with Family: No family members were present at the time of my discharge evaluation, nor did the patient ask and or want me to call anyone    Discharge instructions/Information to patient and family:   See after visit summary for information provided to patient and family  Provisions for Follow-Up Care:  See after visit summary for information related to follow-up care and any pertinent home health orders        Disposition:     Home with [facility]NA Services (Reminder: Complete face to face encounter)      Planned Readmission:   • None     Discharge Statement:  I spent 45 minutes discharging the patient  This time was spent on the day of discharge  I had direct contact with the patient on the day of discharge  Greater than 50% of the total time was spent examining patient, answering all patient questions, arranging and discussing plan of care with patient as well as directly providing post-discharge instructions  Additional time then spent on discharge activities  Discharge Medications:  See after visit summary for reconciled discharge medications provided to patient and family        ** Please Note: This note has been constructed using a voice recognition system **

## 2023-04-05 NOTE — NURSING NOTE
Pt is independent pt bathed self pt did oral care tpt ambulates to restroom by self pt not out of bed RN aware

## 2023-04-05 NOTE — TELEPHONE ENCOUNTER
Caller: Dr Moises Lim Leader office     Doctor: Mena Arango    Reason for call: Giving physician info for a call back for a peer to peer discussion   Please call     Call back#: [phone]

## 2023-04-06 NOTE — UTILIZATION REVIEW
NOTIFICATION OF ADMISSION DISCHARGE   This is a Notification of Discharge from [address]  Please be advised that this patient has been discharge from our facility  Below you will find the admission and discharge date and time including the patient’s disposition  UTILIZATION REVIEW CONTACT:  Mira Romero  Utilization   Network Utilization Review Department  Phone: [phone] carefully listen to the prompts  All voicemails are confidential   Email: [email or web address]     ADMISSION INFORMATION  PRESENTATION DATE: 4/3/2023 12:01 PM  OBERVATION ADMISSION DATE:   INPATIENT ADMISSION DATE: 4/3/23  4:10 PM   DISCHARGE DATE: 4/5/2023  3:51 PM   DISPOSITION:Home with Home Health Care    IMPORTANT INFORMATION:  Send all requests for admission clinical reviews, approved or denied determinations and any other requests to dedicated fax number below belonging to the campus where the patient is receiving treatment   List of dedicated fax numbers:  [address] DENIALS (Administrative/Medical Necessity) [phone]   [address] (Maternity/NICU/Pediatrics) [phone]   [facility] [phone]   [place] [address] [phone]   [address] [phone]   220 [facility] [phone]   90 [facility] [phone]   [address] [place] [address] 119 [phone]   [facility]  [phone]   [address] [facility] [phone]   412 [facility] [address] [facility] [phone]

## 2023-04-08 LAB
BACTERIA BLD CULT: NORMAL
BACTERIA BLD CULT: NORMAL

## 2023-04-10 PROBLEM — R73.01 IMPAIRED FASTING BLOOD SUGAR: Status: ACTIVE | Noted: 2023-04-10

## 2023-04-19 PROBLEM — E61.1 IRON DEFICIENCY: Status: ACTIVE | Noted: 2023-04-19

## 2023-04-19 PROBLEM — E86.1 FLUID VOLUME DEFICIT: Status: ACTIVE | Noted: 2023-04-19

## 2023-04-19 PROBLEM — N18.2 STAGE 2 CHRONIC KIDNEY DISEASE: Status: ACTIVE | Noted: 2023-04-19

## 2023-04-19 PROBLEM — R80.9 PROTEINURIA: Status: ACTIVE | Noted: 2023-04-19

## 2023-04-19 PROBLEM — E87.20 ACIDOSIS: Status: ACTIVE | Noted: 2023-04-19

## 2023-04-19 PROBLEM — N11.9 CHRONIC TUBULOINTERSTITIAL NEPHRITIS: Status: ACTIVE | Noted: 2023-04-19

## 2023-04-19 PROBLEM — E55.9 VITAMIN D DEFICIENCY: Status: ACTIVE | Noted: 2023-04-19

## 2023-05-01 ENCOUNTER — ANESTHESIA EVENT (OUTPATIENT)
Dept: GASTROENTEROLOGY | Facility: HOSPITAL | Age: 53
End: 2023-05-01

## 2023-05-01 ENCOUNTER — HOSPITAL ENCOUNTER (OUTPATIENT)
Dept: GASTROENTEROLOGY | Facility: HOSPITAL | Age: 53
Setting detail: OUTPATIENT SURGERY
Discharge: HOME | End: 2023-05-01
Attending: COLON & RECTAL SURGERY

## 2023-05-01 ENCOUNTER — ANESTHESIA (OUTPATIENT)
Dept: GASTROENTEROLOGY | Facility: HOSPITAL | Age: 53
End: 2023-05-01

## 2023-05-01 ENCOUNTER — PREP FOR PROCEDURE (OUTPATIENT)
Dept: OTHER | Facility: HOSPITAL | Age: 53
End: 2023-05-01

## 2023-05-01 VITALS
HEART RATE: 92 BPM | DIASTOLIC BLOOD PRESSURE: 65 MMHG | RESPIRATION RATE: 16 BRPM | HEIGHT: 63 IN | SYSTOLIC BLOOD PRESSURE: 106 MMHG | BODY MASS INDEX: 42.52 KG/M2 | TEMPERATURE: 99.2 F | OXYGEN SATURATION: 99 % | WEIGHT: 240 LBS

## 2023-05-01 DIAGNOSIS — K51.00 ULCERATIVE PANCOLITIS WITHOUT COMPLICATION (HCC): Primary | ICD-10-CM

## 2023-05-01 DIAGNOSIS — K51.018 ULCERATIVE PANCOLITIS WITH OTHER COMPLICATION (HCC): ICD-10-CM

## 2023-05-01 RX ORDER — METRONIDAZOLE 500 MG/100ML
500 INJECTION, SOLUTION INTRAVENOUS ONCE
OUTPATIENT
Start: 2023-05-01 | End: 2023-05-01

## 2023-05-01 RX ORDER — PROPOFOL 10 MG/ML
INJECTION, EMULSION INTRAVENOUS AS NEEDED
Status: DISCONTINUED | OUTPATIENT
Start: 2023-05-01 | End: 2023-05-01

## 2023-05-01 RX ORDER — LIDOCAINE HYDROCHLORIDE 10 MG/ML
INJECTION, SOLUTION EPIDURAL; INFILTRATION; INTRACAUDAL; PERINEURAL AS NEEDED
Status: DISCONTINUED | OUTPATIENT
Start: 2023-05-01 | End: 2023-05-01

## 2023-05-01 RX ORDER — LEVOFLOXACIN 5 MG/ML
500 INJECTION, SOLUTION INTRAVENOUS ONCE
OUTPATIENT
Start: 2023-05-01 | End: 2023-05-01

## 2023-05-01 RX ORDER — SODIUM CHLORIDE 9 MG/ML
INJECTION, SOLUTION INTRAVENOUS CONTINUOUS PRN
Status: DISCONTINUED | OUTPATIENT
Start: 2023-05-01 | End: 2023-05-01

## 2023-05-01 RX ADMIN — PROPOFOL 50 MG: 10 INJECTION, EMULSION INTRAVENOUS at 08:24

## 2023-05-01 RX ADMIN — PROPOFOL 20 MG: 10 INJECTION, EMULSION INTRAVENOUS at 08:26

## 2023-05-01 RX ADMIN — SODIUM CHLORIDE: 0.9 INJECTION, SOLUTION INTRAVENOUS at 08:20

## 2023-05-01 RX ADMIN — LIDOCAINE HYDROCHLORIDE 50 MG: 10 INJECTION, SOLUTION EPIDURAL; INFILTRATION; INTRACAUDAL; PERINEURAL at 08:23

## 2023-05-01 RX ADMIN — PROPOFOL 100 MG: 10 INJECTION, EMULSION INTRAVENOUS at 08:23

## 2023-05-01 NOTE — ANESTHESIA PREPROCEDURE EVALUATION
Procedure:  FLEXIBLE SIGMOIDOSCOPY    Relevant Problems   ENDO   (+) Acquired hypothyroidism      /RENAL   (+) Acute renal failure (ARF) (HCC)   (+) Chronic tubulointerstitial nephritis   (+) Stage 2 chronic kidney disease      MUSCULOSKELETAL   (+) Low back pain at multiple sites   (+) Primary osteoarthritis of both knees      PULMONARY   (+) Mild intermittent asthma without complication   (+) Mild persistent asthma without complication      Digestive   (+) Ulcerative colitis ([facility] )      Other   (+) Obesity, morbid ([facility] )      Lab Results   Component Value Date    WBC 5 42 04/12/2023    HGB 11 0 (L) 04/12/2023    HCT 34 3 (L) 04/12/2023    MCV 86 04/12/2023     04/12/2023     Lab Results   Component Value Date    K 4 6 04/12/2023    CO2 19 (L) 04/12/2023     (H) 04/12/2023    BUN 18 04/12/2023    CREATININE 1 20 04/12/2023     Lab Results   Component Value Date    INR 1 34 (H) 01/26/2021    PROTIME 15 9 (H) 01/26/2021     Lab Results   Component Value Date    PTT 29 01/26/2021       Lab Results   Component Value Date    GLUCOSE 157 (H) 01/28/2021    GLUCOSE 136 01/26/2021       Lab Results   Component Value Date    HGBA1C 5 9 (H) 04/12/2023       Type and Screen:  A    Physical Exam    Airway    Mallampati score: II  TM Distance: >3 FB  Neck ROM: full     Dental   Comment: Numerous cracked and broken teeth,     Cardiovascular      Pulmonary      Other Findings        Anesthesia Plan  ASA Score- 3     Anesthesia Type- IV sedation with anesthesia with ASA Monitors  Additional Monitors:   Airway Plan:           Plan Factors-Exercise tolerance (METS): >4 METS  Chart reviewed  Existing labs reviewed  Patient summary reviewed  Induction- intravenous  Postoperative Plan-     Informed Consent- Anesthetic plan and risks discussed with patient  I personally reviewed this patient with the CRNA  Discussed and agreed on the Anesthesia Plan with the JOSE DE JESUS Hopkins

## 2023-05-01 NOTE — ANESTHESIA POSTPROCEDURE EVALUATION
Post-Op Assessment Note    CV Status:  Stable  Pain Score: 0    Pain management: adequate     Mental Status:  Alert and awake   Hydration Status:  Euvolemic   PONV Controlled:  Controlled   Airway Patency:  Patent      Post Op Vitals Reviewed: Yes      Staff: CRNA, Anesthesiologist         No notable events documented      /64 (05/01/23 0833)    Temp 99 2 °F (37 3 °C) (05/01/23 0833)    Pulse 84 (05/01/23 0833)   Resp 16 (05/01/23 0833)    SpO2 97 % (05/01/23 0833)
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
## 2023-05-03 DIAGNOSIS — K51.018 ULCERATIVE PANCOLITIS WITH OTHER COMPLICATION (HCC): Primary | ICD-10-CM

## 2023-05-03 RX ORDER — NEOMYCIN SULFATE 500 MG/1
TABLET ORAL
Qty: 4 TABLET | Refills: 0 | Status: SHIPPED | OUTPATIENT
Start: 2023-06-01 | End: 2023-06-01

## 2023-05-03 RX ORDER — METRONIDAZOLE 500 MG/1
TABLET ORAL
Qty: 2 TABLET | Refills: 0 | Status: SHIPPED | OUTPATIENT
Start: 2023-06-01 | End: 2023-06-01

## 2023-05-03 NOTE — LETTER
Erasmo Flores  [address] [address]        ------------------------------------------------------------------------------------------------------------  5/3/2023    Dear Erasmo Flores,    Your surgery is scheduled for: 6/7/2023   The hospital will call the evening prior to your surgery with your expected arrival time  Location:[address] [address]    CHECK LIST PRIOR TO INPATIENT SURGERY  It is your responsibility to obtain any/all referrals needed for your surgery if required by your insurance  Our office will contact you to discuss your insurance coverage for this procedure  Special instructions required if you are taking any blood thinners  Please verify with the prescribing physician  Examples include Coumadin, Plavix, Xarelto, Eliquis, Pradaxa, etc     Please check with your family physician if you are taking the following medications: Aspirin or any Aspirin containing medication, Gingko biloba, Ginseng, Feverfew, and/or St  Lalos Wort  We suggest stopping these for 3 days  The night before and the day of your surgery, wash from your neck to groin with chlorhexidine soap  This soap is available at most retail pharmacies under such brand names as Hibiclens, Endure or Aplicare  Pre-admission testing Required: YES NO x      Other Clearances/ Additional Testing: NONE    CLEAR LIQUIDS DAY PRIOR TO SURGERY *    NOTHING TO EAT OR DRINK AFTER MIDNIGHT PRIOR TO SURGERY  Please do not hesitate to call our office with any questions regarding your surgery

## 2023-05-03 NOTE — LETTER
Procedure Clearance Form    Dear Dr Lannis Olszewski,    Please complete the following surgical clearance form for our mutual patient:    Ryley Mehdi 1970    Surgeon: Dr Jeffry Boo    Type of Procedure: Ileostomy reversal    Date of Procedure: 6/7/2023       ______  No Contraindications Identified    ______ Surgery Contraindications - Further Work-up in Progress    Comments:   _______________patient should have [address] to address low albumin and sodium    _________________________________________________________  ________________________________________________________________________   ________________________________________________________________________   ________________________________________________________________________   ________________________________________________________________________    Further Testing Required:   ________________________________________________________________________   ________________________________________________________________________   ________________________________________________________________________         _________________________________________  ________________________  Physician Signature       Date Faxed        Please fax completed response to [phone] Attention: Dayami Mantle  Thank you

## 2023-05-03 NOTE — TELEPHONE ENCOUNTER
Patient scheduled for surgery 6/7/2023  at \[facility]\"" [place]   Instructions and PAT's gone over with and mailed to the patient      Pre op meds routed to Dr Maria Alejandra Medina

## 2023-05-08 DIAGNOSIS — N17.9 AKI (ACUTE KIDNEY INJURY) (HCC): Primary | ICD-10-CM

## 2023-05-08 NOTE — TELEPHONE ENCOUNTER
patient should have [address] to address low albumin and sodium          Letter sent to PCP for clearance

## 2023-05-09 RX ORDER — MULTIVITAMIN
1 TABLET ORAL DAILY
COMMUNITY

## 2023-05-09 RX ORDER — ALBUTEROL SULFATE 90 UG/1
2 AEROSOL, METERED RESPIRATORY (INHALATION) EVERY 6 HOURS PRN
COMMUNITY

## 2023-05-09 NOTE — PRE-PROCEDURE INSTRUCTIONS
Pre-Surgery Instructions:   Medication Instructions   • acetaminophen (TYLENOL) 325 mg tablet Uses PRN- OK to take day of surgery   • albuterol (PROVENTIL HFA,VENTOLIN HFA) 90 mcg/act inhaler Uses PRN- OK to take day of surgery   • levothyroxine (Euthyrox) 100 mcg tablet Take day of surgery  • loratadine (CLARITIN) 10 mg tablet Uses PRN- OK to take day of surgery   • Multiple Vitamin (multivitamin) tablet Instructions provided by MD   • sodium bicarbonate 650 mg tablet Instructions provided by MD    [facility] preop instructions reviewed with pt  Pt will get antibacterial soap

## 2023-05-25 ENCOUNTER — APPOINTMENT (OUTPATIENT)
Dept: LAB | Facility: CLINIC | Age: 53
End: 2023-05-25

## 2023-05-25 DIAGNOSIS — E61.1 IRON DEFICIENCY: ICD-10-CM

## 2023-05-25 DIAGNOSIS — E55.9 VITAMIN D DEFICIENCY: ICD-10-CM

## 2023-05-25 DIAGNOSIS — E87.20 ACIDOSIS: ICD-10-CM

## 2023-05-25 LAB
25(OH)D3 SERPL-MCNC: 21.2 NG/ML (ref 30–100)
ALBUMIN SERPL BCP-MCNC: 3.6 G/DL (ref 3.5–5)
ALP SERPL-CCNC: 125 U/L (ref 46–116)
ALT SERPL W P-5'-P-CCNC: 71 U/L (ref 12–78)
ANION GAP SERPL CALCULATED.3IONS-SCNC: 6 MMOL/L (ref 4–13)
AST SERPL W P-5'-P-CCNC: 40 U/L (ref 5–45)
BASOPHILS # BLD AUTO: 0.05 THOUSANDS/ÂΜL (ref 0–0.1)
BASOPHILS NFR BLD AUTO: 1 % (ref 0–1)
BILIRUB SERPL-MCNC: 0.69 MG/DL (ref 0.2–1)
BUN SERPL-MCNC: 26 MG/DL (ref 5–25)
CALCIUM SERPL-MCNC: 9.8 MG/DL (ref 8.3–10.1)
CHLORIDE SERPL-SCNC: 106 MMOL/L (ref 96–108)
CO2 SERPL-SCNC: 19 MMOL/L (ref 21–32)
CREAT SERPL-MCNC: 1.69 MG/DL (ref 0.6–1.3)
EOSINOPHIL # BLD AUTO: 0.09 THOUSAND/ÂΜL (ref 0–0.61)
EOSINOPHIL NFR BLD AUTO: 2 % (ref 0–6)
ERYTHROCYTE [DISTWIDTH] IN BLOOD BY AUTOMATED COUNT: 16.3 % (ref 11.6–15.1)
FERRITIN SERPL-MCNC: 78 NG/ML (ref 11–307)
GFR SERPL CREATININE-BSD FRML MDRD: 34 ML/MIN/1.73SQ M
GLUCOSE P FAST SERPL-MCNC: 110 MG/DL (ref 65–99)
HCT VFR BLD AUTO: 37.2 % (ref 34.8–46.1)
HGB BLD-MCNC: 12.2 G/DL (ref 11.5–15.4)
IMM GRANULOCYTES # BLD AUTO: 0.01 THOUSAND/UL (ref 0–0.2)
IMM GRANULOCYTES NFR BLD AUTO: 0 % (ref 0–2)
IRON SATN MFR SERPL: 23 % (ref 15–50)
IRON SERPL-MCNC: 86 UG/DL (ref 50–170)
LYMPHOCYTES # BLD AUTO: 0.86 THOUSANDS/ÂΜL (ref 0.6–4.47)
LYMPHOCYTES NFR BLD AUTO: 21 % (ref 14–44)
MAGNESIUM SERPL-MCNC: 2.2 MG/DL (ref 1.6–2.6)
MCH RBC QN AUTO: 29.2 PG (ref 26.8–34.3)
MCHC RBC AUTO-ENTMCNC: 32.8 G/DL (ref 31.4–37.4)
MCV RBC AUTO: 89 FL (ref 82–98)
MONOCYTES # BLD AUTO: 0.51 THOUSAND/ÂΜL (ref 0.17–1.22)
MONOCYTES NFR BLD AUTO: 12 % (ref 4–12)
NEUTROPHILS # BLD AUTO: 2.59 THOUSANDS/ÂΜL (ref 1.85–7.62)
NEUTS SEG NFR BLD AUTO: 64 % (ref 43–75)
NRBC BLD AUTO-RTO: 0 /100 WBCS
PHOSPHATE SERPL-MCNC: 3.6 MG/DL (ref 2.7–4.5)
PLATELET # BLD AUTO: 317 THOUSANDS/UL (ref 149–390)
PMV BLD AUTO: 11 FL (ref 8.9–12.7)
POTASSIUM SERPL-SCNC: 3.7 MMOL/L (ref 3.5–5.3)
PROT SERPL-MCNC: 8.7 G/DL (ref 6.4–8.4)
RBC # BLD AUTO: 4.18 MILLION/UL (ref 3.81–5.12)
SODIUM SERPL-SCNC: 131 MMOL/L (ref 135–147)
TIBC SERPL-MCNC: 373 UG/DL (ref 250–450)
WBC # BLD AUTO: 4.11 THOUSAND/UL (ref 4.31–10.16)

## 2023-05-28 PROBLEM — A08.4 VIRAL GASTROENTERITIS: Status: RESOLVED | Noted: 2023-03-29 | Resolved: 2023-05-28

## 2023-06-07 ENCOUNTER — HOSPITAL ENCOUNTER (INPATIENT)
Facility: HOSPITAL | Age: 53
LOS: 7 days | Discharge: HOME | DRG: 330 | End: 2023-06-14
Attending: COLON & RECTAL SURGERY | Admitting: COLON & RECTAL SURGERY
Payer: COMMERCIAL

## 2023-06-07 ENCOUNTER — ANESTHESIA EVENT (OUTPATIENT)
Dept: PERIOP | Facility: HOSPITAL | Age: 53
DRG: 330 | End: 2023-06-07
Payer: COMMERCIAL

## 2023-06-07 ENCOUNTER — ANESTHESIA (OUTPATIENT)
Dept: PERIOP | Facility: HOSPITAL | Age: 53
DRG: 330 | End: 2023-06-07
Payer: COMMERCIAL

## 2023-06-07 DIAGNOSIS — K51.00 ULCERATIVE PANCOLITIS WITHOUT COMPLICATION (HCC): ICD-10-CM

## 2023-06-07 LAB
ABO GROUP BLD: NORMAL
BLD GP AB SCN SERPL QL: NEGATIVE
EXT PREGNANCY TEST URINE: NEGATIVE
EXT. CONTROL: NORMAL
RH BLD: POSITIVE
SPECIMEN EXPIRATION DATE: NORMAL

## 2023-06-07 PROCEDURE — 44620 REPAIR BOWEL OPENING: CPT | Performed by: COLON & RECTAL SURGERY

## 2023-06-07 PROCEDURE — 86901 BLOOD TYPING SEROLOGIC RH(D): CPT | Performed by: COLON & RECTAL SURGERY

## 2023-06-07 PROCEDURE — 86900 BLOOD TYPING SEROLOGIC ABO: CPT | Performed by: COLON & RECTAL SURGERY

## 2023-06-07 PROCEDURE — 88304 TISSUE EXAM BY PATHOLOGIST: CPT | Performed by: PATHOLOGY

## 2023-06-07 PROCEDURE — 86850 RBC ANTIBODY SCREEN: CPT | Performed by: COLON & RECTAL SURGERY

## 2023-06-07 PROCEDURE — 0DBB0ZZ EXCISION OF ILEUM, OPEN APPROACH: ICD-10-PCS | Performed by: COLON & RECTAL SURGERY

## 2023-06-07 PROCEDURE — NC001 PR NO CHARGE: Performed by: COLON & RECTAL SURGERY

## 2023-06-07 PROCEDURE — 81025 URINE PREGNANCY TEST: CPT | Performed by: COLON & RECTAL SURGERY

## 2023-06-07 RX ORDER — FENTANYL CITRATE/PF 50 MCG/ML
25 SYRINGE (ML) INJECTION
Status: DISCONTINUED | OUTPATIENT
Start: 2023-06-07 | End: 2023-06-07 | Stop reason: HOSPADM

## 2023-06-07 RX ORDER — ONDANSETRON 2 MG/ML
4 INJECTION INTRAMUSCULAR; INTRAVENOUS EVERY 6 HOURS PRN
Status: DISCONTINUED | OUTPATIENT
Start: 2023-06-07 | End: 2023-06-14 | Stop reason: HOSPADM

## 2023-06-07 RX ORDER — SODIUM CHLORIDE 9 MG/ML
INJECTION, SOLUTION INTRAVENOUS CONTINUOUS PRN
Status: DISCONTINUED | OUTPATIENT
Start: 2023-06-07 | End: 2023-06-07

## 2023-06-07 RX ORDER — MAGNESIUM HYDROXIDE 1200 MG/15ML
LIQUID ORAL AS NEEDED
Status: DISCONTINUED | OUTPATIENT
Start: 2023-06-07 | End: 2023-06-07 | Stop reason: HOSPADM

## 2023-06-07 RX ORDER — DIPHENHYDRAMINE HYDROCHLORIDE 50 MG/ML
25 INJECTION INTRAMUSCULAR; INTRAVENOUS EVERY 6 HOURS PRN
Status: DISCONTINUED | OUTPATIENT
Start: 2023-06-07 | End: 2023-06-14 | Stop reason: HOSPADM

## 2023-06-07 RX ORDER — SODIUM CHLORIDE, SODIUM LACTATE, POTASSIUM CHLORIDE, CALCIUM CHLORIDE 600; 310; 30; 20 MG/100ML; MG/100ML; MG/100ML; MG/100ML
INJECTION, SOLUTION INTRAVENOUS CONTINUOUS PRN
Status: DISCONTINUED | OUTPATIENT
Start: 2023-06-07 | End: 2023-06-07

## 2023-06-07 RX ORDER — LEVOTHYROXINE SODIUM 0.1 MG/1
100 TABLET ORAL
Status: DISCONTINUED | OUTPATIENT
Start: 2023-06-08 | End: 2023-06-14 | Stop reason: HOSPADM

## 2023-06-07 RX ORDER — SODIUM BICARBONATE 650 MG/1
650 TABLET ORAL 2 TIMES DAILY
Status: DISCONTINUED | OUTPATIENT
Start: 2023-06-07 | End: 2023-06-14 | Stop reason: HOSPADM

## 2023-06-07 RX ORDER — SODIUM CHLORIDE, SODIUM LACTATE, POTASSIUM CHLORIDE, CALCIUM CHLORIDE 600; 310; 30; 20 MG/100ML; MG/100ML; MG/100ML; MG/100ML
125 INJECTION, SOLUTION INTRAVENOUS CONTINUOUS
Status: DISCONTINUED | OUTPATIENT
Start: 2023-06-07 | End: 2023-06-08

## 2023-06-07 RX ORDER — METOCLOPRAMIDE HYDROCHLORIDE 5 MG/ML
10 INJECTION INTRAMUSCULAR; INTRAVENOUS ONCE AS NEEDED
Status: DISCONTINUED | OUTPATIENT
Start: 2023-06-07 | End: 2023-06-07 | Stop reason: HOSPADM

## 2023-06-07 RX ORDER — HEPARIN SODIUM 5000 [USP'U]/ML
5000 INJECTION, SOLUTION INTRAVENOUS; SUBCUTANEOUS EVERY 8 HOURS SCHEDULED
Status: DISCONTINUED | OUTPATIENT
Start: 2023-06-07 | End: 2023-06-14 | Stop reason: HOSPADM

## 2023-06-07 RX ORDER — SODIUM CHLORIDE, SODIUM LACTATE, POTASSIUM CHLORIDE, CALCIUM CHLORIDE 600; 310; 30; 20 MG/100ML; MG/100ML; MG/100ML; MG/100ML
100 INJECTION, SOLUTION INTRAVENOUS CONTINUOUS
Status: DISCONTINUED | OUTPATIENT
Start: 2023-06-07 | End: 2023-06-07

## 2023-06-07 RX ORDER — ROCURONIUM BROMIDE 10 MG/ML
INJECTION, SOLUTION INTRAVENOUS AS NEEDED
Status: DISCONTINUED | OUTPATIENT
Start: 2023-06-07 | End: 2023-06-07

## 2023-06-07 RX ORDER — ONDANSETRON 2 MG/ML
4 INJECTION INTRAMUSCULAR; INTRAVENOUS ONCE AS NEEDED
Status: DISCONTINUED | OUTPATIENT
Start: 2023-06-07 | End: 2023-06-07 | Stop reason: HOSPADM

## 2023-06-07 RX ORDER — DEXAMETHASONE SODIUM PHOSPHATE 10 MG/ML
INJECTION, SOLUTION INTRAMUSCULAR; INTRAVENOUS AS NEEDED
Status: DISCONTINUED | OUTPATIENT
Start: 2023-06-07 | End: 2023-06-07

## 2023-06-07 RX ORDER — ACETAMINOPHEN 325 MG/1
975 TABLET ORAL EVERY 8 HOURS SCHEDULED
Status: DISCONTINUED | OUTPATIENT
Start: 2023-06-07 | End: 2023-06-14 | Stop reason: HOSPADM

## 2023-06-07 RX ORDER — HYDROMORPHONE HCL IN WATER/PF 6 MG/30 ML
0.2 PATIENT CONTROLLED ANALGESIA SYRINGE INTRAVENOUS
Status: DISCONTINUED | OUTPATIENT
Start: 2023-06-07 | End: 2023-06-07 | Stop reason: HOSPADM

## 2023-06-07 RX ORDER — PROPOFOL 10 MG/ML
INJECTION, EMULSION INTRAVENOUS AS NEEDED
Status: DISCONTINUED | OUTPATIENT
Start: 2023-06-07 | End: 2023-06-07

## 2023-06-07 RX ORDER — ONDANSETRON 2 MG/ML
INJECTION INTRAMUSCULAR; INTRAVENOUS AS NEEDED
Status: DISCONTINUED | OUTPATIENT
Start: 2023-06-07 | End: 2023-06-07

## 2023-06-07 RX ORDER — LEVOFLOXACIN 5 MG/ML
500 INJECTION, SOLUTION INTRAVENOUS ONCE
Status: COMPLETED | OUTPATIENT
Start: 2023-06-07 | End: 2023-06-07

## 2023-06-07 RX ORDER — MIDAZOLAM HYDROCHLORIDE 2 MG/2ML
INJECTION, SOLUTION INTRAMUSCULAR; INTRAVENOUS AS NEEDED
Status: DISCONTINUED | OUTPATIENT
Start: 2023-06-07 | End: 2023-06-07

## 2023-06-07 RX ORDER — GABAPENTIN 100 MG/1
100 CAPSULE ORAL 3 TIMES DAILY
Status: DISCONTINUED | OUTPATIENT
Start: 2023-06-07 | End: 2023-06-14 | Stop reason: HOSPADM

## 2023-06-07 RX ORDER — FENTANYL CITRATE 50 UG/ML
INJECTION, SOLUTION INTRAMUSCULAR; INTRAVENOUS AS NEEDED
Status: DISCONTINUED | OUTPATIENT
Start: 2023-06-07 | End: 2023-06-07

## 2023-06-07 RX ORDER — LIDOCAINE HYDROCHLORIDE 10 MG/ML
INJECTION, SOLUTION EPIDURAL; INFILTRATION; INTRACAUDAL; PERINEURAL AS NEEDED
Status: DISCONTINUED | OUTPATIENT
Start: 2023-06-07 | End: 2023-06-07

## 2023-06-07 RX ORDER — PHENYLEPHRINE HYDROCHLORIDE 10 MG/ML
INJECTION INTRAVENOUS AS NEEDED
Status: DISCONTINUED | OUTPATIENT
Start: 2023-06-07 | End: 2023-06-07

## 2023-06-07 RX ORDER — SODIUM CHLORIDE, SODIUM LACTATE, POTASSIUM CHLORIDE, CALCIUM CHLORIDE 600; 310; 30; 20 MG/100ML; MG/100ML; MG/100ML; MG/100ML
125 INJECTION, SOLUTION INTRAVENOUS CONTINUOUS
Status: DISCONTINUED | OUTPATIENT
Start: 2023-06-07 | End: 2023-06-07

## 2023-06-07 RX ORDER — METRONIDAZOLE 500 MG/100ML
500 INJECTION, SOLUTION INTRAVENOUS ONCE
Status: COMPLETED | OUTPATIENT
Start: 2023-06-07 | End: 2023-06-07

## 2023-06-07 RX ORDER — ALBUTEROL SULFATE 90 UG/1
2 AEROSOL, METERED RESPIRATORY (INHALATION) EVERY 6 HOURS PRN
Status: DISCONTINUED | OUTPATIENT
Start: 2023-06-07 | End: 2023-06-14 | Stop reason: HOSPADM

## 2023-06-07 RX ORDER — LORATADINE 10 MG/1
10 TABLET ORAL DAILY PRN
Status: DISCONTINUED | OUTPATIENT
Start: 2023-06-07 | End: 2023-06-14 | Stop reason: HOSPADM

## 2023-06-07 RX ORDER — ACETAMINOPHEN 325 MG/1
650 TABLET ORAL EVERY 6 HOURS SCHEDULED
Status: DISCONTINUED | OUTPATIENT
Start: 2023-06-07 | End: 2023-06-07

## 2023-06-07 RX ORDER — SUCCINYLCHOLINE/SOD CL,ISO/PF 100 MG/5ML
SYRINGE (ML) INTRAVENOUS AS NEEDED
Status: DISCONTINUED | OUTPATIENT
Start: 2023-06-07 | End: 2023-06-07

## 2023-06-07 RX ADMIN — ONDANSETRON 4 MG: 2 INJECTION INTRAMUSCULAR; INTRAVENOUS at 12:03

## 2023-06-07 RX ADMIN — ROCURONIUM BROMIDE 50 MG: 10 INJECTION, SOLUTION INTRAVENOUS at 12:22

## 2023-06-07 RX ADMIN — GABAPENTIN 100 MG: 100 CAPSULE ORAL at 19:23

## 2023-06-07 RX ADMIN — Medication 100 MG: at 12:05

## 2023-06-07 RX ADMIN — SODIUM BICARBONATE 650 MG: 650 TABLET ORAL at 19:22

## 2023-06-07 RX ADMIN — PHENYLEPHRINE HYDROCHLORIDE 100 MCG: 10 INJECTION INTRAVENOUS at 12:29

## 2023-06-07 RX ADMIN — LEVOFLOXACIN: 500 INJECTION, SOLUTION INTRAVENOUS at 12:33

## 2023-06-07 RX ADMIN — FENTANYL CITRATE 25 MCG: 50 INJECTION, SOLUTION INTRAMUSCULAR; INTRAVENOUS at 13:55

## 2023-06-07 RX ADMIN — SODIUM CHLORIDE, SODIUM LACTATE, POTASSIUM CHLORIDE, AND CALCIUM CHLORIDE 125 ML/HR: .6; .31; .03; .02 INJECTION, SOLUTION INTRAVENOUS at 20:27

## 2023-06-07 RX ADMIN — HYDROMORPHONE HYDROCHLORIDE: 10 INJECTION, SOLUTION INTRAMUSCULAR; INTRAVENOUS; SUBCUTANEOUS at 14:00

## 2023-06-07 RX ADMIN — SODIUM CHLORIDE: 0.9 INJECTION, SOLUTION INTRAVENOUS at 12:17

## 2023-06-07 RX ADMIN — MIDAZOLAM 2 MG: 1 INJECTION INTRAMUSCULAR; INTRAVENOUS at 12:42

## 2023-06-07 RX ADMIN — METRONIDAZOLE: 500 SOLUTION INTRAVENOUS at 12:20

## 2023-06-07 RX ADMIN — HEPARIN SODIUM 5000 UNITS: 5000 INJECTION INTRAVENOUS; SUBCUTANEOUS at 23:01

## 2023-06-07 RX ADMIN — PROPOFOL 150 MG: 10 INJECTION, EMULSION INTRAVENOUS at 12:04

## 2023-06-07 RX ADMIN — SODIUM CHLORIDE, SODIUM LACTATE, POTASSIUM CHLORIDE, AND CALCIUM CHLORIDE: .6; .31; .03; .02 INJECTION, SOLUTION INTRAVENOUS at 12:15

## 2023-06-07 RX ADMIN — DEXAMETHASONE SODIUM PHOSPHATE 5 MG: 10 INJECTION, SOLUTION INTRAMUSCULAR; INTRAVENOUS at 12:03

## 2023-06-07 RX ADMIN — LORATADINE 10 MG: 10 TABLET ORAL at 19:21

## 2023-06-07 RX ADMIN — LIDOCAINE HYDROCHLORIDE 100 MG: 10 INJECTION, SOLUTION EPIDURAL; INFILTRATION; INTRACAUDAL; PERINEURAL at 12:04

## 2023-06-07 RX ADMIN — SUGAMMADEX 300 MG: 100 INJECTION, SOLUTION INTRAVENOUS at 13:19

## 2023-06-07 RX ADMIN — ACETAMINOPHEN 975 MG: 325 TABLET, FILM COATED ORAL at 19:21

## 2023-06-07 RX ADMIN — FENTANYL CITRATE 100 MCG: 50 INJECTION, SOLUTION INTRAMUSCULAR; INTRAVENOUS at 12:04

## 2023-06-07 RX ADMIN — MIDAZOLAM 2 MG: 1 INJECTION INTRAMUSCULAR; INTRAVENOUS at 12:04

## 2023-06-07 RX ADMIN — FENTANYL CITRATE 25 MCG: 50 INJECTION, SOLUTION INTRAMUSCULAR; INTRAVENOUS at 13:45

## 2023-06-07 NOTE — OP NOTE
OPERATIVE REPORT  PATIENT NAME: Kwame Clements    :  1970  MRN: [id number]  Pt Location:  OR ROOM 07    SURGERY DATE: 2023    Surgeon(s) and Role:     * Kenan Roche MD - Primary     * Beto Krishna MD - Assisting    Preop Diagnosis:  Ulcerative pancolitis without complication ([facility] ) [R11 66]    Post-Op Diagnosis Codes:     * Ulcerative pancolitis without complication ([facility] [address] ) [O11 38]    Procedure(s):  CLOSURE ILEOSTOMY    Specimen(s):  ID Type Source Tests Collected by Time Destination   1 :  Tissue Ileum, ileostomy stoma TISSUE EXAM Kenan Roche MD 2023 1256        Estimated Blood Loss:   Minimal    Drains:  Ileostomy Loop LLQ (Active)   Number of days: 98       Urethral Catheter Non-latex;Straight-tip 16 Fr  (Active)   Number of days: 0       Anesthesia Type:   General    Operative Indications:  Ulcerative pancolitis without complication ([facility] [address] ) [D19 71]      Operative Findings:  Normal ileostomy    Complications:   None    Procedure and Technique:  After preoperative identification in the preoperative holding area the patient was taken the operating room placed in the supine position  After satisfactory induction of general endotracheal anesthesia appropriate placement of anesthesia monitors, patient was secured to the operating room table  Patient was prepped and draped in usual sterile fashion  Antibiotics were given prior to incision  Time-out was undertaken procedure begun  Curvilinear incision was made around the patient's stoma  Subcutaneous tissues were dissected free off of the bowel itself  Circumferentially we dissected the subcutaneous fat off of the small bowel  This was taken all the way down to the level of the fascia  Abdomen was entered sharply  We are able to circumferentially free up the small bowel to include any intra-abdominal adhesions    There is a good rim of approxi-5 cm of adhesion from the anterior abdominal wall around our fascial incision  With this small bowel fully mobilized, we prepared for transection and anastomosis  A clear area of bowel was then transected both proximally and distally  Mesentery was secured using clamps and 2-0 Vicryl ties  Stapler limbs were placed in the proximal and distal limb of small bowel  These were opposed on the antimesenteric border  Stapler was closed and fired  Staple line was inspected for hemostasis  There is no to be excellent  Opening to the anastomosis was then closed between Allis clamps and a final firing of the JESSI 100 was used to complete the anastomosis  Crossing corners and the ends were reinforced using 3-0 Vicryl as was the apex  Anastomosis was noted to be widely patent  Mesenteric defect was closed  Return this to the abdomen proper  This went in without any significant difficulty  It was again performed  Fascia is noted to be clear  As such we prepared to close  Chemicals were changed  Clean instruments were used  Fascia was reapproximated using #1 interrupted figure-of-eight PDS  Subcutaneous tissues were copiously irrigated  Skin was loosely closed with staples  Dry sterile dressing was applied  Patient was awakened from anesthesia and transferred to recovery room in stable condition  I was present for the entire procedure      Patient Disposition:  PACU     This procedure was not performed to treat colon cancer through resection      SIGNATURE: Aminata Irvin MD  DATE: June 7, 2023  TIME: 1:18 PM
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
## 2023-06-07 NOTE — ANESTHESIA PREPROCEDURE EVALUATION
Procedure:  CLOSURE ILEOSTOMY (Abdomen)    Relevant Problems   ENDO   (+) Acquired hypothyroidism      /RENAL   (+) Acute renal failure (ARF) (HCC)   (+) Chronic tubulointerstitial nephritis   (+) Stage 2 chronic kidney disease      MUSCULOSKELETAL   (+) Low back pain at multiple sites   (+) Primary osteoarthritis of both knees      PULMONARY   (+) Mild intermittent asthma without complication   (+) Mild persistent asthma without complication      Respiratory   (+) Tracheitis      Digestive   (+) Ulcerative colitis (HCC)      Endocrine   (+) Impaired fasting blood sugar      Other   (+) Acidosis   (+) Diarrhea   (+) Electrolyte abnormality   (+) Fluid volume deficit   (+) High anion gap metabolic acidosis   (+) Hypokalemia   (+) Hyponatremia   (+) Localized edema        Physical Exam    Airway    Mallampati score: II  TM Distance: >3 FB  Neck ROM: full     Dental       Cardiovascular      Pulmonary      Other Findings    EKG: Normal sinus rhythm  Inferior infarct , age undetermined  Abnormal ECG  When compared with ECG of 18-AUG-2021 10:46,  Lateral T wave flattening is new   Confirmed by Morris Schulz ([id number]) on 2/7/2023 11:49:11 AM    Anesthesia Plan  ASA Score- 3     Anesthesia Type- general with ASA Monitors  Additional Monitors:   Airway Plan: ETT  Plan Factors-    Chart reviewed  EKG reviewed  Existing labs reviewed  Patient summary reviewed  Patient is not a current smoker  Patient did not smoke on day of surgery  Induction- intravenous  Postoperative Plan- Plan for postoperative opioid use  Planned trial extubation    Informed Consent- Anesthetic plan and risks discussed with patient  I personally reviewed this patient with the CRNA  Discussed and agreed on the Anesthesia Plan with the CRNA  Jesus Matthews         Hemoglobin 11 5 - 15 4 g/dL 12 2  11 0 Low   10 5 Low   12 1  13 5  11 5  12 8    Hematocrit 34 8 - 46 1 % 37 2  34 3 Low   31 2 Low   36 3  39 6        Platelets 594 - 635 Thousands/uL 317  331        Sodium 135 - 147 mmol/L 131 Low   131 Low   136  132 Low   130 Low   130 Low   127 Low     Potassium 3 5 - 5 3 mmol/L 3 7  4 6  3 1 Low   3 5  3 4 Low   3 6  3 9    Chloride 96 - 108 mmol/L 106  109 High   103  101  100  99  97    CO2 21 - 32 mmol/L 19 Low   19 Low   23  15 Low   15 Low   16 Low   14 Low     ANION GAP 4 - 13 mmol/L 6  3 Low   10  16 High   15 High   15 High   16 High     BUN 5 - 25 mg/dL 26 High   18  43 High   64 High   71 High   77 High   82 High     Creatinine 0 60 - 1 30 mg/dL 1 69 High   1 20 CM  1 74 High  CM  3 04 High  CM  3 41 High  CM        April 2023: Nephrology Assessment & Plan     1  Acute renal failure with tubular necrosis (HCC)  ? Peak creatinine 4 8 mg/dL secondary to profound volume depletion  Function improved but not back to baseline  We will repeat lab work in 1 month  She was encouraged to hydrate and increase sodium intake due to chronic volume depleted state  2  Stage 2 chronic kidney disease  ? Creatinine 0 7-1 1 mg/dL  Suspect etiology chronic tubulointerstitial disease from repeated volume insults and previous NSAID use  3  Acidosis  ? Start sodium bicarbonate tablets and repeat lab work in 1 month  -     sodium bicarbonate 650 mg tablet; Take 1 tablet (650 mg total) by mouth 2 (two) times a day        -     Comprehensive metabolic panel; Future; Expected date: 05/24/2023        -     CBC and differential; Future; Expected date: 05/24/2023        -     Phosphorus; Future; Expected date: 05/24/2023        -     Magnesium; Future; Expected date: 05/24/2023  4  Fluid volume deficit  ? From high output ostomy  She will attempt to increase hydration and use salty snack to assist with maintaining volume status  If she cannot do this, can consider for infusions  At this time she examines fairly euvolemic  5  Iron deficiency        -     Iron Panel (Includes Ferritin, Iron Sat%, Iron, and TIBC);  Future; Expected date: 05/24/2023 -     CBC and Platelet; Future; Expected date: 05/24/2023  6  Vitamin D deficiency        -     Vitamin D 25 hydroxy; Future; Expected date: 05/24/2023  7  Ulcerative pancolitis with other complication ([facility] )  8  Proteinuria  9   Chronic tubulointerstitial nephritis

## 2023-06-07 NOTE — ANESTHESIA POSTPROCEDURE EVALUATION
Post-Op Assessment Note    CV Status:  Stable  Pain Score: 4    Pain management: adequate     Mental Status:  Alert and awake   Hydration Status:  Euvolemic   PONV Controlled:  Controlled   Airway Patency:  Patent      Post Op Vitals Reviewed: Yes      Staff: CRNA         No notable events documented      BP   141/66   Temp   97 4   Pulse  88   Resp   22   SpO2   98

## 2023-06-08 LAB
ANION GAP SERPL CALCULATED.3IONS-SCNC: 4 MMOL/L (ref 4–13)
BUN SERPL-MCNC: 19 MG/DL (ref 5–25)
CALCIUM SERPL-MCNC: 8.4 MG/DL (ref 8.3–10.1)
CHLORIDE SERPL-SCNC: 108 MMOL/L (ref 96–108)
CO2 SERPL-SCNC: 16 MMOL/L (ref 21–32)
CREAT SERPL-MCNC: 1.51 MG/DL (ref 0.6–1.3)
ERYTHROCYTE [DISTWIDTH] IN BLOOD BY AUTOMATED COUNT: 16.3 % (ref 11.6–15.1)
GFR SERPL CREATININE-BSD FRML MDRD: 40 ML/MIN/1.73SQ M
GLUCOSE SERPL-MCNC: 92 MG/DL (ref 65–140)
HCT VFR BLD AUTO: 33.8 % (ref 34.8–46.1)
HGB BLD-MCNC: 10.7 G/DL (ref 11.5–15.4)
MAGNESIUM SERPL-MCNC: 2.1 MG/DL (ref 1.6–2.6)
MCH RBC QN AUTO: 29.1 PG (ref 26.8–34.3)
MCHC RBC AUTO-ENTMCNC: 31.7 G/DL (ref 31.4–37.4)
MCV RBC AUTO: 92 FL (ref 82–98)
PHOSPHATE SERPL-MCNC: 3.6 MG/DL (ref 2.7–4.5)
PLATELET # BLD AUTO: 283 THOUSANDS/UL (ref 149–390)
PMV BLD AUTO: 11.2 FL (ref 8.9–12.7)
POTASSIUM SERPL-SCNC: 3.8 MMOL/L (ref 3.5–5.3)
RBC # BLD AUTO: 3.68 MILLION/UL (ref 3.81–5.12)
SODIUM SERPL-SCNC: 128 MMOL/L (ref 135–147)
WBC # BLD AUTO: 6.78 THOUSAND/UL (ref 4.31–10.16)

## 2023-06-08 PROCEDURE — 85027 COMPLETE CBC AUTOMATED: CPT | Performed by: SURGERY

## 2023-06-08 PROCEDURE — 83735 ASSAY OF MAGNESIUM: CPT | Performed by: SURGERY

## 2023-06-08 PROCEDURE — 84100 ASSAY OF PHOSPHORUS: CPT | Performed by: SURGERY

## 2023-06-08 PROCEDURE — 80048 BASIC METABOLIC PNL TOTAL CA: CPT | Performed by: SURGERY

## 2023-06-08 PROCEDURE — 99024 POSTOP FOLLOW-UP VISIT: CPT | Performed by: COLON & RECTAL SURGERY

## 2023-06-08 RX ORDER — DEXTROSE MONOHYDRATE, SODIUM CHLORIDE, AND POTASSIUM CHLORIDE 50; 1.49; 4.5 G/1000ML; G/1000ML; G/1000ML
84 INJECTION, SOLUTION INTRAVENOUS CONTINUOUS
Status: DISCONTINUED | OUTPATIENT
Start: 2023-06-08 | End: 2023-06-09

## 2023-06-08 RX ORDER — CALCIUM CARBONATE 500 MG/1
500 TABLET, CHEWABLE ORAL DAILY PRN
Status: DISCONTINUED | OUTPATIENT
Start: 2023-06-08 | End: 2023-06-14 | Stop reason: HOSPADM

## 2023-06-08 RX ORDER — POTASSIUM CHLORIDE 20 MEQ/1
20 TABLET, EXTENDED RELEASE ORAL ONCE
Status: COMPLETED | OUTPATIENT
Start: 2023-06-08 | End: 2023-06-08

## 2023-06-08 RX ORDER — OXYCODONE HYDROCHLORIDE 5 MG/1
5 TABLET ORAL EVERY 4 HOURS PRN
Status: DISCONTINUED | OUTPATIENT
Start: 2023-06-08 | End: 2023-06-10

## 2023-06-08 RX ORDER — OXYCODONE HYDROCHLORIDE 10 MG/1
10 TABLET ORAL EVERY 4 HOURS PRN
Status: DISCONTINUED | OUTPATIENT
Start: 2023-06-08 | End: 2023-06-10

## 2023-06-08 RX ADMIN — POTASSIUM CHLORIDE 20 MEQ: 1500 TABLET, EXTENDED RELEASE ORAL at 09:35

## 2023-06-08 RX ADMIN — ACETAMINOPHEN 975 MG: 325 TABLET, FILM COATED ORAL at 15:17

## 2023-06-08 RX ADMIN — HEPARIN SODIUM 5000 UNITS: 5000 INJECTION INTRAVENOUS; SUBCUTANEOUS at 05:09

## 2023-06-08 RX ADMIN — SODIUM BICARBONATE 650 MG: 650 TABLET ORAL at 18:34

## 2023-06-08 RX ADMIN — ACETAMINOPHEN 975 MG: 325 TABLET, FILM COATED ORAL at 05:09

## 2023-06-08 RX ADMIN — SODIUM CHLORIDE, SODIUM LACTATE, POTASSIUM CHLORIDE, AND CALCIUM CHLORIDE 125 ML/HR: .6; .31; .03; .02 INJECTION, SOLUTION INTRAVENOUS at 04:32

## 2023-06-08 RX ADMIN — GABAPENTIN 100 MG: 100 CAPSULE ORAL at 09:35

## 2023-06-08 RX ADMIN — GABAPENTIN 100 MG: 100 CAPSULE ORAL at 21:45

## 2023-06-08 RX ADMIN — ANTACID TABLETS 500 MG: 500 TABLET, CHEWABLE ORAL at 18:33

## 2023-06-08 RX ADMIN — DEXTROSE, SODIUM CHLORIDE, AND POTASSIUM CHLORIDE 84 ML/HR: 5; .45; .15 INJECTION INTRAVENOUS at 09:35

## 2023-06-08 RX ADMIN — ONDANSETRON 4 MG: 2 INJECTION INTRAMUSCULAR; INTRAVENOUS at 17:25

## 2023-06-08 RX ADMIN — DEXTROSE, SODIUM CHLORIDE, AND POTASSIUM CHLORIDE 84 ML/HR: 5; .45; .15 INJECTION INTRAVENOUS at 21:43

## 2023-06-08 RX ADMIN — GABAPENTIN 100 MG: 100 CAPSULE ORAL at 15:17

## 2023-06-08 RX ADMIN — LEVOTHYROXINE SODIUM 100 MCG: 100 TABLET ORAL at 05:09

## 2023-06-08 RX ADMIN — ACETAMINOPHEN 975 MG: 325 TABLET, FILM COATED ORAL at 21:45

## 2023-06-08 RX ADMIN — HEPARIN SODIUM 5000 UNITS: 5000 INJECTION INTRAVENOUS; SUBCUTANEOUS at 21:45

## 2023-06-08 RX ADMIN — HEPARIN SODIUM 5000 UNITS: 5000 INJECTION INTRAVENOUS; SUBCUTANEOUS at 15:17

## 2023-06-08 RX ADMIN — SODIUM BICARBONATE 650 MG: 650 TABLET ORAL at 09:35

## 2023-06-08 NOTE — UTILIZATION REVIEW
"Initial Clinical Review    Elective Inpatient surgical procedure  Age/Sex: 50 y o  female  Surgery Date: 6/7/23  Procedure: CLOSURE ILEOSTOMY  Anesthesia: general  Operative Findings: Normal ileostomy    POD#1 Progress Note:  No events overnight, passing flatus, no BM yet  Abdomen soft, Non-Distended, appropriately tender  Tolerating diet, adv as able, DC Garcia today, continue pain control prn  Admission Orders: Date/Time/Statement:   Admission Orders (From admission, onward)     Ordered        06/07/23 1349  Inpatient Admission  Once                      Orders Placed This Encounter   Procedures   • Inpatient Admission     Standing Status:   Standing     Number of Occurrences:   1     Order Specific Question:   Level of Care     Answer:   Med Surg [16]     Order Specific Question:   Estimated length of stay     Answer:   More than 2 Midnights     Order Specific Question:   Certification     Answer:   I certify that inpatient services are medically necessary for this patient for a duration of greater than two midnights  See H&P and MD Progress Notes for additional information about the patient's course of treatment       Vital Signs: /87   Pulse 96   Temp 97 7 °F (36 5 °C)   Resp 18   Ht 5' 3\" (1 6 m)   Wt 109 kg (240 lb)   LMP 06/06/2022 (Exact Date)   SpO2 99%   BMI 42 51 kg/m²     Pertinent Labs/Diagnostic Test Results:   No orders to display         Results from last 7 days   Lab Units 06/08/23  0512   HEMATOCRIT % 33 8*   HEMOGLOBIN g/dL 10 7*   PLATELETS Thousands/uL 283   WBC Thousand/uL 6 78         Results from last 7 days   Lab Units 06/08/23  0512   ANION GAP mmol/L 4   BUN mg/dL 19   CALCIUM mg/dL 8 4   CHLORIDE mmol/L 108   CO2 mmol/L 16*   CREATININE mg/dL 1 51*   EGFR ml/min/1 73sq m 40   POTASSIUM mmol/L 3 8   MAGNESIUM mg/dL 2 1   PHOSPHORUS mg/dL 3 6   SODIUM mmol/L 128*     Results from last 7 days   Lab Units 06/08/23  0512   GLUCOSE RANDOM mg/dL 92       Diet: clear liquid, add " crackers  Mobility: OOB and ambulation  DVT Prophylaxis: heparin, scd, ambulatory    Medications/Pain Control:   Scheduled Medications:  acetaminophen, 975 mg, Oral, Q8H LUI  gabapentin, 100 mg, Oral, TID  heparin (porcine), 5,000 Units, Subcutaneous, Q8H LUI  levothyroxine, 100 mcg, Oral, Early Morning  potassium chloride, 20 mEq, Oral, Once  sodium bicarbonate, 650 mg, Oral, BID      Continuous IV Infusions:  dextrose 5 % and sodium chloride 0 45 % with KCl 20 mEq/L, 84 mL/hr, Intravenous, Continuous  HYDROmorphone, , Intravenous, Continuous      PRN Meds:  albuterol, 2 puff, Inhalation, Q6H PRN  diphenhydrAMINE, 25 mg, Intravenous, Q6H PRN  loratadine, 10 mg, Oral, Daily PRN  ondansetron, 4 mg, Intravenous, Q6H PRN  oxyCODONE, 10 mg, Oral, Q4H PRN  oxyCODONE, 5 mg, Oral, Q4H PRN        Network Utilization Review Department  ATTENTION: Please call with any questions or concerns to [phone] and carefully listen to the prompts so that you are directed to the right person  All voicemails are confidential   Tamela Rubi all requests for admission clinical reviews, approved or denied determinations and any other requests to dedicated fax number below belonging to the campus where the patient is receiving treatment   List of dedicated fax numbers for the Facilities:  [address] DENIALS (Administrative/Medical Necessity) [phone]   [address] (Maternity/NICU/Pediatrics) [phone]    Ana Rosas [phone]   [facility] [phone]   [facility] [phone]   [facility] [address] [place] [address] [place] [address] [address] [phone]

## 2023-06-08 NOTE — UTILIZATION REVIEW
NOTIFICATION OF INPATIENT ADMISSION   AUTHORIZATION REQUEST   SERVICING FACILITY:   [facility]  Address: [address] [place] [address]  Tax ID: [id number]  NPI: [id number] ATTENDING PROVIDER:  Attending Name and NPI#: Ana Childers Md [[id number]]  Address: [address] [place] [address]  Phone: [phone]   ADMISSION INFORMATION:  Place of Service: Inpatient [address] [facility]  [address]  Place of Service Code: 21  Inpatient Admission Date/Time: 6/7/23  1:49 PM  Discharge Date/Time: No discharge date for patient encounter  Admitting Diagnosis Code/Description:  [facility] ) [J49 61]     UTILIZATION REVIEW CONTACT:  Saul Bell Utilization   Network Utilization Review Department  Phone: [phone]  Fax: [phone]  Email: [email or web address]  Contact for approvals/pending authorizations, clinical reviews, and discharge  PHYSICIAN ADVISORY SERVICES:  Medical Necessity Denial & Pdlh-ar-Lpsw Review  Phone: [phone]  Fax: [phone]  Email: [email or web address]

## 2023-06-08 NOTE — PLAN OF CARE
Problem: MOBILITY - ADULT  Goal: Maintain or return to baseline ADL function  Description: INTERVENTIONS:  -  Assess patient's ability to carry out ADLs; assess patient's baseline for ADL function and identify physical deficits which impact ability to perform ADLs (bathing, care of mouth/teeth, toileting, grooming, dressing, etc )  - Assess/evaluate cause of self-care deficits   - Assess range of motion  - Assess patient's mobility; develop plan if impaired  - Assess patient's need for assistive devices and provide as appropriate  - Encourage maximum independence but intervene and supervise when necessary  - Involve family in performance of ADLs  - Assess for home care needs following discharge   - Consider OT consult to assist with ADL evaluation and planning for discharge  - Provide patient education as appropriate  Outcome: Progressing     Problem: PAIN - ADULT  Goal: Verbalizes/displays adequate comfort level or baseline comfort level  Description: Interventions:  - Encourage patient to monitor pain and request assistance  - Assess pain using appropriate pain scale  - Administer analgesics based on type and severity of pain and evaluate response  - Implement non-pharmacological measures as appropriate and evaluate response  - Consider cultural and social influences on pain and pain management  - Notify physician/advanced practitioner if interventions unsuccessful or patient reports new pain  Outcome: Progressing     Problem: INFECTION - ADULT  Goal: Absence or prevention of progression during hospitalization  Description: INTERVENTIONS:  - Assess and monitor for signs and symptoms of infection  - Monitor lab/diagnostic results  - Monitor all insertion sites, i e  indwelling lines, tubes, and drains  - Monitor endotracheal if appropriate and nasal secretions for changes in amount and color  - [place] appropriate cooling/warming therapies per order  - Administer medications as ordered  - Instruct and encourage patient and family to use good hand hygiene technique  - Identify and instruct in appropriate isolation precautions for identified infection/condition  Outcome: Progressing     Problem: SAFETY ADULT  Goal: Patient will remain free of falls  Description: INTERVENTIONS:  - Educate patient/family on patient safety including physical limitations  - Instruct patient to call for assistance with activity   - Consult OT/PT to assist with strengthening/mobility   - Keep Call bell within reach  - Keep bed low and locked with side rails adjusted as appropriate  - Keep care items and personal belongings within reach  - Initiate and maintain comfort rounds  - Make Fall Risk Sign visible to staff  - Apply yellow socks and bracelet for high fall risk patients  - Consider moving patient to room near nurses station  Outcome: Progressing     Problem: DISCHARGE PLANNING  Goal: Discharge to home or other facility with appropriate resources  Description: INTERVENTIONS:  - Identify barriers to discharge w/patient and caregiver  - Arrange for needed discharge resources and transportation as appropriate  - Identify discharge learning needs (meds, wound care, etc )  - Arrange for interpretive services to assist at discharge as needed  - Refer to Case Management Department for coordinating discharge planning if the patient needs post-hospital services based on physician/advanced practitioner order or complex needs related to functional status, cognitive ability, or social support system  Outcome: Progressing

## 2023-06-08 NOTE — PLAN OF CARE
Problem: PAIN - ADULT  Goal: Verbalizes/displays adequate comfort level or baseline comfort level  Description: Interventions:  - Encourage patient to monitor pain and request assistance  - Assess pain using appropriate pain scale  - Administer analgesics based on type and severity of pain and evaluate response  - Implement non-pharmacological measures as appropriate and evaluate response  - Consider cultural and social influences on pain and pain management  - Notify physician/advanced practitioner if interventions unsuccessful or patient reports new pain  Outcome: Progressing     Problem: SAFETY ADULT  Goal: Patient will remain free of falls  Description: INTERVENTIONS:  - Educate patient/family on patient safety including physical limitations  - Instruct patient to call for assistance with activity   - Consult OT/PT to assist with strengthening/mobility   - Keep Call bell within reach  - Keep bed low and locked with side rails adjusted as appropriate  - Keep care items and personal belongings within reach  - Initiate and maintain comfort rounds  - Make Fall Risk Sign visible to staff  - Offer Toileting every 2 Hours, in advance of need  - Initiate/Maintain alarm  - Obtain necessary fall risk management equipment  - Apply yellow socks and bracelet for high fall risk patients  - Consider moving patient to room near nurses station  Outcome: Progressing

## 2023-06-08 NOTE — PROGRESS NOTES
"Progress Note - Colorectal Surgery   Angie Harrington 50 y o  female MRN: [id number]  Unit/Bed#: [facility] 804-01 Encounter: [id number]    Assessment:  Angie Harrington is a 50 y o  female s/p 6/7 ileostomy closure         Plan:  Clear liquid diet, add crackers  Dc fernandez  Pain control PRN        Subjective/Objective     Subjective:   No acute events overnight  -N  -V  Passing flatus no BM  Objective:     Blood pressure 108/74, pulse 96, temperature 98 4 °F (36 9 °C), resp  rate 18, height 5' 3\" (1 6 m), weight 109 kg (240 lb), last menstrual period 06/06/2022, SpO2 95 %, not currently breastfeeding  ,Body mass index is 42 51 kg/m²        Intake/Output Summary (Last 24 hours) at 6/7/2023 2214  Last data filed at 6/7/2023 2027  Gross per 24 hour   Intake 2218 ml   Output 200 ml   Net 2018 ml       Invasive Devices     Peripheral Intravenous Line  Duration           Peripheral IV 06/07/23 Left Antecubital <1 day    Peripheral IV 06/07/23 Left Hand <1 day    Peripheral IV 06/07/23 Right Hand <1 day          Drain  Duration           Ileostomy Loop LLQ 98 days    Urethral Catheter Non-latex;Straight-tip 16 Fr  <1 day                Physical Exam:   Gen: NAD, Comfortable  Neuro: A&O, No focal deficits  Head: Normal Cephalic, Atraumatic  Eye: EOMI, PERRLA, No scleral icterus  Neck: Supple, No JVD, Midline trachea  CV: RRR, Cap refill <2 sec  Pulm: Normal work of breathing, no respiratory distress  Abd: Soft, Non-Distended, appropriately tender  Ext: No edema, Non-tender  Skin: warm, dry, intact      "

## 2023-06-08 NOTE — QUICK NOTE
Post Op Check:    Savannah Sanchez is a 50 y o  female s/p 6/7 ileostomy closure- [place]     Saw patient at the bedside once they arrived to the floor  Patients pain is well controlled  They denied any nausea, chest pain, or shortness of breath       GEN: NAD, A&O  Chest: Normal work of breathing, no respiratory distress  Abd: S, ND, appropriately tender, dressing dry    Plan:  Diet Clear Liquid  Continue to monitor  Pain and nausea control PRN    Catina Terrazas MD  Surgery, PGY-2

## 2023-06-09 ENCOUNTER — APPOINTMENT (OUTPATIENT)
Dept: RADIOLOGY | Facility: HOSPITAL | Age: 53
DRG: 330 | End: 2023-06-09
Payer: COMMERCIAL

## 2023-06-09 PROCEDURE — 97162 PT EVAL MOD COMPLEX 30 MIN: CPT

## 2023-06-09 PROCEDURE — 74018 RADEX ABDOMEN 1 VIEW: CPT

## 2023-06-09 PROCEDURE — 97166 OT EVAL MOD COMPLEX 45 MIN: CPT

## 2023-06-09 PROCEDURE — 99024 POSTOP FOLLOW-UP VISIT: CPT | Performed by: COLON & RECTAL SURGERY

## 2023-06-09 RX ORDER — DEXTROSE MONOHYDRATE, SODIUM CHLORIDE, AND POTASSIUM CHLORIDE 50; 1.49; 4.5 G/1000ML; G/1000ML; G/1000ML
125 INJECTION, SOLUTION INTRAVENOUS CONTINUOUS
Status: DISCONTINUED | OUTPATIENT
Start: 2023-06-09 | End: 2023-06-13

## 2023-06-09 RX ADMIN — ACETAMINOPHEN 975 MG: 325 TABLET, FILM COATED ORAL at 05:37

## 2023-06-09 RX ADMIN — HEPARIN SODIUM 5000 UNITS: 5000 INJECTION INTRAVENOUS; SUBCUTANEOUS at 14:14

## 2023-06-09 RX ADMIN — OXYCODONE HYDROCHLORIDE 10 MG: 10 TABLET ORAL at 10:05

## 2023-06-09 RX ADMIN — LEVOTHYROXINE SODIUM 100 MCG: 100 TABLET ORAL at 05:37

## 2023-06-09 RX ADMIN — ONDANSETRON 4 MG: 2 INJECTION INTRAMUSCULAR; INTRAVENOUS at 18:00

## 2023-06-09 RX ADMIN — ACETAMINOPHEN 975 MG: 325 TABLET, FILM COATED ORAL at 14:14

## 2023-06-09 RX ADMIN — GABAPENTIN 100 MG: 100 CAPSULE ORAL at 08:45

## 2023-06-09 RX ADMIN — SODIUM BICARBONATE 650 MG: 650 TABLET ORAL at 08:45

## 2023-06-09 RX ADMIN — GABAPENTIN 100 MG: 100 CAPSULE ORAL at 18:10

## 2023-06-09 RX ADMIN — HEPARIN SODIUM 5000 UNITS: 5000 INJECTION INTRAVENOUS; SUBCUTANEOUS at 22:05

## 2023-06-09 RX ADMIN — HEPARIN SODIUM 5000 UNITS: 5000 INJECTION INTRAVENOUS; SUBCUTANEOUS at 05:38

## 2023-06-09 RX ADMIN — ONDANSETRON 4 MG: 2 INJECTION INTRAMUSCULAR; INTRAVENOUS at 08:45

## 2023-06-09 RX ADMIN — ANTACID TABLETS 500 MG: 500 TABLET, CHEWABLE ORAL at 10:47

## 2023-06-09 RX ADMIN — DEXTROSE, SODIUM CHLORIDE, AND POTASSIUM CHLORIDE 75 ML/HR: 5; .45; .15 INJECTION INTRAVENOUS at 08:50

## 2023-06-09 RX ADMIN — Medication 1 SPRAY: at 22:01

## 2023-06-09 RX ADMIN — OXYCODONE HYDROCHLORIDE 10 MG: 10 TABLET ORAL at 18:10

## 2023-06-09 RX ADMIN — OXYCODONE HYDROCHLORIDE 10 MG: 10 TABLET ORAL at 14:14

## 2023-06-09 RX ADMIN — SODIUM BICARBONATE 650 MG: 650 TABLET ORAL at 18:10

## 2023-06-09 NOTE — PROGRESS NOTES
"Progress Note - Regine Lara 50 y o  female MRN: [id number]    Unit/Bed#: [facility] 804-01 Encounter: [id number]      Assessment:  51 y/o F s/p ilostomy closure on 6/7  Vss  Afebrile  abd s/ nt/ nd  Uop: 2 2L    Plan:  Continue clears toast crackers  Gentle ivf  Waiting return of bowel function  Ambulate  dvt ppx  Local wound care to prior ostomy site    Subjective:   No flatus, no bowel mvts  One episode of vomiting after Iker ice yesterday  Objective:     Vitals: Blood pressure 104/62, pulse 88, temperature 98 8 °F (37 1 °C), resp  rate 17, height 5' 3\" (1 6 m), weight 109 kg (240 lb), last menstrual period 06/06/2022, SpO2 97 %, not currently breastfeeding  ,Body mass index is 42 51 kg/m²  Intake/Output Summary (Last 24 hours) at 6/9/2023 0205  Last data filed at 6/8/2023 2200  Gross per 24 hour   Intake 2660 27 ml   Output 1500 ml   Net 1160 27 ml       Physical Exam  General: NAD  HEENT: NC/AT  MMM  Cv: RRR  Lungs: normal effort  Ab: Soft, NT/ND  Ex: no CCE  Neuro: AAOx3      Invasive Devices     Peripheral Intravenous Line  Duration           Peripheral IV 06/07/23 Left Antecubital 1 day    Peripheral IV 06/07/23 Left Hand 1 day    Peripheral IV 06/07/23 Right Hand 1 day                Lab, Imaging and other studies: I have personally reviewed pertinent reports      VTE Pharmacologic Prophylaxis: Sequential compression device (Venodyne)   VTE Mechanical Prophylaxis: sequential compression device   "

## 2023-06-09 NOTE — QUICK NOTE
Called to bedside by nursing by persistent vomiting  Evaluated patient  49 y/o F s/p ilostomy closure on 6/7  AVSS on RA    Subjective:  Vomiting x 3 this evening despite anti nausea meds  Passing gas no bowel movements       PE: distended, tympanic, soft      Plan  NGT inserted at bedside   Continue NPO/ NGT to suction  Note post placement NGT reviewed, NGT moved back 10 cm
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
## 2023-06-09 NOTE — MALNUTRITION/BMI
This medical record reflects one or more clinical indicators suggestive of malnutrition and/or morbid obesity  Malnutrition Findings:                                 BMI Findings:  Adult BMI Classifications: Morbid Obesity 40-44 9        Body mass index is 42 51 kg/m²  See Nutrition note dated 6/9/23 for additional details  Completed nutrition assessment is viewable in the nutrition documentation

## 2023-06-09 NOTE — PLAN OF CARE
Problem: PHYSICAL THERAPY ADULT  Goal: Performs mobility at highest level of function for planned discharge setting  See evaluation for individualized goals  Description: Treatment/Interventions: Functional transfer training, Elevations, Gait training, Spoke to nursing, OT  Equipment Recommended: Radha Soni       See flowsheet documentation for full assessment, interventions and recommendations  Note: Prognosis: Good  Problem List: Decreased endurance, Decreased mobility, Pain  Assessment: Pt is a 50 y o  female seen for PT evaluation s/p admit to [facility] on 6/7/2023  Pt was admitted with a primary dx of: Ileostomy closure on 6/7  PT now consulted for assessment of mobility and d/c needs  Pt with Ambulate orders  Pts current comorbidities affecting treatment include: Asthma, OA B/L knees, Low back pain , Rotator cuff  Pts current clinical presentation is Unstable/ Unpredictable (high complexity) due to Ongoing medical management for primary dx, Decreased activity tolerance compared to baseline, s/p surgical intervention  Prior to admission, pt was independent for mobility and ADLs, with SO assisting with IADLs  At time of PT evaluation, pt required able to complete mobility with assist X 1, limiting factors abdominal pain and fatigue  Pt presents at PT eval functioning below baseline and currently w/ overall mobility deficits 2* to: decreased endurance, pain, decreased activity tolerance compared to baseline  Pt will continue to benefit from skilled acute PT interventions to maximize functional mobility    At conclusion of PT session all needs in reach, RN notified of session findings/recommendations and pt returned back in recliner chair with phone and call bell within reach  The patient's AM-PAC Basic Mobility Inpatient Short Form Raw Score is  18  A Raw score of greater than 16 suggests the patient may benefit from discharge to home   Please also refer to the recommendation of the Physical Therapist for safe discharge planning  Recommend HHPT upon hospital D/C as anticipate pt to improve in mobility level through hospital course  PT Discharge Recommendation: Home with home health rehabilitation    See flowsheet documentation for full assessment

## 2023-06-09 NOTE — Clinical Note
Pt is a 50 y o  female seen for PT evaluation s/p admit to [facility] on 6/7/2023  Pt was admitted with a primary dx of: Ileostomy closure on 6/7  PT now consulted for assessment of mobility and d/c needs  Pt with Ambulate orders  Pts current comorbidities affecting treatment include: Asthma, OA B/L knees, Low back pain , Rotator cuff  Pts current clinical presentation is Unstable/ Unpredictable (high complexity) due to Ongoing medical management for primary dx, Decreased activity tolerance compared to baseline, s/p surgical intervention  Prior to admission, pt was independent for mobility and ADLs, with SO assisting with IADLs  At time of PT evaluation, pt required able to complete mobility with assist X 1, limiting factors abdominal pain and fatigue  Pt presents at PT eval functioning below baseline and currently w/ overall mobility deficits 2* to: decreased endurance, pain, decreased activity tolerance compared to baseline  Pt will continue to benefit from skilled acute PT interventions to maximize functional mobility    At conclusion of PT session all needs in reach, RN notified of session findings/recommendations and pt returned back in recliner chair with phone and call bell within reach  The patient's AM-PAC Basic Mobility Inpatient Short Form Raw Score is  18  A Raw score of greater than 16 suggests the patient may benefit from discharge to home  Please also refer to the recommendation of the Physical Therapist for safe discharge planning  Recommend HHPT upon hospital D/C as anticipate pt to improve in mobility level through hospital course  STG 1  Pt will be able to perform bed mobility tasks with Mod Ind in order to improve overall functional mobility and assist in safe d/c  STG 2  Pt will be able to perform functional transfer with Mod Ind in order to improve overall functional mobility and assist in safe d/c  STG 3   Pt will be able to ambulate at least 300 feet with least restrictive device with Mod Ind A in order to improve overall functional mobility and assist in safe d/c  STG 5  Pt will improve sitting/standing static/dynamic balance 1/2 grade in order to improve functional mobility and assist in safe d/c  STG 6  Pt will be able to negotiate at least 2 stairs with least restrictive device with Mod Ind  A in order to improve overall functional mobility and assist in safe d/c

## 2023-06-09 NOTE — PHYSICAL THERAPY NOTE
PHYSICAL THERAPY NOTE          Patient Name: Lexie Barcenas  [id number] Date: 6/9/2023 06/09/23 0935   PT Last Visit   PT Visit Date 06/09/23   Note Type   Note type Evaluation   Pain Assessment   Pain Assessment Tool 0-10   Pain Score 3   Pain Location/Orientation Location: Abdomen   Patient's Stated Pain Goal No pain   Hospital Pain Intervention(s) Repositioned; Ambulation/increased activity   Restrictions/Precautions   Weight Bearing Precautions Per Order No   Other Precautions Multiple lines;Pain; Fall Risk   Home Living   Type of [address] [place] [address] One level;Stairs to enter without rails  (1 ROSLYN)   Bathroom Shower/Tub Tub/shower unit   Bathroom Toilet Standard   Bathroom Equipment Toilet raiser; Shower chair   Home Equipment Cane  [facility])   Additional Comments Pt lives in 1 ST, 1 ROSLYN no HR  Prior Function   Level of [place] Independent with ADLs; Needs assistance with IADLS;Needs assistance with ADLs; Independent with functional mobility  (Fiance does assist with sock donning )   Lives With Significant other;Family   Receives Help From Family   IADLs Family/Friend/Other provides transportation; Independent with medication management; Family/Friend/Other provides meals   Falls in the last 6 months 0   Vocational On disability   Comments Pt states Ind for functional mobility with cane, and ADLs but lives with supportive fiance and son     Cognition   Overall Cognitive Status WFL   Arousal/Participation Alert   Attention Within functional limits   Orientation Level Oriented X4   Following Commands Follows all commands and directions without difficulty   Comments Pt cooperative during session   Subjective   Subjective Agreeable to mobilize   RLE Assessment   RLE Assessment WFL   LLE Assessment   LLE Assessment WFL   Bed Mobility   Supine to Sit Unable to assess   Sit to Supine Unable to assess   Additional Comments Pt noted to be in bathroom upon arrival    Transfers   Toilet transfer 3  Moderate assistance   Additional items Assist x 1; Increased time required  (Low toilet)   Additional Comments Pt requires assist to complete transfer from low toilet  Uses RW during session  Ambulation/Elevation   Gait pattern Wide TAE; Excessively slow   Gait Assistance 4  Minimal assist   Additional items Verbal cues   Assistive Device Rolling walker   Distance 130'   Ambulation/Elevation Additional Comments Pt with good mobility, limited 2* fatigue and abdominal discomfort   Balance   Static Sitting Good   Dynamic Sitting Fair +   Static Standing Fair   Dynamic Standing Fair -   Ambulatory Fair -   Activity Tolerance   Activity Tolerance Patient tolerated treatment well   Medical Staff Made Aware Co-eval with OT 2* medical complexity   Nurse Made Aware RN cleared pt for therapy   Assessment   Prognosis Good   Problem List Decreased endurance;Decreased mobility;Pain   Assessment Pt is a 50 y o  female seen for PT evaluation s/p admit to [facility] on 6/7/2023  Pt was admitted with a primary dx of: Ileostomy closure on 6/7  PT now consulted for assessment of mobility and d/c needs  Pt with Ambulate orders  Pts current comorbidities affecting treatment include: Asthma, OA B/L knees, Low back pain , Rotator cuff  Pts current clinical presentation is Moderate complexity Eval due to Ongoing medical management for primary dx, Decreased activity tolerance compared to baseline, s/p surgical intervention  Prior to admission, pt was independent for mobility and ADLs, with SO assisting with IADLs  At time of PT evaluation, pt required able to complete mobility with assist X 1, limiting factors abdominal pain and fatigue  Pt presents at PT eval functioning below baseline and currently w/ overall mobility deficits 2* to: decreased endurance, pain, decreased activity tolerance compared to baseline     Pt will continue to benefit from skilled acute PT interventions to maximize functional mobility    At conclusion of PT session all needs in reach, RN notified of session findings/recommendations and pt returned back in recliner chair with phone and call bell within reach  The patient's AM-PAC Basic Mobility Inpatient Short Form Raw Score is  18  A Raw score of greater than 16 suggests the patient may benefit from discharge to home  Please also refer to the recommendation of the Physical Therapist for safe discharge planning  Recommend HHPT upon hospital D/C as anticipate pt to improve in mobility level through hospital course  Goals   STG Expiration Date 06/23/23   Short Term Goal #1 STG 1  Pt will be able to perform bed mobility tasks with Mod Ind in order to improve overall functional mobility and assist in safe d/c  STG 2  Pt will be able to perform functional transfer with Mod Ind in order to improve overall functional mobility and assist in safe d/c  STG 3  Pt will be able to ambulate at least 300 feet with least restrictive device with Mod Ind A in order to improve overall functional mobility and assist in safe d/c  STG 5  Pt will improve sitting/standing static/dynamic balance 1/2 grade in order to improve functional mobility and assist in safe d/c  STG 6   Pt will be able to negotiate at least 2 stairs with least restrictive device with Mod Ind  A in order to improve overall functional mobility and assist in safe d/c    PT Treatment Day 0   Plan   Treatment/Interventions Functional transfer training;Elevations;Gait training;Spoke to nursing;OT   PT Frequency 1-2x/wk   Recommendation   PT Discharge Recommendation Home with home health rehabilitation   [address]   Turning in Flat Bed Without Bedrails 3   Lying on Back to Sitting on Edge of Flat Bed Without Bedrails 3   Moving Bed to Chair 3   Standing Up From Chair Using Arms 3   Walk in Room 3   Climb 3-5 Stairs With Railing 3   Basic Mobility Inpatient Raw Score 18   Basic Mobility Standardized Score 41 05   Highest Level Of Mobility   -HLM Goal 6: Walk 10 steps or more   -HLM Achieved 7: Walk 25 feet or more   Modified Mohave Scale   Modified Mignon Scale 4   End of Consult   Patient Position at End of Consult All needs within reach; Bedside chair   Savita Warren PT DPT
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
## 2023-06-09 NOTE — PLAN OF CARE
Problem: MOBILITY - ADULT  Goal: Maintain or return to baseline ADL function  Description: INTERVENTIONS:  -  Assess patient's ability to carry out ADLs; assess patient's baseline for ADL function and identify physical deficits which impact ability to perform ADLs (bathing, care of mouth/teeth, toileting, grooming, dressing, etc )  - Assess/evaluate cause of self-care deficits   - Assess range of motion  - Assess patient's mobility; develop plan if impaired  - Assess patient's need for assistive devices and provide as appropriate  - Encourage maximum independence but intervene and supervise when necessary  - Involve family in performance of ADLs  - Assess for home care needs following discharge   - Consider OT consult to assist with ADL evaluation and planning for discharge  - Provide patient education as appropriate  Outcome: Progressing     Problem: PAIN - ADULT  Goal: Verbalizes/displays adequate comfort level or baseline comfort level  Description: Interventions:  - Encourage patient to monitor pain and request assistance  - Assess pain using appropriate pain scale  - Administer analgesics based on type and severity of pain and evaluate response  - Implement non-pharmacological measures as appropriate and evaluate response  - Consider cultural and social influences on pain and pain management  - Notify physician/advanced practitioner if interventions unsuccessful or patient reports new pain  Outcome: Progressing     Problem: INFECTION - ADULT  Goal: Absence or prevention of progression during hospitalization  Description: INTERVENTIONS:  - Assess and monitor for signs and symptoms of infection  - Monitor lab/diagnostic results  - Monitor all insertion sites, i e  indwelling lines, tubes, and drains  - Monitor endotracheal if appropriate and nasal secretions for changes in amount and color  - Wheeler appropriate cooling/warming therapies per order  - Administer medications as ordered  - Instruct and encourage patient and family to use good hand hygiene technique  - Identify and instruct in appropriate isolation precautions for identified infection/condition  Outcome: Progressing     Problem: SAFETY ADULT  Goal: Patient will remain free of falls  Description: INTERVENTIONS:  - Educate patient/family on patient safety including physical limitations  - Instruct patient to call for assistance with activity   - Consult OT/PT to assist with strengthening/mobility   - Keep Call bell within reach  - Keep bed low and locked with side rails adjusted as appropriate  - Keep care items and personal belongings within reach  - Initiate and maintain comfort rounds  - Make Fall Risk Sign visible to staff  - Apply yellow socks and bracelet for high fall risk patients  - Consider moving patient to room near nurses station  Outcome: Progressing     Problem: DISCHARGE PLANNING  Goal: Discharge to home or other facility with appropriate resources  Description: INTERVENTIONS:  - Identify barriers to discharge w/patient and caregiver  - Arrange for needed discharge resources and transportation as appropriate  - Identify discharge learning needs (meds, wound care, etc )  - Arrange for interpretive services to assist at discharge as needed  - Refer to Case Management Department for coordinating discharge planning if the patient needs post-hospital services based on physician/advanced practitioner order or complex needs related to functional status, cognitive ability, or social support system  Outcome: Progressing
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
## 2023-06-10 LAB
ANION GAP SERPL CALCULATED.3IONS-SCNC: 3 MMOL/L (ref 4–13)
BUN SERPL-MCNC: 10 MG/DL (ref 5–25)
CALCIUM SERPL-MCNC: 8.3 MG/DL (ref 8.3–10.1)
CHLORIDE SERPL-SCNC: 110 MMOL/L (ref 96–108)
CO2 SERPL-SCNC: 21 MMOL/L (ref 21–32)
CREAT SERPL-MCNC: 1.27 MG/DL (ref 0.6–1.3)
GFR SERPL CREATININE-BSD FRML MDRD: 49 ML/MIN/1.73SQ M
GLUCOSE SERPL-MCNC: 131 MG/DL (ref 65–140)
PLATELET # BLD AUTO: 268 THOUSANDS/UL (ref 149–390)
PMV BLD AUTO: 10.8 FL (ref 8.9–12.7)
POTASSIUM SERPL-SCNC: 3.8 MMOL/L (ref 3.5–5.3)
SODIUM SERPL-SCNC: 134 MMOL/L (ref 135–147)

## 2023-06-10 PROCEDURE — 85049 AUTOMATED PLATELET COUNT: CPT | Performed by: SURGERY

## 2023-06-10 PROCEDURE — 80048 BASIC METABOLIC PNL TOTAL CA: CPT | Performed by: PHYSICIAN ASSISTANT

## 2023-06-10 PROCEDURE — 99024 POSTOP FOLLOW-UP VISIT: CPT | Performed by: COLON & RECTAL SURGERY

## 2023-06-10 RX ORDER — HYDROMORPHONE HCL IN WATER/PF 6 MG/30 ML
0.2 PATIENT CONTROLLED ANALGESIA SYRINGE INTRAVENOUS EVERY 2 HOUR PRN
Status: DISCONTINUED | OUTPATIENT
Start: 2023-06-10 | End: 2023-06-14 | Stop reason: HOSPADM

## 2023-06-10 RX ADMIN — HEPARIN SODIUM 5000 UNITS: 5000 INJECTION INTRAVENOUS; SUBCUTANEOUS at 06:05

## 2023-06-10 RX ADMIN — HEPARIN SODIUM 5000 UNITS: 5000 INJECTION INTRAVENOUS; SUBCUTANEOUS at 13:56

## 2023-06-10 RX ADMIN — ONDANSETRON 4 MG: 2 INJECTION INTRAMUSCULAR; INTRAVENOUS at 03:18

## 2023-06-10 RX ADMIN — ONDANSETRON 4 MG: 2 INJECTION INTRAMUSCULAR; INTRAVENOUS at 19:33

## 2023-06-10 RX ADMIN — DEXTROSE, SODIUM CHLORIDE, AND POTASSIUM CHLORIDE 125 ML/HR: 5; .45; .15 INJECTION INTRAVENOUS at 13:57

## 2023-06-10 RX ADMIN — HYDROMORPHONE HYDROCHLORIDE 0.2 MG: 0.2 INJECTION, SOLUTION INTRAMUSCULAR; INTRAVENOUS; SUBCUTANEOUS at 10:50

## 2023-06-10 RX ADMIN — HYDROMORPHONE HYDROCHLORIDE 0.2 MG: 0.2 INJECTION, SOLUTION INTRAMUSCULAR; INTRAVENOUS; SUBCUTANEOUS at 03:10

## 2023-06-10 RX ADMIN — DEXTROSE, SODIUM CHLORIDE, AND POTASSIUM CHLORIDE 125 ML/HR: 5; .45; .15 INJECTION INTRAVENOUS at 21:35

## 2023-06-10 RX ADMIN — HEPARIN SODIUM 5000 UNITS: 5000 INJECTION INTRAVENOUS; SUBCUTANEOUS at 21:33

## 2023-06-10 RX ADMIN — ONDANSETRON 4 MG: 2 INJECTION INTRAMUSCULAR; INTRAVENOUS at 10:51

## 2023-06-10 RX ADMIN — HYDROMORPHONE HYDROCHLORIDE 0.2 MG: 0.2 INJECTION, SOLUTION INTRAMUSCULAR; INTRAVENOUS; SUBCUTANEOUS at 19:33

## 2023-06-10 NOTE — RESTORATIVE TECHNICIAN NOTE
Restorative Technician Note      Patient Name: Alejandro Fontanez     Restorative Tech Visit Date: 06/10/23  Note Type: Mobility  Patient Position Upon Consult: Supine  Activity Performed: Ambulated  Assistive Device: Standard walker  Education Provided: Yes  Patient Position at End of Consult: Bedside chair;  All needs within reach    Violeta Berman  DPT, Restorative Technician

## 2023-06-10 NOTE — PLAN OF CARE
Problem: PAIN - ADULT  Goal: Verbalizes/displays adequate comfort level or baseline comfort level  Description: Interventions:  - Encourage patient to monitor pain and request assistance  - Assess pain using appropriate pain scale  - Administer analgesics based on type and severity of pain and evaluate response  - Implement non-pharmacological measures as appropriate and evaluate response  - Consider cultural and social influences on pain and pain management  - Notify physician/advanced practitioner if interventions unsuccessful or patient reports new pain  Outcome: Progressing     Problem: INFECTION - ADULT  Goal: Absence or prevention of progression during hospitalization  Description: INTERVENTIONS:  - Assess and monitor for signs and symptoms of infection  - Monitor lab/diagnostic results  - Monitor all insertion sites, i e  indwelling lines, tubes, and drains  - Monitor endotracheal if appropriate and nasal secretions for changes in amount and color  - [place] appropriate cooling/warming therapies per order  - Administer medications as ordered  - Instruct and encourage patient and family to use good hand hygiene technique  - Identify and instruct in appropriate isolation precautions for identified infection/condition  Outcome: Progressing     Problem: DISCHARGE PLANNING  Goal: Discharge to home or other facility with appropriate resources  Description: INTERVENTIONS:  - Identify barriers to discharge w/patient and caregiver  - Arrange for needed discharge resources and transportation as appropriate  - Identify discharge learning needs (meds, wound care, etc )  - Arrange for interpretive services to assist at discharge as needed  - Refer to Case Management Department for coordinating discharge planning if the patient needs post-hospital services based on physician/advanced practitioner order or complex needs related to functional status, cognitive ability, or social support system  Outcome: Progressing Problem: Nutrition/Hydration-ADULT  Goal: Nutrient/Hydration intake appropriate for improving, restoring or maintaining nutritional needs  Description: Monitor and assess patient's nutrition/hydration status for malnutrition  Collaborate with interdisciplinary team and initiate plan and interventions as ordered  Monitor patient's weight and dietary intake as ordered or per policy  Utilize nutrition screening tool and intervene as necessary  Determine patient's food preferences and provide high-protein, high-caloric foods as appropriate       INTERVENTIONS:  - Monitor oral intake, urinary output, labs, and treatment plans  - Assess nutrition and hydration status and recommend course of action  - Evaluate amount of meals eaten  - Assist patient with eating if necessary   - Allow adequate time for meals  - Recommend/ encourage appropriate diets, oral nutritional supplements, and vitamin/mineral supplements  - Order, calculate, and assess calorie counts as needed  - Recommend, monitor, and adjust tube feedings and TPN/PPN based on assessed needs  - Assess need for intravenous fluids  - Provide specific nutrition/hydration education as appropriate  - Include patient/family/caregiver in decisions related to nutrition  Outcome: Progressing

## 2023-06-10 NOTE — PLAN OF CARE
Problem: MOBILITY - ADULT  Goal: Maintain or return to baseline ADL function  Description: INTERVENTIONS:  -  Assess patient's ability to carry out ADLs; assess patient's baseline for ADL function and identify physical deficits which impact ability to perform ADLs (bathing, care of mouth/teeth, toileting, grooming, dressing, etc )  - Assess/evaluate cause of self-care deficits   - Assess range of motion  - Assess patient's mobility; develop plan if impaired  - Assess patient's need for assistive devices and provide as appropriate  - Encourage maximum independence but intervene and supervise when necessary  - Involve family in performance of ADLs  - Assess for home care needs following discharge   - Consider OT consult to assist with ADL evaluation and planning for discharge  - Provide patient education as appropriate  Outcome: Progressing     Problem: PAIN - ADULT  Goal: Verbalizes/displays adequate comfort level or baseline comfort level  Description: Interventions:  - Encourage patient to monitor pain and request assistance  - Assess pain using appropriate pain scale  - Administer analgesics based on type and severity of pain and evaluate response  - Implement non-pharmacological measures as appropriate and evaluate response  - Consider cultural and social influences on pain and pain management  - Notify physician/advanced practitioner if interventions unsuccessful or patient reports new pain  Outcome: Progressing     Problem: INFECTION - ADULT  Goal: Absence or prevention of progression during hospitalization  Description: INTERVENTIONS:  - Assess and monitor for signs and symptoms of infection  - Monitor lab/diagnostic results  - Monitor all insertion sites, i e  indwelling lines, tubes, and drains  - Monitor endotracheal if appropriate and nasal secretions for changes in amount and color  - [place] appropriate cooling/warming therapies per order  - Administer medications as ordered  - Instruct and encourage patient and family to use good hand hygiene technique  - Identify and instruct in appropriate isolation precautions for identified infection/condition  Outcome: Progressing     Problem: SAFETY ADULT  Goal: Patient will remain free of falls  Description: INTERVENTIONS:  - Educate patient/family on patient safety including physical limitations  - Instruct patient to call for assistance with activity   - Consult OT/PT to assist with strengthening/mobility   - Keep Call bell within reach  - Keep bed low and locked with side rails adjusted as appropriate  - Keep care items and personal belongings within reach  - Initiate and maintain comfort rounds  - Make Fall Risk Sign visible to staff  - Offer Toileting every 2 Hours, in advance of need  - Initiate/Maintain alarm  - Obtain necessary fall risk management equipment  - Apply yellow socks and bracelet for high fall risk patients  - Consider moving patient to room near nurses station  Outcome: Progressing     Problem: DISCHARGE PLANNING  Goal: Discharge to home or other facility with appropriate resources  Description: INTERVENTIONS:  - Identify barriers to discharge w/patient and caregiver  - Arrange for needed discharge resources and transportation as appropriate  - Identify discharge learning needs (meds, wound care, etc )  - Arrange for interpretive services to assist at discharge as needed  - Refer to Case Management Department for coordinating discharge planning if the patient needs post-hospital services based on physician/advanced practitioner order or complex needs related to functional status, cognitive ability, or social support system  Outcome: Progressing     Problem: Nutrition/Hydration-ADULT  Goal: Nutrient/Hydration intake appropriate for improving, restoring or maintaining nutritional needs  Description: Monitor and assess patient's nutrition/hydration status for malnutrition   Collaborate with interdisciplinary team and initiate plan and interventions as ordered  Monitor patient's weight and dietary intake as ordered or per policy  Utilize nutrition screening tool and intervene as necessary  Determine patient's food preferences and provide high-protein, high-caloric foods as appropriate       INTERVENTIONS:  - Monitor oral intake, urinary output, labs, and treatment plans  - Assess nutrition and hydration status and recommend course of action  - Evaluate amount of meals eaten  - Assist patient with eating if necessary   - Allow adequate time for meals  - Recommend/ encourage appropriate diets, oral nutritional supplements, and vitamin/mineral supplements  - Order, calculate, and assess calorie counts as needed  - Recommend, monitor, and adjust tube feedings and TPN/PPN based on assessed needs  - Assess need for intravenous fluids  - Provide specific nutrition/hydration education as appropriate  - Include patient/family/caregiver in decisions related to nutrition  Outcome: Progressing

## 2023-06-10 NOTE — PROGRESS NOTES
"Progress Note - Colorectal Surgery  Neha Garcia 50 y o  female MRN: [id number]  Unit/Bed#: [facility] 804-01 Encounter: [id number]    Assessment:  50 y o  female with history of proctocolectomy with ileoanal pouch and diverting loop ileostomy, who is now 3 Days Post-Op s/p Procedure(s) (LRB):  CLOSURE ILEOSTOMY (N/A)  Patient has developed a postoperative ileus  She had multiple episodes of emesis requiring bedside placement of an NG tube  NG output: 1250 mL    Plan:  - Diet Surgical; NPO; Ice chips; No Carbonation  - Pain and Nausea control PRN  - Incentive spirometry  - OOB, ambulate  -Continue NGT to suction  - Monitor bowel function  - Local wound care  - IV fluids    Subjective/Objective     Subjective: Patient had at least 3 episodes of emesis yesterday requiring placement of NG tube  Since her NG was placed she has vomited once and also has had 1 bowel movement  Objective:   Vitals: Blood pressure 130/80, pulse 102, temperature 98 6 °F (37 °C), resp  rate 20, height 5' 3\" (1 6 m), weight 109 kg (240 lb), last menstrual period 06/06/2022, SpO2 97 %, not currently breastfeeding  ,Body mass index is 42 51 kg/m²  I/O       06/08 0701  06/09 0700 06/09 0701  06/10 0700    P  O   480    I V  (mL/kg) 2661 9 (24 4) 2143 1 (19 7)    Total Intake(mL/kg) 2661 9 (24 4) 2623 1 (24 1)    Urine (mL/kg/hr) 2200 (0 8) 1700 (0 6)    Emesis/NG output 200 1250    Stool  0    Total Output 2400 2950    Net +261 9 -327          Unmeasured Stool Occurrence  1 x          Physical Exam:  Gen: NAD, Aox3, Comfortable in bed  Chest: Normal work of breathing, no respiratory distress  Abd: Soft, mild distention, appropriately tender  No rebound no guarding  Ileus NG output 1250 mL  LLQ surgical site clean, dry, intact with staples in place  Ext: No Edema  Skin: Warm, Dry, Intact      Lab, Imaging and other studies:   I have personally reviewed pertinent reports    , CBC with diff:   Lab Results   Component Value Date    MPV " 10 8 06/10/2023     06/10/2023   , BMP/CMP:   Lab Results   Component Value Date    BUN 10 06/10/2023    CALCIUM 8 3 06/10/2023     (H) 06/10/2023    CO2 21 06/10/2023    CREATININE 1 27 06/10/2023    EGFR 49 06/10/2023    K 3 8 06/10/2023    SODIUM 134 (L) 06/10/2023     VTE Pharmacologic Prophylaxis: Heparin  VTE Mechanical Prophylaxis: sequential compression device        Ahmet Caballero MD  6/10/2023  7:00 AM

## 2023-06-11 LAB
ANION GAP SERPL CALCULATED.3IONS-SCNC: 4 MMOL/L (ref 4–13)
BASOPHILS # BLD AUTO: 0.02 THOUSANDS/ÂΜL (ref 0–0.1)
BASOPHILS NFR BLD AUTO: 0 % (ref 0–1)
BUN SERPL-MCNC: 6 MG/DL (ref 5–25)
CALCIUM SERPL-MCNC: 8.6 MG/DL (ref 8.3–10.1)
CHLORIDE SERPL-SCNC: 109 MMOL/L (ref 96–108)
CO2 SERPL-SCNC: 25 MMOL/L (ref 21–32)
CREAT SERPL-MCNC: 1.23 MG/DL (ref 0.6–1.3)
EOSINOPHIL # BLD AUTO: 0.11 THOUSAND/ÂΜL (ref 0–0.61)
EOSINOPHIL NFR BLD AUTO: 2 % (ref 0–6)
ERYTHROCYTE [DISTWIDTH] IN BLOOD BY AUTOMATED COUNT: 16.3 % (ref 11.6–15.1)
GFR SERPL CREATININE-BSD FRML MDRD: 51 ML/MIN/1.73SQ M
GLUCOSE SERPL-MCNC: 108 MG/DL (ref 65–140)
HCT VFR BLD AUTO: 32.6 % (ref 34.8–46.1)
HGB BLD-MCNC: 10.2 G/DL (ref 11.5–15.4)
IMM GRANULOCYTES # BLD AUTO: 0.01 THOUSAND/UL (ref 0–0.2)
IMM GRANULOCYTES NFR BLD AUTO: 0 % (ref 0–2)
LYMPHOCYTES # BLD AUTO: 0.71 THOUSANDS/ÂΜL (ref 0.6–4.47)
LYMPHOCYTES NFR BLD AUTO: 13 % (ref 14–44)
MAGNESIUM SERPL-MCNC: 1.7 MG/DL (ref 1.6–2.6)
MCH RBC QN AUTO: 28.7 PG (ref 26.8–34.3)
MCHC RBC AUTO-ENTMCNC: 31.3 G/DL (ref 31.4–37.4)
MCV RBC AUTO: 92 FL (ref 82–98)
MONOCYTES # BLD AUTO: 0.65 THOUSAND/ÂΜL (ref 0.17–1.22)
MONOCYTES NFR BLD AUTO: 12 % (ref 4–12)
NEUTROPHILS # BLD AUTO: 3.81 THOUSANDS/ÂΜL (ref 1.85–7.62)
NEUTS SEG NFR BLD AUTO: 73 % (ref 43–75)
NRBC BLD AUTO-RTO: 0 /100 WBCS
PHOSPHATE SERPL-MCNC: 1.9 MG/DL (ref 2.7–4.5)
PLATELET # BLD AUTO: 288 THOUSANDS/UL (ref 149–390)
PMV BLD AUTO: 10.7 FL (ref 8.9–12.7)
POTASSIUM SERPL-SCNC: 3.9 MMOL/L (ref 3.5–5.3)
RBC # BLD AUTO: 3.56 MILLION/UL (ref 3.81–5.12)
SODIUM SERPL-SCNC: 138 MMOL/L (ref 135–147)
WBC # BLD AUTO: 5.31 THOUSAND/UL (ref 4.31–10.16)

## 2023-06-11 PROCEDURE — 83735 ASSAY OF MAGNESIUM: CPT | Performed by: STUDENT IN AN ORGANIZED HEALTH CARE EDUCATION/TRAINING PROGRAM

## 2023-06-11 PROCEDURE — 85025 COMPLETE CBC W/AUTO DIFF WBC: CPT | Performed by: STUDENT IN AN ORGANIZED HEALTH CARE EDUCATION/TRAINING PROGRAM

## 2023-06-11 PROCEDURE — 80048 BASIC METABOLIC PNL TOTAL CA: CPT | Performed by: STUDENT IN AN ORGANIZED HEALTH CARE EDUCATION/TRAINING PROGRAM

## 2023-06-11 PROCEDURE — 99024 POSTOP FOLLOW-UP VISIT: CPT | Performed by: COLON & RECTAL SURGERY

## 2023-06-11 PROCEDURE — 84100 ASSAY OF PHOSPHORUS: CPT | Performed by: STUDENT IN AN ORGANIZED HEALTH CARE EDUCATION/TRAINING PROGRAM

## 2023-06-11 RX ORDER — MAGNESIUM SULFATE HEPTAHYDRATE 40 MG/ML
2 INJECTION, SOLUTION INTRAVENOUS ONCE
Status: COMPLETED | OUTPATIENT
Start: 2023-06-11 | End: 2023-06-12

## 2023-06-11 RX ADMIN — HEPARIN SODIUM 5000 UNITS: 5000 INJECTION INTRAVENOUS; SUBCUTANEOUS at 05:36

## 2023-06-11 RX ADMIN — DEXTROSE, SODIUM CHLORIDE, AND POTASSIUM CHLORIDE 125 ML/HR: 5; .45; .15 INJECTION INTRAVENOUS at 13:23

## 2023-06-11 RX ADMIN — POTASSIUM PHOSPHATE, MONOBASIC AND POTASSIUM PHOSPHATE, DIBASIC 30 MMOL: 224; 236 INJECTION, SOLUTION, CONCENTRATE INTRAVENOUS at 09:45

## 2023-06-11 RX ADMIN — ONDANSETRON 4 MG: 2 INJECTION INTRAMUSCULAR; INTRAVENOUS at 02:56

## 2023-06-11 RX ADMIN — DEXTROSE, SODIUM CHLORIDE, AND POTASSIUM CHLORIDE 125 ML/HR: 5; .45; .15 INJECTION INTRAVENOUS at 21:40

## 2023-06-11 RX ADMIN — HYDROMORPHONE HYDROCHLORIDE 0.2 MG: 0.2 INJECTION, SOLUTION INTRAMUSCULAR; INTRAVENOUS; SUBCUTANEOUS at 02:55

## 2023-06-11 RX ADMIN — HEPARIN SODIUM 5000 UNITS: 5000 INJECTION INTRAVENOUS; SUBCUTANEOUS at 14:57

## 2023-06-11 RX ADMIN — HEPARIN SODIUM 5000 UNITS: 5000 INJECTION INTRAVENOUS; SUBCUTANEOUS at 21:40

## 2023-06-11 RX ADMIN — DEXTROSE, SODIUM CHLORIDE, AND POTASSIUM CHLORIDE 125 ML/HR: 5; .45; .15 INJECTION INTRAVENOUS at 05:35

## 2023-06-11 RX ADMIN — MAGNESIUM SULFATE HEPTAHYDRATE 2 G: 40 INJECTION, SOLUTION INTRAVENOUS at 07:55

## 2023-06-11 NOTE — PROGRESS NOTES
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, who is now s/p ileostomy closure on 6/7, post op course complicated by ileus  S/p 6/9 NGT inserted    AVSS on room air  NG tube 1 9 L      Plan:  - Diet Surgical; NPO; Ice chips; No Carbonation  -Recommend recheck KUB this morning, possible pulled back NG tube  - Incentive spirometry  - OOB, ambulate  -Protonix  - Monitor NG output      Subjective/Objective     Subjective:   No acute events overnight  Having bowel movements x3  Passing gas  Objective:     Blood pressure 112/73, pulse 95, temperature 98 7 °F (37 1 °C), resp  rate 16, height 5' 3\" (1 6 m), weight 109 kg (240 lb), last menstrual period 06/06/2022, SpO2 95 %, not currently breastfeeding  ,Body mass index is 42 51 kg/m²        Intake/Output Summary (Last 24 hours) at 6/11/2023 1418  Last data filed at 6/11/2023 0945  Gross per 24 hour   Intake 3497 91 ml   Output 1325 ml   Net 2172 91 ml       Invasive Devices     Peripheral Intravenous Line  Duration           Peripheral IV 06/09/23 Left;Upper Arm 1 day          Drain  Duration           NG/OG/Enteral Tube Nasogastric Left nare 1 day                Physical Exam:   Gen: NAD, Comfortable  Neuro: A&O, No focal deficits  Head: Normal Cephalic, Atraumatic  Eye: EOMI, PERRLA, No scleral icterus  Neck: Supple, No JVD, Midline trachea  CV: RRR, Cap refill <2 sec  Pulm: Normal work of breathing, no respiratory distress  Abd: Soft, distention improved, Non-Tender  Ext: No edema, Non-tender  Skin: warm, dry, intact      "
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
## 2023-06-11 NOTE — PROGRESS NOTES
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, who is now 4 Days Post-Op s/p Procedure(s) (LRB):  CLOSURE ILEOSTOMY (N/A)  Patient's ileus is gradually improving  She is now having flatus and no further episodes of emesis  She is however still having moderate amount of NG output currently measured at 1025 mL over the last 24 hours  Plan:  - Diet Surgical; NPO; Ice chips; No Carbonation  - Pain and Nausea control PRN  - Incentive spirometry  - OOB, ambulate  -Protonix  - Monitor NG output  - Will consider clamp trial    Subjective/Objective     Subjective: Decreased abdominal pain  Having flatus but no bowel movements  Nausea controlled with Zofran    Objective:   Vitals: Blood pressure 112/73, pulse 95, temperature 98 7 °F (37 1 °C), resp  rate 16, height 5' 3\" (1 6 m), weight 109 kg (240 lb), last menstrual period 06/06/2022, SpO2 95 %, not currently breastfeeding  ,Body mass index is 42 51 kg/m²  I/O       06/09 0701  06/10 0700 06/10 0701  06/11 0700 06/11 0701  06/12 0700    P  O  480      I V  (mL/kg) 2143 1 (19 7) 2931 3 (26 9)     Total Intake(mL/kg) 2623 1 (24 1) 2931 3 (26 9)     Urine (mL/kg/hr) 1700 (0 6) 800 (0 3)     Emesis/NG output 1250 1025     Stool 0 0     Total Output 2950 1825     Net -327 +1106 3            Unmeasured Urine Occurrence  1 x     Unmeasured Stool Occurrence 1 x 1 x           Physical Exam:  Gen: NAD, Aox3, Comfortable in bed  Chest: Normal work of breathing, no respiratory distress  Abd: Soft, decreased distention, decreased tenderness  Surgical sites are clean, dry, and intact   Ext: No Edema  Skin: Warm, Dry, Intact      Lab, Imaging and other studies:   I have personally reviewed pertinent reports    , CBC with diff:   Lab Results   Component Value Date    HCT 32 6 (L) 06/11/2023 " HGB 10 2 (L) 06/11/2023    MCH 28 7 06/11/2023    MCHC 31 3 (L) 06/11/2023    MCV 92 06/11/2023    MPV 10 7 06/11/2023    NRBC 0 06/11/2023     06/11/2023    RBC 3 56 (L) 06/11/2023    RDW 16 3 (H) 06/11/2023    WBC 5 31 06/11/2023   , BMP/CMP:   Lab Results   Component Value Date    BUN 6 06/11/2023    CALCIUM 8 6 06/11/2023     (H) 06/11/2023    CO2 25 06/11/2023    CREATININE 1 23 06/11/2023    EGFR 51 06/11/2023    K 3 9 06/11/2023    SODIUM 138 06/11/2023     VTE Pharmacologic Prophylaxis: Heparin  VTE Mechanical Prophylaxis: sequential compression device        Jeremias Gallego MD  6/11/2023  9:10 AM

## 2023-06-11 NOTE — PLAN OF CARE
Problem: MOBILITY - ADULT  Goal: Maintain or return to baseline ADL function  Description: INTERVENTIONS:  -  Assess patient's ability to carry out ADLs; assess patient's baseline for ADL function and identify physical deficits which impact ability to perform ADLs (bathing, care of mouth/teeth, toileting, grooming, dressing, etc )  - Assess/evaluate cause of self-care deficits   - Assess range of motion  - Assess patient's mobility; develop plan if impaired  - Assess patient's need for assistive devices and provide as appropriate  - Encourage maximum independence but intervene and supervise when necessary  - Involve family in performance of ADLs  - Assess for home care needs following discharge   - Consider OT consult to assist with ADL evaluation and planning for discharge  - Provide patient education as appropriate  Outcome: Progressing     Problem: PAIN - ADULT  Goal: Verbalizes/displays adequate comfort level or baseline comfort level  Description: Interventions:  - Encourage patient to monitor pain and request assistance  - Assess pain using appropriate pain scale  - Administer analgesics based on type and severity of pain and evaluate response  - Implement non-pharmacological measures as appropriate and evaluate response  - Consider cultural and social influences on pain and pain management  - Notify physician/advanced practitioner if interventions unsuccessful or patient reports new pain  Outcome: Progressing     Problem: INFECTION - ADULT  Goal: Absence or prevention of progression during hospitalization  Description: INTERVENTIONS:  - Assess and monitor for signs and symptoms of infection  - Monitor lab/diagnostic results  - Monitor all insertion sites, i e  indwelling lines, tubes, and drains  - Monitor endotracheal if appropriate and nasal secretions for changes in amount and color  - [place] appropriate cooling/warming therapies per order  - Administer medications as ordered  - Instruct and encourage patient and family to use good hand hygiene technique  - Identify and instruct in appropriate isolation precautions for identified infection/condition  Outcome: Progressing     Problem: SAFETY ADULT  Goal: Patient will remain free of falls  Description: INTERVENTIONS:  - Educate patient/family on patient safety including physical limitations  - Instruct patient to call for assistance with activity   - Consult OT/PT to assist with strengthening/mobility   - Keep Call bell within reach  - Keep bed low and locked with side rails adjusted as appropriate  - Keep care items and personal belongings within reach  - Initiate and maintain comfort rounds  - Make Fall Risk Sign visible to staff  - Offer Toileting every 2 Hours, in advance of need  - Initiate/Maintain bed alarm  - Obtain necessary fall risk management equipment: bed/chair alarm  - Apply yellow socks and bracelet for high fall risk patients  - Consider moving patient to room near nurses station  Outcome: Progressing     Problem: DISCHARGE PLANNING  Goal: Discharge to home or other facility with appropriate resources  Description: INTERVENTIONS:  - Identify barriers to discharge w/patient and caregiver  - Arrange for needed discharge resources and transportation as appropriate  - Identify discharge learning needs (meds, wound care, etc )  - Arrange for interpretive services to assist at discharge as needed  - Refer to Case Management Department for coordinating discharge planning if the patient needs post-hospital services based on physician/advanced practitioner order or complex needs related to functional status, cognitive ability, or social support system  Outcome: Progressing     Problem: Nutrition/Hydration-ADULT  Goal: Nutrient/Hydration intake appropriate for improving, restoring or maintaining nutritional needs  Description: Monitor and assess patient's nutrition/hydration status for malnutrition   Collaborate with interdisciplinary team and initiate plan and interventions as ordered  Monitor patient's weight and dietary intake as ordered or per policy  Utilize nutrition screening tool and intervene as necessary  Determine patient's food preferences and provide high-protein, high-caloric foods as appropriate       INTERVENTIONS:  - Monitor oral intake, urinary output, labs, and treatment plans  - Assess nutrition and hydration status and recommend course of action  - Evaluate amount of meals eaten  - Assist patient with eating if necessary   - Allow adequate time for meals  - Recommend/ encourage appropriate diets, oral nutritional supplements, and vitamin/mineral supplements  - Order, calculate, and assess calorie counts as needed  - Recommend, monitor, and adjust tube feedings and TPN/PPN based on assessed needs  - Assess need for intravenous fluids  - Provide specific nutrition/hydration education as appropriate  - Include patient/family/caregiver in decisions related to nutrition  Outcome: Progressing

## 2023-06-12 ENCOUNTER — APPOINTMENT (OUTPATIENT)
Dept: RADIOLOGY | Facility: HOSPITAL | Age: 53
DRG: 330 | End: 2023-06-12
Payer: COMMERCIAL

## 2023-06-12 LAB
ANION GAP SERPL CALCULATED.3IONS-SCNC: 0 MMOL/L (ref 4–13)
BUN SERPL-MCNC: 4 MG/DL (ref 5–25)
CALCIUM SERPL-MCNC: 8.1 MG/DL (ref 8.3–10.1)
CHLORIDE SERPL-SCNC: 113 MMOL/L (ref 96–108)
CO2 SERPL-SCNC: 28 MMOL/L (ref 21–32)
CREAT SERPL-MCNC: 1.21 MG/DL (ref 0.6–1.3)
GFR SERPL CREATININE-BSD FRML MDRD: 52 ML/MIN/1.73SQ M
GLUCOSE SERPL-MCNC: 105 MG/DL (ref 65–140)
MAGNESIUM SERPL-MCNC: 2.2 MG/DL (ref 1.6–2.6)
PHOSPHATE SERPL-MCNC: 2.8 MG/DL (ref 2.7–4.5)
POTASSIUM SERPL-SCNC: 3.9 MMOL/L (ref 3.5–5.3)
SODIUM SERPL-SCNC: 141 MMOL/L (ref 135–147)

## 2023-06-12 PROCEDURE — 80048 BASIC METABOLIC PNL TOTAL CA: CPT | Performed by: STUDENT IN AN ORGANIZED HEALTH CARE EDUCATION/TRAINING PROGRAM

## 2023-06-12 PROCEDURE — 88304 TISSUE EXAM BY PATHOLOGIST: CPT | Performed by: PATHOLOGY

## 2023-06-12 PROCEDURE — 99024 POSTOP FOLLOW-UP VISIT: CPT | Performed by: COLON & RECTAL SURGERY

## 2023-06-12 PROCEDURE — 83735 ASSAY OF MAGNESIUM: CPT | Performed by: STUDENT IN AN ORGANIZED HEALTH CARE EDUCATION/TRAINING PROGRAM

## 2023-06-12 PROCEDURE — 71045 X-RAY EXAM CHEST 1 VIEW: CPT

## 2023-06-12 PROCEDURE — 84100 ASSAY OF PHOSPHORUS: CPT | Performed by: STUDENT IN AN ORGANIZED HEALTH CARE EDUCATION/TRAINING PROGRAM

## 2023-06-12 RX ADMIN — DEXTROSE, SODIUM CHLORIDE, AND POTASSIUM CHLORIDE 125 ML/HR: 5; .45; .15 INJECTION INTRAVENOUS at 10:20

## 2023-06-12 RX ADMIN — HEPARIN SODIUM 5000 UNITS: 5000 INJECTION INTRAVENOUS; SUBCUTANEOUS at 05:16

## 2023-06-12 RX ADMIN — DEXTROSE, SODIUM CHLORIDE, AND POTASSIUM CHLORIDE 125 ML/HR: 5; .45; .15 INJECTION INTRAVENOUS at 13:28

## 2023-06-12 RX ADMIN — GABAPENTIN 100 MG: 100 CAPSULE ORAL at 14:56

## 2023-06-12 RX ADMIN — ACETAMINOPHEN 975 MG: 325 TABLET, FILM COATED ORAL at 14:44

## 2023-06-12 RX ADMIN — DEXTROSE, SODIUM CHLORIDE, AND POTASSIUM CHLORIDE 125 ML/HR: 5; .45; .15 INJECTION INTRAVENOUS at 22:09

## 2023-06-12 RX ADMIN — POTASSIUM PHOSPHATE, MONOBASIC AND POTASSIUM PHOSPHATE, DIBASIC 30 MMOL: 224; 236 INJECTION, SOLUTION, CONCENTRATE INTRAVENOUS at 10:45

## 2023-06-12 RX ADMIN — GABAPENTIN 100 MG: 100 CAPSULE ORAL at 21:11

## 2023-06-12 RX ADMIN — SODIUM BICARBONATE 650 MG: 650 TABLET ORAL at 17:17

## 2023-06-12 RX ADMIN — HEPARIN SODIUM 5000 UNITS: 5000 INJECTION INTRAVENOUS; SUBCUTANEOUS at 21:11

## 2023-06-12 RX ADMIN — DEXTROSE, SODIUM CHLORIDE, AND POTASSIUM CHLORIDE 125 ML/HR: 5; .45; .15 INJECTION INTRAVENOUS at 05:21

## 2023-06-12 RX ADMIN — HEPARIN SODIUM 5000 UNITS: 5000 INJECTION INTRAVENOUS; SUBCUTANEOUS at 14:44

## 2023-06-12 NOTE — QUICK NOTE
Clamp trial completed at 12:45  Only 50cc output, clear discharge, over the last 4 hours  Patient is passing flatus and had a BM  She is without nausea  Removed NGT without complications  Patient tolerated well       Plan:  -Patient may have sips of CLD    Suki Marmolejo

## 2023-06-12 NOTE — PROGRESS NOTES
"Progress Note -Colorectal Surgery  Sheri Chapman 50 y o  female MRN: [id number]  Unit/Bed#: [facility] 804-01 Encounter: [id number]    Assessment:  Patient is a 50 y o  female with hx of proctocolectomy with ileoanal pouch and diverting loop ileostomy who presented for ileostomy closure on 6/7 c/b ileus requiring NGT placement which was discontinued yesterday  Afebrile,VSS    UOP: 350 recorded  Stool 100 plus 1x    Plan:  Advance diet as tolerated-CLD  Decrease IVF  PO pain regimen for pain control  DVT px  Encourage out of bed and ambulation  PT/OT recommend Home health rehab    Subjective/Objective     Subjective:   No acute events overnight  NGT discontinued yesterday  Patient tolerated sips of clears  She has had increasing bowel function  Large bowel movement yesterday  States that she is urinating  Denies nausea or vomiting chest pain or shortness of breath    Objective:    Blood pressure 115/76, pulse 77, temperature 98 2 °F (36 8 °C), resp  rate 14, height 5' 3\" (1 6 m), weight 109 kg (240 lb), last menstrual period 06/06/2022, SpO2 100 %, not currently breastfeeding  ,Body mass index is 42 51 kg/m²  Intake/Output Summary (Last 24 hours) at 6/12/2023 1706  Last data filed at 6/12/2023 1528  Gross per 24 hour   Intake 3445 83 ml   Output 1625 ml   Net 1820 83 ml       Invasive Devices     Peripheral Intravenous Line  Duration           Peripheral IV 06/11/23 Right Antecubital <1 day          Drain  Duration           NG/OG/Enteral Tube Nasogastric Left nare 2 days                Physical Exam  Vitals reviewed  Constitutional:       General: She is not in acute distress  Appearance: She is not ill-appearing, toxic-appearing or diaphoretic  HENT:      Head: Normocephalic and atraumatic  Eyes:      Extraocular Movements: Extraocular movements intact  Cardiovascular:      Rate and Rhythm: Normal rate  Pulmonary:      Effort: Pulmonary effort is normal  No respiratory distress     Abdominal: " General: There is no distension  Palpations: Abdomen is soft  Tenderness: There is no abdominal tenderness  There is no guarding or rebound  Comments: Incisions clean, dry and intact-with some serous drainage on dressing   Skin:     General: Skin is warm and dry  Neurological:      Mental Status: She is alert and oriented to person, place, and time     Psychiatric:         Mood and Affect: Mood normal          Behavior: Behavior normal              Results from last 7 days   Lab Units 06/11/23  0436 06/10/23  0520 06/08/23  0512   HEMATOCRIT % 32 6*  --  33 8*   HEMOGLOBIN g/dL 10 2*  --  10 7*   PLATELETS Thousands/uL 288 268 283   WBC Thousand/uL 5 31  --  6 78     Results from last 7 days   Lab Units 06/12/23  0449 06/11/23  0436 06/10/23  0520   BUN mg/dL 4* 6 10   CALCIUM mg/dL 8 1* 8 6 8 3   CHLORIDE mmol/L 113* 109* 110*   CO2 mmol/L 28 25 21   CREATININE mg/dL 1 21 1 23 1 27   POTASSIUM mmol/L 3 9 3 9 3 8

## 2023-06-12 NOTE — RESTORATIVE TECHNICIAN NOTE
Restorative Technician Note      Patient Name: Ela Shah     Restorative Tech Visit Date: 06/12/23  Note Type: Mobility  Patient Position Upon Consult: Standing  Activity Performed: Ambulated  Assistive Device: Other (Comment) (pt able to push her own IV pole safely to increase independence)  Education Provided: Yes  Patient Position at End of Consult: Bedside chair;  All needs within reach    Rianna Garsia  DPT, Restorative Technician

## 2023-06-12 NOTE — PLAN OF CARE
Problem: MOBILITY - ADULT  Goal: Maintain or return to baseline ADL function  Description: INTERVENTIONS:  -  Assess patient's ability to carry out ADLs; assess patient's baseline for ADL function and identify physical deficits which impact ability to perform ADLs (bathing, care of mouth/teeth, toileting, grooming, dressing, etc )  - Assess/evaluate cause of self-care deficits   - Assess range of motion  - Assess patient's mobility; develop plan if impaired  - Assess patient's need for assistive devices and provide as appropriate  - Encourage maximum independence but intervene and supervise when necessary  - Involve family in performance of ADLs  - Assess for home care needs following discharge   - Consider OT consult to assist with ADL evaluation and planning for discharge  - Provide patient education as appropriate  Outcome: Progressing     Problem: PAIN - ADULT  Goal: Verbalizes/displays adequate comfort level or baseline comfort level  Description: Interventions:  - Encourage patient to monitor pain and request assistance  - Assess pain using appropriate pain scale  - Administer analgesics based on type and severity of pain and evaluate response  - Implement non-pharmacological measures as appropriate and evaluate response  - Consider cultural and social influences on pain and pain management  - Notify physician/advanced practitioner if interventions unsuccessful or patient reports new pain  Outcome: Progressing     Problem: SAFETY ADULT  Goal: Patient will remain free of falls  Description: INTERVENTIONS:  - Educate patient/family on patient safety including physical limitations  - Instruct patient to call for assistance with activity   - Consult OT/PT to assist with strengthening/mobility   - Keep Call bell within reach  - Keep bed low and locked with side rails adjusted as appropriate  - Keep care items and personal belongings within reach  - Initiate and maintain comfort rounds  - Make Fall Risk Sign visible to staff  - Offer Toileting every 2 Hours, in advance of need  - Initiate/Maintain alarm  - Obtain necessary fall risk management equipment  - Apply yellow socks and bracelet for high fall risk patients  - Consider moving patient to room near nurses station  Outcome: Progressing     Problem: Nutrition/Hydration-ADULT  Goal: Nutrient/Hydration intake appropriate for improving, restoring or maintaining nutritional needs  Description: Monitor and assess patient's nutrition/hydration status for malnutrition  Collaborate with interdisciplinary team and initiate plan and interventions as ordered  Monitor patient's weight and dietary intake as ordered or per policy  Utilize nutrition screening tool and intervene as necessary  Determine patient's food preferences and provide high-protein, high-caloric foods as appropriate       INTERVENTIONS:  - Monitor oral intake, urinary output, labs, and treatment plans  - Assess nutrition and hydration status and recommend course of action  - Evaluate amount of meals eaten  - Assist patient with eating if necessary   - Allow adequate time for meals  - Recommend/ encourage appropriate diets, oral nutritional supplements, and vitamin/mineral supplements  - Order, calculate, and assess calorie counts as needed  - Recommend, monitor, and adjust tube feedings and TPN/PPN based on assessed needs  - Assess need for intravenous fluids  - Provide specific nutrition/hydration education as appropriate  - Include patient/family/caregiver in decisions related to nutrition  Outcome: Progressing

## 2023-06-13 LAB
ANION GAP SERPL CALCULATED.3IONS-SCNC: 1 MMOL/L (ref 4–13)
BUN SERPL-MCNC: 4 MG/DL (ref 5–25)
CALCIUM SERPL-MCNC: 7.8 MG/DL (ref 8.3–10.1)
CHLORIDE SERPL-SCNC: 113 MMOL/L (ref 96–108)
CO2 SERPL-SCNC: 28 MMOL/L (ref 21–32)
CREAT SERPL-MCNC: 1.28 MG/DL (ref 0.6–1.3)
GFR SERPL CREATININE-BSD FRML MDRD: 48 ML/MIN/1.73SQ M
GLUCOSE SERPL-MCNC: 91 MG/DL (ref 65–140)
MAGNESIUM SERPL-MCNC: 2.1 MG/DL (ref 1.6–2.6)
PHOSPHATE SERPL-MCNC: 3.5 MG/DL (ref 2.7–4.5)
POTASSIUM SERPL-SCNC: 4.1 MMOL/L (ref 3.5–5.3)
SODIUM SERPL-SCNC: 142 MMOL/L (ref 135–147)

## 2023-06-13 PROCEDURE — 99024 POSTOP FOLLOW-UP VISIT: CPT | Performed by: COLON & RECTAL SURGERY

## 2023-06-13 PROCEDURE — 83735 ASSAY OF MAGNESIUM: CPT | Performed by: SURGERY

## 2023-06-13 PROCEDURE — 80048 BASIC METABOLIC PNL TOTAL CA: CPT | Performed by: SURGERY

## 2023-06-13 PROCEDURE — 84100 ASSAY OF PHOSPHORUS: CPT | Performed by: SURGERY

## 2023-06-13 RX ADMIN — GABAPENTIN 100 MG: 100 CAPSULE ORAL at 17:07

## 2023-06-13 RX ADMIN — GABAPENTIN 100 MG: 100 CAPSULE ORAL at 09:27

## 2023-06-13 RX ADMIN — LEVOTHYROXINE SODIUM 100 MCG: 100 TABLET ORAL at 05:10

## 2023-06-13 RX ADMIN — SODIUM BICARBONATE 650 MG: 650 TABLET ORAL at 09:27

## 2023-06-13 RX ADMIN — DEXTROSE, SODIUM CHLORIDE, AND POTASSIUM CHLORIDE 125 ML/HR: 5; .45; .15 INJECTION INTRAVENOUS at 05:13

## 2023-06-13 RX ADMIN — HEPARIN SODIUM 5000 UNITS: 5000 INJECTION INTRAVENOUS; SUBCUTANEOUS at 13:21

## 2023-06-13 RX ADMIN — HEPARIN SODIUM 5000 UNITS: 5000 INJECTION INTRAVENOUS; SUBCUTANEOUS at 05:09

## 2023-06-13 RX ADMIN — SODIUM BICARBONATE 650 MG: 650 TABLET ORAL at 17:07

## 2023-06-13 RX ADMIN — GABAPENTIN 100 MG: 100 CAPSULE ORAL at 21:14

## 2023-06-13 RX ADMIN — HEPARIN SODIUM 5000 UNITS: 5000 INJECTION INTRAVENOUS; SUBCUTANEOUS at 21:14

## 2023-06-13 NOTE — PLAN OF CARE
Problem: MOBILITY - ADULT  Goal: Maintain or return to baseline ADL function  Description: INTERVENTIONS:  -  Assess patient's ability to carry out ADLs; assess patient's baseline for ADL function and identify physical deficits which impact ability to perform ADLs (bathing, care of mouth/teeth, toileting, grooming, dressing, etc )  - Assess/evaluate cause of self-care deficits   - Assess range of motion  - Assess patient's mobility; develop plan if impaired  - Assess patient's need for assistive devices and provide as appropriate  - Encourage maximum independence but intervene and supervise when necessary  - Involve family in performance of ADLs  - Assess for home care needs following discharge   - Consider OT consult to assist with ADL evaluation and planning for discharge  - Provide patient education as appropriate  Outcome: Progressing     Problem: PAIN - ADULT  Goal: Verbalizes/displays adequate comfort level or baseline comfort level  Description: Interventions:  - Encourage patient to monitor pain and request assistance  - Assess pain using appropriate pain scale  - Administer analgesics based on type and severity of pain and evaluate response  - Implement non-pharmacological measures as appropriate and evaluate response  - Consider cultural and social influences on pain and pain management  - Notify physician/advanced practitioner if interventions unsuccessful or patient reports new pain  Outcome: Progressing     Problem: INFECTION - ADULT  Goal: Absence or prevention of progression during hospitalization  Description: INTERVENTIONS:  - Assess and monitor for signs and symptoms of infection  - Monitor lab/diagnostic results  - Monitor all insertion sites, i e  indwelling lines, tubes, and drains  - Monitor endotracheal if appropriate and nasal secretions for changes in amount and color  - [place] appropriate cooling/warming therapies per order  - Administer medications as ordered  - Instruct and encourage patient and family to use good hand hygiene technique  - Identify and instruct in appropriate isolation precautions for identified infection/condition  Outcome: Progressing     Problem: SAFETY ADULT  Goal: Patient will remain free of falls  Description: INTERVENTIONS:  - Educate patient/family on patient safety including physical limitations  - Instruct patient to call for assistance with activity   - Consult OT/PT to assist with strengthening/mobility   - Keep Call bell within reach  - Keep bed low and locked with side rails adjusted as appropriate  - Keep care items and personal belongings within reach  - Initiate and maintain comfort rounds  - Make Fall Risk Sign visible to staff  - Apply yellow socks and bracelet for high fall risk patients  - Consider moving patient to room near nurses station  Outcome: Progressing     Problem: DISCHARGE PLANNING  Goal: Discharge to home or other facility with appropriate resources  Description: INTERVENTIONS:  - Identify barriers to discharge w/patient and caregiver  - Arrange for needed discharge resources and transportation as appropriate  - Identify discharge learning needs (meds, wound care, etc )  - Arrange for interpretive services to assist at discharge as needed  - Refer to Case Management Department for coordinating discharge planning if the patient needs post-hospital services based on physician/advanced practitioner order or complex needs related to functional status, cognitive ability, or social support system  Outcome: Progressing     Problem: Nutrition/Hydration-ADULT  Goal: Nutrient/Hydration intake appropriate for improving, restoring or maintaining nutritional needs  Description: Monitor and assess patient's nutrition/hydration status for malnutrition  Collaborate with interdisciplinary team and initiate plan and interventions as ordered  Monitor patient's weight and dietary intake as ordered or per policy   Utilize nutrition screening tool and intervene as necessary  Determine patient's food preferences and provide high-protein, high-caloric foods as appropriate       INTERVENTIONS:  - Monitor oral intake, urinary output, labs, and treatment plans  - Assess nutrition and hydration status and recommend course of action  - Evaluate amount of meals eaten  - Assist patient with eating if necessary   - Allow adequate time for meals  - Recommend/ encourage appropriate diets, oral nutritional supplements, and vitamin/mineral supplements  - Order, calculate, and assess calorie counts as needed  - Recommend, monitor, and adjust tube feedings and TPN/PPN based on assessed needs  - Assess need for intravenous fluids  - Provide specific nutrition/hydration education as appropriate  - Include patient/family/caregiver in decisions related to nutrition  Outcome: Progressing

## 2023-06-13 NOTE — PLAN OF CARE
Problem: PAIN - ADULT  Goal: Verbalizes/displays adequate comfort level or baseline comfort level  Description: Interventions:  - Encourage patient to monitor pain and request assistance  - Assess pain using appropriate pain scale  - Administer analgesics based on type and severity of pain and evaluate response  - Implement non-pharmacological measures as appropriate and evaluate response  - Consider cultural and social influences on pain and pain management  - Notify physician/advanced practitioner if interventions unsuccessful or patient reports new pain  Outcome: Progressing     Problem: INFECTION - ADULT  Goal: Absence or prevention of progression during hospitalization  Description: INTERVENTIONS:  - Assess and monitor for signs and symptoms of infection  - Monitor lab/diagnostic results  - Monitor all insertion sites, i e  indwelling lines, tubes, and drains  - Monitor endotracheal if appropriate and nasal secretions for changes in amount and color  - [place] appropriate cooling/warming therapies per order  - Administer medications as ordered  - Instruct and encourage patient and family to use good hand hygiene technique  - Identify and instruct in appropriate isolation precautions for identified infection/condition  Outcome: Progressing     Problem: DISCHARGE PLANNING  Goal: Discharge to home or other facility with appropriate resources  Description: INTERVENTIONS:  - Identify barriers to discharge w/patient and caregiver  - Arrange for needed discharge resources and transportation as appropriate  - Identify discharge learning needs (meds, wound care, etc )  - Arrange for interpretive services to assist at discharge as needed  - Refer to Case Management Department for coordinating discharge planning if the patient needs post-hospital services based on physician/advanced practitioner order or complex needs related to functional status, cognitive ability, or social support system  Outcome: Progressing Problem: Nutrition/Hydration-ADULT  Goal: Nutrient/Hydration intake appropriate for improving, restoring or maintaining nutritional needs  Description: Monitor and assess patient's nutrition/hydration status for malnutrition  Collaborate with interdisciplinary team and initiate plan and interventions as ordered  Monitor patient's weight and dietary intake as ordered or per policy  Utilize nutrition screening tool and intervene as necessary  Determine patient's food preferences and provide high-protein, high-caloric foods as appropriate       INTERVENTIONS:  - Monitor oral intake, urinary output, labs, and treatment plans  - Assess nutrition and hydration status and recommend course of action  - Evaluate amount of meals eaten  - Assist patient with eating if necessary   - Allow adequate time for meals  - Recommend/ encourage appropriate diets, oral nutritional supplements, and vitamin/mineral supplements  - Order, calculate, and assess calorie counts as needed  - Recommend, monitor, and adjust tube feedings and TPN/PPN based on assessed needs  - Assess need for intravenous fluids  - Provide specific nutrition/hydration education as appropriate  - Include patient/family/caregiver in decisions related to nutrition  Outcome: Progressing

## 2023-06-14 VITALS
DIASTOLIC BLOOD PRESSURE: 62 MMHG | HEART RATE: 83 BPM | SYSTOLIC BLOOD PRESSURE: 102 MMHG | RESPIRATION RATE: 18 BRPM | TEMPERATURE: 98.3 F | OXYGEN SATURATION: 97 % | WEIGHT: 240 LBS | HEIGHT: 63 IN | BODY MASS INDEX: 42.52 KG/M2

## 2023-06-14 PROCEDURE — 99024 POSTOP FOLLOW-UP VISIT: CPT | Performed by: COLON & RECTAL SURGERY

## 2023-06-14 PROCEDURE — NC001 PR NO CHARGE: Performed by: PHYSICIAN ASSISTANT

## 2023-06-14 PROCEDURE — 97535 SELF CARE MNGMENT TRAINING: CPT

## 2023-06-14 RX ORDER — METHOCARBAMOL 500 MG/1
500 TABLET, FILM COATED ORAL 4 TIMES DAILY
Qty: 28 TABLET | Refills: 0 | Status: SHIPPED | OUTPATIENT
Start: 2023-06-14 | End: 2023-06-21

## 2023-06-14 RX ORDER — ACETAMINOPHEN 325 MG/1
975 TABLET ORAL EVERY 8 HOURS SCHEDULED
Refills: 0 | COMMUNITY
Start: 2023-06-14

## 2023-06-14 RX ORDER — GABAPENTIN 100 MG/1
100 CAPSULE ORAL 3 TIMES DAILY
Qty: 21 CAPSULE | Refills: 0 | Status: SHIPPED | OUTPATIENT
Start: 2023-06-14 | End: 2023-06-21

## 2023-06-14 RX ADMIN — SODIUM BICARBONATE 650 MG: 650 TABLET ORAL at 09:07

## 2023-06-14 RX ADMIN — LEVOTHYROXINE SODIUM 100 MCG: 100 TABLET ORAL at 05:30

## 2023-06-14 RX ADMIN — HEPARIN SODIUM 5000 UNITS: 5000 INJECTION INTRAVENOUS; SUBCUTANEOUS at 05:30

## 2023-06-14 RX ADMIN — GABAPENTIN 100 MG: 100 CAPSULE ORAL at 09:07

## 2023-06-14 NOTE — OCCUPATIONAL THERAPY NOTE
Occupational Therapy Progress Note     Patient Name: Vaughn Echeverria  [id number]'[id number] Date: 6/14/2023  Problem List  Active Problems: There are no active Hospital Problems  06/14/23 0928   OT Last Visit   OT Visit Date 06/14/23   Note Type   Note Type Treatment   Pain Assessment   Pain Assessment Tool 0-10   Pain Score No Pain   Restrictions/Precautions   Weight Bearing Precautions Per Order No   Other Precautions Fall Risk;Pain   Lifestyle   Autonomy PTA, pt reports independence with ADLs (fiance helps with socks), independence with functional mobility, and independent with medication management  Reciprocal Relationships supportive fiance and son   Service to Others SSD   Intrinsic Gratification enjoys sitting outside   ADL   Where Assessed Chair   Equipment Provided Reacher;Sock aid   LB Bathing Comments Pt provided with energy conservation hand out with ADLs/IADLs  Reviewed compensatory techniques with ADLs upon DC and Pt reports supportive spouse  UB Dressing Assistance 5  Supervision/Setup   UB Dressing Deficit Setup; Fasteners   LB Dressing Assistance 5  Supervision/Setup   LB Dressing Deficit Setup;Don/doff R sock; Don/doff L sock; Use of adaptive equipment   LB Dressing Comments Use of AE- Pt with reacher at home and educated on where to purchase sock aid   Bed Mobility   Additional Comments Pt greeted OOB in recliner chair  Cognition   Overall Cognitive Status WFL   Arousal/Participation Alert; Responsive; Cooperative   Attention Within functional limits   Orientation Level Oriented X4   Memory Within functional limits   Following Commands Follows all commands and directions without difficulty   Comments Pt pleasant and cooperative during OT session  Activity Tolerance   Activity Tolerance Patient tolerated treatment well   Medical Staff Made Aware RN cleared/updated  Lonza Smaller     Assessment   Assessment Pt greeted bedside for OT treatment on 6/14/2023 focusing on maximizing independence with ADLs  Pt S with UB dressing and S with LB dressing with use of LHAE  Pt provided with energy conservation hand out with ADLs/IADLs  Reviewed compensatory techniques with ADLs upon DC and Pt reports supportive spouse able to assist  Pt encouraged to participate in ADLs/functional mobility with nursing/restorative staff during hospitalization  Pt with no further acute OT concerns  Pt would benefit from returning home with increased social support upon DC to maximize safety and independence with ADLs and functional tasks of choice  DC skilled OT services  Plan   Treatment Interventions ADL retraining;Functional transfer training;UE strengthening/ROM; Endurance training;Cognitive reorientation;Patient/family training;Equipment evaluation/education; Compensatory technique education; Energy conservation; Activityengagement   Goal Expiration Date 06/23/23  (DC SKILLED OT SERVICES S/P TODAY'S TX)   OT Treatment Day 1   OT Frequency 2-3x/wk   Recommendation   OT Discharge Recommendation No rehabilitation needs   Equipment Recommended Shower/Tub chair with back ($);Bedside commode  (continue use of reacher)   Commode Type Wide   Additional Comments    The patient's raw score on the AM-PAC Daily Activity Inpatient Short Form is 19  A raw score of greater than or equal to 19 suggests the patient may benefit from discharge to home  Please refer to the recommendation of the Occupational Therapist for safe discharge planning       AM-PAC Daily Activity Inpatient   Lower Body Dressing 3   Bathing 3   Toileting 3   Upper Body Dressing 3   Grooming 3   Eating 4   Daily Activity Raw Score 19   Daily Activity Standardized Score (Calc for Raw Score >=11)    AM-[facility] Applied Cognition Inpatient   Following a Speech/Presentation 4   Understanding Ordinary Conversation 4   Taking Medications 4   Remembering Where Things Are Placed or Put Away 4   Remembering List of 4-5 Errands 4   Taking Care of Complicated Tasks 4   Applied Cognition Raw Score 24   Applied Cognition Standardized Score 62 21   End of Consult   Education Provided Yes   Patient Position at End of Consult Bedside chair; All needs within reach   Nurse Communication Nurse aware of consult       Fredrick Azul MS, OTR/L

## 2023-06-14 NOTE — PLAN OF CARE
Problem: OCCUPATIONAL THERAPY ADULT  Goal: Performs self-care activities at highest level of function for planned discharge setting  See evaluation for individualized goals  Description: Treatment Interventions: ADL retraining, Functional transfer training, Endurance training, Equipment evaluation/education, Patient/family training, Compensatory technique education, Continued evaluation, Energy conservation, Activityengagement  Equipment Recommended: Shower/Tub chair with back ($), Bedside commode (Recommend continued use of reacher)       See flowsheet documentation for full assessment, interventions and recommendations  Outcome: Adequate for Discharge  Note: Limitation: Decreased ADL status, Decreased endurance, Decreased high-level ADLs, Decreased self-care trans  Prognosis: Good  Assessment: Pt greeted bedside for OT treatment on 6/14/2023 focusing on maximizing independence with ADLs  Pt S with UB dressing and S with LB dressing with use of LHAE  Pt provided with energy conservation hand out with ADLs/IADLs  Reviewed compensatory techniques with ADLs upon DC and Pt reports supportive spouse able to assist  Pt encouraged to participate in ADLs/functional mobility with nursing/restorative staff during hospitalization  Pt with no further acute OT concerns  Pt would benefit from returning home with increased social support upon DC to maximize safety and independence with ADLs and functional tasks of choice  DC skilled OT services       OT Discharge Recommendation: No rehabilitation needs

## 2023-06-14 NOTE — PROGRESS NOTES
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 who presented for ileostomy closure on 6/7 c/b ileus requiring NGT placement which has since been removed     Having multiple bowel movements    Plan:  Diet as tolerated  Plan for DC home today  PO pain regimen for pain control  DVT px  Encourage out of bed and ambulation  PT/OT recommend Home health rehab      Subjective/Objective     Subjective:   No acute events overnight  Diarrhea over the last day  No nausea or vomiting  Objective:     Blood pressure 99/65, pulse 80, temperature 97 8 °F (36 6 °C), resp  rate 18, height 5' 3\" (1 6 m), weight 109 kg (240 lb), last menstrual period 06/06/2022, SpO2 100 %, not currently breastfeeding  ,Body mass index is 42 51 kg/m²        Intake/Output Summary (Last 24 hours) at 6/13/2023 2121  Last data filed at 6/13/2023 1545  Gross per 24 hour   Intake 2314 58 ml   Output 600 ml   Net 1714 58 ml       Invasive Devices     Peripheral Intravenous Line  Duration           Peripheral IV 06/11/23 Right Antecubital 2 days                Physical Exam:   Gen: NAD, Comfortable  Neuro: A&O, No focal deficits  Head: Normal Cephalic, Atraumatic  Eye: EOMI, PERRLA, No scleral icterus  Neck: Supple, No JVD, Midline trachea  CV: RRR, Cap refill <2 sec  Pulm: Normal work of breathing, no respiratory distress  Abd: Soft, Non-Distended, Non-Tender  Ext: No edema, Non-tender  Skin: warm, dry, intact      "

## 2023-06-14 NOTE — DISCHARGE SUMMARY
Discharge Summary - Colorectal Surgery   Regine Lara 50 y o  female MRN: [id number]  Unit/Bed#: [facility] 804-01 Encounter: [id number]        Admitting Diagnosis: Ulcerative colitis, diverting ileostomy    Admit Date: 6/7/2023    Discharge Diagnosis: Ulcerative colitis    Discharge Date: 6/14/2023    HPI: Tai Conley is a 61-year-old woman who has a history of ulcerative colitis  In March 2023 she had a proctocolectomy with an ileoanal pouch and diverting loop ileostomy  Patient has been doing extremely well and now is being admitted for ileostomy closure  Procedures Performed: 6/7/2023 ileostomy closure  Hospital Course: Patient has done well postoperatively  Patient did have a postoperative ileus and therefore spent a few more days in the hospital   There has been no further nausea, no emesis  She is now tolerating a low residue diet  Her ileostomy closure site is on her left mid abdominal region and it is stayed clean and dry  Staples have remained intact  Patient is now up and ambulating the halls well  Pain is being tolerated with gabapentin 100 mg every 8 hours as well as extra strength Tylenol  Patient will be discharged home today and followed in the office in 7 to 10 days for staple removal   Scripts were sent to [facility] in [facility] for gabapentin 100 mg every 8 hours x7 days no refills, Robaxin 500 mg every 6 hours x5 days, no refills  Discharge instructions were given to the patient  For any fevers of 101 5 or higher increasing abdominal pain she is not to hesitate to call the office  Complications: None    Condition at Discharge: good     Discharge instructions/Information to patient and family:   See after visit summary for information provided to patient and family  Provisions for Follow-Up Care:  See after visit summary for information related to follow-up care and any pertinent home health orders        Disposition: Home    Planned Readmission: No    Discharge Statement   I spent 30 minutes discharging the patient  This time was spent on the day of discharge  I had direct contact with the patient on the day of discharge  Additional documentation is required if more than 30 minutes were spent on discharge  Discharge Medications:  See after visit summary for reconciled discharge medications provided to patient and family

## 2023-06-15 ENCOUNTER — TRANSITIONAL CARE MANAGEMENT (OUTPATIENT)
Dept: INTERNAL MEDICINE CLINIC | Facility: OTHER | Age: 53
End: 2023-06-15

## 2023-06-15 ENCOUNTER — TELEPHONE (OUTPATIENT)
Dept: INTERNAL MEDICINE CLINIC | Facility: CLINIC | Age: 53
End: 2023-06-15

## 2023-06-15 DIAGNOSIS — M79.89 LEG SWELLING: Primary | ICD-10-CM

## 2023-06-15 RX ORDER — FUROSEMIDE 20 MG/1
20 TABLET ORAL AS NEEDED
Qty: 10 TABLET | Refills: 0 | Status: SHIPPED | OUTPATIENT
Start: 2023-06-15 | End: 2023-06-21 | Stop reason: SDUPTHER

## 2023-06-15 NOTE — TELEPHONE ENCOUNTER
Patient would like a prescription for a water pill,  Her feet are swollen  Was just discharged from the hospital yesterday, they took her colostomy bag and she has a lot of diarrhea, does not want to come into the office

## 2023-06-15 NOTE — UTILIZATION REVIEW
NOTIFICATION OF ADMISSION DISCHARGE   This is a Notification of Discharge from [facility]  Please be advised that this patient has been discharge from our facility  Below you will find the admission and discharge date and time including the patient’s disposition  UTILIZATION REVIEW CONTACT:  Shari Velazquez  Utilization   Network Utilization Review Department  Phone: [phone] x carefully listen to the prompts  All voicemails are confidential   Email: [email or web address]     ADMISSION INFORMATION  PRESENTATION DATE: 6/7/2023  9:08 AM  OBERVATION ADMISSION DATE:   INPATIENT ADMISSION DATE: 6/7/23  1:49 PM   DISCHARGE DATE: 6/14/2023  2:10 PM   DISPOSITION:Home/Self Care    IMPORTANT INFORMATION:  Send all requests for admission clinical reviews, approved or denied determinations and any other requests to dedicated fax number below belonging to the campus where the patient is receiving treatment   List of dedicated fax numbers:  [address] DENIALS (Administrative/Medical Necessity) [phone]   [address] (Maternity/NICU/Pediatrics) [phone]   [facility] [phone]   [facility] [phone]   [facility] 134 [phone]   [address] [facility] [phone]   [address] [facility] [phone]   [address] [place] [address] [phone]   [facility]  [phone]   [address] [facility] [phone]   [facility] [address] [facility] [phone]

## 2023-06-15 NOTE — TELEPHONE ENCOUNTER
Prescription sent for Lasix 20 mg to be taken as needed for leg swelling  Patient should call colorectal surgeon office regarding diarrhea

## 2023-06-21 DIAGNOSIS — M79.89 LEG SWELLING: ICD-10-CM

## 2023-06-21 RX ORDER — FUROSEMIDE 20 MG/1
40 TABLET ORAL DAILY
Qty: 60 TABLET | Refills: 0 | Status: SHIPPED | OUTPATIENT
Start: 2023-06-21

## 2023-06-21 NOTE — TELEPHONE ENCOUNTER
Patient reports feet are still swollen  Out of the 10 pills she was given she only has 3 left  Reports no difference at all when she takes this medication  She is looking for something else or maybe an increased dose  Please advise
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
## 2023-06-26 ENCOUNTER — OFFICE VISIT (OUTPATIENT)
Dept: INTERNAL MEDICINE CLINIC | Facility: CLINIC | Age: 53
End: 2023-06-26
Payer: COMMERCIAL

## 2023-06-26 ENCOUNTER — OFFICE VISIT (OUTPATIENT)
Age: 53
End: 2023-06-26

## 2023-06-26 VITALS
TEMPERATURE: 98.6 F | OXYGEN SATURATION: 98 % | BODY MASS INDEX: 40.22 KG/M2 | DIASTOLIC BLOOD PRESSURE: 78 MMHG | WEIGHT: 227 LBS | SYSTOLIC BLOOD PRESSURE: 130 MMHG | HEART RATE: 91 BPM | HEIGHT: 63 IN

## 2023-06-26 VITALS — BODY MASS INDEX: 42.52 KG/M2 | WEIGHT: 240 LBS | HEIGHT: 63 IN

## 2023-06-26 DIAGNOSIS — E03.9 ACQUIRED HYPOTHYROIDISM: ICD-10-CM

## 2023-06-26 DIAGNOSIS — E66.01 OBESITY, MORBID (HCC): ICD-10-CM

## 2023-06-26 DIAGNOSIS — K51.018 ULCERATIVE PANCOLITIS WITH OTHER COMPLICATION (HCC): Primary | ICD-10-CM

## 2023-06-26 DIAGNOSIS — J45.20 MILD INTERMITTENT ASTHMA WITHOUT COMPLICATION: ICD-10-CM

## 2023-06-26 DIAGNOSIS — R73.01 IMPAIRED FASTING BLOOD SUGAR: ICD-10-CM

## 2023-06-26 DIAGNOSIS — M17.0 PRIMARY OSTEOARTHRITIS OF BOTH KNEES: ICD-10-CM

## 2023-06-26 PROCEDURE — 99024 POSTOP FOLLOW-UP VISIT: CPT | Performed by: COLON & RECTAL SURGERY

## 2023-06-26 PROCEDURE — 99495 TRANSJ CARE MGMT MOD F2F 14D: CPT | Performed by: INTERNAL MEDICINE

## 2023-06-26 RX ORDER — ALBUTEROL SULFATE 90 UG/1
2 AEROSOL, METERED RESPIRATORY (INHALATION) EVERY 6 HOURS PRN
Qty: 18 G | Refills: 0 | Status: SHIPPED | OUTPATIENT
Start: 2023-06-26

## 2023-06-26 NOTE — ASSESSMENT & PLAN NOTE
Patient presents after ileostomy takedown status post proctocolectomy with ileal pouch anal anastomosis  Appetite and stamina are slowly improving  She still has loose frequent bowel movements  Increase p o  intake particularly with fiber supplements was recommended  She may also start a trial of intermittent antimotility agents such as Imodium as needed  Incision is healing nicely with minimal skin separation at midpoint  All staples were removed  She may start to resume activities as tolerated  I will plan on seeing her back in 1 month for follow-up
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
## 2023-07-13 ENCOUNTER — VBI (OUTPATIENT)
Dept: ADMINISTRATIVE | Facility: OTHER | Age: 53
End: 2023-07-13

## 2023-07-16 DIAGNOSIS — M79.89 LEG SWELLING: ICD-10-CM

## 2023-07-17 ENCOUNTER — TELEPHONE (OUTPATIENT)
Age: 53
End: 2023-07-17

## 2023-07-17 RX ORDER — FUROSEMIDE 20 MG/1
TABLET ORAL
Qty: 60 TABLET | Refills: 0 | OUTPATIENT
Start: 2023-07-17

## 2023-07-17 NOTE — TELEPHONE ENCOUNTER
Patients GI provider:  Dr. Elizabeth Yen    Number to return call: [phone]     Reason for call: Pt called to rescheduled post op visit on 08/03/2023, she wants to know if there is any earlier time that day?  Please review and reach out thank you     Scheduled procedure/appointment date if applicable: Appt 41/86/9157

## 2023-08-04 ENCOUNTER — VBI (OUTPATIENT)
Dept: ADMINISTRATIVE | Facility: OTHER | Age: 53
End: 2023-08-04
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
## 2023-08-24 ENCOUNTER — OFFICE VISIT (OUTPATIENT)
Age: 53
End: 2023-08-24

## 2023-08-24 VITALS
BODY MASS INDEX: 39.87 KG/M2 | WEIGHT: 225 LBS | SYSTOLIC BLOOD PRESSURE: 124 MMHG | DIASTOLIC BLOOD PRESSURE: 82 MMHG | HEIGHT: 63 IN

## 2023-08-24 DIAGNOSIS — K51.018 ULCERATIVE PANCOLITIS WITH OTHER COMPLICATION (HCC): Primary | ICD-10-CM

## 2023-08-24 PROCEDURE — 99024 POSTOP FOLLOW-UP VISIT: CPT | Performed by: COLON & RECTAL SURGERY

## 2023-08-24 NOTE — ASSESSMENT & PLAN NOTE
Patient presents for follow-up. She is status post total proctocolectomy with ileal pouch anal anastomosis. She is adjusting to her current anatomy but is well pleased with not having stoma at present. She notes that some foods are more poorly tolerated than others. High-fiber diet was recommended. We discussed long-term follow-up should include pouchoscopy with anorectal exam in approximately 1 year. She will call if concerning symptoms develop in the meantime. Otherwise I will see her back as needed.

## 2023-08-28 ENCOUNTER — TELEPHONE (OUTPATIENT)
Dept: INTERNAL MEDICINE CLINIC | Facility: CLINIC | Age: 53
End: 2023-08-28

## 2023-08-28 DIAGNOSIS — E61.1 IRON DEFICIENCY: ICD-10-CM

## 2023-08-28 DIAGNOSIS — E53.8 B12 DEFICIENCY: ICD-10-CM

## 2023-08-28 DIAGNOSIS — E03.9 ACQUIRED HYPOTHYROIDISM: Primary | ICD-10-CM

## 2023-08-28 DIAGNOSIS — E78.2 MIXED HYPERLIPIDEMIA: ICD-10-CM

## 2023-08-28 DIAGNOSIS — R73.01 IMPAIRED FASTING BLOOD SUGAR: ICD-10-CM

## 2023-08-28 DIAGNOSIS — E55.9 VITAMIN D DEFICIENCY: Primary | ICD-10-CM

## 2023-09-01 ENCOUNTER — OFFICE VISIT (OUTPATIENT)
Dept: NEPHROLOGY | Facility: CLINIC | Age: 53
End: 2023-09-01

## 2023-09-01 VITALS
OXYGEN SATURATION: 97 % | BODY MASS INDEX: 39.34 KG/M2 | SYSTOLIC BLOOD PRESSURE: 118 MMHG | WEIGHT: 222 LBS | HEART RATE: 75 BPM | DIASTOLIC BLOOD PRESSURE: 78 MMHG | HEIGHT: 63 IN

## 2023-09-01 DIAGNOSIS — N11.9 CHRONIC TUBULOINTERSTITIAL NEPHRITIS: ICD-10-CM

## 2023-09-01 DIAGNOSIS — N18.2 STAGE 2 CHRONIC KIDNEY DISEASE: Primary | ICD-10-CM

## 2023-09-01 DIAGNOSIS — E87.8 ELECTROLYTE ABNORMALITY: ICD-10-CM
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
## 2023-09-01 NOTE — PATIENT INSTRUCTIONS
It was nice to see you today. Have your lab work done and then will see if you can stop the sodium bicarbonate tablets. Use lasix as needed for swelling.  Return to office in 6 months

## 2023-10-03 ENCOUNTER — APPOINTMENT (OUTPATIENT)
Dept: LAB | Facility: CLINIC | Age: 53
End: 2023-10-03
Payer: COMMERCIAL

## 2023-10-03 DIAGNOSIS — N18.2 STAGE 2 CHRONIC KIDNEY DISEASE: ICD-10-CM

## 2023-10-03 DIAGNOSIS — R73.01 IMPAIRED FASTING BLOOD SUGAR: ICD-10-CM

## 2023-10-03 DIAGNOSIS — E78.2 MIXED HYPERLIPIDEMIA: ICD-10-CM

## 2023-10-03 DIAGNOSIS — E03.9 ACQUIRED HYPOTHYROIDISM: ICD-10-CM

## 2023-10-03 DIAGNOSIS — E53.8 B12 DEFICIENCY: ICD-10-CM

## 2023-10-03 DIAGNOSIS — E55.9 VITAMIN D DEFICIENCY: ICD-10-CM

## 2023-10-03 DIAGNOSIS — E61.1 IRON DEFICIENCY: ICD-10-CM

## 2023-10-03 LAB
25(OH)D3 SERPL-MCNC: 25.3 NG/ML (ref 30–100)
ALBUMIN SERPL BCP-MCNC: 4.1 G/DL (ref 3.5–5)
ALP SERPL-CCNC: 72 U/L (ref 34–104)
ALT SERPL W P-5'-P-CCNC: 16 U/L (ref 7–52)
ANION GAP SERPL CALCULATED.3IONS-SCNC: 12 MMOL/L
AST SERPL W P-5'-P-CCNC: 20 U/L (ref 13–39)
BACTERIA UR QL AUTO: ABNORMAL /HPF
BASOPHILS # BLD AUTO: 0.05 THOUSANDS/ÂΜL (ref 0–0.1)
BASOPHILS NFR BLD AUTO: 1 % (ref 0–1)
BILIRUB SERPL-MCNC: 0.53 MG/DL (ref 0.2–1)
BILIRUB UR QL STRIP: NEGATIVE
BUN SERPL-MCNC: 23 MG/DL (ref 5–25)
CA-I BLD-SCNC: 1.28 MMOL/L (ref 1.12–1.32)
CALCIUM SERPL-MCNC: 10 MG/DL (ref 8.4–10.2)
CHLORIDE SERPL-SCNC: 103 MMOL/L (ref 96–108)
CHOLEST SERPL-MCNC: 164 MG/DL
CLARITY UR: ABNORMAL
CO2 SERPL-SCNC: 22 MMOL/L (ref 21–32)
COLOR UR: YELLOW
CREAT SERPL-MCNC: 1.2 MG/DL (ref 0.6–1.3)
CREAT UR-MCNC: 194.6 MG/DL
EOSINOPHIL # BLD AUTO: 0.23 THOUSAND/ÂΜL (ref 0–0.61)
EOSINOPHIL NFR BLD AUTO: 6 % (ref 0–6)
ERYTHROCYTE [DISTWIDTH] IN BLOOD BY AUTOMATED COUNT: 15.6 % (ref 11.6–15.1)
FERRITIN SERPL-MCNC: 23 NG/ML (ref 11–307)
GFR SERPL CREATININE-BSD FRML MDRD: 52 ML/MIN/1.73SQ M
GLUCOSE P FAST SERPL-MCNC: 103 MG/DL (ref 65–99)
GLUCOSE UR STRIP-MCNC: NEGATIVE MG/DL
HCT VFR BLD AUTO: 41.1 % (ref 34.8–46.1)
HDLC SERPL-MCNC: 71 MG/DL
HGB BLD-MCNC: 12.7 G/DL (ref 11.5–15.4)
HGB UR QL STRIP.AUTO: NEGATIVE
IMM GRANULOCYTES # BLD AUTO: 0.01 THOUSAND/UL (ref 0–0.2)
IMM GRANULOCYTES NFR BLD AUTO: 0 % (ref 0–2)
IRON SATN MFR SERPL: 30 % (ref 15–50)
IRON SERPL-MCNC: 104 UG/DL (ref 50–212)
KETONES UR STRIP-MCNC: NEGATIVE MG/DL
LDLC SERPL CALC-MCNC: 80 MG/DL (ref 0–100)
LEUKOCYTE ESTERASE UR QL STRIP: ABNORMAL
LYMPHOCYTES # BLD AUTO: 1.01 THOUSANDS/ÂΜL (ref 0.6–4.47)
LYMPHOCYTES NFR BLD AUTO: 25 % (ref 14–44)
MAGNESIUM SERPL-MCNC: 2 MG/DL (ref 1.9–2.7)
MCH RBC QN AUTO: 26.6 PG (ref 26.8–34.3)
MCHC RBC AUTO-ENTMCNC: 30.9 G/DL (ref 31.4–37.4)
MCV RBC AUTO: 86 FL (ref 82–98)
MONOCYTES # BLD AUTO: 0.35 THOUSAND/ÂΜL (ref 0.17–1.22)
MONOCYTES NFR BLD AUTO: 9 % (ref 4–12)
MUCOUS THREADS UR QL AUTO: ABNORMAL
NEUTROPHILS # BLD AUTO: 2.33 THOUSANDS/ÂΜL (ref 1.85–7.62)
NEUTS SEG NFR BLD AUTO: 59 % (ref 43–75)
NITRITE UR QL STRIP: NEGATIVE
NON-SQ EPI CELLS URNS QL MICRO: ABNORMAL /HPF
NRBC BLD AUTO-RTO: 0 /100 WBCS
PH UR STRIP.AUTO: 6.5 [PH]
PHOSPHATE SERPL-MCNC: 4.4 MG/DL (ref 2.7–4.5)
PLATELET # BLD AUTO: 314 THOUSANDS/UL (ref 149–390)
PMV BLD AUTO: 11.4 FL (ref 8.9–12.7)
POTASSIUM SERPL-SCNC: 4.3 MMOL/L (ref 3.5–5.3)
PROT SERPL-MCNC: 7.8 G/DL (ref 6.4–8.4)
PROT UR STRIP-MCNC: ABNORMAL MG/DL
PROT UR-MCNC: 29 MG/DL
PROT/CREAT UR: 0.15 MG/G{CREAT} (ref 0–0.1)
RBC # BLD AUTO: 4.77 MILLION/UL (ref 3.81–5.12)
RBC #/AREA URNS AUTO: ABNORMAL /HPF
SODIUM SERPL-SCNC: 137 MMOL/L (ref 135–147)
SP GR UR STRIP.AUTO: 1.02 (ref 1–1.03)
TIBC SERPL-MCNC: 352 UG/DL (ref 250–450)
TRIGL SERPL-MCNC: 66 MG/DL
TSH SERPL DL<=0.05 MIU/L-ACNC: 2.07 UIU/ML (ref 0.45–4.5)
UIBC SERPL-MCNC: 248 UG/DL (ref 155–355)
UROBILINOGEN UR STRIP-ACNC: <2 MG/DL
VIT B12 SERPL-MCNC: 239 PG/ML (ref 180–914)
WBC # BLD AUTO: 3.98 THOUSAND/UL (ref 4.31–10.16)
WBC #/AREA URNS AUTO: ABNORMAL /HPF

## 2023-10-03 PROCEDURE — 80061 LIPID PANEL: CPT

## 2023-10-03 PROCEDURE — 83918 ORGANIC ACIDS TOTAL QUANT: CPT

## 2023-10-03 PROCEDURE — 82728 ASSAY OF FERRITIN: CPT

## 2023-10-03 PROCEDURE — 82306 VITAMIN D 25 HYDROXY: CPT

## 2023-10-03 PROCEDURE — 80053 COMPREHEN METABOLIC PANEL: CPT

## 2023-10-03 PROCEDURE — 82607 VITAMIN B-12: CPT

## 2023-10-03 PROCEDURE — 83550 IRON BINDING TEST: CPT

## 2023-10-03 PROCEDURE — 83036 HEMOGLOBIN GLYCOSYLATED A1C: CPT

## 2023-10-03 PROCEDURE — 83735 ASSAY OF MAGNESIUM: CPT

## 2023-10-03 PROCEDURE — 85025 COMPLETE CBC W/AUTO DIFF WBC: CPT

## 2023-10-03 PROCEDURE — 84100 ASSAY OF PHOSPHORUS: CPT

## 2023-10-03 PROCEDURE — 83540 ASSAY OF IRON: CPT

## 2023-10-03 PROCEDURE — 82330 ASSAY OF CALCIUM: CPT

## 2023-10-03 PROCEDURE — 84443 ASSAY THYROID STIM HORMONE: CPT

## 2023-10-03 PROCEDURE — 36415 COLL VENOUS BLD VENIPUNCTURE: CPT

## 2023-10-04 LAB
EST. AVERAGE GLUCOSE BLD GHB EST-MCNC: 111 MG/DL
HBA1C MFR BLD: 5.5 %

## 2023-10-05 DIAGNOSIS — N17.9 ACUTE RENAL FAILURE, UNSPECIFIED ACUTE RENAL FAILURE TYPE (HCC): Primary | ICD-10-CM

## 2023-10-06 LAB — METHYLMALONATE SERPL-SCNC: 231 NMOL/L (ref 0–378)

## 2023-10-09 ENCOUNTER — OFFICE VISIT (OUTPATIENT)
Dept: INTERNAL MEDICINE CLINIC | Facility: CLINIC | Age: 53
End: 2023-10-09
Payer: COMMERCIAL

## 2023-10-09 VITALS
DIASTOLIC BLOOD PRESSURE: 84 MMHG | HEART RATE: 74 BPM | HEIGHT: 63 IN | TEMPERATURE: 98.4 F | BODY MASS INDEX: 38.98 KG/M2 | SYSTOLIC BLOOD PRESSURE: 132 MMHG | OXYGEN SATURATION: 99 % | WEIGHT: 220 LBS

## 2023-10-09 DIAGNOSIS — E61.1 IRON DEFICIENCY: ICD-10-CM

## 2023-10-09 DIAGNOSIS — Z23 FLU VACCINE NEED: ICD-10-CM

## 2023-10-09 DIAGNOSIS — Z13.820 SCREENING FOR OSTEOPOROSIS: ICD-10-CM

## 2023-10-09 DIAGNOSIS — B35.1 ONYCHOMYCOSIS: ICD-10-CM

## 2023-10-09 DIAGNOSIS — M51.26 DISC DISPLACEMENT, LUMBAR: ICD-10-CM

## 2023-10-09 DIAGNOSIS — K51.00 ULCERATIVE PANCOLITIS WITHOUT COMPLICATION (HCC): ICD-10-CM

## 2023-10-09 DIAGNOSIS — M17.0 PRIMARY OSTEOARTHRITIS OF BOTH KNEES: ICD-10-CM

## 2023-10-09 DIAGNOSIS — R73.01 IMPAIRED FASTING BLOOD SUGAR: ICD-10-CM

## 2023-10-09 DIAGNOSIS — E55.9 VITAMIN D DEFICIENCY: ICD-10-CM

## 2023-10-09 DIAGNOSIS — Z00.00 ANNUAL PHYSICAL EXAM: ICD-10-CM

## 2023-10-09 DIAGNOSIS — J45.20 MILD INTERMITTENT ASTHMA WITHOUT COMPLICATION: ICD-10-CM

## 2023-10-09 DIAGNOSIS — Z12.31 SCREENING MAMMOGRAM FOR BREAST CANCER: ICD-10-CM

## 2023-10-09 DIAGNOSIS — E03.9 ACQUIRED HYPOTHYROIDISM: Primary | ICD-10-CM

## 2023-10-09 DIAGNOSIS — E53.8 B12 DEFICIENCY: ICD-10-CM

## 2023-10-09 PROCEDURE — 99396 PREV VISIT EST AGE 40-64: CPT | Performed by: INTERNAL MEDICINE

## 2023-10-09 PROCEDURE — 90686 IIV4 VACC NO PRSV 0.5 ML IM: CPT

## 2023-10-09 PROCEDURE — 99214 OFFICE O/P EST MOD 30 MIN: CPT | Performed by: INTERNAL MEDICINE

## 2023-10-09 PROCEDURE — 90471 IMMUNIZATION ADMIN: CPT

## 2023-10-09 RX ORDER — FERROUS SULFATE 324(65)MG
324 TABLET, DELAYED RELEASE (ENTERIC COATED) ORAL
Qty: 30 TABLET | Refills: 2 | Status: SHIPPED | OUTPATIENT
Start: 2023-10-09

## 2023-10-09 RX ORDER — METHOCARBAMOL 500 MG/1
500 TABLET, FILM COATED ORAL 4 TIMES DAILY
Qty: 28 TABLET | Refills: 0 | Status: SHIPPED | OUTPATIENT
Start: 2023-10-09 | End: 2023-10-16

## 2023-10-09 RX ORDER — MELATONIN
1000 DAILY
COMMUNITY

## 2023-10-09 RX ORDER — LANOLIN ALCOHOL/MO/W.PET/CERES
1000 CREAM (GRAM) TOPICAL DAILY
Qty: 90 TABLET | Refills: 1 | Status: SHIPPED | OUTPATIENT
Start: 2023-10-09
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
## 2023-10-13 DIAGNOSIS — M17.0 PRIMARY OSTEOARTHRITIS OF BOTH KNEES: Primary | ICD-10-CM

## 2023-10-13 RX ORDER — MELOXICAM 15 MG/1
15 TABLET ORAL DAILY PRN
Qty: 30 TABLET | Refills: 1 | Status: SHIPPED | OUTPATIENT
Start: 2023-10-13

## 2023-10-18 ENCOUNTER — VBI (OUTPATIENT)
Dept: ADMINISTRATIVE | Facility: OTHER | Age: 53
End: 2023-10-18

## 2023-10-30 ENCOUNTER — VBI (OUTPATIENT)
Dept: ADMINISTRATIVE | Facility: OTHER | Age: 53
End: 2023-10-30

## 2023-12-07 DIAGNOSIS — M17.0 PRIMARY OSTEOARTHRITIS OF BOTH KNEES: ICD-10-CM

## 2023-12-07 RX ORDER — MELOXICAM 15 MG/1
15 TABLET ORAL DAILY PRN
Qty: 30 TABLET | Refills: 0 | Status: SHIPPED | OUTPATIENT
Start: 2023-12-07

## 2023-12-15 ENCOUNTER — VBI (OUTPATIENT)
Dept: ADMINISTRATIVE | Facility: OTHER | Age: 53
End: 2023-12-15

## 2023-12-15 ENCOUNTER — OFFICE VISIT (OUTPATIENT)
Dept: INTERNAL MEDICINE CLINIC | Facility: CLINIC | Age: 53
End: 2023-12-15
Payer: COMMERCIAL

## 2023-12-15 VITALS
DIASTOLIC BLOOD PRESSURE: 80 MMHG | TEMPERATURE: 98 F | HEIGHT: 63 IN | SYSTOLIC BLOOD PRESSURE: 122 MMHG | OXYGEN SATURATION: 99 % | WEIGHT: 217 LBS | HEART RATE: 73 BPM | BODY MASS INDEX: 38.45 KG/M2

## 2023-12-15 DIAGNOSIS — L08.9 SKIN INFECTION: ICD-10-CM

## 2023-12-15 DIAGNOSIS — M17.0 PRIMARY OSTEOARTHRITIS OF BOTH KNEES: ICD-10-CM

## 2023-12-15 DIAGNOSIS — N18.2 STAGE 2 CHRONIC KIDNEY DISEASE: ICD-10-CM

## 2023-12-15 DIAGNOSIS — E03.9 ACQUIRED HYPOTHYROIDISM: ICD-10-CM

## 2023-12-15 DIAGNOSIS — K51.019 ULCERATIVE PANCOLITIS WITH COMPLICATION (HCC): Primary | ICD-10-CM

## 2023-12-15 DIAGNOSIS — R73.01 IMPAIRED FASTING BLOOD SUGAR: ICD-10-CM

## 2023-12-15 DIAGNOSIS — M51.26 DISC DISPLACEMENT, LUMBAR: ICD-10-CM

## 2023-12-15 PROCEDURE — 99214 OFFICE O/P EST MOD 30 MIN: CPT | Performed by: INTERNAL MEDICINE
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
## 2023-12-19 DIAGNOSIS — N17.9 ACUTE RENAL FAILURE, UNSPECIFIED ACUTE RENAL FAILURE TYPE (HCC): Primary | ICD-10-CM

## 2024-01-04 DIAGNOSIS — M17.0 PRIMARY OSTEOARTHRITIS OF BOTH KNEES: ICD-10-CM

## 2024-01-04 DIAGNOSIS — E03.9 ACQUIRED HYPOTHYROIDISM: ICD-10-CM

## 2024-01-04 RX ORDER — MELOXICAM 15 MG/1
15 TABLET ORAL DAILY PRN
Qty: 30 TABLET | Refills: 0 | Status: SHIPPED | OUTPATIENT
Start: 2024-01-04

## 2024-01-04 RX ORDER — LEVOTHYROXINE SODIUM 0.1 MG/1
100 TABLET ORAL
Qty: 90 TABLET | Refills: 0 | Status: SHIPPED | OUTPATIENT
Start: 2024-01-04

## 2024-01-05 ENCOUNTER — VBI (OUTPATIENT)
Dept: ADMINISTRATIVE | Facility: OTHER | Age: 54
End: 2024-01-05

## 2024-01-05 NOTE — TELEPHONE ENCOUNTER
01/05/24 11:23 AM     [employer] SmartForm used to document caregap status.    Patience Aguirre MA

## 2024-01-17 ENCOUNTER — APPOINTMENT (OUTPATIENT)
Dept: LAB | Facility: CLINIC | Age: 54
End: 2024-01-17
Payer: COMMERCIAL

## 2024-01-17 DIAGNOSIS — N17.9 ACUTE RENAL FAILURE, UNSPECIFIED ACUTE RENAL FAILURE TYPE (HCC): ICD-10-CM

## 2024-01-17 LAB
ANION GAP SERPL CALCULATED.3IONS-SCNC: 8 MMOL/L
BUN SERPL-MCNC: 17 MG/DL (ref 5–25)
CALCIUM SERPL-MCNC: 9.7 MG/DL (ref 8.4–10.2)
CHLORIDE SERPL-SCNC: 106 MMOL/L (ref 96–108)
CO2 SERPL-SCNC: 26 MMOL/L (ref 21–32)
CREAT SERPL-MCNC: 0.96 MG/DL (ref 0.6–1.3)
GFR SERPL CREATININE-BSD FRML MDRD: 68 ML/MIN/1.73SQ M
GLUCOSE P FAST SERPL-MCNC: 92 MG/DL (ref 65–99)
MAGNESIUM SERPL-MCNC: 2 MG/DL (ref 1.9–2.7)
PHOSPHATE SERPL-MCNC: 4.2 MG/DL (ref 2.7–4.5)
POTASSIUM SERPL-SCNC: 4 MMOL/L (ref 3.5–5.3)
SODIUM SERPL-SCNC: 140 MMOL/L (ref 135–147)

## 2024-01-17 PROCEDURE — 36415 COLL VENOUS BLD VENIPUNCTURE: CPT

## 2024-01-17 PROCEDURE — 84100 ASSAY OF PHOSPHORUS: CPT

## 2024-01-17 PROCEDURE — 80048 BASIC METABOLIC PNL TOTAL CA: CPT

## 2024-01-17 PROCEDURE — 83735 ASSAY OF MAGNESIUM: CPT

## 2024-01-18 ENCOUNTER — TELEPHONE (OUTPATIENT)
Dept: NEPHROLOGY | Facility: CLINIC | Age: 54
End: 2024-01-18

## 2024-01-18 NOTE — TELEPHONE ENCOUNTER
----- Message from BRAYAN Garay sent at 1/18/2024  8:22 AM EST -----  I called and spoke with patient, she is aware of labs look good. All electrolytes and kidney function appropriate. Her acid base balance is appropriate and she may discontinue sodium bicarbonate tablets if she is still taking them

## 2024-02-05 ENCOUNTER — OFFICE VISIT (OUTPATIENT)
Dept: INTERNAL MEDICINE CLINIC | Facility: CLINIC | Age: 54
End: 2024-02-05
Payer: COMMERCIAL

## 2024-02-05 VITALS
OXYGEN SATURATION: 98 % | HEIGHT: 63 IN | SYSTOLIC BLOOD PRESSURE: 118 MMHG | TEMPERATURE: 98.3 F | HEART RATE: 52 BPM | WEIGHT: 225 LBS | DIASTOLIC BLOOD PRESSURE: 80 MMHG | BODY MASS INDEX: 39.87 KG/M2

## 2024-02-05 DIAGNOSIS — E53.8 B12 DEFICIENCY: ICD-10-CM

## 2024-02-05 DIAGNOSIS — E78.2 MIXED HYPERLIPIDEMIA: ICD-10-CM

## 2024-02-05 DIAGNOSIS — Z12.31 ENCOUNTER FOR SCREENING MAMMOGRAM FOR MALIGNANT NEOPLASM OF BREAST: ICD-10-CM

## 2024-02-05 DIAGNOSIS — K51.018 ULCERATIVE PANCOLITIS WITH OTHER COMPLICATION (HCC): ICD-10-CM

## 2024-02-05 DIAGNOSIS — E55.9 VITAMIN D DEFICIENCY: ICD-10-CM

## 2024-02-05 DIAGNOSIS — J45.20 MILD INTERMITTENT ASTHMA WITHOUT COMPLICATION: ICD-10-CM

## 2024-02-05 DIAGNOSIS — E03.9 ACQUIRED HYPOTHYROIDISM: Primary | ICD-10-CM

## 2024-02-05 DIAGNOSIS — E61.1 IRON DEFICIENCY: ICD-10-CM

## 2024-02-05 DIAGNOSIS — Z13.820 SCREENING FOR OSTEOPOROSIS: ICD-10-CM

## 2024-02-05 DIAGNOSIS — M17.0 PRIMARY OSTEOARTHRITIS OF BOTH KNEES: ICD-10-CM

## 2024-02-05 DIAGNOSIS — R73.01 IMPAIRED FASTING BLOOD SUGAR: ICD-10-CM

## 2024-02-05 PROCEDURE — 99214 OFFICE O/P EST MOD 30 MIN: CPT | Performed by: INTERNAL MEDICINE

## 2024-02-05 RX ORDER — MELOXICAM 15 MG/1
15 TABLET ORAL DAILY PRN
Qty: 90 TABLET | Refills: 1 | Status: SHIPPED | OUTPATIENT
Start: 2024-02-05
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
## 2024-04-02 DIAGNOSIS — E03.9 ACQUIRED HYPOTHYROIDISM: ICD-10-CM

## 2024-04-03 RX ORDER — LEVOTHYROXINE SODIUM 0.1 MG/1
100 TABLET ORAL
Qty: 90 TABLET | Refills: 0 | Status: SHIPPED | OUTPATIENT
Start: 2024-04-03

## 2024-04-09 ENCOUNTER — TELEPHONE (OUTPATIENT)
Dept: NEPHROLOGY | Facility: CLINIC | Age: 54
End: 2024-04-09

## 2024-05-03 ENCOUNTER — VBI (OUTPATIENT)
Dept: ADMINISTRATIVE | Facility: OTHER | Age: 54
End: 2024-05-03

## 2024-05-03 NOTE — TELEPHONE ENCOUNTER
05/03/24 10:46 AM     [employer] SmartForm used to document caregap status.    Patience Aguirre MA

## 2024-05-23 ENCOUNTER — HOSPITAL ENCOUNTER (OUTPATIENT)
Dept: BONE DENSITY | Facility: HOSPITAL | Age: 54
End: 2024-05-23
Payer: COMMERCIAL

## 2024-05-23 ENCOUNTER — HOSPITAL ENCOUNTER (OUTPATIENT)
Dept: MAMMOGRAPHY | Facility: HOSPITAL | Age: 54
End: 2024-05-23
Payer: COMMERCIAL

## 2024-05-23 VITALS — WEIGHT: 220 LBS | HEIGHT: 63 IN | BODY MASS INDEX: 38.98 KG/M2

## 2024-05-23 DIAGNOSIS — Z13.820 SCREENING FOR OSTEOPOROSIS: ICD-10-CM

## 2024-05-23 DIAGNOSIS — Z12.31 ENCOUNTER FOR SCREENING MAMMOGRAM FOR MALIGNANT NEOPLASM OF BREAST: ICD-10-CM

## 2024-05-23 PROCEDURE — 77067 SCR MAMMO BI INCL CAD: CPT

## 2024-05-23 PROCEDURE — 77063 BREAST TOMOSYNTHESIS BI: CPT

## 2024-05-23 PROCEDURE — 77080 DXA BONE DENSITY AXIAL: CPT

## 2024-06-06 ENCOUNTER — VBI (OUTPATIENT)
Dept: ADMINISTRATIVE | Facility: OTHER | Age: 54
End: 2024-06-06

## 2024-06-06 NOTE — TELEPHONE ENCOUNTER
06/06/24 8:48 AM     [employer] SmartForm used to document caregap status.    Patience Aguirre MA

## 2024-06-26 ENCOUNTER — VBI (OUTPATIENT)
Dept: ADMINISTRATIVE | Facility: OTHER | Age: 54
End: 2024-06-26

## 2024-06-26 NOTE — TELEPHONE ENCOUNTER
06/26/24 12:26 PM     Chart reviewed for Diabetic Eye Exam was/were not submitted to the patient's insurance.     Patience Aguirre MA   [facility]

## 2024-06-27 ENCOUNTER — APPOINTMENT (OUTPATIENT)
Dept: LAB | Facility: HOSPITAL | Age: 54
End: 2024-06-27
Payer: COMMERCIAL

## 2024-06-27 DIAGNOSIS — E03.9 ACQUIRED HYPOTHYROIDISM: ICD-10-CM

## 2024-06-27 DIAGNOSIS — R73.01 IMPAIRED FASTING BLOOD SUGAR: ICD-10-CM

## 2024-06-27 DIAGNOSIS — E78.2 MIXED HYPERLIPIDEMIA: ICD-10-CM

## 2024-06-27 LAB
ALBUMIN SERPL BCG-MCNC: 4 G/DL (ref 3.5–5)
ALP SERPL-CCNC: 65 U/L (ref 34–104)
ALT SERPL W P-5'-P-CCNC: 11 U/L (ref 7–52)
ANION GAP SERPL CALCULATED.3IONS-SCNC: 5 MMOL/L (ref 4–13)
AST SERPL W P-5'-P-CCNC: 15 U/L (ref 13–39)
BASOPHILS # BLD AUTO: 0.04 THOUSANDS/ÂΜL (ref 0–0.1)
BASOPHILS NFR BLD AUTO: 1 % (ref 0–1)
BILIRUB SERPL-MCNC: 0.54 MG/DL (ref 0.2–1)
BUN SERPL-MCNC: 21 MG/DL (ref 5–25)
CALCIUM SERPL-MCNC: 9.2 MG/DL (ref 8.4–10.2)
CHLORIDE SERPL-SCNC: 108 MMOL/L (ref 96–108)
CHOLEST SERPL-MCNC: 192 MG/DL
CO2 SERPL-SCNC: 26 MMOL/L (ref 21–32)
CREAT SERPL-MCNC: 0.95 MG/DL (ref 0.6–1.3)
EOSINOPHIL # BLD AUTO: 0.27 THOUSAND/ÂΜL (ref 0–0.61)
EOSINOPHIL NFR BLD AUTO: 6 % (ref 0–6)
ERYTHROCYTE [DISTWIDTH] IN BLOOD BY AUTOMATED COUNT: 14.6 % (ref 11.6–15.1)
EST. AVERAGE GLUCOSE BLD GHB EST-MCNC: 97 MG/DL
GFR SERPL CREATININE-BSD FRML MDRD: 69 ML/MIN/1.73SQ M
GLUCOSE P FAST SERPL-MCNC: 95 MG/DL (ref 65–99)
HBA1C MFR BLD: 5 %
HCT VFR BLD AUTO: 38.7 % (ref 34.8–46.1)
HDLC SERPL-MCNC: 80 MG/DL
HGB BLD-MCNC: 12.1 G/DL (ref 11.5–15.4)
IMM GRANULOCYTES # BLD AUTO: 0.01 THOUSAND/UL (ref 0–0.2)
IMM GRANULOCYTES NFR BLD AUTO: 0 % (ref 0–2)
LDLC SERPL CALC-MCNC: 99 MG/DL (ref 0–100)
LYMPHOCYTES # BLD AUTO: 0.94 THOUSANDS/ÂΜL (ref 0.6–4.47)
LYMPHOCYTES NFR BLD AUTO: 21 % (ref 14–44)
MCH RBC QN AUTO: 29.9 PG (ref 26.8–34.3)
MCHC RBC AUTO-ENTMCNC: 31.3 G/DL (ref 31.4–37.4)
MCV RBC AUTO: 96 FL (ref 82–98)
MONOCYTES # BLD AUTO: 0.43 THOUSAND/ÂΜL (ref 0.17–1.22)
MONOCYTES NFR BLD AUTO: 10 % (ref 4–12)
NEUTROPHILS # BLD AUTO: 2.75 THOUSANDS/ÂΜL (ref 1.85–7.62)
NEUTS SEG NFR BLD AUTO: 62 % (ref 43–75)
NRBC BLD AUTO-RTO: 0 /100 WBCS
PLATELET # BLD AUTO: 282 THOUSANDS/UL (ref 149–390)
PMV BLD AUTO: 10.5 FL (ref 8.9–12.7)
POTASSIUM SERPL-SCNC: 4.3 MMOL/L (ref 3.5–5.3)
PROT SERPL-MCNC: 7.7 G/DL (ref 6.4–8.4)
RBC # BLD AUTO: 4.05 MILLION/UL (ref 3.81–5.12)
SODIUM SERPL-SCNC: 139 MMOL/L (ref 135–147)
TRIGL SERPL-MCNC: 67 MG/DL
TSH SERPL DL<=0.05 MIU/L-ACNC: 4.26 UIU/ML (ref 0.45–4.5)
WBC # BLD AUTO: 4.44 THOUSAND/UL (ref 4.31–10.16)

## 2024-06-27 PROCEDURE — 36415 COLL VENOUS BLD VENIPUNCTURE: CPT

## 2024-06-27 PROCEDURE — 83036 HEMOGLOBIN GLYCOSYLATED A1C: CPT

## 2024-06-27 PROCEDURE — 85025 COMPLETE CBC W/AUTO DIFF WBC: CPT

## 2024-06-27 PROCEDURE — 80053 COMPREHEN METABOLIC PANEL: CPT

## 2024-06-27 PROCEDURE — 80061 LIPID PANEL: CPT

## 2024-06-27 PROCEDURE — 84443 ASSAY THYROID STIM HORMONE: CPT

## 2024-07-01 DIAGNOSIS — E03.9 ACQUIRED HYPOTHYROIDISM: ICD-10-CM

## 2024-07-01 RX ORDER — LEVOTHYROXINE SODIUM 0.1 MG/1
100 TABLET ORAL
Qty: 90 TABLET | Refills: 1 | Status: SHIPPED | OUTPATIENT
Start: 2024-07-01

## 2024-07-01 NOTE — TELEPHONE ENCOUNTER
Reason for call:   [x] Refill   [] Prior Auth  [] Other:     Office:   [x] PCP/Provider -   [] Specialty/Provider -     Medication: levothyroxine (Euthyrox) 100 mcg tablet Take 1 tablet (100 mcg total) by mouth daily in the early morning       [id number] [address]     Does the patient have enough for 3 days?   [x] Yes   [] No - Send as HP to POD

## 2024-07-08 ENCOUNTER — TELEPHONE (OUTPATIENT)
Age: 54
End: 2024-07-08

## 2024-07-08 NOTE — LETTER
Sofia Copeland  [address] [place] [id number]              POUCHOSCOPY WITH SEDATION INSTRUCTIONS  PLEASE NOTE…  AS OF JUNE 1, 2014, OUR OFFICE REQUIRES 48 HOURS NOTICE OF CANCELLATION/RESCHEDULE OF A PROCEDURE TO AVOID INCURRING A MISSED APPOINTMENT FEE.  Your Sigmoidoscopy Procedure has been scheduled at:  [facility].    The Date of your Procedure is: August 27, 2024    Patient is to have nothing to eat or drink after midnight.    Also in preparation for this procedure, take 2 Fleet Enemas 2 hours prior to the appointment.  These enemas can be purchased over the counter in most pharmacies.  Follow the directions on the box.       You are to report to the GI Lab ([facility] 1st Floor) ONE (1) HOUR PRIOR to your procedure.    Special instructions may be needed if you are taking aspirin or any aspirin-containing medication.  Check with your family physician.      Check with your family doctor if you are taking Coumadin (a blood thinner), Plavix, Gingko biloba, Ginseng, Feverfew, and/or Pepito's Wort.  We suggest stopping these for 3 days.      If you are on DIABETIC MEDICATION (tablets or insulin) your doctor may make changes in your preparation.    Take all medications usual unless otherwise instructed.    As always, if you have any questions or concerns, feel free to call the office.  Our  staff will be happy to connect you with one of the nurses to answer any questions or address any concerns you have regarding your procedure.      Arrangements must be made for a responsible party to drive you home from the procedure.  If you do not have a responsible family member or friend to drive you home, the procedure will not be done.  Vast or a taxi is not acceptable.  It is important you notify our office of any insurance changes prior to your procedure and, if necessary, supply us with referrals from your primary care  physician.          Medicine Instructions for Adults with Diabetes who Need a Bowel Prep       Follow these instructions when a BOWEL PREP is required for your procedure or surgery!    NOTE:   GLP-1 Agonists taken weekly: do not take in the 7 days before your procedure   SGLT-2 Inhibitors: do not take in the 4 days before your procedure     On the Day Before Surgery/Procedure  If you are having a procedure (e.g. Colonoscopy) or surgery that requires a bowel prep and you may have at least a clear liquid diet, follow the directions below based on the type of medicine you take for your diabetes.     Type of Medicine You Take Examples What to do   Pre-Mixed Insulin - Intermediate Acting Humalog® 75/25, Humulin® 70/30, Novolog® 70/30, Regular Insulin Take ½ your regular dose the evening before your procedure   Rapid/Fast Acting Insulin Humalog® U200, NovoLog®, Apidra®, Fiasp® Take ½ your regular dose the evening before your procedure.   Long-Acting Insulin Lantus®, Levemir®, Tresiba®, Toujeo®, Basaglar® Take your FULL regular dose the day before procedure   Oral Sulfonylurea Glipizide/Glimepiride/Glucotrol® Take ½ your regular dose the evening before your procedure   Other Oral Diabetes Medicines Metformin®, Glucophage®, Glucophage XR®, Riomet®, Glumetza®), Actose®, Avandia®, Glyset®, Prandin® Take your regular dose with dinner in the evening before your procedure   GLP-1 Agonists AdlyxinÒ, ByettaÒ, BydureonÒ, OzempicÒ, SoliquaÒ, TanzeumÒ, TrulicityÒ, VictozaÒ, Saxenda®, Rybelsus® If taken daily, take as normal    If taken weekly, do not take this medicine for 7 days before your procedure including the day of the procedure (resume taking after the procedure)   SGLT-2 Inhibitors Jardiance®, Invokana®, Farxiga®,   Steglatro®, Brenzavvy®, Qtern®, Segluromet®, Glyxambi®, Synjardy®, Synjardy XR®, Invokamet®, Invokamet XR®, Trijary XR®, Xigduo XR®, Steglujan® Do not take for 4 days before your procedure including the day of  the procedure (resume taking after the procedure)                More information continued on back                    Medicine Instructions for Adults with Diabetes who Need a Bowel Prep  Page 2      On the Day of Surgery/Procedure  Follow the directions below based on the type of medicine you take for your diabetes.     Type of Medicine You Take Examples What to do   Long-Acting Insulin Lantus®, Levemir®, Tresiba®, Toujeo®, Basaglar®, Semglee®   If you usually take your Long-Acting Insulin in the morning, take the full dose as scheduled.   GLP-1 Agonists AdlyxinÒ, ByettaÒ, BydureonÒ, OzempicÒ, SoliquaÒ, TanzeumÒ, TrulicityÒ, VictozaÒ, Saxenda®, Rybelsus® Do NOT take this medicine on the day of your procedure (resume taking after the procedure)       On the Day of Surgery/Procedure (continued)  Except for the morning Long-Acting Insulin, DO NOT take ANY diabetic medicine on the day of your procedure unless you were instructed by the doctor who manages your diabetes medicines.    Continue to check your blood sugars.  If you have an insulin pump, ask your endocrinologist for instructions at least 3 days before your procedure. NOTE: If you are not able to ask your endocrinologist in advance, on the day of the procedure set your insulin pump to your basal rate only. Bring your insulin pump supplies to the hospital.     If you have any questions about taking your diabetes medicines prior to your procedure, please contact the doctor who manages your diabetes medicines.

## 2024-07-16 NOTE — TELEPHONE ENCOUNTER
Pouchoscopy w/ anorectal exam scheduled for 8/27/2024 at [facility] w/ DB, as per OV note on 8/24/2024. Paperwork mailed for pt.

## 2024-07-16 NOTE — TELEPHONE ENCOUNTER
Patients GI provider:  Dr. Mayer     Number to return call: [phone]     Reason for call:  Patient called stated someone called her last week to schedule a follow up colonoscopy. Patient would like to be called at [phone]    Scheduled procedure/appointment date if applicable: Apt/procedure

## 2024-07-18 ENCOUNTER — VBI (OUTPATIENT)
Dept: ADMINISTRATIVE | Facility: OTHER | Age: 54
End: 2024-07-18

## 2024-07-18 NOTE — TELEPHONE ENCOUNTER
07/18/24 9:46 AM     Chart reviewed for Diabetic Eye Exam was/were not submitted to the patient's insurance.     Patience Aguirre MA   [facility]

## 2024-07-22 ENCOUNTER — OFFICE VISIT (OUTPATIENT)
Dept: INTERNAL MEDICINE CLINIC | Facility: CLINIC | Age: 54
End: 2024-07-22
Payer: COMMERCIAL

## 2024-07-22 VITALS
HEART RATE: 81 BPM | WEIGHT: 241 LBS | TEMPERATURE: 98.1 F | HEIGHT: 63 IN | DIASTOLIC BLOOD PRESSURE: 76 MMHG | BODY MASS INDEX: 42.7 KG/M2 | SYSTOLIC BLOOD PRESSURE: 118 MMHG | OXYGEN SATURATION: 99 %

## 2024-07-22 DIAGNOSIS — E03.9 ACQUIRED HYPOTHYROIDISM: Primary | ICD-10-CM

## 2024-07-22 DIAGNOSIS — M51.26 DISC DISPLACEMENT, LUMBAR: ICD-10-CM

## 2024-07-22 DIAGNOSIS — M17.0 PRIMARY OSTEOARTHRITIS OF BOTH KNEES: ICD-10-CM

## 2024-07-22 DIAGNOSIS — K51.00 ULCERATIVE PANCOLITIS WITHOUT COMPLICATION (HCC): ICD-10-CM

## 2024-07-22 DIAGNOSIS — J45.20 MILD INTERMITTENT ASTHMA WITHOUT COMPLICATION: ICD-10-CM

## 2024-07-22 PROCEDURE — 99214 OFFICE O/P EST MOD 30 MIN: CPT | Performed by: INTERNAL MEDICINE

## 2024-07-22 RX ORDER — MELOXICAM 15 MG/1
15 TABLET ORAL DAILY PRN
Qty: 90 TABLET | Refills: 1 | Status: SHIPPED | OUTPATIENT
Start: 2024-07-22 | End: 2024-07-24
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
## 2024-07-24 ENCOUNTER — HOSPITAL ENCOUNTER (EMERGENCY)
Facility: HOSPITAL | Age: 54
Discharge: HOME | End: 2024-07-24
Attending: EMERGENCY MEDICINE
Payer: COMMERCIAL

## 2024-07-24 ENCOUNTER — TELEPHONE (OUTPATIENT)
Age: 54
End: 2024-07-24

## 2024-07-24 ENCOUNTER — APPOINTMENT (EMERGENCY)
Dept: RADIOLOGY | Facility: HOSPITAL | Age: 54
End: 2024-07-24
Payer: COMMERCIAL

## 2024-07-24 VITALS
OXYGEN SATURATION: 98 % | HEART RATE: 67 BPM | SYSTOLIC BLOOD PRESSURE: 139 MMHG | TEMPERATURE: 97.3 F | RESPIRATION RATE: 16 BRPM | DIASTOLIC BLOOD PRESSURE: 65 MMHG

## 2024-07-24 DIAGNOSIS — M54.9 MUSCULOSKELETAL BACK PAIN: Primary | ICD-10-CM

## 2024-07-24 LAB
BACTERIA UR QL AUTO: NORMAL /HPF
BILIRUB UR QL STRIP: NEGATIVE
CLARITY UR: CLEAR
COLOR UR: YELLOW
GLUCOSE UR STRIP-MCNC: NEGATIVE MG/DL
HGB UR QL STRIP.AUTO: NEGATIVE
KETONES UR STRIP-MCNC: NEGATIVE MG/DL
LEUKOCYTE ESTERASE UR QL STRIP: ABNORMAL
NITRITE UR QL STRIP: NEGATIVE
NON-SQ EPI CELLS URNS QL MICRO: NORMAL /HPF
PH UR STRIP.AUTO: 7 [PH]
PROT UR STRIP-MCNC: NEGATIVE MG/DL
RBC #/AREA URNS AUTO: NORMAL /HPF
SP GR UR STRIP.AUTO: 1.01
UROBILINOGEN UR QL STRIP.AUTO: 0.2 E.U./DL
WBC #/AREA URNS AUTO: NORMAL /HPF

## 2024-07-24 PROCEDURE — 81001 URINALYSIS AUTO W/SCOPE: CPT | Performed by: EMERGENCY MEDICINE

## 2024-07-24 PROCEDURE — 72100 X-RAY EXAM L-S SPINE 2/3 VWS: CPT

## 2024-07-24 PROCEDURE — 99283 EMERGENCY DEPT VISIT LOW MDM: CPT

## 2024-07-24 PROCEDURE — 96372 THER/PROPH/DIAG INJ SC/IM: CPT

## 2024-07-24 PROCEDURE — 99284 EMERGENCY DEPT VISIT MOD MDM: CPT | Performed by: EMERGENCY MEDICINE

## 2024-07-24 RX ORDER — CYCLOBENZAPRINE HCL 5 MG
5 TABLET ORAL 3 TIMES DAILY PRN
Qty: 7 TABLET | Refills: 0 | Status: SHIPPED | OUTPATIENT
Start: 2024-07-24

## 2024-07-24 RX ORDER — IBUPROFEN 600 MG/1
600 TABLET ORAL EVERY 6 HOURS PRN
Qty: 30 TABLET | Refills: 0 | Status: SHIPPED | OUTPATIENT
Start: 2024-07-24 | End: 2024-08-07

## 2024-07-24 RX ORDER — OXYCODONE HYDROCHLORIDE AND ACETAMINOPHEN 5; 325 MG/1; MG/1
1 TABLET ORAL ONCE
Status: COMPLETED | OUTPATIENT
Start: 2024-07-24 | End: 2024-07-24

## 2024-07-24 RX ORDER — KETOROLAC TROMETHAMINE 30 MG/ML
15 INJECTION, SOLUTION INTRAMUSCULAR; INTRAVENOUS ONCE
Status: COMPLETED | OUTPATIENT
Start: 2024-07-24 | End: 2024-07-24

## 2024-07-24 RX ORDER — ACETAMINOPHEN 325 MG/1
650 TABLET ORAL ONCE
Status: COMPLETED | OUTPATIENT
Start: 2024-07-24 | End: 2024-07-24

## 2024-07-24 RX ADMIN — OXYCODONE HYDROCHLORIDE AND ACETAMINOPHEN 1 TABLET: 5; 325 TABLET ORAL at 10:38

## 2024-07-24 RX ADMIN — KETOROLAC TROMETHAMINE 15 MG: 30 INJECTION, SOLUTION INTRAMUSCULAR; INTRAVENOUS at 10:39

## 2024-07-24 RX ADMIN — ACETAMINOPHEN 650 MG: 325 TABLET ORAL at 10:38

## 2024-07-24 NOTE — DISCHARGE INSTRUCTIONS
Take ibuprofen and 650mg of tylenol if needed for paib, take the flexeril (cyclobenzaprine) for more significant pain  Return if symptoms worsen or if new symptoms develop  You likely have soft tissue injury in your spine (eg; herniated disk) follow up with the comprehensive spine specialists for further care. You should receive a call within the next 2-3 business days.

## 2024-07-24 NOTE — TELEPHONE ENCOUNTER
Patient would like to know why her meloxicam (MOBIC) 15 mg tablet [[id number]]  was discontinued?  Please advise and please contact patient.

## 2024-07-24 NOTE — TELEPHONE ENCOUNTER
This medication was discontinued today in the ER. We would not be able to answer the patient's question as to why it was discontinued.      Spoke to patient.
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
## 2024-07-25 ENCOUNTER — TELEPHONE (OUTPATIENT)
Age: 54
End: 2024-07-25

## 2024-07-25 ENCOUNTER — TELEPHONE (OUTPATIENT)
Dept: PHYSICAL THERAPY | Facility: OTHER | Age: 54
End: 2024-07-25

## 2024-07-25 NOTE — TELEPHONE ENCOUNTER
Call placed to the patient per [facility] referral.    Spoke with the patient who declined triage for PT    Patient is established with Ortho Dr Coughlin (2022) and PM Dr Sebastian consult 1/17/2023    Pt does not feel PT will help her. She will follow up with PCP, Ortho or PM.   closed

## 2024-08-07 DIAGNOSIS — M54.9 MUSCULOSKELETAL BACK PAIN: ICD-10-CM

## 2024-08-07 DIAGNOSIS — M17.0 PRIMARY OSTEOARTHRITIS OF BOTH KNEES: Primary | ICD-10-CM

## 2024-08-07 RX ORDER — MELOXICAM 15 MG/1
15 TABLET ORAL DAILY PRN
Qty: 90 TABLET | Refills: 1 | Status: SHIPPED | OUTPATIENT
Start: 2024-08-07 | End: 2025-02-03

## 2024-08-14 ENCOUNTER — TELEPHONE (OUTPATIENT)
Age: 54
End: 2024-08-14

## 2024-08-14 NOTE — TELEPHONE ENCOUNTER
Spoke w/PT to confirm 8/27/24 pouchoscopy, PT aware of 2 enema prep, 2 hours prior, nothing by mouth after midnight.  PT aware she will receive call day prior with arrival time.

## 2024-08-15 ENCOUNTER — CONSULT (OUTPATIENT)
Age: 54
End: 2024-08-15
Payer: COMMERCIAL

## 2024-08-15 VITALS
DIASTOLIC BLOOD PRESSURE: 81 MMHG | HEART RATE: 81 BPM | SYSTOLIC BLOOD PRESSURE: 172 MMHG | BODY MASS INDEX: 42.31 KG/M2 | HEIGHT: 63 IN | WEIGHT: 238.8 LBS

## 2024-08-15 DIAGNOSIS — M54.9 MID BACK PAIN: ICD-10-CM

## 2024-08-15 DIAGNOSIS — G89.4 CHRONIC PAIN SYNDROME: ICD-10-CM

## 2024-08-15 DIAGNOSIS — M54.50 CHRONIC BILATERAL LOW BACK PAIN, UNSPECIFIED WHETHER SCIATICA PRESENT: ICD-10-CM

## 2024-08-15 DIAGNOSIS — M54.2 NECK PAIN: Primary | ICD-10-CM

## 2024-08-15 DIAGNOSIS — G89.29 CHRONIC BILATERAL LOW BACK PAIN, UNSPECIFIED WHETHER SCIATICA PRESENT: ICD-10-CM

## 2024-08-15 DIAGNOSIS — M47.816 LUMBAR SPONDYLOSIS: ICD-10-CM

## 2024-08-15 PROCEDURE — 99204 OFFICE O/P NEW MOD 45 MIN: CPT | Performed by: ANESTHESIOLOGY

## 2024-08-15 RX ORDER — DULOXETIN HYDROCHLORIDE 30 MG/1
30 CAPSULE, DELAYED RELEASE ORAL DAILY
Qty: 30 CAPSULE | Refills: 1 | Status: SHIPPED | OUTPATIENT
Start: 2024-08-15 | End: 2024-09-14
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
## 2024-08-15 NOTE — PATIENT INSTRUCTIONS
Patient Education     Neck Pain Exercises   About this topic   The neck or cervical spine has 7 spinal bones that run from the base of your skull to the upper back. These spinal bones have discs in between them. Discs act as shock absorbers. Ligaments are strong bands of tissue that hold the bones together. Many muscles surround and attach on these bones. Nerves come off of the spinal cord and exit out of small spaces in between the spinal bones. You can have neck pain if any of these are injured or damaged. Exercises may help to make this problem better.  General   Before starting with a program, ask your doctor if you are healthy enough to do these exercises. Your doctor may have you work with a  or physical therapist to make a safe exercise program to meet your needs. You should not do the exercises if they cause sharp pains, if you feel dizzy, or if you have vision changes.  Stretching Exercises   Stretching exercises keep your muscles flexible. They also stop them from getting tight. Start by doing each of these stretches 2 to 3 times. In order for your body to make changes, you will need to hold these stretches for 20 to 30 seconds. Try to do the stretches 2 to 3 times each day. Do all exercises slowly.  Passive neck stretches:  Put your left hand on top of your head. Your other arm can be at your side or behind your back. Pull your head toward your left shoulder until you feel a gentle stretch on the right side of your neck. Repeat on the other side using your other hand.  Also, try this stretch by pulling in a diagonal direction. With your left hand on top of your head, pull your head down towards the direction of your left knee. You should feel this stretch toward the back on the right side of your neck. Repeat on the other side.  Active neck stretches:  Neck front-to-back motion ? Look down to the floor until you feel a stretch in the back of your neck. Hold. Next, look up to the ceiling until you  feel a stretch in the front of your neck. Hold.  Neck side-to-side motion ? Tilt your head to the side and bring your ear to your shoulder until you feel a stretch on the other side of your neck. Hold. Next, tilt your head to the other side until you feel a stretch. Hold.  Neck turning ? Turn only your head and look over your left shoulder until you feel stretching in the right side of your neck. Hold. Now turn only your head and look over your right shoulder until you feel a stretch in the left side of your neck. Hold.  Scalene stretches ? Grasp your head with the hand opposite the side you want to stretch. Pull your head to the side until you feel a stretch. Now, slowly turn your head so your chin is pointed upwards.  Chin tucks ? Stand straight or lie down on your back. Tuck your chin in and lengthen the back of your neck. Return to the starting position and repeat. It may help to stand up against a wall during this exercise. Try gently pushing your chin with two fingers while trying to flatten your neck against the wall. If you do this exercise lying down, try using a small rolled up washcloth under your neck. Push down into the washcloth when tucking in your chin.  Strengthening Exercises   Strengthening exercises keep your muscles firm and strong. Start by repeating each exercise 2 to 3 times. Work up to doing each exercise 10 times. Try to do the exercises 2 to 3 times each day. Hold each exercise for 3 to 5 seconds. Do all exercises slowly.  Shoulder blade squeezes ? Pinch your shoulder blades together on your upper back and hold 3 to 5 seconds. Relax.             What will the results be?   Less pain and stiffness  Better range of motion  Increased strength  Help you heal faster after an injury or surgery  Increase blood flow to a body part  Help you feel better and more relaxed  Give you more energy  More toned looking muscles  Better posture  Easier to do daily activities  Helpful tips   Stay active and  work out to keep your muscles strong and flexible.  Be sure you do not hold your breath when exercising. This can raise your blood pressure. If you tend to hold your breath, try counting out loud when exercising. If any exercise bothers you, stop right away.  Try swinging your arms at an easy pace for a few minutes to warm up your muscles. Do this again after exercising.  Doing exercises before a meal may be a good way to get into a routine.  Exercise may be slightly uncomfortable, but you should not have sharp pains. If you do get sharp pains, stop what you are doing. If the sharp pains continue, call your doctor.  Last Reviewed Date   2020-03-10  Consumer Information Use and Disclaimer   This generalized information is a limited summary of diagnosis, treatment, and/or medication information. It is not meant to be comprehensive and should be used as a tool to help the user understand and/or assess potential diagnostic and treatment options. It does NOT include all information about conditions, treatments, medications, side effects, or risks that may apply to a specific patient. It is not intended to be medical advice or a substitute for the medical advice, diagnosis, or treatment of a health care provider based on the health care provider's examination and assessment of a patient’s specific and unique circumstances. Patients must speak with a health care provider for complete information about their health, medical questions, and treatment options, including any risks or benefits regarding use of medications. This information does not endorse any treatments or medications as safe, effective, or approved for treating a specific patient. [employer] and its affiliates disclaim any warranty or liability relating to this information or the use thereof. The use of this information is governed by the Terms of Use, available at https://www.woltersWellikouwer.com/en/know/clinical-effectiveness-terms   Copyright   Copyright ©  2024 [employer] and its affiliates and/or licensors. All rights reserved.  Patient Education     Core Strengthening Exercises on Back or on Hands and Knees   About this topic   Your core muscles are in your chest, back, buttock, and stomach area. They are your abdominal, back, and pelvis muscles. These muscles help keep your body stable when using your arms or legs. They also help with balance and posture. There are many exercises you can do to keep these muscles strong.  If you have back problems like a compression fracture or a ruptured disc, doing some of these exercises could make your problem worse. Some of these exercises may cause lower back pain.  General   Before starting with a program, ask your doctor if you are healthy enough to do these exercises. Your doctor may have you work with a , chiropractor, or physical therapist to make a safe exercise program to meet your needs.  Strengthening Exercises   Strengthening exercises keep your muscles firm and strong. Start by repeating each exercise 2 to 3 times. Work up to doing each exercise 10 times. Try to do the exercises 2 to 3 times each day. Hold each exercise for 3 to 5 seconds. Do all exercises slowly.  Hip lifts ? Lie on your back with your knees bent and feet flat on the floor. Tighten your stomach muscles and push your heels into the floor to lift your buttocks off the floor. Relax.  Pelvic tilts ? Lie on your back with your knees bent and feet flat on the floor. Tighten your stomach muscles and press your lower back down to the floor. Relax.  Straight leg raises lying down ? Lie on your back with one leg straight. Bend your other knee so the foot is flat on the bed. Keeping your leg straight, lift the leg up to the level of your other knee. Lower it back down. Repeat with the other leg.  Knee flex lying down ? Lie on your back with both knees bent and your feet flat on the floor. Tighten your belly muscles. Raise one leg up and back down  as if you are marching in slow motion. Keep belly muscles tight while you move your leg. Switch legs. To make this exercise harder, raise both arms straight up in the air. Tighten your belly muscles. When you raise one leg up, reach the opposite arm over your head. Switch, moving the opposite arm and leg until you have done 10 repetitions on each side.  Abdominal crunches ? Lie on your back with both knees bent. Keep your feet flat on the floor. Place your hands in one of these positions. Try starting with the first position since it is the easiest. As you get better, use the other positions to make it harder.  Crunches with arms at sides.  Crunches with arms across chest.  Crunches with arms behind head. Be careful not to interlock your fingers behind your neck or head while doing crunches. This may add tension to your neck and cause strain.  Look at the ceiling. Tighten your belly muscles and lift your shoulders and upper back off the floor. Breathe out while you are doing this. Lower your shoulders to the floor. Breathe in while you are doing this. Relax your belly muscles all the way before starting another crunch.  Arm and leg lifts on hands and knees ? Start on your hands and knees. With all of these exercises, keep your back as level as possible. If you are having trouble with this, you may want to put a small object on your back such as a book. If it falls off, you are not keeping your back level enough during the exercise.  Lift one arm up to shoulder level and hold. Lower it back down. Now, lift up the other arm and hold.  Lift one leg up and kick it straight out until it is in line with your back and hold. Lower it back down. Now, lift up the other leg and hold.  Lift one arm and the OPPOSITE leg up at the same time and hold. Lower them down. Now, repeat using the other arm and leg. This is a very hard exercise. It may take time to be able to do this.               What will the results be?   Stronger  core  Better balance  More toned belly and back muscles  Easier to do daily activities  Better sports performance  Less low back pain  Helpful tips   Stay active and work out to keep your muscles strong and flexible.  Keep a healthy weight to avoid putting too much stress on your spine. Eat a healthy diet to keep your muscles healthy.  Be sure you do not hold your breath when exercising. This can raise your blood pressure. If you tend to hold your breath, try counting out loud when exercising. If any exercise bothers you, stop right away.  Try walking or cycling at an easy pace for a few minutes to warm up your muscles. Do this again after exercising.  Exercise may be slightly uncomfortable, but you should not have sharp pains. If you do get sharp pains, stop what you are doing. If the sharp pains continue, call your doctor.  Last Reviewed Date   2021-03-18  Consumer Information Use and Disclaimer   This generalized information is a limited summary of diagnosis, treatment, and/or medication information. It is not meant to be comprehensive and should be used as a tool to help the user understand and/or assess potential diagnostic and treatment options. It does NOT include all information about conditions, treatments, medications, side effects, or risks that may apply to a specific patient. It is not intended to be medical advice or a substitute for the medical advice, diagnosis, or treatment of a health care provider based on the health care provider's examination and assessment of a patient’s specific and unique circumstances. Patients must speak with a health care provider for complete information about their health, medical questions, and treatment options, including any risks or benefits regarding use of medications. This information does not endorse any treatments or medications as safe, effective, or approved for treating a specific patient. [employer] and its affiliates disclaim any warranty or liability  relating to this information or the use thereof. The use of this information is governed by the Terms of Use, available at https://www.wolterskluwer.com/en/know/clinical-effectiveness-terms   Copyright   Copyright © 2024 [employer] and its affiliates and/or licensors. All rights reserved.

## 2024-08-27 ENCOUNTER — HOSPITAL ENCOUNTER (OUTPATIENT)
Dept: GASTROENTEROLOGY | Facility: HOSPITAL | Age: 54
Setting detail: OUTPATIENT SURGERY
Discharge: HOME | End: 2024-08-27
Attending: COLON & RECTAL SURGERY
Payer: COMMERCIAL

## 2024-08-27 ENCOUNTER — ANESTHESIA (OUTPATIENT)
Dept: GASTROENTEROLOGY | Facility: HOSPITAL | Age: 54
End: 2024-08-27

## 2024-08-27 ENCOUNTER — ANESTHESIA EVENT (OUTPATIENT)
Dept: GASTROENTEROLOGY | Facility: HOSPITAL | Age: 54
End: 2024-08-27

## 2024-08-27 VITALS
HEART RATE: 61 BPM | TEMPERATURE: 97.7 F | WEIGHT: 240 LBS | HEIGHT: 63 IN | OXYGEN SATURATION: 98 % | BODY MASS INDEX: 42.52 KG/M2 | DIASTOLIC BLOOD PRESSURE: 55 MMHG | RESPIRATION RATE: 16 BRPM | SYSTOLIC BLOOD PRESSURE: 115 MMHG

## 2024-08-27 DIAGNOSIS — Z98.890 HISTORY OF COLON SURGERY: ICD-10-CM

## 2024-08-27 DIAGNOSIS — K51.018 ULCERATIVE PANCOLITIS WITH OTHER COMPLICATION (HCC): ICD-10-CM

## 2024-08-27 PROCEDURE — 44385 ENDOSCOPY OF BOWEL POUCH: CPT | Performed by: COLON & RECTAL SURGERY

## 2024-08-27 RX ORDER — LIDOCAINE HYDROCHLORIDE 10 MG/ML
INJECTION, SOLUTION EPIDURAL; INFILTRATION; INTRACAUDAL; PERINEURAL AS NEEDED
Status: DISCONTINUED | OUTPATIENT
Start: 2024-08-27 | End: 2024-08-27

## 2024-08-27 RX ORDER — SODIUM CHLORIDE 9 MG/ML
INJECTION, SOLUTION INTRAVENOUS CONTINUOUS PRN
Status: DISCONTINUED | OUTPATIENT
Start: 2024-08-27 | End: 2024-08-27

## 2024-08-27 RX ORDER — PROPOFOL 10 MG/ML
INJECTION, EMULSION INTRAVENOUS AS NEEDED
Status: DISCONTINUED | OUTPATIENT
Start: 2024-08-27 | End: 2024-08-27

## 2024-08-27 RX ADMIN — PROPOFOL 50 MG: 10 INJECTION, EMULSION INTRAVENOUS at 08:13

## 2024-08-27 RX ADMIN — PROPOFOL 100 MG: 10 INJECTION, EMULSION INTRAVENOUS at 08:12

## 2024-08-27 RX ADMIN — LIDOCAINE HYDROCHLORIDE 50 MG: 10 INJECTION, SOLUTION EPIDURAL; INFILTRATION; INTRACAUDAL; PERINEURAL at 08:12

## 2024-08-27 RX ADMIN — SODIUM CHLORIDE: 0.9 INJECTION, SOLUTION INTRAVENOUS at 08:09

## 2024-08-27 RX ADMIN — PROPOFOL 50 MG: 10 INJECTION, EMULSION INTRAVENOUS at 08:16
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
## 2024-08-27 NOTE — ANESTHESIA PREPROCEDURE EVALUATION
Procedure:  POUCHOSCOPY    Relevant Problems   ENDO   (+) Acquired hypothyroidism      /RENAL   (+) Acute renal failure (ARF) (HCC)   (+) Chronic tubulointerstitial nephritis   (+) Stage 2 chronic kidney disease      MUSCULOSKELETAL   (+) Chronic bilateral low back pain   (+) Lumbar spondylosis   (+) Mid back pain   (+) Primary osteoarthritis of both knees      NEURO/PSYCH   (+) Chronic bilateral low back pain   (+) Chronic pain syndrome      PULMONARY   (+) Mild intermittent asthma without complication   (+) Mild persistent asthma without complication      H/o colon surgery    Physical Exam    Airway    Mallampati score: II  TM Distance: >3 FB  Neck ROM: full     Dental        Cardiovascular  Cardiovascular exam normal    Pulmonary  Pulmonary exam normal     Other Findings  post-pubertal.      Anesthesia Plan  ASA Score- 3     Anesthesia Type- IV sedation with anesthesia with ASA Monitors.         Additional Monitors:     Airway Plan:            Plan Factors-    Chart reviewed. EKG reviewed.  Existing labs reviewed. Patient summary reviewed.                  Induction- intravenous.    Postoperative Plan-         Informed Consent- Anesthetic plan and risks discussed with patient.  I personally reviewed this patient with the CRNA. Discussed and agreed on the Anesthesia Plan with the CRNA..

## 2024-09-11 ENCOUNTER — VBI (OUTPATIENT)
Dept: ADMINISTRATIVE | Facility: OTHER | Age: 54
End: 2024-09-11

## 2024-09-11 NOTE — TELEPHONE ENCOUNTER
09/11/24 9:06 AM     Chart reviewed for Pap Smear (HPV) aka Cervical Cancer Screening was/were not submitted to the patient's insurance.     Patience Aguirre MA   [facility]

## 2024-09-17 ENCOUNTER — VBI (OUTPATIENT)
Dept: ADMINISTRATIVE | Facility: OTHER | Age: 54
End: 2024-09-17

## 2024-09-17 NOTE — TELEPHONE ENCOUNTER
09/17/24 8:13 AM     Chart reviewed for Diabetic Eye Exam was/were not submitted to the patient's insurance.     Patience Aguirre MA   [facility]

## 2024-09-25 ENCOUNTER — HOSPITAL ENCOUNTER (OUTPATIENT)
Dept: RADIOLOGY | Facility: HOSPITAL | Age: 54
Discharge: HOME | End: 2024-09-25
Payer: COMMERCIAL

## 2024-09-25 DIAGNOSIS — M54.9 MID BACK PAIN: ICD-10-CM

## 2024-09-25 DIAGNOSIS — M54.2 NECK PAIN: ICD-10-CM

## 2024-09-25 PROCEDURE — 72072 X-RAY EXAM THORAC SPINE 3VWS: CPT

## 2024-09-25 PROCEDURE — 72050 X-RAY EXAM NECK SPINE 4/5VWS: CPT

## 2024-09-26 ENCOUNTER — OFFICE VISIT (OUTPATIENT)
Age: 54
End: 2024-09-26
Payer: COMMERCIAL

## 2024-09-26 VITALS
BODY MASS INDEX: 42.52 KG/M2 | DIASTOLIC BLOOD PRESSURE: 84 MMHG | HEART RATE: 82 BPM | HEIGHT: 63 IN | SYSTOLIC BLOOD PRESSURE: 131 MMHG | WEIGHT: 240 LBS

## 2024-09-26 DIAGNOSIS — G89.29 CHRONIC BILATERAL LOW BACK PAIN WITH BILATERAL SCIATICA: ICD-10-CM

## 2024-09-26 DIAGNOSIS — M54.2 NECK PAIN: Primary | ICD-10-CM

## 2024-09-26 DIAGNOSIS — M54.12 CERVICAL RADICULOPATHY: ICD-10-CM

## 2024-09-26 DIAGNOSIS — M54.50 CHRONIC BILATERAL LOW BACK PAIN, UNSPECIFIED WHETHER SCIATICA PRESENT: ICD-10-CM

## 2024-09-26 DIAGNOSIS — M54.41 CHRONIC BILATERAL LOW BACK PAIN WITH BILATERAL SCIATICA: ICD-10-CM

## 2024-09-26 DIAGNOSIS — G89.4 CHRONIC PAIN SYNDROME: ICD-10-CM

## 2024-09-26 DIAGNOSIS — M47.816 LUMBAR SPONDYLOSIS: ICD-10-CM

## 2024-09-26 DIAGNOSIS — M54.42 CHRONIC BILATERAL LOW BACK PAIN WITH BILATERAL SCIATICA: ICD-10-CM

## 2024-09-26 DIAGNOSIS — M54.16 LUMBAR RADICULOPATHY: ICD-10-CM

## 2024-09-26 DIAGNOSIS — G89.29 CHRONIC BILATERAL LOW BACK PAIN, UNSPECIFIED WHETHER SCIATICA PRESENT: ICD-10-CM

## 2024-09-26 DIAGNOSIS — M54.9 MID BACK PAIN: ICD-10-CM

## 2024-09-26 PROCEDURE — 99214 OFFICE O/P EST MOD 30 MIN: CPT | Performed by: ANESTHESIOLOGY

## 2024-09-26 RX ORDER — DULOXETIN HYDROCHLORIDE 30 MG/1
60 CAPSULE, DELAYED RELEASE ORAL DAILY
Qty: 60 CAPSULE | Refills: 0 | Status: SHIPPED | OUTPATIENT
Start: 2024-09-26 | End: 2024-10-26
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
## 2024-10-02 DIAGNOSIS — E78.2 MIXED HYPERLIPIDEMIA: ICD-10-CM

## 2024-10-02 DIAGNOSIS — R73.01 IMPAIRED FASTING BLOOD SUGAR: Primary | ICD-10-CM

## 2024-10-06 ENCOUNTER — HOSPITAL ENCOUNTER (OUTPATIENT)
Dept: MRI IMAGING | Facility: HOSPITAL | Age: 54
Discharge: HOME | End: 2024-10-06
Payer: COMMERCIAL

## 2024-10-06 DIAGNOSIS — G89.29 CHRONIC BILATERAL LOW BACK PAIN WITH BILATERAL SCIATICA: ICD-10-CM

## 2024-10-06 DIAGNOSIS — M54.2 NECK PAIN: ICD-10-CM

## 2024-10-06 DIAGNOSIS — G89.4 CHRONIC PAIN SYNDROME: ICD-10-CM

## 2024-10-06 DIAGNOSIS — M54.42 CHRONIC BILATERAL LOW BACK PAIN WITH BILATERAL SCIATICA: ICD-10-CM

## 2024-10-06 DIAGNOSIS — M54.16 LUMBAR RADICULOPATHY: ICD-10-CM

## 2024-10-06 DIAGNOSIS — M54.41 CHRONIC BILATERAL LOW BACK PAIN WITH BILATERAL SCIATICA: ICD-10-CM

## 2024-10-06 DIAGNOSIS — M54.12 CERVICAL RADICULOPATHY: ICD-10-CM

## 2024-10-06 PROCEDURE — 72148 MRI LUMBAR SPINE W/O DYE: CPT

## 2024-10-06 PROCEDURE — 72141 MRI NECK SPINE W/O DYE: CPT

## 2024-10-20 DIAGNOSIS — G89.29 CHRONIC BILATERAL LOW BACK PAIN, UNSPECIFIED WHETHER SCIATICA PRESENT: ICD-10-CM

## 2024-10-20 DIAGNOSIS — M54.2 NECK PAIN: ICD-10-CM

## 2024-10-20 DIAGNOSIS — M54.9 MID BACK PAIN: ICD-10-CM

## 2024-10-20 DIAGNOSIS — G89.4 CHRONIC PAIN SYNDROME: ICD-10-CM

## 2024-10-20 DIAGNOSIS — M54.50 CHRONIC BILATERAL LOW BACK PAIN, UNSPECIFIED WHETHER SCIATICA PRESENT: ICD-10-CM

## 2024-10-20 DIAGNOSIS — M47.816 LUMBAR SPONDYLOSIS: ICD-10-CM

## 2024-10-21 RX ORDER — DULOXETIN HYDROCHLORIDE 30 MG/1
60 CAPSULE, DELAYED RELEASE ORAL DAILY
Qty: 60 CAPSULE | Refills: 0 | Status: SHIPPED | OUTPATIENT
Start: 2024-10-21

## 2024-10-24 ENCOUNTER — OFFICE VISIT (OUTPATIENT)
Age: 54
End: 2024-10-24
Payer: COMMERCIAL

## 2024-10-24 ENCOUNTER — TELEPHONE (OUTPATIENT)
Age: 54
End: 2024-10-24

## 2024-10-24 VITALS
HEIGHT: 63 IN | WEIGHT: 240 LBS | SYSTOLIC BLOOD PRESSURE: 120 MMHG | DIASTOLIC BLOOD PRESSURE: 77 MMHG | BODY MASS INDEX: 42.52 KG/M2 | HEART RATE: 90 BPM

## 2024-10-24 DIAGNOSIS — F11.20 UNCOMPLICATED OPIOID DEPENDENCE (HCC): ICD-10-CM

## 2024-10-24 DIAGNOSIS — G89.4 CHRONIC PAIN SYNDROME: ICD-10-CM

## 2024-10-24 DIAGNOSIS — M54.16 LUMBAR RADICULOPATHY: Primary | ICD-10-CM

## 2024-10-24 DIAGNOSIS — M54.2 NECK PAIN: ICD-10-CM

## 2024-10-24 DIAGNOSIS — Z79.891 LONG-TERM CURRENT USE OF OPIATE ANALGESIC: ICD-10-CM

## 2024-10-24 DIAGNOSIS — M54.12 CERVICAL RADICULOPATHY: ICD-10-CM

## 2024-10-24 DIAGNOSIS — M47.816 LUMBAR SPONDYLOSIS: ICD-10-CM

## 2024-10-24 PROCEDURE — 99214 OFFICE O/P EST MOD 30 MIN: CPT | Performed by: ANESTHESIOLOGY

## 2024-10-24 RX ORDER — BUPRENORPHINE 5 UG/H
1 PATCH TRANSDERMAL
Qty: 4 PATCH | Refills: 0 | Status: SHIPPED | OUTPATIENT
Start: 2024-10-24 | End: 2024-11-23

## 2024-10-24 RX ORDER — BUPRENORPHINE 7.5 UG/H
1 PATCH TRANSDERMAL
Qty: 4 PATCH | Refills: 0 | Status: SHIPPED | OUTPATIENT
Start: 2024-11-23 | End: 2024-12-23

## 2024-10-24 NOTE — PATIENT INSTRUCTIONS
Patient Education     Core Strengthening Exercises on Back or on Hands and Knees   About this topic   Your core muscles are in your chest, back, buttock, and stomach area. They are your abdominal, back, and pelvis muscles. These muscles help keep your body stable when using your arms or legs. They also help with balance and posture. There are many exercises you can do to keep these muscles strong.  If you have back problems like a compression fracture or a ruptured disc, doing some of these exercises could make your problem worse. Some of these exercises may cause lower back pain.  General   Before starting with a program, ask your doctor if you are healthy enough to do these exercises. Your doctor may have you work with a , chiropractor, or physical therapist to make a safe exercise program to meet your needs.  Strengthening Exercises   Strengthening exercises keep your muscles firm and strong. Start by repeating each exercise 2 to 3 times. Work up to doing each exercise 10 times. Try to do the exercises 2 to 3 times each day. Hold each exercise for 3 to 5 seconds. Do all exercises slowly.  Hip lifts ? Lie on your back with your knees bent and feet flat on the floor. Tighten your stomach muscles and push your heels into the floor to lift your buttocks off the floor. Relax.  Pelvic tilts ? Lie on your back with your knees bent and feet flat on the floor. Tighten your stomach muscles and press your lower back down to the floor. Relax.  Straight leg raises lying down ? Lie on your back with one leg straight. Bend your other knee so the foot is flat on the bed. Keeping your leg straight, lift the leg up to the level of your other knee. Lower it back down. Repeat with the other leg.  Knee flex lying down ? Lie on your back with both knees bent and your feet flat on the floor. Tighten your belly muscles. Raise one leg up and back down as if you are marching in slow motion. Keep belly muscles tight while you move  your leg. Switch legs. To make this exercise harder, raise both arms straight up in the air. Tighten your belly muscles. When you raise one leg up, reach the opposite arm over your head. Switch, moving the opposite arm and leg until you have done 10 repetitions on each side.  Abdominal crunches ? Lie on your back with both knees bent. Keep your feet flat on the floor. Place your hands in one of these positions. Try starting with the first position since it is the easiest. As you get better, use the other positions to make it harder.  Crunches with arms at sides.  Crunches with arms across chest.  Crunches with arms behind head. Be careful not to interlock your fingers behind your neck or head while doing crunches. This may add tension to your neck and cause strain.  Look at the ceiling. Tighten your belly muscles and lift your shoulders and upper back off the floor. Breathe out while you are doing this. Lower your shoulders to the floor. Breathe in while you are doing this. Relax your belly muscles all the way before starting another crunch.  Arm and leg lifts on hands and knees ? Start on your hands and knees. With all of these exercises, keep your back as level as possible. If you are having trouble with this, you may want to put a small object on your back such as a book. If it falls off, you are not keeping your back level enough during the exercise.  Lift one arm up to shoulder level and hold. Lower it back down. Now, lift up the other arm and hold.  Lift one leg up and kick it straight out until it is in line with your back and hold. Lower it back down. Now, lift up the other leg and hold.  Lift one arm and the OPPOSITE leg up at the same time and hold. Lower them down. Now, repeat using the other arm and leg. This is a very hard exercise. It may take time to be able to do this.               What will the results be?   Stronger core  Better balance  More toned belly and back muscles  Easier to do daily  activities  Better sports performance  Less low back pain  Helpful tips   Stay active and work out to keep your muscles strong and flexible.  Keep a healthy weight to avoid putting too much stress on your spine. Eat a healthy diet to keep your muscles healthy.  Be sure you do not hold your breath when exercising. This can raise your blood pressure. If you tend to hold your breath, try counting out loud when exercising. If any exercise bothers you, stop right away.  Try walking or cycling at an easy pace for a few minutes to warm up your muscles. Do this again after exercising.  Exercise may be slightly uncomfortable, but you should not have sharp pains. If you do get sharp pains, stop what you are doing. If the sharp pains continue, call your doctor.  Last Reviewed Date   2021-03-18  Consumer Information Use and Disclaimer   This generalized information is a limited summary of diagnosis, treatment, and/or medication information. It is not meant to be comprehensive and should be used as a tool to help the user understand and/or assess potential diagnostic and treatment options. It does NOT include all information about conditions, treatments, medications, side effects, or risks that may apply to a specific patient. It is not intended to be medical advice or a substitute for the medical advice, diagnosis, or treatment of a health care provider based on the health care provider's examination and assessment of a patient’s specific and unique circumstances. Patients must speak with a health care provider for complete information about their health, medical questions, and treatment options, including any risks or benefits regarding use of medications. This information does not endorse any treatments or medications as safe, effective, or approved for treating a specific patient. [employer] and its affiliates disclaim any warranty or liability relating to this information or the use thereof. The use of this information  is governed by the Terms of Use, available at https://www.woltersPaytopiauwer.com/en/know/clinical-effectiveness-terms   Copyright   Copyright © 2024 [employer] and its affiliates and/or licensors. All rights reserved.
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
## 2024-10-24 NOTE — TELEPHONE ENCOUNTER
PA for [employer] SUBMITTED     via    []M-KEY:   []Surescripts-Case ID #   []Availity-Auth ID # NDC #   []Faxed to plan   [x]Other website [facility]) ID:  [id number]  []Phone call Case ID #     Office notes sent, clinical questions answered. Awaiting determination    Turnaround time for your insurance to make a decision on your Prior Authorization can take 7-21 business days.

## 2024-10-24 NOTE — TELEPHONE ENCOUNTER
Caller: [facility]     Doctor: Diamond     Reason for call: prior author is needed for the   transdermal buprenorphine (Butrans) 7.5 mcg/hr TD patch     Call back#: [phone]

## 2024-10-25 NOTE — TELEPHONE ENCOUNTER
BUTRANS 5 MCG HAS BEEN DENIED BY PLAN.    I have resubmitted a redetermination request addressing the reasons for denial. Will await redetermination response.

## 2024-10-26 LAB
6MAM UR QL CFM: NEGATIVE NG/ML
7AMINOCLONAZEPAM UR QL CFM: NEGATIVE NG/ML
A-OH ALPRAZ UR QL CFM: NEGATIVE NG/ML
ACCEPTABLE CREAT UR QL: NORMAL MG/DL
ACCEPTIBLE SP GR UR QL: NORMAL
AMPHET UR QL CFM: NEGATIVE NG/ML
AMPHET UR QL CFM: NEGATIVE NG/ML
BUPRENORPHINE UR QL CFM: ABNORMAL NG/ML
BUTALBITAL UR QL CFM: NEGATIVE NG/ML
BZE UR QL CFM: NEGATIVE NG/ML
CODEINE UR QL CFM: NEGATIVE NG/ML
DESIPRAMINE UR QL CFM: NEGATIVE NG/ML
EDDP UR QL CFM: NEGATIVE NG/ML
ETHYL GLUCURONIDE UR QL CFM: NEGATIVE NG/ML
ETHYL SULFATE UR QL SCN: NEGATIVE NG/ML
FENTANYL UR QL CFM: NEGATIVE NG/ML
GLIADIN IGG SER IA-ACNC: NEGATIVE NG/ML
GLUCOSE 30M P 50 G LAC PO SERPL-MCNC: NEGATIVE NG/ML
HYDROCODONE UR QL CFM: NEGATIVE NG/ML
HYDROCODONE UR QL CFM: NEGATIVE NG/ML
HYDROMORPHONE UR QL CFM: NEGATIVE NG/ML
LORAZEPAM UR QL CFM: NEGATIVE NG/ML
MDMA UR QL CFM: NEGATIVE NG/ML
ME-PHENIDATE UR QL CFM: NEGATIVE NG/ML
MEPERIDINE UR QL CFM: NEGATIVE NG/ML
METHADONE UR QL CFM: NEGATIVE NG/ML
METHAMPHET UR QL CFM: NEGATIVE NG/ML
MORPHINE UR QL CFM: NEGATIVE NG/ML
MORPHINE UR QL CFM: NEGATIVE NG/ML
NITRITE UR QL: NORMAL UG/ML
NORBUPRENORPHINE UR QL CFM: ABNORMAL NG/ML
NORDIAZEPAM UR QL CFM: NEGATIVE NG/ML
NORFENTANYL UR QL CFM: NEGATIVE NG/ML
NORHYDROCODONE UR QL CFM: NEGATIVE NG/ML
NORHYDROCODONE UR QL CFM: NEGATIVE NG/ML
NORMEPERIDINE UR QL CFM: NEGATIVE NG/ML
NOROXYCODONE UR QL CFM: NEGATIVE NG/ML
OLANZAPINE QUANTIFICATION: NEGATIVE NG/ML
OPC-3373 QUANTIFICATION: NEGATIVE NG/ML
OXAZEPAM UR QL CFM: NEGATIVE NG/ML
OXYCODONE UR QL CFM: NEGATIVE NG/ML
OXYMORPHONE UR QL CFM: NEGATIVE NG/ML
OXYMORPHONE UR QL CFM: NEGATIVE NG/ML
PARA-FLUOROFENTANYL QUANTIFICATION: NORMAL NG/ML
PCP UR QL CFM: NEGATIVE NG/ML
PHENOBARB UR QL CFM: NEGATIVE NG/ML
RESULT ALL_PRESCRIBED MEDS AND SPECIAL INSTRUCTIONS: NORMAL
SECOBARBITAL UR QL CFM: NEGATIVE NG/ML
SL AMB 7-OH-MITRAGYNINE (KRATOM ALKALOID) QUANTIFICATION: NEGATIVE NG/ML
SL AMB ACETYL FENTANYL QUANTIFICATION: NORMAL NG/ML
SL AMB ACETYL NORFENTANYL QUANTIFICATION: NORMAL NG/ML
SL AMB ACRYL FENTANYL QUANTIFICATION: NORMAL NG/ML
SL AMB CARFENTANIL QUANTIFICATION: NORMAL NG/ML
SL AMB CLOZAPINE QUANTIFICATION: NEGATIVE NG/ML
SL AMB CTHC (MARIJUANA METABOLITE) QUANTIFICATION: NEGATIVE NG/ML
SL AMB DEXTRORPHAN (DEXTROMETHORPHAN METABOLITE) QUANT: NEGATIVE NG/ML
SL AMB HALOPERIDOL  QUANTIFICATION: NEGATIVE NG/ML
SL AMB HALOPERIDOL METABOLITE QUANTIFICATION: NEGATIVE NG/ML
SL AMB HYDROXYRISPERIDONE QUANTIFICATION: NEGATIVE NG/ML
SL AMB N-DESMETHYL-TRAMADOL QUANTIFICATION: NEGATIVE NG/ML
SL AMB N-DESMETHYLCLOZAPINE QUANTIFICATION: NEGATIVE NG/ML
SL AMB NORQUETIAPINE QUANTIFICATION: NEGATIVE NG/ML
SL AMB PHENTERMINE QUANTIFICATION: NEGATIVE NG/ML
SL AMB PREGABALIN QUANTIFICATION: NEGATIVE NG/ML
SL AMB QUETIAPINE QUANTIFICATION: NEGATIVE NG/ML
SL AMB RISPERIDONE QUANTIFICATION: NEGATIVE NG/ML
SL AMB RITALINIC ACID QUANTIFICATION: NEGATIVE NG/ML
SPECIMEN PH ACCEPTABLE UR: NORMAL
TAPENTADOL UR QL CFM: NEGATIVE NG/ML
TEMAZEPAM UR QL CFM: NEGATIVE NG/ML
TEMAZEPAM UR QL CFM: NEGATIVE NG/ML
TRAMADOL UR QL CFM: NEGATIVE NG/ML
URATE/CREAT 24H UR: NEGATIVE NG/ML

## 2024-10-30 ENCOUNTER — TELEPHONE (OUTPATIENT)
Age: 54
End: 2024-10-30

## 2024-10-30 DIAGNOSIS — H65.191 OTHER NON-RECURRENT ACUTE NONSUPPURATIVE OTITIS MEDIA OF RIGHT EAR: Primary | ICD-10-CM

## 2024-10-30 RX ORDER — CLARITHROMYCIN 500 MG/1
500 TABLET ORAL EVERY 12 HOURS SCHEDULED
Qty: 14 TABLET | Refills: 0 | Status: SHIPPED | OUTPATIENT
Start: 2024-10-30 | End: 2024-11-06
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
## 2024-10-30 NOTE — TELEPHONE ENCOUNTER
I sent antibiotic to the pharmacy, please have her start, and if she is not better or getting worse please let me know Surgeon/Pathologist Verbiage (Will Incorporate Name Of Surgeon From Intro If Not Blank): operated in two distinct and integrated capacities as the surgeon and pathologist.

## 2024-10-30 NOTE — TELEPHONE ENCOUNTER
Spoke with patient and informed her to start taking medication, if she does not feel better or gets worse, to call the office.

## 2024-11-19 ENCOUNTER — APPOINTMENT (OUTPATIENT)
Dept: LAB | Facility: CLINIC | Age: 54
End: 2024-11-19
Payer: COMMERCIAL

## 2024-11-19 DIAGNOSIS — E78.2 MIXED HYPERLIPIDEMIA: ICD-10-CM

## 2024-11-19 DIAGNOSIS — R73.01 IMPAIRED FASTING BLOOD SUGAR: ICD-10-CM

## 2024-11-19 LAB
ALBUMIN SERPL BCG-MCNC: 4.2 G/DL (ref 3.5–5)
ALP SERPL-CCNC: 71 U/L (ref 34–104)
ALT SERPL W P-5'-P-CCNC: 20 U/L (ref 7–52)
ANION GAP SERPL CALCULATED.3IONS-SCNC: 8 MMOL/L (ref 4–13)
AST SERPL W P-5'-P-CCNC: 24 U/L (ref 13–39)
BASOPHILS # BLD AUTO: 0.05 THOUSANDS/ÂΜL (ref 0–0.1)
BASOPHILS NFR BLD AUTO: 1 % (ref 0–1)
BILIRUB SERPL-MCNC: 0.48 MG/DL (ref 0.2–1)
BUN SERPL-MCNC: 22 MG/DL (ref 5–25)
CALCIUM SERPL-MCNC: 9.3 MG/DL (ref 8.4–10.2)
CHLORIDE SERPL-SCNC: 104 MMOL/L (ref 96–108)
CHOLEST SERPL-MCNC: 186 MG/DL (ref ?–200)
CO2 SERPL-SCNC: 27 MMOL/L (ref 21–32)
CREAT SERPL-MCNC: 1.04 MG/DL (ref 0.6–1.3)
EOSINOPHIL # BLD AUTO: 0.32 THOUSAND/ÂΜL (ref 0–0.61)
EOSINOPHIL NFR BLD AUTO: 7 % (ref 0–6)
ERYTHROCYTE [DISTWIDTH] IN BLOOD BY AUTOMATED COUNT: 14.6 % (ref 11.6–15.1)
EST. AVERAGE GLUCOSE BLD GHB EST-MCNC: 111 MG/DL
GFR SERPL CREATININE-BSD FRML MDRD: 61 ML/MIN/1.73SQ M
GLUCOSE P FAST SERPL-MCNC: 108 MG/DL (ref 65–99)
HBA1C MFR BLD: 5.5 %
HCT VFR BLD AUTO: 42.1 % (ref 34.8–46.1)
HDLC SERPL-MCNC: 69 MG/DL
HGB BLD-MCNC: 13.2 G/DL (ref 11.5–15.4)
IMM GRANULOCYTES # BLD AUTO: 0.01 THOUSAND/UL (ref 0–0.2)
IMM GRANULOCYTES NFR BLD AUTO: 0 % (ref 0–2)
LDLC SERPL CALC-MCNC: 100 MG/DL (ref 0–100)
LYMPHOCYTES # BLD AUTO: 1.01 THOUSANDS/ÂΜL (ref 0.6–4.47)
LYMPHOCYTES NFR BLD AUTO: 21 % (ref 14–44)
MCH RBC QN AUTO: 29 PG (ref 26.8–34.3)
MCHC RBC AUTO-ENTMCNC: 31.4 G/DL (ref 31.4–37.4)
MCV RBC AUTO: 93 FL (ref 82–98)
MONOCYTES # BLD AUTO: 0.49 THOUSAND/ÂΜL (ref 0.17–1.22)
MONOCYTES NFR BLD AUTO: 10 % (ref 4–12)
NEUTROPHILS # BLD AUTO: 2.87 THOUSANDS/ÂΜL (ref 1.85–7.62)
NEUTS SEG NFR BLD AUTO: 61 % (ref 43–75)
NRBC BLD AUTO-RTO: 0 /100 WBCS
PLATELET # BLD AUTO: 328 THOUSANDS/UL (ref 149–390)
PMV BLD AUTO: 10.9 FL (ref 8.9–12.7)
POTASSIUM SERPL-SCNC: 4.5 MMOL/L (ref 3.5–5.3)
PROT SERPL-MCNC: 7.8 G/DL (ref 6.4–8.4)
RBC # BLD AUTO: 4.55 MILLION/UL (ref 3.81–5.12)
SODIUM SERPL-SCNC: 139 MMOL/L (ref 135–147)
TRIGL SERPL-MCNC: 83 MG/DL (ref ?–150)
TSH SERPL DL<=0.05 MIU/L-ACNC: 5.18 UIU/ML (ref 0.45–4.5)
WBC # BLD AUTO: 4.75 THOUSAND/UL (ref 4.31–10.16)

## 2024-11-19 PROCEDURE — 84443 ASSAY THYROID STIM HORMONE: CPT

## 2024-11-19 PROCEDURE — 80061 LIPID PANEL: CPT

## 2024-11-19 PROCEDURE — 80053 COMPREHEN METABOLIC PANEL: CPT

## 2024-11-19 PROCEDURE — 36415 COLL VENOUS BLD VENIPUNCTURE: CPT

## 2024-11-19 PROCEDURE — 85025 COMPLETE CBC W/AUTO DIFF WBC: CPT

## 2024-11-19 PROCEDURE — 83036 HEMOGLOBIN GLYCOSYLATED A1C: CPT

## 2024-11-25 ENCOUNTER — TELEPHONE (OUTPATIENT)
Age: 54
End: 2024-11-25

## 2024-11-25 ENCOUNTER — TELEPHONE (OUTPATIENT)
Dept: PAIN MEDICINE | Facility: CLINIC | Age: 54
End: 2024-11-25

## 2024-11-25 ENCOUNTER — OFFICE VISIT (OUTPATIENT)
Dept: INTERNAL MEDICINE CLINIC | Facility: CLINIC | Age: 54
End: 2024-11-25
Payer: COMMERCIAL

## 2024-11-25 VITALS
TEMPERATURE: 98 F | DIASTOLIC BLOOD PRESSURE: 74 MMHG | HEIGHT: 63 IN | SYSTOLIC BLOOD PRESSURE: 118 MMHG | HEART RATE: 79 BPM | OXYGEN SATURATION: 99 % | BODY MASS INDEX: 43.05 KG/M2 | WEIGHT: 243 LBS

## 2024-11-25 DIAGNOSIS — M54.12 CERVICAL RADICULOPATHY: ICD-10-CM

## 2024-11-25 DIAGNOSIS — R73.01 IMPAIRED FASTING BLOOD SUGAR: ICD-10-CM

## 2024-11-25 DIAGNOSIS — M17.0 PRIMARY OSTEOARTHRITIS OF BOTH KNEES: ICD-10-CM

## 2024-11-25 DIAGNOSIS — J45.20 MILD INTERMITTENT ASTHMA WITHOUT COMPLICATION: ICD-10-CM

## 2024-11-25 DIAGNOSIS — E03.9 ACQUIRED HYPOTHYROIDISM: Primary | ICD-10-CM

## 2024-11-25 DIAGNOSIS — M54.16 LUMBAR RADICULOPATHY: ICD-10-CM

## 2024-11-25 DIAGNOSIS — K04.7 TOOTH INFECTION: ICD-10-CM

## 2024-11-25 DIAGNOSIS — Z00.00 ANNUAL PHYSICAL EXAM: ICD-10-CM

## 2024-11-25 DIAGNOSIS — Z23 ENCOUNTER FOR IMMUNIZATION: ICD-10-CM

## 2024-11-25 PROCEDURE — 99214 OFFICE O/P EST MOD 30 MIN: CPT | Performed by: INTERNAL MEDICINE

## 2024-11-25 PROCEDURE — 99396 PREV VISIT EST AGE 40-64: CPT | Performed by: INTERNAL MEDICINE

## 2024-11-25 PROCEDURE — 90673 RIV3 VACCINE NO PRESERV IM: CPT

## 2024-11-25 PROCEDURE — 90471 IMMUNIZATION ADMIN: CPT

## 2024-11-25 RX ORDER — CLINDAMYCIN HYDROCHLORIDE 300 MG/1
300 CAPSULE ORAL 3 TIMES DAILY
Qty: 21 CAPSULE | Refills: 0 | Status: SHIPPED | OUTPATIENT
Start: 2024-11-25 | End: 2024-12-02

## 2024-11-25 RX ORDER — LEVOTHYROXINE SODIUM 112 UG/1
112 TABLET ORAL
Start: 2024-11-25

## 2024-11-25 RX ORDER — ALBUTEROL SULFATE 90 UG/1
2 INHALANT RESPIRATORY (INHALATION) EVERY 6 HOURS PRN
Qty: 18 G | Refills: 0 | Status: SHIPPED | OUTPATIENT
Start: 2024-11-25

## 2024-11-25 NOTE — TELEPHONE ENCOUNTER
Caller: [facility]     Doctor: Alexis     Reason for call: they need prior auth     transdermal buprenorphine (Butrans) 7.5 mcg/hr TD patch     Call back#: [phone]
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
## 2024-11-25 NOTE — PROGRESS NOTES
Adult Annual Physical  Name: Sofia Copeland      : 1970      MRN: [id number]  Encounter Provider: Casey Hudson MD  Encounter Date: 2024   Encounter department: [facility] [place] [facility]    Assessment & Plan  Acquired hypothyroidism    Orders:    levothyroxine 112 mcg tablet; Take 1 tablet (112 mcg total) by mouth daily in the early morning    Lumbar radiculopathy         Mild intermittent asthma without complication    Orders:    albuterol (PROVENTIL HFA,VENTOLIN HFA) 90 mcg/act inhaler; Inhale 2 puffs every 6 (six) hours as needed for wheezing    Impaired fasting blood sugar         Cervical radiculopathy         Primary osteoarthritis of both knees         Annual physical exam         Tooth infection    Orders:    clindamycin (CLEOCIN) 300 MG capsule; Take 1 capsule (300 mg total) by mouth 3 (three) times a day for 7 days      Immunizations and preventive care screenings were discussed with patient today. Appropriate education was printed on patient's after visit summary.    Counseling:  Exercise: the importance of regular exercise/physical activity was discussed. Recommend exercise 3-5 times per week for at least 30 minutes.       Depression Screening and Follow-up Plan: Patient was screened for depression during today's encounter. They screened negative with a PHQ-2 score of 0.        History of Present Illness     Adult Annual Physical:  Patient presents for annual physical. physical.     Diet and Physical Activity:  - Diet/Nutrition: well balanced diet.  - Exercise: moderate cardiovascular exercise.    Depression Screening:  - PHQ-2 Score: 0    General Health:  - Sleep: sleeps poorly.  - Hearing: normal hearing bilateral ears.  - Vision: vision problems. going to see eye dr.  - Dental: regular dental visits.    /GYN Health:  - Follows with GYN: yes.   - History of STDs: no    Advanced Care Planning:  - Has an advanced directive?: no    - Has a durable medical  POA?: yes    - ACP document given to patient?: yes      Review of Systems   Constitutional:  Negative for chills and fever.   HENT:  Negative for congestion, ear pain and sore throat.    Eyes:  Negative for pain.   Respiratory:  Negative for cough and shortness of breath.    Cardiovascular:  Negative for chest pain and leg swelling.   Gastrointestinal:  Negative for abdominal pain, nausea and vomiting.   Endocrine: Negative for polyuria.   Genitourinary:  Negative for difficulty urinating, frequency and urgency.   Musculoskeletal:  Positive for arthralgias and back pain.   Skin:  Negative for rash.   Neurological:  Negative for weakness and headaches.   Psychiatric/Behavioral:  Negative for sleep disturbance. The patient is not nervous/anxious.      Current Outpatient Medications on File Prior to Visit   Medication Sig Dispense Refill    cholecalciferol (VITAMIN D3) 1,000 units tablet Take 1,000 Units by mouth daily      DULoxetine (CYMBALTA) 30 mg delayed release capsule take 2 capsules by mouth daily 60 capsule 0    loratadine (CLARITIN) 10 mg tablet Take 10 mg by mouth if needed      meloxicam (MOBIC) 15 mg tablet Take 1 tablet (15 mg total) by mouth daily as needed for moderate pain 90 tablet 1    Multiple Vitamin (multivitamin) tablet Take 1 tablet by mouth daily      transdermal buprenorphine (Butrans) 7.5 mcg/hr TD patch Place 1 patch on the skin over 7 days every 7 days Do not start before November 23, 2024. 4 patch 0    vitamin B-12 (VITAMIN B-12) 1,000 mcg tablet Take 1 tablet (1,000 mcg total) by mouth daily 90 tablet 1    [DISCONTINUED] levothyroxine (Euthyrox) 100 mcg tablet Take 1 tablet (100 mcg total) by mouth daily in the early morning 90 tablet 1    acetaminophen (TYLENOL) 325 mg tablet Take 3 tablets (975 mg total) by mouth every 8 (eight) hours (Patient not taking: Reported on 10/9/2023)  0    [DISCONTINUED] albuterol (PROVENTIL HFA,VENTOLIN HFA) 90 mcg/act inhaler Inhale 2 puffs every 6 (six)  "hours as needed for wheezing (Patient not taking: Reported on 7/22/2024) 18 g 0     No current facility-administered medications on file prior to visit.      Social History     Tobacco Use    Smoking status: Never    Smokeless tobacco: Never   Vaping Use    Vaping status: Never Used   Substance and Sexual Activity    Alcohol use: Not Currently     Comment: occ    Drug use: Never    Sexual activity: Not Currently       Objective   /74 (BP Location: Left arm, Patient Position: Sitting, Cuff Size: Standard)   Pulse 79   Temp 98 °F (36.7 °C) (Temporal)   Ht 5' 3\" (1.6 m)   Wt 110 kg (243 lb)   LMP 06/06/2022 (Exact Date)   SpO2 99%   BMI 43.05 kg/m²     Physical Exam  Vitals and nursing note reviewed.   Constitutional:       General: She is not in acute distress.     Appearance: She is well-developed.   HENT:      Head: Normocephalic and atraumatic.   Eyes:      Conjunctiva/sclera: Conjunctivae normal.   Cardiovascular:      Rate and Rhythm: Normal rate and regular rhythm.      Heart sounds: No murmur heard.  Pulmonary:      Effort: Pulmonary effort is normal. No respiratory distress.      Breath sounds: Normal breath sounds.   Abdominal:      Palpations: Abdomen is soft.      Tenderness: There is no abdominal tenderness.   Musculoskeletal:         General: No swelling.      Cervical back: Neck supple.   Skin:     General: Skin is warm and dry.      Capillary Refill: Capillary refill takes less than 2 seconds.   Neurological:      Mental Status: She is alert.   Psychiatric:         Mood and Affect: Mood normal.         "

## 2024-11-25 NOTE — TELEPHONE ENCOUNTER
PA for butrans 7.5SUBMITTED to  [employer]     via    [x]M-KEY:[id number]  []Surescripts-Case ID #   []Availity-Auth ID # NDC #   []Faxed to plan   []Other website   []Phone call Case ID #     []PA sent as URGENT    All office notes, labs and other pertaining documents and studies sent. Clinical questions answered. Awaiting determination from insurance company.     Turnaround time for your insurance to make a decision on your Prior Authorization can take 7-21 business days.

## 2024-11-25 NOTE — ASSESSMENT & PLAN NOTE
Orders:    levothyroxine 112 mcg tablet; Take 1 tablet (112 mcg total) by mouth daily in the early morning

## 2024-11-25 NOTE — PATIENT INSTRUCTIONS
"Patient Education     Routine physical for adults   The Basics   Written by the doctors and editors at [facility]   What is a physical? -- A physical is a routine visit, or \"check-up,\" with your doctor. You might also hear it called a \"wellness visit\" or \"preventive visit.\"  During each visit, the doctor will:   Ask about your physical and mental health   Ask about your habits, behaviors, and lifestyle   Do an exam   Give you vaccines if needed   Talk to you about any medicines you take   Give advice about your health   Answer your questions  Getting regular check-ups is an important part of taking care of your health. It can help your doctor find and treat any problems you have. But it's also important for preventing health problems.  A routine physical is different from a \"sick visit.\" A sick visit is when you see a doctor because of a health concern or problem. Since physicals are scheduled ahead of time, you can think about what you want to ask the doctor.  How often should I get a physical? -- It depends on your age and health. In general, for people age 21 years and older:   If you are younger than 50 years, you might be able to get a physical every 3 years.   If you are 50 years or older, your doctor might recommend a physical every year.  If you have an ongoing health condition, like diabetes or high blood pressure, your doctor will probably want to see you more often.  What happens during a physical? -- In general, each visit will include:   Physical exam - The doctor or nurse will check your height, weight, heart rate, and blood pressure. They will also look at your eyes and ears. They will ask about how you are feeling and whether you have any symptoms that bother you.   Medicines - It's a good idea to bring a list of all the medicines you take to each doctor visit. Your doctor will talk to you about your medicines and answer any questions. Tell them if you are having any side effects that bother you. You " "should also tell them if you are having trouble paying for any of your medicines.   Habits and behaviors - This includes:   Your diet   Your exercise habits   Whether you smoke, drink alcohol, or use drugs   Whether you are sexually active   Whether you feel safe at home  Your doctor will talk to you about things you can do to improve your health and lower your risk of health problems. They will also offer help and support. For example, if you want to quit smoking, they can give you advice and might prescribe medicines. If you want to improve your diet or get more physical activity, they can help you with this, too.   Lab tests, if needed - The tests you get will depend on your age and situation. For example, your doctor might want to check your:   Cholesterol   Blood sugar   Iron level   Vaccines - The recommended vaccines will depend on your age, health, and what vaccines you already had. Vaccines are very important because they can prevent certain serious or deadly infections.   Discussion of screening - \"Screening\" means checking for diseases or other health problems before they cause symptoms. Your doctor can recommend screening based on your age, risk, and preferences. This might include tests to check for:   Cancer, such as breast, prostate, cervical, ovarian, colorectal, prostate, lung, or skin cancer   Sexually transmitted infections, such as chlamydia and gonorrhea   Mental health conditions like depression and anxiety  Your doctor will talk to you about the different types of screening tests. They can help you decide which screenings to have. They can also explain what the results might mean.   Answering questions - The physical is a good time to ask the doctor or nurse questions about your health. If needed, they can refer you to other doctors or specialists, too.  Adults older than 65 years often need other care, too. As you get older, your doctor will talk to you about:   How to prevent falling at " home   Hearing or vision tests   Memory testing   How to take your medicines safely   Making sure that you have the help and support you need at home  All topics are updated as new evidence becomes available and our peer review process is complete.  This topic retrieved from [employer] on: May 02, 2024.  Topic 698367 Version 1.0  Release: 32.4.3 - C32.122  © 2024 UpToDate, Inc. and/or its affiliates. All rights reserved.  Consumer Information Use and Disclaimer   Disclaimer: This generalized information is a limited summary of diagnosis, treatment, and/or medication information. It is not meant to be comprehensive and should be used as a tool to help the user understand and/or assess potential diagnostic and treatment options. It does NOT include all information about conditions, treatments, medications, side effects, or risks that may apply to a specific patient. It is not intended to be medical advice or a substitute for the medical advice, diagnosis, or treatment of a health care provider based on the health care provider's examination and assessment of a patient's specific and unique circumstances. Patients must speak with a health care provider for complete information about their health, medical questions, and treatment options, including any risks or benefits regarding use of medications. This information does not endorse any treatments or medications as safe, effective, or approved for treating a specific patient. UpToDate, Inc. and its affiliates disclaim any warranty or liability relating to this information or the use thereof.The use of this information is governed by the Terms of Use, available at https://www.[employer].com/en/know/clinical-effectiveness-terms. 2024© UpToDate, Inc. and its affiliates and/or licensors. All rights reserved.  Copyright   © 2024 UpToDate, Inc. and/or its affiliates. All rights reserved.

## 2024-11-25 NOTE — TELEPHONE ENCOUNTER
Caller: kate    Doctor: gary    Reason for call: kate needs to know if the pt is going to be on the 7.5 mg butrans patch now?    Call back#: [phone]   none

## 2024-11-25 NOTE — TELEPHONE ENCOUNTER
PA for butrans 7.5 mg  APPROVED     Date(s) approved 10/25/2024 -10/25/2025        Patient advised by          [x][employer] Message  [x]Phone call   []LMOM  []L/M to call office as no active Communication consent on file  []Unable to leave detailed message as VM not approved on Communication consent       Pharmacy advised by    [x]Fax  []Phone call    Approval letter scanned into [employer]

## 2024-12-12 ENCOUNTER — OFFICE VISIT (OUTPATIENT)
Age: 54
End: 2024-12-12
Payer: COMMERCIAL

## 2024-12-12 ENCOUNTER — TELEPHONE (OUTPATIENT)
Age: 54
End: 2024-12-12

## 2024-12-12 VITALS
WEIGHT: 246 LBS | HEIGHT: 63 IN | SYSTOLIC BLOOD PRESSURE: 118 MMHG | HEART RATE: 76 BPM | BODY MASS INDEX: 43.59 KG/M2 | DIASTOLIC BLOOD PRESSURE: 81 MMHG

## 2024-12-12 DIAGNOSIS — M54.9 MID BACK PAIN: ICD-10-CM

## 2024-12-12 DIAGNOSIS — M54.12 CERVICAL RADICULOPATHY: ICD-10-CM

## 2024-12-12 DIAGNOSIS — G89.29 CHRONIC BILATERAL LOW BACK PAIN WITH SCIATICA, SCIATICA LATERALITY UNSPECIFIED: ICD-10-CM

## 2024-12-12 DIAGNOSIS — M54.16 LUMBAR RADICULOPATHY: Primary | ICD-10-CM

## 2024-12-12 DIAGNOSIS — G89.4 CHRONIC PAIN SYNDROME: ICD-10-CM

## 2024-12-12 DIAGNOSIS — M47.816 LUMBAR SPONDYLOSIS: ICD-10-CM

## 2024-12-12 DIAGNOSIS — M54.2 NECK PAIN: ICD-10-CM

## 2024-12-12 DIAGNOSIS — M17.0 PRIMARY OSTEOARTHRITIS OF BOTH KNEES: ICD-10-CM

## 2024-12-12 DIAGNOSIS — M54.40 CHRONIC BILATERAL LOW BACK PAIN WITH SCIATICA, SCIATICA LATERALITY UNSPECIFIED: ICD-10-CM

## 2024-12-12 PROCEDURE — 99214 OFFICE O/P EST MOD 30 MIN: CPT | Performed by: ANESTHESIOLOGY

## 2024-12-12 RX ORDER — BUPRENORPHINE 10 UG/H
1 PATCH TRANSDERMAL
Qty: 4 PATCH | Refills: 0 | Status: SHIPPED | OUTPATIENT
Start: 2024-12-12 | End: 2025-01-11

## 2024-12-12 RX ORDER — BUPRENORPHINE 15 UG/H
1 PATCH TRANSDERMAL
Qty: 4 PATCH | Refills: 0 | Status: SHIPPED | OUTPATIENT
Start: 2025-01-11 | End: 2025-02-10

## 2024-12-12 NOTE — TELEPHONE ENCOUNTER
REQUIRED AUTHORIZATION    Caller: Dre ibarra [facility] #[id number] - [place] - [address]   [address]     Doctor/Office: Suad    Medication:   transdermal buprenorphine (Butrans) 10 mcg/hr TD patch [[id number]]     Pharmacy Benefit ID: [id number]    Pharmacy Phone #: [phone]

## 2024-12-12 NOTE — TELEPHONE ENCOUNTER
I spoke with pharmacy -and Rx plan -looks like she has 2 scripts trying to sent to her plan -I will start a new PA for her 10mg butran-& a 2nd pa for her 15mg

## 2024-12-12 NOTE — TELEPHONE ENCOUNTER
Caller: kate    Doctor: gary    Reason for call: they have questions about the butrans.    Call back#: [phone]

## 2024-12-12 NOTE — TELEPHONE ENCOUNTER
Caller: Jessika ROCK, [facility]    Doctor: Dr Fields    Reason for call: Jessika from [employer] calling about the Med Request for the Butrans patch.  She states there was a pain claim last on 11/26/24 and a future claim to be process on 12/24/24.  She states that refill will process without needing a PA. She states that the patient's coverage does end however on 12/31/24 with [employer]. Reach out with out further questioning.    Call back#: [phone]
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
## 2024-12-12 NOTE — PATIENT INSTRUCTIONS
Patient Education     Buprenorphine (byoo pre NOR feen)   Brand Names: US Belbuca; Brixadi; Brixadi (Weekly); Buprenex [DSC]; Butrans; Probuphine Implant Kit [DSC]; Sublocade   Brand Names: Stephen BuTrans 10; BuTrans 15; BuTrans 20; BuTrans 5; Probuphine [DSC]; Sublocade; Subutex   Warning   All products:   This drug may cause very bad and sometimes deadly breathing problems. Call your doctor right away if you have slow, shallow, or trouble breathing.  Keep all drugs in a safe place. Keep all drugs out of the reach of children and pets.  Severe side effects have happened when opioid drugs were used with benzodiazepines, alcohol, marijuana, other forms of cannabis, or street drugs. This includes severe drowsiness, breathing problems, and death. Benzodiazepines include drugs like alprazolam, diazepam, and lorazepam. If you have questions, talk with the doctor.  Many drugs interact with this drug and can raise the chance of side effects like deadly breathing problems. Talk with your doctor and pharmacist to make sure it is safe to use this drug with all of your drugs.  Get medical help right away if you feel very sleepy, very dizzy, or if you pass out. Caregivers or others need to get medical help right away if the patient does not respond, does not answer or react like normal, or will not wake up.  If you are pregnant or plan to get pregnant, talk with your doctor right away. Using this drug for a long time during pregnancy may lead to withdrawal in the  baby. Withdrawal in the  can be life-threatening if not treated.  All oral products, short-acting injection, and patch:   This is an opioid drug. Opioid drugs can put you at risk for drug use disorder. These can lead to overdose and death. You will be watched closely while taking this drug.  Do not take more than what your doctor told you to take. Taking more than you are told may raise your chance of very bad side effects.  The chance of very bad and  sometimes deadly breathing problems may be greater when you first start this drug or anytime your dose is raised.  Even one dose of this drug may be deadly if it is taken by someone else or by accident, especially in children. If this drug is taken by someone else or by accident, get medical help right away.  All oral products:   Misuse or abuse of this drug by chewing, swallowing, injecting, or snorting it can lead to overdose and death.  Patch:   Misuse or abuse of this drug by placing it in the mouth or chewing, swallowing, injecting, or snorting it can lead to overdose and death.  Implant:   Very bad problems can happen when this drug is put in or taken out. These include the implant moving, sticking out of the skin, or coming out by itself. This may cause nerve or blood vessel injury in the arm. It could be deadly if the implant or pieces of it move into the blood vessels or your lungs. Call your doctor right away if the implant sticks out of the skin or comes out by itself. Call your doctor right away if you have numbness or weakness in your arm, chest pain, or shortness of breath.  It is common to have itching, pain, irritation, redness, swelling, scarring, bleeding, or bruising where the implant is put in or taken out. Call the doctor if you have bleeding, or if any of these effects happen often or get worse.  Long-acting injection:   This drug is given into the fatty part of the skin only. If given other ways (into a vein or muscle), this can be deadly. If you have questions, talk with the doctor.  Using this drug for a long time during pregnancy may lead to withdrawal in the  baby. Withdrawal can be life-threatening if not treated.  What is this drug used for?   Implant, long-acting injection, and under the tongue (sublingual) tablet:   It is used to treat opioid use disorder. Opioid drugs include heroin and prescription pain drugs like oxycodone and morphine.  Implant and long-acting injection:    This drug is only for use by people who have been taking pain drugs (opioids) and are used to their effects. Talk with the doctor.  Cheek film, patch, and short-acting injection:   It is used to manage pain.  Cheek film and patch:   It is only to be used when around-the-clock (continuous) care is needed for a long time. It is also only to be used when other pain drugs do not treat your pain well enough or you cannot take them.  All oral products, short-acting injection, and patch:   It may be given to you for other reasons. Talk with the doctor.  What do I need to tell my doctor BEFORE I take this drug?   If you are allergic to this drug; any part of this drug; or any other drugs, foods, or substances. Tell your doctor about the allergy and what signs you had.  If you have a latex allergy, talk with your doctor. Some products have latex.  If you have any of these health problems: Lung or breathing problems like asthma, trouble breathing, or sleep apnea; high levels of carbon dioxide in the blood; or stomach or bowel block or narrowing.  If you have liver disease.  If you are taking any of these drugs: Butorphanol, nalbuphine, or pentazocine.  If you are using another drug that has the same drug in it.  If you have taken certain drugs for depression or Parkinson's disease in the last 14 days. This includes isocarboxazid, phenelzine, tranylcypromine, selegiline, or rasagiline. Very high blood pressure may happen.  If you are taking any of these drugs: Linezolid or methylene blue.  If you are pregnant or may be pregnant. This drug may cause harm to an unborn baby. You will need to talk with your doctor about if this drug is right for you.  If you are breast-feeding. Do not breast-feed while you take this drug.  This is not a list of all drugs or health problems that interact with this drug.  Tell your doctor and pharmacist about all of your drugs (prescription or OTC, natural products, vitamins) and health problems.  You must check to make sure that it is safe for you to take this drug with all of your drugs and health problems. Do not start, stop, or change the dose of any drug without checking with your doctor.  What are some things I need to know or do while I take this drug?   All products:   Tell all of your health care providers that you take this drug. This includes your doctors, nurses, pharmacists, and dentists.  Avoid driving and doing other tasks or actions that call for you to be alert until you see how this drug affects you.  To lower the chance of feeling dizzy or passing out, rise slowly if you have been sitting or lying down. Be careful going up and down stairs.  Do not take this drug with other strong pain drugs or if you are using a pain patch without talking to your doctor first.  If you drink grapefruit juice or eat grapefruit often, talk with your doctor.  Have your blood work and other lab tests checked as you have been told by your doctor.  Do not take with alcohol or products that have alcohol. Unsafe and sometimes deadly effects may happen.  This drug may cause withdrawal symptoms if you have opioid use disorder. If you have questions, talk with your doctor.  Severe and sometimes deadly liver problems have happened with this drug. Call your doctor right away if you have signs of liver problems like dark urine, tiredness, decreased appetite, upset stomach or stomach pain, light-colored stools, throwing up, or yellow skin or eyes.  Long-term use of an opioid drug may lead to lower sex hormone levels. Call your doctor if you have a lowered interest in sex, fertility problems, no menstrual period, or ejaculation problems.  This drug may raise the chance of seizures in some people, including people who have had seizures in the past. Talk to your doctor to see if you have a greater chance of seizures while taking this drug.  Taking an opioid drug like this drug may lead to a rare but severe adrenal gland  problem. Call your doctor right away if you feel very tired or weak, you pass out, or you have severe dizziness, very upset stomach, throwing up, or decreased appetite.  If you are 65 or older, use this drug with care. You could have more side effects.  Patch:   Avoid use of heat sources (such as sunlamps, tanning beds, heating pads, electric blankets, heat lamps, saunas, hot tubs, heated waterbeds). Avoid long, hot baths or sunbathing. Your temperature may rise and cause too much drug to pass into your body.  If the sticky side of the patch touches another person's skin, wash the area with water only, and get medical help right away.  All oral products, short-acting injection, and patch:   Long-term or regular use of opioid drugs like this drug may lead to dependence. Lowering the dose or stopping this drug all of a sudden may cause a greater risk of withdrawal or other severe problems. Talk to your doctor before you lower the dose or stop this drug. You will need to follow your doctor’s instructions. Tell your doctor if you have more pain, mood changes, thoughts of suicide, or any other bad effects.  If you have been taking this drug for a long time or at high doses, it may not work as well and you may need higher doses to get the same effect. This is known as tolerance. Call your doctor if this drug stops working well. Do not take more than ordered.  Cheek film, patch, and short-acting injection:   If your pain gets worse, if you feel more sensitive to pain, or if you have new pain after you take this drug, call your doctor right away. Do not take more than ordered.  Implant, long-acting injection, and under the tongue (sublingual) tablet:   Follow up with the doctor as you have been told.  Be sure you know how to treat pain while you take this drug. Do not take opioid pain drugs unless your doctor tells you to. Pain drugs may not work as well while you take this drug. Do not take more pain drugs to try to get  them to work. If you have an emergency, tell your health care provider that you take this drug. If you have questions, talk with your doctor.  Implant and long-acting injection:   If you need to stop treatment with this drug, you will need to watch for signs of withdrawal. Tell your doctor if you have any bad effects. If you have questions, talk with your doctor.  If this drug comes out by itself, keep it away from children. Accidental exposure may cause death. If someone else, especially a child, is exposed to this drug by accident, get medical help right away.  Do not try to take this drug out by yourself. This could lead to infection and withdrawal. Talk with your doctor.  Do not rub or massage the area where this drug is put in. Try not to touch the area very often. Touching it often may raise the chance of infection.  Long-acting injection:   Very bad skin problems have happened where the shot was given. Sometimes surgery was needed for these skin problems. Talk with the doctor.  What are some side effects that I need to call my doctor about right away?   WARNING/CAUTION: Even though it may be rare, some people may have very bad and sometimes deadly side effects when taking a drug. Tell your doctor or get medical help right away if you have any of the following signs or symptoms that may be related to a very bad side effect:  All products:   Signs of an allergic reaction, like rash; hives; itching; red, swollen, blistered, or peeling skin with or without fever; wheezing; tightness in the chest or throat; trouble breathing, swallowing, or talking; unusual hoarseness; or swelling of the mouth, face, lips, tongue, or throat.  Signs of low blood sugar like dizziness, headache, feeling sleepy, feeling weak, shaking, a fast heartbeat, confusion, hunger, or sweating.  Sweating a lot.  Fast, slow, or abnormal heartbeat.  Feeling nervous and excitable.  Fever, chills, or sore throat.  Change in balance.  Mood  changes.  Severe constipation or stomach pain. These may be signs of a severe bowel problem.  Extra muscle action or slow movement.  Swelling in the arms or legs.  Change in eyesight.  A burning, numbness, or tingling feeling that is not normal.  Trouble speaking.  Chest pain or pressure or passing out.  Trouble breathing, slow breathing, or shallow breathing.  Noisy breathing.  Breathing problems during sleep (sleep apnea).  Trouble passing urine.  Hallucinations (seeing or hearing things that are not there).  Muscle or joint pain.  Memory problems or loss.  Seizures.  Shakiness.  Slurred speech, stumbling, or feeling confused, very sleepy or dizzy, or drunk.  Not able to focus.  A severe and sometimes deadly problem called serotonin syndrome may happen if you take this drug with certain other drugs. Call your doctor right away if you have agitation; change in balance; confusion; hallucinations; fever; fast or abnormal heartbeat; flushing; muscle twitching or stiffness; seizures; shivering or shaking; sweating a lot; severe diarrhea, upset stomach, or throwing up; or severe headache.  All oral products:   Dental problems like cavities, infections, and loss of teeth have happened with buprenorphine products that are dissolved in the mouth. This has even happened in people who did not already have dental problems. Call your doctor and your dentist right away if you have any problems with your teeth or gums.  Short-acting injection:   Irritation where the shot is given.  Implant:   Depression.  Pain, redness, swelling, or other reaction where the injection was given.  Long-acting injection:   Area that feels hard, blisters, dark scab, lumps, open wound, pain, swelling, or other very bad skin irritation where the shot was given.  What are some other side effects of this drug?   All drugs may cause side effects. However, many people have no side effects or only have minor side effects. Call your doctor or get medical  help if any of these side effects or any other side effects bother you or do not go away:  All products:   Constipation, diarrhea, stomach pain, upset stomach, or throwing up.  Feeling dizzy, sleepy, tired, or weak.  Headache.  Trouble sleeping.  Back pain.  Dry mouth.  Signs of a common cold.  Under the tongue (sublingual) tablet:   Numbness or tingling in the mouth.  Patch:   Skin irritation.  Implant:   Tooth pain.  Mouth pain.  Throat pain.  These are not all of the side effects that may occur. If you have questions about side effects, call your doctor. Call your doctor for medical advice about side effects.  You may report side effects to your national health agency.  You may report side effects to the FDA at 1-394.499.7612. You may also report side effects at https://www.fda.gov/medwatch.  How is this drug best taken?   Use this drug as ordered by your doctor. Read all information given to you. Follow all instructions closely.  Under the tongue (sublingual) tablet:   Place under tongue and let dissolve all the way. Do not chew, suck or swallow tablet.  Do not split or break tablet.  Do not eat, drink, smoke, or talk while this drug is dissolving.  Take this drug at the same time of day.  After this drug has dissolved, take a large sip of water, swish it around in your mouth, and swallow. Wait at least 1 hour before brushing your teeth.  Take good care of your teeth. See a dentist often.  Cheek film and patch:   Use this drug at the same time of day.  Do not use if the pouch that holds this drug is torn, open, or not sealed all the way.  Do not use for fast pain relief or on an as needed basis.  Do not use for pain relief after surgery if you have not been taking drugs like this drug.  Cheek film:   Do not use this drug if it is cut, damaged, or changed in any way.  Do not put this drug on any areas with sores.  Use right after opening.  Wash your hands before use.  Be sure your hands are dry before you touch  this drug.  Wet the inside of your cheek with your tongue or water.  Place the film inside the mouth on a wet cheek. Place the yellow side of the film against the inside of the cheek. Hold for 5 seconds so it sticks to the cheek. Let it dissolve.  Do not touch or move this drug with your tongue or finger after it has been placed.  Do not eat or drink until this drug has dissolved all the way.  After this drug has dissolved, take a large sip of water, swish it around in your mouth, and swallow. Wait at least 1 hour before brushing your teeth.  Take good care of your teeth. See a dentist often.  Patch:   Do not use patches that are cut or do not look right.  Wash your hands before and after use.  Take off old patch first.  Put patch on clean, dry, healthy skin on the upper arm, upper back, upper chest, or side of the chest.  If there is hair where you are putting the patch, clip the hair as close to the skin as you can. Do not shave the hair.  Put the patch in a new area each time you change the patch.  Do not put a new patch on the same skin area as an old patch for at least 21 days.  If the patch falls off, put a new one on.  If the patch loosens, put tape ONLY on the edges of the patch to hold it in place.  If there are problems with the patch not sticking, cover the patch with dressings as you have been told.  Do not put on more than 1 patch at the same time unless your doctor tells you to.  Short-acting injection:   It is given as a shot into a muscle or vein.  Long-acting injection:   It is given as a shot into the fatty part of the skin.  Do not use pressure (waist band or belt) on the part where the shot is given.  Implant:   It is put in as an implant under the skin.  If the implant is sticking out of the skin or comes out by itself, call your doctor right away. Follow what your doctor has told you to do.  What do I do if I miss a dose?   All oral products:   Take a missed dose as soon as you think about  it.  If it is close to the time for your next dose, skip the missed dose and go back to your normal time.  Do not take 2 doses at the same time or extra doses.  Patch:   Put on a missed patch as soon as you think about it after taking off the old one.  Do not apply double dose or extra doses.  Implant and all injection products:   Call your doctor to find out what to do.  How do I store and/or throw out this drug?   All oral products and skin patch:   Store at room temperature in a dry place. Do not store in a bathroom.  Cheek film:   Store in foil pouch until ready for use.  Patch:   Store patches in pouch until ready for use.  After you take off a skin patch, be sure to fold the sticky sides of the patch to each other. Throw away used patches where children and pets cannot get to them.  Follow the info that comes with this drug for throwing out patches that are used or not needed. Check with your pharmacist if you have questions about how to throw out this drug.  Implant and all injection products:   If you need to store this drug at home, talk with your doctor, nurse, or pharmacist about how to store it.  All products:   Throw away unused or  drugs. Do not flush down a toilet or pour down a drain unless you are told to do so. Check with your pharmacist if you have questions about the best way to throw out drugs. There may be drug take-back programs in your area.  Store this drug in a safe place where children cannot see or reach it, and where other people cannot get to it. A locked box or area may help keep this drug safe. Keep all drugs away from pets.  General drug facts   If your symptoms or health problems do not get better or if they become worse, call your doctor.  Do not share your drugs with others and do not take anyone else's drugs.  Some drugs may have another patient information leaflet. If you have any questions about this drug, please talk with your doctor, nurse, pharmacist, or other health  care provider.  This drug comes with an extra patient fact sheet called a Medication Guide. Read it with care. Read it again each time this drug is refilled. If you have any questions about this drug, please talk with the doctor, pharmacist, or other health care provider.  A drug called naloxone can be used to help treat an overdose of this drug. Talk with your doctor or pharmacist about how to get or use naloxone. If you think there has been an overdose, get medical care right away even if naloxone has been used. Be ready to tell or show what was taken, how much, and when it happened.  If you think there has been an overdose, call your poison control center or get medical care right away. Be ready to tell or show what was taken, how much, and when it happened.  Consumer Information Use and Disclaimer   This generalized information is a limited summary of diagnosis, treatment, and/or medication information. It is not meant to be comprehensive and should be used as a tool to help the user understand and/or assess potential diagnostic and treatment options. It does NOT include all information about conditions, treatments, medications, side effects, or risks that may apply to a specific patient. It is not intended to be medical advice or a substitute for the medical advice, diagnosis, or treatment of a health care provider based on the health care provider's examination and assessment of a patient's specific and unique circumstances. Patients must speak with a health care provider for complete information about their health, medical questions, and treatment options, including any risks or benefits regarding use of medications. This information does not endorse any treatments or medications as safe, effective, or approved for treating a specific patient. [employer] and its affiliates disclaim any warranty or liability relating to this information or the use thereof. The use of this information is governed by the  Terms of Use, available at https://www.wolterskluwer.com/en/know/clinical-effectiveness-terms.  Last Reviewed Date   2024-01-16  Copyright   © 2024 [employer] and its affiliates and/or licensors. All rights reserved.

## 2024-12-13 NOTE — TELEPHONE ENCOUNTER
PA for BUTRAN 10 MG  DENIED    Reason:        Message sent to office clinical pool Yes    Denial letter scanned into Media Yes    Appeal started No (Provider will need to decide if appeal is warranted and send clinical documentation to Prior Authorization Team for initiation.)    **Please follow up with your patient regarding denial and next steps**

## 2024-12-16 NOTE — TELEPHONE ENCOUNTER
Caller: Nona waggoner     Doctor: Dr Borjas     Reason for call: Pharmacy called in regard to the Prior auth .     Call back#: [phone]

## 2024-12-18 ENCOUNTER — VBI (OUTPATIENT)
Dept: ADMINISTRATIVE | Facility: OTHER | Age: 54
End: 2024-12-18

## 2024-12-18 NOTE — TELEPHONE ENCOUNTER
12/18/24 3:28 PM     Chart reviewed for Pap Smear (HPV) aka Cervical Cancer Screening ; nothing is submitted to the patient's insurance at this time.     Vu Monk MA   [facility]

## 2024-12-19 ENCOUNTER — TELEPHONE (OUTPATIENT)
Age: 54
End: 2024-12-19

## 2024-12-19 DIAGNOSIS — J45.20 MILD INTERMITTENT ASTHMA WITHOUT COMPLICATION: ICD-10-CM

## 2024-12-19 NOTE — TELEPHONE ENCOUNTER
Caller: Pt    Doctor: Suad    Reason for call: Pt indicates her pharmacy will not fill her ransdermal buprenorphine (Butrans) 10 mcg/ until 1/11.  Pt states the last time she picked up the last order was late Nov and this next order was supposed to be an increased dosage as indicated.    Pt would like clarification. Please assist.     Call back#: [phone]

## 2024-12-20 ENCOUNTER — TELEPHONE (OUTPATIENT)
Age: 54
End: 2024-12-20

## 2024-12-20 RX ORDER — ALBUTEROL SULFATE 90 UG/1
INHALANT RESPIRATORY (INHALATION)
Qty: 8.5 G | Refills: 5 | Status: SHIPPED | OUTPATIENT
Start: 2024-12-20

## 2024-12-20 NOTE — TELEPHONE ENCOUNTER
PA for BUTRANS 10 [facility] APPROVED     Date(s) approved 12/20/2024-12/20/2025         Patient advised by          [x]MyChart Message  []Phone call   []LMOM  []L/M to call office as no active Communication consent on file  []Unable to leave detailed message as VM not approved on Communication consent       Pharmacy advised by    [x]Fax  []Phone call    Approval letter scanned into Media No WILL SCAN UPON RECEIPT

## 2024-12-20 NOTE — TELEPHONE ENCOUNTER
Caller: Nereida jenkins/ [facility]     Doctor:      Reason for call: Calling to advise medication was approved.      Approval good from 12/20/2024-12/20/2025    Auth #: [id number]     Call back#: [phone] .

## 2024-12-20 NOTE — TELEPHONE ENCOUNTER
PA for BUTRANS 10 MCG SUBMITTED to [facility]    via      [x]Other website PROMPT PA EOC ID [id number]      [x]PA sent as URGENT    All office notes, labs and other pertaining documents and studies sent. Clinical questions answered. Awaiting determination from insurance company.     Turnaround time for your insurance to make a decision on your Prior Authorization can take 7-21 business days.

## 2024-12-20 NOTE — TELEPHONE ENCOUNTER
Caller: pharmacy [facility]    Doctor: YENNIFER    Reason for call: Called and stated Auth is need for transdermal buprenorphine (Butrans) 10 mcg/hr TD patch     Please advise with pharmacy    Call back#:

## 2025-01-02 DIAGNOSIS — E03.9 ACQUIRED HYPOTHYROIDISM: ICD-10-CM

## 2025-01-03 RX ORDER — LEVOTHYROXINE SODIUM 112 UG/1
112 TABLET ORAL
Qty: 30 TABLET | Refills: 0 | OUTPATIENT
Start: 2025-01-03

## 2025-01-03 RX ORDER — LEVOTHYROXINE SODIUM 112 UG/1
112 TABLET ORAL
Qty: 90 TABLET | Refills: 1 | Status: SHIPPED | OUTPATIENT
Start: 2025-01-03

## 2025-01-13 ENCOUNTER — TELEPHONE (OUTPATIENT)
Age: 55
End: 2025-01-13

## 2025-01-13 NOTE — TELEPHONE ENCOUNTER
Caller: Pharmacy     Doctor: Suad     Reason for call: Pharmacy calling stating medication buprenorphine (Butrans) 15 mcg/hr needs prior auth please advise     Call back#: [phone]

## 2025-01-13 NOTE — TELEPHONE ENCOUNTER
PA for BUTRANS 15 MG SUBMITTED to [facility]    via    []CMM-KEY:    [x]Surescripts-Case ID #     []Availity-Auth ID #  NDC #    []Faxed to plan   []Other website    []Phone call Case ID #      [x]PA sent as URGENT    All office notes, labs and other pertaining documents and studies sent. Clinical questions answered. Awaiting determination from insurance company.     Turnaround time for your insurance to make a decision on your Prior Authorization can take 7-21 business days.

## 2025-01-14 NOTE — TELEPHONE ENCOUNTER
PA for BUTRANS 15MG  APPROVED     Date(s) approved    January 14, 2025 to January 14, 2026       Patient advised by          [x]MyChart Message  []Phone call   []LMOM  []L/M to call office as no active Communication consent on file  []Unable to leave detailed message as VM not approved on Communication consent       Pharmacy advised by    [x]Fax  []Phone call    Approval letter scanned into Media Yes

## 2025-01-15 NOTE — TELEPHONE ENCOUNTER
Caller: Patient    Doctor: YENNIFER    Reason for call: Patient just called pharmacy and was told Butran still not approved from there part    Please advise with patient   Call back#:

## 2025-01-15 NOTE — TELEPHONE ENCOUNTER
I spoke  with pt & explained I  fax pharmacy the approval to them.They had kate on file -old plan

## 2025-01-17 ENCOUNTER — HOSPITAL ENCOUNTER (OUTPATIENT)
Dept: RADIOLOGY | Facility: HOSPITAL | Age: 55
End: 2025-01-17
Payer: COMMERCIAL

## 2025-01-17 VITALS
TEMPERATURE: 97.2 F | RESPIRATION RATE: 18 BRPM | SYSTOLIC BLOOD PRESSURE: 140 MMHG | DIASTOLIC BLOOD PRESSURE: 65 MMHG | HEART RATE: 85 BPM | OXYGEN SATURATION: 99 %

## 2025-01-17 DIAGNOSIS — M54.16 LUMBAR RADICULOPATHY: ICD-10-CM

## 2025-01-17 DIAGNOSIS — G89.4 CHRONIC PAIN SYNDROME: ICD-10-CM

## 2025-01-17 PROCEDURE — 64484 NJX AA&/STRD TFRM EPI L/S EA: CPT | Performed by: ANESTHESIOLOGY

## 2025-01-17 PROCEDURE — 64483 NJX AA&/STRD TFRM EPI L/S 1: CPT | Performed by: ANESTHESIOLOGY

## 2025-01-17 RX ORDER — PAPAVERINE HCL 150 MG
15 CAPSULE, EXTENDED RELEASE ORAL ONCE
Status: COMPLETED | OUTPATIENT
Start: 2025-01-17 | End: 2025-01-17

## 2025-01-17 RX ADMIN — Medication 15 MG: at 08:42

## 2025-01-17 RX ADMIN — IOHEXOL 2 ML: 300 INJECTION, SOLUTION INTRAVENOUS at 08:42
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
## 2025-01-17 NOTE — DISCHARGE INSTR - LAB
Epidural Steroid Injection   WHAT YOU NEED TO KNOW:   An epidural steroid injection (SARA) is a procedure to inject steroid medicine into the epidural space. The epidural space is between your spinal cord and vertebrae. Steroids reduce inflammation and fluid buildup in your spine that may be causing pain. You may be given pain medicine along with the steroids.          ACTIVITY  Do not drive or operate machinery today.  No strenuous activity today - bending, lifting, etc.  You may resume normal activites starting tomorrow - start slowly and as tolerated.  You may shower today, but no tub baths or hot tubs.  You may have numbness for several hours from the local anesthetic. Please use caution and common sense, especially with weight-bearing activities.    CARE OF THE INJECTION SITE  If you have soreness or pain, apply ice to the area today (20 minutes on/20 minutes off).  Starting tomorrow, you may use warm, moist heat or ice if needed.  You may have an increase or change in your discomfort for 36-48 hours after your treatment.  Apply ice and continue with any pain medication you have been prescribed.  Notify [facility] if you have any of the following: redness, drainage, swelling, headache, stiff neck or fever above 100°F.    SPECIAL INSTRUCTIONS  Our office will contact you in approximately 14 days for a progress report.    MEDICATIONS  Continue to take all routine medications.  Our office may have instructed you to hold some medications.    As no general anesthesia was used in today's procedure, you should not experience any side effects related to anesthesia.     If you are diabetic, the steroids used in today's injection may temporarily increase your blood sugar levels after the first few days after your injection. Please keep a close eye on your sugars and alert the doctor who manages your diabetes if your sugars are significantly high from your baseline or you are symptomatic.     If you have a  problem specifically related to your procedure, please call our office at [phone].  Problems not related to your procedure should be directed to your primary care physician.

## 2025-01-19 DIAGNOSIS — M17.0 PRIMARY OSTEOARTHRITIS OF BOTH KNEES: ICD-10-CM

## 2025-01-20 RX ORDER — MELOXICAM 15 MG/1
TABLET ORAL
Qty: 90 TABLET | Refills: 1 | Status: SHIPPED | OUTPATIENT
Start: 2025-01-20

## 2025-01-31 ENCOUNTER — TELEPHONE (OUTPATIENT)
Dept: PAIN MEDICINE | Facility: CLINIC | Age: 55
End: 2025-01-31

## 2025-02-11 ENCOUNTER — TELEPHONE (OUTPATIENT)
Age: 55
End: 2025-02-11

## 2025-02-12 ENCOUNTER — OFFICE VISIT (OUTPATIENT)
Age: 55
End: 2025-02-12
Payer: COMMERCIAL

## 2025-02-12 ENCOUNTER — TELEPHONE (OUTPATIENT)
Age: 55
End: 2025-02-12

## 2025-02-12 ENCOUNTER — TELEPHONE (OUTPATIENT)
Dept: PAIN MEDICINE | Facility: CLINIC | Age: 55
End: 2025-02-12

## 2025-02-12 VITALS — BODY MASS INDEX: 44.12 KG/M2 | HEIGHT: 63 IN | WEIGHT: 249 LBS

## 2025-02-12 DIAGNOSIS — G89.29 CHRONIC BILATERAL LOW BACK PAIN WITH LEFT-SIDED SCIATICA: ICD-10-CM

## 2025-02-12 DIAGNOSIS — G89.4 CHRONIC PAIN SYNDROME: Primary | ICD-10-CM

## 2025-02-12 DIAGNOSIS — F11.20 UNCOMPLICATED OPIOID DEPENDENCE (HCC): ICD-10-CM

## 2025-02-12 DIAGNOSIS — M54.12 CERVICAL RADICULOPATHY: ICD-10-CM

## 2025-02-12 DIAGNOSIS — M54.16 LUMBAR RADICULOPATHY: ICD-10-CM

## 2025-02-12 DIAGNOSIS — M54.42 CHRONIC BILATERAL LOW BACK PAIN WITH LEFT-SIDED SCIATICA: ICD-10-CM

## 2025-02-12 DIAGNOSIS — M54.2 NECK PAIN: ICD-10-CM

## 2025-02-12 DIAGNOSIS — Z79.891 LONG-TERM CURRENT USE OF OPIATE ANALGESIC: ICD-10-CM

## 2025-02-12 DIAGNOSIS — M47.816 LUMBAR SPONDYLOSIS: ICD-10-CM

## 2025-02-12 PROCEDURE — 99214 OFFICE O/P EST MOD 30 MIN: CPT | Performed by: NURSE PRACTITIONER

## 2025-02-12 RX ORDER — PREGABALIN 50 MG/1
CAPSULE ORAL
Qty: 90 CAPSULE | Refills: 0 | Status: SHIPPED | OUTPATIENT
Start: 2025-02-12

## 2025-02-12 RX ORDER — BUPRENORPHINE 5 UG/H
1 PATCH TRANSDERMAL
Qty: 2 PATCH | Refills: 0 | Status: SHIPPED | OUTPATIENT
Start: 2025-02-12 | End: 2025-02-13
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
## 2025-02-12 NOTE — TELEPHONE ENCOUNTER
Caller: Pharmcy     Doctor: Dr. Kocher    Reason for call: medication  transdermal buprenorphine (Butrans) 5 mcg/hr TD patch      The box comes in 4 pack they can't break it, can they dispense the 4 pack    Call back#: [phone]

## 2025-02-12 NOTE — TELEPHONE ENCOUNTER
Caller: pharmacy    Doctor: kocher    Reason for call: the butrans only come in a pack of 4 and they can not break the pack. Can you please write for 4 patches.    Call back#: [phone]

## 2025-02-12 NOTE — TELEPHONE ENCOUNTER
PA for SUBMITTED to [facility]) 5 mcg    via    []CMM-KEY:    [x]Surescripts-Case ID #    []Availity-Auth ID #  NDC #    []Faxed to plan   []Other website    []Phone call Case ID #      [x]PA sent as URGENT    All office notes, labs and other pertaining documents and studies sent. Clinical questions answered. Awaiting determination from insurance company.     Turnaround time for your insurance to make a decision on your Prior Authorization can take 7-21 business days.

## 2025-02-13 ENCOUNTER — TELEPHONE (OUTPATIENT)
Age: 55
End: 2025-02-13

## 2025-02-13 DIAGNOSIS — G89.4 CHRONIC PAIN SYNDROME: ICD-10-CM

## 2025-02-13 DIAGNOSIS — M54.16 LUMBAR RADICULOPATHY: ICD-10-CM

## 2025-02-13 DIAGNOSIS — M54.9 MID BACK PAIN: ICD-10-CM

## 2025-02-13 DIAGNOSIS — M47.816 LUMBAR SPONDYLOSIS: ICD-10-CM

## 2025-02-13 DIAGNOSIS — M54.12 CERVICAL RADICULOPATHY: ICD-10-CM

## 2025-02-13 DIAGNOSIS — F11.20 UNCOMPLICATED OPIOID DEPENDENCE (HCC): ICD-10-CM

## 2025-02-13 DIAGNOSIS — M54.2 NECK PAIN: Primary | ICD-10-CM

## 2025-02-13 DIAGNOSIS — Z79.891 LONG-TERM CURRENT USE OF OPIATE ANALGESIC: ICD-10-CM

## 2025-02-13 RX ORDER — BUPRENORPHINE 5 UG/H
1 PATCH TRANSDERMAL
Qty: 4 PATCH | Refills: 0 | Status: SHIPPED | OUTPATIENT
Start: 2025-03-15 | End: 2025-04-14

## 2025-02-13 RX ORDER — BUPRENORPHINE 7.5 UG/H
1 PATCH TRANSDERMAL
Qty: 4 PATCH | Refills: 0 | Status: SHIPPED | OUTPATIENT
Start: 2025-02-13 | End: 2025-03-15

## 2025-02-13 NOTE — TELEPHONE ENCOUNTER
Pharm calling back regarding Butrans. LOV pt being weaned off Butrans but pharm unable to only supply 2 patches.    Pls advise. Thank you!

## 2025-02-13 NOTE — TELEPHONE ENCOUNTER
S/w [facility] pharm and RN advised 7.5mcg [facility]

## 2025-02-13 NOTE — TELEPHONE ENCOUNTER
Butrans 7.5 has been sent to the pharmacy  Butrans 5 has been sent to the pharmacy    It will have to be 4 weeks for each because she cannot break the boxes into half doses

## 2025-02-13 NOTE — TELEPHONE ENCOUNTER
Caller: pharmacy    Doctor: Dr. Kocher    Reason for call: pharmacy following up on the message about the butrans patch.  Pt was prescribed two and they come in a box of 4.  They are not able to break open the box to give the pt 2    Call back#: [phone]

## 2025-02-13 NOTE — TELEPHONE ENCOUNTER
BK is OOO are you able to advise on this?  Pt currently weaning off Butrans and BK sent script for 2 patches but pharm unable to split Butrans box.  Thank you!

## 2025-04-04 NOTE — TELEPHONE ENCOUNTER
----- Message from Brooks Morgan DO sent at 6/14/2021 12:29 PM EDT -----  Please call the patient with the C diff toxin by PCR results  It is negative  Pt calling requesting to speak with  please call back. Thanks!

## 2025-04-07 ENCOUNTER — APPOINTMENT (OUTPATIENT)
Dept: LAB | Facility: CLINIC | Age: 55
End: 2025-04-07
Payer: COMMERCIAL

## 2025-04-07 DIAGNOSIS — E03.9 ACQUIRED HYPOTHYROIDISM: ICD-10-CM

## 2025-04-07 DIAGNOSIS — R73.01 IMPAIRED FASTING BLOOD SUGAR: ICD-10-CM

## 2025-04-07 LAB
ALBUMIN SERPL BCG-MCNC: 4.2 G/DL (ref 3.5–5)
ALP SERPL-CCNC: 80 U/L (ref 34–104)
ALT SERPL W P-5'-P-CCNC: 17 U/L (ref 7–52)
ANION GAP SERPL CALCULATED.3IONS-SCNC: 12 MMOL/L (ref 4–13)
AST SERPL W P-5'-P-CCNC: 19 U/L (ref 13–39)
BASOPHILS # BLD AUTO: 0.06 THOUSANDS/ÂΜL (ref 0–0.1)
BASOPHILS NFR BLD AUTO: 1 % (ref 0–1)
BILIRUB SERPL-MCNC: 0.67 MG/DL (ref 0.2–1)
BUN SERPL-MCNC: 21 MG/DL (ref 5–25)
CALCIUM SERPL-MCNC: 9.5 MG/DL (ref 8.4–10.2)
CHLORIDE SERPL-SCNC: 105 MMOL/L (ref 96–108)
CHOLEST SERPL-MCNC: 189 MG/DL (ref ?–200)
CO2 SERPL-SCNC: 22 MMOL/L (ref 21–32)
CREAT SERPL-MCNC: 1.19 MG/DL (ref 0.6–1.3)
EOSINOPHIL # BLD AUTO: 0.31 THOUSAND/ÂΜL (ref 0–0.61)
EOSINOPHIL NFR BLD AUTO: 5 % (ref 0–6)
ERYTHROCYTE [DISTWIDTH] IN BLOOD BY AUTOMATED COUNT: 15.2 % (ref 11.6–15.1)
EST. AVERAGE GLUCOSE BLD GHB EST-MCNC: 108 MG/DL
GFR SERPL CREATININE-BSD FRML MDRD: 52 ML/MIN/1.73SQ M
GLUCOSE P FAST SERPL-MCNC: 93 MG/DL (ref 65–99)
HBA1C MFR BLD: 5.4 %
HCT VFR BLD AUTO: 42.6 % (ref 34.8–46.1)
HDLC SERPL-MCNC: 88 MG/DL
HGB BLD-MCNC: 13.6 G/DL (ref 11.5–15.4)
IMM GRANULOCYTES # BLD AUTO: 0.02 THOUSAND/UL (ref 0–0.2)
IMM GRANULOCYTES NFR BLD AUTO: 0 % (ref 0–2)
LDLC SERPL CALC-MCNC: 80 MG/DL (ref 0–100)
LYMPHOCYTES # BLD AUTO: 1.26 THOUSANDS/ÂΜL (ref 0.6–4.47)
LYMPHOCYTES NFR BLD AUTO: 22 % (ref 14–44)
MCH RBC QN AUTO: 30 PG (ref 26.8–34.3)
MCHC RBC AUTO-ENTMCNC: 31.9 G/DL (ref 31.4–37.4)
MCV RBC AUTO: 94 FL (ref 82–98)
MONOCYTES # BLD AUTO: 0.52 THOUSAND/ÂΜL (ref 0.17–1.22)
MONOCYTES NFR BLD AUTO: 9 % (ref 4–12)
NEUTROPHILS # BLD AUTO: 3.66 THOUSANDS/ÂΜL (ref 1.85–7.62)
NEUTS SEG NFR BLD AUTO: 63 % (ref 43–75)
NRBC BLD AUTO-RTO: 0 /100 WBCS
PLATELET # BLD AUTO: 298 THOUSANDS/UL (ref 149–390)
PMV BLD AUTO: 11 FL (ref 8.9–12.7)
POTASSIUM SERPL-SCNC: 4.3 MMOL/L (ref 3.5–5.3)
PROT SERPL-MCNC: 7.7 G/DL (ref 6.4–8.4)
RBC # BLD AUTO: 4.54 MILLION/UL (ref 3.81–5.12)
SODIUM SERPL-SCNC: 139 MMOL/L (ref 135–147)
TRIGL SERPL-MCNC: 105 MG/DL (ref ?–150)
TSH SERPL DL<=0.05 MIU/L-ACNC: 2.74 UIU/ML (ref 0.45–4.5)
WBC # BLD AUTO: 5.83 THOUSAND/UL (ref 4.31–10.16)

## 2025-04-07 PROCEDURE — 80053 COMPREHEN METABOLIC PANEL: CPT

## 2025-04-07 PROCEDURE — 84443 ASSAY THYROID STIM HORMONE: CPT

## 2025-04-07 PROCEDURE — 36415 COLL VENOUS BLD VENIPUNCTURE: CPT

## 2025-04-07 PROCEDURE — 85025 COMPLETE CBC W/AUTO DIFF WBC: CPT

## 2025-04-07 PROCEDURE — 83036 HEMOGLOBIN GLYCOSYLATED A1C: CPT

## 2025-04-07 PROCEDURE — 80061 LIPID PANEL: CPT

## 2025-04-14 ENCOUNTER — RA CDI HCC (OUTPATIENT)
Dept: OTHER | Facility: HOSPITAL | Age: 55
End: 2025-04-14

## 2025-04-21 ENCOUNTER — OFFICE VISIT (OUTPATIENT)
Dept: INTERNAL MEDICINE CLINIC | Facility: CLINIC | Age: 55
End: 2025-04-21
Payer: COMMERCIAL

## 2025-04-21 VITALS
TEMPERATURE: 97.9 F | HEIGHT: 63 IN | HEART RATE: 74 BPM | BODY MASS INDEX: 45 KG/M2 | OXYGEN SATURATION: 99 % | SYSTOLIC BLOOD PRESSURE: 132 MMHG | WEIGHT: 254 LBS | DIASTOLIC BLOOD PRESSURE: 76 MMHG

## 2025-04-21 DIAGNOSIS — E03.9 ACQUIRED HYPOTHYROIDISM: Primary | ICD-10-CM

## 2025-04-21 DIAGNOSIS — M54.16 LUMBAR RADICULOPATHY: ICD-10-CM

## 2025-04-21 DIAGNOSIS — Z12.4 SCREENING FOR CERVICAL CANCER: ICD-10-CM

## 2025-04-21 DIAGNOSIS — M17.0 PRIMARY OSTEOARTHRITIS OF BOTH KNEES: ICD-10-CM

## 2025-04-21 DIAGNOSIS — E66.01 OBESITY, MORBID (HCC): ICD-10-CM

## 2025-04-21 DIAGNOSIS — Z12.31 ENCOUNTER FOR SCREENING MAMMOGRAM FOR BREAST CANCER: ICD-10-CM

## 2025-04-21 DIAGNOSIS — J45.20 MILD INTERMITTENT ASTHMA WITHOUT COMPLICATION: ICD-10-CM

## 2025-04-21 DIAGNOSIS — K51.018 ULCERATIVE PANCOLITIS WITH OTHER COMPLICATION (HCC): ICD-10-CM

## 2025-04-21 PROCEDURE — G2211 COMPLEX E/M VISIT ADD ON: HCPCS | Performed by: INTERNAL MEDICINE

## 2025-04-21 PROCEDURE — 99214 OFFICE O/P EST MOD 30 MIN: CPT | Performed by: INTERNAL MEDICINE

## 2025-04-21 NOTE — PROGRESS NOTES
Name: Sofia Copeland      : 1970      MRN: [id number]  Encounter Provider: Casey Hudson MD  Encounter Date: 2025   Encounter department: [facility] [address]  :  Assessment & Plan  Acquired hypothyroidism         Mild intermittent asthma without complication         Lumbar radiculopathy         Primary osteoarthritis of both knees         Encounter for screening mammogram for breast cancer    Orders:  •  Mammo screening bilateral w 3d and cad; Future    Screening for cervical cancer    Orders:  •  Ambulatory referral to Obstetrics / Gynecology; Future    Ulcerative pancolitis with other complication (HCC)         Obesity, morbid (HCC)             Preventive health issues were discussed with patient, and age appropriate screening tests were ordered as noted in patient's After Visit Summary. Personalized health advice and appropriate referrals for health education or preventive services given if needed, as noted in patient's After Visit Summary.    History of Present Illness     Follow-up on blood test done 25 test discussed with her,        Patient Care Team:  Casey Hudson MD as PCP - General (Internal Medicine)  Casey Hudson MD as PCP - PCP-[facility] ([facility])  Kierra Sanabria PA-C as Physician Assistant (Physician Assistant)  Brodie Frankel DO (Gastroenterology)  BRAYAN Garay as Nurse Practitioner (Nephrology)    Review of Systems   Constitutional:  Negative for chills and fever.   HENT:  Negative for congestion, ear pain and sore throat.    Eyes:  Negative for pain.   Respiratory:  Negative for cough and shortness of breath.    Cardiovascular:  Negative for chest pain and leg swelling.   Gastrointestinal:  Negative for abdominal pain, nausea and vomiting.   Endocrine: Negative for polyuria.   Genitourinary:  Negative for difficulty urinating, frequency and urgency.   Musculoskeletal:  Positive for arthralgias and back pain.   Skin:  Negative for rash.  "  Neurological:  Negative for weakness and headaches.   Psychiatric/Behavioral:  Negative for sleep disturbance. The patient is not nervous/anxious.      Medical History Reviewed by provider this encounter:  Tobacco  Allergies  Meds  Problems  Med Hx  Surg Hx  Fam Hx                No results found.    Objective   /76 (BP Location: Left arm, Patient Position: Sitting, Cuff Size: Standard)   Pulse 74   Temp 97.9 °F (36.6 °C) (Temporal)   Ht 5' 3\" (1.6 m)   Wt 115 kg (254 lb)   LMP 06/06/2022 (Exact Date)   SpO2 99%   BMI 44.99 kg/m²     Physical Exam  Vitals and nursing note reviewed.   Constitutional:       General: She is not in acute distress.     Appearance: She is well-developed.   HENT:      Head: Normocephalic and atraumatic.      Right Ear: Tympanic membrane, ear canal and external ear normal.      Left Ear: Tympanic membrane, ear canal and external ear normal.      Mouth/Throat:      Mouth: Mucous membranes are moist.      Pharynx: Oropharynx is clear.   Eyes:      Extraocular Movements: Extraocular movements intact.      Conjunctiva/sclera: Conjunctivae normal.   Cardiovascular:      Rate and Rhythm: Normal rate and regular rhythm.      Heart sounds: Normal heart sounds. No murmur heard.  Pulmonary:      Effort: Pulmonary effort is normal. No respiratory distress.      Breath sounds: Normal breath sounds. No wheezing or rales.   Abdominal:      General: Abdomen is flat. There is no distension.      Palpations: Abdomen is soft.      Tenderness: There is no abdominal tenderness.   Musculoskeletal:         General: No swelling.      Cervical back: Neck supple.      Right lower leg: No edema.      Left lower leg: No edema.   Skin:     General: Skin is warm and dry.      Capillary Refill: Capillary refill takes less than 2 seconds.   Neurological:      General: No focal deficit present.      Mental Status: She is alert and oriented to person, place, and time.   Psychiatric:         Mood and " Affect: Mood normal.

## 2025-07-01 DIAGNOSIS — E03.9 ACQUIRED HYPOTHYROIDISM: ICD-10-CM

## 2025-07-01 NOTE — TELEPHONE ENCOUNTER
Medication: levothyroxine 112 mcg tablet     Dose/Frequency:     Take 1 tablet (112 mcg total) by mouth daily in the early morning       Quantity:  90 tablet     Pharmacy: [id number] - [place] - [address]      Office:   [x] PCP/Provider -   [] Speciality/Provider -     Does the patient have enough for 3 days?   [x] Yes   [] No - Send as HP to POD

## 2025-07-02 RX ORDER — LEVOTHYROXINE SODIUM 112 UG/1
112 TABLET ORAL
Qty: 90 TABLET | Refills: 1 | Status: SHIPPED | OUTPATIENT
Start: 2025-07-02

## 2025-08-07 DIAGNOSIS — M17.0 PRIMARY OSTEOARTHRITIS OF BOTH KNEES: ICD-10-CM

## 2025-08-07 RX ORDER — MELOXICAM 15 MG/1
15 TABLET ORAL DAILY
Qty: 90 TABLET | Refills: 0 | Status: SHIPPED | OUTPATIENT
Start: 2025-08-07

## (undated) DEVICE — DRESSING MEPILEX BORDER 4 X 12 IN

## (undated) DEVICE — JACKSON-PRATT 100CC BULB RESERVOIR: Brand: CARDINAL HEALTH

## (undated) DEVICE — PROXIMATE RELOADABLE LINEAR CUTTER WITH SAFETY LOCK-OUT, 75MM: Brand: PROXIMATE

## (undated) DEVICE — LUBRICANT SURGILUBE TUBE 4 OZ  FLIP TOP

## (undated) DEVICE — SUT MONOCRYL 4-0 PS-2 18 IN Y496G

## (undated) DEVICE — SCD SEQUENTIAL COMPRESSION COMFORT SLEEVE MEDIUM KNEE LENGTH: Brand: KENDALL SCD

## (undated) DEVICE — WOUND RETRACTOR AND PROTECTOR: Brand: ALEXIS O WOUND PROTECTOR-RETRACTOR

## (undated) DEVICE — POOLE SUCTION HANDLE: Brand: CARDINAL HEALTH

## (undated) DEVICE — INTENDED FOR TISSUE SEPARATION, AND OTHER PROCEDURES THAT REQUIRE A SHARP SURGICAL BLADE TO PUNCTURE OR CUT.: Brand: BARD-PARKER SAFETY BLADES SIZE 10, STERILE

## (undated) DEVICE — VEST COOLING PLUS SZ SNGL USE

## (undated) DEVICE — GOWN ,SIRUS ,NONREINFORCED 4XL: Brand: MEDLINE

## (undated) DEVICE — PROXIMATE LINEAR CUTTER RELOAD, BLUE, 75MM: Brand: PROXIMATE

## (undated) DEVICE — SUT VICRYL 2-0 REEL 54 IN J286G

## (undated) DEVICE — SUT VICRYL 3-0 SH 27 IN J416H

## (undated) DEVICE — TRAY FOLEY 16FR URIMETER SURESTEP

## (undated) DEVICE — PROXIMATE RELOADABLE LINEAR CUTTER WITH SAFETY LOCK-OUT, 100MM: Brand: PROXIMATE

## (undated) DEVICE — TROCAR: Brand: KII® SLEEVE

## (undated) DEVICE — ADHESIVE SKIN HIGH VISCOSITY EXOFIN 1ML

## (undated) DEVICE — SUT PROLENE 2-0 SH 30 IN 8833H

## (undated) DEVICE — VAC CANISTER 500ML

## (undated) DEVICE — SUT SILK 3-0 SH 30 IN K832H

## (undated) DEVICE — PROXIMATE LINEAR CUTTER RELOADS (STANDARD)-100MM: Brand: PROXIMATE

## (undated) DEVICE — BETHLEHEM MAJOR GENERAL PACK: Brand: CARDINAL HEALTH

## (undated) DEVICE — GLOVE SRG BIOGEL 7.5

## (undated) DEVICE — TIBURON LAPAROSCOPIC ABDOMINAL DRAPE: Brand: CONVERTORS

## (undated) DEVICE — PLUMEPEN PRO 10FT

## (undated) DEVICE — ABDOMINAL PAD: Brand: DERMACEA

## (undated) DEVICE — TROCAR: Brand: KII FIOS FIRST ENTRY

## (undated) DEVICE — SUT VICRYL 0 REEL 54 IN J287G

## (undated) DEVICE — DRAIN SPONGES,6 PLY: Brand: EXCILON

## (undated) DEVICE — SUT VICRYL 1 CT-1 27 IN J261H

## (undated) DEVICE — NEEDLE 18 G X 1 1/2 SAFETY

## (undated) DEVICE — ECHELON CONTOUR W/ BLUE RELOAD: Brand: ECHELON

## (undated) DEVICE — PACK PBDS STERILE LAP LITHOTOMY RF

## (undated) DEVICE — GLOVE INDICATOR PI UNDERGLOVE SZ 7.5 BLUE

## (undated) DEVICE — GAUZE SPONGES,16 PLY: Brand: CURITY

## (undated) DEVICE — SUT VICRYL 3-0 PS-2 18 IN J497G

## (undated) DEVICE — ABTHERA OPEN ABDOMEN DRESSING WITH SENSATRAC PAD: Brand: ABTHERA™ SENSAT.R.A.C.™

## (undated) DEVICE — LIGHT HANDLE COVER SLEEVE DISP BLUE STELLAR

## (undated) DEVICE — SUT VICRYL 3-0 REEL 54 IN J285G

## (undated) DEVICE — GLOVE INDICATOR PI UNDERGLOVE SZ 7 BLUE

## (undated) DEVICE — CONTOUR CURVED CUTTER STAPLER RELOAD: Brand: CONTOUR

## (undated) DEVICE — TUBING SMOKE EVAC W/FILTRATION DEVICE PLUMEPORT ACTIV

## (undated) DEVICE — ENSEAL 20 CM SHAFT, LARGE JAW: Brand: ENSEAL X1

## (undated) DEVICE — INTERCEED

## (undated) DEVICE — [HIGH FLOW INSUFFLATOR,  DO NOT USE IF PACKAGE IS DAMAGED,  KEEP DRY,  KEEP AWAY FROM SUNLIGHT,  PROTECT FROM HEAT AND RADIOACTIVE SOURCES.]: Brand: PNEUMOSURE

## (undated) DEVICE — ALLENTOWN LAP CHOLE APP PACK: Brand: CARDINAL HEALTH

## (undated) DEVICE — LIGACLIP MCA MULTIPLE CLIP APPLIERS, 20 MEDIUM CLIPS: Brand: LIGACLIP

## (undated) DEVICE — BOWL: 16OZ PEELPOUCH 75/CS 16/PLT: Brand: MEDEGEN MEDICAL PRODUCTS, LLC

## (undated) DEVICE — VIOLET BRAIDED (POLYGLACTIN 910), SYNTHETIC ABSORBABLE SUTURE: Brand: COATED VICRYL

## (undated) DEVICE — IRRIG ENDO FLO TUBING

## (undated) DEVICE — ENDOPATH ECHELON ENDOSCOPIC LINEAR CUTTER RELOADS, BLUE, 60MM: Brand: ECHELON ENDOPATH

## (undated) DEVICE — PENROSE DRAIN, 18 X 3 8: Brand: CARDINAL HEALTH

## (undated) DEVICE — ECHELON CONTOUR GST RELOAD GREEN: Brand: ECHELON

## (undated) DEVICE — CHLORAPREP HI-LITE 26ML ORANGE

## (undated) DEVICE — JP PERF DRN SIL FLT 10MM FULL: Brand: CARDINAL HEALTH

## (undated) DEVICE — ADHESIVE REMOVER: Brand: UNI-SOLVE ADHESIVE REMOVER 8OZ US

## (undated) DEVICE — 3M™ IOBAN™ 2 ANTIMICROBIAL INCISE DRAPE 6650EZ: Brand: IOBAN™ 2

## (undated) DEVICE — DRESSING MEPILEX AG BORDER 4 X 8 IN

## (undated) DEVICE — GLOVE SRG BIOGEL 7

## (undated) DEVICE — ECHELON FLEX POWERED PLUS COMPACT ARTICULATING ENDOSCOPIC LINEAR CUTTER, 60MM: Brand: ECHELON FLEX

## (undated) DEVICE — ECHELON CIRCULAR POWERED STAPLER: Brand: ECHELON CIRCULAR

## (undated) DEVICE — SUT PDS PLUS 1 CTX 36IN PDP371T

## (undated) DEVICE — 3000CC GUARDIAN II: Brand: GUARDIAN

## (undated) DEVICE — DRESSING MEPILEX AG BORDER POST-OP 4 X 6 IN

## (undated) DEVICE — COVER ROLL 8 IN X 2 YD STRETCH TAPE

## (undated) DEVICE — INTENDED FOR TISSUE SEPARATION, AND OTHER PROCEDURES THAT REQUIRE A SHARP SURGICAL BLADE TO PUNCTURE OR CUT.: Brand: BARD-PARKER SAFETY BLADES SIZE 15, STERILE

## (undated) DEVICE — CONTOUR CURVED CUTTER STAPLER: Brand: CONTOUR

## (undated) DEVICE — TOWEL SET X-RAY

## (undated) DEVICE — ANTIBACTERIAL UNDYED BRAIDED (POLYGLACTIN 910), SYNTHETIC ABSORBABLE SUTURE: Brand: COATED VICRYL

## (undated) DEVICE — PAD GROUNDING ADULT

## (undated) DEVICE — SUT PROLENE 2-0 SH 36 IN 8523H

## (undated) DEVICE — DRAPE EQUIPMENT RF WAND

## (undated) DEVICE — STERILE EMESIS BASIN                 070: Brand: CARDINAL HEALTH